# Patient Record
Sex: MALE | Race: WHITE | Employment: UNEMPLOYED | URBAN - METROPOLITAN AREA
[De-identification: names, ages, dates, MRNs, and addresses within clinical notes are randomized per-mention and may not be internally consistent; named-entity substitution may affect disease eponyms.]

---

## 2019-03-22 ENCOUNTER — APPOINTMENT (OUTPATIENT)
Dept: CT IMAGING | Age: 39
DRG: 244 | End: 2019-03-22
Attending: INTERNAL MEDICINE
Payer: MEDICAID

## 2019-03-22 ENCOUNTER — HOSPITAL ENCOUNTER (INPATIENT)
Age: 39
LOS: 7 days | Discharge: HOME | DRG: 244 | End: 2019-03-29
Attending: INTERNAL MEDICINE | Admitting: INTERNAL MEDICINE
Payer: MEDICAID

## 2019-03-22 DIAGNOSIS — K57.40 DIVERTICULITIS OF BOTH LARGE AND SMALL INTESTINE WITH ABSCESS WITHOUT BLEEDING: ICD-10-CM

## 2019-03-22 DIAGNOSIS — K65.1 INTRA-ABDOMINAL ABSCESS (HCC): Primary | ICD-10-CM

## 2019-03-22 LAB
ANION GAP SERPL CALCULATED.3IONS-SCNC: 14 MMOL/L (ref 3–16)
BUN BLDV-MCNC: 10 MG/DL (ref 7–20)
CALCIUM SERPL-MCNC: 8.8 MG/DL (ref 8.3–10.6)
CHLORIDE BLD-SCNC: 91 MMOL/L (ref 99–110)
CO2: 30 MMOL/L (ref 21–32)
CREAT SERPL-MCNC: 0.7 MG/DL (ref 0.9–1.3)
GFR AFRICAN AMERICAN: >60
GFR NON-AFRICAN AMERICAN: >60
GLUCOSE BLD-MCNC: 103 MG/DL (ref 70–99)
INR BLD: 1.65 (ref 0.86–1.14)
POTASSIUM SERPL-SCNC: 2.7 MMOL/L (ref 3.5–5.1)
PROTHROMBIN TIME: 18.8 SEC (ref 9.8–13)
SODIUM BLD-SCNC: 135 MMOL/L (ref 136–145)

## 2019-03-22 PROCEDURE — 6370000000 HC RX 637 (ALT 250 FOR IP): Performed by: INTERNAL MEDICINE

## 2019-03-22 PROCEDURE — 6360000002 HC RX W HCPCS: Performed by: INTERNAL MEDICINE

## 2019-03-22 PROCEDURE — 2580000003 HC RX 258: Performed by: INTERNAL MEDICINE

## 2019-03-22 PROCEDURE — 85610 PROTHROMBIN TIME: CPT

## 2019-03-22 PROCEDURE — 2500000003 HC RX 250 WO HCPCS: Performed by: INTERNAL MEDICINE

## 2019-03-22 PROCEDURE — 87076 CULTURE ANAEROBE IDENT EACH: CPT

## 2019-03-22 PROCEDURE — 87070 CULTURE OTHR SPECIMN AEROBIC: CPT

## 2019-03-22 PROCEDURE — 36415 COLL VENOUS BLD VENIPUNCTURE: CPT

## 2019-03-22 PROCEDURE — 1200000000 HC SEMI PRIVATE

## 2019-03-22 PROCEDURE — 6360000002 HC RX W HCPCS: Performed by: RADIOLOGY

## 2019-03-22 PROCEDURE — 99254 IP/OBS CNSLTJ NEW/EST MOD 60: CPT | Performed by: SURGERY

## 2019-03-22 PROCEDURE — 87205 SMEAR GRAM STAIN: CPT

## 2019-03-22 PROCEDURE — 2709999900 CT PERITONEAL/RETROPERITONEAL PERC DRAIN

## 2019-03-22 PROCEDURE — 87185 SC STD ENZYME DETCJ PER NZM: CPT

## 2019-03-22 PROCEDURE — 0W9G30Z DRAINAGE OF PERITONEAL CAVITY WITH DRAINAGE DEVICE, PERCUTANEOUS APPROACH: ICD-10-PCS | Performed by: RADIOLOGY

## 2019-03-22 PROCEDURE — 80048 BASIC METABOLIC PNL TOTAL CA: CPT

## 2019-03-22 PROCEDURE — 87186 SC STD MICRODIL/AGAR DIL: CPT

## 2019-03-22 PROCEDURE — 87075 CULTR BACTERIA EXCEPT BLOOD: CPT

## 2019-03-22 RX ORDER — SODIUM CHLORIDE 9 MG/ML
INJECTION, SOLUTION INTRAVENOUS CONTINUOUS
Status: DISCONTINUED | OUTPATIENT
Start: 2019-03-22 | End: 2019-03-25

## 2019-03-22 RX ORDER — POTASSIUM CHLORIDE 20 MEQ/1
40 TABLET, EXTENDED RELEASE ORAL PRN
Status: DISCONTINUED | OUTPATIENT
Start: 2019-03-22 | End: 2019-03-29 | Stop reason: HOSPADM

## 2019-03-22 RX ORDER — LORAZEPAM 0.5 MG/1
0.5 TABLET ORAL EVERY 6 HOURS PRN
Status: DISCONTINUED | OUTPATIENT
Start: 2019-03-22 | End: 2019-03-29 | Stop reason: HOSPADM

## 2019-03-22 RX ORDER — SODIUM CHLORIDE 0.9 % (FLUSH) 0.9 %
10 SYRINGE (ML) INJECTION PRN
Status: DISCONTINUED | OUTPATIENT
Start: 2019-03-22 | End: 2019-03-29 | Stop reason: HOSPADM

## 2019-03-22 RX ORDER — POTASSIUM CHLORIDE 7.45 MG/ML
10 INJECTION INTRAVENOUS PRN
Status: DISCONTINUED | OUTPATIENT
Start: 2019-03-22 | End: 2019-03-29 | Stop reason: HOSPADM

## 2019-03-22 RX ORDER — ACETAMINOPHEN 325 MG/1
650 TABLET ORAL EVERY 4 HOURS PRN
Status: DISCONTINUED | OUTPATIENT
Start: 2019-03-22 | End: 2019-03-29 | Stop reason: HOSPADM

## 2019-03-22 RX ORDER — MAGNESIUM SULFATE 1 G/100ML
1 INJECTION INTRAVENOUS PRN
Status: DISCONTINUED | OUTPATIENT
Start: 2019-03-22 | End: 2019-03-29 | Stop reason: HOSPADM

## 2019-03-22 RX ORDER — MIDAZOLAM HYDROCHLORIDE 5 MG/ML
INJECTION INTRAMUSCULAR; INTRAVENOUS
Status: COMPLETED | OUTPATIENT
Start: 2019-03-22 | End: 2019-03-22

## 2019-03-22 RX ORDER — HYDROMORPHONE HCL 110MG/55ML
0.5 PATIENT CONTROLLED ANALGESIA SYRINGE INTRAVENOUS
Status: DISCONTINUED | OUTPATIENT
Start: 2019-03-22 | End: 2019-03-23

## 2019-03-22 RX ORDER — SODIUM CHLORIDE 0.9 % (FLUSH) 0.9 %
10 SYRINGE (ML) INJECTION EVERY 12 HOURS SCHEDULED
Status: DISCONTINUED | OUTPATIENT
Start: 2019-03-22 | End: 2019-03-29 | Stop reason: HOSPADM

## 2019-03-22 RX ORDER — FENTANYL CITRATE 50 UG/ML
INJECTION, SOLUTION INTRAMUSCULAR; INTRAVENOUS
Status: COMPLETED | OUTPATIENT
Start: 2019-03-22 | End: 2019-03-22

## 2019-03-22 RX ORDER — ONDANSETRON 2 MG/ML
4 INJECTION INTRAMUSCULAR; INTRAVENOUS EVERY 6 HOURS PRN
Status: DISCONTINUED | OUTPATIENT
Start: 2019-03-22 | End: 2019-03-29 | Stop reason: HOSPADM

## 2019-03-22 RX ADMIN — SODIUM CHLORIDE: 900 INJECTION, SOLUTION INTRAVENOUS at 21:56

## 2019-03-22 RX ADMIN — HYDROMORPHONE HYDROCHLORIDE 0.5 MG: 2 INJECTION, SOLUTION INTRAMUSCULAR; INTRAVENOUS; SUBCUTANEOUS at 16:52

## 2019-03-22 RX ADMIN — ONDANSETRON 4 MG: 2 INJECTION INTRAMUSCULAR; INTRAVENOUS at 13:59

## 2019-03-22 RX ADMIN — METRONIDAZOLE 500 MG: 500 INJECTION, SOLUTION INTRAVENOUS at 18:27

## 2019-03-22 RX ADMIN — LORAZEPAM 0.5 MG: 0.5 TABLET ORAL at 21:44

## 2019-03-22 RX ADMIN — FENTANYL CITRATE 50 MCG: 50 INJECTION INTRAMUSCULAR; INTRAVENOUS at 15:59

## 2019-03-22 RX ADMIN — METRONIDAZOLE 500 MG: 500 INJECTION, SOLUTION INTRAVENOUS at 21:37

## 2019-03-22 RX ADMIN — Medication 10 ML: at 21:37

## 2019-03-22 RX ADMIN — HYDROMORPHONE HYDROCHLORIDE 0.5 MG: 2 INJECTION, SOLUTION INTRAMUSCULAR; INTRAVENOUS; SUBCUTANEOUS at 21:44

## 2019-03-22 RX ADMIN — ONDANSETRON 4 MG: 2 INJECTION INTRAMUSCULAR; INTRAVENOUS at 21:44

## 2019-03-22 RX ADMIN — SODIUM CHLORIDE: 900 INJECTION, SOLUTION INTRAVENOUS at 13:50

## 2019-03-22 RX ADMIN — MIDAZOLAM HYDROCHLORIDE 1 MG: 5 INJECTION INTRAMUSCULAR; INTRAVENOUS at 15:59

## 2019-03-22 RX ADMIN — HYDROMORPHONE HYDROCHLORIDE 0.5 MG: 2 INJECTION, SOLUTION INTRAMUSCULAR; INTRAVENOUS; SUBCUTANEOUS at 13:47

## 2019-03-22 RX ADMIN — CEFEPIME HYDROCHLORIDE 1 G: 1 INJECTION, POWDER, FOR SOLUTION INTRAMUSCULAR; INTRAVENOUS at 21:37

## 2019-03-22 ASSESSMENT — PAIN DESCRIPTION - LOCATION: LOCATION: ABDOMEN

## 2019-03-22 ASSESSMENT — PAIN SCALES - GENERAL
PAINLEVEL_OUTOF10: 10
PAINLEVEL_OUTOF10: 10
PAINLEVEL_OUTOF10: 8
PAINLEVEL_OUTOF10: 9
PAINLEVEL_OUTOF10: 9
PAINLEVEL_OUTOF10: 7
PAINLEVEL_OUTOF10: 8

## 2019-03-22 ASSESSMENT — PAIN DESCRIPTION - PAIN TYPE: TYPE: ACUTE PAIN

## 2019-03-22 ASSESSMENT — PAIN DESCRIPTION - FREQUENCY: FREQUENCY: CONTINUOUS

## 2019-03-22 ASSESSMENT — PAIN DESCRIPTION - ONSET: ONSET: GRADUAL

## 2019-03-22 ASSESSMENT — PAIN DESCRIPTION - DESCRIPTORS: DESCRIPTORS: ACHING;CONSTANT

## 2019-03-22 NOTE — PROGRESS NOTES
Pt arrived for image guided abscess drain placement. Procedure explained including the risk and benefits of the procedure. All questions answered and pt states no more questions. Labs, allergies, medications, and code status reviewed. No contraindications noted. Pre Farooq score 10. Time out taken.

## 2019-03-22 NOTE — PRE SEDATION
Sedation Pre-Procedure Note    Patient Name: Maria Luisa Weiss   YOB: 1980  Room/Bed: 0212/0212-01  Medical Record Number: [id number]  Date: 3/22/2019   Time: 3:45 PM       Indication:  abd abcess drain    Consent: I have discussed with the patient and/or the patient representative the indication, alternatives, and the possible risks and/or complications of the planned procedure and the anesthesia methods. The patient and/or patient representative appear to understand and agree to proceed. Vital Signs:   Vitals:    03/22/19 1324   BP: 116/70   Pulse: 81   Resp: 16   Temp: 98.4 °F (36.9 °C)   SpO2: 97%       Past Medical History:   has no past medical history on file. Past Surgical History:   has no past surgical history on file. Medications:   Scheduled Meds:    sodium chloride flush  10 mL Intravenous 2 times per day    enoxaparin  40 mg Subcutaneous Daily    cefepime  1 g Intravenous Q8H    metroNIDAZOLE  500 mg Intravenous Q8H     Continuous Infusions:    sodium chloride 125 mL/hr at 03/22/19 1350     PRN Meds: sodium chloride flush, potassium chloride **OR** potassium alternative oral replacement **OR** potassium chloride, magnesium sulfate, magnesium hydroxide, ondansetron, acetaminophen, HYDROmorphone, LORazepam  Home Meds:   Prior to Admission medications    Medication Sig Start Date End Date Taking? Authorizing Provider   naproxen (NAPROSYN) 500 MG tablet Take 1 tablet by mouth 2 times daily 8/15/17   BENI Daly CNP     Coumadin Use Last 7 Days:  no  Antiplatelet drug therapy use last 7 days: no  Other anticoagulant use last 7 days: no  Additional Medication Information:        Pre-Sedation Documentation and Exam:   I have reviewed the patient's history and review of systems.     Mallampati Airway Assessment:  Mallampati Class II - (soft palate, fauces & uvula are visible)    Prior History of Anesthesia Complications:   none    ASA Classification:  Class 2 - A normal healthy patient with mild systemic disease    Sedation/ Anesthesia Plan:   intravenous sedation    Medications Planned:   midazolam (Versed) intravenously and fentanyl intravenously    Patient is an appropriate candidate for plan of sedation: yes    Electronically signed by Rambo Peters MD on 3/22/2019 at 3:45 PM

## 2019-03-22 NOTE — PROGRESS NOTES
Consult has been called to Dr. Julio Montoya on 3/22/2019 . Spoke with Apolinar Greene in office.  2:17 PM    Baldev Medina  3/22/2019

## 2019-03-22 NOTE — PROGRESS NOTES
Admission assessment completed:    Alert and Oriented x4. Vitals are WNL. Respiratory status is regular, easy, unlabored, and SpO2 is 95% on RA. Pain is 9/10, located in abdomen, Pain medication given. Pt denies any other complications at this time. SR up 2/4. Bed in lowest position. Call light within reach. . Will monitor.

## 2019-03-22 NOTE — PROGRESS NOTES
4 Eyes Skin Assessment     The patient is being assess for   Admission    I agree that 2 RN's have performed a thorough Head to Toe Skin Assessment on the patient. ALL assessment sites listed below have been assessed. Areas assessed by both nurses:   [x]   Head, Face, and Ears   [x]   Shoulders, Back, and Chest, Abdomen  [x]   Arms, Elbows, and Hands   [x]   Coccyx, Sacrum, and Ischium  [x]   Legs, Feet, and Heels        Patient has no skin issues    **SHARE this note so that the co-signing nurse is able to place an eSignature**    Co-signer eSignature: Electronically signed by Radha Gregg RN on 3/22/19 at 6:50 PM    Does the Patient have Skin Breakdown?   No          Sebastian Prevention initiated:  No   Wound Care Orders initiated:  No      WOC nurse consulted for Pressure Injury (Stage 3,4, Unstageable, DTI, NWPT, Complex wounds)and New or Established Ostomies:  NA      Primary Nurse eSignature: Electronically signed by Nitin Ag RN on 3/22/19 at 3:59 PM

## 2019-03-22 NOTE — PROGRESS NOTES
Patient tolerated procedure well, specimens sent, drain to gravity bag,with green purulent drainage. Report called to Krishan Mcadams RN. Transport notified to take patient to floor in stable condition.   Ruchi DIAZ RN

## 2019-03-22 NOTE — PLAN OF CARE
49-year-old male coming from [facility]  Failed outpatient antibiotic treatment for radiculitis completed course of Cipro and Flagyl  Back with abdominal pain, CT shows 2 abscesses  Transferred to [facility], IV antibiotics and surgery consult

## 2019-03-22 NOTE — CONSULTS
mg, Intravenous, Q3H PRN  LORazepam (ATIVAN) tablet 0.5 mg, 0.5 mg, Oral, Q6H PRN  Prior to Admission medications    Medication Sig Start Date End Date Taking? Authorizing Provider   naproxen (NAPROSYN) 500 MG tablet Take 1 tablet by mouth 2 times daily 8/15/17   BENI Ocasio - CNP        Allergies:  Tramadol    Social History:   TOBACCO:   reports that he has been smoking. He has a 20.00 pack-year smoking history. He does not have any smokeless tobacco history on file. ETOH:   reports that he drinks alcohol. DRUGS:   reports that he does not use drugs. Family History:    No family history on file. REVIEW OF SYSTEMS:  He reports no complaints related to the eyes, ears , nose throat or mouth. He denies weight loss. No chest pain. No SOB. No urinary complaints. No musculoskeletal complaints. No skin rashes. No neurologic deficits. No bleeding tendencies. GI complaints include lower abdominal pain. PHYSICAL EXAM:  VITALS:  /70   Pulse 81   Temp 98.4 °F (36.9 °C) (Oral)   Resp 16   Ht 6' 1\" (1.854 m)   Wt 227 lb (103 kg)   SpO2 97%   BMI 29.95 kg/m²     CONSTITUTIONAL:  alert, no apparent distress and normal weight  EYES:  sclera clear  ENT:  normocepalic, without obvious abnormality  NECK:  supple, symmetrical, trachea midline  LUNGS:  clear to auscultation  CARDIOVASCULAR:  regular rate and rhythm   ABDOMEN:   non-distended, tenderness noted in the left lower quadrant,  and soft  MUSCULOSKELETAL:  No pitting edema lower extremities  NEUROLOGIC:  Mental Status Exam:  Level of Alertness:   awake  Orientation:   person, place, time  SKIN:  no rashes    DATA:    CBC: WBC 20 at [facility]  BMP:    Recent Labs     03/22/19  1414   *   K 2.7*   CL 91*   CO2 30   BUN 10   CREATININE 0.7*   GLUCOSE 103*         Radiology Review:  CT reviewed and shows pericolonic abscess    ASSESSMENT:  Sigmoid diverticulitis with abdominopelvic abscess    PLAN:  Discussed with IR.   Plan for percutaneous drainage. Antibiotics.         [address] [facility]

## 2019-03-23 LAB
ANION GAP SERPL CALCULATED.3IONS-SCNC: 16 MMOL/L (ref 3–16)
ANISOCYTOSIS: ABNORMAL
ATYPICAL LYMPHOCYTE RELATIVE PERCENT: 2 % (ref 0–6)
BANDED NEUTROPHILS RELATIVE PERCENT: 1 % (ref 0–7)
BASOPHILS ABSOLUTE: 0 K/UL (ref 0–0.2)
BASOPHILS RELATIVE PERCENT: 0 %
BUN BLDV-MCNC: 9 MG/DL (ref 7–20)
CALCIUM SERPL-MCNC: 8.1 MG/DL (ref 8.3–10.6)
CHLORIDE BLD-SCNC: 92 MMOL/L (ref 99–110)
CO2: 25 MMOL/L (ref 21–32)
CREAT SERPL-MCNC: 0.6 MG/DL (ref 0.9–1.3)
EOSINOPHILS ABSOLUTE: 0 K/UL (ref 0–0.6)
EOSINOPHILS RELATIVE PERCENT: 0 %
GFR AFRICAN AMERICAN: >60
GFR NON-AFRICAN AMERICAN: >60
GLUCOSE BLD-MCNC: 111 MG/DL (ref 70–99)
HCT VFR BLD CALC: 34.7 % (ref 40.5–52.5)
HEMOGLOBIN: 11.6 G/DL (ref 13.5–17.5)
LYMPHOCYTES ABSOLUTE: 1.4 K/UL (ref 1–5.1)
LYMPHOCYTES RELATIVE PERCENT: 6 %
MAGNESIUM: 1.9 MG/DL (ref 1.8–2.4)
MCH RBC QN AUTO: 29.4 PG (ref 26–34)
MCHC RBC AUTO-ENTMCNC: 33.3 G/DL (ref 31–36)
MCV RBC AUTO: 88.3 FL (ref 80–100)
MONOCYTES ABSOLUTE: 1.2 K/UL (ref 0–1.3)
MONOCYTES RELATIVE PERCENT: 7 %
NEUTROPHILS ABSOLUTE: 15 K/UL (ref 1.7–7.7)
NEUTROPHILS RELATIVE PERCENT: 84 %
PDW BLD-RTO: 15.2 % (ref 12.4–15.4)
PHOSPHORUS: 3.1 MG/DL (ref 2.5–4.9)
PLATELET # BLD: 242 K/UL (ref 135–450)
PLATELET SLIDE REVIEW: ADEQUATE
PMV BLD AUTO: 8.5 FL (ref 5–10.5)
POTASSIUM REFLEX MAGNESIUM: 2.8 MMOL/L (ref 3.5–5.1)
RBC # BLD: 3.94 M/UL (ref 4.2–5.9)
SLIDE REVIEW: ABNORMAL
SODIUM BLD-SCNC: 133 MMOL/L (ref 136–145)
WBC # BLD: 17.7 K/UL (ref 4–11)

## 2019-03-23 PROCEDURE — 85025 COMPLETE CBC W/AUTO DIFF WBC: CPT

## 2019-03-23 PROCEDURE — 6370000000 HC RX 637 (ALT 250 FOR IP): Performed by: INTERNAL MEDICINE

## 2019-03-23 PROCEDURE — 80048 BASIC METABOLIC PNL TOTAL CA: CPT

## 2019-03-23 PROCEDURE — 2580000003 HC RX 258: Performed by: SURGERY

## 2019-03-23 PROCEDURE — 84100 ASSAY OF PHOSPHORUS: CPT

## 2019-03-23 PROCEDURE — 1200000000 HC SEMI PRIVATE

## 2019-03-23 PROCEDURE — 6370000000 HC RX 637 (ALT 250 FOR IP): Performed by: SURGERY

## 2019-03-23 PROCEDURE — 83735 ASSAY OF MAGNESIUM: CPT

## 2019-03-23 PROCEDURE — 6360000002 HC RX W HCPCS: Performed by: INTERNAL MEDICINE

## 2019-03-23 PROCEDURE — 6360000002 HC RX W HCPCS: Performed by: SURGERY

## 2019-03-23 PROCEDURE — 2580000003 HC RX 258: Performed by: INTERNAL MEDICINE

## 2019-03-23 PROCEDURE — 36415 COLL VENOUS BLD VENIPUNCTURE: CPT

## 2019-03-23 PROCEDURE — 99233 SBSQ HOSP IP/OBS HIGH 50: CPT | Performed by: SURGERY

## 2019-03-23 PROCEDURE — 2500000003 HC RX 250 WO HCPCS: Performed by: INTERNAL MEDICINE

## 2019-03-23 RX ORDER — OXYCODONE HYDROCHLORIDE 5 MG/1
5 TABLET ORAL EVERY 4 HOURS PRN
Status: DISCONTINUED | OUTPATIENT
Start: 2019-03-23 | End: 2019-03-29 | Stop reason: HOSPADM

## 2019-03-23 RX ORDER — NICOTINE 21 MG/24HR
1 PATCH, TRANSDERMAL 24 HOURS TRANSDERMAL DAILY
Status: DISCONTINUED | OUTPATIENT
Start: 2019-03-23 | End: 2019-03-29 | Stop reason: HOSPADM

## 2019-03-23 RX ORDER — OXYCODONE HYDROCHLORIDE 5 MG/1
10 TABLET ORAL EVERY 4 HOURS PRN
Status: DISCONTINUED | OUTPATIENT
Start: 2019-03-23 | End: 2019-03-29 | Stop reason: HOSPADM

## 2019-03-23 RX ORDER — HYDROMORPHONE HCL 110MG/55ML
1 PATIENT CONTROLLED ANALGESIA SYRINGE INTRAVENOUS
Status: DISCONTINUED | OUTPATIENT
Start: 2019-03-23 | End: 2019-03-28

## 2019-03-23 RX ORDER — MAGNESIUM SULFATE IN WATER 40 MG/ML
2 INJECTION, SOLUTION INTRAVENOUS ONCE
Status: COMPLETED | OUTPATIENT
Start: 2019-03-23 | End: 2019-03-23

## 2019-03-23 RX ORDER — KETOROLAC TROMETHAMINE 30 MG/ML
30 INJECTION, SOLUTION INTRAMUSCULAR; INTRAVENOUS EVERY 8 HOURS PRN
Status: DISPENSED | OUTPATIENT
Start: 2019-03-23 | End: 2019-03-28

## 2019-03-23 RX ADMIN — HYDROMORPHONE HYDROCHLORIDE 1 MG: 2 INJECTION, SOLUTION INTRAMUSCULAR; INTRAVENOUS; SUBCUTANEOUS at 19:04

## 2019-03-23 RX ADMIN — POTASSIUM CHLORIDE 10 MEQ: 7.46 INJECTION, SOLUTION INTRAVENOUS at 18:05

## 2019-03-23 RX ADMIN — LORAZEPAM 0.5 MG: 0.5 TABLET ORAL at 17:23

## 2019-03-23 RX ADMIN — MEROPENEM 1 G: 1 INJECTION, POWDER, FOR SOLUTION INTRAVENOUS at 09:44

## 2019-03-23 RX ADMIN — OXYCODONE HYDROCHLORIDE 10 MG: 5 TABLET ORAL at 09:43

## 2019-03-23 RX ADMIN — HYDROMORPHONE HYDROCHLORIDE 1 MG: 2 INJECTION, SOLUTION INTRAMUSCULAR; INTRAVENOUS; SUBCUTANEOUS at 14:53

## 2019-03-23 RX ADMIN — ONDANSETRON 4 MG: 2 INJECTION INTRAMUSCULAR; INTRAVENOUS at 08:02

## 2019-03-23 RX ADMIN — HYDROMORPHONE HYDROCHLORIDE 0.5 MG: 2 INJECTION, SOLUTION INTRAMUSCULAR; INTRAVENOUS; SUBCUTANEOUS at 08:02

## 2019-03-23 RX ADMIN — OXYCODONE HYDROCHLORIDE 10 MG: 5 TABLET ORAL at 18:00

## 2019-03-23 RX ADMIN — ONDANSETRON 4 MG: 2 INJECTION INTRAMUSCULAR; INTRAVENOUS at 13:54

## 2019-03-23 RX ADMIN — POTASSIUM CHLORIDE 10 MEQ: 7.46 INJECTION, SOLUTION INTRAVENOUS at 13:00

## 2019-03-23 RX ADMIN — OXYCODONE HYDROCHLORIDE 10 MG: 5 TABLET ORAL at 22:09

## 2019-03-23 RX ADMIN — KETOROLAC TROMETHAMINE 30 MG: 30 INJECTION, SOLUTION INTRAMUSCULAR; INTRAVENOUS at 18:00

## 2019-03-23 RX ADMIN — POTASSIUM CHLORIDE 10 MEQ: 7.46 INJECTION, SOLUTION INTRAVENOUS at 19:05

## 2019-03-23 RX ADMIN — MEROPENEM 1 G: 1 INJECTION, POWDER, FOR SOLUTION INTRAVENOUS at 17:19

## 2019-03-23 RX ADMIN — LORAZEPAM 0.5 MG: 0.5 TABLET ORAL at 23:31

## 2019-03-23 RX ADMIN — KETOROLAC TROMETHAMINE 30 MG: 30 INJECTION, SOLUTION INTRAMUSCULAR; INTRAVENOUS at 10:05

## 2019-03-23 RX ADMIN — METRONIDAZOLE 500 MG: 500 INJECTION, SOLUTION INTRAVENOUS at 05:31

## 2019-03-23 RX ADMIN — HYDROMORPHONE HYDROCHLORIDE 1 MG: 2 INJECTION, SOLUTION INTRAMUSCULAR; INTRAVENOUS; SUBCUTANEOUS at 11:13

## 2019-03-23 RX ADMIN — POTASSIUM CHLORIDE 10 MEQ: 7.46 INJECTION, SOLUTION INTRAVENOUS at 16:14

## 2019-03-23 RX ADMIN — SODIUM CHLORIDE: 900 INJECTION, SOLUTION INTRAVENOUS at 20:02

## 2019-03-23 RX ADMIN — HYDROMORPHONE HYDROCHLORIDE 0.5 MG: 2 INJECTION, SOLUTION INTRAMUSCULAR; INTRAVENOUS; SUBCUTANEOUS at 02:52

## 2019-03-23 RX ADMIN — POTASSIUM CHLORIDE 10 MEQ: 7.46 INJECTION, SOLUTION INTRAVENOUS at 12:00

## 2019-03-23 RX ADMIN — CEFEPIME HYDROCHLORIDE 1 G: 1 INJECTION, POWDER, FOR SOLUTION INTRAMUSCULAR; INTRAVENOUS at 05:31

## 2019-03-23 RX ADMIN — POTASSIUM CHLORIDE 10 MEQ: 7.46 INJECTION, SOLUTION INTRAVENOUS at 14:48

## 2019-03-23 RX ADMIN — SODIUM CHLORIDE: 900 INJECTION, SOLUTION INTRAVENOUS at 18:04

## 2019-03-23 RX ADMIN — ONDANSETRON 4 MG: 2 INJECTION INTRAMUSCULAR; INTRAVENOUS at 22:11

## 2019-03-23 RX ADMIN — Medication 10 ML: at 20:02

## 2019-03-23 RX ADMIN — SODIUM CHLORIDE: 900 INJECTION, SOLUTION INTRAVENOUS at 05:36

## 2019-03-23 RX ADMIN — MAGNESIUM SULFATE HEPTAHYDRATE 2 G: 40 INJECTION, SOLUTION INTRAVENOUS at 09:50

## 2019-03-23 RX ADMIN — HYDROMORPHONE HYDROCHLORIDE 1 MG: 2 INJECTION, SOLUTION INTRAMUSCULAR; INTRAVENOUS; SUBCUTANEOUS at 23:31

## 2019-03-23 RX ADMIN — OXYCODONE HYDROCHLORIDE 10 MG: 5 TABLET ORAL at 13:51

## 2019-03-23 RX ADMIN — ACETAMINOPHEN 650 MG: 325 TABLET ORAL at 04:13

## 2019-03-23 ASSESSMENT — PAIN SCALES - GENERAL
PAINLEVEL_OUTOF10: 8
PAINLEVEL_OUTOF10: 9
PAINLEVEL_OUTOF10: 8
PAINLEVEL_OUTOF10: 8
PAINLEVEL_OUTOF10: 0
PAINLEVEL_OUTOF10: 8
PAINLEVEL_OUTOF10: 8
PAINLEVEL_OUTOF10: 6
PAINLEVEL_OUTOF10: 8
PAINLEVEL_OUTOF10: 10
PAINLEVEL_OUTOF10: 0
PAINLEVEL_OUTOF10: 8
PAINLEVEL_OUTOF10: 8

## 2019-03-23 ASSESSMENT — PAIN DESCRIPTION - FREQUENCY
FREQUENCY: CONTINUOUS

## 2019-03-23 ASSESSMENT — PAIN DESCRIPTION - ORIENTATION
ORIENTATION: MID;LOWER

## 2019-03-23 ASSESSMENT — PAIN - FUNCTIONAL ASSESSMENT
PAIN_FUNCTIONAL_ASSESSMENT: ACTIVITIES ARE NOT PREVENTED

## 2019-03-23 ASSESSMENT — PAIN DESCRIPTION - DESCRIPTORS
DESCRIPTORS: ACHING;CONSTANT

## 2019-03-23 ASSESSMENT — PAIN DESCRIPTION - PAIN TYPE
TYPE: ACUTE PAIN

## 2019-03-23 ASSESSMENT — PAIN DESCRIPTION - LOCATION
LOCATION: ABDOMEN

## 2019-03-23 ASSESSMENT — PAIN DESCRIPTION - PROGRESSION
CLINICAL_PROGRESSION: NOT CHANGED

## 2019-03-23 ASSESSMENT — PAIN DESCRIPTION - ONSET
ONSET: ON-GOING

## 2019-03-23 NOTE — PLAN OF CARE
Problem: Infection:  Goal: Will remain free from infection  Description  Will remain free from infection  Outcome: Ongoing     Problem: Safety:  Goal: Free from accidental physical injury  Description  Free from accidental physical injury  Outcome: Ongoing     Problem: Pain:  Goal: Patient's pain/discomfort is manageable  Description  Patient's pain/discomfort is manageable  Outcome: Ongoing

## 2019-03-23 NOTE — FLOWSHEET NOTE
03/23/19 0930   Vital Signs   Temp 97.8 °F (36.6 °C)   Temp Source Oral   Pulse 88   Heart Rate Source Monitor   Resp 16   /68   BP Location Right upper arm   BP Upper/Lower Upper   Patient Position Supine   Level of Consciousness 0   MEWS Score 1   Oxygen Therapy   SpO2 96 %   O2 Device None (Room air)   AM assessment completed, see flow sheet. Pt is alert and oriented. Vital signs are WNL. Respirations are even & easy. Pt with c/o pain to abdomen, PRN pain meds given per order as well as nausea med, will monitor for effect. Dr. Yogi Dan made aware of K 2.8, K+ protocol replacement orders in place, and will adminster KCl per orders. Pt denies needs at this time. SR up x 2, and bed in low position. Call light is within reach.

## 2019-03-23 NOTE — CARE COORDINATION
Case Management Assessment  Initial Evaluation    Date/Time of Evaluation: 3/23/2019 10:57 AM  Assessment Completed by: Brenton Figueroa    Patient Name: Gerry Grijalva  YOB: 1980  Diagnosis: Diverticulitis of both small and large intestine with perforation and abscess without bleeding [K57.40]  Diverticulitis of both large and small intestine with abscess, unspecified bleeding status [K57.40]  Date / Time: 3/22/2019 12:50 PM  Admission status/Date:inpatient 03/22/19  Chart Reviewed: Yes      Patient Interviewed: Yes   Family Interviewed:  No      Hospitalization in the last 30 days:  No    Contacts  :     Relationship to Patient:   Phone Number:    Alternate Contact:     Relationship to Patient:     Phone Number:    Met with:    Current PCP  No primary care provider on file. Financial  self pay  Precert required for SNF : Y, N        3 night stay required - Y, N    ADLS  Support Systems: Parent  Transportation: self    Meal Preparation: self    Housing  Home Environment: home with his Mom  Steps: one  Plans to Return to Present Housing: Yes  Other Identified Issues: no    Home Care Information  Currently active with [facility] [address] : No  Type of Home Care Services: None  Passport/Waiver : No  :                      Phone Number:    Passport/Waiver Services: no          Durable Medical Equipment   DME Provider: n/a  Equipment: Walker__Cane__RTS__ BSC__Shower Chair__  02__ HHN__ CPAP__  BiPap__  Hospital Bed__ W/C___ Other__________      Has Home O2 in place on admit:  No  Informed of need to bring portable home O2 tank on day of discharge for nursing to connect prior to leaving:   Not Indicated  Verbalized agreement/Understanding:   Not Indicated    Community Service Affiliation  Dialysis:  No    · Name:  · Location  · Dialysis Schedule:  · Phone:   · Fax:     Outpatient PT/OT: No    Cancer Center: No     CHF Clinic: No     Pulmonary Rehab: No  Pain Clinic:

## 2019-03-23 NOTE — PLAN OF CARE
Problem: Infection:  Goal: Will remain free from infection  Description  Will remain free from infection  Outcome: Ongoing     Problem: Daily Care:  Goal: Daily care needs are met  Description  Daily care needs are met  Outcome: Ongoing     Problem: Pain:  Description  Pain management should include both nonpharmacologic and pharmacologic interventions.   Goal: Patient's pain/discomfort is manageable  Description  Patient's pain/discomfort is manageable  Outcome: Ongoing  Goal: Pain level will decrease  Description  Pain level will decrease  Outcome: Ongoing     Problem: Tobacco Use:  Goal: Inpatient tobacco use cessation counseling participation  Description  Inpatient tobacco use cessation counseling participation  Outcome: Ongoing

## 2019-03-23 NOTE — H&P
cooperative. HEENT Normal cephalic, atraumatic without obvious deformity. Pupils equal, round, and reactive to light. Extra ocular muscles intact. Conjunctivae/corneas clear. Neck: Supple, No jugular venous distention/bruits. Trachea midline without thyromegaly or adenopathy with full range of motion. Lungs: Clear to auscultation, bilaterally without Rales/Wheezes/Rhonchi with good respiratory effort. Heart: Regular rate and rhythm with Normal S1/S2 without murmurs, rubs or gallops, point of maximum impulse non-displaced  Abdomen: diffuse tenderness in the lower abdomen, RUQ and LUQ no pain. + BS. Suprapubic region midline abd drain in place. Draining feculent material.   Extremities: No clubbing, cyanosis, or edema bilaterally. Full range of motion without deformity and normal gait intact. Skin: Skin color, texture, turgor normal.  No rashes or lesions. Neurologic: Alert and oriented X 3, neurovascularly intact with sensory/motor intact upper extremities/lower extremities, bilaterally. Cranial nerves: II-XII intact, grossly non-focal.  Mental status: Alert, oriented, thought content appropriate. Capillary Refill: Acceptable  < 3 seconds  Peripheral Pulses: +3 Easily felt, not easily obliterated with pressure    CBC   Recent Labs     03/23/19  0600   WBC 17.7*   HGB 11.6*   HCT 34.7*         RENAL  Recent Labs     03/22/19  1414 03/23/19  0559   * 133*   K 2.7* 2.8*   CL 91* 92*   CO2 30 25   PHOS  --  3.1   BUN 10 9   CREATININE 0.7* 0.6*     LFT'S  No results for input(s): AST, ALT, ALB, BILIDIR, BILITOT, ALKPHOS in the last 72 hours. COAG  Recent Labs     03/22/19  1414   INR 1.65*     CARDIAC ENZYMES  No results for input(s): CKTOTAL, CKMB, CKMBINDEX, TROPONINI in the last 72 hours.     U/A:    Lab Results   Component Value Date    COLORU Yellow 08/15/2017    WBCUA None seen 08/15/2017    RBCUA 0-2 08/15/2017    CLARITYU Clear 08/15/2017    SPECGRAV 1.010 08/15/2017    LEUKOCYTESUR

## 2019-03-24 PROBLEM — E44.0 MODERATE PROTEIN-CALORIE MALNUTRITION (HCC): Status: ACTIVE | Noted: 2019-03-24

## 2019-03-24 LAB
ANION GAP SERPL CALCULATED.3IONS-SCNC: 12 MMOL/L (ref 3–16)
BASOPHILS ABSOLUTE: 0 K/UL (ref 0–0.2)
BASOPHILS RELATIVE PERCENT: 0.2 %
BUN BLDV-MCNC: 11 MG/DL (ref 7–20)
CALCIUM SERPL-MCNC: 8.5 MG/DL (ref 8.3–10.6)
CHLORIDE BLD-SCNC: 97 MMOL/L (ref 99–110)
CO2: 25 MMOL/L (ref 21–32)
CREAT SERPL-MCNC: 0.6 MG/DL (ref 0.9–1.3)
EOSINOPHILS ABSOLUTE: 0.1 K/UL (ref 0–0.6)
EOSINOPHILS RELATIVE PERCENT: 0.9 %
GFR AFRICAN AMERICAN: >60
GFR NON-AFRICAN AMERICAN: >60
GLUCOSE BLD-MCNC: 89 MG/DL (ref 70–99)
HCT VFR BLD CALC: 32.2 % (ref 40.5–52.5)
HEMOGLOBIN: 10.6 G/DL (ref 13.5–17.5)
LYMPHOCYTES ABSOLUTE: 1.5 K/UL (ref 1–5.1)
LYMPHOCYTES RELATIVE PERCENT: 12.1 %
MAGNESIUM: 2.2 MG/DL (ref 1.8–2.4)
MCH RBC QN AUTO: 29.7 PG (ref 26–34)
MCHC RBC AUTO-ENTMCNC: 33.1 G/DL (ref 31–36)
MCV RBC AUTO: 89.9 FL (ref 80–100)
MONOCYTES ABSOLUTE: 1.2 K/UL (ref 0–1.3)
MONOCYTES RELATIVE PERCENT: 9.7 %
NEUTROPHILS ABSOLUTE: 9.6 K/UL (ref 1.7–7.7)
NEUTROPHILS RELATIVE PERCENT: 77.1 %
PDW BLD-RTO: 15.1 % (ref 12.4–15.4)
PHOSPHORUS: 2.1 MG/DL (ref 2.5–4.9)
PLATELET # BLD: 234 K/UL (ref 135–450)
PMV BLD AUTO: 8.5 FL (ref 5–10.5)
POTASSIUM REFLEX MAGNESIUM: 3.5 MMOL/L (ref 3.5–5.1)
RBC # BLD: 3.58 M/UL (ref 4.2–5.9)
SODIUM BLD-SCNC: 134 MMOL/L (ref 136–145)
WBC # BLD: 12.4 K/UL (ref 4–11)

## 2019-03-24 PROCEDURE — 2580000003 HC RX 258: Performed by: SURGERY

## 2019-03-24 PROCEDURE — 6360000002 HC RX W HCPCS: Performed by: SURGERY

## 2019-03-24 PROCEDURE — 6370000000 HC RX 637 (ALT 250 FOR IP): Performed by: INTERNAL MEDICINE

## 2019-03-24 PROCEDURE — 83735 ASSAY OF MAGNESIUM: CPT

## 2019-03-24 PROCEDURE — 2500000003 HC RX 250 WO HCPCS: Performed by: SURGERY

## 2019-03-24 PROCEDURE — 2580000003 HC RX 258: Performed by: INTERNAL MEDICINE

## 2019-03-24 PROCEDURE — 80048 BASIC METABOLIC PNL TOTAL CA: CPT

## 2019-03-24 PROCEDURE — 84100 ASSAY OF PHOSPHORUS: CPT

## 2019-03-24 PROCEDURE — 85025 COMPLETE CBC W/AUTO DIFF WBC: CPT

## 2019-03-24 PROCEDURE — 99232 SBSQ HOSP IP/OBS MODERATE 35: CPT | Performed by: SURGERY

## 2019-03-24 PROCEDURE — 6360000002 HC RX W HCPCS: Performed by: INTERNAL MEDICINE

## 2019-03-24 PROCEDURE — 36415 COLL VENOUS BLD VENIPUNCTURE: CPT

## 2019-03-24 PROCEDURE — 6370000000 HC RX 637 (ALT 250 FOR IP): Performed by: SURGERY

## 2019-03-24 PROCEDURE — 1200000000 HC SEMI PRIVATE

## 2019-03-24 RX ORDER — POTASSIUM CHLORIDE 7.45 MG/ML
40 INJECTION INTRAVENOUS ONCE
Status: DISCONTINUED | OUTPATIENT
Start: 2019-03-24 | End: 2019-03-24 | Stop reason: SDUPTHER

## 2019-03-24 RX ORDER — POTASSIUM CHLORIDE 7.45 MG/ML
10 INJECTION INTRAVENOUS
Status: COMPLETED | OUTPATIENT
Start: 2019-03-24 | End: 2019-03-24

## 2019-03-24 RX ADMIN — HYDROMORPHONE HYDROCHLORIDE 1 MG: 2 INJECTION, SOLUTION INTRAMUSCULAR; INTRAVENOUS; SUBCUTANEOUS at 15:06

## 2019-03-24 RX ADMIN — MEROPENEM 1 G: 1 INJECTION, POWDER, FOR SOLUTION INTRAVENOUS at 00:32

## 2019-03-24 RX ADMIN — ONDANSETRON 4 MG: 2 INJECTION INTRAMUSCULAR; INTRAVENOUS at 21:08

## 2019-03-24 RX ADMIN — POTASSIUM CHLORIDE 10 MEQ: 7.46 INJECTION, SOLUTION INTRAVENOUS at 09:55

## 2019-03-24 RX ADMIN — OXYCODONE HYDROCHLORIDE 10 MG: 5 TABLET ORAL at 16:52

## 2019-03-24 RX ADMIN — POTASSIUM PHOSPHATE, MONOBASIC AND POTASSIUM PHOSPHATE, DIBASIC 20 MMOL: 224; 236 INJECTION, SOLUTION INTRAVENOUS at 17:20

## 2019-03-24 RX ADMIN — HYDROMORPHONE HYDROCHLORIDE 1 MG: 2 INJECTION, SOLUTION INTRAMUSCULAR; INTRAVENOUS; SUBCUTANEOUS at 03:45

## 2019-03-24 RX ADMIN — KETOROLAC TROMETHAMINE 30 MG: 30 INJECTION, SOLUTION INTRAMUSCULAR; INTRAVENOUS at 10:01

## 2019-03-24 RX ADMIN — LORAZEPAM 0.5 MG: 0.5 TABLET ORAL at 23:22

## 2019-03-24 RX ADMIN — ONDANSETRON 4 MG: 2 INJECTION INTRAMUSCULAR; INTRAVENOUS at 06:05

## 2019-03-24 RX ADMIN — HYDROMORPHONE HYDROCHLORIDE 1 MG: 2 INJECTION, SOLUTION INTRAMUSCULAR; INTRAVENOUS; SUBCUTANEOUS at 18:06

## 2019-03-24 RX ADMIN — OXYCODONE HYDROCHLORIDE 10 MG: 5 TABLET ORAL at 06:05

## 2019-03-24 RX ADMIN — OXYCODONE HYDROCHLORIDE 10 MG: 5 TABLET ORAL at 02:03

## 2019-03-24 RX ADMIN — SODIUM CHLORIDE: 900 INJECTION, SOLUTION INTRAVENOUS at 12:18

## 2019-03-24 RX ADMIN — MEROPENEM 1 G: 1 INJECTION, POWDER, FOR SOLUTION INTRAVENOUS at 08:07

## 2019-03-24 RX ADMIN — POTASSIUM CHLORIDE 10 MEQ: 7.46 INJECTION, SOLUTION INTRAVENOUS at 11:00

## 2019-03-24 RX ADMIN — OXYCODONE HYDROCHLORIDE 10 MG: 5 TABLET ORAL at 21:08

## 2019-03-24 RX ADMIN — HYDROMORPHONE HYDROCHLORIDE 1 MG: 2 INJECTION, SOLUTION INTRAMUSCULAR; INTRAVENOUS; SUBCUTANEOUS at 11:00

## 2019-03-24 RX ADMIN — HYDROMORPHONE HYDROCHLORIDE 1 MG: 2 INJECTION, SOLUTION INTRAMUSCULAR; INTRAVENOUS; SUBCUTANEOUS at 07:11

## 2019-03-24 RX ADMIN — ONDANSETRON 4 MG: 2 INJECTION INTRAMUSCULAR; INTRAVENOUS at 15:06

## 2019-03-24 RX ADMIN — SODIUM CHLORIDE: 900 INJECTION, SOLUTION INTRAVENOUS at 21:08

## 2019-03-24 RX ADMIN — POTASSIUM CHLORIDE 10 MEQ: 7.46 INJECTION, SOLUTION INTRAVENOUS at 09:00

## 2019-03-24 RX ADMIN — HYDROMORPHONE HYDROCHLORIDE 1 MG: 2 INJECTION, SOLUTION INTRAMUSCULAR; INTRAVENOUS; SUBCUTANEOUS at 22:11

## 2019-03-24 RX ADMIN — POTASSIUM CHLORIDE 10 MEQ: 7.46 INJECTION, SOLUTION INTRAVENOUS at 12:00

## 2019-03-24 RX ADMIN — MEROPENEM 1 G: 1 INJECTION, POWDER, FOR SOLUTION INTRAVENOUS at 16:52

## 2019-03-24 RX ADMIN — OXYCODONE HYDROCHLORIDE 10 MG: 5 TABLET ORAL at 10:01

## 2019-03-24 RX ADMIN — KETOROLAC TROMETHAMINE 30 MG: 30 INJECTION, SOLUTION INTRAMUSCULAR; INTRAVENOUS at 18:04

## 2019-03-24 ASSESSMENT — PAIN - FUNCTIONAL ASSESSMENT
PAIN_FUNCTIONAL_ASSESSMENT: ACTIVITIES ARE NOT PREVENTED

## 2019-03-24 ASSESSMENT — PAIN DESCRIPTION - FREQUENCY
FREQUENCY: CONTINUOUS

## 2019-03-24 ASSESSMENT — PAIN DESCRIPTION - PAIN TYPE
TYPE: ACUTE PAIN

## 2019-03-24 ASSESSMENT — PAIN DESCRIPTION - PROGRESSION
CLINICAL_PROGRESSION: GRADUALLY IMPROVING
CLINICAL_PROGRESSION: GRADUALLY IMPROVING
CLINICAL_PROGRESSION: NOT CHANGED

## 2019-03-24 ASSESSMENT — PAIN DESCRIPTION - DESCRIPTORS
DESCRIPTORS: ACHING;DISCOMFORT
DESCRIPTORS: ACHING;DISCOMFORT;SORE
DESCRIPTORS: SORE

## 2019-03-24 ASSESSMENT — PAIN SCALES - GENERAL
PAINLEVEL_OUTOF10: 8
PAINLEVEL_OUTOF10: 0
PAINLEVEL_OUTOF10: 8
PAINLEVEL_OUTOF10: 0
PAINLEVEL_OUTOF10: 8
PAINLEVEL_OUTOF10: 6
PAINLEVEL_OUTOF10: 8
PAINLEVEL_OUTOF10: 6
PAINLEVEL_OUTOF10: 0
PAINLEVEL_OUTOF10: 8

## 2019-03-24 ASSESSMENT — PAIN DESCRIPTION - ORIENTATION
ORIENTATION: LOWER;MID
ORIENTATION: LOWER;MID
ORIENTATION: MID;LOWER
ORIENTATION: LOWER;MID
ORIENTATION: MID;LOWER
ORIENTATION: LOWER;MID
ORIENTATION: MID;LOWER
ORIENTATION: LOWER;MID
ORIENTATION: LOWER;MID

## 2019-03-24 ASSESSMENT — PAIN DESCRIPTION - LOCATION
LOCATION: ABDOMEN

## 2019-03-24 ASSESSMENT — PAIN DESCRIPTION - ONSET
ONSET: ON-GOING

## 2019-03-24 NOTE — FLOWSHEET NOTE
03/24/19 0800   Vital Signs   Temp 98.8 °F (37.1 °C)   Temp Source Axillary   Pulse 83   Heart Rate Source Monitor   Resp 16   /68   BP Location Left upper arm   BP Upper/Lower Upper   Patient Position Supine   Level of Consciousness 0   MEWS Score 1   Pain Assessment   Pain Assessment 0-10   Pain Level 8   Pain Type Acute pain   Pain Location Abdomen   Pain Orientation Mid;Lower   Pain Descriptors Sore   Pain Frequency Continuous   Pain Onset On-going   Clinical Progression Gradually improving   Functional Pain Assessment Activities are not prevented   Non-Pharmaceutical Pain Intervention(s) Repositioned; Rest   AM assessment completed, see flow sheet. Pt is alert and oriented. Vital signs are WNL. Respirations are even & easy. C/o pain to abdomen but was recently given PRN pain meds. Pt c/o some pain to l foot, sustained from a fall that happened 6 months ago. Foot slightly swollen but no redness noted. Will make MD aware. Pt denies needs at this time. SR up x 2, and bed in low position. Call light is within reach.

## 2019-03-24 NOTE — PROGRESS NOTES
Pt with c/o pain and some nausea a this time. PRN dilaudid and PRN zofran given. Will monitor for effect.

## 2019-03-24 NOTE — PROGRESS NOTES
Pt started on full liquid diet. Will monitor tolerance. Per Dr. Yogi Dan, ok for pt to shower with drain in place.

## 2019-03-24 NOTE — PROGRESS NOTES
Nutrition Assessment    Type and Reason for Visit: Initial, Positive Nutrition Screen(MST 1)    Nutrition Recommendations:   1. Ensure High Protein added to meals     Nutrition Assessment: pt adnitted with Mod PCM noted by sig wt loss, muscle . loss and fat loss r/t inadequate PO intake over 2 month period d/t GI pain ; remains nutritionally compromised d/t Diverticilitis and abcess with Full liquid diet; will add ensure High protien to meals     Malnutrition Assessment:  · Malnutrition Status: Meets the criteria for moderate malnutrition  · Context: Acute illness or injury  · Findings of the 6 clinical characteristics of malnutrition (Minimum of 2 out of 6 clinical characteristics is required to make the diagnosis of moderate or severe Protein Calorie Malnutrition based on AND/ASPEN Guidelines):  1. Energy Intake-Less than or equal to 75% of estimated energy requirement, Greater than or equal to 1 month    2. Weight Loss-10% loss or greater, in 3 months  3. Fat Loss-Moderate subcutaneous fat loss, Triceps, Orbital  4. Muscle Loss-Moderate muscle mass loss, Clavicles (pectoralis and deltoids), Temples (temporalis muscle), Scapula (trapezius)  5. Fluid Accumulation- , Extremities  6.  Strength-Not measured    Nutrition Risk Level: Moderate    Nutrient Needs:  · Estimated Daily Total Kcal: 2079-6074  · Estimated Daily Protein (g): 108-123  · Estimated Daily Total Fluid (ml/day): 2482-2872    Nutrition Diagnosis:   · Problem:  Moderate malnutrition  · Etiology: related to Alteration in GI function, Insufficient energy/nutrient consumption     Signs and symptoms:  as evidenced by Diet history of poor intake, Weight loss greater than or equal to 10% in 6 months, Moderate loss of subcutaneous fat, Moderate muscle loss, GI abnormality    Objective Information:  · Nutrition-Focused Physical Findings: pt is ill appering sitting up in beds  eyes are dull with dark cirlcle; he c/eleonora wekaness   · Wound Type:    · Current Nutrition Therapies:  · Oral Diet Orders: Full Liquid   · Oral Diet intake: Unable to assess  · Oral Nutrition Supplement (ONS) Orders: None  · ONS intake: Unable to assess  · Anthropometric Measures:  · Ht: 6' 1\" (185.4 cm)   · Current Body Wt: 227 lb (103 kg)  · Admission Body Wt: 227 lb (103 kg)  · Usual Body Wt: 270 lb (122.5 kg)  · % Weight Change:  ,  43 # loss x 2 months  · Ideal Body Wt: 184 lb (83.5 kg), % Ideal Body (123)  · BMI Classification: BMI 30.0 - 34.9 Obese Class I    Nutrition Interventions:   Continue current diet, Start ONS  Continued Inpatient Monitoring, Coordination of Community Care, Coordination of Care    Nutrition Evaluation:   · Evaluation: Goals set   · Goals: pt will consume > 50% of meals and supplements and his body weight will be > 220#      · Monitoring: Meal Intake, Supplement Intake, TF Tolerance, Monitor Bowel Function, Weight      Electronically signed by Raymond Carter RD, LD on 3/24/19 at 1:34 PM    Contact Number: [phone]

## 2019-03-24 NOTE — PLAN OF CARE
Problem: Infection:  Goal: Will remain free from infection  Description  Will remain free from infection  Outcome: Ongoing     Problem: Daily Care:  Goal: Daily care needs are met  Description  Daily care needs are met  Outcome: Ongoing     Problem: Pain:  Description  Pain management should include both nonpharmacologic and pharmacologic interventions.   Goal: Patient's pain/discomfort is manageable  Description  Patient's pain/discomfort is manageable  Outcome: Ongoing     Problem: Nutrition  Goal: Optimal nutrition therapy  3/24/2019 1549 by Clarita Burch RN  Outcome: Ongoing  3/24/2019 1335 by Manjeet Lewis RD, LD  Outcome: Ongoing

## 2019-03-24 NOTE — PROGRESS NOTES
[facility] SURGERY    PATIENT NAME: Iona Bah     TODAY'S DATE: 3/24/2019    CHIEF COMPLAINT: Lower abdominal pain. INTERVAL HISTORY/HPI:    Pt feels about the same. No fever yesterday. Hungry. No BM. REVIEW OF SYSTEMS:  Pertinent positives and negatives as per interval history section    OBJECTIVE:  VITALS:  /68   Pulse 83   Temp 98.8 °F (37.1 °C) (Axillary)   Resp 16   Ht 6' 1\" (1.854 m)   Wt 227 lb (103 kg)   SpO2 97%   BMI 29.95 kg/m²     INTAKE/OUTPUT:    I/O last 3 completed shifts: In: 6250 [P.O.:120; I.V.:3500]  Out: 225 [Urine:200; Drains:25]  No intake/output data recorded. CONSTITUTIONAL:  awake and alert  LUNGS:  Respirations easy and unlabored, clear to auscultation  CARD:  regular rate and rhythm  ABDOMEN:   soft, non-distended, tenderness noted lower abdomen     Data:  CBC:   Recent Labs     03/23/19  0600 03/24/19  0615   WBC 17.7* 12.4*   HGB 11.6* 10.6*   HCT 34.7* 32.2*    234     BMP:    Recent Labs     03/22/19  1414 03/23/19  0559 03/24/19  0615   * 133* 134*   K 2.7* 2.8* 3.5   CL 91* 92* 97*   CO2 30 25 25   BUN 10 9 11   CREATININE 0.7* 0.6* 0.6*   GLUCOSE 103* 111* 89     Hepatic: No results for input(s): AST, ALT, ALB, BILITOT, ALKPHOS in the last 72 hours. Mag:      Recent Labs     03/23/19  0559 03/24/19  0615   MG 1.90 2.20      Phos:     Recent Labs     03/23/19  0559 03/24/19  0615   PHOS 3.1 2.1*      INR:   Recent Labs     03/22/19  1414   INR 1.65*     ASSESSMENT:  Sigmoid diverticulitis with abdominopelvic abscess s/p percutaneous drainage     PLAN:  Antibiotics  Drain in place  Ambulate  Full liquids  Supplement electrolytes.            Electronically signed by [facility], [address]

## 2019-03-24 NOTE — PROGRESS NOTES
Patient resting in bed watching t.v at this time. IV patent, running fluids per order. Patient states his pain is controled at this time. No questions or concerns asked at this time. Call light in reach.

## 2019-03-25 LAB
ANION GAP SERPL CALCULATED.3IONS-SCNC: 11 MMOL/L (ref 3–16)
BASOPHILS ABSOLUTE: 0 K/UL (ref 0–0.2)
BASOPHILS RELATIVE PERCENT: 0.3 %
BUN BLDV-MCNC: 8 MG/DL (ref 7–20)
CALCIUM SERPL-MCNC: 7.9 MG/DL (ref 8.3–10.6)
CHLORIDE BLD-SCNC: 99 MMOL/L (ref 99–110)
CO2: 23 MMOL/L (ref 21–32)
CREAT SERPL-MCNC: <0.5 MG/DL (ref 0.9–1.3)
EOSINOPHILS ABSOLUTE: 0.1 K/UL (ref 0–0.6)
EOSINOPHILS RELATIVE PERCENT: 1.6 %
GFR AFRICAN AMERICAN: >60
GFR NON-AFRICAN AMERICAN: >60
GLUCOSE BLD-MCNC: 99 MG/DL (ref 70–99)
HCT VFR BLD CALC: 30.3 % (ref 40.5–52.5)
HEMOGLOBIN: 10 G/DL (ref 13.5–17.5)
LYMPHOCYTES ABSOLUTE: 1.2 K/UL (ref 1–5.1)
LYMPHOCYTES RELATIVE PERCENT: 14 %
MAGNESIUM: 1.7 MG/DL (ref 1.8–2.4)
MCH RBC QN AUTO: 29.3 PG (ref 26–34)
MCHC RBC AUTO-ENTMCNC: 33.1 G/DL (ref 31–36)
MCV RBC AUTO: 88.3 FL (ref 80–100)
MONOCYTES ABSOLUTE: 0.9 K/UL (ref 0–1.3)
MONOCYTES RELATIVE PERCENT: 10.5 %
NEUTROPHILS ABSOLUTE: 6.1 K/UL (ref 1.7–7.7)
NEUTROPHILS RELATIVE PERCENT: 73.6 %
PDW BLD-RTO: 15.2 % (ref 12.4–15.4)
PHOSPHORUS: 2.4 MG/DL (ref 2.5–4.9)
PLATELET # BLD: 208 K/UL (ref 135–450)
PMV BLD AUTO: 8.7 FL (ref 5–10.5)
POTASSIUM REFLEX MAGNESIUM: 3.3 MMOL/L (ref 3.5–5.1)
RBC # BLD: 3.43 M/UL (ref 4.2–5.9)
SODIUM BLD-SCNC: 133 MMOL/L (ref 136–145)
WBC # BLD: 8.3 K/UL (ref 4–11)

## 2019-03-25 PROCEDURE — 36415 COLL VENOUS BLD VENIPUNCTURE: CPT

## 2019-03-25 PROCEDURE — 83735 ASSAY OF MAGNESIUM: CPT

## 2019-03-25 PROCEDURE — 2580000003 HC RX 258: Performed by: SURGERY

## 2019-03-25 PROCEDURE — 99232 SBSQ HOSP IP/OBS MODERATE 35: CPT | Performed by: SURGERY

## 2019-03-25 PROCEDURE — 2500000003 HC RX 250 WO HCPCS: Performed by: SURGERY

## 2019-03-25 PROCEDURE — 6360000002 HC RX W HCPCS: Performed by: SURGERY

## 2019-03-25 PROCEDURE — 6370000000 HC RX 637 (ALT 250 FOR IP): Performed by: INTERNAL MEDICINE

## 2019-03-25 PROCEDURE — 80048 BASIC METABOLIC PNL TOTAL CA: CPT

## 2019-03-25 PROCEDURE — 84100 ASSAY OF PHOSPHORUS: CPT

## 2019-03-25 PROCEDURE — 6360000002 HC RX W HCPCS: Performed by: INTERNAL MEDICINE

## 2019-03-25 PROCEDURE — 99232 SBSQ HOSP IP/OBS MODERATE 35: CPT | Performed by: INTERNAL MEDICINE

## 2019-03-25 PROCEDURE — 6370000000 HC RX 637 (ALT 250 FOR IP): Performed by: SURGERY

## 2019-03-25 PROCEDURE — 1200000000 HC SEMI PRIVATE

## 2019-03-25 PROCEDURE — 2580000003 HC RX 258: Performed by: INTERNAL MEDICINE

## 2019-03-25 PROCEDURE — 85025 COMPLETE CBC W/AUTO DIFF WBC: CPT

## 2019-03-25 RX ADMIN — Medication 10 ML: at 20:16

## 2019-03-25 RX ADMIN — OXYCODONE HYDROCHLORIDE 10 MG: 5 TABLET ORAL at 23:45

## 2019-03-25 RX ADMIN — HYDROMORPHONE HYDROCHLORIDE 1 MG: 2 INJECTION, SOLUTION INTRAMUSCULAR; INTRAVENOUS; SUBCUTANEOUS at 20:52

## 2019-03-25 RX ADMIN — OXYCODONE HYDROCHLORIDE 10 MG: 5 TABLET ORAL at 01:12

## 2019-03-25 RX ADMIN — OXYCODONE HYDROCHLORIDE 10 MG: 5 TABLET ORAL at 19:37

## 2019-03-25 RX ADMIN — KETOROLAC TROMETHAMINE 30 MG: 30 INJECTION, SOLUTION INTRAMUSCULAR; INTRAVENOUS at 02:34

## 2019-03-25 RX ADMIN — HYDROMORPHONE HYDROCHLORIDE 1 MG: 2 INJECTION, SOLUTION INTRAMUSCULAR; INTRAVENOUS; SUBCUTANEOUS at 06:30

## 2019-03-25 RX ADMIN — POTASSIUM PHOSPHATE, MONOBASIC AND POTASSIUM PHOSPHATE, DIBASIC 20 MMOL: 224; 236 INJECTION, SOLUTION INTRAVENOUS at 14:43

## 2019-03-25 RX ADMIN — ACETAMINOPHEN 650 MG: 325 TABLET ORAL at 20:13

## 2019-03-25 RX ADMIN — KETOROLAC TROMETHAMINE 30 MG: 30 INJECTION, SOLUTION INTRAMUSCULAR; INTRAVENOUS at 22:02

## 2019-03-25 RX ADMIN — LORAZEPAM 0.5 MG: 0.5 TABLET ORAL at 22:01

## 2019-03-25 RX ADMIN — MEROPENEM 1 G: 1 INJECTION, POWDER, FOR SOLUTION INTRAVENOUS at 09:20

## 2019-03-25 RX ADMIN — MEROPENEM 1 G: 1 INJECTION, POWDER, FOR SOLUTION INTRAVENOUS at 01:13

## 2019-03-25 RX ADMIN — ONDANSETRON 4 MG: 2 INJECTION INTRAMUSCULAR; INTRAVENOUS at 05:08

## 2019-03-25 RX ADMIN — HYDROMORPHONE HYDROCHLORIDE 1 MG: 2 INJECTION, SOLUTION INTRAMUSCULAR; INTRAVENOUS; SUBCUTANEOUS at 15:50

## 2019-03-25 RX ADMIN — ONDANSETRON 4 MG: 2 INJECTION INTRAMUSCULAR; INTRAVENOUS at 22:02

## 2019-03-25 RX ADMIN — MEROPENEM 1 G: 1 INJECTION, POWDER, FOR SOLUTION INTRAVENOUS at 17:28

## 2019-03-25 RX ADMIN — OXYCODONE HYDROCHLORIDE 10 MG: 5 TABLET ORAL at 05:08

## 2019-03-25 RX ADMIN — POTASSIUM CHLORIDE 40 MEQ: 20 TABLET, EXTENDED RELEASE ORAL at 09:19

## 2019-03-25 RX ADMIN — HYDROMORPHONE HYDROCHLORIDE 1 MG: 2 INJECTION, SOLUTION INTRAMUSCULAR; INTRAVENOUS; SUBCUTANEOUS at 02:34

## 2019-03-25 RX ADMIN — SODIUM CHLORIDE: 900 INJECTION, SOLUTION INTRAVENOUS at 05:07

## 2019-03-25 RX ADMIN — Medication 10 ML: at 09:20

## 2019-03-25 RX ADMIN — OXYCODONE HYDROCHLORIDE 10 MG: 5 TABLET ORAL at 14:43

## 2019-03-25 RX ADMIN — OXYCODONE HYDROCHLORIDE 10 MG: 5 TABLET ORAL at 09:19

## 2019-03-25 RX ADMIN — HYDROMORPHONE HYDROCHLORIDE 1 MG: 2 INJECTION, SOLUTION INTRAMUSCULAR; INTRAVENOUS; SUBCUTANEOUS at 10:48

## 2019-03-25 RX ADMIN — KETOROLAC TROMETHAMINE 30 MG: 30 INJECTION, SOLUTION INTRAMUSCULAR; INTRAVENOUS at 10:48

## 2019-03-25 RX ADMIN — ONDANSETRON 4 MG: 2 INJECTION INTRAMUSCULAR; INTRAVENOUS at 15:50

## 2019-03-25 ASSESSMENT — PAIN SCALES - GENERAL
PAINLEVEL_OUTOF10: 9
PAINLEVEL_OUTOF10: 8
PAINLEVEL_OUTOF10: 7
PAINLEVEL_OUTOF10: 9
PAINLEVEL_OUTOF10: 7
PAINLEVEL_OUTOF10: 8
PAINLEVEL_OUTOF10: 0
PAINLEVEL_OUTOF10: 9
PAINLEVEL_OUTOF10: 7
PAINLEVEL_OUTOF10: 8
PAINLEVEL_OUTOF10: 8
PAINLEVEL_OUTOF10: 9
PAINLEVEL_OUTOF10: 8
PAINLEVEL_OUTOF10: 9
PAINLEVEL_OUTOF10: 9
PAINLEVEL_OUTOF10: 0

## 2019-03-25 ASSESSMENT — PAIN DESCRIPTION - DESCRIPTORS
DESCRIPTORS: ACHING;DISCOMFORT
DESCRIPTORS: CONSTANT;THROBBING
DESCRIPTORS: CONSTANT
DESCRIPTORS: ACHING;DISCOMFORT
DESCRIPTORS: ACHING;DISCOMFORT

## 2019-03-25 ASSESSMENT — PAIN DESCRIPTION - ONSET
ONSET: ON-GOING

## 2019-03-25 ASSESSMENT — PAIN DESCRIPTION - PAIN TYPE
TYPE: ACUTE PAIN

## 2019-03-25 ASSESSMENT — PAIN DESCRIPTION - FREQUENCY
FREQUENCY: CONTINUOUS

## 2019-03-25 ASSESSMENT — PAIN - FUNCTIONAL ASSESSMENT: PAIN_FUNCTIONAL_ASSESSMENT: ACTIVITIES ARE NOT PREVENTED

## 2019-03-25 ASSESSMENT — PAIN DESCRIPTION - ORIENTATION
ORIENTATION: LOWER;MID

## 2019-03-25 ASSESSMENT — PAIN DESCRIPTION - LOCATION
LOCATION: ABDOMEN

## 2019-03-25 ASSESSMENT — PAIN DESCRIPTION - PROGRESSION: CLINICAL_PROGRESSION: GRADUALLY IMPROVING

## 2019-03-25 NOTE — PROGRESS NOTES
Hospitalist Progress Note      PCP: No primary care provider on file. Date of Admission: 3/22/2019    Chief Complaint: Abd pain. Subjective: still with abd pain, worse when moving or coughing. SHANNON drain with feculent output. Fevers resolving  Continues to have pain      Medications:  Reviewed    Infusion Medications    sodium chloride 125 mL/hr at 03/25/19 0507     Scheduled Medications    meropenem  1 g Intravenous Q8H    nicotine  1 patch Transdermal Daily    sodium chloride flush  10 mL Intravenous 2 times per day    enoxaparin  40 mg Subcutaneous Daily     PRN Meds: oxyCODONE **OR** oxyCODONE, HYDROmorphone, ketorolac, sodium chloride flush, potassium chloride **OR** potassium alternative oral replacement **OR** potassium chloride, magnesium sulfate, magnesium hydroxide, ondansetron, acetaminophen, LORazepam      Intake/Output Summary (Last 24 hours) at 3/25/2019 0758  Last data filed at 3/25/2019 0520  Gross per 24 hour   Intake 3747 ml   Output 390 ml   Net 3357 ml       Physical Exam Performed:    /78   Pulse 80   Temp 97.6 °F (36.4 °C) (Oral)   Resp 16   Ht 6' 1\" (1.854 m)   Wt 227 lb (103 kg)   SpO2 96%   BMI 29.95 kg/m²     General appearance: No apparent distress appears stated age and cooperative. HEENT Normal cephalic, atraumatic without obvious deformity. Pupils equal, round, and reactive to light. Extra ocular muscles intact. Conjunctivae/corneas clear. Neck: Supple, No jugular venous distention/bruits. Trachea midline without thyromegaly or adenopathy with full range of motion. Lungs: Clear to auscultation, bilaterally without Rales/Wheezes/Rhonchi with good respiratory effort. Heart: Regular rate and rhythm with Normal S1/S2 without murmurs, rubs or gallops, point of maximum impulse non-displaced  Abdomen: diffuse tenderness in the lower abdomen, RUQ and LUQ no pain. + BS. Suprapubic region midline abd drain in place.  Draining feculent material. Juan Cross MD

## 2019-03-25 NOTE — PROGRESS NOTES
Bedside report given to [facility], RN, pt stable at this time. Call light within reach.

## 2019-03-25 NOTE — PROGRESS NOTES
PRN Toradal & dilaudid given per request, Pt rated lower abdominal pain 9/10. SEE MAR . Will cont to monitor.

## 2019-03-25 NOTE — PROGRESS NOTES
[facility] SURGERY    PATIENT NAME: Iona Bah     TODAY'S DATE: 3/25/2019    CHIEF COMPLAINT: Lower abdominal pain    INTERVAL HISTORY/HPI:    Pt feels overall better but not much. Denies fever. REVIEW OF SYSTEMS:  Pertinent positives and negatives as per interval history section    OBJECTIVE:  VITALS:  /76   Pulse 79   Temp 97.9 °F (36.6 °C) (Oral)   Resp 16   Ht 6' 1\" (1.854 m)   Wt 227 lb (103 kg)   SpO2 96%   BMI 29.95 kg/m²     INTAKE/OUTPUT:    I/O last 3 completed shifts: In: 0998 [P.O.:320; I.V.:3427]  Out: 400 [Urine:350; Drains:50]  No intake/output data recorded. CONSTITUTIONAL:  awake and alert  LUNGS:  Respirations easy and unlabored, clear to auscultation  CARD:  regular rate and rhythm  ABDOMEN:   soft, non-distended, tenderness noted in the left lower quadrant     Data:  CBC:   Recent Labs     03/23/19  0600 03/24/19  0615 03/25/19  0516   WBC 17.7* 12.4* 8.3   HGB 11.6* 10.6* 10.0*   HCT 34.7* 32.2* 30.3*    234 208     BMP:    Recent Labs     03/23/19  0559 03/24/19  0615 03/25/19  0516   * 134* 133*   K 2.8* 3.5 3.3*   CL 92* 97* 99   CO2 25 25 23   BUN 9 11 8   CREATININE 0.6* 0.6* <0.5*   GLUCOSE 111* 89 99     Hepatic: No results for input(s): AST, ALT, ALB, BILITOT, ALKPHOS in the last 72 hours. Mag:      Recent Labs     03/23/19  0559 03/24/19  0615 03/25/19  0516   MG 1.90 2.20 1.70*      Phos:     Recent Labs     03/23/19  0559 03/24/19  0615 03/25/19  0516   PHOS 3.1 2.1* 2.4*        ASSESSMENT:  Sigmoid diverticulitis with abdominopelvic abscess s/p percutaneous drainage     PLAN:  Antibiotics  Drain in place  Ambulate  Full liquids  Supplement electrolytes. Repeat CT in AM to re-evaluate drain and abscess.           Electronically signed by [employer], [address] [place] [address]

## 2019-03-25 NOTE — PROGRESS NOTES
Shift assessment completed, see flowsheets. K+ replaced per PRN orders. AM meds given per orders. Patient c/o pain 9/10, prn medications provided, see MAR. Patient denies further needs at this time. Patient currently awake in bed, call light and personal belongings within reach.

## 2019-03-25 NOTE — PROGRESS NOTES
Pt resting in bed w eyes closed, no signs of distress noted . Call light within reach. Will cont to monitor.

## 2019-03-26 ENCOUNTER — APPOINTMENT (OUTPATIENT)
Dept: CT IMAGING | Age: 39
DRG: 244 | End: 2019-03-26
Attending: INTERNAL MEDICINE
Payer: MEDICAID

## 2019-03-26 LAB
ANAEROBIC CULTURE: ABNORMAL
ANION GAP SERPL CALCULATED.3IONS-SCNC: 9 MMOL/L (ref 3–16)
BASOPHILS ABSOLUTE: 0.1 K/UL (ref 0–0.2)
BASOPHILS RELATIVE PERCENT: 0.8 %
BUN BLDV-MCNC: 6 MG/DL (ref 7–20)
CALCIUM SERPL-MCNC: 8.2 MG/DL (ref 8.3–10.6)
CHLORIDE BLD-SCNC: 100 MMOL/L (ref 99–110)
CO2: 25 MMOL/L (ref 21–32)
CREAT SERPL-MCNC: <0.5 MG/DL (ref 0.9–1.3)
EOSINOPHILS ABSOLUTE: 0.1 K/UL (ref 0–0.6)
EOSINOPHILS RELATIVE PERCENT: 1.9 %
GFR AFRICAN AMERICAN: >60
GFR NON-AFRICAN AMERICAN: >60
GLUCOSE BLD-MCNC: 101 MG/DL (ref 70–99)
GRAM STAIN RESULT: ABNORMAL
HCT VFR BLD CALC: 31 % (ref 40.5–52.5)
HEMOGLOBIN: 10.4 G/DL (ref 13.5–17.5)
LYMPHOCYTES ABSOLUTE: 1.3 K/UL (ref 1–5.1)
LYMPHOCYTES RELATIVE PERCENT: 18 %
MAGNESIUM: 1.6 MG/DL (ref 1.8–2.4)
MCH RBC QN AUTO: 30 PG (ref 26–34)
MCHC RBC AUTO-ENTMCNC: 33.6 G/DL (ref 31–36)
MCV RBC AUTO: 89.4 FL (ref 80–100)
MONOCYTES ABSOLUTE: 1 K/UL (ref 0–1.3)
MONOCYTES RELATIVE PERCENT: 13.3 %
NEUTROPHILS ABSOLUTE: 4.7 K/UL (ref 1.7–7.7)
NEUTROPHILS RELATIVE PERCENT: 66 %
ORGANISM: ABNORMAL
PDW BLD-RTO: 15.1 % (ref 12.4–15.4)
PHOSPHORUS: 2.9 MG/DL (ref 2.5–4.9)
PLATELET # BLD: 200 K/UL (ref 135–450)
PMV BLD AUTO: 8.5 FL (ref 5–10.5)
POTASSIUM REFLEX MAGNESIUM: 3.7 MMOL/L (ref 3.5–5.1)
RBC # BLD: 3.46 M/UL (ref 4.2–5.9)
SODIUM BLD-SCNC: 134 MMOL/L (ref 136–145)
WBC # BLD: 7.2 K/UL (ref 4–11)
WOUND/ABSCESS: ABNORMAL
WOUND/ABSCESS: ABNORMAL

## 2019-03-26 PROCEDURE — 84100 ASSAY OF PHOSPHORUS: CPT

## 2019-03-26 PROCEDURE — 85025 COMPLETE CBC W/AUTO DIFF WBC: CPT

## 2019-03-26 PROCEDURE — 99232 SBSQ HOSP IP/OBS MODERATE 35: CPT | Performed by: INTERNAL MEDICINE

## 2019-03-26 PROCEDURE — 6360000002 HC RX W HCPCS: Performed by: SURGERY

## 2019-03-26 PROCEDURE — 80048 BASIC METABOLIC PNL TOTAL CA: CPT

## 2019-03-26 PROCEDURE — 6360000002 HC RX W HCPCS: Performed by: INTERNAL MEDICINE

## 2019-03-26 PROCEDURE — 1200000000 HC SEMI PRIVATE

## 2019-03-26 PROCEDURE — 99233 SBSQ HOSP IP/OBS HIGH 50: CPT | Performed by: SURGERY

## 2019-03-26 PROCEDURE — 6370000000 HC RX 637 (ALT 250 FOR IP): Performed by: SURGERY

## 2019-03-26 PROCEDURE — 6360000004 HC RX CONTRAST MEDICATION: Performed by: SURGERY

## 2019-03-26 PROCEDURE — 6370000000 HC RX 637 (ALT 250 FOR IP): Performed by: INTERNAL MEDICINE

## 2019-03-26 PROCEDURE — 2580000003 HC RX 258: Performed by: INTERNAL MEDICINE

## 2019-03-26 PROCEDURE — 36415 COLL VENOUS BLD VENIPUNCTURE: CPT

## 2019-03-26 PROCEDURE — 2580000003 HC RX 258: Performed by: SURGERY

## 2019-03-26 PROCEDURE — 74177 CT ABD & PELVIS W/CONTRAST: CPT

## 2019-03-26 PROCEDURE — 83735 ASSAY OF MAGNESIUM: CPT

## 2019-03-26 RX ADMIN — KETOROLAC TROMETHAMINE 30 MG: 30 INJECTION, SOLUTION INTRAMUSCULAR; INTRAVENOUS at 12:05

## 2019-03-26 RX ADMIN — HYDROMORPHONE HYDROCHLORIDE 1 MG: 2 INJECTION, SOLUTION INTRAMUSCULAR; INTRAVENOUS; SUBCUTANEOUS at 12:05

## 2019-03-26 RX ADMIN — MAGNESIUM SULFATE HEPTAHYDRATE 1 G: 1 INJECTION, SOLUTION INTRAVENOUS at 14:20

## 2019-03-26 RX ADMIN — HYDROMORPHONE HYDROCHLORIDE 1 MG: 2 INJECTION, SOLUTION INTRAMUSCULAR; INTRAVENOUS; SUBCUTANEOUS at 08:36

## 2019-03-26 RX ADMIN — MAGNESIUM SULFATE HEPTAHYDRATE 1 G: 1 INJECTION, SOLUTION INTRAVENOUS at 11:36

## 2019-03-26 RX ADMIN — OXYCODONE HYDROCHLORIDE 10 MG: 5 TABLET ORAL at 05:42

## 2019-03-26 RX ADMIN — MEROPENEM 1 G: 1 INJECTION, POWDER, FOR SOLUTION INTRAVENOUS at 17:02

## 2019-03-26 RX ADMIN — OXYCODONE HYDROCHLORIDE 10 MG: 5 TABLET ORAL at 15:09

## 2019-03-26 RX ADMIN — ONDANSETRON 4 MG: 2 INJECTION INTRAMUSCULAR; INTRAVENOUS at 15:09

## 2019-03-26 RX ADMIN — OXYCODONE HYDROCHLORIDE 10 MG: 5 TABLET ORAL at 19:13

## 2019-03-26 RX ADMIN — OXYCODONE HYDROCHLORIDE 10 MG: 5 TABLET ORAL at 10:56

## 2019-03-26 RX ADMIN — HYDROMORPHONE HYDROCHLORIDE 1 MG: 2 INJECTION, SOLUTION INTRAMUSCULAR; INTRAVENOUS; SUBCUTANEOUS at 21:02

## 2019-03-26 RX ADMIN — HYDROMORPHONE HYDROCHLORIDE 1 MG: 2 INJECTION, SOLUTION INTRAMUSCULAR; INTRAVENOUS; SUBCUTANEOUS at 01:29

## 2019-03-26 RX ADMIN — ONDANSETRON 4 MG: 2 INJECTION INTRAMUSCULAR; INTRAVENOUS at 21:02

## 2019-03-26 RX ADMIN — MEROPENEM 1 G: 1 INJECTION, POWDER, FOR SOLUTION INTRAVENOUS at 08:36

## 2019-03-26 RX ADMIN — HYDROMORPHONE HYDROCHLORIDE 1 MG: 2 INJECTION, SOLUTION INTRAMUSCULAR; INTRAVENOUS; SUBCUTANEOUS at 17:02

## 2019-03-26 RX ADMIN — ONDANSETRON 4 MG: 2 INJECTION INTRAMUSCULAR; INTRAVENOUS at 05:42

## 2019-03-26 RX ADMIN — Medication 10 ML: at 21:02

## 2019-03-26 RX ADMIN — IOPAMIDOL 75 ML: 755 INJECTION, SOLUTION INTRAVENOUS at 10:11

## 2019-03-26 RX ADMIN — MEROPENEM 1 G: 1 INJECTION, POWDER, FOR SOLUTION INTRAVENOUS at 01:29

## 2019-03-26 RX ADMIN — OXYCODONE HYDROCHLORIDE 10 MG: 5 TABLET ORAL at 23:44

## 2019-03-26 ASSESSMENT — PAIN DESCRIPTION - PAIN TYPE
TYPE: ACUTE PAIN

## 2019-03-26 ASSESSMENT — PAIN DESCRIPTION - FREQUENCY: FREQUENCY: CONTINUOUS

## 2019-03-26 ASSESSMENT — PAIN DESCRIPTION - ORIENTATION
ORIENTATION: LOWER;MID

## 2019-03-26 ASSESSMENT — PAIN SCALES - GENERAL
PAINLEVEL_OUTOF10: 9
PAINLEVEL_OUTOF10: 9
PAINLEVEL_OUTOF10: 0
PAINLEVEL_OUTOF10: 9
PAINLEVEL_OUTOF10: 9
PAINLEVEL_OUTOF10: 8
PAINLEVEL_OUTOF10: 10
PAINLEVEL_OUTOF10: 9
PAINLEVEL_OUTOF10: 7
PAINLEVEL_OUTOF10: 0
PAINLEVEL_OUTOF10: 0
PAINLEVEL_OUTOF10: 8
PAINLEVEL_OUTOF10: 9
PAINLEVEL_OUTOF10: 9

## 2019-03-26 ASSESSMENT — PAIN DESCRIPTION - LOCATION
LOCATION: ABDOMEN

## 2019-03-26 ASSESSMENT — PAIN DESCRIPTION - DESCRIPTORS
DESCRIPTORS: CONSTANT
DESCRIPTORS: CONSTANT

## 2019-03-26 ASSESSMENT — PAIN DESCRIPTION - ONSET: ONSET: ON-GOING

## 2019-03-26 NOTE — PROGRESS NOTES
PRN Zofran and pain meds given per request. Pt rated lower abd. Pain 9/10. SEE MAR. Will cont to monitor.

## 2019-03-26 NOTE — PROGRESS NOTES
Bedside report given to [facility], RN, pt stable at this time. Call light within reach.

## 2019-03-26 NOTE — PROGRESS NOTES
PM assessment completed. See flow sheet. Pt A&O X 4. Temp 101.4 tylenol given. SEE MAR. Resp E&E with no distress noted. Pt.denies any further needs at this time. Call light within reach. Will continue to monitor.

## 2019-03-26 NOTE — PROGRESS NOTES
Bedside report given to [facility] with no signs of distress noted. Pt denies needs.

## 2019-03-26 NOTE — PROGRESS NOTES
Hospitalist Progress Note      PCP: No primary care provider on file. Date of Admission: 3/22/2019    Chief Complaint: Abd pain. Subjective: still with abd pain, worse when moving or coughing. SHANNON drain with feculent output. Continues to have pain and fevers       Medications:  Reviewed    Infusion Medications     Scheduled Medications    meropenem  1 g Intravenous Q8H    nicotine  1 patch Transdermal Daily    sodium chloride flush  10 mL Intravenous 2 times per day    enoxaparin  40 mg Subcutaneous Daily     PRN Meds: oxyCODONE **OR** oxyCODONE, HYDROmorphone, ketorolac, sodium chloride flush, potassium chloride **OR** potassium alternative oral replacement **OR** potassium chloride, magnesium sulfate, magnesium hydroxide, ondansetron, acetaminophen, LORazepam      Intake/Output Summary (Last 24 hours) at 3/26/2019 0811  Last data filed at 3/25/2019 2219  Gross per 24 hour   Intake --   Output 35 ml   Net -35 ml       Physical Exam Performed:    /74   Pulse 63   Temp 97.9 °F (36.6 °C) (Oral)   Resp 16   Ht 6' 1\" (1.854 m)   Wt 244 lb (110.7 kg)   SpO2 96%   BMI 32.19 kg/m²     General appearance: No apparent distress appears stated age and cooperative. HEENT Normal cephalic, atraumatic without obvious deformity. Pupils equal, round, and reactive to light. Extra ocular muscles intact. Conjunctivae/corneas clear. Neck: Supple, No jugular venous distention/bruits. Trachea midline without thyromegaly or adenopathy with full range of motion. Lungs: Clear to auscultation, bilaterally without Rales/Wheezes/Rhonchi with good respiratory effort. Heart: Regular rate and rhythm with Normal S1/S2 without murmurs, rubs or gallops, point of maximum impulse non-displaced  Abdomen: diffuse tenderness in the lower abdomen, RUQ and LUQ no pain. + BS. Suprapubic region midline abd drain in place. Draining feculent material.   Extremities: No clubbing, cyanosis, or edema bilaterally. Full range of motion without deformity and normal gait intact. Skin: Skin color, texture, turgor normal.  No rashes or lesions. Neurologic: Alert and oriented X 3, neurovascularly intact with sensory/motor intact upper extremities/lower extremities, bilaterally. Cranial nerves: II-XII intact, grossly non-focal.  Mental status: Alert, oriented, thought content appropriate. Labs:   Recent Labs     03/24/19  0615 03/25/19  0516 03/26/19  0404   WBC 12.4* 8.3 7.2   HGB 10.6* 10.0* 10.4*   HCT 32.2* 30.3* 31.0*    208 200     Recent Labs     03/24/19  0615 03/25/19  0516 03/26/19  0404   * 133* 134*   K 3.5 3.3* 3.7   CL 97* 99 100   CO2 25 23 25   BUN 11 8 6*   CREATININE 0.6* <0.5* <0.5*   CALCIUM 8.5 7.9* 8.2*   PHOS 2.1* 2.4* 2.9     No results for input(s): AST, ALT, BILIDIR, BILITOT, ALKPHOS in the last 72 hours. No results for input(s): INR in the last 72 hours. No results for input(s): Stephanie Jubilee in the last 72 hours. Urinalysis:      Lab Results   Component Value Date    NITRU Negative 08/15/2017    WBCUA None seen 08/15/2017    RBCUA 0-2 08/15/2017    BLOODU TRACE-LYSED 08/15/2017    SPECGRAV 1.010 08/15/2017    GLUCOSEU Negative 08/15/2017       Radiology:  CT PERITONEAL/RETROPERITONEAL [facility] DRAIN   Final Result   Successful CT guided placement of anterior pelvic abscess drainage catheter. CT ABDOMEN PELVIS W IV CONTRAST Additional Contrast? Oral    (Results Pending)           Assessment/Plan:    Active Hospital Problems    Diagnosis Date Noted    Intra-abdominal abscess ([address] [K65.1]     Moderate protein-calorie malnutrition ([address] [E44.0] 03/24/2019    Diverticulitis of both large and small intestine with abscess [K57.40] 47/41/4634     Complicated Diverticulitis with Abscess  IVF. Surgery eval.   S/p CT guided drain 3/22  On IV Merrem  Tolerating full liquid diet. D/c iv fluids   Repeat CT today as he continues to have abd pain and fevers     Hypokalemia. Replace K  Replace Mg        DVT Prophylaxis: lovenox  Diet: full liquid   Code Status: Full Code     Dispo - inpatient.             Brenda Lanier MD

## 2019-03-26 NOTE — PROGRESS NOTES
[facility]    PATIENT NAME: Thuan Dominguez     TODAY'S DATE: 3/26/2019    CHIEF COMPLAINT: Abdominal pain    INTERVAL HISTORY/HPI:    Pt feels about the same. Having fevers again. He had N/V as well. REVIEW OF SYSTEMS:  Pertinent positives and negatives as per interval history section    OBJECTIVE:  VITALS:  /80   Pulse 62   Temp 97.3 °F (36.3 °C) (Oral)   Resp 15   Ht 6' 1\" (1.854 m)   Wt 244 lb (110.7 kg)   SpO2 96%   BMI 32.19 kg/m²     INTAKE/OUTPUT:    I/O last 3 completed shifts:  In: -   Out: 40 [Drains:40]  No intake/output data recorded. CONSTITUTIONAL:  awake and alert  LUNGS:  Respirations easy and unlabored, clear to auscultation  CARD:  regular rate and rhythm  ABDOMEN:   soft, non-distended, tenderness noted in the left lower quadrant, drain with purulent output    Data:  CBC:   Recent Labs     03/24/19  0615 03/25/19  0516 03/26/19  0404   WBC 12.4* 8.3 7.2   HGB 10.6* 10.0* 10.4*   HCT 32.2* 30.3* 31.0*    208 200     BMP:    Recent Labs     03/24/19  0615 03/25/19  0516 03/26/19  0404   * 133* 134*   K 3.5 3.3* 3.7   CL 97* 99 100   CO2 25 23 25   BUN 11 8 6*   CREATININE 0.6* <0.5* <0.5*   GLUCOSE 89 99 101*       Mag:      Recent Labs     03/24/19  0615 03/25/19  0516 03/26/19  0404   MG 2.20 1.70* 1.60*      Phos:     Recent Labs     03/24/19  0615 03/25/19  0516 03/26/19  0404   PHOS 2.1* 2.4* 2.9        Radiology Review:  *Imaging personally reviewed by me. CT shows decreased size of abscess cavity. ASSESSMENT:  Sigmoid diverticulitis with abdominopelvic abscess s/p percutaneous drainage  Failure of medical and interventional management at present     PLAN:  Antibiotics  Drain in place  Ambulate  NPO after midnight. May require surgery this admission as he is not clinically improving with persistent N/V, fever and pain despite drainage and antibiotics.     Re-evaluate in AM for possible surgery.            Electronically signed by [facility] Marylin Edmonds, [address] [place] [address]

## 2019-03-26 NOTE — PROGRESS NOTES
Shift assessment completed, see flowsheets. AM meds given per orders. Patient c/o pain 10/10, prn medications given, see MAR. Oral contrast started at this time. Patient denies further needs at this time. Patient currently awake in bed, call light and personal belongings within reach.

## 2019-03-27 ENCOUNTER — APPOINTMENT (OUTPATIENT)
Dept: CT IMAGING | Age: 39
DRG: 244 | End: 2019-03-27
Attending: INTERNAL MEDICINE
Payer: MEDICAID

## 2019-03-27 LAB
ANION GAP SERPL CALCULATED.3IONS-SCNC: 9 MMOL/L (ref 3–16)
BASOPHILS ABSOLUTE: 0 K/UL (ref 0–0.2)
BASOPHILS RELATIVE PERCENT: 0.4 %
BUN BLDV-MCNC: 3 MG/DL (ref 7–20)
CALCIUM SERPL-MCNC: 8.6 MG/DL (ref 8.3–10.6)
CHLORIDE BLD-SCNC: 100 MMOL/L (ref 99–110)
CO2: 26 MMOL/L (ref 21–32)
CREAT SERPL-MCNC: <0.5 MG/DL (ref 0.9–1.3)
EOSINOPHILS ABSOLUTE: 0.2 K/UL (ref 0–0.6)
EOSINOPHILS RELATIVE PERCENT: 3 %
GFR AFRICAN AMERICAN: >60
GFR NON-AFRICAN AMERICAN: >60
GLUCOSE BLD-MCNC: 104 MG/DL (ref 70–99)
HCT VFR BLD CALC: 34 % (ref 40.5–52.5)
HEMOGLOBIN: 11.2 G/DL (ref 13.5–17.5)
LYMPHOCYTES ABSOLUTE: 1.1 K/UL (ref 1–5.1)
LYMPHOCYTES RELATIVE PERCENT: 19.7 %
MAGNESIUM: 1.9 MG/DL (ref 1.8–2.4)
MCH RBC QN AUTO: 29.4 PG (ref 26–34)
MCHC RBC AUTO-ENTMCNC: 33 G/DL (ref 31–36)
MCV RBC AUTO: 88.9 FL (ref 80–100)
MONOCYTES ABSOLUTE: 0.8 K/UL (ref 0–1.3)
MONOCYTES RELATIVE PERCENT: 14.7 %
NEUTROPHILS ABSOLUTE: 3.5 K/UL (ref 1.7–7.7)
NEUTROPHILS RELATIVE PERCENT: 62.2 %
PDW BLD-RTO: 15.2 % (ref 12.4–15.4)
PHOSPHORUS: 2.9 MG/DL (ref 2.5–4.9)
PLATELET # BLD: 267 K/UL (ref 135–450)
PMV BLD AUTO: 8.4 FL (ref 5–10.5)
POTASSIUM SERPL-SCNC: 4 MMOL/L (ref 3.5–5.1)
RBC # BLD: 3.82 M/UL (ref 4.2–5.9)
SODIUM BLD-SCNC: 135 MMOL/L (ref 136–145)
WBC # BLD: 5.6 K/UL (ref 4–11)

## 2019-03-27 PROCEDURE — 99233 SBSQ HOSP IP/OBS HIGH 50: CPT | Performed by: SURGERY

## 2019-03-27 PROCEDURE — 6360000002 HC RX W HCPCS: Performed by: SURGERY

## 2019-03-27 PROCEDURE — 83735 ASSAY OF MAGNESIUM: CPT

## 2019-03-27 PROCEDURE — 85025 COMPLETE CBC W/AUTO DIFF WBC: CPT

## 2019-03-27 PROCEDURE — 6370000000 HC RX 637 (ALT 250 FOR IP): Performed by: SURGERY

## 2019-03-27 PROCEDURE — 76380 CAT SCAN FOLLOW-UP STUDY: CPT

## 2019-03-27 PROCEDURE — 80048 BASIC METABOLIC PNL TOTAL CA: CPT

## 2019-03-27 PROCEDURE — 2580000003 HC RX 258: Performed by: INTERNAL MEDICINE

## 2019-03-27 PROCEDURE — 99232 SBSQ HOSP IP/OBS MODERATE 35: CPT | Performed by: INTERNAL MEDICINE

## 2019-03-27 PROCEDURE — 6370000000 HC RX 637 (ALT 250 FOR IP): Performed by: INTERNAL MEDICINE

## 2019-03-27 PROCEDURE — 6360000002 HC RX W HCPCS: Performed by: INTERNAL MEDICINE

## 2019-03-27 PROCEDURE — 1200000000 HC SEMI PRIVATE

## 2019-03-27 PROCEDURE — 84100 ASSAY OF PHOSPHORUS: CPT

## 2019-03-27 PROCEDURE — 2580000003 HC RX 258: Performed by: SURGERY

## 2019-03-27 PROCEDURE — 36415 COLL VENOUS BLD VENIPUNCTURE: CPT

## 2019-03-27 RX ORDER — SODIUM CHLORIDE 9 MG/ML
INJECTION, SOLUTION INTRAVENOUS
Status: COMPLETED
Start: 2019-03-27 | End: 2019-03-28

## 2019-03-27 RX ADMIN — MEROPENEM 1 G: 1 INJECTION, POWDER, FOR SOLUTION INTRAVENOUS at 08:41

## 2019-03-27 RX ADMIN — OXYCODONE HYDROCHLORIDE 10 MG: 5 TABLET ORAL at 23:09

## 2019-03-27 RX ADMIN — HYDROMORPHONE HYDROCHLORIDE 1 MG: 2 INJECTION, SOLUTION INTRAMUSCULAR; INTRAVENOUS; SUBCUTANEOUS at 15:55

## 2019-03-27 RX ADMIN — Medication 1.5 G: at 08:41

## 2019-03-27 RX ADMIN — KETOROLAC TROMETHAMINE 30 MG: 30 INJECTION, SOLUTION INTRAMUSCULAR; INTRAVENOUS at 20:53

## 2019-03-27 RX ADMIN — Medication 1.5 G: at 20:42

## 2019-03-27 RX ADMIN — ONDANSETRON 4 MG: 2 INJECTION INTRAMUSCULAR; INTRAVENOUS at 05:36

## 2019-03-27 RX ADMIN — Medication 10 ML: at 20:40

## 2019-03-27 RX ADMIN — OXYCODONE HYDROCHLORIDE 10 MG: 5 TABLET ORAL at 19:15

## 2019-03-27 RX ADMIN — HYDROMORPHONE HYDROCHLORIDE 1 MG: 2 INJECTION, SOLUTION INTRAMUSCULAR; INTRAVENOUS; SUBCUTANEOUS at 07:12

## 2019-03-27 RX ADMIN — HYDROMORPHONE HYDROCHLORIDE 1 MG: 2 INJECTION, SOLUTION INTRAMUSCULAR; INTRAVENOUS; SUBCUTANEOUS at 10:22

## 2019-03-27 RX ADMIN — MEROPENEM 1 G: 1 INJECTION, POWDER, FOR SOLUTION INTRAVENOUS at 17:48

## 2019-03-27 RX ADMIN — HYDROMORPHONE HYDROCHLORIDE 1 MG: 2 INJECTION, SOLUTION INTRAMUSCULAR; INTRAVENOUS; SUBCUTANEOUS at 00:47

## 2019-03-27 RX ADMIN — ONDANSETRON 4 MG: 2 INJECTION INTRAMUSCULAR; INTRAVENOUS at 15:56

## 2019-03-27 RX ADMIN — OXYCODONE HYDROCHLORIDE 10 MG: 5 TABLET ORAL at 05:36

## 2019-03-27 RX ADMIN — Medication 10 ML: at 08:41

## 2019-03-27 RX ADMIN — OXYCODONE HYDROCHLORIDE 10 MG: 5 TABLET ORAL at 14:02

## 2019-03-27 RX ADMIN — HYDROMORPHONE HYDROCHLORIDE 1 MG: 2 INJECTION, SOLUTION INTRAMUSCULAR; INTRAVENOUS; SUBCUTANEOUS at 20:52

## 2019-03-27 RX ADMIN — MEROPENEM 1 G: 1 INJECTION, POWDER, FOR SOLUTION INTRAVENOUS at 00:47

## 2019-03-27 RX ADMIN — LORAZEPAM 0.5 MG: 0.5 TABLET ORAL at 11:45

## 2019-03-27 ASSESSMENT — PAIN SCALES - GENERAL
PAINLEVEL_OUTOF10: 8
PAINLEVEL_OUTOF10: 9
PAINLEVEL_OUTOF10: 8
PAINLEVEL_OUTOF10: 9
PAINLEVEL_OUTOF10: 7
PAINLEVEL_OUTOF10: 9
PAINLEVEL_OUTOF10: 0
PAINLEVEL_OUTOF10: 8
PAINLEVEL_OUTOF10: 8
PAINLEVEL_OUTOF10: 7

## 2019-03-27 ASSESSMENT — PAIN DESCRIPTION - DESCRIPTORS: DESCRIPTORS: CONSTANT

## 2019-03-27 ASSESSMENT — PAIN DESCRIPTION - LOCATION: LOCATION: ABDOMEN

## 2019-03-27 ASSESSMENT — PAIN DESCRIPTION - FREQUENCY: FREQUENCY: CONTINUOUS

## 2019-03-27 ASSESSMENT — PAIN DESCRIPTION - PAIN TYPE: TYPE: ACUTE PAIN

## 2019-03-27 ASSESSMENT — PAIN DESCRIPTION - ORIENTATION: ORIENTATION: LOWER;MID

## 2019-03-27 ASSESSMENT — PAIN DESCRIPTION - ONSET: ONSET: ON-GOING

## 2019-03-27 NOTE — PROGRESS NOTES
Pt arrived for image guided drain placement/aspirtion. Procedure explained including the risk and benefits of the procedure. All questions answered. Pt states no more questions and verbalizes understanding. Consent verified. Labs, allergies, medications, and code status reviewed. No contraindications. Pre Farooq score 10. Time out taken.

## 2019-03-27 NOTE — PROGRESS NOTES
Image guided aspiration aborted. Drain removed by Dr. Dalia Mathias. Pt tolerated procedure without any signs or symptoms of distress. Vital signs stable see flow sheets. Post Farooq score 10. Report called to Jalen Sheppard on 2w. Pt transported to 2w in stable condition by transport. Care of pt transferred.

## 2019-03-27 NOTE — PROGRESS NOTES
Patient shift assessment completed, see flow sheet. All medications given per MD order, see MAR. Drain to abdomen in place and draining properly. Patient in bed, call light is within reach, bed in lowest, locked position. Patient denies further needs at this time.

## 2019-03-27 NOTE — PRE SEDATION
- A normal healthy patient with mild systemic disease    Sedation/ Anesthesia Plan:   intravenous sedation    Medications Planned:   midazolam (Versed) intravenously and fentanyl intravenously    Patient is an appropriate candidate for plan of sedation: yes    Electronically signed by Bg Dominguez MD on 3/27/2019 at 1:10 PM

## 2019-03-27 NOTE — PROGRESS NOTES
Bedside report given to Jassi Lopes,  no signs of distress noted. PRN pain 8/10  meds per request. Pt rated lower abdominal pain  Pt denies any further needs.

## 2019-03-27 NOTE — PROGRESS NOTES
Hospitalist Progress Note      PCP: No primary care provider on file. Date of Admission: 3/22/2019    Chief Complaint: Abd pain. Subjective: still with abd pain, worse when moving or coughing. SHANNON drain with feculent output. Continues to have pain and fevers       Medications:  Reviewed    Infusion Medications     Scheduled Medications    vancomycin  1,500 mg Intravenous Q12H    meropenem  1 g Intravenous Q8H    nicotine  1 patch Transdermal Daily    sodium chloride flush  10 mL Intravenous 2 times per day    enoxaparin  40 mg Subcutaneous Daily     PRN Meds: oxyCODONE **OR** oxyCODONE, HYDROmorphone, ketorolac, sodium chloride flush, potassium chloride **OR** potassium alternative oral replacement **OR** potassium chloride, magnesium sulfate, magnesium hydroxide, ondansetron, acetaminophen, LORazepam      Intake/Output Summary (Last 24 hours) at 3/27/2019 0805  Last data filed at 3/27/2019 0249  Gross per 24 hour   Intake 560 ml   Output 20 ml   Net 540 ml       Physical Exam Performed:    /74   Pulse 58   Temp 97.2 °F (36.2 °C) (Oral)   Resp 18   Ht 6' 1\" (1.854 m)   Wt 242 lb 4 oz (109.9 kg)   SpO2 97%   BMI 31.96 kg/m²     General appearance: No apparent distress appears stated age and cooperative. HEENT Normal cephalic, atraumatic without obvious deformity. Pupils equal, round, and reactive to light. Extra ocular muscles intact. Conjunctivae/corneas clear. Neck: Supple, No jugular venous distention/bruits. Trachea midline without thyromegaly or adenopathy with full range of motion. Lungs: Clear to auscultation, bilaterally without Rales/Wheezes/Rhonchi with good respiratory effort. Heart: Regular rate and rhythm with Normal S1/S2 without murmurs, rubs or gallops, point of maximum impulse non-displaced  Abdomen: diffuse tenderness in the lower abdomen, RUQ and LUQ no pain. + BS. Suprapubic region midline abd drain in place.    Extremities: No clubbing, cyanosis, or edema bilaterally. Full range of motion without deformity and normal gait intact. Skin: Skin color, texture, turgor normal.  No rashes or lesions. Neurologic: Alert and oriented X 3, neurovascularly intact with sensory/motor intact upper extremities/lower extremities, bilaterally. Cranial nerves: II-XII intact, grossly non-focal.  Mental status: Alert, oriented, thought content appropriate. Labs:   Recent Labs     03/25/19  0516 03/26/19  0404 03/27/19  0519   WBC 8.3 7.2 5.6   HGB 10.0* 10.4* 11.2*   HCT 30.3* 31.0* 34.0*    200 267     Recent Labs     03/25/19  0516 03/26/19  0404 03/27/19  0519   * 134* 135*   K 3.3* 3.7 4.0   CL 99 100 100   CO2 23 25 26   BUN 8 6* 3*   CREATININE <0.5* <0.5* <0.5*   CALCIUM 7.9* 8.2* 8.6   PHOS 2.4* 2.9 2.9     No results for input(s): AST, ALT, BILIDIR, BILITOT, ALKPHOS in the last 72 hours. No results for input(s): INR in the last 72 hours. No results for input(s): Eward Pong in the last 72 hours. Urinalysis:      Lab Results   Component Value Date    NITRU Negative 08/15/2017    WBCUA None seen 08/15/2017    RBCUA 0-2 08/15/2017    BLOODU TRACE-LYSED 08/15/2017    SPECGRAV 1.010 08/15/2017    GLUCOSEU Negative 08/15/2017       Radiology:  CT ABDOMEN PELVIS W IV CONTRAST Additional Contrast? Oral   Final Result   Persistent anterior pelvic abscess, presumably secondary to perforated   diverticulitis as there is wall thickening of the proximal sigmoid colon,   with surrounding inflammatory change. .  The tip of the drainage catheter is   anterior to the remaining pocket of fluid. Tiny nonobstructing right renal calculus         CT PERITONEAL/RETROPERITONEAL [facility] DRAIN   Final Result   Successful CT guided placement of anterior pelvic abscess drainage catheter.          CT DRAINAGE VISCERAL PERCUTANEOUS    (Results Pending)     Persistent anterior pelvic abscess, presumably secondary to perforated  diverticulitis as there is wall thickening of the proximal sigmoid colon,  with surrounding inflammatory change. .  The tip of the drainage catheter is  anterior to the remaining pocket of fluid. Tiny nonobstructing right renal calculus      Assessment/Plan:    Active Hospital Problems    Diagnosis Date Noted    Intra-ab[address] [K65.1]     Moderate protein-calorie malnutrition ([facility] [E44.0] 03/24/2019    Diverticulitis of both large and small intestine with abscess [K57.40] 07/59/5013     Complicated Diverticulitis with Abscess  IVF. Surgery eval.   S/p CT guided drain 3/22  On IV Merrem  Tolerating full liquid diet. D/c iv fluids   Repeat CT- persistent fluid collection. removal of existing drain and needle aspiration. IV vanc added as he seems to have developed cellulitis in the flank     Hypokalemia.    Replace K  Replace Mg        DVT Prophylaxis: lovenox  Diet: full liquid   Code Status: Full Code     Dispo - inpatient.             Bull Mack MD

## 2019-03-27 NOTE — PROGRESS NOTES
Pharmacy note:  Vancomycin Day #  1  Patient is a 35 yo male with diverticulitis and an abdominal abscess. He is being treated with Merrem. Pharmacy was consulted to dose vancomycin to cover gram positive organisms including MRSA. WBC           BUN/SCr     Ht. Wt.   5.6               3/<0.5         6'1\"           109.9 kg    Based on patient's age, weight, and renal function will start Vancomycin 1500 mg IVPB q12h and check trough at steady state. Will adjust as necessary. Briseida Villalba, Pharm. D. 3/27/2019 7:46 AM

## 2019-03-27 NOTE — PROGRESS NOTES
PM assessment completed. See flow sheet. Pt A&O X 4. Resp E&E with no distress noted. Zofran and Pain meds given per request. Pt rated lower abdominal pain 9/10. SEE MAR. Pt.denies any further needs at this time. Call light within reach. Will continue to monitor.

## 2019-03-28 LAB
ANION GAP SERPL CALCULATED.3IONS-SCNC: 12 MMOL/L (ref 3–16)
BUN BLDV-MCNC: 3 MG/DL (ref 7–20)
CALCIUM SERPL-MCNC: 8.2 MG/DL (ref 8.3–10.6)
CHLORIDE BLD-SCNC: 101 MMOL/L (ref 99–110)
CO2: 24 MMOL/L (ref 21–32)
CREAT SERPL-MCNC: <0.5 MG/DL (ref 0.9–1.3)
GFR AFRICAN AMERICAN: >60
GFR NON-AFRICAN AMERICAN: >60
GLUCOSE BLD-MCNC: 104 MG/DL (ref 70–99)
POTASSIUM SERPL-SCNC: 4.1 MMOL/L (ref 3.5–5.1)
SODIUM BLD-SCNC: 137 MMOL/L (ref 136–145)
VANCOMYCIN TROUGH: 13.2 UG/ML (ref 10–20)

## 2019-03-28 PROCEDURE — 6370000000 HC RX 637 (ALT 250 FOR IP): Performed by: INTERNAL MEDICINE

## 2019-03-28 PROCEDURE — 36415 COLL VENOUS BLD VENIPUNCTURE: CPT

## 2019-03-28 PROCEDURE — 99232 SBSQ HOSP IP/OBS MODERATE 35: CPT | Performed by: SURGERY

## 2019-03-28 PROCEDURE — 6360000002 HC RX W HCPCS: Performed by: SURGERY

## 2019-03-28 PROCEDURE — 2580000003 HC RX 258

## 2019-03-28 PROCEDURE — 80048 BASIC METABOLIC PNL TOTAL CA: CPT

## 2019-03-28 PROCEDURE — 2580000003 HC RX 258: Performed by: SURGERY

## 2019-03-28 PROCEDURE — 6370000000 HC RX 637 (ALT 250 FOR IP): Performed by: SURGERY

## 2019-03-28 PROCEDURE — 2580000003 HC RX 258: Performed by: INTERNAL MEDICINE

## 2019-03-28 PROCEDURE — 6360000002 HC RX W HCPCS: Performed by: INTERNAL MEDICINE

## 2019-03-28 PROCEDURE — 99232 SBSQ HOSP IP/OBS MODERATE 35: CPT | Performed by: INTERNAL MEDICINE

## 2019-03-28 PROCEDURE — 80202 ASSAY OF VANCOMYCIN: CPT

## 2019-03-28 PROCEDURE — 1200000000 HC SEMI PRIVATE

## 2019-03-28 RX ADMIN — Medication 1.5 G: at 20:56

## 2019-03-28 RX ADMIN — OXYCODONE HYDROCHLORIDE 10 MG: 5 TABLET ORAL at 07:02

## 2019-03-28 RX ADMIN — MEROPENEM 1 G: 1 INJECTION, POWDER, FOR SOLUTION INTRAVENOUS at 08:50

## 2019-03-28 RX ADMIN — HYDROMORPHONE HYDROCHLORIDE 1 MG: 2 INJECTION, SOLUTION INTRAMUSCULAR; INTRAVENOUS; SUBCUTANEOUS at 00:14

## 2019-03-28 RX ADMIN — HYDROMORPHONE HYDROCHLORIDE 1 MG: 2 INJECTION, SOLUTION INTRAMUSCULAR; INTRAVENOUS; SUBCUTANEOUS at 08:50

## 2019-03-28 RX ADMIN — LORAZEPAM 0.5 MG: 0.5 TABLET ORAL at 01:20

## 2019-03-28 RX ADMIN — OXYCODONE HYDROCHLORIDE 10 MG: 5 TABLET ORAL at 20:57

## 2019-03-28 RX ADMIN — OXYCODONE HYDROCHLORIDE 10 MG: 5 TABLET ORAL at 12:02

## 2019-03-28 RX ADMIN — OXYCODONE HYDROCHLORIDE 10 MG: 5 TABLET ORAL at 03:13

## 2019-03-28 RX ADMIN — Medication 10 ML: at 08:50

## 2019-03-28 RX ADMIN — MEROPENEM 1 G: 1 INJECTION, POWDER, FOR SOLUTION INTRAVENOUS at 16:41

## 2019-03-28 RX ADMIN — ONDANSETRON 4 MG: 2 INJECTION INTRAMUSCULAR; INTRAVENOUS at 23:11

## 2019-03-28 RX ADMIN — LORAZEPAM 0.5 MG: 0.5 TABLET ORAL at 19:24

## 2019-03-28 RX ADMIN — Medication 10 ML: at 20:57

## 2019-03-28 RX ADMIN — HYDROMORPHONE HYDROCHLORIDE 1 MG: 2 INJECTION, SOLUTION INTRAMUSCULAR; INTRAVENOUS; SUBCUTANEOUS at 04:27

## 2019-03-28 RX ADMIN — ONDANSETRON 4 MG: 2 INJECTION INTRAMUSCULAR; INTRAVENOUS at 09:54

## 2019-03-28 RX ADMIN — OXYCODONE HYDROCHLORIDE 10 MG: 5 TABLET ORAL at 16:42

## 2019-03-28 RX ADMIN — ONDANSETRON 4 MG: 2 INJECTION INTRAMUSCULAR; INTRAVENOUS at 16:47

## 2019-03-28 RX ADMIN — SODIUM CHLORIDE 250 ML: 9 INJECTION, SOLUTION INTRAVENOUS at 00:17

## 2019-03-28 RX ADMIN — Medication 1.5 G: at 09:53

## 2019-03-28 RX ADMIN — MEROPENEM 1 G: 1 INJECTION, POWDER, FOR SOLUTION INTRAVENOUS at 00:54

## 2019-03-28 ASSESSMENT — PAIN DESCRIPTION - PROGRESSION
CLINICAL_PROGRESSION: NOT CHANGED
CLINICAL_PROGRESSION: NOT CHANGED
CLINICAL_PROGRESSION: GRADUALLY IMPROVING
CLINICAL_PROGRESSION: NOT CHANGED
CLINICAL_PROGRESSION: NOT CHANGED

## 2019-03-28 ASSESSMENT — PAIN DESCRIPTION - DESCRIPTORS: DESCRIPTORS: CONSTANT

## 2019-03-28 ASSESSMENT — PAIN SCALES - GENERAL
PAINLEVEL_OUTOF10: 8
PAINLEVEL_OUTOF10: 8
PAINLEVEL_OUTOF10: 9
PAINLEVEL_OUTOF10: 9
PAINLEVEL_OUTOF10: 8
PAINLEVEL_OUTOF10: 9
PAINLEVEL_OUTOF10: 10
PAINLEVEL_OUTOF10: 7
PAINLEVEL_OUTOF10: 8
PAINLEVEL_OUTOF10: 10

## 2019-03-28 ASSESSMENT — PAIN DESCRIPTION - LOCATION
LOCATION: ABDOMEN

## 2019-03-28 ASSESSMENT — PAIN DESCRIPTION - FREQUENCY: FREQUENCY: CONTINUOUS

## 2019-03-28 ASSESSMENT — PAIN DESCRIPTION - ONSET: ONSET: ON-GOING

## 2019-03-28 ASSESSMENT — PAIN DESCRIPTION - PAIN TYPE
TYPE: ACUTE PAIN

## 2019-03-28 NOTE — PLAN OF CARE
Problem: Infection:  Goal: Will remain free from infection  Description  Will remain free from infection  Outcome: Ongoing     Problem: Safety:  Goal: Free from accidental physical injury  Description  Free from accidental physical injury  Outcome: Ongoing  Goal: Free from intentional harm  Description  Free from intentional harm  Outcome: Ongoing     Problem: Daily Care:  Goal: Daily care needs are met  Description  Daily care needs are met  Outcome: Ongoing     Problem: Pain:  Description  Pain management should include both nonpharmacologic and pharmacologic interventions.   Goal: Patient's pain/discomfort is manageable  Description  Patient's pain/discomfort is manageable  Outcome: Ongoing  Goal: Pain level will decrease  Description  Pain level will decrease  Outcome: Ongoing  Goal: Control of acute pain  Description  Control of acute pain  Outcome: Ongoing  Goal: Control of chronic pain  Description  Control of chronic pain  Outcome: Ongoing     Problem: Tobacco Use:  Goal: Inpatient tobacco use cessation counseling participation  Description  Inpatient tobacco use cessation counseling participation  Outcome: Ongoing     Problem: Falls - Risk of:  Goal: Will remain free from falls  Description  Will remain free from falls  Outcome: Ongoing  Goal: Absence of physical injury  Description  Absence of physical injury  Outcome: Ongoing     Problem: Nutrition  Goal: Optimal nutrition therapy  Outcome: Ongoing

## 2019-03-28 NOTE — PROGRESS NOTES
Vancomycin Day: 2    Patient's labs, cultures, vitals, and vancomycin regimen reviewed. No changes today.   Trough due on 3/28 at 2030  Apolinar ESTRADA 3/28/965428:21 PM  .

## 2019-03-28 NOTE — PROGRESS NOTES
Hospitalist Progress Note      PCP: No primary care provider on file. Date of Admission: 3/22/2019    Chief Complaint: Abd pain. Subjective: still with abd pain, worse when moving or coughing. SHANNON drain with feculent output. Continues to have pain and fevers       Medications:  Reviewed    Infusion Medications     Scheduled Medications    vancomycin  1,500 mg Intravenous Q12H    meropenem  1 g Intravenous Q8H    nicotine  1 patch Transdermal Daily    sodium chloride flush  10 mL Intravenous 2 times per day    enoxaparin  40 mg Subcutaneous Daily     PRN Meds: oxyCODONE **OR** oxyCODONE, HYDROmorphone, sodium chloride flush, potassium chloride **OR** potassium alternative oral replacement **OR** potassium chloride, magnesium sulfate, magnesium hydroxide, ondansetron, acetaminophen, LORazepam      Intake/Output Summary (Last 24 hours) at 3/28/2019 1147  Last data filed at 3/27/2019 1801  Gross per 24 hour   Intake 960 ml   Output --   Net 960 ml       Physical Exam Performed:    /72   Pulse 69   Temp 97.1 °F (36.2 °C) (Oral)   Resp 16   Ht 6' 1\" (1.854 m)   Wt 237 lb (107.5 kg)   SpO2 98%   BMI 31.27 kg/m²     General appearance: No apparent distress appears stated age and cooperative. HEENT Normal cephalic, atraumatic without obvious deformity. Pupils equal, round, and reactive to light. Extra ocular muscles intact. Conjunctivae/corneas clear. Neck: Supple, No jugular venous distention/bruits. Trachea midline without thyromegaly or adenopathy with full range of motion. Lungs: Clear to auscultation, bilaterally without Rales/Wheezes/Rhonchi with good respiratory effort. Heart: Regular rate and rhythm with Normal S1/S2 without murmurs, rubs or gallops, point of maximum impulse non-displaced  Abdomen: mild tenderness lower quadrants  Extremities: No clubbing, cyanosis, or edema bilaterally. Full range of motion without deformity and normal gait intact.   Skin: Skin color, texture, turgor normal.  No rashes or lesions. Neurologic: Alert and oriented X 3, neurovascularly intact with sensory/motor intact upper extremities/lower extremities, bilaterally. Cranial nerves: II-XII intact, grossly non-focal.  Mental status: Alert, oriented, thought content appropriate. Labs:   Recent Labs     03/26/19  0404 03/27/19  0519   WBC 7.2 5.6   HGB 10.4* 11.2*   HCT 31.0* 34.0*    267     Recent Labs     03/26/19  0404 03/27/19  0519 03/28/19  0510   * 135* 137   K 3.7 4.0 4.1    100 101   CO2 25 26 24   BUN 6* 3* 3*   CREATININE <0.5* <0.5* <0.5*   CALCIUM 8.2* 8.6 8.2*   PHOS 2.9 2.9  --      No results for input(s): AST, ALT, BILIDIR, BILITOT, ALKPHOS in the last 72 hours. No results for input(s): INR in the last 72 hours. No results for input(s): Chey Dark in the last 72 hours. Urinalysis:      Lab Results   Component Value Date    NITRU Negative 08/15/2017    WBCUA None seen 08/15/2017    RBCUA 0-2 08/15/2017    BLOODU TRACE-LYSED 08/15/2017    SPECGRAV 1.010 08/15/2017    GLUCOSEU Negative 08/15/2017       Radiology:  CT LIMITED FOLLOW UP   Final Result   Removal of the recently placed abscess drainage catheter. Improved and nearly completely resolved small pelvic fluid collection. Aspiration was not performed. CT ABDOMEN PELVIS W IV CONTRAST Additional Contrast? Oral   Final Result   Persistent anterior pelvic abscess, presumably secondary to perforated   diverticulitis as there is wall thickening of the proximal sigmoid colon,   with surrounding inflammatory change. .  The tip of the drainage catheter is   anterior to the remaining pocket of fluid. Tiny nonobstructing right renal calculus         CT PERITONEAL/RETROPERITONEAL [facility] DRAIN   Final Result   Successful CT guided placement of anterior pelvic abscess drainage catheter.            Persistent anterior pelvic abscess, presumably secondary to perforated  diverticulitis as there is wall thickening of the proximal sigmoid colon,  with surrounding inflammatory change. .  The tip of the drainage catheter is  anterior to the remaining pocket of fluid. Tiny nonobstructing right renal calculus      3/27 CT   Removal of the recently placed abscess drainage catheter. Improved and nearly completely resolved small pelvic fluid collection. Aspiration was not performed. Assessment/Plan:    Active Hospital Problems    Diagnosis Date Noted    Intra-abdominal abscess ([facility] [address] [K65.1]     Moderate protein-calorie malnutrition ([facility] [address] [facility] [E44.0] 03/24/2019    Diverticulitis of both large and small intestine with abscess [K57.40] 87/52/1649     Complicated Diverticulitis with Abscess  IVF. Surgery eval.   S/p CT guided drain 3/22  On IV Merrem  Tolerating  General diet   D/c iv fluids   Repeat CT- persistent fluid collection. removal of existing drain and needle aspiration. IV vanc added as he seems to have developed cellulitis in the flank  Too small an amount of fluid for aspiration     Hypokalemia.    Replace K  Replace Mg        DVT Prophylaxis: lovenox  Diet: full liquid   Code Status: Full Code     Dispo - inpatient.       d/c Romero Rodríguez MD

## 2019-03-28 NOTE — PROGRESS NOTES
[facility]    PATIENT NAME: Santiago Katz     TODAY'S DATE: 3/28/2019    CHIEF COMPLAINT: Lower abdominal pain    INTERVAL HISTORY/HPI:    Pt feels slightly better. REVIEW OF SYSTEMS:  Pertinent positives and negatives as per interval history section    OBJECTIVE:  VITALS:  /69   Pulse 55   Temp 97 °F (36.1 °C) (Oral)   Resp 16   Ht 6' 1\" (1.854 m)   Wt 237 lb (107.5 kg)   SpO2 94%   BMI 31.27 kg/m²     INTAKE/OUTPUT:    I/O last 3 completed shifts: In: 960 [P.O.:960]  Out: -   No intake/output data recorded. CONSTITUTIONAL:  awake and alert  LUNGS:  Respirations easy and unlabored, clear to auscultation  CARD:  regular rate and rhythm  ABDOMEN:   soft, non-distended, tenderness noted in the left lower quadrant, flank erythema improved    Data:  CBC:   Recent Labs     03/26/19 0404 03/27/19  0519   WBC 7.2 5.6   HGB 10.4* 11.2*   HCT 31.0* 34.0*    267     BMP:    Recent Labs     03/26/19 0404 03/27/19  0519 03/28/19  0510   * 135* 137   K 3.7 4.0 4.1    100 101   CO2 25 26 24   BUN 6* 3* 3*   CREATININE <0.5* <0.5* <0.5*   GLUCOSE 101* 104* 104*     Hepatic: No results for input(s): AST, ALT, ALB, BILITOT, ALKPHOS in the last 72 hours. Mag:      Recent Labs     03/26/19 0404 03/27/19  0519   MG 1.60* 1.90      Phos:     Recent Labs     03/26/19 0404 03/27/19 0519   PHOS 2.9 2.9          Radiology Review:  *Imaging personally reviewed by me. CT with resolution of fluid collection in abdomen. ASSESSMENT:  Sigmoid diverticulitis with abdominopelvic abscess s/p percutaneous drainage  Bilateral flank cellulitis - improved     PLAN:  Antibiotics.   Continue Vancomycin  Drain removed   Ambulate  Soft diet           Electronically signed by Keya Sharif, [address]

## 2019-03-28 NOTE — PROGRESS NOTES
Pt sleeping upon entering room. Pt did wake up and asked for pain medication. Pt states pain 8/10. Prn oxycodone given. Pt then went back to sleep. Will monitor.

## 2019-03-28 NOTE — PROGRESS NOTES
Shift assessment completed. See flow sheet. Respiration easy, even and non labored. Vital signs WNL. Pt alert and oriented x 4. Pt rates pain 9/10, pt states pain is the same as it has been since he has been here. Pt has hypoactive bowel sounds in all four quadrants. Pt has loose stool. Side rails up x 2. IVF infusing without complications. Pt denies any needs at this time. Call light within reach. Will continue to monitor.

## 2019-03-28 NOTE — PROGRESS NOTES
Shift assessment completed, see flowsheets. AM meds given per orders. Patient c/o pain 8/10, prn medications provided, see MAR. Patient denies further needs at this time. Patient currently awake, call light and personal belongings within reach.

## 2019-03-29 VITALS
BODY MASS INDEX: 31.14 KG/M2 | RESPIRATION RATE: 18 BRPM | WEIGHT: 235 LBS | SYSTOLIC BLOOD PRESSURE: 110 MMHG | DIASTOLIC BLOOD PRESSURE: 68 MMHG | OXYGEN SATURATION: 95 % | HEART RATE: 99 BPM | HEIGHT: 73 IN | TEMPERATURE: 97.5 F

## 2019-03-29 PROCEDURE — 6360000002 HC RX W HCPCS: Performed by: SURGERY

## 2019-03-29 PROCEDURE — 6370000000 HC RX 637 (ALT 250 FOR IP): Performed by: INTERNAL MEDICINE

## 2019-03-29 PROCEDURE — 99232 SBSQ HOSP IP/OBS MODERATE 35: CPT | Performed by: SURGERY

## 2019-03-29 PROCEDURE — 6360000002 HC RX W HCPCS: Performed by: INTERNAL MEDICINE

## 2019-03-29 PROCEDURE — 2580000003 HC RX 258: Performed by: INTERNAL MEDICINE

## 2019-03-29 PROCEDURE — 99238 HOSP IP/OBS DSCHRG MGMT 30/<: CPT | Performed by: INTERNAL MEDICINE

## 2019-03-29 PROCEDURE — 6370000000 HC RX 637 (ALT 250 FOR IP): Performed by: SURGERY

## 2019-03-29 PROCEDURE — 2580000003 HC RX 258: Performed by: SURGERY

## 2019-03-29 RX ORDER — CLINDAMYCIN HYDROCHLORIDE 300 MG/1
300 CAPSULE ORAL 3 TIMES DAILY
Qty: 30 CAPSULE | Refills: 0 | Status: SHIPPED | OUTPATIENT
Start: 2019-03-29 | End: 2019-04-08

## 2019-03-29 RX ORDER — OXYCODONE HYDROCHLORIDE 5 MG/1
5 TABLET ORAL EVERY 6 HOURS PRN
Qty: 28 TABLET | Refills: 0 | Status: SHIPPED | OUTPATIENT
Start: 2019-03-29 | End: 2019-04-05

## 2019-03-29 RX ORDER — AMOXICILLIN AND CLAVULANATE POTASSIUM 875; 125 MG/1; MG/1
1 TABLET, FILM COATED ORAL 2 TIMES DAILY
Qty: 20 TABLET | Refills: 0 | Status: SHIPPED | OUTPATIENT
Start: 2019-03-29 | End: 2019-04-08

## 2019-03-29 RX ADMIN — Medication 10 ML: at 09:07

## 2019-03-29 RX ADMIN — ONDANSETRON 4 MG: 2 INJECTION INTRAMUSCULAR; INTRAVENOUS at 05:50

## 2019-03-29 RX ADMIN — OXYCODONE HYDROCHLORIDE 10 MG: 5 TABLET ORAL at 01:17

## 2019-03-29 RX ADMIN — OXYCODONE HYDROCHLORIDE 10 MG: 5 TABLET ORAL at 05:50

## 2019-03-29 RX ADMIN — OXYCODONE HYDROCHLORIDE 10 MG: 5 TABLET ORAL at 13:49

## 2019-03-29 RX ADMIN — MEROPENEM 1 G: 1 INJECTION, POWDER, FOR SOLUTION INTRAVENOUS at 09:06

## 2019-03-29 RX ADMIN — Medication 1.5 G: at 09:06

## 2019-03-29 RX ADMIN — OXYCODONE HYDROCHLORIDE 10 MG: 5 TABLET ORAL at 09:49

## 2019-03-29 RX ADMIN — MEROPENEM 1 G: 1 INJECTION, POWDER, FOR SOLUTION INTRAVENOUS at 01:17

## 2019-03-29 ASSESSMENT — PAIN DESCRIPTION - ORIENTATION
ORIENTATION: MID
ORIENTATION: MID;LOWER
ORIENTATION: MID;LOWER

## 2019-03-29 ASSESSMENT — PAIN DESCRIPTION - LOCATION
LOCATION: ABDOMEN

## 2019-03-29 ASSESSMENT — PAIN DESCRIPTION - FREQUENCY
FREQUENCY: CONTINUOUS

## 2019-03-29 ASSESSMENT — PAIN DESCRIPTION - DESCRIPTORS
DESCRIPTORS: CONSTANT;DISCOMFORT
DESCRIPTORS: CONSTANT;ACHING;DISCOMFORT
DESCRIPTORS: ACHING;CONSTANT;DISCOMFORT

## 2019-03-29 ASSESSMENT — PAIN DESCRIPTION - PAIN TYPE
TYPE: ACUTE PAIN
TYPE: SURGICAL PAIN
TYPE: SURGICAL PAIN

## 2019-03-29 ASSESSMENT — PAIN DESCRIPTION - PROGRESSION
CLINICAL_PROGRESSION: GRADUALLY IMPROVING
CLINICAL_PROGRESSION: GRADUALLY WORSENING

## 2019-03-29 ASSESSMENT — PAIN SCALES - GENERAL
PAINLEVEL_OUTOF10: 8
PAINLEVEL_OUTOF10: 9
PAINLEVEL_OUTOF10: 10
PAINLEVEL_OUTOF10: 8
PAINLEVEL_OUTOF10: 9
PAINLEVEL_OUTOF10: 9

## 2019-03-29 ASSESSMENT — PAIN DESCRIPTION - ONSET
ONSET: ON-GOING

## 2019-03-29 NOTE — PROGRESS NOTES
Vancomycin Day: 3    Patient's labs, cultures, vitals, and vancomycin regimen reviewed. No changes today.   Renetta Lynch D 7/39/288056:04 AM  .

## 2019-03-29 NOTE — PLAN OF CARE
Problem: Safety:  Goal: Free from accidental physical injury  Description  Free from accidental physical injury  Outcome: Met This Shift  Goal: Free from intentional harm  Description  Free from intentional harm  Outcome: Met This Shift     Problem: Daily Care:  Goal: Daily care needs are met  Description  Daily care needs are met  Outcome: Met This Shift     Problem: Falls - Risk of:  Goal: Will remain free from falls  Description  Will remain free from falls  Outcome: Met This Shift  Goal: Absence of physical injury  Description  Absence of physical injury  Outcome: Met This Shift     Problem: Nutrition  Goal: Optimal nutrition therapy  Outcome: Met This Shift     Problem: Infection:  Goal: Will remain free from infection  Description  Will remain free from infection  Outcome: Completed

## 2019-03-29 NOTE — PROGRESS NOTES
Awake in bed. No needs voiced. Pt recently medicated with both pain & nausea meds. Pt rested well this shift & when woke up for VS c/o abd pain 9/10 & nausea. No needs voiced. Call light in reach.  Avani Young

## 2019-03-29 NOTE — PLAN OF CARE
Nutrition Problem:  Moderate malnutrition  Intervention: Food and/or Nutrient Delivery: Continue current diet, Start ONS  Nutritional Goals: pt will consume > 50% of meals and supplements and his body weight will be > 220#

## 2019-03-29 NOTE — PROGRESS NOTES
Pt a/o x4. Pt medicated for pain 9/10 in mid/lower abdomen. Medicated with PRN oxycodone 10 mg PO given. See MAR and pain flow sheet. Call light within reach. Will monitor. 03/29/19 8552   Pain Assessment   Pain Assessment 0-10   Pain Level 9   Pain Type Surgical pain   Pain Location Abdomen   Pain Orientation Mid;Lower   Pain Descriptors Constant; Discomfort   Pain Frequency Continuous   Pain Onset On-going   Clinical Progression Gradually worsening

## 2019-03-29 NOTE — PROGRESS NOTES
Awake in bed. PM assessment complete. Vanco trough 13.2, vanco given. C/o abd pain 10/10, medicated with oxycodone per request. States 'my pain has been a 10 all day bc they got rid of my dilaudid\". Denies nausea. No other needs voiced. New armband applied. Call light in reach. Will cont to monitor.  Kala Barnes

## 2019-03-29 NOTE — PROGRESS NOTES
Pt a/o x4. Pt medicated for pain 8/10 in lower abdomen. Medicated with PRN oxycodone 10 mg PO. See MAR and pain flow sheet. Pt able to ambulate off unit with mother for discharge. All belongings with patient. 03/29/19 1349   Pain Assessment   Pain Assessment 0-10   Pain Level 8   Pain Type Surgical pain   Pain Location Abdomen   Pain Orientation Mid;Lower   Pain Descriptors Aching;Constant; Discomfort   Pain Frequency Continuous   Pain Onset On-going   Clinical Progression Gradually worsening

## 2019-03-29 NOTE — PROGRESS NOTES
Addendum:  Vancomycin trough = 13.2. Continue Vancomycin 1500 mg IVPB q12h and monitor. Syed Aragon. D. 3/28/2019 8:32 PM

## 2019-03-29 NOTE — PROGRESS NOTES
[facility] GENERAL SURGERY DAILY PROGRESS NOTE    SUBJECTIVE: Awake, alert    OBJECTIVE: CURRENT VITALS:  /75   Pulse 64   Temp 97 °F (36.1 °C) (Oral)   Resp 18   Ht 6' 1\" (1.854 m)   Wt 235 lb (106.6 kg)   SpO2 95%   BMI 31.00 kg/m²          ABD: Soft. Flank without erythema. Tender LLQ but improved. LABS:    CBC:   Recent Labs     03/27/19 0519   WBC 5.6   RBC 3.82*   HGB 11.2*   HCT 34.0*   MCV 88.9   RDW 15.2        BMP:   Recent Labs     03/27/19  0519 03/28/19  0510   * 137   K 4.0 4.1    101   CO2 26 24   PHOS 2.9  --    BUN 3* 3*   CREATININE <0.5* <0.5*     Recent Labs     03/27/19  0519   MG 1.90       XRAYS: CT with resolution of fluid collection        ASSESSMENT:  Sigmoid diverticulitis with abdominopelvic abscess s/p percutaneous drainage  Bilateral flank cellulitis - resolved     PLAN:  Drain removed   Ambulate  Soft diet  OK for discharge with Po antibiotics. Discussed with Dr. Prachi Grant.             [address] [facility]

## 2019-03-29 NOTE — CARE COORDINATION
DISCHARGE ORDER  Date/Time 3/29/2019 10:34 AM  Completed by: Maria R Mcnair, Case Management    Patient Name: Jonas Best    : 1980  Admitting Diagnosis: Diverticulitis of both small and large intestine with perforation and abscess without bleeding [K57.40]  Diverticulitis of both large and small intestine with abscess, unspecified bleeding status [K57.40]  Admit Date/Time: 3/22/2019 12:50 PM    Noted discharge order. Confirmed discharge plan with patient / family (pt): Yes   Discharge Plan: Chart reviewed and role of dcp explained. Pt is returning home and declines HHC at this time. No dc needs voiced or identified. Discharge timeout done with Bhumi Nichole RN. All discharge needs and concerns addressed.

## 2019-03-29 NOTE — PROGRESS NOTES
Prescriptions and discharge instructions given. Verbal and written education provided on: new medications (clindamycin / Augmentin / oxycodone), follow-up appointments (Dr. Jose Roberto Katz on 4/4 pt to call), s/s to report to physician, diet, and activity. Written & verbal education provided on Diverticulitis and new meds. Pt verbalized understanding denies any questions/ needs at this time. PIV removed from Right wrist, no complications, catheter intact, pressure held for 2 mins, and dressing applied that is CDI. Immunizations are n/a, refused flu vaccine. Pt is stable for discharge at this time. All belongings are with patient at discharge.      Awaiting for pts ride around [id number]

## 2019-08-23 ENCOUNTER — HOSPITAL ENCOUNTER (INPATIENT)
Age: 39
LOS: 7 days | Discharge: HOME | DRG: 244 | End: 2019-08-30
Attending: SURGERY | Admitting: SURGERY
Payer: MEDICAID

## 2019-08-23 PROBLEM — K57.92 DIVERTICULITIS: Status: ACTIVE | Noted: 2019-08-23

## 2019-08-23 PROCEDURE — 1200000000 HC SEMI PRIVATE

## 2019-08-23 RX ORDER — SODIUM CHLORIDE AND POTASSIUM CHLORIDE .9; .15 G/100ML; G/100ML
SOLUTION INTRAVENOUS CONTINUOUS
Status: DISCONTINUED | OUTPATIENT
Start: 2019-08-23 | End: 2019-08-30 | Stop reason: HOSPADM

## 2019-08-23 RX ORDER — DIPHENHYDRAMINE HYDROCHLORIDE 50 MG/ML
25 INJECTION INTRAMUSCULAR; INTRAVENOUS EVERY 6 HOURS PRN
Status: DISCONTINUED | OUTPATIENT
Start: 2019-08-23 | End: 2019-08-30 | Stop reason: HOSPADM

## 2019-08-23 RX ORDER — ONDANSETRON 2 MG/ML
4 INJECTION INTRAMUSCULAR; INTRAVENOUS EVERY 4 HOURS PRN
Status: DISCONTINUED | OUTPATIENT
Start: 2019-08-23 | End: 2019-08-30 | Stop reason: HOSPADM

## 2019-08-23 RX ORDER — ACETAMINOPHEN 325 MG/1
650 TABLET ORAL EVERY 4 HOURS PRN
Status: DISCONTINUED | OUTPATIENT
Start: 2019-08-23 | End: 2019-08-30 | Stop reason: HOSPADM

## 2019-08-24 LAB
ANION GAP SERPL CALCULATED.3IONS-SCNC: 12 MMOL/L (ref 3–16)
BASOPHILS ABSOLUTE: 0 K/UL (ref 0–0.2)
BASOPHILS RELATIVE PERCENT: 0.4 %
BUN BLDV-MCNC: 9 MG/DL (ref 7–20)
CALCIUM SERPL-MCNC: 8.7 MG/DL (ref 8.3–10.6)
CHLORIDE BLD-SCNC: 101 MMOL/L (ref 99–110)
CO2: 26 MMOL/L (ref 21–32)
CREAT SERPL-MCNC: <0.5 MG/DL (ref 0.9–1.3)
EOSINOPHILS ABSOLUTE: 0.1 K/UL (ref 0–0.6)
EOSINOPHILS RELATIVE PERCENT: 0.8 %
GFR AFRICAN AMERICAN: >60
GFR NON-AFRICAN AMERICAN: >60
GLUCOSE BLD-MCNC: 105 MG/DL (ref 70–99)
HCT VFR BLD CALC: 31.7 % (ref 40.5–52.5)
HEMOGLOBIN: 10.9 G/DL (ref 13.5–17.5)
LYMPHOCYTES ABSOLUTE: 1.1 K/UL (ref 1–5.1)
LYMPHOCYTES RELATIVE PERCENT: 12.1 %
MCH RBC QN AUTO: 31.5 PG (ref 26–34)
MCHC RBC AUTO-ENTMCNC: 34.5 G/DL (ref 31–36)
MCV RBC AUTO: 91.5 FL (ref 80–100)
MONOCYTES ABSOLUTE: 1.1 K/UL (ref 0–1.3)
MONOCYTES RELATIVE PERCENT: 11.8 %
NEUTROPHILS ABSOLUTE: 6.9 K/UL (ref 1.7–7.7)
NEUTROPHILS RELATIVE PERCENT: 74.9 %
PDW BLD-RTO: 15.1 % (ref 12.4–15.4)
PLATELET # BLD: 210 K/UL (ref 135–450)
PMV BLD AUTO: 6.8 FL (ref 5–10.5)
POTASSIUM SERPL-SCNC: 3.1 MMOL/L (ref 3.5–5.1)
RBC # BLD: 3.46 M/UL (ref 4.2–5.9)
SODIUM BLD-SCNC: 139 MMOL/L (ref 136–145)
WBC # BLD: 9.3 K/UL (ref 4–11)

## 2019-08-24 PROCEDURE — 6360000002 HC RX W HCPCS: Performed by: SURGERY

## 2019-08-24 PROCEDURE — 99223 1ST HOSP IP/OBS HIGH 75: CPT | Performed by: SURGERY

## 2019-08-24 PROCEDURE — 85025 COMPLETE CBC W/AUTO DIFF WBC: CPT

## 2019-08-24 PROCEDURE — 1200000000 HC SEMI PRIVATE

## 2019-08-24 PROCEDURE — 80048 BASIC METABOLIC PNL TOTAL CA: CPT

## 2019-08-24 PROCEDURE — 6370000000 HC RX 637 (ALT 250 FOR IP): Performed by: SURGERY

## 2019-08-24 PROCEDURE — 36415 COLL VENOUS BLD VENIPUNCTURE: CPT

## 2019-08-24 PROCEDURE — 2580000003 HC RX 258: Performed by: SURGERY

## 2019-08-24 RX ORDER — OXYCODONE HYDROCHLORIDE 5 MG/1
5 TABLET ORAL EVERY 4 HOURS PRN
Status: DISCONTINUED | OUTPATIENT
Start: 2019-08-24 | End: 2019-08-30 | Stop reason: HOSPADM

## 2019-08-24 RX ORDER — OXYCODONE HYDROCHLORIDE 5 MG/1
10 TABLET ORAL EVERY 4 HOURS PRN
Status: DISCONTINUED | OUTPATIENT
Start: 2019-08-24 | End: 2019-08-30 | Stop reason: HOSPADM

## 2019-08-24 RX ORDER — KETOROLAC TROMETHAMINE 30 MG/ML
30 INJECTION, SOLUTION INTRAMUSCULAR; INTRAVENOUS EVERY 8 HOURS PRN
Status: DISPENSED | OUTPATIENT
Start: 2019-08-24 | End: 2019-08-29

## 2019-08-24 RX ORDER — POTASSIUM CHLORIDE 750 MG/1
10 TABLET, EXTENDED RELEASE ORAL 3 TIMES DAILY
Status: DISCONTINUED | OUTPATIENT
Start: 2019-08-24 | End: 2019-08-26

## 2019-08-24 RX ADMIN — KETOROLAC TROMETHAMINE 30 MG: 30 INJECTION, SOLUTION INTRAMUSCULAR; INTRAVENOUS at 16:26

## 2019-08-24 RX ADMIN — PIPERACILLIN SODIUM AND TAZOBACTAM SODIUM 3.38 G: 3; .375 INJECTION, POWDER, LYOPHILIZED, FOR SOLUTION INTRAVENOUS at 00:10

## 2019-08-24 RX ADMIN — HYDROMORPHONE HYDROCHLORIDE 1 MG: 1 INJECTION, SOLUTION INTRAMUSCULAR; INTRAVENOUS; SUBCUTANEOUS at 08:44

## 2019-08-24 RX ADMIN — PIPERACILLIN SODIUM AND TAZOBACTAM SODIUM 3.38 G: 3; .375 INJECTION, POWDER, LYOPHILIZED, FOR SOLUTION INTRAVENOUS at 14:35

## 2019-08-24 RX ADMIN — POTASSIUM CHLORIDE AND SODIUM CHLORIDE: 900; 150 INJECTION, SOLUTION INTRAVENOUS at 08:44

## 2019-08-24 RX ADMIN — ENOXAPARIN SODIUM 40 MG: 40 INJECTION SUBCUTANEOUS at 08:44

## 2019-08-24 RX ADMIN — HYDROMORPHONE HYDROCHLORIDE 1 MG: 1 INJECTION, SOLUTION INTRAMUSCULAR; INTRAVENOUS; SUBCUTANEOUS at 22:11

## 2019-08-24 RX ADMIN — POTASSIUM CHLORIDE 10 MEQ: 10 TABLET, EXTENDED RELEASE ORAL at 14:35

## 2019-08-24 RX ADMIN — POTASSIUM CHLORIDE AND SODIUM CHLORIDE: 900; 150 INJECTION, SOLUTION INTRAVENOUS at 00:14

## 2019-08-24 RX ADMIN — POTASSIUM CHLORIDE AND SODIUM CHLORIDE: 900; 150 INJECTION, SOLUTION INTRAVENOUS at 23:25

## 2019-08-24 RX ADMIN — OXYCODONE HYDROCHLORIDE 10 MG: 5 TABLET ORAL at 20:04

## 2019-08-24 RX ADMIN — POTASSIUM CHLORIDE 10 MEQ: 10 TABLET, EXTENDED RELEASE ORAL at 20:04

## 2019-08-24 RX ADMIN — OXYCODONE HYDROCHLORIDE 10 MG: 5 TABLET ORAL at 14:37

## 2019-08-24 RX ADMIN — ONDANSETRON 4 MG: 2 INJECTION INTRAMUSCULAR; INTRAVENOUS at 00:14

## 2019-08-24 RX ADMIN — HYDROMORPHONE HYDROCHLORIDE 1 MG: 1 INJECTION, SOLUTION INTRAMUSCULAR; INTRAVENOUS; SUBCUTANEOUS at 04:38

## 2019-08-24 RX ADMIN — HYDROMORPHONE HYDROCHLORIDE 1 MG: 1 INJECTION, SOLUTION INTRAMUSCULAR; INTRAVENOUS; SUBCUTANEOUS at 00:14

## 2019-08-24 RX ADMIN — PIPERACILLIN SODIUM AND TAZOBACTAM SODIUM 3.38 G: 3; .375 INJECTION, POWDER, LYOPHILIZED, FOR SOLUTION INTRAVENOUS at 07:20

## 2019-08-24 RX ADMIN — PIPERACILLIN SODIUM AND TAZOBACTAM SODIUM 3.38 G: 3; .375 INJECTION, POWDER, LYOPHILIZED, FOR SOLUTION INTRAVENOUS at 23:25

## 2019-08-24 ASSESSMENT — PAIN SCALES - GENERAL
PAINLEVEL_OUTOF10: 9
PAINLEVEL_OUTOF10: 8
PAINLEVEL_OUTOF10: 8
PAINLEVEL_OUTOF10: 7
PAINLEVEL_OUTOF10: 10

## 2019-08-24 NOTE — FLOWSHEET NOTE
08/24/19 0830   Vital Signs   Temp 98.3 °F (36.8 °C)   Temp Source Oral   Pulse 72   Resp 18   BP (!) 131/92   BP Location Left upper arm   Patient Position Semi fowlers   Level of Consciousness 0   MEWS Score 1   Oxygen Therapy   SpO2 97 %   O2 Device None (Room air)   AM assessment and meds complete. PRN dilaudid given. Pt denies further needs. Call light in reach. Will monitor.

## 2019-08-24 NOTE — PROGRESS NOTES
Admitted to [place] [id number] at this time. Alert and pleasant; states that he is in pain at this time.

## 2019-08-24 NOTE — H&P
smoking history. He does not have any smokeless tobacco history on file. ETOH:   reports that he drinks about 8.0 standard drinks of alcohol per week. DRUGS:   reports that he does not use drugs. Family History:    History reviewed. No pertinent family history. REVIEW OF SYSTEMS:  He reports no complaints related to the eyes, ears , nose throat or mouth. He denies weight loss. No chest pain. No SOB. No urinary complaints. No musculoskeletal complaints. No skin rashes. No neurologic deficits. No bleeding tendencies. GI complaints include lower abdominal pain. PHYSICAL EXAM:  VITALS:  BP (!) 131/92   Pulse 72   Temp 98.3 °F (36.8 °C) (Oral)   Resp 18   Ht 6' (1.829 m)   Wt 272 lb 6.4 oz (123.6 kg)   SpO2 97%   BMI 36.94 kg/m²     CONSTITUTIONAL:  alert, no apparent distress and moderately obese  EYES:  sclera clear  ENT:  normocepalic, without obvious abnormality  NECK:  supple, symmetrical, trachea midline   LUNGS:  clear to auscultation  CARDIOVASCULAR:  regular rate and rhythm   ABDOMEN:     non-distended, tenderness noted in the left lower quadrant,and soft  MUSCULOSKELETAL:  No pitting edema lower extremities  NEUROLOGIC:  Mental Status Exam:  Level of Alertness:   awake  Orientation:   person, place, time  SKIN:  no rashes    DATA:    CBC:   Recent Labs     08/24/19  0531   WBC 9.3   HGB 10.9*   HCT 31.7*        BMP:    Recent Labs     08/24/19  0531      K 3.1*      CO2 26   BUN 9   CREATININE <0.5*   GLUCOSE 105*         Radiology Review:  CT reviewed from [facility]. Significant inflammation around sigmoid. Possible small pericolonic fluid collection. ASSESSMENT:  Recurrent, complicated sigmoid diverticulitis    PLAN:  IVF  Antibiotics  Full liquids  Try to cool off acute process. Will need elective resection.         [address] [facility]

## 2019-08-25 LAB
ANION GAP SERPL CALCULATED.3IONS-SCNC: 9 MMOL/L (ref 3–16)
BASOPHILS ABSOLUTE: 0 K/UL (ref 0–0.2)
BASOPHILS RELATIVE PERCENT: 0.4 %
BUN BLDV-MCNC: 9 MG/DL (ref 7–20)
CALCIUM SERPL-MCNC: 8.6 MG/DL (ref 8.3–10.6)
CHLORIDE BLD-SCNC: 103 MMOL/L (ref 99–110)
CO2: 25 MMOL/L (ref 21–32)
CREAT SERPL-MCNC: 0.6 MG/DL (ref 0.9–1.3)
EOSINOPHILS ABSOLUTE: 0.1 K/UL (ref 0–0.6)
EOSINOPHILS RELATIVE PERCENT: 1 %
GFR AFRICAN AMERICAN: >60
GFR NON-AFRICAN AMERICAN: >60
GLUCOSE BLD-MCNC: 108 MG/DL (ref 70–99)
HCT VFR BLD CALC: 29.4 % (ref 40.5–52.5)
HEMOGLOBIN: 10.1 G/DL (ref 13.5–17.5)
LYMPHOCYTES ABSOLUTE: 1.3 K/UL (ref 1–5.1)
LYMPHOCYTES RELATIVE PERCENT: 15.2 %
MAGNESIUM: 1.7 MG/DL (ref 1.8–2.4)
MCH RBC QN AUTO: 31.5 PG (ref 26–34)
MCHC RBC AUTO-ENTMCNC: 34.3 G/DL (ref 31–36)
MCV RBC AUTO: 91.9 FL (ref 80–100)
MONOCYTES ABSOLUTE: 1 K/UL (ref 0–1.3)
MONOCYTES RELATIVE PERCENT: 12.5 %
NEUTROPHILS ABSOLUTE: 5.9 K/UL (ref 1.7–7.7)
NEUTROPHILS RELATIVE PERCENT: 70.9 %
PDW BLD-RTO: 14.9 % (ref 12.4–15.4)
PHOSPHORUS: 2.1 MG/DL (ref 2.5–4.9)
PLATELET # BLD: 194 K/UL (ref 135–450)
PMV BLD AUTO: 7 FL (ref 5–10.5)
POTASSIUM SERPL-SCNC: 3.7 MMOL/L (ref 3.5–5.1)
RBC # BLD: 3.2 M/UL (ref 4.2–5.9)
SODIUM BLD-SCNC: 137 MMOL/L (ref 136–145)
WBC # BLD: 8.3 K/UL (ref 4–11)

## 2019-08-25 PROCEDURE — 2500000003 HC RX 250 WO HCPCS: Performed by: SURGERY

## 2019-08-25 PROCEDURE — 1200000000 HC SEMI PRIVATE

## 2019-08-25 PROCEDURE — 6370000000 HC RX 637 (ALT 250 FOR IP): Performed by: SURGERY

## 2019-08-25 PROCEDURE — 6360000002 HC RX W HCPCS: Performed by: SURGERY

## 2019-08-25 PROCEDURE — 36415 COLL VENOUS BLD VENIPUNCTURE: CPT

## 2019-08-25 PROCEDURE — 85025 COMPLETE CBC W/AUTO DIFF WBC: CPT

## 2019-08-25 PROCEDURE — 2580000003 HC RX 258: Performed by: SURGERY

## 2019-08-25 PROCEDURE — 80048 BASIC METABOLIC PNL TOTAL CA: CPT

## 2019-08-25 PROCEDURE — 83735 ASSAY OF MAGNESIUM: CPT

## 2019-08-25 PROCEDURE — 84100 ASSAY OF PHOSPHORUS: CPT

## 2019-08-25 PROCEDURE — 99232 SBSQ HOSP IP/OBS MODERATE 35: CPT | Performed by: SURGERY

## 2019-08-25 RX ORDER — MAGNESIUM SULFATE IN WATER 40 MG/ML
2 INJECTION, SOLUTION INTRAVENOUS ONCE
Status: COMPLETED | OUTPATIENT
Start: 2019-08-25 | End: 2019-08-25

## 2019-08-25 RX ADMIN — KETOROLAC TROMETHAMINE 30 MG: 30 INJECTION, SOLUTION INTRAMUSCULAR; INTRAVENOUS at 11:37

## 2019-08-25 RX ADMIN — HYDROMORPHONE HYDROCHLORIDE 1 MG: 1 INJECTION, SOLUTION INTRAMUSCULAR; INTRAVENOUS; SUBCUTANEOUS at 13:56

## 2019-08-25 RX ADMIN — OXYCODONE HYDROCHLORIDE 10 MG: 5 TABLET ORAL at 10:08

## 2019-08-25 RX ADMIN — PIPERACILLIN SODIUM AND TAZOBACTAM SODIUM 3.38 G: 3; .375 INJECTION, POWDER, LYOPHILIZED, FOR SOLUTION INTRAVENOUS at 07:02

## 2019-08-25 RX ADMIN — OXYCODONE HYDROCHLORIDE 10 MG: 5 TABLET ORAL at 17:59

## 2019-08-25 RX ADMIN — HYDROMORPHONE HYDROCHLORIDE 1 MG: 1 INJECTION, SOLUTION INTRAMUSCULAR; INTRAVENOUS; SUBCUTANEOUS at 21:37

## 2019-08-25 RX ADMIN — OXYCODONE HYDROCHLORIDE 10 MG: 5 TABLET ORAL at 03:21

## 2019-08-25 RX ADMIN — MAGNESIUM SULFATE HEPTAHYDRATE 2 G: 40 INJECTION, SOLUTION INTRAVENOUS at 10:10

## 2019-08-25 RX ADMIN — HYDROMORPHONE HYDROCHLORIDE 1 MG: 1 INJECTION, SOLUTION INTRAMUSCULAR; INTRAVENOUS; SUBCUTANEOUS at 07:02

## 2019-08-25 RX ADMIN — PIPERACILLIN SODIUM AND TAZOBACTAM SODIUM 3.38 G: 3; .375 INJECTION, POWDER, LYOPHILIZED, FOR SOLUTION INTRAVENOUS at 23:50

## 2019-08-25 RX ADMIN — POTASSIUM CHLORIDE AND SODIUM CHLORIDE: 900; 150 INJECTION, SOLUTION INTRAVENOUS at 07:09

## 2019-08-25 RX ADMIN — POTASSIUM CHLORIDE AND SODIUM CHLORIDE: 900; 150 INJECTION, SOLUTION INTRAVENOUS at 20:25

## 2019-08-25 RX ADMIN — ENOXAPARIN SODIUM 40 MG: 40 INJECTION SUBCUTANEOUS at 10:07

## 2019-08-25 RX ADMIN — HYDROMORPHONE HYDROCHLORIDE 1 MG: 1 INJECTION, SOLUTION INTRAMUSCULAR; INTRAVENOUS; SUBCUTANEOUS at 19:18

## 2019-08-25 RX ADMIN — POTASSIUM CHLORIDE 10 MEQ: 10 TABLET, EXTENDED RELEASE ORAL at 20:54

## 2019-08-25 RX ADMIN — POTASSIUM CHLORIDE 10 MEQ: 10 TABLET, EXTENDED RELEASE ORAL at 13:56

## 2019-08-25 RX ADMIN — ACETAMINOPHEN 650 MG: 325 TABLET ORAL at 20:27

## 2019-08-25 RX ADMIN — PIPERACILLIN SODIUM AND TAZOBACTAM SODIUM 3.38 G: 3; .375 INJECTION, POWDER, LYOPHILIZED, FOR SOLUTION INTRAVENOUS at 16:45

## 2019-08-25 RX ADMIN — POTASSIUM CHLORIDE 10 MEQ: 10 TABLET, EXTENDED RELEASE ORAL at 10:07

## 2019-08-25 RX ADMIN — POTASSIUM PHOSPHATE, MONOBASIC AND POTASSIUM PHOSPHATE, DIBASIC 20 MMOL: 224; 236 INJECTION, SOLUTION, CONCENTRATE INTRAVENOUS at 11:37

## 2019-08-25 RX ADMIN — KETOROLAC TROMETHAMINE 30 MG: 30 INJECTION, SOLUTION INTRAMUSCULAR; INTRAVENOUS at 22:55

## 2019-08-25 ASSESSMENT — PAIN SCALES - GENERAL
PAINLEVEL_OUTOF10: 8
PAINLEVEL_OUTOF10: 8
PAINLEVEL_OUTOF10: 7
PAINLEVEL_OUTOF10: 8
PAINLEVEL_OUTOF10: 0
PAINLEVEL_OUTOF10: 10
PAINLEVEL_OUTOF10: 7
PAINLEVEL_OUTOF10: 8

## 2019-08-25 NOTE — PLAN OF CARE
Problem: Pain:  Goal: Pain level will decrease  Description  Pain level will decrease  Outcome: Ongoing  Goal: Control of acute pain  Description  Control of acute pain  Outcome: Ongoing     Problem: Falls - Risk of:  Goal: Will remain free from falls  Description  Will remain free from falls  Outcome: Ongoing  Goal: Absence of physical injury  Description  Absence of physical injury  Outcome: Ongoing     Problem: Nutrition  Goal: Optimal nutrition therapy  8/25/2019 1621 by Talha Briones RN  Outcome: Ongoing  8/25/2019 1316 by Sabine Briseno RD, LD  Outcome: Ongoing  Goal: Understanding of nutritional guidelines  8/25/2019 1621 by Talha Briones RN  Outcome: Ongoing  8/25/2019 1316 by Sabine Briseno RD, LD  Outcome: Ongoing

## 2019-08-25 NOTE — PROGRESS NOTES
energy/nutrient consumption     Signs and symptoms:  as evidenced by Patient report of, Diet history of poor intake, Lab values, GI abnormality, Nausea    Objective Information:  · Nutrition-Focused Physical Findings: patient was laying in bed watching TV this afternoon; he is A & O x 4; he appeared annoyed with this RD's presence during initial visit; + own teeth - poor dentition; he reported that his throat feels \"tight\" and food is sometimes hard to go down but he has never choked before; skin is unremarkable; he c/o abdominal pain and was guarded when this RD gently felt his abdomen; he reported that his abdomen feels bloated + distended all of the time; patient reported that his last BM was \"probably 2 weeks ago\" (chart has 8/17/19); patient had consumed the drinks on his tray for lunch but not the meal itself  · Wound Type: None  · Current Nutrition Therapies:  · Oral Diet Orders: Full Liquid   · Oral Diet intake: 51-75%  · Oral Nutrition Supplement (ONS) Orders: None  · ONS intake: (none ordered)  · Anthropometric Measures:  · Ht: 6' (182.9 cm)   · Current Body Wt: 272 lb 6 oz (123.5 kg)  · Admission Body Wt: 272 lb 6 oz (123.5 kg)  · Usual Body Wt: (245#, per patient; patient weighed 235# in March 2019 but has gained weight)  · Weight Change:  + 37# weight gain since March 2019  · Ideal Body Wt: 178 lb (80.7 kg), % Ideal Body 153%  · BMI Classification: BMI 35.0 - 39.9 Obese Class II    Nutrition Interventions:   Continue current diet  Continued Inpatient Monitoring, Coordination of Care, Coordination of Community Care    Nutrition Evaluation:   · Evaluation: Goals set   · Goals: patient will consume 50% or greater of meals on full liquid diet order x 3 meals per day + he will tolerate diet advancement to solid food, as appropriate; weight will remain stable during remainder of admission    · Monitoring: Meal Intake, Nutrition Progression, Diet Tolerance, Weight, Pertinent Labs, Nausea or Vomiting, Constipation, Monitor Bowel Function      Electronically signed by Sabine Briseno RD, LD on 8/25/19 at 1:17 PM    Contact Number: [phone]

## 2019-08-25 NOTE — PLAN OF CARE
Nutrition Problem: Altered GI function  Intervention: Food and/or Nutrient Delivery: Continue current diet  Nutritional Goals: patient will consume 50% or greater of meals on full liquid diet order x 3 meals per day + he will tolerate diet advancement to solid food, as appropriate; weight will remain stable during remainder of admission

## 2019-08-26 ENCOUNTER — APPOINTMENT (OUTPATIENT)
Dept: CT IMAGING | Age: 39
DRG: 244 | End: 2019-08-26
Attending: SURGERY
Payer: MEDICAID

## 2019-08-26 LAB
ANION GAP SERPL CALCULATED.3IONS-SCNC: 10 MMOL/L (ref 3–16)
BASOPHILS ABSOLUTE: 0 K/UL (ref 0–0.2)
BASOPHILS RELATIVE PERCENT: 0.3 %
BUN BLDV-MCNC: 4 MG/DL (ref 7–20)
CALCIUM SERPL-MCNC: 8.5 MG/DL (ref 8.3–10.6)
CHLORIDE BLD-SCNC: 101 MMOL/L (ref 99–110)
CO2: 24 MMOL/L (ref 21–32)
CREAT SERPL-MCNC: <0.5 MG/DL (ref 0.9–1.3)
EOSINOPHILS ABSOLUTE: 0.1 K/UL (ref 0–0.6)
EOSINOPHILS RELATIVE PERCENT: 0.8 %
GFR AFRICAN AMERICAN: >60
GFR NON-AFRICAN AMERICAN: >60
GLUCOSE BLD-MCNC: 115 MG/DL (ref 70–99)
HCT VFR BLD CALC: 31.1 % (ref 40.5–52.5)
HEMOGLOBIN: 10.6 G/DL (ref 13.5–17.5)
LYMPHOCYTES ABSOLUTE: 0.9 K/UL (ref 1–5.1)
LYMPHOCYTES RELATIVE PERCENT: 10.2 %
MAGNESIUM: 2 MG/DL (ref 1.8–2.4)
MCH RBC QN AUTO: 31.4 PG (ref 26–34)
MCHC RBC AUTO-ENTMCNC: 33.9 G/DL (ref 31–36)
MCV RBC AUTO: 92.5 FL (ref 80–100)
MONOCYTES ABSOLUTE: 1.2 K/UL (ref 0–1.3)
MONOCYTES RELATIVE PERCENT: 13.5 %
NEUTROPHILS ABSOLUTE: 6.6 K/UL (ref 1.7–7.7)
NEUTROPHILS RELATIVE PERCENT: 75.2 %
PDW BLD-RTO: 14.8 % (ref 12.4–15.4)
PHOSPHORUS: 2.6 MG/DL (ref 2.5–4.9)
PLATELET # BLD: 208 K/UL (ref 135–450)
PMV BLD AUTO: 7.4 FL (ref 5–10.5)
POTASSIUM SERPL-SCNC: 3.7 MMOL/L (ref 3.5–5.1)
RBC # BLD: 3.37 M/UL (ref 4.2–5.9)
SODIUM BLD-SCNC: 135 MMOL/L (ref 136–145)
WBC # BLD: 8.8 K/UL (ref 4–11)

## 2019-08-26 PROCEDURE — 83735 ASSAY OF MAGNESIUM: CPT

## 2019-08-26 PROCEDURE — 6360000002 HC RX W HCPCS: Performed by: NURSE PRACTITIONER

## 2019-08-26 PROCEDURE — 84100 ASSAY OF PHOSPHORUS: CPT

## 2019-08-26 PROCEDURE — 74177 CT ABD & PELVIS W/CONTRAST: CPT

## 2019-08-26 PROCEDURE — 1200000000 HC SEMI PRIVATE

## 2019-08-26 PROCEDURE — 6360000002 HC RX W HCPCS: Performed by: SURGERY

## 2019-08-26 PROCEDURE — 80048 BASIC METABOLIC PNL TOTAL CA: CPT

## 2019-08-26 PROCEDURE — 6370000000 HC RX 637 (ALT 250 FOR IP): Performed by: SURGERY

## 2019-08-26 PROCEDURE — 2580000003 HC RX 258: Performed by: SURGERY

## 2019-08-26 PROCEDURE — 2500000003 HC RX 250 WO HCPCS: Performed by: NURSE PRACTITIONER

## 2019-08-26 PROCEDURE — 6360000004 HC RX CONTRAST MEDICATION: Performed by: NURSE PRACTITIONER

## 2019-08-26 PROCEDURE — 36415 COLL VENOUS BLD VENIPUNCTURE: CPT

## 2019-08-26 PROCEDURE — 99233 SBSQ HOSP IP/OBS HIGH 50: CPT | Performed by: SURGERY

## 2019-08-26 PROCEDURE — 85025 COMPLETE CBC W/AUTO DIFF WBC: CPT

## 2019-08-26 RX ADMIN — ONDANSETRON 4 MG: 2 INJECTION INTRAMUSCULAR; INTRAVENOUS at 20:48

## 2019-08-26 RX ADMIN — HYDROMORPHONE HYDROCHLORIDE 1 MG: 1 INJECTION, SOLUTION INTRAMUSCULAR; INTRAVENOUS; SUBCUTANEOUS at 17:06

## 2019-08-26 RX ADMIN — OXYCODONE HYDROCHLORIDE 10 MG: 5 TABLET ORAL at 03:50

## 2019-08-26 RX ADMIN — HYDROMORPHONE HYDROCHLORIDE 1 MG: 1 INJECTION, SOLUTION INTRAMUSCULAR; INTRAVENOUS; SUBCUTANEOUS at 23:36

## 2019-08-26 RX ADMIN — METRONIDAZOLE 500 MG: 500 INJECTION, SOLUTION INTRAVENOUS at 12:54

## 2019-08-26 RX ADMIN — HYDROMORPHONE HYDROCHLORIDE 1 MG: 1 INJECTION, SOLUTION INTRAMUSCULAR; INTRAVENOUS; SUBCUTANEOUS at 11:14

## 2019-08-26 RX ADMIN — IOPAMIDOL 75 ML: 755 INJECTION, SOLUTION INTRAVENOUS at 10:46

## 2019-08-26 RX ADMIN — POTASSIUM CHLORIDE AND SODIUM CHLORIDE: 900; 150 INJECTION, SOLUTION INTRAVENOUS at 20:49

## 2019-08-26 RX ADMIN — HYDROMORPHONE HYDROCHLORIDE 1 MG: 1 INJECTION, SOLUTION INTRAMUSCULAR; INTRAVENOUS; SUBCUTANEOUS at 09:15

## 2019-08-26 RX ADMIN — HYDROMORPHONE HYDROCHLORIDE 1 MG: 1 INJECTION, SOLUTION INTRAMUSCULAR; INTRAVENOUS; SUBCUTANEOUS at 14:04

## 2019-08-26 RX ADMIN — KETOROLAC TROMETHAMINE 30 MG: 30 INJECTION, SOLUTION INTRAMUSCULAR; INTRAVENOUS at 12:54

## 2019-08-26 RX ADMIN — METRONIDAZOLE 500 MG: 500 INJECTION, SOLUTION INTRAVENOUS at 20:48

## 2019-08-26 RX ADMIN — HYDROMORPHONE HYDROCHLORIDE 1 MG: 1 INJECTION, SOLUTION INTRAMUSCULAR; INTRAVENOUS; SUBCUTANEOUS at 07:00

## 2019-08-26 RX ADMIN — POTASSIUM CHLORIDE 10 MEQ: 10 TABLET, EXTENDED RELEASE ORAL at 08:36

## 2019-08-26 RX ADMIN — PIPERACILLIN SODIUM AND TAZOBACTAM SODIUM 3.38 G: 3; .375 INJECTION, POWDER, LYOPHILIZED, FOR SOLUTION INTRAVENOUS at 15:03

## 2019-08-26 RX ADMIN — KETOROLAC TROMETHAMINE 30 MG: 30 INJECTION, SOLUTION INTRAMUSCULAR; INTRAVENOUS at 21:45

## 2019-08-26 RX ADMIN — ENOXAPARIN SODIUM 40 MG: 40 INJECTION SUBCUTANEOUS at 08:36

## 2019-08-26 RX ADMIN — ONDANSETRON 4 MG: 2 INJECTION INTRAMUSCULAR; INTRAVENOUS at 07:00

## 2019-08-26 RX ADMIN — PIPERACILLIN SODIUM AND TAZOBACTAM SODIUM 3.38 G: 3; .375 INJECTION, POWDER, LYOPHILIZED, FOR SOLUTION INTRAVENOUS at 06:44

## 2019-08-26 RX ADMIN — HYDROMORPHONE HYDROCHLORIDE 1 MG: 1 INJECTION, SOLUTION INTRAMUSCULAR; INTRAVENOUS; SUBCUTANEOUS at 20:48

## 2019-08-26 RX ADMIN — ACETAMINOPHEN 650 MG: 325 TABLET ORAL at 21:45

## 2019-08-26 ASSESSMENT — PAIN DESCRIPTION - PROGRESSION
CLINICAL_PROGRESSION: NOT CHANGED

## 2019-08-26 ASSESSMENT — PAIN DESCRIPTION - ORIENTATION
ORIENTATION: MID

## 2019-08-26 ASSESSMENT — PAIN SCALES - GENERAL
PAINLEVEL_OUTOF10: 6
PAINLEVEL_OUTOF10: 8
PAINLEVEL_OUTOF10: 7
PAINLEVEL_OUTOF10: 7
PAINLEVEL_OUTOF10: 9
PAINLEVEL_OUTOF10: 7
PAINLEVEL_OUTOF10: 8
PAINLEVEL_OUTOF10: 8
PAINLEVEL_OUTOF10: 7
PAINLEVEL_OUTOF10: 9
PAINLEVEL_OUTOF10: 5
PAINLEVEL_OUTOF10: 7
PAINLEVEL_OUTOF10: 8

## 2019-08-26 ASSESSMENT — PAIN DESCRIPTION - DESCRIPTORS
DESCRIPTORS: ACHING;CRAMPING

## 2019-08-26 ASSESSMENT — PAIN DESCRIPTION - ONSET
ONSET: ON-GOING

## 2019-08-26 ASSESSMENT — PAIN DESCRIPTION - PAIN TYPE
TYPE: ACUTE PAIN

## 2019-08-26 ASSESSMENT — PAIN DESCRIPTION - LOCATION
LOCATION: ABDOMEN

## 2019-08-26 ASSESSMENT — PAIN DESCRIPTION - FREQUENCY
FREQUENCY: CONTINUOUS

## 2019-08-26 NOTE — PROGRESS NOTES
Shift report given to Juventino Nicholson RN. States that nausea has subsided but that pain is still present but has also improved. Discussed that he call out with need/concerns; voiced understanding.

## 2019-08-26 NOTE — PROGRESS NOTES
Patient states pain is unrelieved by medications. PRN toradol given at this time for pain 8/10, see MAR. Patient currently awake in bed, call light and personal belongings within reach, no needs voiced at this time.

## 2019-08-26 NOTE — PROGRESS NOTES
Toradol given at this time for abd pain. Stated that the previous pain medication given has not helped much. Will continue to monitor.

## 2019-08-26 NOTE — PROGRESS NOTES
General Surgery - [facility], [address] [facility]  Daily Progress Note    Pt Name: Hernando Veloz Record Number: [id number]  Date of Birth 1980   Today's Date: 8/26/2019    ASSESSMENT  1. Repeat CT 8/26: long segment recurrent diverticulitis with 21mm ? Intramural abscess  2. ABD: obese, soft, LLQ tenderness present, no peritonitis, +N, +V, + flatus, + BM yesterday  3. VS: intermittent fevers as high as 102.7 now 100.1  4. Leuks normal  5. UO OK    PLAN  1. Repeat CT scan reviewed by Dr. Henson Overall: no change in plan of care. Continue IV antibiotics, add Flagyl  2. Ambulate halls  3. IVF  4. NPO  5. Hopefully acute diverticulitis will resolved with antibiotic therapy with plans for elective resection once acute inflammation resolved. SUBJECTIVE  Bharat Fears has improved in some ways and worsened in others from yesterday. Pain is not well controlled. He has nausea and vomiting. He has passed flatus and has had a bowel movement. He is not tolerating full liquids. Current activity is up with assistance    OBJECTIVE  VITALS:  height is 6' (1.829 m) and weight is 272 lb 6.4 oz (123.6 kg). His oral temperature is 98.5 °F (36.9 °C). His blood pressure is 139/94 (abnormal) and his pulse is 87. His respiration is 18 and oxygen saturation is 97%. VITALS:  BP (!) 139/94   Pulse 87   Temp 98.5 °F (36.9 °C) (Oral)   Resp 18   Ht 6' (1.829 m)   Wt 272 lb 6.4 oz (123.6 kg)   SpO2 97%   BMI 36.94 kg/m²   INTAKE/OUTPUT:    Intake/Output Summary (Last 24 hours) at 8/26/2019 1254  Last data filed at 8/26/2019 0947  Gross per 24 hour   Intake 720 ml   Output --   Net 720 ml     GENERAL: alert, cooperative, no distress    I/O last 3 completed shifts: In: 4266 [P.O.:960; I.V.:3250]  Out: -   No intake/output data recorded.     LABS  Recent Labs     08/26/19  0539   WBC 8.8   HGB 10.6*   HCT 31.1*      *   K 3.7      CO2 24   BUN 4*   CREATININE <0.5*   MG 2.00   PHOS 2.6   CALCIUM 8.5     CBC with

## 2019-08-26 NOTE — PROGRESS NOTES
Zofran IVP and Dilaudid 1 mg IVP given at this time for c/o pain and nausea. Will follow up appropriately.

## 2019-08-27 LAB
ANION GAP SERPL CALCULATED.3IONS-SCNC: 11 MMOL/L (ref 3–16)
BASOPHILS ABSOLUTE: 0.1 K/UL (ref 0–0.2)
BASOPHILS RELATIVE PERCENT: 0.7 %
BUN BLDV-MCNC: 6 MG/DL (ref 7–20)
CALCIUM SERPL-MCNC: 8.8 MG/DL (ref 8.3–10.6)
CHLORIDE BLD-SCNC: 98 MMOL/L (ref 99–110)
CO2: 25 MMOL/L (ref 21–32)
CREAT SERPL-MCNC: 0.6 MG/DL (ref 0.9–1.3)
EOSINOPHILS ABSOLUTE: 0.1 K/UL (ref 0–0.6)
EOSINOPHILS RELATIVE PERCENT: 0.9 %
GFR AFRICAN AMERICAN: >60
GFR NON-AFRICAN AMERICAN: >60
GLUCOSE BLD-MCNC: 87 MG/DL (ref 70–99)
HCT VFR BLD CALC: 31.3 % (ref 40.5–52.5)
HEMOGLOBIN: 10.5 G/DL (ref 13.5–17.5)
LYMPHOCYTES ABSOLUTE: 0.8 K/UL (ref 1–5.1)
LYMPHOCYTES RELATIVE PERCENT: 8.9 %
MCH RBC QN AUTO: 31.5 PG (ref 26–34)
MCHC RBC AUTO-ENTMCNC: 33.7 G/DL (ref 31–36)
MCV RBC AUTO: 93.6 FL (ref 80–100)
MONOCYTES ABSOLUTE: 1.2 K/UL (ref 0–1.3)
MONOCYTES RELATIVE PERCENT: 13.9 %
NEUTROPHILS ABSOLUTE: 6.6 K/UL (ref 1.7–7.7)
NEUTROPHILS RELATIVE PERCENT: 75.6 %
PDW BLD-RTO: 14.7 % (ref 12.4–15.4)
PLATELET # BLD: 207 K/UL (ref 135–450)
PMV BLD AUTO: 7 FL (ref 5–10.5)
POTASSIUM SERPL-SCNC: 4.4 MMOL/L (ref 3.5–5.1)
RBC # BLD: 3.34 M/UL (ref 4.2–5.9)
SODIUM BLD-SCNC: 134 MMOL/L (ref 136–145)
WBC # BLD: 8.7 K/UL (ref 4–11)

## 2019-08-27 PROCEDURE — 2580000003 HC RX 258: Performed by: SURGERY

## 2019-08-27 PROCEDURE — 6360000002 HC RX W HCPCS: Performed by: SURGERY

## 2019-08-27 PROCEDURE — 85025 COMPLETE CBC W/AUTO DIFF WBC: CPT

## 2019-08-27 PROCEDURE — 36415 COLL VENOUS BLD VENIPUNCTURE: CPT

## 2019-08-27 PROCEDURE — 2500000003 HC RX 250 WO HCPCS: Performed by: NURSE PRACTITIONER

## 2019-08-27 PROCEDURE — 80048 BASIC METABOLIC PNL TOTAL CA: CPT

## 2019-08-27 PROCEDURE — 99232 SBSQ HOSP IP/OBS MODERATE 35: CPT | Performed by: SURGERY

## 2019-08-27 PROCEDURE — 6360000002 HC RX W HCPCS: Performed by: NURSE PRACTITIONER

## 2019-08-27 PROCEDURE — 1200000000 HC SEMI PRIVATE

## 2019-08-27 RX ADMIN — ENOXAPARIN SODIUM 40 MG: 40 INJECTION SUBCUTANEOUS at 08:26

## 2019-08-27 RX ADMIN — PIPERACILLIN SODIUM AND TAZOBACTAM SODIUM 3.38 G: 3; .375 INJECTION, POWDER, LYOPHILIZED, FOR SOLUTION INTRAVENOUS at 23:18

## 2019-08-27 RX ADMIN — KETOROLAC TROMETHAMINE 30 MG: 30 INJECTION, SOLUTION INTRAMUSCULAR; INTRAVENOUS at 15:01

## 2019-08-27 RX ADMIN — HYDROMORPHONE HYDROCHLORIDE 1 MG: 1 INJECTION, SOLUTION INTRAMUSCULAR; INTRAVENOUS; SUBCUTANEOUS at 10:45

## 2019-08-27 RX ADMIN — HYDROMORPHONE HYDROCHLORIDE 1 MG: 1 INJECTION, SOLUTION INTRAMUSCULAR; INTRAVENOUS; SUBCUTANEOUS at 05:02

## 2019-08-27 RX ADMIN — PIPERACILLIN SODIUM AND TAZOBACTAM SODIUM 3.38 G: 3; .375 INJECTION, POWDER, LYOPHILIZED, FOR SOLUTION INTRAVENOUS at 00:34

## 2019-08-27 RX ADMIN — HYDROMORPHONE HYDROCHLORIDE 1 MG: 1 INJECTION, SOLUTION INTRAMUSCULAR; INTRAVENOUS; SUBCUTANEOUS at 23:26

## 2019-08-27 RX ADMIN — ONDANSETRON 4 MG: 2 INJECTION INTRAMUSCULAR; INTRAVENOUS at 20:40

## 2019-08-27 RX ADMIN — PIPERACILLIN SODIUM AND TAZOBACTAM SODIUM 3.38 G: 3; .375 INJECTION, POWDER, LYOPHILIZED, FOR SOLUTION INTRAVENOUS at 06:30

## 2019-08-27 RX ADMIN — HYDROMORPHONE HYDROCHLORIDE 1 MG: 1 INJECTION, SOLUTION INTRAMUSCULAR; INTRAVENOUS; SUBCUTANEOUS at 18:52

## 2019-08-27 RX ADMIN — KETOROLAC TROMETHAMINE 30 MG: 30 INJECTION, SOLUTION INTRAMUSCULAR; INTRAVENOUS at 23:18

## 2019-08-27 RX ADMIN — HYDROMORPHONE HYDROCHLORIDE 1 MG: 1 INJECTION, SOLUTION INTRAMUSCULAR; INTRAVENOUS; SUBCUTANEOUS at 16:40

## 2019-08-27 RX ADMIN — METRONIDAZOLE 500 MG: 500 INJECTION, SOLUTION INTRAVENOUS at 12:51

## 2019-08-27 RX ADMIN — METRONIDAZOLE 500 MG: 500 INJECTION, SOLUTION INTRAVENOUS at 05:01

## 2019-08-27 RX ADMIN — POTASSIUM CHLORIDE AND SODIUM CHLORIDE: 900; 150 INJECTION, SOLUTION INTRAVENOUS at 13:03

## 2019-08-27 RX ADMIN — HYDROMORPHONE HYDROCHLORIDE 1 MG: 1 INJECTION, SOLUTION INTRAMUSCULAR; INTRAVENOUS; SUBCUTANEOUS at 08:25

## 2019-08-27 RX ADMIN — METRONIDAZOLE 500 MG: 500 INJECTION, SOLUTION INTRAVENOUS at 20:40

## 2019-08-27 RX ADMIN — ONDANSETRON 4 MG: 2 INJECTION INTRAMUSCULAR; INTRAVENOUS at 06:11

## 2019-08-27 RX ADMIN — HYDROMORPHONE HYDROCHLORIDE 1 MG: 1 INJECTION, SOLUTION INTRAMUSCULAR; INTRAVENOUS; SUBCUTANEOUS at 20:52

## 2019-08-27 RX ADMIN — HYDROMORPHONE HYDROCHLORIDE 1 MG: 1 INJECTION, SOLUTION INTRAMUSCULAR; INTRAVENOUS; SUBCUTANEOUS at 13:04

## 2019-08-27 RX ADMIN — KETOROLAC TROMETHAMINE 30 MG: 30 INJECTION, SOLUTION INTRAMUSCULAR; INTRAVENOUS at 06:35

## 2019-08-27 RX ADMIN — POTASSIUM CHLORIDE AND SODIUM CHLORIDE: 900; 150 INJECTION, SOLUTION INTRAVENOUS at 05:06

## 2019-08-27 RX ADMIN — PIPERACILLIN SODIUM AND TAZOBACTAM SODIUM 3.38 G: 3; .375 INJECTION, POWDER, LYOPHILIZED, FOR SOLUTION INTRAVENOUS at 16:46

## 2019-08-27 ASSESSMENT — PAIN DESCRIPTION - FREQUENCY: FREQUENCY: CONTINUOUS

## 2019-08-27 ASSESSMENT — PAIN SCALES - GENERAL
PAINLEVEL_OUTOF10: 6
PAINLEVEL_OUTOF10: 7
PAINLEVEL_OUTOF10: 6
PAINLEVEL_OUTOF10: 7
PAINLEVEL_OUTOF10: 7
PAINLEVEL_OUTOF10: 5
PAINLEVEL_OUTOF10: 5
PAINLEVEL_OUTOF10: 8
PAINLEVEL_OUTOF10: 6
PAINLEVEL_OUTOF10: 8
PAINLEVEL_OUTOF10: 7
PAINLEVEL_OUTOF10: 6
PAINLEVEL_OUTOF10: 8
PAINLEVEL_OUTOF10: 6
PAINLEVEL_OUTOF10: 6
PAINLEVEL_OUTOF10: 7

## 2019-08-27 ASSESSMENT — PAIN DESCRIPTION - DESCRIPTORS
DESCRIPTORS: THROBBING

## 2019-08-27 ASSESSMENT — PAIN DESCRIPTION - LOCATION
LOCATION: ABDOMEN

## 2019-08-27 ASSESSMENT — PAIN DESCRIPTION - PAIN TYPE
TYPE: ACUTE PAIN

## 2019-08-27 ASSESSMENT — PAIN DESCRIPTION - ORIENTATION
ORIENTATION: MID

## 2019-08-27 ASSESSMENT — PAIN DESCRIPTION - PROGRESSION: CLINICAL_PROGRESSION: GRADUALLY IMPROVING

## 2019-08-27 NOTE — PROGRESS NOTES
Handoff report and transfer of care given at bedside to [address] [place] [address]. Patient in stable condition, denies needs/concerns at this time. Call light within reach.

## 2019-08-28 LAB
BILIRUBIN URINE: NEGATIVE
BLOOD, URINE: ABNORMAL
CLARITY: CLEAR
COLOR: YELLOW
GLUCOSE URINE: NEGATIVE MG/DL
KETONES, URINE: 15 MG/DL
LEUKOCYTE ESTERASE, URINE: NEGATIVE
MICROSCOPIC EXAMINATION: YES
NITRITE, URINE: NEGATIVE
PH UA: 6.5 (ref 5–8)
PROTEIN UA: NEGATIVE MG/DL
RBC UA: ABNORMAL /HPF (ref 0–2)
SPECIFIC GRAVITY UA: <=1.005 (ref 1–1.03)
URINE TYPE: ABNORMAL
UROBILINOGEN, URINE: 2 E.U./DL
WBC UA: ABNORMAL /HPF (ref 0–5)

## 2019-08-28 PROCEDURE — 1200000000 HC SEMI PRIVATE

## 2019-08-28 PROCEDURE — 6360000002 HC RX W HCPCS: Performed by: SURGERY

## 2019-08-28 PROCEDURE — 87086 URINE CULTURE/COLONY COUNT: CPT

## 2019-08-28 PROCEDURE — 6370000000 HC RX 637 (ALT 250 FOR IP): Performed by: SURGERY

## 2019-08-28 PROCEDURE — 81001 URINALYSIS AUTO W/SCOPE: CPT

## 2019-08-28 PROCEDURE — 6360000002 HC RX W HCPCS: Performed by: NURSE PRACTITIONER

## 2019-08-28 PROCEDURE — 2500000003 HC RX 250 WO HCPCS: Performed by: NURSE PRACTITIONER

## 2019-08-28 PROCEDURE — 2580000003 HC RX 258: Performed by: SURGERY

## 2019-08-28 PROCEDURE — 99232 SBSQ HOSP IP/OBS MODERATE 35: CPT | Performed by: SURGERY

## 2019-08-28 RX ADMIN — HYDROMORPHONE HYDROCHLORIDE 1 MG: 1 INJECTION, SOLUTION INTRAMUSCULAR; INTRAVENOUS; SUBCUTANEOUS at 17:59

## 2019-08-28 RX ADMIN — HYDROMORPHONE HYDROCHLORIDE 1 MG: 1 INJECTION, SOLUTION INTRAMUSCULAR; INTRAVENOUS; SUBCUTANEOUS at 05:37

## 2019-08-28 RX ADMIN — METRONIDAZOLE 500 MG: 500 INJECTION, SOLUTION INTRAVENOUS at 13:43

## 2019-08-28 RX ADMIN — KETOROLAC TROMETHAMINE 30 MG: 30 INJECTION, SOLUTION INTRAMUSCULAR; INTRAVENOUS at 07:33

## 2019-08-28 RX ADMIN — HYDROMORPHONE HYDROCHLORIDE 1 MG: 1 INJECTION, SOLUTION INTRAMUSCULAR; INTRAVENOUS; SUBCUTANEOUS at 08:48

## 2019-08-28 RX ADMIN — ONDANSETRON 4 MG: 2 INJECTION INTRAMUSCULAR; INTRAVENOUS at 17:55

## 2019-08-28 RX ADMIN — POTASSIUM CHLORIDE AND SODIUM CHLORIDE: 900; 150 INJECTION, SOLUTION INTRAVENOUS at 17:55

## 2019-08-28 RX ADMIN — KETOROLAC TROMETHAMINE 30 MG: 30 INJECTION, SOLUTION INTRAMUSCULAR; INTRAVENOUS at 20:26

## 2019-08-28 RX ADMIN — ENOXAPARIN SODIUM 40 MG: 40 INJECTION SUBCUTANEOUS at 08:10

## 2019-08-28 RX ADMIN — PIPERACILLIN SODIUM AND TAZOBACTAM SODIUM 3.38 G: 3; .375 INJECTION, POWDER, LYOPHILIZED, FOR SOLUTION INTRAVENOUS at 23:19

## 2019-08-28 RX ADMIN — HYDROMORPHONE HYDROCHLORIDE 1 MG: 1 INJECTION, SOLUTION INTRAMUSCULAR; INTRAVENOUS; SUBCUTANEOUS at 03:30

## 2019-08-28 RX ADMIN — OXYCODONE HYDROCHLORIDE 10 MG: 5 TABLET ORAL at 13:52

## 2019-08-28 RX ADMIN — PIPERACILLIN SODIUM AND TAZOBACTAM SODIUM 3.38 G: 3; .375 INJECTION, POWDER, LYOPHILIZED, FOR SOLUTION INTRAVENOUS at 05:34

## 2019-08-28 RX ADMIN — OXYCODONE HYDROCHLORIDE 10 MG: 5 TABLET ORAL at 20:25

## 2019-08-28 RX ADMIN — PIPERACILLIN SODIUM AND TAZOBACTAM SODIUM 3.38 G: 3; .375 INJECTION, POWDER, LYOPHILIZED, FOR SOLUTION INTRAVENOUS at 16:01

## 2019-08-28 RX ADMIN — METRONIDAZOLE 500 MG: 500 INJECTION, SOLUTION INTRAVENOUS at 20:26

## 2019-08-28 RX ADMIN — METRONIDAZOLE 500 MG: 500 INJECTION, SOLUTION INTRAVENOUS at 05:34

## 2019-08-28 ASSESSMENT — PAIN SCALES - GENERAL
PAINLEVEL_OUTOF10: 7
PAINLEVEL_OUTOF10: 0
PAINLEVEL_OUTOF10: 7
PAINLEVEL_OUTOF10: 5
PAINLEVEL_OUTOF10: 5
PAINLEVEL_OUTOF10: 7
PAINLEVEL_OUTOF10: 6
PAINLEVEL_OUTOF10: 7

## 2019-08-28 NOTE — PROGRESS NOTES
Pt called out for nausea and pain in abd 7/10, prn meds given, see MAR. Will reassess and continue to monitor.

## 2019-08-28 NOTE — PLAN OF CARE
Problem: Pain:  Description  Pain management should include both nonpharmacologic and pharmacologic interventions.   Goal: Pain level will decrease  Description  Pain level will decrease  Outcome: Ongoing  Goal: Control of acute pain  Description  Control of acute pain  Outcome: Ongoing  Goal: Control of chronic pain  Description  Control of chronic pain  Outcome: Ongoing  Goal: Patient's pain/discomfort is manageable  Description  Patient's pain/discomfort is manageable  Outcome: Ongoing     Problem: Falls - Risk of:  Goal: Will remain free from falls  Description  Will remain free from falls  Outcome: Ongoing  Goal: Absence of physical injury  Description  Absence of physical injury  Outcome: Ongoing     Problem: Nutrition  Goal: Optimal nutrition therapy  Outcome: Ongoing  Goal: Understanding of nutritional guidelines  Outcome: Ongoing     Problem: Infection:  Goal: Will remain free from infection  Description  Will remain free from infection  Outcome: Ongoing     Problem: Safety:  Goal: Free from accidental physical injury  Description  Free from accidental physical injury  Outcome: Ongoing  Goal: Free from intentional harm  Description  Free from intentional harm  Outcome: Ongoing     Problem: Daily Care:  Goal: Daily care needs are met  Description  Daily care needs are met  Outcome: Ongoing     Problem: Skin Integrity:  Goal: Skin integrity will stabilize  Description  Skin integrity will stabilize  Outcome: Ongoing     Problem: Discharge Planning:  Goal: Patients continuum of care needs are met  Description  Patients continuum of care needs are met  Outcome: Ongoing

## 2019-08-28 NOTE — PROGRESS NOTES
General Surgery - [facility] [place], [place]  Daily Progress Note    Pt Name: Ashley Huff Record Number: [id number]  Date of Birth 1980   Today's Date: 8/28/2019    ASSESSMENT  1. Repeat CT 8/26: long segment recurrent diverticulitis with 21mm ? Intramural abscess  2. ABD: obese, soft, very mild LLQ tenderness present, no peritonitis, + mild nausea, no V, + flatus, + BMs  3. VS: fever curve improving 100.9   4. Leuks normal  5. UO OK: pt complaining of some old blood in urine, pain with urination, increased start stop    PLAN  1. Continue IV antibiotics  2. Ambulate halls  3. IVF: decrease  4. Start clears  5. Hopefully acute diverticulitis will resolved with antibiotic therapy with plans for elective resection once acute inflammation resolved. If continues to improve hopefully discharge pt home in 1-2 days    SUBJECTIVE  Rico Kub has improved from yesterday. Pain is well controlled. He has mild intermittent nausea and vomiting. He has passed flatus and has had a bowel movement. He is tolerating ice chips. Current activity is up with assistance    OBJECTIVE  VITALS:  height is 6' (1.829 m) and weight is 272 lb 6.4 oz (123.6 kg). His oral temperature is 100.1 °F (37.8 °C). His blood pressure is 141/85 (abnormal) and his pulse is 84. His respiration is 18 and oxygen saturation is 96%. VITALS:  BP (!) 141/85   Pulse 84   Temp 100.1 °F (37.8 °C) (Oral)   Resp 18   Ht 6' (1.829 m)   Wt 272 lb 6.4 oz (123.6 kg)   SpO2 96%   BMI 36.94 kg/m²   INTAKE/OUTPUT:      Intake/Output Summary (Last 24 hours) at 8/28/2019 1145  Last data filed at 8/28/2019 0846  Gross per 24 hour   Intake 4720 ml   Output --   Net 4720 ml     GENERAL: alert, cooperative, no distress    I/O last 3 completed shifts:   In: 4360 [P.O.:360; I.V.:4000]  Out: -   I/O this shift:  In: 360 [P.O.:360]  Out: -     LABS  Recent Labs     08/26/19  0539 08/27/19  0920   WBC 8.8 8.7   HGB 10.6* 10.5*   HCT 31.1* 31.3*    207   NA

## 2019-08-28 NOTE — FLOWSHEET NOTE
08/28/19 0800   Vital Signs   Temp 100.4 °F (38 °C)   Temp Source Oral   Temp recheck above, no meds given at this time. Pt states he was bundled under blankets when the temp of 100.9 was taken, will reassess and continue to monitor.

## 2019-08-29 LAB — URINE CULTURE, ROUTINE: NORMAL

## 2019-08-29 PROCEDURE — 2580000003 HC RX 258: Performed by: SURGERY

## 2019-08-29 PROCEDURE — 1200000000 HC SEMI PRIVATE

## 2019-08-29 PROCEDURE — 6360000002 HC RX W HCPCS: Performed by: SURGERY

## 2019-08-29 PROCEDURE — 2500000003 HC RX 250 WO HCPCS: Performed by: NURSE PRACTITIONER

## 2019-08-29 PROCEDURE — 6370000000 HC RX 637 (ALT 250 FOR IP): Performed by: SURGERY

## 2019-08-29 PROCEDURE — 99232 SBSQ HOSP IP/OBS MODERATE 35: CPT | Performed by: SURGERY

## 2019-08-29 RX ADMIN — PIPERACILLIN SODIUM AND TAZOBACTAM SODIUM 3.38 G: 3; .375 INJECTION, POWDER, LYOPHILIZED, FOR SOLUTION INTRAVENOUS at 21:00

## 2019-08-29 RX ADMIN — METRONIDAZOLE 500 MG: 500 INJECTION, SOLUTION INTRAVENOUS at 04:58

## 2019-08-29 RX ADMIN — ONDANSETRON 4 MG: 2 INJECTION INTRAMUSCULAR; INTRAVENOUS at 22:11

## 2019-08-29 RX ADMIN — METRONIDAZOLE 500 MG: 500 INJECTION, SOLUTION INTRAVENOUS at 12:21

## 2019-08-29 RX ADMIN — ONDANSETRON 4 MG: 2 INJECTION INTRAMUSCULAR; INTRAVENOUS at 08:25

## 2019-08-29 RX ADMIN — OXYCODONE HYDROCHLORIDE 10 MG: 5 TABLET ORAL at 13:09

## 2019-08-29 RX ADMIN — OXYCODONE HYDROCHLORIDE 10 MG: 5 TABLET ORAL at 17:45

## 2019-08-29 RX ADMIN — OXYCODONE HYDROCHLORIDE 10 MG: 5 TABLET ORAL at 08:50

## 2019-08-29 RX ADMIN — PIPERACILLIN SODIUM AND TAZOBACTAM SODIUM 3.38 G: 3; .375 INJECTION, POWDER, LYOPHILIZED, FOR SOLUTION INTRAVENOUS at 14:30

## 2019-08-29 RX ADMIN — OXYCODONE HYDROCHLORIDE 10 MG: 5 TABLET ORAL at 03:31

## 2019-08-29 RX ADMIN — PIPERACILLIN SODIUM AND TAZOBACTAM SODIUM 3.38 G: 3; .375 INJECTION, POWDER, LYOPHILIZED, FOR SOLUTION INTRAVENOUS at 06:23

## 2019-08-29 RX ADMIN — ENOXAPARIN SODIUM 40 MG: 40 INJECTION SUBCUTANEOUS at 08:26

## 2019-08-29 RX ADMIN — KETOROLAC TROMETHAMINE 30 MG: 30 INJECTION, SOLUTION INTRAMUSCULAR; INTRAVENOUS at 03:30

## 2019-08-29 RX ADMIN — METRONIDAZOLE 500 MG: 500 INJECTION, SOLUTION INTRAVENOUS at 20:58

## 2019-08-29 RX ADMIN — DIPHENHYDRAMINE HYDROCHLORIDE 25 MG: 50 INJECTION, SOLUTION INTRAMUSCULAR; INTRAVENOUS at 23:09

## 2019-08-29 RX ADMIN — OXYCODONE HYDROCHLORIDE 10 MG: 5 TABLET ORAL at 22:11

## 2019-08-29 ASSESSMENT — PAIN DESCRIPTION - DESCRIPTORS
DESCRIPTORS: CRAMPING;DISCOMFORT
DESCRIPTORS: ACHING
DESCRIPTORS: ACHING;CRAMPING

## 2019-08-29 ASSESSMENT — PAIN SCALES - GENERAL
PAINLEVEL_OUTOF10: 1
PAINLEVEL_OUTOF10: 3
PAINLEVEL_OUTOF10: 6
PAINLEVEL_OUTOF10: 4
PAINLEVEL_OUTOF10: 4
PAINLEVEL_OUTOF10: 7
PAINLEVEL_OUTOF10: 3
PAINLEVEL_OUTOF10: 6
PAINLEVEL_OUTOF10: 7
PAINLEVEL_OUTOF10: 4

## 2019-08-29 ASSESSMENT — PAIN DESCRIPTION - FREQUENCY
FREQUENCY: CONTINUOUS

## 2019-08-29 ASSESSMENT — PAIN - FUNCTIONAL ASSESSMENT
PAIN_FUNCTIONAL_ASSESSMENT: ACTIVITIES ARE NOT PREVENTED
PAIN_FUNCTIONAL_ASSESSMENT: ACTIVITIES ARE NOT PREVENTED
PAIN_FUNCTIONAL_ASSESSMENT: PREVENTS OR INTERFERES SOME ACTIVE ACTIVITIES AND ADLS
PAIN_FUNCTIONAL_ASSESSMENT: ACTIVITIES ARE NOT PREVENTED

## 2019-08-29 ASSESSMENT — PAIN DESCRIPTION - ORIENTATION
ORIENTATION: MID

## 2019-08-29 ASSESSMENT — PAIN DESCRIPTION - PROGRESSION
CLINICAL_PROGRESSION: NOT CHANGED

## 2019-08-29 ASSESSMENT — PAIN DESCRIPTION - PAIN TYPE
TYPE: ACUTE PAIN

## 2019-08-29 ASSESSMENT — PAIN DESCRIPTION - ONSET
ONSET: ON-GOING

## 2019-08-29 ASSESSMENT — PAIN DESCRIPTION - LOCATION
LOCATION: ABDOMEN

## 2019-08-29 NOTE — PROGRESS NOTES
Assessment complete. Chaya is alert and oriented. Vital signs are per flowsheet. Respirations are easy and even at rest.  Chaya complains of pain in abdomen at 6/10. Non-medication interventions were refused. Chaya is up in bed watching television, 2/4 side rails up, bed in lowest position, call light in easy reach, bed alarm off, and he denies any further needs. No other complications are noted, will continue to monitor throughout shift.

## 2019-08-29 NOTE — PROGRESS NOTES
AM assessment complete. Gave am meds due see mar. PRN nausea med given. A/O x 4. Call light within reach.

## 2019-08-30 VITALS
TEMPERATURE: 98.4 F | SYSTOLIC BLOOD PRESSURE: 114 MMHG | BODY MASS INDEX: 36.9 KG/M2 | RESPIRATION RATE: 20 BRPM | HEART RATE: 73 BPM | DIASTOLIC BLOOD PRESSURE: 78 MMHG | OXYGEN SATURATION: 95 % | HEIGHT: 72 IN | WEIGHT: 272.4 LBS

## 2019-08-30 PROCEDURE — 2580000003 HC RX 258: Performed by: SURGERY

## 2019-08-30 PROCEDURE — 6360000002 HC RX W HCPCS: Performed by: SURGERY

## 2019-08-30 PROCEDURE — 6370000000 HC RX 637 (ALT 250 FOR IP): Performed by: SURGERY

## 2019-08-30 PROCEDURE — 99238 HOSP IP/OBS DSCHRG MGMT 30/<: CPT | Performed by: SURGERY

## 2019-08-30 PROCEDURE — 2500000003 HC RX 250 WO HCPCS: Performed by: NURSE PRACTITIONER

## 2019-08-30 RX ORDER — AMOXICILLIN AND CLAVULANATE POTASSIUM 500; 125 MG/1; MG/1
1 TABLET, FILM COATED ORAL 3 TIMES DAILY
Qty: 30 TABLET | Refills: 0 | Status: ON HOLD | OUTPATIENT
Start: 2019-08-30 | End: 2019-09-11 | Stop reason: HOSPADM

## 2019-08-30 RX ORDER — METRONIDAZOLE 500 MG/1
500 TABLET ORAL 3 TIMES DAILY
Qty: 30 TABLET | Refills: 0 | Status: ON HOLD | OUTPATIENT
Start: 2019-08-30 | End: 2019-09-11 | Stop reason: HOSPADM

## 2019-08-30 RX ADMIN — OXYCODONE HYDROCHLORIDE 10 MG: 5 TABLET ORAL at 03:14

## 2019-08-30 RX ADMIN — OXYCODONE HYDROCHLORIDE 10 MG: 5 TABLET ORAL at 08:09

## 2019-08-30 RX ADMIN — METRONIDAZOLE 500 MG: 500 INJECTION, SOLUTION INTRAVENOUS at 05:02

## 2019-08-30 RX ADMIN — PIPERACILLIN SODIUM AND TAZOBACTAM SODIUM 3.38 G: 3; .375 INJECTION, POWDER, LYOPHILIZED, FOR SOLUTION INTRAVENOUS at 05:02

## 2019-08-30 RX ADMIN — ENOXAPARIN SODIUM 40 MG: 40 INJECTION SUBCUTANEOUS at 07:39

## 2019-08-30 RX ADMIN — ONDANSETRON 4 MG: 2 INJECTION INTRAMUSCULAR; INTRAVENOUS at 07:38

## 2019-08-30 ASSESSMENT — PAIN DESCRIPTION - ONSET
ONSET: ON-GOING
ONSET: ON-GOING

## 2019-08-30 ASSESSMENT — PAIN DESCRIPTION - FREQUENCY
FREQUENCY: CONTINUOUS
FREQUENCY: CONTINUOUS

## 2019-08-30 ASSESSMENT — PAIN DESCRIPTION - ORIENTATION: ORIENTATION: MID

## 2019-08-30 ASSESSMENT — PAIN DESCRIPTION - PROGRESSION
CLINICAL_PROGRESSION: NOT CHANGED
CLINICAL_PROGRESSION: NOT CHANGED

## 2019-08-30 ASSESSMENT — PAIN - FUNCTIONAL ASSESSMENT
PAIN_FUNCTIONAL_ASSESSMENT: ACTIVITIES ARE NOT PREVENTED
PAIN_FUNCTIONAL_ASSESSMENT: ACTIVITIES ARE NOT PREVENTED

## 2019-08-30 ASSESSMENT — PAIN DESCRIPTION - LOCATION
LOCATION: ABDOMEN
LOCATION: ABDOMEN

## 2019-08-30 ASSESSMENT — PAIN SCALES - GENERAL
PAINLEVEL_OUTOF10: 7
PAINLEVEL_OUTOF10: 3
PAINLEVEL_OUTOF10: 7

## 2019-08-30 ASSESSMENT — PAIN DESCRIPTION - DESCRIPTORS
DESCRIPTORS: CRAMPING;DISCOMFORT
DESCRIPTORS: DISCOMFORT

## 2019-08-30 ASSESSMENT — PAIN DESCRIPTION - PAIN TYPE
TYPE: ACUTE PAIN
TYPE: ACUTE PAIN

## 2019-08-30 NOTE — PROGRESS NOTES
Nausea    Objective Information:  · Nutrition-Focused Physical Findings: tolerating full liquids  · Wound Type: None  · Current Nutrition Therapies:  · Oral Diet Orders: Full Liquid   · Oral Diet intake: 51-75%  · Oral Nutrition Supplement (ONS) Orders: None  · ONS intake: (none ordered)  · Anthropometric Measures:  · Ht: 6' (182.9 cm)   · Current Body Wt: 272 lb 6 oz (123.5 kg)  · Admission Body Wt: 272 lb 6 oz (123.5 kg)  · Usual Body Wt: (245#, per patient; patient weighed 235# in March 2019 but has gained weight)  · % Weight Change:  ,  + 37# weight gain since March 2019  · Ideal Body Wt: 178 lb (80.7 kg), % Ideal Body 153%  · BMI Classification: BMI 35.0 - 39.9 Obese Class II    Nutrition Interventions:   Continue current diet  Education declined, Continued Inpatient Monitoring    Nutrition Evaluation:   · Evaluation: Goals set   · Goals: patient will consume 50% or greater of meals on full liquid diet order x 3 meals per day + he will tolerate diet advancement to solid food, as appropriate; weight will remain stable during remainder of admission    · Monitoring: Meal Intake, Nutrition Progression, Diet Tolerance, Weight, Pertinent Labs, Nausea or Vomiting, Constipation, Monitor Bowel Function      Electronically signed by Sanaz Arreola RD, LD on 8/30/19 at 1:55 PM    Contact Number: [phone]

## 2019-08-30 NOTE — DISCHARGE SUMMARY
EXAMINATION   Discharge Vitals:  height is 6' (1.829 m) and weight is 272 lb 6.4 oz (123.6 kg). His oral temperature is 98.4 °F (36.9 °C). His blood pressure is 114/78 and his pulse is 73. His respiration is 20 and oxygen saturation is 95%. General appearance - alert, well appearing, and in no distress  Abdomen - soft, mild LLQ tenderness, nondistended, no masses or organomegaly  Neurological - motor and sensory grossly normal bilaterally  Musculoskeletal - full range of motion without pain  Incision: N/A    LABS   No results for input(s): WBC, HGB, HCT, PLT, NA, K, CL, CO2, BUN, CREATININE in the last 72 hours.     DISCHARGE INSTRUCTIONS   Discharge Medications:      Medication List      START taking these medications    amoxicillin-clavulanate 500-125 MG per tablet  Commonly known as:  AUGMENTIN  Take 1 tablet by mouth 3 times daily for 10 days     metroNIDAZOLE 500 MG tablet  Commonly known as:  FLAGYL  Take 1 tablet by mouth 3 times daily for 10 days        STOP taking these medications    naproxen 500 MG tablet  Commonly known as:  NAPROSYN           Where to Get Your Medications      You can get these medications from any pharmacy    Bring a paper prescription for each of these medications  · amoxicillin-clavulanate 500-125 MG per tablet  · metroNIDAZOLE 500 MG tablet       Diet: Home on full liquids advance to low fiber diet over the next 2-3 days as tolerated   Activity: activity as tolerated  Wound Care: N/A  Follow-up:  Next Thursday at [facility] with Dr. Ileana Cronin in office   Time Spent for discharge: 20 minutes      Electronically signed by BENI Ariza CNP on 8/30/2019 at 12:51 PM

## 2019-09-01 ENCOUNTER — HOSPITAL ENCOUNTER (INPATIENT)
Age: 39
LOS: 10 days | Discharge: HOME | DRG: 720 | End: 2019-09-11
Attending: EMERGENCY MEDICINE | Admitting: SURGERY
Payer: MEDICAID

## 2019-09-01 ENCOUNTER — APPOINTMENT (OUTPATIENT)
Dept: GENERAL RADIOLOGY | Age: 39
DRG: 720 | End: 2019-09-01
Payer: MEDICAID

## 2019-09-01 DIAGNOSIS — K57.32 DIVERTICULITIS OF COLON: ICD-10-CM

## 2019-09-01 DIAGNOSIS — K57.20 DIVERTICULITIS OF LARGE INTESTINE WITH PERFORATION AND ABSCESS WITHOUT BLEEDING: ICD-10-CM

## 2019-09-01 DIAGNOSIS — R10.31 RIGHT LOWER QUADRANT ABDOMINAL PAIN: ICD-10-CM

## 2019-09-01 DIAGNOSIS — R10.32 LEFT LOWER QUADRANT PAIN: Primary | ICD-10-CM

## 2019-09-01 DIAGNOSIS — R11.2 NAUSEA AND VOMITING, INTRACTABILITY OF VOMITING NOT SPECIFIED, UNSPECIFIED VOMITING TYPE: ICD-10-CM

## 2019-09-01 LAB
A/G RATIO: 0.8 (ref 1.1–2.2)
ALBUMIN SERPL-MCNC: 4.1 G/DL (ref 3.4–5)
ALP BLD-CCNC: 82 U/L (ref 40–129)
ALT SERPL-CCNC: <5 U/L (ref 10–40)
ANION GAP SERPL CALCULATED.3IONS-SCNC: 15 MMOL/L (ref 3–16)
AST SERPL-CCNC: 8 U/L (ref 15–37)
BASOPHILS ABSOLUTE: 0.1 K/UL (ref 0–0.2)
BASOPHILS RELATIVE PERCENT: 0.4 %
BILIRUB SERPL-MCNC: 0.7 MG/DL (ref 0–1)
BUN BLDV-MCNC: 6 MG/DL (ref 7–20)
CALCIUM SERPL-MCNC: 9.9 MG/DL (ref 8.3–10.6)
CHLORIDE BLD-SCNC: 97 MMOL/L (ref 99–110)
CO2: 23 MMOL/L (ref 21–32)
CREAT SERPL-MCNC: 0.7 MG/DL (ref 0.9–1.3)
EOSINOPHILS ABSOLUTE: 0.1 K/UL (ref 0–0.6)
EOSINOPHILS RELATIVE PERCENT: 0.7 %
GFR AFRICAN AMERICAN: >60
GFR NON-AFRICAN AMERICAN: >60
GLOBULIN: 5.3 G/DL
GLUCOSE BLD-MCNC: 113 MG/DL (ref 70–99)
GLUCOSE BLD-MCNC: 115 MG/DL (ref 70–99)
HCT VFR BLD CALC: 40.1 % (ref 40.5–52.5)
HEMOGLOBIN: 13.4 G/DL (ref 13.5–17.5)
LACTIC ACID, SEPSIS: 1.3 MMOL/L (ref 0.4–1.9)
LYMPHOCYTES ABSOLUTE: 0.9 K/UL (ref 1–5.1)
LYMPHOCYTES RELATIVE PERCENT: 6.3 %
MCH RBC QN AUTO: 31 PG (ref 26–34)
MCHC RBC AUTO-ENTMCNC: 33.4 G/DL (ref 31–36)
MCV RBC AUTO: 92.9 FL (ref 80–100)
MONOCYTES ABSOLUTE: 1.1 K/UL (ref 0–1.3)
MONOCYTES RELATIVE PERCENT: 7.4 %
NEUTROPHILS ABSOLUTE: 12.8 K/UL (ref 1.7–7.7)
NEUTROPHILS RELATIVE PERCENT: 85.2 %
PDW BLD-RTO: 15.3 % (ref 12.4–15.4)
PERFORMED ON: ABNORMAL
PLATELET # BLD: 491 K/UL (ref 135–450)
PMV BLD AUTO: 7.3 FL (ref 5–10.5)
POTASSIUM REFLEX MAGNESIUM: 3.9 MMOL/L (ref 3.5–5.1)
RBC # BLD: 4.32 M/UL (ref 4.2–5.9)
SODIUM BLD-SCNC: 135 MMOL/L (ref 136–145)
TOTAL PROTEIN: 9.4 G/DL (ref 6.4–8.2)
WBC # BLD: 15 K/UL (ref 4–11)

## 2019-09-01 PROCEDURE — 2580000003 HC RX 258: Performed by: SURGERY

## 2019-09-01 PROCEDURE — 6370000000 HC RX 637 (ALT 250 FOR IP): Performed by: SURGERY

## 2019-09-01 PROCEDURE — 83605 ASSAY OF LACTIC ACID: CPT

## 2019-09-01 PROCEDURE — 2500000003 HC RX 250 WO HCPCS: Performed by: EMERGENCY MEDICINE

## 2019-09-01 PROCEDURE — 1200000000 HC SEMI PRIVATE

## 2019-09-01 PROCEDURE — 85025 COMPLETE CBC W/AUTO DIFF WBC: CPT

## 2019-09-01 PROCEDURE — 96376 TX/PRO/DX INJ SAME DRUG ADON: CPT

## 2019-09-01 PROCEDURE — 2500000003 HC RX 250 WO HCPCS: Performed by: SURGERY

## 2019-09-01 PROCEDURE — 6360000002 HC RX W HCPCS: Performed by: EMERGENCY MEDICINE

## 2019-09-01 PROCEDURE — 2580000003 HC RX 258: Performed by: EMERGENCY MEDICINE

## 2019-09-01 PROCEDURE — 80053 COMPREHEN METABOLIC PANEL: CPT

## 2019-09-01 PROCEDURE — 96375 TX/PRO/DX INJ NEW DRUG ADDON: CPT

## 2019-09-01 PROCEDURE — 74022 RADEX COMPL AQT ABD SERIES: CPT

## 2019-09-01 PROCEDURE — 99285 EMERGENCY DEPT VISIT HI MDM: CPT

## 2019-09-01 PROCEDURE — 99222 1ST HOSP IP/OBS MODERATE 55: CPT | Performed by: SURGERY

## 2019-09-01 PROCEDURE — 96365 THER/PROPH/DIAG IV INF INIT: CPT

## 2019-09-01 PROCEDURE — 6360000002 HC RX W HCPCS: Performed by: SURGERY

## 2019-09-01 RX ORDER — FENTANYL CITRATE 50 UG/ML
50 INJECTION, SOLUTION INTRAMUSCULAR; INTRAVENOUS
Status: COMPLETED | OUTPATIENT
Start: 2019-09-01 | End: 2019-09-01

## 2019-09-01 RX ORDER — HEPARIN SODIUM 5000 [USP'U]/ML
5000 INJECTION, SOLUTION INTRAVENOUS; SUBCUTANEOUS EVERY 8 HOURS SCHEDULED
Status: DISCONTINUED | OUTPATIENT
Start: 2019-09-01 | End: 2019-09-11 | Stop reason: HOSPADM

## 2019-09-01 RX ORDER — DIPHENHYDRAMINE HYDROCHLORIDE 50 MG/ML
25 INJECTION INTRAMUSCULAR; INTRAVENOUS EVERY 6 HOURS PRN
Status: DISCONTINUED | OUTPATIENT
Start: 2019-09-01 | End: 2019-09-11 | Stop reason: HOSPADM

## 2019-09-01 RX ORDER — ONDANSETRON 2 MG/ML
4 INJECTION INTRAMUSCULAR; INTRAVENOUS EVERY 4 HOURS PRN
Status: DISCONTINUED | OUTPATIENT
Start: 2019-09-01 | End: 2019-09-11 | Stop reason: HOSPADM

## 2019-09-01 RX ORDER — ONDANSETRON 2 MG/ML
4 INJECTION INTRAMUSCULAR; INTRAVENOUS EVERY 30 MIN PRN
Status: DISCONTINUED | OUTPATIENT
Start: 2019-09-01 | End: 2019-09-01

## 2019-09-01 RX ORDER — 0.9 % SODIUM CHLORIDE 0.9 %
1000 INTRAVENOUS SOLUTION INTRAVENOUS ONCE
Status: COMPLETED | OUTPATIENT
Start: 2019-09-01 | End: 2019-09-01

## 2019-09-01 RX ORDER — ACETAMINOPHEN 325 MG/1
650 TABLET ORAL EVERY 4 HOURS PRN
Status: DISCONTINUED | OUTPATIENT
Start: 2019-09-01 | End: 2019-09-11 | Stop reason: HOSPADM

## 2019-09-01 RX ORDER — SODIUM CHLORIDE 9 MG/ML
INJECTION, SOLUTION INTRAVENOUS CONTINUOUS
Status: DISCONTINUED | OUTPATIENT
Start: 2019-09-01 | End: 2019-09-06

## 2019-09-01 RX ORDER — IBUPROFEN 400 MG/1
400 TABLET ORAL EVERY 4 HOURS PRN
Status: DISCONTINUED | OUTPATIENT
Start: 2019-09-01 | End: 2019-09-04

## 2019-09-01 RX ORDER — LORAZEPAM 2 MG/ML
1 INJECTION INTRAMUSCULAR EVERY 6 HOURS PRN
Status: DISCONTINUED | OUTPATIENT
Start: 2019-09-01 | End: 2019-09-11 | Stop reason: HOSPADM

## 2019-09-01 RX ADMIN — PIPERACILLIN SODIUM,TAZOBACTAM SODIUM 3.38 G: 3; .375 INJECTION, POWDER, FOR SOLUTION INTRAVENOUS at 05:18

## 2019-09-01 RX ADMIN — MEROPENEM 1 G: 1 INJECTION, POWDER, FOR SOLUTION INTRAVENOUS at 08:06

## 2019-09-01 RX ADMIN — HYDROMORPHONE HYDROCHLORIDE 1 MG: 1 INJECTION, SOLUTION INTRAMUSCULAR; INTRAVENOUS; SUBCUTANEOUS at 14:02

## 2019-09-01 RX ADMIN — HYDROMORPHONE HYDROCHLORIDE 1 MG: 1 INJECTION, SOLUTION INTRAMUSCULAR; INTRAVENOUS; SUBCUTANEOUS at 08:55

## 2019-09-01 RX ADMIN — METRONIDAZOLE 500 MG: 500 INJECTION, SOLUTION INTRAVENOUS at 23:08

## 2019-09-01 RX ADMIN — MEROPENEM 1 G: 1 INJECTION, POWDER, FOR SOLUTION INTRAVENOUS at 23:03

## 2019-09-01 RX ADMIN — SODIUM CHLORIDE: 9 INJECTION, SOLUTION INTRAVENOUS at 08:06

## 2019-09-01 RX ADMIN — SODIUM CHLORIDE: 9 INJECTION, SOLUTION INTRAVENOUS at 21:15

## 2019-09-01 RX ADMIN — FENTANYL CITRATE 50 MCG: 50 INJECTION INTRAMUSCULAR; INTRAVENOUS at 04:56

## 2019-09-01 RX ADMIN — HEPARIN SODIUM 5000 UNITS: 5000 INJECTION INTRAVENOUS; SUBCUTANEOUS at 08:07

## 2019-09-01 RX ADMIN — HYDROMORPHONE HYDROCHLORIDE 1 MG: 1 INJECTION, SOLUTION INTRAMUSCULAR; INTRAVENOUS; SUBCUTANEOUS at 22:51

## 2019-09-01 RX ADMIN — HYDROMORPHONE HYDROCHLORIDE 1 MG: 1 INJECTION, SOLUTION INTRAMUSCULAR; INTRAVENOUS; SUBCUTANEOUS at 19:32

## 2019-09-01 RX ADMIN — HEPARIN SODIUM 5000 UNITS: 5000 INJECTION INTRAVENOUS; SUBCUTANEOUS at 13:55

## 2019-09-01 RX ADMIN — HYDROMORPHONE HYDROCHLORIDE 1 MG: 1 INJECTION, SOLUTION INTRAMUSCULAR; INTRAVENOUS; SUBCUTANEOUS at 17:02

## 2019-09-01 RX ADMIN — SODIUM CHLORIDE 1000 ML: 9 INJECTION, SOLUTION INTRAVENOUS at 10:20

## 2019-09-01 RX ADMIN — HYDROMORPHONE HYDROCHLORIDE 1 MG: 1 INJECTION, SOLUTION INTRAMUSCULAR; INTRAVENOUS; SUBCUTANEOUS at 06:26

## 2019-09-01 RX ADMIN — METRONIDAZOLE 500 MG: 500 INJECTION, SOLUTION INTRAVENOUS at 15:28

## 2019-09-01 RX ADMIN — HEPARIN SODIUM 5000 UNITS: 5000 INJECTION INTRAVENOUS; SUBCUTANEOUS at 22:56

## 2019-09-01 RX ADMIN — LORAZEPAM 1 MG: 2 INJECTION INTRAMUSCULAR; INTRAVENOUS at 21:11

## 2019-09-01 RX ADMIN — LORAZEPAM 1 MG: 2 INJECTION INTRAMUSCULAR; INTRAVENOUS at 10:18

## 2019-09-01 RX ADMIN — SODIUM CHLORIDE: 9 INJECTION, SOLUTION INTRAVENOUS at 23:09

## 2019-09-01 RX ADMIN — IBUPROFEN 400 MG: 400 TABLET, FILM COATED ORAL at 22:51

## 2019-09-01 RX ADMIN — MEROPENEM 1 G: 1 INJECTION, POWDER, FOR SOLUTION INTRAVENOUS at 15:28

## 2019-09-01 RX ADMIN — ACETAMINOPHEN 650 MG: 325 TABLET ORAL at 20:26

## 2019-09-01 RX ADMIN — SODIUM CHLORIDE 1000 ML: 9 INJECTION, SOLUTION INTRAVENOUS at 05:17

## 2019-09-01 RX ADMIN — ONDANSETRON 4 MG: 2 INJECTION INTRAMUSCULAR; INTRAVENOUS at 15:38

## 2019-09-01 RX ADMIN — METRONIDAZOLE 500 MG: 500 INJECTION, SOLUTION INTRAVENOUS at 08:15

## 2019-09-01 RX ADMIN — FENTANYL CITRATE 50 MCG: 50 INJECTION INTRAMUSCULAR; INTRAVENOUS at 05:40

## 2019-09-01 RX ADMIN — ONDANSETRON 4 MG: 2 INJECTION INTRAMUSCULAR; INTRAVENOUS at 04:56

## 2019-09-01 ASSESSMENT — PAIN SCALES - GENERAL
PAINLEVEL_OUTOF10: 8
PAINLEVEL_OUTOF10: 10
PAINLEVEL_OUTOF10: 9
PAINLEVEL_OUTOF10: 8
PAINLEVEL_OUTOF10: 9
PAINLEVEL_OUTOF10: 8
PAINLEVEL_OUTOF10: 10
PAINLEVEL_OUTOF10: 10

## 2019-09-01 ASSESSMENT — PAIN - FUNCTIONAL ASSESSMENT
PAIN_FUNCTIONAL_ASSESSMENT: PREVENTS OR INTERFERES SOME ACTIVE ACTIVITIES AND ADLS
PAIN_FUNCTIONAL_ASSESSMENT: PREVENTS OR INTERFERES SOME ACTIVE ACTIVITIES AND ADLS
PAIN_FUNCTIONAL_ASSESSMENT: ACTIVITIES ARE NOT PREVENTED
PAIN_FUNCTIONAL_ASSESSMENT: PREVENTS OR INTERFERES SOME ACTIVE ACTIVITIES AND ADLS

## 2019-09-01 ASSESSMENT — PAIN DESCRIPTION - LOCATION
LOCATION: ABDOMEN

## 2019-09-01 ASSESSMENT — PAIN DESCRIPTION - DESCRIPTORS
DESCRIPTORS: JABBING;SHARP

## 2019-09-01 ASSESSMENT — PAIN DESCRIPTION - ORIENTATION
ORIENTATION: LEFT

## 2019-09-01 ASSESSMENT — PAIN DESCRIPTION - PAIN TYPE
TYPE: ACUTE PAIN

## 2019-09-01 ASSESSMENT — PAIN DESCRIPTION - PROGRESSION: CLINICAL_PROGRESSION: NOT CHANGED

## 2019-09-01 NOTE — H&P
negative  HEMATOLOGIC/LYMPHATIC:  negative  ENDOCRINE:  Negative  NEUROLOGICAL:  Negative  * All other ROS reviewed and negative. PHYSICAL EXAM:    VITALS:  BP (!) 150/89   Pulse 140   Temp 99.8 °F (37.7 °C) (Oral)   Resp 18   Ht 6' (1.829 m)   Wt 264 lb 9.6 oz (120 kg)   SpO2 94%   BMI 35.89 kg/m²   INTAKE/OUTPUT:   No intake/output data recorded. No intake/output data recorded. CONSTITUTIONAL:  awake, alert, no apparent distress and moderately obese  ENT:  normocepalic, without obvious abnormality  NECK:  supple, symmetrical, trachea midline   LUNGS:  clear to auscultation  CARDIOVASCULAR:  tachycardic with regular rhythm and no murmur noted  ABDOMEN:  normal bowel sounds, soft, obese, tenderness noted in the left lower quadrant, voluntary guarding present, no masses palpated and hernia absent  MUSCULOSKELETAL:  0+ pitting edema lower extremities  NEUROLOGIC:  Mental Status Exam:  Level of Alertness:   awake  Orientation:   person, place, time  SKIN: Warm and dry      DATA:  CBC:   Recent Labs     09/01/19 0443   WBC 15.0*   HGB 13.4*   HCT 40.1*   *     BMP:    Recent Labs     09/01/19 0443   *   K 3.9   CL 97*   CO2 23   BUN 6*   CREATININE 0.7*   GLUCOSE 115*     Hepatic:   Recent Labs     09/01/19 0443   AST 8*   ALT <5*   BILITOT 0.7   ALKPHOS 82     Mag:    No results for input(s): MG in the last 72 hours. Phos:   No results for input(s): PHOS in the last 72 hours. INR: No results for input(s): INR in the last 72 hours. Radiology Review: Images personally reviewed by me. Abdominal x-ray shows no free air, no bowel dilation, and no airspace disease      ASSESSMENT AND PLAN:  Diverticulitis with abscess, failed outpatient management. Given the lack of free air on plain film imaging, it was decided not to repeat a CT scan at this time. Admit for IV fluid hydration, intravenous antibiotics, and parenteral narcotics.   He will likely need surgery this

## 2019-09-01 NOTE — ED PROVIDER NOTES
which required my urgent intervention. This time does not include separately billable procedures. The note was completed using Dragon voice recognition transcription. Every effort was made to ensure accuracy; however, inadvertent transcription errors may be present despite my best efforts to edit errors.     Rose Marie Lynch MD  [address] [place] [address]        Rose Marie Lynch MD  09/01/19 6371

## 2019-09-02 ENCOUNTER — APPOINTMENT (OUTPATIENT)
Dept: CT IMAGING | Age: 39
DRG: 720 | End: 2019-09-02
Payer: MEDICAID

## 2019-09-02 LAB
ANION GAP SERPL CALCULATED.3IONS-SCNC: 11 MMOL/L (ref 3–16)
BASOPHILS ABSOLUTE: 0 K/UL (ref 0–0.2)
BASOPHILS RELATIVE PERCENT: 0.1 %
BUN BLDV-MCNC: 8 MG/DL (ref 7–20)
CALCIUM SERPL-MCNC: 8.4 MG/DL (ref 8.3–10.6)
CHLORIDE BLD-SCNC: 103 MMOL/L (ref 99–110)
CO2: 22 MMOL/L (ref 21–32)
CREAT SERPL-MCNC: 0.7 MG/DL (ref 0.9–1.3)
EOSINOPHILS ABSOLUTE: 0.1 K/UL (ref 0–0.6)
EOSINOPHILS RELATIVE PERCENT: 0.5 %
GFR AFRICAN AMERICAN: >60
GFR NON-AFRICAN AMERICAN: >60
GLUCOSE BLD-MCNC: 105 MG/DL (ref 70–99)
HCT VFR BLD CALC: 33 % (ref 40.5–52.5)
HEMOGLOBIN: 10.7 G/DL (ref 13.5–17.5)
LYMPHOCYTES ABSOLUTE: 1.1 K/UL (ref 1–5.1)
LYMPHOCYTES RELATIVE PERCENT: 7.1 %
MCH RBC QN AUTO: 30.4 PG (ref 26–34)
MCHC RBC AUTO-ENTMCNC: 32.5 G/DL (ref 31–36)
MCV RBC AUTO: 93.8 FL (ref 80–100)
MONOCYTES ABSOLUTE: 1.5 K/UL (ref 0–1.3)
MONOCYTES RELATIVE PERCENT: 9.5 %
NEUTROPHILS ABSOLUTE: 12.9 K/UL (ref 1.7–7.7)
NEUTROPHILS RELATIVE PERCENT: 82.8 %
PDW BLD-RTO: 15.2 % (ref 12.4–15.4)
PLATELET # BLD: 426 K/UL (ref 135–450)
PMV BLD AUTO: 7.5 FL (ref 5–10.5)
POTASSIUM SERPL-SCNC: 3.6 MMOL/L (ref 3.5–5.1)
RBC # BLD: 3.52 M/UL (ref 4.2–5.9)
SODIUM BLD-SCNC: 136 MMOL/L (ref 136–145)
WBC # BLD: 15.6 K/UL (ref 4–11)

## 2019-09-02 PROCEDURE — 80048 BASIC METABOLIC PNL TOTAL CA: CPT

## 2019-09-02 PROCEDURE — 36415 COLL VENOUS BLD VENIPUNCTURE: CPT

## 2019-09-02 PROCEDURE — 99232 SBSQ HOSP IP/OBS MODERATE 35: CPT | Performed by: SURGERY

## 2019-09-02 PROCEDURE — 6360000004 HC RX CONTRAST MEDICATION: Performed by: SURGERY

## 2019-09-02 PROCEDURE — 2500000003 HC RX 250 WO HCPCS: Performed by: SURGERY

## 2019-09-02 PROCEDURE — 6360000002 HC RX W HCPCS: Performed by: SURGERY

## 2019-09-02 PROCEDURE — 1200000000 HC SEMI PRIVATE

## 2019-09-02 PROCEDURE — 85025 COMPLETE CBC W/AUTO DIFF WBC: CPT

## 2019-09-02 PROCEDURE — 6370000000 HC RX 637 (ALT 250 FOR IP): Performed by: SURGERY

## 2019-09-02 PROCEDURE — 2580000003 HC RX 258: Performed by: SURGERY

## 2019-09-02 RX ADMIN — HYDROMORPHONE HYDROCHLORIDE 1 MG: 1 INJECTION, SOLUTION INTRAMUSCULAR; INTRAVENOUS; SUBCUTANEOUS at 10:16

## 2019-09-02 RX ADMIN — HEPARIN SODIUM 5000 UNITS: 5000 INJECTION INTRAVENOUS; SUBCUTANEOUS at 12:49

## 2019-09-02 RX ADMIN — HYDROMORPHONE HYDROCHLORIDE 1 MG: 1 INJECTION, SOLUTION INTRAMUSCULAR; INTRAVENOUS; SUBCUTANEOUS at 04:16

## 2019-09-02 RX ADMIN — MEROPENEM 1 G: 1 INJECTION, POWDER, FOR SOLUTION INTRAVENOUS at 08:11

## 2019-09-02 RX ADMIN — MEROPENEM 1 G: 1 INJECTION, POWDER, FOR SOLUTION INTRAVENOUS at 00:20

## 2019-09-02 RX ADMIN — MEROPENEM 1 G: 1 INJECTION, POWDER, FOR SOLUTION INTRAVENOUS at 23:19

## 2019-09-02 RX ADMIN — ACETAMINOPHEN 650 MG: 325 TABLET ORAL at 19:41

## 2019-09-02 RX ADMIN — METRONIDAZOLE 500 MG: 500 INJECTION, SOLUTION INTRAVENOUS at 06:21

## 2019-09-02 RX ADMIN — HYDROMORPHONE HYDROCHLORIDE 1 MG: 1 INJECTION, SOLUTION INTRAMUSCULAR; INTRAVENOUS; SUBCUTANEOUS at 20:54

## 2019-09-02 RX ADMIN — SODIUM CHLORIDE: 9 INJECTION, SOLUTION INTRAVENOUS at 08:11

## 2019-09-02 RX ADMIN — HYDROMORPHONE HYDROCHLORIDE 1 MG: 1 INJECTION, SOLUTION INTRAMUSCULAR; INTRAVENOUS; SUBCUTANEOUS at 18:53

## 2019-09-02 RX ADMIN — METRONIDAZOLE 500 MG: 500 INJECTION, SOLUTION INTRAVENOUS at 14:19

## 2019-09-02 RX ADMIN — IBUPROFEN 400 MG: 400 TABLET, FILM COATED ORAL at 20:35

## 2019-09-02 RX ADMIN — SODIUM CHLORIDE: 9 INJECTION, SOLUTION INTRAVENOUS at 16:59

## 2019-09-02 RX ADMIN — ONDANSETRON 4 MG: 2 INJECTION INTRAMUSCULAR; INTRAVENOUS at 07:50

## 2019-09-02 RX ADMIN — LORAZEPAM 1 MG: 2 INJECTION INTRAMUSCULAR; INTRAVENOUS at 22:00

## 2019-09-02 RX ADMIN — HYDROMORPHONE HYDROCHLORIDE 1 MG: 1 INJECTION, SOLUTION INTRAMUSCULAR; INTRAVENOUS; SUBCUTANEOUS at 16:58

## 2019-09-02 RX ADMIN — ONDANSETRON 4 MG: 2 INJECTION INTRAMUSCULAR; INTRAVENOUS at 19:44

## 2019-09-02 RX ADMIN — SODIUM CHLORIDE: 9 INJECTION, SOLUTION INTRAVENOUS at 19:42

## 2019-09-02 RX ADMIN — HEPARIN SODIUM 5000 UNITS: 5000 INJECTION INTRAVENOUS; SUBCUTANEOUS at 06:23

## 2019-09-02 RX ADMIN — HYDROMORPHONE HYDROCHLORIDE 1 MG: 1 INJECTION, SOLUTION INTRAMUSCULAR; INTRAVENOUS; SUBCUTANEOUS at 12:47

## 2019-09-02 RX ADMIN — HYDROMORPHONE HYDROCHLORIDE 1 MG: 1 INJECTION, SOLUTION INTRAMUSCULAR; INTRAVENOUS; SUBCUTANEOUS at 07:46

## 2019-09-02 RX ADMIN — HYDROMORPHONE HYDROCHLORIDE 1 MG: 1 INJECTION, SOLUTION INTRAMUSCULAR; INTRAVENOUS; SUBCUTANEOUS at 23:22

## 2019-09-02 RX ADMIN — HYDROMORPHONE HYDROCHLORIDE 1 MG: 1 INJECTION, SOLUTION INTRAMUSCULAR; INTRAVENOUS; SUBCUTANEOUS at 14:53

## 2019-09-02 RX ADMIN — ACETAMINOPHEN 650 MG: 325 TABLET ORAL at 14:53

## 2019-09-02 RX ADMIN — IOPAMIDOL 75 ML: 755 INJECTION, SOLUTION INTRAVENOUS at 23:49

## 2019-09-02 RX ADMIN — METRONIDAZOLE 500 MG: 500 INJECTION, SOLUTION INTRAVENOUS at 22:07

## 2019-09-02 RX ADMIN — HEPARIN SODIUM 5000 UNITS: 5000 INJECTION INTRAVENOUS; SUBCUTANEOUS at 20:54

## 2019-09-02 RX ADMIN — MEROPENEM 1 G: 1 INJECTION, POWDER, FOR SOLUTION INTRAVENOUS at 15:48

## 2019-09-02 ASSESSMENT — PAIN SCALES - GENERAL
PAINLEVEL_OUTOF10: 8
PAINLEVEL_OUTOF10: 9
PAINLEVEL_OUTOF10: 4
PAINLEVEL_OUTOF10: 4
PAINLEVEL_OUTOF10: 8
PAINLEVEL_OUTOF10: 4
PAINLEVEL_OUTOF10: 8
PAINLEVEL_OUTOF10: 8
PAINLEVEL_OUTOF10: 9
PAINLEVEL_OUTOF10: 8
PAINLEVEL_OUTOF10: 3
PAINLEVEL_OUTOF10: 8

## 2019-09-02 ASSESSMENT — PAIN - FUNCTIONAL ASSESSMENT
PAIN_FUNCTIONAL_ASSESSMENT: ACTIVITIES ARE NOT PREVENTED

## 2019-09-02 ASSESSMENT — PAIN DESCRIPTION - DESCRIPTORS
DESCRIPTORS: CRAMPING;DISCOMFORT
DESCRIPTORS: CRAMPING;DISCOMFORT
DESCRIPTORS: DISCOMFORT;CRAMPING
DESCRIPTORS: CRAMPING;DISCOMFORT;SHARP
DESCRIPTORS: CRAMPING;DISCOMFORT

## 2019-09-02 ASSESSMENT — PAIN DESCRIPTION - LOCATION
LOCATION: ABDOMEN

## 2019-09-02 ASSESSMENT — PAIN DESCRIPTION - ORIENTATION
ORIENTATION: LEFT

## 2019-09-02 ASSESSMENT — PAIN DESCRIPTION - PAIN TYPE
TYPE: ACUTE PAIN

## 2019-09-02 ASSESSMENT — PAIN DESCRIPTION - FREQUENCY
FREQUENCY: CONTINUOUS

## 2019-09-02 ASSESSMENT — PAIN DESCRIPTION - PROGRESSION
CLINICAL_PROGRESSION: NOT CHANGED

## 2019-09-02 ASSESSMENT — PAIN DESCRIPTION - ONSET
ONSET: ON-GOING

## 2019-09-02 NOTE — PROGRESS NOTES
AM assessment complete. Gave am meds due see mar. A/O x 4. Pain level 8/10 iv dilaudid given at 0746. Notified Dr Daryle Coins. Call light within reach.

## 2019-09-02 NOTE — PROGRESS NOTES
PRN pain med given for pain level 9/10 located L Abd. See mar/pain flowsheet. Call light within reach.

## 2019-09-02 NOTE — PROGRESS NOTES
PRN pain med given for pain level 9/10 located in L abd. See mar/pain flowsheet. Call light within reach.

## 2019-09-02 NOTE — PROGRESS NOTES
[facility] SURGERY    PATIENT NAME: Sean Mohan     TODAY'S DATE: 9/2/2019    CHIEF COMPLAINT: Abdominal pain    INTERVAL HISTORY/HPI:    Pt states pain is just slightly better but still significant. Flatus this AM, minimal appetite. REVIEW OF SYSTEMS:  Pertinent positives and negatives as per interval history section    OBJECTIVE:  VITALS:  BP (!) 141/93   Pulse 105   Temp 100 °F (37.8 °C) (Oral)   Resp 16   Ht 6' (1.829 m)   Wt 264 lb 9.6 oz (120 kg)   SpO2 93%   BMI 35.89 kg/m²     INTAKE/OUTPUT:    I/O last 3 completed shifts: In: 6047 [I.V.:1504]  Out: -   I/O this shift:  In: 2015 [I.V.:2015]  Out: -     CONSTITUTIONAL:  fatigued and alert  LUNGS:  Respirations easy and unlabored, clear to auscultation  CARD:     ABDOMEN:  hypoactive bowel sounds, soft, non-distended, tenderness noted diffusely and worse in LLQ, no rebound     Data:  CBC:   Recent Labs     09/01/19 0443 09/02/19 0519   WBC 15.0* 15.6*   HGB 13.4* 10.7*   HCT 40.1* 33.0*   * 426     BMP:    Recent Labs     09/01/19 0443 09/02/19 0519   * 136   K 3.9 3.6   CL 97* 103   CO2 23 22   BUN 6* 8   CREATININE 0.7* 0.7*   GLUCOSE 115* 105*     Hepatic:   Recent Labs     09/01/19 0443   AST 8*   ALT <5*   BILITOT 0.7   ALKPHOS 82     Mag:    No results for input(s): MG in the last 72 hours. Phos:   No results for input(s): PHOS in the last 72 hours. INR: No results for input(s): INR in the last 72 hours. Radiology Review:  *Imaging personally reviewed by me.           ASSESSMENT AND PLAN:  Diverticulitis w/ abscess   - cont IV abx   - clear liquids ok today   - recheck labs, exam in AM - possible repeat CT for poss perc abscess drainage, vs proceeding to surgical intervention    [address] [facility]

## 2019-09-02 NOTE — PROGRESS NOTES
Shift assessment completed. See flow sheet. Respiration easy, even and non labored. Vital signs mewsing 5, charge nurse notified. Clinical called and updated. Pt alert and oriented. Side rails up x 2. IVF infusing without complications. Pt states he is in pain, pt states pain medication not helping as much as it had been. Pt denies any needs at this time. Call light within reach. Will continue to monitor.

## 2019-09-03 ENCOUNTER — APPOINTMENT (OUTPATIENT)
Dept: CT IMAGING | Age: 39
DRG: 720 | End: 2019-09-03
Payer: MEDICAID

## 2019-09-03 LAB
ANION GAP SERPL CALCULATED.3IONS-SCNC: 10 MMOL/L (ref 3–16)
ANISOCYTOSIS: ABNORMAL
ATYPICAL LYMPHOCYTE RELATIVE PERCENT: 1 % (ref 0–6)
BASOPHILS ABSOLUTE: 0 K/UL (ref 0–0.2)
BASOPHILS RELATIVE PERCENT: 0 %
BUN BLDV-MCNC: 7 MG/DL (ref 7–20)
CALCIUM SERPL-MCNC: 8.6 MG/DL (ref 8.3–10.6)
CHLORIDE BLD-SCNC: 101 MMOL/L (ref 99–110)
CO2: 24 MMOL/L (ref 21–32)
CREAT SERPL-MCNC: 0.6 MG/DL (ref 0.9–1.3)
EOSINOPHILS ABSOLUTE: 0.3 K/UL (ref 0–0.6)
EOSINOPHILS RELATIVE PERCENT: 2 %
GFR AFRICAN AMERICAN: >60
GFR NON-AFRICAN AMERICAN: >60
GLUCOSE BLD-MCNC: 113 MG/DL (ref 70–99)
HCT VFR BLD CALC: 33.7 % (ref 40.5–52.5)
HEMOGLOBIN: 11 G/DL (ref 13.5–17.5)
HYPOCHROMIA: ABNORMAL
LYMPHOCYTES ABSOLUTE: 1.2 K/UL (ref 1–5.1)
LYMPHOCYTES RELATIVE PERCENT: 7 %
MAGNESIUM: 2 MG/DL (ref 1.8–2.4)
MCH RBC QN AUTO: 30.1 PG (ref 26–34)
MCHC RBC AUTO-ENTMCNC: 32.5 G/DL (ref 31–36)
MCV RBC AUTO: 92.5 FL (ref 80–100)
METAMYELOCYTES RELATIVE PERCENT: 1 %
MONOCYTES ABSOLUTE: 1.4 K/UL (ref 0–1.3)
MONOCYTES RELATIVE PERCENT: 9 %
NEUTROPHILS ABSOLUTE: 12.6 K/UL (ref 1.7–7.7)
NEUTROPHILS RELATIVE PERCENT: 80 %
PDW BLD-RTO: 15.6 % (ref 12.4–15.4)
PHOSPHORUS: 2.4 MG/DL (ref 2.5–4.9)
PLATELET # BLD: 460 K/UL (ref 135–450)
PLATELET SLIDE REVIEW: ABNORMAL
PMV BLD AUTO: 7.5 FL (ref 5–10.5)
POLYCHROMASIA: ABNORMAL
POTASSIUM SERPL-SCNC: 3.6 MMOL/L (ref 3.5–5.1)
RBC # BLD: 3.64 M/UL (ref 4.2–5.9)
SLIDE REVIEW: ABNORMAL
SODIUM BLD-SCNC: 135 MMOL/L (ref 136–145)
WBC # BLD: 15.6 K/UL (ref 4–11)

## 2019-09-03 PROCEDURE — 74177 CT ABD & PELVIS W/CONTRAST: CPT

## 2019-09-03 PROCEDURE — C1769 GUIDE WIRE: HCPCS

## 2019-09-03 PROCEDURE — 6360000002 HC RX W HCPCS: Performed by: RADIOLOGY

## 2019-09-03 PROCEDURE — 87205 SMEAR GRAM STAIN: CPT

## 2019-09-03 PROCEDURE — 83735 ASSAY OF MAGNESIUM: CPT

## 2019-09-03 PROCEDURE — 6360000002 HC RX W HCPCS: Performed by: SURGERY

## 2019-09-03 PROCEDURE — 99232 SBSQ HOSP IP/OBS MODERATE 35: CPT | Performed by: SURGERY

## 2019-09-03 PROCEDURE — 87077 CULTURE AEROBIC IDENTIFY: CPT

## 2019-09-03 PROCEDURE — 87186 SC STD MICRODIL/AGAR DIL: CPT

## 2019-09-03 PROCEDURE — 87075 CULTR BACTERIA EXCEPT BLOOD: CPT

## 2019-09-03 PROCEDURE — 87070 CULTURE OTHR SPECIMN AEROBIC: CPT

## 2019-09-03 PROCEDURE — 80048 BASIC METABOLIC PNL TOTAL CA: CPT

## 2019-09-03 PROCEDURE — 0W9H30Z DRAINAGE OF RETROPERITONEUM WITH DRAINAGE DEVICE, PERCUTANEOUS APPROACH: ICD-10-PCS | Performed by: RADIOLOGY

## 2019-09-03 PROCEDURE — 2580000003 HC RX 258: Performed by: SURGERY

## 2019-09-03 PROCEDURE — 2500000003 HC RX 250 WO HCPCS: Performed by: SURGERY

## 2019-09-03 PROCEDURE — 85025 COMPLETE CBC W/AUTO DIFF WBC: CPT

## 2019-09-03 PROCEDURE — 1200000000 HC SEMI PRIVATE

## 2019-09-03 PROCEDURE — 6370000000 HC RX 637 (ALT 250 FOR IP): Performed by: SURGERY

## 2019-09-03 PROCEDURE — 84100 ASSAY OF PHOSPHORUS: CPT

## 2019-09-03 PROCEDURE — 99153 MOD SED SAME PHYS/QHP EA: CPT

## 2019-09-03 PROCEDURE — 99152 MOD SED SAME PHYS/QHP 5/>YRS: CPT

## 2019-09-03 PROCEDURE — 36415 COLL VENOUS BLD VENIPUNCTURE: CPT

## 2019-09-03 PROCEDURE — 6360000004 HC RX CONTRAST MEDICATION: Performed by: SURGERY

## 2019-09-03 RX ORDER — FENTANYL CITRATE 50 UG/ML
INJECTION, SOLUTION INTRAMUSCULAR; INTRAVENOUS
Status: COMPLETED | OUTPATIENT
Start: 2019-09-03 | End: 2019-09-03

## 2019-09-03 RX ORDER — MIDAZOLAM HYDROCHLORIDE 5 MG/ML
INJECTION INTRAMUSCULAR; INTRAVENOUS
Status: COMPLETED | OUTPATIENT
Start: 2019-09-03 | End: 2019-09-03

## 2019-09-03 RX ADMIN — ACETAMINOPHEN 650 MG: 325 TABLET ORAL at 06:32

## 2019-09-03 RX ADMIN — HYDROMORPHONE HYDROCHLORIDE 1 MG: 1 INJECTION, SOLUTION INTRAMUSCULAR; INTRAVENOUS; SUBCUTANEOUS at 08:33

## 2019-09-03 RX ADMIN — HEPARIN SODIUM 5000 UNITS: 5000 INJECTION INTRAVENOUS; SUBCUTANEOUS at 22:08

## 2019-09-03 RX ADMIN — HYDROMORPHONE HYDROCHLORIDE 1 MG: 1 INJECTION, SOLUTION INTRAMUSCULAR; INTRAVENOUS; SUBCUTANEOUS at 16:41

## 2019-09-03 RX ADMIN — IOPAMIDOL 75 ML: 755 INJECTION, SOLUTION INTRAVENOUS at 10:05

## 2019-09-03 RX ADMIN — MEROPENEM 1 G: 1 INJECTION, POWDER, FOR SOLUTION INTRAVENOUS at 16:07

## 2019-09-03 RX ADMIN — HYDROMORPHONE HYDROCHLORIDE 1 MG: 1 INJECTION, SOLUTION INTRAMUSCULAR; INTRAVENOUS; SUBCUTANEOUS at 06:32

## 2019-09-03 RX ADMIN — ACETAMINOPHEN 650 MG: 325 TABLET ORAL at 14:43

## 2019-09-03 RX ADMIN — HYDROMORPHONE HYDROCHLORIDE 1 MG: 1 INJECTION, SOLUTION INTRAMUSCULAR; INTRAVENOUS; SUBCUTANEOUS at 18:54

## 2019-09-03 RX ADMIN — HYDROMORPHONE HYDROCHLORIDE 1 MG: 1 INJECTION, SOLUTION INTRAMUSCULAR; INTRAVENOUS; SUBCUTANEOUS at 03:28

## 2019-09-03 RX ADMIN — MIDAZOLAM HYDROCHLORIDE 2 MG: 5 INJECTION INTRAMUSCULAR; INTRAVENOUS at 13:22

## 2019-09-03 RX ADMIN — MEROPENEM 1 G: 1 INJECTION, POWDER, FOR SOLUTION INTRAVENOUS at 08:16

## 2019-09-03 RX ADMIN — METRONIDAZOLE 500 MG: 500 INJECTION, SOLUTION INTRAVENOUS at 08:16

## 2019-09-03 RX ADMIN — HYDROMORPHONE HYDROCHLORIDE 1 MG: 1 INJECTION, SOLUTION INTRAMUSCULAR; INTRAVENOUS; SUBCUTANEOUS at 10:41

## 2019-09-03 RX ADMIN — ONDANSETRON 4 MG: 2 INJECTION INTRAMUSCULAR; INTRAVENOUS at 22:08

## 2019-09-03 RX ADMIN — ONDANSETRON 4 MG: 2 INJECTION INTRAMUSCULAR; INTRAVENOUS at 14:21

## 2019-09-03 RX ADMIN — HYDROMORPHONE HYDROCHLORIDE 1 MG: 1 INJECTION, SOLUTION INTRAMUSCULAR; INTRAVENOUS; SUBCUTANEOUS at 14:21

## 2019-09-03 RX ADMIN — IBUPROFEN 400 MG: 400 TABLET, FILM COATED ORAL at 16:06

## 2019-09-03 RX ADMIN — HEPARIN SODIUM 5000 UNITS: 5000 INJECTION INTRAVENOUS; SUBCUTANEOUS at 15:28

## 2019-09-03 RX ADMIN — HEPARIN SODIUM 5000 UNITS: 5000 INJECTION INTRAVENOUS; SUBCUTANEOUS at 06:32

## 2019-09-03 RX ADMIN — FENTANYL CITRATE 100 MCG: 50 INJECTION INTRAMUSCULAR; INTRAVENOUS at 13:22

## 2019-09-03 RX ADMIN — HYDROMORPHONE HYDROCHLORIDE 1 MG: 1 INJECTION, SOLUTION INTRAMUSCULAR; INTRAVENOUS; SUBCUTANEOUS at 22:08

## 2019-09-03 RX ADMIN — METRONIDAZOLE 500 MG: 500 INJECTION, SOLUTION INTRAVENOUS at 23:10

## 2019-09-03 RX ADMIN — SODIUM CHLORIDE: 9 INJECTION, SOLUTION INTRAVENOUS at 17:17

## 2019-09-03 RX ADMIN — ONDANSETRON 4 MG: 2 INJECTION INTRAMUSCULAR; INTRAVENOUS at 06:33

## 2019-09-03 RX ADMIN — METRONIDAZOLE 500 MG: 500 INJECTION, SOLUTION INTRAVENOUS at 16:07

## 2019-09-03 ASSESSMENT — PAIN SCALES - GENERAL
PAINLEVEL_OUTOF10: 8
PAINLEVEL_OUTOF10: 7
PAINLEVEL_OUTOF10: 8
PAINLEVEL_OUTOF10: 9
PAINLEVEL_OUTOF10: 5
PAINLEVEL_OUTOF10: 8
PAINLEVEL_OUTOF10: 7
PAINLEVEL_OUTOF10: 9
PAINLEVEL_OUTOF10: 8
PAINLEVEL_OUTOF10: 8

## 2019-09-03 ASSESSMENT — PAIN DESCRIPTION - DESCRIPTORS: DESCRIPTORS: ACHING

## 2019-09-03 NOTE — PROGRESS NOTES
Pt taken down to CT for scan. Per x ray techs, pt attempted to get onto CT bed and slid down bed and was an assisted fall to pt knees on ground. Pt did not hit his head per x ray techs and patient, this writer was not in room. Pt stated \"Im fine, no I'm not hurt\". Pt knees palpated and pt denies any new pain. Pt states \"My knees give out all the time, I'm fine\". Clinical called immediately and instructed to call nocturnist due to pt already being in radiology incase another xray needed. Dr. Liliana Gardner updated and given pt diagnosis and situation, MD notified pt denies injury. No additional xray needed per MD.     Gerhardt Kohl to do CT due to not drinking oral contrast, pt needing both IV and oral. Pt educated once back into room on 2w, if he had any pain staff needed to get an x ray of his knees, pt stated \"No I don't have any pain then\". Pt encouraged to let this writer know if he had pain and pt insisted he did not have any new pain. Pt laughed and stated \"yeah the dilaudid was helping my belly then trying to get up flared it up again\". Pt denies any new or different pain. Pt states \"are you going to treat me like a fall patient now\" pt educated and when going to turn bed alarm on pt refused. Charge nurse notified and insisted bed alarm be on, back to explain to pt. Pt still refusing bed alarm and encouraged to call out.

## 2019-09-03 NOTE — PROGRESS NOTES
Patient returned to room from CT. Patient c/o pain 9/10, prn medications provided, see MAR. Patient vomiting, prn nausea medications given. Patient with temp 102.1, tylenol given. Drains emptied at this time, see flowsheets. Patient currently awake in bed, call light and personal belongings within reach, see MAR.

## 2019-09-03 NOTE — PLAN OF CARE
Problem: Falls - Risk of:  Goal: Will remain free from falls  Description  Will remain free from falls  9/2/2019 2001 by Misael Arenas RN  Outcome: Ongoing  9/2/2019 0939 by Cady Menon RN  Outcome: Ongoing  Goal: Absence of physical injury  Description  Absence of physical injury  9/2/2019 2001 by Misael Arenas RN  Outcome: Ongoing  9/2/2019 0939 by Cady Menon RN  Outcome: Ongoing     Problem: Risk for Impaired Skin Integrity  Goal: Tissue integrity - skin and mucous membranes  Description  Structural intactness and normal physiological function of skin and  mucous membranes. 9/2/2019 2001 by Misael Arenas RN  Outcome: Ongoing  9/2/2019 0939 by Cady Menon RN  Outcome: Ongoing     Problem: Activity:  Goal: Risk for activity intolerance will decrease  Description  Risk for activity intolerance will decrease  9/2/2019 2001 by Misael Arenas RN  Outcome: Ongoing  9/2/2019 0939 by Cady Menon RN  Outcome: Ongoing     Problem:  Bowel/Gastric:  Goal: Bowel function will improve  Description  Bowel function will improve  9/2/2019 2001 by Misael Arenas RN  Outcome: Ongoing  9/2/2019 0939 by Cady Menon RN  Outcome: Ongoing  Goal: Diagnostic test results will improve  Description  Diagnostic test results will improve  9/2/2019 2001 by Misael Arenas RN  Outcome: Ongoing  9/2/2019 0939 by Cady Menon RN  Outcome: Ongoing  Goal: Occurrences of nausea will decrease  Description  Occurrences of nausea will decrease  9/2/2019 2001 by Misael Arenas RN  Outcome: Ongoing  9/2/2019 0939 by Cady Menon RN  Outcome: Ongoing  Goal: Occurrences of vomiting will decrease  Description  Occurrences of vomiting will decrease  9/2/2019 2001 by Misael Arenas RN  Outcome: Ongoing  9/2/2019 0939 by Cady Menon RN  Outcome: Ongoing     Problem: Fluid Volume:  Goal: Maintenance of adequate hydration will improve  Description  Maintenance of adequate hydration will improve  9/2/2019 2001 by Misael Arenas RN  Outcome: Ongoing  9/2/2019 0939 by Aury Ochoa RN  Outcome: Ongoing     Problem: Health Behavior:  Goal: Ability to state signs and symptoms to report to health care provider will improve  Description  Ability to state signs and symptoms to report to health care provider will improve  9/2/2019 2001 by Latesha Bella RN  Outcome: Ongoing  9/2/2019 0939 by Aury Ochoa RN  Outcome: Ongoing     Problem: Physical Regulation:  Goal: Complications related to the disease process, condition or treatment will be avoided or minimized  Description  Complications related to the disease process, condition or treatment will be avoided or minimized  9/2/2019 2001 by Latesha Bella RN  Outcome: Ongoing  9/2/2019 0939 by Aury Ochoa RN  Outcome: Ongoing  Goal: Ability to maintain clinical measurements within normal limits will improve  Description  Ability to maintain clinical measurements within normal limits will improve  9/2/2019 2001 by Latesha Bella RN  Outcome: Ongoing  9/2/2019 0939 by Aury Ochoa RN  Outcome: Ongoing     Problem: Sensory:  Goal: Ability to identify factors that increase the pain will improve  Description  Ability to identify factors that increase the pain will improve  9/2/2019 2001 by Latesha Bella RN  Outcome: Ongoing  9/2/2019 0939 by Aury Ochoa RN  Outcome: Ongoing  Goal: Ability to notify healthcare provider of pain before it becomes unmanageable or unbearable will improve  Description  Ability to notify healthcare provider of pain before it becomes unmanageable or unbearable will improve  9/2/2019 2001 by Latesha Bella RN  Outcome: Ongoing  9/2/2019 0939 by Aury Ochoa RN  Outcome: Ongoing  Goal: Pain level will decrease  Description  Pain level will decrease  9/2/2019 2001 by Latesha Bella RN  Outcome: Ongoing  9/2/2019 0939 by Aury Ochoa RN  Outcome: Ongoing     Problem: Tobacco Use:  Goal: Inpatient tobacco use cessation counseling participation  Description  Inpatient tobacco use

## 2019-09-04 LAB
ANION GAP SERPL CALCULATED.3IONS-SCNC: 13 MMOL/L (ref 3–16)
BASOPHILS ABSOLUTE: 0 K/UL (ref 0–0.2)
BASOPHILS RELATIVE PERCENT: 0.3 %
BUN BLDV-MCNC: 4 MG/DL (ref 7–20)
CALCIUM SERPL-MCNC: 8.1 MG/DL (ref 8.3–10.6)
CHLORIDE BLD-SCNC: 101 MMOL/L (ref 99–110)
CO2: 21 MMOL/L (ref 21–32)
CREAT SERPL-MCNC: <0.5 MG/DL (ref 0.9–1.3)
EOSINOPHILS ABSOLUTE: 0.1 K/UL (ref 0–0.6)
EOSINOPHILS RELATIVE PERCENT: 0.7 %
GFR AFRICAN AMERICAN: >60
GFR NON-AFRICAN AMERICAN: >60
GLUCOSE BLD-MCNC: 107 MG/DL (ref 70–99)
HCT VFR BLD CALC: 30.1 % (ref 40.5–52.5)
HEMOGLOBIN: 9.8 G/DL (ref 13.5–17.5)
LYMPHOCYTES ABSOLUTE: 1.1 K/UL (ref 1–5.1)
LYMPHOCYTES RELATIVE PERCENT: 8.7 %
MAGNESIUM: 2 MG/DL (ref 1.8–2.4)
MCH RBC QN AUTO: 30.1 PG (ref 26–34)
MCHC RBC AUTO-ENTMCNC: 32.7 G/DL (ref 31–36)
MCV RBC AUTO: 92 FL (ref 80–100)
MONOCYTES ABSOLUTE: 1.4 K/UL (ref 0–1.3)
MONOCYTES RELATIVE PERCENT: 11.1 %
NEUTROPHILS ABSOLUTE: 9.7 K/UL (ref 1.7–7.7)
NEUTROPHILS RELATIVE PERCENT: 79.2 %
PDW BLD-RTO: 15.2 % (ref 12.4–15.4)
PHOSPHORUS: 2.9 MG/DL (ref 2.5–4.9)
PLATELET # BLD: 426 K/UL (ref 135–450)
PMV BLD AUTO: 7.1 FL (ref 5–10.5)
POTASSIUM SERPL-SCNC: 3.4 MMOL/L (ref 3.5–5.1)
RBC # BLD: 3.27 M/UL (ref 4.2–5.9)
SODIUM BLD-SCNC: 135 MMOL/L (ref 136–145)
WBC # BLD: 12.3 K/UL (ref 4–11)

## 2019-09-04 PROCEDURE — 85025 COMPLETE CBC W/AUTO DIFF WBC: CPT

## 2019-09-04 PROCEDURE — 36415 COLL VENOUS BLD VENIPUNCTURE: CPT

## 2019-09-04 PROCEDURE — 6360000002 HC RX W HCPCS: Performed by: SURGERY

## 2019-09-04 PROCEDURE — 6370000000 HC RX 637 (ALT 250 FOR IP): Performed by: SURGERY

## 2019-09-04 PROCEDURE — 84100 ASSAY OF PHOSPHORUS: CPT

## 2019-09-04 PROCEDURE — 80048 BASIC METABOLIC PNL TOTAL CA: CPT

## 2019-09-04 PROCEDURE — 83735 ASSAY OF MAGNESIUM: CPT

## 2019-09-04 PROCEDURE — 6360000002 HC RX W HCPCS: Performed by: NURSE PRACTITIONER

## 2019-09-04 PROCEDURE — 2500000003 HC RX 250 WO HCPCS: Performed by: SURGERY

## 2019-09-04 PROCEDURE — 1200000000 HC SEMI PRIVATE

## 2019-09-04 PROCEDURE — 2580000003 HC RX 258: Performed by: SURGERY

## 2019-09-04 PROCEDURE — 99232 SBSQ HOSP IP/OBS MODERATE 35: CPT | Performed by: SURGERY

## 2019-09-04 RX ORDER — KETOROLAC TROMETHAMINE 30 MG/ML
15 INJECTION, SOLUTION INTRAMUSCULAR; INTRAVENOUS EVERY 6 HOURS PRN
Status: DISPENSED | OUTPATIENT
Start: 2019-09-04 | End: 2019-09-09

## 2019-09-04 RX ADMIN — KETOROLAC TROMETHAMINE 15 MG: 30 INJECTION, SOLUTION INTRAMUSCULAR; INTRAVENOUS at 22:55

## 2019-09-04 RX ADMIN — METRONIDAZOLE 500 MG: 500 INJECTION, SOLUTION INTRAVENOUS at 06:50

## 2019-09-04 RX ADMIN — KETOROLAC TROMETHAMINE 15 MG: 30 INJECTION, SOLUTION INTRAMUSCULAR; INTRAVENOUS at 16:10

## 2019-09-04 RX ADMIN — HYDROMORPHONE HYDROCHLORIDE 1 MG: 1 INJECTION, SOLUTION INTRAMUSCULAR; INTRAVENOUS; SUBCUTANEOUS at 19:39

## 2019-09-04 RX ADMIN — HYDROMORPHONE HYDROCHLORIDE 1 MG: 1 INJECTION, SOLUTION INTRAMUSCULAR; INTRAVENOUS; SUBCUTANEOUS at 10:20

## 2019-09-04 RX ADMIN — IBUPROFEN 400 MG: 400 TABLET, FILM COATED ORAL at 02:42

## 2019-09-04 RX ADMIN — MEROPENEM 1 G: 1 INJECTION, POWDER, FOR SOLUTION INTRAVENOUS at 00:25

## 2019-09-04 RX ADMIN — HYDROMORPHONE HYDROCHLORIDE 1 MG: 1 INJECTION, SOLUTION INTRAMUSCULAR; INTRAVENOUS; SUBCUTANEOUS at 00:28

## 2019-09-04 RX ADMIN — HEPARIN SODIUM 5000 UNITS: 5000 INJECTION INTRAVENOUS; SUBCUTANEOUS at 14:59

## 2019-09-04 RX ADMIN — HYDROMORPHONE HYDROCHLORIDE 1 MG: 1 INJECTION, SOLUTION INTRAMUSCULAR; INTRAVENOUS; SUBCUTANEOUS at 14:59

## 2019-09-04 RX ADMIN — HEPARIN SODIUM 5000 UNITS: 5000 INJECTION INTRAVENOUS; SUBCUTANEOUS at 06:51

## 2019-09-04 RX ADMIN — METRONIDAZOLE 500 MG: 500 INJECTION, SOLUTION INTRAVENOUS at 14:59

## 2019-09-04 RX ADMIN — DIPHENHYDRAMINE HYDROCHLORIDE 25 MG: 50 INJECTION, SOLUTION INTRAMUSCULAR; INTRAVENOUS at 00:33

## 2019-09-04 RX ADMIN — METRONIDAZOLE 500 MG: 500 INJECTION, SOLUTION INTRAVENOUS at 22:48

## 2019-09-04 RX ADMIN — MEROPENEM 1 G: 1 INJECTION, POWDER, FOR SOLUTION INTRAVENOUS at 07:25

## 2019-09-04 RX ADMIN — HYDROMORPHONE HYDROCHLORIDE 1 MG: 1 INJECTION, SOLUTION INTRAMUSCULAR; INTRAVENOUS; SUBCUTANEOUS at 17:30

## 2019-09-04 RX ADMIN — LORAZEPAM 1 MG: 2 INJECTION INTRAMUSCULAR; INTRAVENOUS at 22:48

## 2019-09-04 RX ADMIN — HYDROMORPHONE HYDROCHLORIDE 1 MG: 1 INJECTION, SOLUTION INTRAMUSCULAR; INTRAVENOUS; SUBCUTANEOUS at 04:58

## 2019-09-04 RX ADMIN — HEPARIN SODIUM 5000 UNITS: 5000 INJECTION INTRAVENOUS; SUBCUTANEOUS at 21:28

## 2019-09-04 RX ADMIN — ONDANSETRON 4 MG: 2 INJECTION INTRAMUSCULAR; INTRAVENOUS at 02:45

## 2019-09-04 RX ADMIN — HYDROMORPHONE HYDROCHLORIDE 1 MG: 1 INJECTION, SOLUTION INTRAMUSCULAR; INTRAVENOUS; SUBCUTANEOUS at 12:40

## 2019-09-04 RX ADMIN — HYDROMORPHONE HYDROCHLORIDE 1 MG: 1 INJECTION, SOLUTION INTRAMUSCULAR; INTRAVENOUS; SUBCUTANEOUS at 02:46

## 2019-09-04 RX ADMIN — ONDANSETRON 4 MG: 2 INJECTION INTRAMUSCULAR; INTRAVENOUS at 21:49

## 2019-09-04 RX ADMIN — SODIUM CHLORIDE: 9 INJECTION, SOLUTION INTRAVENOUS at 10:20

## 2019-09-04 RX ADMIN — HYDROMORPHONE HYDROCHLORIDE 1 MG: 1 INJECTION, SOLUTION INTRAMUSCULAR; INTRAVENOUS; SUBCUTANEOUS at 21:49

## 2019-09-04 RX ADMIN — HYDROMORPHONE HYDROCHLORIDE 1 MG: 1 INJECTION, SOLUTION INTRAMUSCULAR; INTRAVENOUS; SUBCUTANEOUS at 06:57

## 2019-09-04 RX ADMIN — SODIUM CHLORIDE: 9 INJECTION, SOLUTION INTRAVENOUS at 21:28

## 2019-09-04 RX ADMIN — MEROPENEM 1 G: 1 INJECTION, POWDER, FOR SOLUTION INTRAVENOUS at 14:59

## 2019-09-04 ASSESSMENT — PAIN SCALES - GENERAL
PAINLEVEL_OUTOF10: 8
PAINLEVEL_OUTOF10: 7
PAINLEVEL_OUTOF10: 8
PAINLEVEL_OUTOF10: 9
PAINLEVEL_OUTOF10: 9
PAINLEVEL_OUTOF10: 8

## 2019-09-04 ASSESSMENT — PAIN DESCRIPTION - ONSET: ONSET: ON-GOING

## 2019-09-04 ASSESSMENT — PAIN DESCRIPTION - PROGRESSION: CLINICAL_PROGRESSION: GRADUALLY WORSENING

## 2019-09-04 ASSESSMENT — PAIN DESCRIPTION - LOCATION
LOCATION: ABDOMEN

## 2019-09-04 ASSESSMENT — PAIN DESCRIPTION - ORIENTATION
ORIENTATION: LOWER;MID
ORIENTATION: MID

## 2019-09-04 ASSESSMENT — PAIN DESCRIPTION - PAIN TYPE
TYPE: ACUTE PAIN
TYPE: ACUTE PAIN
TYPE: ACUTE PAIN;SURGICAL PAIN

## 2019-09-04 ASSESSMENT — PAIN DESCRIPTION - FREQUENCY: FREQUENCY: CONTINUOUS

## 2019-09-04 ASSESSMENT — PAIN DESCRIPTION - DESCRIPTORS
DESCRIPTORS: ACHING;DISCOMFORT
DESCRIPTORS: ACHING;SHARP
DESCRIPTORS: ACHING;DISCOMFORT

## 2019-09-04 NOTE — PROGRESS NOTES
Pt with fever nausea and pain. Prn meds given cold applied to forehead. Pt frustrated because he does not feel well at all and has been in pain all night. Will monitor. Denies other needs. Call light within reach.

## 2019-09-04 NOTE — PROGRESS NOTES
Shift assessment completed. Pt is alert and oriented. Vital signs are WNL. Respirations are even & easy. Pt just back to bed after having a episode of diarrhea and experiencing some nausea. Prn meds given. Full bed and gown change. Abdominal drains are c,d and I. abd sounds are present but hypoactive and distant. Some bruising noted around sites. serosanguinous drainage noted. Pt denies needs at this time. SR up x 2 and bed in low position. Call light is within reach. Will monitor.

## 2019-09-04 NOTE — PROGRESS NOTES
4*   CREATININE <0.5*   MG 2.00   PHOS 2.9   CALCIUM 8.1*     CBC with Differential:    Lab Results   Component Value Date    WBC 12.3 09/04/2019    RBC 3.27 09/04/2019    HGB 9.8 09/04/2019    HCT 30.1 09/04/2019     09/04/2019    MCV 92.0 09/04/2019    MCH 30.1 09/04/2019    MCHC 32.7 09/04/2019    RDW 15.2 09/04/2019    BANDSPCT 1 03/23/2019    METASPCT 1 09/03/2019    LYMPHOPCT 8.7 09/04/2019    MONOPCT 11.1 09/04/2019    BASOPCT 0.3 09/04/2019    MONOSABS 1.4 09/04/2019    LYMPHSABS 1.1 09/04/2019    EOSABS 0.1 09/04/2019    BASOSABS 0.0 09/04/2019     CMP:    Lab Results   Component Value Date     09/04/2019    K 3.4 09/04/2019    K 3.9 09/01/2019     09/04/2019    CO2 21 09/04/2019    BUN 4 09/04/2019    CREATININE <0.5 09/04/2019    GFRAA >60 09/04/2019    AGRATIO 0.8 09/01/2019    LABGLOM >60 09/04/2019    GLUCOSE 107 09/04/2019    PROT 9.4 09/01/2019    LABALBU 4.1 09/01/2019    CALCIUM 8.1 09/04/2019    BILITOT 0.7 09/01/2019    ALKPHOS 82 09/01/2019    AST 8 09/01/2019    ALT <5 09/01/2019         Ledy Bella Valadez  Electronically signed 9/4/2019 at 11:13 AM     Patient seen and examined. I agree with the assessment and plan from MITESH Roldan. Patient feels about the same. Had N/V yesterday but not today. IR placed 2 drains. Labs reviewed. Continue antibiotics, drains. Encourage ambulation. Possible trial of liquids tomorrow.     [address] [facility]

## 2019-09-05 LAB
ANION GAP SERPL CALCULATED.3IONS-SCNC: 14 MMOL/L (ref 3–16)
ANISOCYTOSIS: ABNORMAL
BASOPHILS ABSOLUTE: 0 K/UL (ref 0–0.2)
BASOPHILS RELATIVE PERCENT: 0 %
BUN BLDV-MCNC: 4 MG/DL (ref 7–20)
BURR CELLS: ABNORMAL
CALCIUM SERPL-MCNC: 8.4 MG/DL (ref 8.3–10.6)
CHLORIDE BLD-SCNC: 99 MMOL/L (ref 99–110)
CO2: 20 MMOL/L (ref 21–32)
CREAT SERPL-MCNC: 0.6 MG/DL (ref 0.9–1.3)
EOSINOPHILS ABSOLUTE: 0.1 K/UL (ref 0–0.6)
EOSINOPHILS RELATIVE PERCENT: 1 %
GFR AFRICAN AMERICAN: >60
GFR NON-AFRICAN AMERICAN: >60
GLUCOSE BLD-MCNC: 96 MG/DL (ref 70–99)
HCT VFR BLD CALC: 31.4 % (ref 40.5–52.5)
HEMATOLOGY PATH CONSULT: NO
HEMOGLOBIN: 10.4 G/DL (ref 13.5–17.5)
LYMPHOCYTES ABSOLUTE: 0.7 K/UL (ref 1–5.1)
LYMPHOCYTES RELATIVE PERCENT: 6 %
MAGNESIUM: 2 MG/DL (ref 1.8–2.4)
MCH RBC QN AUTO: 30.1 PG (ref 26–34)
MCHC RBC AUTO-ENTMCNC: 33.1 G/DL (ref 31–36)
MCV RBC AUTO: 91 FL (ref 80–100)
MONOCYTES ABSOLUTE: 0.9 K/UL (ref 0–1.3)
MONOCYTES RELATIVE PERCENT: 8 %
MYELOCYTE PERCENT: 1 %
NEUTROPHILS ABSOLUTE: 9.7 K/UL (ref 1.7–7.7)
NEUTROPHILS RELATIVE PERCENT: 84 %
PDW BLD-RTO: 15.9 % (ref 12.4–15.4)
PHOSPHORUS: 2.7 MG/DL (ref 2.5–4.9)
PLATELET # BLD: 437 K/UL (ref 135–450)
PLATELET SLIDE REVIEW: ABNORMAL
PMV BLD AUTO: 7 FL (ref 5–10.5)
POIKILOCYTES: ABNORMAL
POTASSIUM SERPL-SCNC: 3.3 MMOL/L (ref 3.5–5.1)
RBC # BLD: 3.45 M/UL (ref 4.2–5.9)
SLIDE REVIEW: ABNORMAL
SODIUM BLD-SCNC: 133 MMOL/L (ref 136–145)
WBC # BLD: 11.4 K/UL (ref 4–11)

## 2019-09-05 PROCEDURE — 99232 SBSQ HOSP IP/OBS MODERATE 35: CPT | Performed by: SURGERY

## 2019-09-05 PROCEDURE — 36415 COLL VENOUS BLD VENIPUNCTURE: CPT

## 2019-09-05 PROCEDURE — 6360000002 HC RX W HCPCS: Performed by: SURGERY

## 2019-09-05 PROCEDURE — 83735 ASSAY OF MAGNESIUM: CPT

## 2019-09-05 PROCEDURE — 6360000002 HC RX W HCPCS: Performed by: NURSE PRACTITIONER

## 2019-09-05 PROCEDURE — 85025 COMPLETE CBC W/AUTO DIFF WBC: CPT

## 2019-09-05 PROCEDURE — 1200000000 HC SEMI PRIVATE

## 2019-09-05 PROCEDURE — 2500000003 HC RX 250 WO HCPCS: Performed by: SURGERY

## 2019-09-05 PROCEDURE — 6370000000 HC RX 637 (ALT 250 FOR IP): Performed by: SURGERY

## 2019-09-05 PROCEDURE — 80048 BASIC METABOLIC PNL TOTAL CA: CPT

## 2019-09-05 PROCEDURE — 84100 ASSAY OF PHOSPHORUS: CPT

## 2019-09-05 PROCEDURE — 2580000003 HC RX 258: Performed by: SURGERY

## 2019-09-05 RX ORDER — POTASSIUM CHLORIDE 7.45 MG/ML
10 INJECTION INTRAVENOUS
Status: COMPLETED | OUTPATIENT
Start: 2019-09-05 | End: 2019-09-05

## 2019-09-05 RX ADMIN — SODIUM CHLORIDE: 9 INJECTION, SOLUTION INTRAVENOUS at 18:46

## 2019-09-05 RX ADMIN — ONDANSETRON 4 MG: 2 INJECTION INTRAMUSCULAR; INTRAVENOUS at 21:07

## 2019-09-05 RX ADMIN — HYDROMORPHONE HYDROCHLORIDE 1 MG: 1 INJECTION, SOLUTION INTRAMUSCULAR; INTRAVENOUS; SUBCUTANEOUS at 03:17

## 2019-09-05 RX ADMIN — HEPARIN SODIUM 5000 UNITS: 5000 INJECTION INTRAVENOUS; SUBCUTANEOUS at 14:12

## 2019-09-05 RX ADMIN — SODIUM CHLORIDE: 9 INJECTION, SOLUTION INTRAVENOUS at 21:07

## 2019-09-05 RX ADMIN — HYDROMORPHONE HYDROCHLORIDE 1 MG: 1 INJECTION, SOLUTION INTRAMUSCULAR; INTRAVENOUS; SUBCUTANEOUS at 14:12

## 2019-09-05 RX ADMIN — Medication 10 MEQ: at 13:13

## 2019-09-05 RX ADMIN — HYDROMORPHONE HYDROCHLORIDE 1 MG: 1 INJECTION, SOLUTION INTRAMUSCULAR; INTRAVENOUS; SUBCUTANEOUS at 21:07

## 2019-09-05 RX ADMIN — LORAZEPAM 1 MG: 2 INJECTION INTRAMUSCULAR; INTRAVENOUS at 04:51

## 2019-09-05 RX ADMIN — HYDROMORPHONE HYDROCHLORIDE 1 MG: 1 INJECTION, SOLUTION INTRAMUSCULAR; INTRAVENOUS; SUBCUTANEOUS at 11:55

## 2019-09-05 RX ADMIN — HYDROMORPHONE HYDROCHLORIDE 1 MG: 1 INJECTION, SOLUTION INTRAMUSCULAR; INTRAVENOUS; SUBCUTANEOUS at 07:15

## 2019-09-05 RX ADMIN — SODIUM CHLORIDE: 9 INJECTION, SOLUTION INTRAVENOUS at 07:20

## 2019-09-05 RX ADMIN — ONDANSETRON 4 MG: 2 INJECTION INTRAMUSCULAR; INTRAVENOUS at 13:14

## 2019-09-05 RX ADMIN — HEPARIN SODIUM 5000 UNITS: 5000 INJECTION INTRAVENOUS; SUBCUTANEOUS at 05:59

## 2019-09-05 RX ADMIN — METRONIDAZOLE 500 MG: 500 INJECTION, SOLUTION INTRAVENOUS at 15:16

## 2019-09-05 RX ADMIN — HYDROMORPHONE HYDROCHLORIDE 1 MG: 1 INJECTION, SOLUTION INTRAMUSCULAR; INTRAVENOUS; SUBCUTANEOUS at 22:59

## 2019-09-05 RX ADMIN — HYDROMORPHONE HYDROCHLORIDE 1 MG: 1 INJECTION, SOLUTION INTRAMUSCULAR; INTRAVENOUS; SUBCUTANEOUS at 09:15

## 2019-09-05 RX ADMIN — HYDROMORPHONE HYDROCHLORIDE 1 MG: 1 INJECTION, SOLUTION INTRAMUSCULAR; INTRAVENOUS; SUBCUTANEOUS at 16:49

## 2019-09-05 RX ADMIN — KETOROLAC TROMETHAMINE 15 MG: 30 INJECTION, SOLUTION INTRAMUSCULAR; INTRAVENOUS at 22:19

## 2019-09-05 RX ADMIN — HYDROMORPHONE HYDROCHLORIDE 1 MG: 1 INJECTION, SOLUTION INTRAMUSCULAR; INTRAVENOUS; SUBCUTANEOUS at 00:15

## 2019-09-05 RX ADMIN — Medication 10 MEQ: at 16:48

## 2019-09-05 RX ADMIN — MEROPENEM 1 G: 1 INJECTION, POWDER, FOR SOLUTION INTRAVENOUS at 23:30

## 2019-09-05 RX ADMIN — MEROPENEM 1 G: 1 INJECTION, POWDER, FOR SOLUTION INTRAVENOUS at 15:16

## 2019-09-05 RX ADMIN — KETOROLAC TROMETHAMINE 15 MG: 30 INJECTION, SOLUTION INTRAMUSCULAR; INTRAVENOUS at 13:08

## 2019-09-05 RX ADMIN — MEROPENEM 1 G: 1 INJECTION, POWDER, FOR SOLUTION INTRAVENOUS at 00:09

## 2019-09-05 RX ADMIN — ACETAMINOPHEN 650 MG: 325 TABLET ORAL at 21:12

## 2019-09-05 RX ADMIN — METRONIDAZOLE 500 MG: 500 INJECTION, SOLUTION INTRAVENOUS at 07:20

## 2019-09-05 RX ADMIN — MEROPENEM 1 G: 1 INJECTION, POWDER, FOR SOLUTION INTRAVENOUS at 08:43

## 2019-09-05 RX ADMIN — ACETAMINOPHEN 650 MG: 325 TABLET ORAL at 00:20

## 2019-09-05 RX ADMIN — Medication 10 MEQ: at 14:13

## 2019-09-05 RX ADMIN — HEPARIN SODIUM 5000 UNITS: 5000 INJECTION INTRAVENOUS; SUBCUTANEOUS at 21:14

## 2019-09-05 RX ADMIN — HYDROMORPHONE HYDROCHLORIDE 1 MG: 1 INJECTION, SOLUTION INTRAMUSCULAR; INTRAVENOUS; SUBCUTANEOUS at 18:47

## 2019-09-05 RX ADMIN — METRONIDAZOLE 500 MG: 500 INJECTION, SOLUTION INTRAVENOUS at 22:19

## 2019-09-05 ASSESSMENT — PAIN DESCRIPTION - PROGRESSION: CLINICAL_PROGRESSION: GRADUALLY IMPROVING

## 2019-09-05 ASSESSMENT — PAIN DESCRIPTION - LOCATION
LOCATION: ABDOMEN

## 2019-09-05 ASSESSMENT — PAIN SCALES - GENERAL
PAINLEVEL_OUTOF10: 8
PAINLEVEL_OUTOF10: 6
PAINLEVEL_OUTOF10: 8

## 2019-09-05 ASSESSMENT — PAIN DESCRIPTION - ORIENTATION: ORIENTATION: LOWER

## 2019-09-05 ASSESSMENT — PAIN DESCRIPTION - PAIN TYPE
TYPE: ACUTE PAIN

## 2019-09-05 ASSESSMENT — PAIN DESCRIPTION - FREQUENCY: FREQUENCY: INTERMITTENT

## 2019-09-05 ASSESSMENT — PAIN DESCRIPTION - DESCRIPTORS: DESCRIPTORS: ACHING;SHARP

## 2019-09-05 NOTE — PROGRESS NOTES
Pt called out for pain 8/10 in abd and nausea, prn meds given, see MAR. [address] mist also given to pt. Will reassess and continue to monitor.

## 2019-09-06 LAB
ANION GAP SERPL CALCULATED.3IONS-SCNC: 10 MMOL/L (ref 3–16)
BASOPHILS ABSOLUTE: 0 K/UL (ref 0–0.2)
BASOPHILS RELATIVE PERCENT: 0.4 %
BUN BLDV-MCNC: 3 MG/DL (ref 7–20)
CALCIUM SERPL-MCNC: 8.3 MG/DL (ref 8.3–10.6)
CHLORIDE BLD-SCNC: 103 MMOL/L (ref 99–110)
CO2: 24 MMOL/L (ref 21–32)
CREAT SERPL-MCNC: <0.5 MG/DL (ref 0.9–1.3)
EOSINOPHILS ABSOLUTE: 0.2 K/UL (ref 0–0.6)
EOSINOPHILS RELATIVE PERCENT: 1.6 %
GFR AFRICAN AMERICAN: >60
GFR NON-AFRICAN AMERICAN: >60
GLUCOSE BLD-MCNC: 112 MG/DL (ref 70–99)
HCT VFR BLD CALC: 30.1 % (ref 40.5–52.5)
HEMOGLOBIN: 9.9 G/DL (ref 13.5–17.5)
LYMPHOCYTES ABSOLUTE: 1.3 K/UL (ref 1–5.1)
LYMPHOCYTES RELATIVE PERCENT: 12.6 %
MAGNESIUM: 1.9 MG/DL (ref 1.8–2.4)
MCH RBC QN AUTO: 30.2 PG (ref 26–34)
MCHC RBC AUTO-ENTMCNC: 32.9 G/DL (ref 31–36)
MCV RBC AUTO: 91.8 FL (ref 80–100)
MONOCYTES ABSOLUTE: 1.2 K/UL (ref 0–1.3)
MONOCYTES RELATIVE PERCENT: 11.7 %
NEUTROPHILS ABSOLUTE: 7.6 K/UL (ref 1.7–7.7)
NEUTROPHILS RELATIVE PERCENT: 73.7 %
PDW BLD-RTO: 15.6 % (ref 12.4–15.4)
PHOSPHORUS: 2.5 MG/DL (ref 2.5–4.9)
PLATELET # BLD: 442 K/UL (ref 135–450)
PMV BLD AUTO: 7.5 FL (ref 5–10.5)
POTASSIUM SERPL-SCNC: 3.2 MMOL/L (ref 3.5–5.1)
RBC # BLD: 3.28 M/UL (ref 4.2–5.9)
SODIUM BLD-SCNC: 137 MMOL/L (ref 136–145)
WBC # BLD: 10.3 K/UL (ref 4–11)

## 2019-09-06 PROCEDURE — 99232 SBSQ HOSP IP/OBS MODERATE 35: CPT | Performed by: SURGERY

## 2019-09-06 PROCEDURE — 85025 COMPLETE CBC W/AUTO DIFF WBC: CPT

## 2019-09-06 PROCEDURE — 6360000002 HC RX W HCPCS: Performed by: SURGERY

## 2019-09-06 PROCEDURE — 2500000003 HC RX 250 WO HCPCS: Performed by: SURGERY

## 2019-09-06 PROCEDURE — 83735 ASSAY OF MAGNESIUM: CPT

## 2019-09-06 PROCEDURE — 80048 BASIC METABOLIC PNL TOTAL CA: CPT

## 2019-09-06 PROCEDURE — 36415 COLL VENOUS BLD VENIPUNCTURE: CPT

## 2019-09-06 PROCEDURE — 1200000000 HC SEMI PRIVATE

## 2019-09-06 PROCEDURE — 6370000000 HC RX 637 (ALT 250 FOR IP): Performed by: NURSE PRACTITIONER

## 2019-09-06 PROCEDURE — 2580000003 HC RX 258: Performed by: SURGERY

## 2019-09-06 PROCEDURE — 84100 ASSAY OF PHOSPHORUS: CPT

## 2019-09-06 PROCEDURE — 6360000002 HC RX W HCPCS: Performed by: NURSE PRACTITIONER

## 2019-09-06 RX ORDER — SODIUM CHLORIDE AND POTASSIUM CHLORIDE .9; .15 G/100ML; G/100ML
SOLUTION INTRAVENOUS CONTINUOUS
Status: DISCONTINUED | OUTPATIENT
Start: 2019-09-06 | End: 2019-09-11 | Stop reason: HOSPADM

## 2019-09-06 RX ORDER — POTASSIUM CHLORIDE 750 MG/1
40 TABLET, EXTENDED RELEASE ORAL
Status: COMPLETED | OUTPATIENT
Start: 2019-09-06 | End: 2019-09-06

## 2019-09-06 RX ADMIN — HYDROMORPHONE HYDROCHLORIDE 1 MG: 1 INJECTION, SOLUTION INTRAMUSCULAR; INTRAVENOUS; SUBCUTANEOUS at 02:07

## 2019-09-06 RX ADMIN — LORAZEPAM 1 MG: 2 INJECTION INTRAMUSCULAR; INTRAVENOUS at 03:02

## 2019-09-06 RX ADMIN — ONDANSETRON 4 MG: 2 INJECTION INTRAMUSCULAR; INTRAVENOUS at 07:56

## 2019-09-06 RX ADMIN — MEROPENEM 1 G: 1 INJECTION, POWDER, FOR SOLUTION INTRAVENOUS at 08:01

## 2019-09-06 RX ADMIN — KETOROLAC TROMETHAMINE 15 MG: 30 INJECTION, SOLUTION INTRAMUSCULAR; INTRAVENOUS at 16:27

## 2019-09-06 RX ADMIN — METRONIDAZOLE 500 MG: 500 INJECTION, SOLUTION INTRAVENOUS at 06:55

## 2019-09-06 RX ADMIN — HYDROMORPHONE HYDROCHLORIDE 1 MG: 1 INJECTION, SOLUTION INTRAMUSCULAR; INTRAVENOUS; SUBCUTANEOUS at 10:51

## 2019-09-06 RX ADMIN — HYDROMORPHONE HYDROCHLORIDE 1 MG: 1 INJECTION, SOLUTION INTRAMUSCULAR; INTRAVENOUS; SUBCUTANEOUS at 17:59

## 2019-09-06 RX ADMIN — HEPARIN SODIUM 5000 UNITS: 5000 INJECTION INTRAVENOUS; SUBCUTANEOUS at 20:22

## 2019-09-06 RX ADMIN — POTASSIUM CHLORIDE AND SODIUM CHLORIDE: 900; 150 INJECTION, SOLUTION INTRAVENOUS at 09:15

## 2019-09-06 RX ADMIN — METRONIDAZOLE 500 MG: 500 INJECTION, SOLUTION INTRAVENOUS at 23:21

## 2019-09-06 RX ADMIN — METRONIDAZOLE 500 MG: 500 INJECTION, SOLUTION INTRAVENOUS at 17:17

## 2019-09-06 RX ADMIN — KETOROLAC TROMETHAMINE 15 MG: 30 INJECTION, SOLUTION INTRAMUSCULAR; INTRAVENOUS at 23:16

## 2019-09-06 RX ADMIN — HEPARIN SODIUM 5000 UNITS: 5000 INJECTION INTRAVENOUS; SUBCUTANEOUS at 05:24

## 2019-09-06 RX ADMIN — LORAZEPAM 1 MG: 2 INJECTION INTRAMUSCULAR; INTRAVENOUS at 16:26

## 2019-09-06 RX ADMIN — MEROPENEM 1 G: 1 INJECTION, POWDER, FOR SOLUTION INTRAVENOUS at 23:21

## 2019-09-06 RX ADMIN — MEROPENEM 1 G: 1 INJECTION, POWDER, FOR SOLUTION INTRAVENOUS at 16:34

## 2019-09-06 RX ADMIN — POTASSIUM CHLORIDE AND SODIUM CHLORIDE: 900; 150 INJECTION, SOLUTION INTRAVENOUS at 23:21

## 2019-09-06 RX ADMIN — HYDROMORPHONE HYDROCHLORIDE 1 MG: 1 INJECTION, SOLUTION INTRAMUSCULAR; INTRAVENOUS; SUBCUTANEOUS at 07:56

## 2019-09-06 RX ADMIN — HEPARIN SODIUM 5000 UNITS: 5000 INJECTION INTRAVENOUS; SUBCUTANEOUS at 13:34

## 2019-09-06 RX ADMIN — POTASSIUM CHLORIDE 40 MEQ: 10 TABLET, EXTENDED RELEASE ORAL at 09:13

## 2019-09-06 RX ADMIN — HYDROMORPHONE HYDROCHLORIDE 1 MG: 1 INJECTION, SOLUTION INTRAMUSCULAR; INTRAVENOUS; SUBCUTANEOUS at 05:24

## 2019-09-06 RX ADMIN — HYDROMORPHONE HYDROCHLORIDE 1 MG: 1 INJECTION, SOLUTION INTRAMUSCULAR; INTRAVENOUS; SUBCUTANEOUS at 15:16

## 2019-09-06 RX ADMIN — HYDROMORPHONE HYDROCHLORIDE 1 MG: 1 INJECTION, SOLUTION INTRAMUSCULAR; INTRAVENOUS; SUBCUTANEOUS at 23:54

## 2019-09-06 RX ADMIN — HYDROMORPHONE HYDROCHLORIDE 1 MG: 1 INJECTION, SOLUTION INTRAMUSCULAR; INTRAVENOUS; SUBCUTANEOUS at 20:22

## 2019-09-06 RX ADMIN — LORAZEPAM 1 MG: 2 INJECTION INTRAMUSCULAR; INTRAVENOUS at 23:17

## 2019-09-06 RX ADMIN — HYDROMORPHONE HYDROCHLORIDE 1 MG: 1 INJECTION, SOLUTION INTRAMUSCULAR; INTRAVENOUS; SUBCUTANEOUS at 12:55

## 2019-09-06 RX ADMIN — LORAZEPAM 1 MG: 2 INJECTION INTRAMUSCULAR; INTRAVENOUS at 09:06

## 2019-09-06 RX ADMIN — KETOROLAC TROMETHAMINE 15 MG: 30 INJECTION, SOLUTION INTRAMUSCULAR; INTRAVENOUS at 09:05

## 2019-09-06 ASSESSMENT — PAIN SCALES - GENERAL
PAINLEVEL_OUTOF10: 0
PAINLEVEL_OUTOF10: 8
PAINLEVEL_OUTOF10: 8
PAINLEVEL_OUTOF10: 9
PAINLEVEL_OUTOF10: 9
PAINLEVEL_OUTOF10: 8
PAINLEVEL_OUTOF10: 9
PAINLEVEL_OUTOF10: 8
PAINLEVEL_OUTOF10: 6
PAINLEVEL_OUTOF10: 8
PAINLEVEL_OUTOF10: 9

## 2019-09-06 ASSESSMENT — PAIN DESCRIPTION - LOCATION
LOCATION: ABDOMEN

## 2019-09-06 ASSESSMENT — PAIN DESCRIPTION - PAIN TYPE
TYPE: ACUTE PAIN

## 2019-09-06 ASSESSMENT — PAIN DESCRIPTION - DESCRIPTORS
DESCRIPTORS: CONSTANT

## 2019-09-06 ASSESSMENT — PAIN DESCRIPTION - ORIENTATION
ORIENTATION: LOWER

## 2019-09-06 ASSESSMENT — PAIN DESCRIPTION - PROGRESSION: CLINICAL_PROGRESSION: GRADUALLY IMPROVING

## 2019-09-06 NOTE — FLOWSHEET NOTE
09/05/19 2106   Vital Signs   Temp 101 °F (38.3 °C)   Temp Source Oral   Pulse 105   Heart Rate Source Monitor   Resp 16   /84   BP Location Right upper arm   BP Upper/Lower Upper   Patient Position Semi fowlers   Level of Consciousness 0   MEWS Score 2   Oxygen Therapy   SpO2 97 %   O2 Device None (Room air)   Pt A/O x 4 assessment completed. 2 SHANNON drains noted with brown output. PM meds given. PRN Tylenol given for temp. PRN Zofran given. Pt provided with fresh ice chips and ginger ale. Pt denies any other needs at this time.

## 2019-09-06 NOTE — FLOWSHEET NOTE
09/06/19 1830   Vital Signs   Temp 97.6 °F (36.4 °C)   Temp Source Axillary   Pulse 87   Resp 15   /79   BP Location Right upper arm   BP Upper/Lower Upper   Patient Position High fowlers   Oxygen Therapy   SpO2 96 %   O2 Device None (Room air)

## 2019-09-07 LAB
ANION GAP SERPL CALCULATED.3IONS-SCNC: 9 MMOL/L (ref 3–16)
BASOPHILS ABSOLUTE: 0.1 K/UL (ref 0–0.2)
BASOPHILS RELATIVE PERCENT: 0.7 %
BUN BLDV-MCNC: 4 MG/DL (ref 7–20)
CALCIUM SERPL-MCNC: 8.4 MG/DL (ref 8.3–10.6)
CHLORIDE BLD-SCNC: 104 MMOL/L (ref 99–110)
CO2: 23 MMOL/L (ref 21–32)
CREAT SERPL-MCNC: <0.5 MG/DL (ref 0.9–1.3)
EOSINOPHILS ABSOLUTE: 0.2 K/UL (ref 0–0.6)
EOSINOPHILS RELATIVE PERCENT: 2.1 %
GFR AFRICAN AMERICAN: >60
GFR NON-AFRICAN AMERICAN: >60
GLUCOSE BLD-MCNC: 125 MG/DL (ref 70–99)
HCT VFR BLD CALC: 29.9 % (ref 40.5–52.5)
HEMOGLOBIN: 9.8 G/DL (ref 13.5–17.5)
LYMPHOCYTES ABSOLUTE: 1.3 K/UL (ref 1–5.1)
LYMPHOCYTES RELATIVE PERCENT: 14.1 %
MAGNESIUM: 2.1 MG/DL (ref 1.8–2.4)
MCH RBC QN AUTO: 30.3 PG (ref 26–34)
MCHC RBC AUTO-ENTMCNC: 32.9 G/DL (ref 31–36)
MCV RBC AUTO: 92 FL (ref 80–100)
MONOCYTES ABSOLUTE: 1 K/UL (ref 0–1.3)
MONOCYTES RELATIVE PERCENT: 11.2 %
NEUTROPHILS ABSOLUTE: 6.7 K/UL (ref 1.7–7.7)
NEUTROPHILS RELATIVE PERCENT: 71.9 %
PDW BLD-RTO: 16.5 % (ref 12.4–15.4)
PHOSPHORUS: 3.2 MG/DL (ref 2.5–4.9)
PLATELET # BLD: 441 K/UL (ref 135–450)
PMV BLD AUTO: 7.1 FL (ref 5–10.5)
POTASSIUM SERPL-SCNC: 3.8 MMOL/L (ref 3.5–5.1)
RBC # BLD: 3.25 M/UL (ref 4.2–5.9)
SODIUM BLD-SCNC: 136 MMOL/L (ref 136–145)
WBC # BLD: 9.3 K/UL (ref 4–11)

## 2019-09-07 PROCEDURE — 80048 BASIC METABOLIC PNL TOTAL CA: CPT

## 2019-09-07 PROCEDURE — 99232 SBSQ HOSP IP/OBS MODERATE 35: CPT | Performed by: SURGERY

## 2019-09-07 PROCEDURE — 83735 ASSAY OF MAGNESIUM: CPT

## 2019-09-07 PROCEDURE — 2580000003 HC RX 258: Performed by: SURGERY

## 2019-09-07 PROCEDURE — 84100 ASSAY OF PHOSPHORUS: CPT

## 2019-09-07 PROCEDURE — 85025 COMPLETE CBC W/AUTO DIFF WBC: CPT

## 2019-09-07 PROCEDURE — 2500000003 HC RX 250 WO HCPCS: Performed by: SURGERY

## 2019-09-07 PROCEDURE — 36415 COLL VENOUS BLD VENIPUNCTURE: CPT

## 2019-09-07 PROCEDURE — 1200000000 HC SEMI PRIVATE

## 2019-09-07 PROCEDURE — 6360000002 HC RX W HCPCS: Performed by: SURGERY

## 2019-09-07 PROCEDURE — 6360000002 HC RX W HCPCS: Performed by: NURSE PRACTITIONER

## 2019-09-07 RX ADMIN — HYDROMORPHONE HYDROCHLORIDE 1 MG: 1 INJECTION, SOLUTION INTRAMUSCULAR; INTRAVENOUS; SUBCUTANEOUS at 17:25

## 2019-09-07 RX ADMIN — HYDROMORPHONE HYDROCHLORIDE 1 MG: 1 INJECTION, SOLUTION INTRAMUSCULAR; INTRAVENOUS; SUBCUTANEOUS at 09:10

## 2019-09-07 RX ADMIN — METRONIDAZOLE 500 MG: 500 INJECTION, SOLUTION INTRAVENOUS at 06:16

## 2019-09-07 RX ADMIN — LORAZEPAM 1 MG: 2 INJECTION INTRAMUSCULAR; INTRAVENOUS at 15:04

## 2019-09-07 RX ADMIN — KETOROLAC TROMETHAMINE 15 MG: 30 INJECTION, SOLUTION INTRAMUSCULAR; INTRAVENOUS at 22:32

## 2019-09-07 RX ADMIN — HEPARIN SODIUM 5000 UNITS: 5000 INJECTION INTRAVENOUS; SUBCUTANEOUS at 21:58

## 2019-09-07 RX ADMIN — MEROPENEM 1 G: 1 INJECTION, POWDER, FOR SOLUTION INTRAVENOUS at 06:16

## 2019-09-07 RX ADMIN — LORAZEPAM 1 MG: 2 INJECTION INTRAMUSCULAR; INTRAVENOUS at 21:41

## 2019-09-07 RX ADMIN — HYDROMORPHONE HYDROCHLORIDE 1 MG: 1 INJECTION, SOLUTION INTRAMUSCULAR; INTRAVENOUS; SUBCUTANEOUS at 23:39

## 2019-09-07 RX ADMIN — POTASSIUM CHLORIDE AND SODIUM CHLORIDE: 900; 150 INJECTION, SOLUTION INTRAVENOUS at 16:31

## 2019-09-07 RX ADMIN — HEPARIN SODIUM 5000 UNITS: 5000 INJECTION INTRAVENOUS; SUBCUTANEOUS at 06:19

## 2019-09-07 RX ADMIN — HYDROMORPHONE HYDROCHLORIDE 1 MG: 1 INJECTION, SOLUTION INTRAMUSCULAR; INTRAVENOUS; SUBCUTANEOUS at 06:14

## 2019-09-07 RX ADMIN — HYDROMORPHONE HYDROCHLORIDE 1 MG: 1 INJECTION, SOLUTION INTRAMUSCULAR; INTRAVENOUS; SUBCUTANEOUS at 21:41

## 2019-09-07 RX ADMIN — METRONIDAZOLE 500 MG: 500 INJECTION, SOLUTION INTRAVENOUS at 21:42

## 2019-09-07 RX ADMIN — HYDROMORPHONE HYDROCHLORIDE 1 MG: 1 INJECTION, SOLUTION INTRAMUSCULAR; INTRAVENOUS; SUBCUTANEOUS at 12:13

## 2019-09-07 RX ADMIN — HEPARIN SODIUM 5000 UNITS: 5000 INJECTION INTRAVENOUS; SUBCUTANEOUS at 13:18

## 2019-09-07 RX ADMIN — HYDROMORPHONE HYDROCHLORIDE 1 MG: 1 INJECTION, SOLUTION INTRAMUSCULAR; INTRAVENOUS; SUBCUTANEOUS at 14:39

## 2019-09-07 RX ADMIN — MEROPENEM 1 G: 1 INJECTION, POWDER, FOR SOLUTION INTRAVENOUS at 21:42

## 2019-09-07 RX ADMIN — MEROPENEM 1 G: 1 INJECTION, POWDER, FOR SOLUTION INTRAVENOUS at 15:45

## 2019-09-07 RX ADMIN — LORAZEPAM 1 MG: 2 INJECTION INTRAMUSCULAR; INTRAVENOUS at 06:14

## 2019-09-07 RX ADMIN — HYDROMORPHONE HYDROCHLORIDE 1 MG: 1 INJECTION, SOLUTION INTRAMUSCULAR; INTRAVENOUS; SUBCUTANEOUS at 19:28

## 2019-09-07 RX ADMIN — METRONIDAZOLE 500 MG: 500 INJECTION, SOLUTION INTRAVENOUS at 14:42

## 2019-09-07 RX ADMIN — HYDROMORPHONE HYDROCHLORIDE 1 MG: 1 INJECTION, SOLUTION INTRAMUSCULAR; INTRAVENOUS; SUBCUTANEOUS at 02:02

## 2019-09-07 ASSESSMENT — PAIN SCALES - GENERAL
PAINLEVEL_OUTOF10: 8
PAINLEVEL_OUTOF10: 6
PAINLEVEL_OUTOF10: 8
PAINLEVEL_OUTOF10: 7
PAINLEVEL_OUTOF10: 9
PAINLEVEL_OUTOF10: 8
PAINLEVEL_OUTOF10: 7
PAINLEVEL_OUTOF10: 8
PAINLEVEL_OUTOF10: 7
PAINLEVEL_OUTOF10: 8

## 2019-09-07 ASSESSMENT — PAIN DESCRIPTION - FREQUENCY
FREQUENCY: CONTINUOUS

## 2019-09-07 ASSESSMENT — PAIN DESCRIPTION - ONSET
ONSET: ON-GOING

## 2019-09-07 ASSESSMENT — PAIN DESCRIPTION - PAIN TYPE
TYPE: ACUTE PAIN
TYPE: CHRONIC PAIN

## 2019-09-07 ASSESSMENT — PAIN - FUNCTIONAL ASSESSMENT
PAIN_FUNCTIONAL_ASSESSMENT: ACTIVITIES ARE NOT PREVENTED

## 2019-09-07 ASSESSMENT — PAIN DESCRIPTION - DESCRIPTORS
DESCRIPTORS: ACHING;DISCOMFORT;CRAMPING
DESCRIPTORS: ACHING;CONSTANT;CRAMPING;DISCOMFORT
DESCRIPTORS: ACHING;DISCOMFORT;CONSTANT;CRAMPING

## 2019-09-07 ASSESSMENT — PAIN DESCRIPTION - LOCATION
LOCATION: ABDOMEN

## 2019-09-07 NOTE — PLAN OF CARE
Problem: Falls - Risk of:  Goal: Will remain free from falls  Description  Will remain free from falls  9/7/2019 1234 by Rubina Montgomery RN  Outcome: Ongoing     Problem: Risk for Impaired Skin Integrity  Goal: Tissue integrity - skin and mucous membranes  Description  Structural intactness and normal physiological function of skin and  mucous membranes. 9/7/2019 1234 by Rubina Montgomery RN  Outcome: Ongoing     Problem: Activity:  Goal: Risk for activity intolerance will decrease  Description  Risk for activity intolerance will decrease  9/7/2019 1234 by Rubina Montgomery RN  Outcome: Ongoing     Problem:  Bowel/Gastric:  Goal: Bowel function will improve  Description  Bowel function will improve  9/7/2019 1234 by Rubina Montgomery RN  Outcome: Ongoing     Problem: Fluid Volume:  Goal: Maintenance of adequate hydration will improve  Description  Maintenance of adequate hydration will improve  9/7/2019 1234 by Rubina Montgomery RN  Outcome: Ongoing

## 2019-09-08 LAB
ANAEROBIC CULTURE: ABNORMAL
ANAEROBIC CULTURE: ABNORMAL
GRAM STAIN RESULT: ABNORMAL
GRAM STAIN RESULT: ABNORMAL
ORGANISM: ABNORMAL
ORGANISM: ABNORMAL
WOUND/ABSCESS: ABNORMAL

## 2019-09-08 PROCEDURE — 1200000000 HC SEMI PRIVATE

## 2019-09-08 PROCEDURE — 6360000002 HC RX W HCPCS: Performed by: NURSE PRACTITIONER

## 2019-09-08 PROCEDURE — 99231 SBSQ HOSP IP/OBS SF/LOW 25: CPT | Performed by: SURGERY

## 2019-09-08 PROCEDURE — 6360000002 HC RX W HCPCS: Performed by: SURGERY

## 2019-09-08 PROCEDURE — 2500000003 HC RX 250 WO HCPCS: Performed by: SURGERY

## 2019-09-08 PROCEDURE — 2580000003 HC RX 258: Performed by: SURGERY

## 2019-09-08 RX ADMIN — HYDROMORPHONE HYDROCHLORIDE 1 MG: 1 INJECTION, SOLUTION INTRAMUSCULAR; INTRAVENOUS; SUBCUTANEOUS at 06:10

## 2019-09-08 RX ADMIN — METRONIDAZOLE 500 MG: 500 INJECTION, SOLUTION INTRAVENOUS at 14:37

## 2019-09-08 RX ADMIN — LORAZEPAM 1 MG: 2 INJECTION INTRAMUSCULAR; INTRAVENOUS at 03:47

## 2019-09-08 RX ADMIN — HEPARIN SODIUM 5000 UNITS: 5000 INJECTION INTRAVENOUS; SUBCUTANEOUS at 06:20

## 2019-09-08 RX ADMIN — HYDROMORPHONE HYDROCHLORIDE 1 MG: 1 INJECTION, SOLUTION INTRAMUSCULAR; INTRAVENOUS; SUBCUTANEOUS at 01:49

## 2019-09-08 RX ADMIN — KETOROLAC TROMETHAMINE 15 MG: 30 INJECTION, SOLUTION INTRAMUSCULAR; INTRAVENOUS at 19:58

## 2019-09-08 RX ADMIN — MEROPENEM 1 G: 1 INJECTION, POWDER, FOR SOLUTION INTRAVENOUS at 22:32

## 2019-09-08 RX ADMIN — HYDROMORPHONE HYDROCHLORIDE 1 MG: 1 INJECTION, SOLUTION INTRAMUSCULAR; INTRAVENOUS; SUBCUTANEOUS at 16:18

## 2019-09-08 RX ADMIN — HYDROMORPHONE HYDROCHLORIDE 1 MG: 1 INJECTION, SOLUTION INTRAMUSCULAR; INTRAVENOUS; SUBCUTANEOUS at 08:55

## 2019-09-08 RX ADMIN — MEROPENEM 1 G: 1 INJECTION, POWDER, FOR SOLUTION INTRAVENOUS at 06:13

## 2019-09-08 RX ADMIN — LORAZEPAM 1 MG: 2 INJECTION INTRAMUSCULAR; INTRAVENOUS at 19:58

## 2019-09-08 RX ADMIN — HYDROMORPHONE HYDROCHLORIDE 1 MG: 1 INJECTION, SOLUTION INTRAMUSCULAR; INTRAVENOUS; SUBCUTANEOUS at 20:47

## 2019-09-08 RX ADMIN — MEROPENEM 1 G: 1 INJECTION, POWDER, FOR SOLUTION INTRAVENOUS at 16:23

## 2019-09-08 RX ADMIN — HEPARIN SODIUM 5000 UNITS: 5000 INJECTION INTRAVENOUS; SUBCUTANEOUS at 22:31

## 2019-09-08 RX ADMIN — HYDROMORPHONE HYDROCHLORIDE 1 MG: 1 INJECTION, SOLUTION INTRAMUSCULAR; INTRAVENOUS; SUBCUTANEOUS at 13:26

## 2019-09-08 RX ADMIN — HEPARIN SODIUM 5000 UNITS: 5000 INJECTION INTRAVENOUS; SUBCUTANEOUS at 14:34

## 2019-09-08 RX ADMIN — HYDROMORPHONE HYDROCHLORIDE 1 MG: 1 INJECTION, SOLUTION INTRAMUSCULAR; INTRAVENOUS; SUBCUTANEOUS at 22:39

## 2019-09-08 RX ADMIN — METRONIDAZOLE 500 MG: 500 INJECTION, SOLUTION INTRAVENOUS at 06:10

## 2019-09-08 RX ADMIN — HYDROMORPHONE HYDROCHLORIDE 1 MG: 1 INJECTION, SOLUTION INTRAMUSCULAR; INTRAVENOUS; SUBCUTANEOUS at 03:47

## 2019-09-08 RX ADMIN — POTASSIUM CHLORIDE AND SODIUM CHLORIDE: 900; 150 INJECTION, SOLUTION INTRAVENOUS at 13:28

## 2019-09-08 RX ADMIN — HYDROMORPHONE HYDROCHLORIDE 1 MG: 1 INJECTION, SOLUTION INTRAMUSCULAR; INTRAVENOUS; SUBCUTANEOUS at 11:05

## 2019-09-08 RX ADMIN — METRONIDAZOLE 500 MG: 500 INJECTION, SOLUTION INTRAVENOUS at 22:32

## 2019-09-08 RX ADMIN — HYDROMORPHONE HYDROCHLORIDE 1 MG: 1 INJECTION, SOLUTION INTRAMUSCULAR; INTRAVENOUS; SUBCUTANEOUS at 18:30

## 2019-09-08 ASSESSMENT — PAIN SCALES - GENERAL
PAINLEVEL_OUTOF10: 8
PAINLEVEL_OUTOF10: 8
PAINLEVEL_OUTOF10: 6
PAINLEVEL_OUTOF10: 7
PAINLEVEL_OUTOF10: 8
PAINLEVEL_OUTOF10: 7
PAINLEVEL_OUTOF10: 7
PAINLEVEL_OUTOF10: 8
PAINLEVEL_OUTOF10: 8
PAINLEVEL_OUTOF10: 9
PAINLEVEL_OUTOF10: 8
PAINLEVEL_OUTOF10: 8
PAINLEVEL_OUTOF10: 6
PAINLEVEL_OUTOF10: 8
PAINLEVEL_OUTOF10: 7

## 2019-09-08 ASSESSMENT — PAIN DESCRIPTION - DESCRIPTORS
DESCRIPTORS: ACHING;CRAMPING;DISCOMFORT
DESCRIPTORS: DISCOMFORT;CRAMPING;ACHING

## 2019-09-08 ASSESSMENT — PAIN DESCRIPTION - PROGRESSION: CLINICAL_PROGRESSION: GRADUALLY IMPROVING

## 2019-09-08 ASSESSMENT — PAIN DESCRIPTION - LOCATION
LOCATION: ABDOMEN
LOCATION: ABDOMEN

## 2019-09-08 ASSESSMENT — PAIN DESCRIPTION - PAIN TYPE
TYPE: ACUTE PAIN
TYPE: ACUTE PAIN

## 2019-09-08 ASSESSMENT — PAIN DESCRIPTION - ORIENTATION
ORIENTATION: MID
ORIENTATION: MID

## 2019-09-08 ASSESSMENT — PAIN DESCRIPTION - ONSET
ONSET: ON-GOING
ONSET: ON-GOING

## 2019-09-08 ASSESSMENT — PAIN DESCRIPTION - FREQUENCY
FREQUENCY: CONTINUOUS
FREQUENCY: CONTINUOUS

## 2019-09-08 NOTE — PROGRESS NOTES
Handoff report and transfer of care given at bedside to [facility]. Patient in stable condition, denies needs/concerns at this time. Call light within reach.

## 2019-09-08 NOTE — PROGRESS NOTES
Shift assessment complete; see flow sheet. Scheduled medications administered; See MAR. IV infusing without difficulty. Pt c/o abdominal pain 8/10 PRN Dilaudid given at this time. Pt denies any needs at this time.  Call light within reach, bed in low locked position, bed alarm refused

## 2019-09-08 NOTE — PROGRESS NOTES
Pt requesting pain medication  7/10. PRN  pain medication given, see MAR. SR up x2, Call light and bedside table in easy reach. Denies any other needs at this time.

## 2019-09-08 NOTE — PLAN OF CARE
Problem: Falls - Risk of:  Goal: Will remain free from falls  Description  Will remain free from falls  Outcome: Ongoing  Goal: Absence of physical injury  Description  Absence of physical injury  Outcome: Ongoing     Problem: Risk for Impaired Skin Integrity  Goal: Tissue integrity - skin and mucous membranes  Description  Structural intactness and normal physiological function of skin and  mucous membranes. Outcome: Ongoing     Problem: Activity:  Goal: Risk for activity intolerance will decrease  Description  Risk for activity intolerance will decrease  Outcome: Ongoing     Problem:  Bowel/Gastric:  Goal: Bowel function will improve  Description  Bowel function will improve  Outcome: Ongoing  Goal: Diagnostic test results will improve  Description  Diagnostic test results will improve  Outcome: Ongoing  Goal: Occurrences of nausea will decrease  Description  Occurrences of nausea will decrease  Outcome: Ongoing  Goal: Occurrences of vomiting will decrease  Description  Occurrences of vomiting will decrease  Outcome: Ongoing     Problem: Fluid Volume:  Goal: Maintenance of adequate hydration will improve  Description  Maintenance of adequate hydration will improve  Outcome: Ongoing     Problem: Health Behavior:  Goal: Ability to state signs and symptoms to report to health care provider will improve  Description  Ability to state signs and symptoms to report to health care provider will improve  Outcome: Ongoing     Problem: Physical Regulation:  Goal: Complications related to the disease process, condition or treatment will be avoided or minimized  Description  Complications related to the disease process, condition or treatment will be avoided or minimized  Outcome: Ongoing  Goal: Ability to maintain clinical measurements within normal limits will improve  Description  Ability to maintain clinical measurements within normal limits will improve  Outcome: Ongoing     Problem: Sensory:  Goal: Ability to identify

## 2019-09-09 ENCOUNTER — APPOINTMENT (OUTPATIENT)
Dept: CT IMAGING | Age: 39
DRG: 720 | End: 2019-09-09
Payer: MEDICAID

## 2019-09-09 LAB
ANION GAP SERPL CALCULATED.3IONS-SCNC: 10 MMOL/L (ref 3–16)
BASOPHILS ABSOLUTE: 0.1 K/UL (ref 0–0.2)
BASOPHILS RELATIVE PERCENT: 0.6 %
BUN BLDV-MCNC: 6 MG/DL (ref 7–20)
CALCIUM SERPL-MCNC: 9.1 MG/DL (ref 8.3–10.6)
CHLORIDE BLD-SCNC: 98 MMOL/L (ref 99–110)
CO2: 28 MMOL/L (ref 21–32)
CREAT SERPL-MCNC: <0.5 MG/DL (ref 0.9–1.3)
EOSINOPHILS ABSOLUTE: 0.2 K/UL (ref 0–0.6)
EOSINOPHILS RELATIVE PERCENT: 2.4 %
GFR AFRICAN AMERICAN: >60
GFR NON-AFRICAN AMERICAN: >60
GLUCOSE BLD-MCNC: 114 MG/DL (ref 70–99)
HCT VFR BLD CALC: 31.9 % (ref 40.5–52.5)
HEMOGLOBIN: 10.4 G/DL (ref 13.5–17.5)
LYMPHOCYTES ABSOLUTE: 1.5 K/UL (ref 1–5.1)
LYMPHOCYTES RELATIVE PERCENT: 15.7 %
MCH RBC QN AUTO: 30.5 PG (ref 26–34)
MCHC RBC AUTO-ENTMCNC: 32.5 G/DL (ref 31–36)
MCV RBC AUTO: 93.6 FL (ref 80–100)
MONOCYTES ABSOLUTE: 0.9 K/UL (ref 0–1.3)
MONOCYTES RELATIVE PERCENT: 9.7 %
NEUTROPHILS ABSOLUTE: 6.8 K/UL (ref 1.7–7.7)
NEUTROPHILS RELATIVE PERCENT: 71.6 %
PDW BLD-RTO: 16.4 % (ref 12.4–15.4)
PLATELET # BLD: 460 K/UL (ref 135–450)
PMV BLD AUTO: 7.3 FL (ref 5–10.5)
POTASSIUM SERPL-SCNC: 4.3 MMOL/L (ref 3.5–5.1)
RBC # BLD: 3.41 M/UL (ref 4.2–5.9)
SODIUM BLD-SCNC: 136 MMOL/L (ref 136–145)
WBC # BLD: 9.5 K/UL (ref 4–11)

## 2019-09-09 PROCEDURE — 6360000004 HC RX CONTRAST MEDICATION: Performed by: NURSE PRACTITIONER

## 2019-09-09 PROCEDURE — 85025 COMPLETE CBC W/AUTO DIFF WBC: CPT

## 2019-09-09 PROCEDURE — 2580000003 HC RX 258: Performed by: SURGERY

## 2019-09-09 PROCEDURE — 74177 CT ABD & PELVIS W/CONTRAST: CPT

## 2019-09-09 PROCEDURE — 1200000000 HC SEMI PRIVATE

## 2019-09-09 PROCEDURE — 36415 COLL VENOUS BLD VENIPUNCTURE: CPT

## 2019-09-09 PROCEDURE — 2500000003 HC RX 250 WO HCPCS: Performed by: SURGERY

## 2019-09-09 PROCEDURE — 80048 BASIC METABOLIC PNL TOTAL CA: CPT

## 2019-09-09 PROCEDURE — 99232 SBSQ HOSP IP/OBS MODERATE 35: CPT | Performed by: SURGERY

## 2019-09-09 PROCEDURE — 6360000002 HC RX W HCPCS: Performed by: SURGERY

## 2019-09-09 PROCEDURE — 6360000002 HC RX W HCPCS: Performed by: NURSE PRACTITIONER

## 2019-09-09 RX ADMIN — HYDROMORPHONE HYDROCHLORIDE 1 MG: 1 INJECTION, SOLUTION INTRAMUSCULAR; INTRAVENOUS; SUBCUTANEOUS at 23:53

## 2019-09-09 RX ADMIN — HEPARIN SODIUM 5000 UNITS: 5000 INJECTION INTRAVENOUS; SUBCUTANEOUS at 14:11

## 2019-09-09 RX ADMIN — METRONIDAZOLE 500 MG: 500 INJECTION, SOLUTION INTRAVENOUS at 22:31

## 2019-09-09 RX ADMIN — MEROPENEM 1 G: 1 INJECTION, POWDER, FOR SOLUTION INTRAVENOUS at 07:41

## 2019-09-09 RX ADMIN — METRONIDAZOLE 500 MG: 500 INJECTION, SOLUTION INTRAVENOUS at 15:09

## 2019-09-09 RX ADMIN — LORAZEPAM 1 MG: 2 INJECTION INTRAMUSCULAR; INTRAVENOUS at 02:10

## 2019-09-09 RX ADMIN — HYDROMORPHONE HYDROCHLORIDE 1 MG: 1 INJECTION, SOLUTION INTRAMUSCULAR; INTRAVENOUS; SUBCUTANEOUS at 06:15

## 2019-09-09 RX ADMIN — HEPARIN SODIUM 5000 UNITS: 5000 INJECTION INTRAVENOUS; SUBCUTANEOUS at 21:21

## 2019-09-09 RX ADMIN — MEROPENEM 1 G: 1 INJECTION, POWDER, FOR SOLUTION INTRAVENOUS at 15:08

## 2019-09-09 RX ADMIN — METRONIDAZOLE 500 MG: 500 INJECTION, SOLUTION INTRAVENOUS at 07:41

## 2019-09-09 RX ADMIN — HYDROMORPHONE HYDROCHLORIDE 1 MG: 1 INJECTION, SOLUTION INTRAMUSCULAR; INTRAVENOUS; SUBCUTANEOUS at 09:03

## 2019-09-09 RX ADMIN — HYDROMORPHONE HYDROCHLORIDE 1 MG: 1 INJECTION, SOLUTION INTRAMUSCULAR; INTRAVENOUS; SUBCUTANEOUS at 01:29

## 2019-09-09 RX ADMIN — IOPAMIDOL 75 ML: 755 INJECTION, SOLUTION INTRAVENOUS at 11:04

## 2019-09-09 RX ADMIN — HEPARIN SODIUM 5000 UNITS: 5000 INJECTION INTRAVENOUS; SUBCUTANEOUS at 06:15

## 2019-09-09 RX ADMIN — LORAZEPAM 1 MG: 2 INJECTION INTRAMUSCULAR; INTRAVENOUS at 22:27

## 2019-09-09 RX ADMIN — KETOROLAC TROMETHAMINE 15 MG: 30 INJECTION, SOLUTION INTRAMUSCULAR; INTRAVENOUS at 02:11

## 2019-09-09 RX ADMIN — HYDROMORPHONE HYDROCHLORIDE 1 MG: 1 INJECTION, SOLUTION INTRAMUSCULAR; INTRAVENOUS; SUBCUTANEOUS at 21:19

## 2019-09-09 RX ADMIN — HYDROMORPHONE HYDROCHLORIDE 1 MG: 1 INJECTION, SOLUTION INTRAMUSCULAR; INTRAVENOUS; SUBCUTANEOUS at 14:11

## 2019-09-09 RX ADMIN — LORAZEPAM 1 MG: 2 INJECTION INTRAMUSCULAR; INTRAVENOUS at 15:09

## 2019-09-09 RX ADMIN — HYDROMORPHONE HYDROCHLORIDE 1 MG: 1 INJECTION, SOLUTION INTRAMUSCULAR; INTRAVENOUS; SUBCUTANEOUS at 16:38

## 2019-09-09 RX ADMIN — MEROPENEM 1 G: 1 INJECTION, POWDER, FOR SOLUTION INTRAVENOUS at 23:53

## 2019-09-09 RX ADMIN — HYDROMORPHONE HYDROCHLORIDE 1 MG: 1 INJECTION, SOLUTION INTRAMUSCULAR; INTRAVENOUS; SUBCUTANEOUS at 11:45

## 2019-09-09 RX ADMIN — HYDROMORPHONE HYDROCHLORIDE 1 MG: 1 INJECTION, SOLUTION INTRAMUSCULAR; INTRAVENOUS; SUBCUTANEOUS at 19:09

## 2019-09-09 RX ADMIN — HYDROMORPHONE HYDROCHLORIDE 1 MG: 1 INJECTION, SOLUTION INTRAMUSCULAR; INTRAVENOUS; SUBCUTANEOUS at 03:40

## 2019-09-09 ASSESSMENT — PAIN SCALES - GENERAL
PAINLEVEL_OUTOF10: 8
PAINLEVEL_OUTOF10: 6
PAINLEVEL_OUTOF10: 8

## 2019-09-09 ASSESSMENT — PAIN DESCRIPTION - PROGRESSION
CLINICAL_PROGRESSION: NOT CHANGED

## 2019-09-09 ASSESSMENT — PAIN DESCRIPTION - ONSET
ONSET: ON-GOING

## 2019-09-09 ASSESSMENT — PAIN DESCRIPTION - ORIENTATION
ORIENTATION: MID

## 2019-09-09 ASSESSMENT — PAIN - FUNCTIONAL ASSESSMENT
PAIN_FUNCTIONAL_ASSESSMENT: ACTIVITIES ARE NOT PREVENTED

## 2019-09-09 ASSESSMENT — PAIN DESCRIPTION - DESCRIPTORS
DESCRIPTORS: ACHING;CRAMPING;DISCOMFORT
DESCRIPTORS: DISCOMFORT;ACHING;CRAMPING
DESCRIPTORS: DISCOMFORT;CRAMPING;ACHING

## 2019-09-09 ASSESSMENT — PAIN DESCRIPTION - FREQUENCY
FREQUENCY: CONTINUOUS

## 2019-09-09 ASSESSMENT — PAIN DESCRIPTION - PAIN TYPE
TYPE: ACUTE PAIN

## 2019-09-09 ASSESSMENT — PAIN DESCRIPTION - LOCATION
LOCATION: ABDOMEN

## 2019-09-09 NOTE — PROGRESS NOTES
General Surgery - L[facility] Gustafson, [facility]  Daily Progress Note    Pt Name: Domonique Castro Record Number: [id number]  Date of Birth 1980   Today's Date: 9/9/2019    ASSESSMENT  1. S/P perc drain placement x 2 9/3  2. ABD: obese, soft, + nausea x1, no V, + flatus, + Diarrhea, perc drain x 2 in place, + lower abdominal tenderness appears unchanged, mild distention  3. VSS  4. Sepsis present on admission improving: Leuks 15.6->12.3->11.4->10.3->9.3->9.5  5. Hypokalemia resolved  6. Perc drains x 2: no output recorded: Rigjht purulent green/ left brown/pink purulent drainage  7. Wound culture: E coli/ mixed skin monalisa sensitivity for E coli: pansensitive    PLAN  1. Merrem/Flagyl  2. IVF to 50  3. Ambulate halls  4. Fulls: pt npo until after CT  5. Pain control  6. Plan is to cool off pt acute process with perc drainage and IV antibiotics and plan for elective colectomy at some point. PT is aware if he fails to improve or worsens then emergency surgery may be warranted  7. Await CT results     SUBJECTIVE  Estelle Hampton is unchanged yesterday. Pain is better controlled. He has + mild intermittent nausea and no vomiting. He has passed flatus and has had a bowel movement. He is tolerating some liquids. Current activity is up with assistance    OBJECTIVE  VITALS:  height is 6' (1.829 m) and weight is 264 lb 9.6 oz (120 kg). His oral temperature is 97.6 °F (36.4 °C). His blood pressure is 121/85 and his pulse is 81. His respiration is 16 and oxygen saturation is 96%. VITALS:  /85   Pulse 81   Temp 97.6 °F (36.4 °C) (Oral)   Resp 16   Ht 6' (1.829 m)   Wt 264 lb 9.6 oz (120 kg)   SpO2 96%   BMI 35.89 kg/m²   INTAKE/OUTPUT:      Intake/Output Summary (Last 24 hours) at 9/9/2019 1025  Last data filed at 9/9/2019 0849  Gross per 24 hour   Intake 1060 ml   Output 500 ml   Net 560 ml     URINARY CATHETER OUTPUT (Valenzuela):     GENERAL: alert, cooperative, no distress    I/O last 3 completed shifts:   In: 960 [P.O.:260; I.V.:700]  Out: 800 [Urine:800]  I/O this shift:  In: 360 [P.O.:360]  Out: -     LABS  Recent Labs     09/07/19  0734 09/09/19  0508   WBC 9.3 9.5   HGB 9.8* 10.4*   HCT 29.9* 31.9*    460*    136   K 3.8 4.3    98*   CO2 23 28   BUN 4* 6*   CREATININE <0.5* <0.5*   MG 2.10  --    PHOS 3.2  --    CALCIUM 8.4 9.1     CBC with Differential:    Lab Results   Component Value Date    WBC 9.5 09/09/2019    RBC 3.41 09/09/2019    HGB 10.4 09/09/2019    HCT 31.9 09/09/2019     09/09/2019    MCV 93.6 09/09/2019    MCH 30.5 09/09/2019    MCHC 32.5 09/09/2019    RDW 16.4 09/09/2019    BANDSPCT 1 03/23/2019    METASPCT 1 09/03/2019    LYMPHOPCT 15.7 09/09/2019    MONOPCT 9.7 09/09/2019    MYELOPCT 1 09/05/2019    BASOPCT 0.6 09/09/2019    MONOSABS 0.9 09/09/2019    LYMPHSABS 1.5 09/09/2019    EOSABS 0.2 09/09/2019    BASOSABS 0.1 09/09/2019     CMP:    Lab Results   Component Value Date     09/09/2019    K 4.3 09/09/2019    K 3.9 09/01/2019    CL 98 09/09/2019    CO2 28 09/09/2019    BUN 6 09/09/2019    CREATININE <0.5 09/09/2019    GFRAA >60 09/09/2019    AGRATIO 0.8 09/01/2019    LABGLOM >60 09/09/2019    GLUCOSE 114 09/09/2019    PROT 9.4 09/01/2019    LABALBU 4.1 09/01/2019    CALCIUM 9.1 09/09/2019    BILITOT 0.7 09/01/2019    ALKPHOS 82 09/01/2019    AST 8 09/01/2019    ALT <5 09/01/2019         Ledy Bella Frame  Electronically signed 9/9/2019 at 10:25 AM       Patient seen and examined. I agree with the assessment and plan from MITESH Roldan. Patient feels some better. Abdomen soft. Mild tenderness. CT reviewed. Large collection drained and controlled. L side collection smaller. Drains still feculent. Continue antibiotics. Advance diet. Ambulate. Home soon with drains.       [address] [facility]

## 2019-09-10 PROCEDURE — 2500000003 HC RX 250 WO HCPCS: Performed by: SURGERY

## 2019-09-10 PROCEDURE — 1200000000 HC SEMI PRIVATE

## 2019-09-10 PROCEDURE — 2580000003 HC RX 258: Performed by: SURGERY

## 2019-09-10 PROCEDURE — 99232 SBSQ HOSP IP/OBS MODERATE 35: CPT | Performed by: SURGERY

## 2019-09-10 PROCEDURE — 6360000002 HC RX W HCPCS: Performed by: SURGERY

## 2019-09-10 RX ADMIN — HYDROMORPHONE HYDROCHLORIDE 1 MG: 1 INJECTION, SOLUTION INTRAMUSCULAR; INTRAVENOUS; SUBCUTANEOUS at 20:13

## 2019-09-10 RX ADMIN — MEROPENEM 1 G: 1 INJECTION, POWDER, FOR SOLUTION INTRAVENOUS at 15:50

## 2019-09-10 RX ADMIN — METRONIDAZOLE 500 MG: 500 INJECTION, SOLUTION INTRAVENOUS at 06:05

## 2019-09-10 RX ADMIN — HYDROMORPHONE HYDROCHLORIDE 1 MG: 1 INJECTION, SOLUTION INTRAMUSCULAR; INTRAVENOUS; SUBCUTANEOUS at 17:51

## 2019-09-10 RX ADMIN — HYDROMORPHONE HYDROCHLORIDE 1 MG: 1 INJECTION, SOLUTION INTRAMUSCULAR; INTRAVENOUS; SUBCUTANEOUS at 06:05

## 2019-09-10 RX ADMIN — HYDROMORPHONE HYDROCHLORIDE 1 MG: 1 INJECTION, SOLUTION INTRAMUSCULAR; INTRAVENOUS; SUBCUTANEOUS at 11:11

## 2019-09-10 RX ADMIN — HEPARIN SODIUM 5000 UNITS: 5000 INJECTION INTRAVENOUS; SUBCUTANEOUS at 14:57

## 2019-09-10 RX ADMIN — HYDROMORPHONE HYDROCHLORIDE 1 MG: 1 INJECTION, SOLUTION INTRAMUSCULAR; INTRAVENOUS; SUBCUTANEOUS at 13:35

## 2019-09-10 RX ADMIN — HEPARIN SODIUM 5000 UNITS: 5000 INJECTION INTRAVENOUS; SUBCUTANEOUS at 22:40

## 2019-09-10 RX ADMIN — METRONIDAZOLE 500 MG: 500 INJECTION, SOLUTION INTRAVENOUS at 14:56

## 2019-09-10 RX ADMIN — HYDROMORPHONE HYDROCHLORIDE 1 MG: 1 INJECTION, SOLUTION INTRAMUSCULAR; INTRAVENOUS; SUBCUTANEOUS at 22:34

## 2019-09-10 RX ADMIN — HYDROMORPHONE HYDROCHLORIDE 1 MG: 1 INJECTION, SOLUTION INTRAMUSCULAR; INTRAVENOUS; SUBCUTANEOUS at 08:54

## 2019-09-10 RX ADMIN — MEROPENEM 1 G: 1 INJECTION, POWDER, FOR SOLUTION INTRAVENOUS at 08:54

## 2019-09-10 RX ADMIN — METRONIDAZOLE 500 MG: 500 INJECTION, SOLUTION INTRAVENOUS at 22:34

## 2019-09-10 RX ADMIN — HEPARIN SODIUM 5000 UNITS: 5000 INJECTION INTRAVENOUS; SUBCUTANEOUS at 06:08

## 2019-09-10 RX ADMIN — HYDROMORPHONE HYDROCHLORIDE 1 MG: 1 INJECTION, SOLUTION INTRAMUSCULAR; INTRAVENOUS; SUBCUTANEOUS at 03:07

## 2019-09-10 RX ADMIN — MEROPENEM 1 G: 1 INJECTION, POWDER, FOR SOLUTION INTRAVENOUS at 23:46

## 2019-09-10 RX ADMIN — ONDANSETRON 4 MG: 2 INJECTION INTRAMUSCULAR; INTRAVENOUS at 08:54

## 2019-09-10 RX ADMIN — HYDROMORPHONE HYDROCHLORIDE 1 MG: 1 INJECTION, SOLUTION INTRAMUSCULAR; INTRAVENOUS; SUBCUTANEOUS at 15:49

## 2019-09-10 RX ADMIN — LORAZEPAM 1 MG: 2 INJECTION INTRAMUSCULAR; INTRAVENOUS at 23:46

## 2019-09-10 ASSESSMENT — PAIN SCALES - GENERAL
PAINLEVEL_OUTOF10: 8
PAINLEVEL_OUTOF10: 7
PAINLEVEL_OUTOF10: 8
PAINLEVEL_OUTOF10: 8

## 2019-09-10 NOTE — PROGRESS NOTES
LOV 11/8/18  NOV- No upcoming apppointments  Refilled 12/4/18 Shift handoff given to [facility], RN. Care transferred.

## 2019-09-11 VITALS
HEART RATE: 84 BPM | HEIGHT: 72 IN | RESPIRATION RATE: 16 BRPM | SYSTOLIC BLOOD PRESSURE: 126 MMHG | DIASTOLIC BLOOD PRESSURE: 91 MMHG | BODY MASS INDEX: 35.84 KG/M2 | TEMPERATURE: 97.1 F | OXYGEN SATURATION: 93 % | WEIGHT: 264.6 LBS

## 2019-09-11 PROCEDURE — 2500000003 HC RX 250 WO HCPCS: Performed by: SURGERY

## 2019-09-11 PROCEDURE — 2580000003 HC RX 258: Performed by: SURGERY

## 2019-09-11 PROCEDURE — 6360000002 HC RX W HCPCS: Performed by: NURSE PRACTITIONER

## 2019-09-11 PROCEDURE — 6360000002 HC RX W HCPCS: Performed by: SURGERY

## 2019-09-11 PROCEDURE — 6370000000 HC RX 637 (ALT 250 FOR IP): Performed by: NURSE PRACTITIONER

## 2019-09-11 RX ORDER — AMOXICILLIN AND CLAVULANATE POTASSIUM 500; 125 MG/1; MG/1
1 TABLET, FILM COATED ORAL 3 TIMES DAILY
Qty: 30 TABLET | Refills: 0 | Status: SHIPPED | OUTPATIENT
Start: 2019-09-11 | End: 2019-09-21

## 2019-09-11 RX ORDER — OXYCODONE HYDROCHLORIDE 5 MG/1
5 TABLET ORAL EVERY 6 HOURS PRN
Qty: 10 TABLET | Refills: 0 | Status: SHIPPED | OUTPATIENT
Start: 2019-09-11 | End: 2019-09-14

## 2019-09-11 RX ORDER — OXYCODONE HYDROCHLORIDE 5 MG/1
5 TABLET ORAL EVERY 6 HOURS PRN
Status: DISCONTINUED | OUTPATIENT
Start: 2019-09-11 | End: 2019-09-11 | Stop reason: HOSPADM

## 2019-09-11 RX ADMIN — MEROPENEM 1 G: 1 INJECTION, POWDER, FOR SOLUTION INTRAVENOUS at 10:02

## 2019-09-11 RX ADMIN — HEPARIN SODIUM 5000 UNITS: 5000 INJECTION INTRAVENOUS; SUBCUTANEOUS at 06:20

## 2019-09-11 RX ADMIN — HYDROMORPHONE HYDROCHLORIDE 1 MG: 1 INJECTION, SOLUTION INTRAMUSCULAR; INTRAVENOUS; SUBCUTANEOUS at 06:19

## 2019-09-11 RX ADMIN — POTASSIUM CHLORIDE AND SODIUM CHLORIDE: 900; 150 INJECTION, SOLUTION INTRAVENOUS at 11:37

## 2019-09-11 RX ADMIN — OXYCODONE HYDROCHLORIDE 5 MG: 5 TABLET ORAL at 11:37

## 2019-09-11 RX ADMIN — METRONIDAZOLE 500 MG: 500 INJECTION, SOLUTION INTRAVENOUS at 06:19

## 2019-09-11 RX ADMIN — HYDROMORPHONE HYDROCHLORIDE 1 MG: 1 INJECTION, SOLUTION INTRAMUSCULAR; INTRAVENOUS; SUBCUTANEOUS at 04:11

## 2019-09-11 RX ADMIN — HYDROMORPHONE HYDROCHLORIDE 1 MG: 1 INJECTION, SOLUTION INTRAMUSCULAR; INTRAVENOUS; SUBCUTANEOUS at 01:10

## 2019-09-11 ASSESSMENT — PAIN SCALES - GENERAL
PAINLEVEL_OUTOF10: 8
PAINLEVEL_OUTOF10: 9
PAINLEVEL_OUTOF10: 8
PAINLEVEL_OUTOF10: 7
PAINLEVEL_OUTOF10: 8

## 2019-09-11 NOTE — CARE COORDINATION
Spoke with Digna Royal in intake at [facility]. Pt is covered at 100% for home IV abx. Per [facility]. will be providing SN services if needed. Informed Digna Royal pt may go home on PO abx. Telephone call to [facility]. Spoke with Ozzie in intake. Aware pt may go home today. [facility] is able to service pt for SN if needed. Liaison to follow up with .    3:15 Liaison aware pt declining need for [facility], [facility]..      Electronically signed by Rach Stone RN on 9/11/2019 at 1:34 PM

## 2019-09-11 NOTE — ADT AUTH CERT
Utilization Reviews         Diverticulitis, Acute - Care Day 8 (9/8/2019) by Rajendra Ziegler RN         Review Status Review Entered   Completed 9/9/2019 09:56       Criteria Review      Care Day: 8 Care Date: 9/8/2019 Level of Care:    Guideline Day 3    Clinical Status    ( ) * Hemodynamic stability    ( ) * Pain absent or managed    ( ) * Hematocrit stable    ( ) * No evidence of peritonitis or abscess    ( ) * Surgery not indicated    ( ) * Diet tolerated    ( ) * Fever absent or reduced    ( ) * Discharge plans and education understood    Activity    (X) * Ambulatory    Routes    (X) * Oral hydration, medications, [E] and diet    9/9/2019 9:42 AM EDT by Poppy torres fl diet    9/9/2019 9:23 AM EDT by Dallas bergman diet    Interventions    ( ) CBC    9/9/2019 9:51 AM EDT by Dallas Brar      wbc 9.3, h/h 9.8/29.9    9/9/2019 9:23 AM EDT by Dermateusz Brar      wbc 11.4, h/h 10.4/31.4    Medications    (X) Possible parenteral or oral antibiotics    9/9/2019 9:56 AM EDT by Deri Maykel      iv merrem and iv flagyl    9/9/2019 9:51 AM EDT by Deri Buzzard      iv merrem q 8 hrs, iv flagyl q 8 hrs    9/9/2019 9:42 AM EDT by Deri Budanielaard      iv mrrem q 8 hrs, iv flagyl q 8 hrs    9/9/2019 9:23 AM EDT by Deri Buzzard      iv merre q 8 hrs, iv flagyl q 8 hrs    * Milestone   Additional Notes      VS    Radiology testing/Labs pertinent    Anaerobic and Aerobic Culture [[id number]] (Abnormal) Collected: 09/03/19 1333      Specimen: Abdomen from Abscess Updated: 09/08/19 1047      Gram Stain Result 3+ Gram positive cocci    3+ Gram variable rods    2+ Yeast    4+ WBC's (Polymorphonuclear)      WOUND/ABSCESS --      Heavy growth Mixed skin monalisa,    No further workup      Anaerobic Culture No anaerobes isolated      Organism Escherichia coli      WOUND/ABSCESS --      Rare growth    No further workup      Narrative:        ORDER#: [id number]                          ORDERED BY: DELVIN Webb    SOURCE: Abscess Abdomen Narrative:        Performed at:    [address]   Phone [phone]      BASIC METABOLIC PANEL [[id number]] (Abnormal) Collected: 09/07/19 0734      Specimen: Blood Updated: 09/07/19 0813      Sodium 136 mmol/L      Potassium 3.8 mmol/L      Chloride 104 mmol/L      CO2 23 mmol/L      Anion Gap 9      Glucose 125 mg/dL      BUN 4 mg/dL      CREATININE <0.5 mg/dL   GFR Non-African American >60      GFR African American >60      Calcium 8.4 mg/dL      Narrative:        Performed at:    [address]   Phone [phone]      Magnesium [[id number]] Collected: 09/07/19 0734      Specimen: Blood Updated: 09/07/19 0813      Magnesium 2.10 mg/dL      Narrative:        Performed at:    [address]   Phone [phone]      CBC Auto Differential [[id number]] (Abnormal) Collected: 09/07/19 0734      Specimen: Blood Updated: 09/07/19 0759      WBC 9.3 K/uL      RBC 3.25 M/uL      Hemoglobin 9.8 g/dL      Hematocrit 29.9 %      MCV 92.0 fL      MCH 30.3 pg      MCHC 32.9 g/dL      RDW 16.5 %      Platelets 891 K/uL      MPV 7.1 fL      Neutrophils % 71.9 %      Lymphocytes % 14.1 %      Monocytes % 11.2 %      Eosinophils % 2.1 %      Basophils % 0.7 %      Neutrophils Absolute 6.7 K/uL      Lymphocytes Absolute 1.3 K/uL      Monocytes Absolute 1.0 K/uL      Eosinophils Absolute 0.2 K/uL      Basophils Absolute 0.1 K/uL    Meds-name, dose, route,rate    Current Facility-Administered Medications: 0.9% NaCl with KCl 20 mEq infusion, , Intravenous, Continuous    ketorolac (TORADOL) injection 15 mg, 15 mg, Intravenous, Q6H PRN    ondansetron (ZOFRAN) injection 4 mg, 4 mg, Intravenous, Q4H PRN    metronidazole (FLAGYL) 500 mg in NaCl 100 mL IVPB premix, 500 mg, Intravenous, Q8H    heparin (porcine) injection 5,000 Units, 5,000 Units, Subcutaneous, 3 times per day    diphenhydrAMINE (BENADRYL) injection 25 mg, 25 mg, Intravenous, Q6H PRN    acetaminophen (TYLENOL) tablet 650 mg, 650 mg, Oral, Q4H PRN    HYDROmorphone (DILAUDID) injection 1 mg, 1 mg, Intravenous, Q2H PRN    LORazepam (ATIVAN) injection 1 mg, 1 mg, Intravenous, Q6H PRN    meropenem (MERREM) 1 g in sodium chloride 0.9 % 100 mL IVPB (mini-bag), 1 g, Intravenous, Q8H    Treatment plan-attending, consults     gen sur. S/P perc drain placement x 2 9/3    2. ABD: obese, soft, no nausea, no V, + flatus, + Diarrhea, perc drain x 2 in place, + lower abdominal tenderness appears unchanged, mild distention    3. VS: afebrile last night    4. Sepsis present on admission improving: Leuks 15.6->12.3->11.4->10.3->9.3    5. Hypokalemia resolved    6. Perc drains x 2: 40/30: left feculent drainage, right serosanguinous/purulent drainage    7. Wound culture: E coli/ mixed skin monalisa sensitivity for E coli: pansensitive         PLAN    1. Merrem/Flagyl    2. IVF    3. Ambulate halls    4. Fulls: go slow    5. Pain control    6. Plan is to cool off pt acute process with perc drainage and IV antibiotics and plan for elective colectomy at some point. PT is aware if he fails to improve or worsens then emergency surgery may be warranted    7.  Plan for follow-up CT on Monday    Orders     ivf at 75/hr, iv merrem q 8 hrs, iv flagyl q 8 hrs, dilaudid iv x 9, toradol iv x 1, ativan iv x 3, cont above meds       Diverticulitis, Acute - Care Day 6 (2019) by Miladis Araiza RN         Review Status Review Entered   Completed 2019 09:42       Criteria Review      Care Day: 6 Care Date: 2019 Level of Care:    Guideline Day 3    Clinical Status    ( ) * Hemodynamic stability    ( ) * Pain absent or managed    ( ) * Hematocrit stable    ( ) * No evidence of peritonitis or abscess    ( ) * Surgery not indicated    ( ) * Diet tolerated    ( ) * Fever absent or reduced

## 2019-09-11 NOTE — DISCHARGE INSTR - COC
Continuity of Care Form    Patient Name: Richard Fox   :  1980  MRN:  [id number]    Admit date:  2019  Discharge date:  ***    Code Status Order: Full Code   Advance Directives:   Advance Care Flowsheet Documentation     Date/Time Healthcare Directive Type of Healthcare Directive Copy in [address] Agent's Name Healthcare Agent's Phone Number    19 1035  No, patient does not have an advance directive for healthcare treatment -- -- -- -- --          Admitting Physician:  Jovita Melgar MD  PCP: Bard Dimas DO    Discharging Nurse: [facility] Unit/Room#: 0203/0203-01  Discharging Unit Phone Number: ***    Emergency Contact:   Extended Emergency Contact Information  Primary Emergency Contact: Celeste Lopez  Address: [address] [place] [address] Phone: [phone]  Mobile Phone: [phone]  Relation: Parent    Past Surgical History:  Past Surgical History:   Procedure Laterality Date    ABDOMEN SURGERY         Immunization History: There is no immunization history on file for this patient.     Active Problems:  Patient Active Problem List   Diagnosis Code    Diverticulitis of both large and small intestine with abscess K57.40    Moderate protein-calorie malnutrition (HCC) E44.0    Intra-abdominal abscess ([address] K65.1    Diverticulitis K57.92    Diverticulitis of large intestine with perforation and abscess without bleeding K57.20       Isolation/Infection:   Isolation          No Isolation            Nurse Assessment:  Last Vital Signs: /85   Pulse 73   Temp 97.8 °F (36.6 °C) (Oral)   Resp 14   Ht 6' (1.829 m)   Wt 264 lb 9.6 oz (120 kg)   SpO2 94%   BMI 35.89 kg/m²     Last documented pain score (0-10 scale): Pain Level: 9  Last Weight:   Wt Readings from Last 1 Encounters:   19 264 lb 9.6 oz (120 kg)     Mental Status:  {IP PT MENTAL STATUS:}    IV Access:  [facility] IV

## 2019-09-11 NOTE — DISCHARGE SUMMARY
GENERAL SURGERY  Discharge Summary      Pt Name:Miguel Astorga  MRN: [id number]  YOB: 1980  Primary Care Physician: Elliot Hood DO  Admit date:  9/1/2019  4:04 AM  Discharge date:  No discharge date for patient encounter. Disposition: home  Admitting Diagnosis:   1. Left lower quadrant pain    2. Right lower quadrant abdominal pain    3. Nausea and vomiting, intractability of vomiting not specified, unspecified vomiting type    4. Diverticulitis of colon    5. Diverticulitis of large intestine with perforation and abscess without bleeding      Discharge Diagnosis:   Patient Active Problem List   Diagnosis Code    Diverticulitis of both large and small intestine with abscess K57.40    Moderate protein-calorie malnutrition (HCC) E44.0    Intra-abdominal abscess ([address] K65.1    Diverticulitis K57.92    Diverticulitis of large intestine with perforation and abscess without bleeding K57.20     Consultants:  none  Procedures/Diagnostic Test:  Percutaneous drainage of intraabdominal abscess performed by interventional radiology on 9/3/19  Hospital Course: Juan Richmond originally presented to the hospital on 9/1/2019  4:04 AM with complaints of worsening abdominal pain. He was discharged a day before this admission after suffering from acute diverticulitis with a small abscess. After discharge he did take his antibiotics. His abdominal pain worsened and he developed fevers so, he came to the ER. In the ER he was noted to have sepsis present on admission with a leukocytosis and fevers. A CT was performed on 9/3 which demonstrated worsening changes associated with sigmoid diverticulitis and associated abscesses. Interventional radiology was consulted at this time and two percutaneous drains were placed within the abscesses. The patient appears to improve. On 9/9 a f/u CT was performed and demonstrated interval resolution of suprapubic abscess and smaller anterior left upper pelvis abscess.  The

## 2019-09-11 NOTE — PROGRESS NOTES
Discharge instructions reviewed and copy given to patient. Meds to beds provided prescriptions. SHANNON drain care instructions provided to patient. Patient denies further needs/questions. Mom here to take patient home. Patient A/Ox4. Ambulatory at d/c. VSS.

## 2019-09-26 ENCOUNTER — TELEPHONE (OUTPATIENT)
Dept: SURGERY | Age: 39
End: 2019-09-26

## 2019-09-26 NOTE — TELEPHONE ENCOUNTER
----- Message from Wyatt Luna sent at 9/26/2019 12:16 PM EDT -----  Billing has requested Dr. Ria Eugene add a CPT code to this encounter.

## 2019-10-03 ENCOUNTER — HOSPITAL ENCOUNTER (INPATIENT)
Age: 39
LOS: 11 days | Discharge: HOME HEALTH | DRG: 230 | End: 2019-10-14
Attending: EMERGENCY MEDICINE | Admitting: SURGERY
Payer: MEDICAID

## 2019-10-03 ENCOUNTER — APPOINTMENT (OUTPATIENT)
Dept: CT IMAGING | Age: 39
DRG: 230 | End: 2019-10-03
Payer: MEDICAID

## 2019-10-03 DIAGNOSIS — R91.1 PULMONARY NODULE: ICD-10-CM

## 2019-10-03 DIAGNOSIS — K57.20 DIVERTICULITIS OF LARGE INTESTINE WITH PERFORATION AND ABSCESS WITHOUT BLEEDING: ICD-10-CM

## 2019-10-03 DIAGNOSIS — R10.9 ABDOMINAL PAIN, UNSPECIFIED ABDOMINAL LOCATION: Primary | ICD-10-CM

## 2019-10-03 DIAGNOSIS — R11.2 NAUSEA AND VOMITING, INTRACTABILITY OF VOMITING NOT SPECIFIED, UNSPECIFIED VOMITING TYPE: ICD-10-CM

## 2019-10-03 LAB
A/G RATIO: 1 (ref 1.1–2.2)
ALBUMIN SERPL-MCNC: 4.1 G/DL (ref 3.4–5)
ALP BLD-CCNC: 83 U/L (ref 40–129)
ALT SERPL-CCNC: 38 U/L (ref 10–40)
ANION GAP SERPL CALCULATED.3IONS-SCNC: 17 MMOL/L (ref 3–16)
AST SERPL-CCNC: 31 U/L (ref 15–37)
BACTERIA: ABNORMAL /HPF
BILIRUB SERPL-MCNC: 1.5 MG/DL (ref 0–1)
BILIRUBIN URINE: ABNORMAL
BLOOD, URINE: ABNORMAL
BUN BLDV-MCNC: 11 MG/DL (ref 7–20)
CALCIUM SERPL-MCNC: 9.7 MG/DL (ref 8.3–10.6)
CHLORIDE BLD-SCNC: 98 MMOL/L (ref 99–110)
CLARITY: CLEAR
CO2: 21 MMOL/L (ref 21–32)
COLOR: ABNORMAL
CREAT SERPL-MCNC: <0.5 MG/DL (ref 0.9–1.3)
EPITHELIAL CELLS, UA: ABNORMAL /HPF
GFR AFRICAN AMERICAN: >60
GFR NON-AFRICAN AMERICAN: >60
GLOBULIN: 4.2 G/DL
GLUCOSE BLD-MCNC: 110 MG/DL (ref 70–99)
GLUCOSE BLD-MCNC: 96 MG/DL (ref 70–99)
GLUCOSE URINE: 100 MG/DL
HCT VFR BLD CALC: 35.3 % (ref 40.5–52.5)
HEMOGLOBIN: 11.8 G/DL (ref 13.5–17.5)
KETONES, URINE: 15 MG/DL
LEUKOCYTE ESTERASE, URINE: NEGATIVE
LIPASE: <3 U/L (ref 13–60)
MCH RBC QN AUTO: 30.3 PG (ref 26–34)
MCHC RBC AUTO-ENTMCNC: 33.5 G/DL (ref 31–36)
MCV RBC AUTO: 90.4 FL (ref 80–100)
MICROSCOPIC EXAMINATION: YES
MUCUS: ABNORMAL /LPF
NITRITE, URINE: POSITIVE
PDW BLD-RTO: 16.4 % (ref 12.4–15.4)
PERFORMED ON: ABNORMAL
PH UA: 8.5 (ref 5–8)
PLATELET # BLD: 166 K/UL (ref 135–450)
PMV BLD AUTO: 7.9 FL (ref 5–10.5)
POTASSIUM REFLEX MAGNESIUM: 4 MMOL/L (ref 3.5–5.1)
PROTEIN UA: 30 MG/DL
RBC # BLD: 3.91 M/UL (ref 4.2–5.9)
RBC UA: ABNORMAL /HPF (ref 0–2)
SODIUM BLD-SCNC: 136 MMOL/L (ref 136–145)
SPECIFIC GRAVITY UA: 1.01 (ref 1–1.03)
TOTAL PROTEIN: 8.3 G/DL (ref 6.4–8.2)
URINE REFLEX TO CULTURE: YES
URINE TYPE: ABNORMAL
UROBILINOGEN, URINE: >=8 E.U./DL
WBC # BLD: 15.2 K/UL (ref 4–11)
WBC UA: ABNORMAL /HPF (ref 0–5)

## 2019-10-03 PROCEDURE — 85027 COMPLETE CBC AUTOMATED: CPT

## 2019-10-03 PROCEDURE — 81001 URINALYSIS AUTO W/SCOPE: CPT

## 2019-10-03 PROCEDURE — 96375 TX/PRO/DX INJ NEW DRUG ADDON: CPT

## 2019-10-03 PROCEDURE — 6360000004 HC RX CONTRAST MEDICATION: Performed by: EMERGENCY MEDICINE

## 2019-10-03 PROCEDURE — 1200000000 HC SEMI PRIVATE

## 2019-10-03 PROCEDURE — 80053 COMPREHEN METABOLIC PANEL: CPT

## 2019-10-03 PROCEDURE — 6360000004 HC RX CONTRAST MEDICATION: Performed by: PHYSICIAN ASSISTANT

## 2019-10-03 PROCEDURE — 74177 CT ABD & PELVIS W/CONTRAST: CPT

## 2019-10-03 PROCEDURE — 2580000003 HC RX 258: Performed by: SURGERY

## 2019-10-03 PROCEDURE — 6360000002 HC RX W HCPCS: Performed by: PHYSICIAN ASSISTANT

## 2019-10-03 PROCEDURE — 87040 BLOOD CULTURE FOR BACTERIA: CPT

## 2019-10-03 PROCEDURE — 36415 COLL VENOUS BLD VENIPUNCTURE: CPT

## 2019-10-03 PROCEDURE — 96361 HYDRATE IV INFUSION ADD-ON: CPT

## 2019-10-03 PROCEDURE — 6360000002 HC RX W HCPCS: Performed by: EMERGENCY MEDICINE

## 2019-10-03 PROCEDURE — 6360000002 HC RX W HCPCS: Performed by: SURGERY

## 2019-10-03 PROCEDURE — 87086 URINE CULTURE/COLONY COUNT: CPT

## 2019-10-03 PROCEDURE — 96374 THER/PROPH/DIAG INJ IV PUSH: CPT

## 2019-10-03 PROCEDURE — 99285 EMERGENCY DEPT VISIT HI MDM: CPT

## 2019-10-03 PROCEDURE — 2580000003 HC RX 258: Performed by: PHYSICIAN ASSISTANT

## 2019-10-03 PROCEDURE — 83690 ASSAY OF LIPASE: CPT

## 2019-10-03 RX ORDER — ACETAMINOPHEN 325 MG/1
650 TABLET ORAL EVERY 4 HOURS PRN
Status: DISCONTINUED | OUTPATIENT
Start: 2019-10-03 | End: 2019-10-14 | Stop reason: HOSPADM

## 2019-10-03 RX ORDER — ONDANSETRON 2 MG/ML
4 INJECTION INTRAMUSCULAR; INTRAVENOUS ONCE
Status: COMPLETED | OUTPATIENT
Start: 2019-10-03 | End: 2019-10-03

## 2019-10-03 RX ORDER — SODIUM CHLORIDE 9 MG/ML
INJECTION, SOLUTION INTRAVENOUS CONTINUOUS
Status: DISCONTINUED | OUTPATIENT
Start: 2019-10-03 | End: 2019-10-11

## 2019-10-03 RX ORDER — SODIUM CHLORIDE 9 MG/ML
INJECTION, SOLUTION INTRAVENOUS
Status: DISPENSED
Start: 2019-10-03 | End: 2019-10-04

## 2019-10-03 RX ORDER — DIPHENHYDRAMINE HYDROCHLORIDE 50 MG/ML
25 INJECTION INTRAMUSCULAR; INTRAVENOUS EVERY 6 HOURS PRN
Status: DISCONTINUED | OUTPATIENT
Start: 2019-10-03 | End: 2019-10-13

## 2019-10-03 RX ORDER — ONDANSETRON 2 MG/ML
4 INJECTION INTRAMUSCULAR; INTRAVENOUS EVERY 4 HOURS PRN
Status: DISCONTINUED | OUTPATIENT
Start: 2019-10-03 | End: 2019-10-14 | Stop reason: HOSPADM

## 2019-10-03 RX ORDER — MORPHINE SULFATE 4 MG/ML
4 INJECTION, SOLUTION INTRAMUSCULAR; INTRAVENOUS ONCE
Status: COMPLETED | OUTPATIENT
Start: 2019-10-03 | End: 2019-10-03

## 2019-10-03 RX ORDER — 0.9 % SODIUM CHLORIDE 0.9 %
1000 INTRAVENOUS SOLUTION INTRAVENOUS ONCE
Status: COMPLETED | OUTPATIENT
Start: 2019-10-03 | End: 2019-10-03

## 2019-10-03 RX ADMIN — IOHEXOL 50 ML: 240 INJECTION, SOLUTION INTRATHECAL; INTRAVASCULAR; INTRAVENOUS; ORAL at 18:32

## 2019-10-03 RX ADMIN — SODIUM CHLORIDE 1000 ML: 9 INJECTION, SOLUTION INTRAVENOUS at 18:23

## 2019-10-03 RX ADMIN — ONDANSETRON 4 MG: 2 INJECTION INTRAMUSCULAR; INTRAVENOUS at 18:23

## 2019-10-03 RX ADMIN — HYDROMORPHONE HYDROCHLORIDE 0.5 MG: 1 INJECTION, SOLUTION INTRAMUSCULAR; INTRAVENOUS; SUBCUTANEOUS at 20:51

## 2019-10-03 RX ADMIN — HYDROMORPHONE HYDROCHLORIDE 1 MG: 1 INJECTION, SOLUTION INTRAMUSCULAR; INTRAVENOUS; SUBCUTANEOUS at 23:46

## 2019-10-03 RX ADMIN — IOPAMIDOL 75 ML: 755 INJECTION, SOLUTION INTRAVENOUS at 19:32

## 2019-10-03 RX ADMIN — ONDANSETRON 4 MG: 2 INJECTION INTRAMUSCULAR; INTRAVENOUS at 23:48

## 2019-10-03 RX ADMIN — MORPHINE SULFATE 4 MG: 4 INJECTION INTRAVENOUS at 19:57

## 2019-10-03 RX ADMIN — SODIUM CHLORIDE: 900 INJECTION, SOLUTION INTRAVENOUS at 23:46

## 2019-10-03 RX ADMIN — PIPERACILLIN AND TAZOBACTAM 3.38 G: 3; .375 INJECTION, POWDER, FOR SOLUTION INTRAVENOUS at 23:53

## 2019-10-03 ASSESSMENT — ENCOUNTER SYMPTOMS
DIARRHEA: 0
RECTAL PAIN: 0
COUGH: 0
ABDOMINAL PAIN: 1
CONSTIPATION: 1
ABDOMINAL DISTENTION: 1
NAUSEA: 1
VOMITING: 1
SHORTNESS OF BREATH: 0

## 2019-10-03 ASSESSMENT — PAIN DESCRIPTION - DESCRIPTORS: DESCRIPTORS: ACHING;DISCOMFORT

## 2019-10-03 ASSESSMENT — PAIN DESCRIPTION - ORIENTATION: ORIENTATION: MID;LOWER

## 2019-10-03 ASSESSMENT — PAIN SCALES - GENERAL
PAINLEVEL_OUTOF10: 7

## 2019-10-03 ASSESSMENT — PAIN DESCRIPTION - PAIN TYPE
TYPE: ACUTE PAIN
TYPE: ACUTE PAIN

## 2019-10-03 ASSESSMENT — PAIN DESCRIPTION - LOCATION
LOCATION: ABDOMEN
LOCATION: ABDOMEN

## 2019-10-03 ASSESSMENT — PAIN DESCRIPTION - FREQUENCY: FREQUENCY: CONTINUOUS

## 2019-10-04 ENCOUNTER — APPOINTMENT (OUTPATIENT)
Dept: CT IMAGING | Age: 39
DRG: 230 | End: 2019-10-04
Payer: MEDICAID

## 2019-10-04 PROBLEM — E43 SEVERE PROTEIN-CALORIE MALNUTRITION (HCC): Status: ACTIVE | Noted: 2019-03-24

## 2019-10-04 LAB
ANION GAP SERPL CALCULATED.3IONS-SCNC: 11 MMOL/L (ref 3–16)
BASOPHILS ABSOLUTE: 0 K/UL (ref 0–0.2)
BASOPHILS RELATIVE PERCENT: 0.1 %
BUN BLDV-MCNC: 9 MG/DL (ref 7–20)
CALCIUM SERPL-MCNC: 8.8 MG/DL (ref 8.3–10.6)
CHLORIDE BLD-SCNC: 102 MMOL/L (ref 99–110)
CO2: 23 MMOL/L (ref 21–32)
CREAT SERPL-MCNC: 0.6 MG/DL (ref 0.9–1.3)
EOSINOPHILS ABSOLUTE: 0.2 K/UL (ref 0–0.6)
EOSINOPHILS RELATIVE PERCENT: 2 %
GFR AFRICAN AMERICAN: >60
GFR NON-AFRICAN AMERICAN: >60
GLUCOSE BLD-MCNC: 100 MG/DL (ref 70–99)
GLUCOSE BLD-MCNC: 102 MG/DL (ref 70–99)
GLUCOSE BLD-MCNC: 104 MG/DL (ref 70–99)
GLUCOSE BLD-MCNC: 122 MG/DL (ref 70–99)
GLUCOSE BLD-MCNC: 96 MG/DL (ref 70–99)
GLUCOSE BLD-MCNC: 98 MG/DL (ref 70–99)
HCT VFR BLD CALC: 31.9 % (ref 40.5–52.5)
HEMOGLOBIN: 10.6 G/DL (ref 13.5–17.5)
INR BLD: 1.28 (ref 0.86–1.14)
LACTIC ACID, SEPSIS: 0.5 MMOL/L (ref 0.4–1.9)
LYMPHOCYTES ABSOLUTE: 1.1 K/UL (ref 1–5.1)
LYMPHOCYTES RELATIVE PERCENT: 10.6 %
MCH RBC QN AUTO: 30.9 PG (ref 26–34)
MCHC RBC AUTO-ENTMCNC: 33.1 G/DL (ref 31–36)
MCV RBC AUTO: 93.4 FL (ref 80–100)
MONOCYTES ABSOLUTE: 0.8 K/UL (ref 0–1.3)
MONOCYTES RELATIVE PERCENT: 7.9 %
NEUTROPHILS ABSOLUTE: 8.5 K/UL (ref 1.7–7.7)
NEUTROPHILS RELATIVE PERCENT: 79.4 %
PDW BLD-RTO: 16.4 % (ref 12.4–15.4)
PERFORMED ON: ABNORMAL
PERFORMED ON: NORMAL
PERFORMED ON: NORMAL
PLATELET # BLD: 148 K/UL (ref 135–450)
PMV BLD AUTO: 7.6 FL (ref 5–10.5)
POTASSIUM SERPL-SCNC: 3.5 MMOL/L (ref 3.5–5.1)
PROTHROMBIN TIME: 14.6 SEC (ref 9.8–13)
RBC # BLD: 3.42 M/UL (ref 4.2–5.9)
SODIUM BLD-SCNC: 136 MMOL/L (ref 136–145)
WBC # BLD: 10.7 K/UL (ref 4–11)

## 2019-10-04 PROCEDURE — 99222 1ST HOSP IP/OBS MODERATE 55: CPT | Performed by: SURGERY

## 2019-10-04 PROCEDURE — 83605 ASSAY OF LACTIC ACID: CPT

## 2019-10-04 PROCEDURE — 2580000003 HC RX 258: Performed by: SURGERY

## 2019-10-04 PROCEDURE — 2709999900 CT PERITONEAL/RETROPERITONEAL PERC DRAIN

## 2019-10-04 PROCEDURE — 85025 COMPLETE CBC W/AUTO DIFF WBC: CPT

## 2019-10-04 PROCEDURE — 6360000002 HC RX W HCPCS: Performed by: SURGERY

## 2019-10-04 PROCEDURE — 85610 PROTHROMBIN TIME: CPT

## 2019-10-04 PROCEDURE — 6360000002 HC RX W HCPCS: Performed by: RADIOLOGY

## 2019-10-04 PROCEDURE — 1200000000 HC SEMI PRIVATE

## 2019-10-04 PROCEDURE — 80048 BASIC METABOLIC PNL TOTAL CA: CPT

## 2019-10-04 PROCEDURE — 36415 COLL VENOUS BLD VENIPUNCTURE: CPT

## 2019-10-04 RX ORDER — FENTANYL CITRATE 50 UG/ML
INJECTION, SOLUTION INTRAMUSCULAR; INTRAVENOUS
Status: COMPLETED | OUTPATIENT
Start: 2019-10-04 | End: 2019-10-04

## 2019-10-04 RX ORDER — MIDAZOLAM HYDROCHLORIDE 5 MG/ML
INJECTION INTRAMUSCULAR; INTRAVENOUS
Status: COMPLETED | OUTPATIENT
Start: 2019-10-04 | End: 2019-10-04

## 2019-10-04 RX ADMIN — HYDROMORPHONE HYDROCHLORIDE 1 MG: 1 INJECTION, SOLUTION INTRAMUSCULAR; INTRAVENOUS; SUBCUTANEOUS at 05:53

## 2019-10-04 RX ADMIN — HYDROMORPHONE HYDROCHLORIDE 1 MG: 1 INJECTION, SOLUTION INTRAMUSCULAR; INTRAVENOUS; SUBCUTANEOUS at 22:52

## 2019-10-04 RX ADMIN — HYDROMORPHONE HYDROCHLORIDE 1 MG: 1 INJECTION, SOLUTION INTRAMUSCULAR; INTRAVENOUS; SUBCUTANEOUS at 02:18

## 2019-10-04 RX ADMIN — SODIUM CHLORIDE: 900 INJECTION, SOLUTION INTRAVENOUS at 02:14

## 2019-10-04 RX ADMIN — PIPERACILLIN AND TAZOBACTAM 3.38 G: 3; .375 INJECTION, POWDER, FOR SOLUTION INTRAVENOUS at 14:18

## 2019-10-04 RX ADMIN — ONDANSETRON 4 MG: 2 INJECTION INTRAMUSCULAR; INTRAVENOUS at 08:31

## 2019-10-04 RX ADMIN — HYDROMORPHONE HYDROCHLORIDE 1 MG: 1 INJECTION, SOLUTION INTRAMUSCULAR; INTRAVENOUS; SUBCUTANEOUS at 15:07

## 2019-10-04 RX ADMIN — ENOXAPARIN SODIUM 40 MG: 40 INJECTION SUBCUTANEOUS at 14:18

## 2019-10-04 RX ADMIN — FENTANYL CITRATE 50 MCG: 50 INJECTION INTRAMUSCULAR; INTRAVENOUS at 11:58

## 2019-10-04 RX ADMIN — HYDROMORPHONE HYDROCHLORIDE 1 MG: 1 INJECTION, SOLUTION INTRAMUSCULAR; INTRAVENOUS; SUBCUTANEOUS at 12:46

## 2019-10-04 RX ADMIN — ONDANSETRON 4 MG: 2 INJECTION INTRAMUSCULAR; INTRAVENOUS at 18:27

## 2019-10-04 RX ADMIN — HYDROMORPHONE HYDROCHLORIDE 1 MG: 1 INJECTION, SOLUTION INTRAMUSCULAR; INTRAVENOUS; SUBCUTANEOUS at 18:27

## 2019-10-04 RX ADMIN — HYDROMORPHONE HYDROCHLORIDE 1 MG: 1 INJECTION, SOLUTION INTRAMUSCULAR; INTRAVENOUS; SUBCUTANEOUS at 08:31

## 2019-10-04 RX ADMIN — HYDROMORPHONE HYDROCHLORIDE 1 MG: 1 INJECTION, SOLUTION INTRAMUSCULAR; INTRAVENOUS; SUBCUTANEOUS at 20:54

## 2019-10-04 RX ADMIN — PIPERACILLIN AND TAZOBACTAM 3.38 G: 3; .375 INJECTION, POWDER, FOR SOLUTION INTRAVENOUS at 22:45

## 2019-10-04 RX ADMIN — PIPERACILLIN AND TAZOBACTAM 3.38 G: 3; .375 INJECTION, POWDER, FOR SOLUTION INTRAVENOUS at 07:33

## 2019-10-04 RX ADMIN — MIDAZOLAM HYDROCHLORIDE 1 MG: 5 INJECTION INTRAMUSCULAR; INTRAVENOUS at 11:58

## 2019-10-04 RX ADMIN — FENTANYL CITRATE 25 MCG: 50 INJECTION INTRAMUSCULAR; INTRAVENOUS at 12:10

## 2019-10-04 RX ADMIN — SODIUM CHLORIDE: 900 INJECTION, SOLUTION INTRAVENOUS at 18:29

## 2019-10-04 ASSESSMENT — PAIN DESCRIPTION - ONSET
ONSET: ON-GOING

## 2019-10-04 ASSESSMENT — PAIN SCALES - GENERAL
PAINLEVEL_OUTOF10: 6
PAINLEVEL_OUTOF10: 9
PAINLEVEL_OUTOF10: 6
PAINLEVEL_OUTOF10: 8
PAINLEVEL_OUTOF10: 6
PAINLEVEL_OUTOF10: 4
PAINLEVEL_OUTOF10: 7
PAINLEVEL_OUTOF10: 8
PAINLEVEL_OUTOF10: 6
PAINLEVEL_OUTOF10: 8
PAINLEVEL_OUTOF10: 4

## 2019-10-04 ASSESSMENT — PAIN DESCRIPTION - FREQUENCY
FREQUENCY: CONTINUOUS

## 2019-10-04 ASSESSMENT — PAIN - FUNCTIONAL ASSESSMENT
PAIN_FUNCTIONAL_ASSESSMENT: PREVENTS OR INTERFERES WITH MANY ACTIVE NOT PASSIVE ACTIVITIES
PAIN_FUNCTIONAL_ASSESSMENT: PREVENTS OR INTERFERES SOME ACTIVE ACTIVITIES AND ADLS
PAIN_FUNCTIONAL_ASSESSMENT: PREVENTS OR INTERFERES WITH MANY ACTIVE NOT PASSIVE ACTIVITIES

## 2019-10-04 ASSESSMENT — PAIN DESCRIPTION - ORIENTATION
ORIENTATION: LOWER

## 2019-10-04 ASSESSMENT — PAIN DESCRIPTION - PAIN TYPE
TYPE: ACUTE PAIN

## 2019-10-04 ASSESSMENT — PAIN DESCRIPTION - PROGRESSION
CLINICAL_PROGRESSION: NOT CHANGED
CLINICAL_PROGRESSION: GRADUALLY IMPROVING
CLINICAL_PROGRESSION: GRADUALLY WORSENING

## 2019-10-04 ASSESSMENT — PAIN DESCRIPTION - LOCATION
LOCATION: ABDOMEN

## 2019-10-04 ASSESSMENT — PAIN DESCRIPTION - DESCRIPTORS
DESCRIPTORS: ACHING;DISCOMFORT
DESCRIPTORS: ACHING
DESCRIPTORS: ACHING;DISCOMFORT

## 2019-10-05 LAB — URINE CULTURE, ROUTINE: NORMAL

## 2019-10-05 PROCEDURE — 99232 SBSQ HOSP IP/OBS MODERATE 35: CPT | Performed by: SURGERY

## 2019-10-05 PROCEDURE — 6360000002 HC RX W HCPCS: Performed by: SURGERY

## 2019-10-05 PROCEDURE — 2580000003 HC RX 258: Performed by: SURGERY

## 2019-10-05 PROCEDURE — 1200000000 HC SEMI PRIVATE

## 2019-10-05 RX ADMIN — HYDROMORPHONE HYDROCHLORIDE 1 MG: 1 INJECTION, SOLUTION INTRAMUSCULAR; INTRAVENOUS; SUBCUTANEOUS at 22:00

## 2019-10-05 RX ADMIN — HYDROMORPHONE HYDROCHLORIDE 1 MG: 1 INJECTION, SOLUTION INTRAMUSCULAR; INTRAVENOUS; SUBCUTANEOUS at 08:59

## 2019-10-05 RX ADMIN — PIPERACILLIN AND TAZOBACTAM 3.38 G: 3; .375 INJECTION, POWDER, FOR SOLUTION INTRAVENOUS at 23:14

## 2019-10-05 RX ADMIN — PIPERACILLIN AND TAZOBACTAM 3.38 G: 3; .375 INJECTION, POWDER, FOR SOLUTION INTRAVENOUS at 13:28

## 2019-10-05 RX ADMIN — HYDROMORPHONE HYDROCHLORIDE 1 MG: 1 INJECTION, SOLUTION INTRAMUSCULAR; INTRAVENOUS; SUBCUTANEOUS at 02:01

## 2019-10-05 RX ADMIN — SODIUM CHLORIDE: 900 INJECTION, SOLUTION INTRAVENOUS at 18:50

## 2019-10-05 RX ADMIN — HYDROMORPHONE HYDROCHLORIDE 1 MG: 1 INJECTION, SOLUTION INTRAMUSCULAR; INTRAVENOUS; SUBCUTANEOUS at 15:28

## 2019-10-05 RX ADMIN — ONDANSETRON 4 MG: 2 INJECTION INTRAMUSCULAR; INTRAVENOUS at 22:00

## 2019-10-05 RX ADMIN — ONDANSETRON 4 MG: 2 INJECTION INTRAMUSCULAR; INTRAVENOUS at 09:00

## 2019-10-05 RX ADMIN — PIPERACILLIN AND TAZOBACTAM 3.38 G: 3; .375 INJECTION, POWDER, FOR SOLUTION INTRAVENOUS at 06:44

## 2019-10-05 RX ADMIN — HYDROMORPHONE HYDROCHLORIDE 1 MG: 1 INJECTION, SOLUTION INTRAMUSCULAR; INTRAVENOUS; SUBCUTANEOUS at 11:05

## 2019-10-05 RX ADMIN — ENOXAPARIN SODIUM 40 MG: 40 INJECTION SUBCUTANEOUS at 09:00

## 2019-10-05 RX ADMIN — SODIUM CHLORIDE: 900 INJECTION, SOLUTION INTRAVENOUS at 03:08

## 2019-10-05 RX ADMIN — HYDROMORPHONE HYDROCHLORIDE 1 MG: 1 INJECTION, SOLUTION INTRAMUSCULAR; INTRAVENOUS; SUBCUTANEOUS at 06:44

## 2019-10-05 RX ADMIN — HYDROMORPHONE HYDROCHLORIDE 1 MG: 1 INJECTION, SOLUTION INTRAMUSCULAR; INTRAVENOUS; SUBCUTANEOUS at 13:28

## 2019-10-05 RX ADMIN — HYDROMORPHONE HYDROCHLORIDE 1 MG: 1 INJECTION, SOLUTION INTRAMUSCULAR; INTRAVENOUS; SUBCUTANEOUS at 04:06

## 2019-10-05 RX ADMIN — HYDROMORPHONE HYDROCHLORIDE 1 MG: 1 INJECTION, SOLUTION INTRAMUSCULAR; INTRAVENOUS; SUBCUTANEOUS at 19:23

## 2019-10-05 RX ADMIN — HYDROMORPHONE HYDROCHLORIDE 1 MG: 1 INJECTION, SOLUTION INTRAMUSCULAR; INTRAVENOUS; SUBCUTANEOUS at 17:30

## 2019-10-05 ASSESSMENT — PAIN SCALES - GENERAL
PAINLEVEL_OUTOF10: 6
PAINLEVEL_OUTOF10: 3
PAINLEVEL_OUTOF10: 7
PAINLEVEL_OUTOF10: 6
PAINLEVEL_OUTOF10: 7
PAINLEVEL_OUTOF10: 6
PAINLEVEL_OUTOF10: 7
PAINLEVEL_OUTOF10: 3
PAINLEVEL_OUTOF10: 6
PAINLEVEL_OUTOF10: 3
PAINLEVEL_OUTOF10: 6
PAINLEVEL_OUTOF10: 7
PAINLEVEL_OUTOF10: 7

## 2019-10-05 ASSESSMENT — PAIN DESCRIPTION - DESCRIPTORS
DESCRIPTORS: THROBBING

## 2019-10-05 ASSESSMENT — PAIN DESCRIPTION - PROGRESSION
CLINICAL_PROGRESSION: NOT CHANGED

## 2019-10-05 ASSESSMENT — PAIN DESCRIPTION - PAIN TYPE
TYPE: ACUTE PAIN

## 2019-10-05 ASSESSMENT — PAIN DESCRIPTION - LOCATION
LOCATION: ABDOMEN

## 2019-10-05 ASSESSMENT — PAIN DESCRIPTION - ORIENTATION
ORIENTATION: LOWER

## 2019-10-05 ASSESSMENT — PAIN DESCRIPTION - ONSET
ONSET: ON-GOING

## 2019-10-05 ASSESSMENT — PAIN DESCRIPTION - FREQUENCY
FREQUENCY: CONTINUOUS

## 2019-10-06 PROCEDURE — 2580000003 HC RX 258: Performed by: SURGERY

## 2019-10-06 PROCEDURE — 1200000000 HC SEMI PRIVATE

## 2019-10-06 PROCEDURE — 6360000002 HC RX W HCPCS: Performed by: SURGERY

## 2019-10-06 PROCEDURE — 99232 SBSQ HOSP IP/OBS MODERATE 35: CPT | Performed by: SURGERY

## 2019-10-06 RX ADMIN — PIPERACILLIN AND TAZOBACTAM 3.38 G: 3; .375 INJECTION, POWDER, FOR SOLUTION INTRAVENOUS at 23:31

## 2019-10-06 RX ADMIN — HYDROMORPHONE HYDROCHLORIDE 1 MG: 1 INJECTION, SOLUTION INTRAMUSCULAR; INTRAVENOUS; SUBCUTANEOUS at 12:12

## 2019-10-06 RX ADMIN — HYDROMORPHONE HYDROCHLORIDE 1 MG: 1 INJECTION, SOLUTION INTRAMUSCULAR; INTRAVENOUS; SUBCUTANEOUS at 09:18

## 2019-10-06 RX ADMIN — ENOXAPARIN SODIUM 40 MG: 40 INJECTION SUBCUTANEOUS at 09:18

## 2019-10-06 RX ADMIN — DIPHENHYDRAMINE HYDROCHLORIDE 25 MG: 50 INJECTION, SOLUTION INTRAMUSCULAR; INTRAVENOUS at 23:26

## 2019-10-06 RX ADMIN — PIPERACILLIN AND TAZOBACTAM 3.38 G: 3; .375 INJECTION, POWDER, FOR SOLUTION INTRAVENOUS at 14:21

## 2019-10-06 RX ADMIN — HYDROMORPHONE HYDROCHLORIDE 1 MG: 1 INJECTION, SOLUTION INTRAMUSCULAR; INTRAVENOUS; SUBCUTANEOUS at 16:34

## 2019-10-06 RX ADMIN — HYDROMORPHONE HYDROCHLORIDE 1 MG: 1 INJECTION, SOLUTION INTRAMUSCULAR; INTRAVENOUS; SUBCUTANEOUS at 14:16

## 2019-10-06 RX ADMIN — SODIUM CHLORIDE: 900 INJECTION, SOLUTION INTRAVENOUS at 06:18

## 2019-10-06 RX ADMIN — HYDROMORPHONE HYDROCHLORIDE 1 MG: 1 INJECTION, SOLUTION INTRAMUSCULAR; INTRAVENOUS; SUBCUTANEOUS at 18:55

## 2019-10-06 RX ADMIN — DIPHENHYDRAMINE HYDROCHLORIDE 25 MG: 50 INJECTION, SOLUTION INTRAMUSCULAR; INTRAVENOUS at 00:12

## 2019-10-06 RX ADMIN — HYDROMORPHONE HYDROCHLORIDE 1 MG: 1 INJECTION, SOLUTION INTRAMUSCULAR; INTRAVENOUS; SUBCUTANEOUS at 06:20

## 2019-10-06 RX ADMIN — PIPERACILLIN AND TAZOBACTAM 3.38 G: 3; .375 INJECTION, POWDER, FOR SOLUTION INTRAVENOUS at 06:18

## 2019-10-06 RX ADMIN — HYDROMORPHONE HYDROCHLORIDE 1 MG: 1 INJECTION, SOLUTION INTRAMUSCULAR; INTRAVENOUS; SUBCUTANEOUS at 00:12

## 2019-10-06 RX ADMIN — SODIUM CHLORIDE: 900 INJECTION, SOLUTION INTRAVENOUS at 10:32

## 2019-10-06 RX ADMIN — HYDROMORPHONE HYDROCHLORIDE 1 MG: 1 INJECTION, SOLUTION INTRAMUSCULAR; INTRAVENOUS; SUBCUTANEOUS at 21:16

## 2019-10-06 RX ADMIN — HYDROMORPHONE HYDROCHLORIDE 1 MG: 1 INJECTION, SOLUTION INTRAMUSCULAR; INTRAVENOUS; SUBCUTANEOUS at 04:04

## 2019-10-06 RX ADMIN — ONDANSETRON 4 MG: 2 INJECTION INTRAMUSCULAR; INTRAVENOUS at 09:18

## 2019-10-06 RX ADMIN — HYDROMORPHONE HYDROCHLORIDE 1 MG: 1 INJECTION, SOLUTION INTRAMUSCULAR; INTRAVENOUS; SUBCUTANEOUS at 23:25

## 2019-10-06 ASSESSMENT — PAIN DESCRIPTION - LOCATION
LOCATION: ABDOMEN

## 2019-10-06 ASSESSMENT — PAIN DESCRIPTION - DESCRIPTORS
DESCRIPTORS: ACHING;CRAMPING;DISCOMFORT
DESCRIPTORS: ACHING;DISCOMFORT;CRAMPING
DESCRIPTORS: ACHING;CRAMPING;DISCOMFORT

## 2019-10-06 ASSESSMENT — PAIN SCALES - GENERAL
PAINLEVEL_OUTOF10: 7
PAINLEVEL_OUTOF10: 7
PAINLEVEL_OUTOF10: 8
PAINLEVEL_OUTOF10: 7

## 2019-10-06 ASSESSMENT — PAIN DESCRIPTION - PAIN TYPE
TYPE: ACUTE PAIN;CHRONIC PAIN

## 2019-10-06 ASSESSMENT — PAIN DESCRIPTION - FREQUENCY
FREQUENCY: CONTINUOUS

## 2019-10-06 ASSESSMENT — PAIN DESCRIPTION - ONSET
ONSET: ON-GOING

## 2019-10-06 ASSESSMENT — PAIN DESCRIPTION - ORIENTATION
ORIENTATION: LOWER
ORIENTATION: MID;LOWER
ORIENTATION: MID;LOWER
ORIENTATION: LOWER
ORIENTATION: LOWER

## 2019-10-06 ASSESSMENT — PAIN DESCRIPTION - PROGRESSION
CLINICAL_PROGRESSION: NOT CHANGED

## 2019-10-07 ENCOUNTER — ANESTHESIA EVENT (OUTPATIENT)
Dept: OPERATING ROOM | Age: 39
DRG: 230 | End: 2019-10-07
Payer: MEDICAID

## 2019-10-07 LAB
ANION GAP SERPL CALCULATED.3IONS-SCNC: 12 MMOL/L (ref 3–16)
BASOPHILS ABSOLUTE: 0 K/UL (ref 0–0.2)
BASOPHILS RELATIVE PERCENT: 0.5 %
BUN BLDV-MCNC: 4 MG/DL (ref 7–20)
CALCIUM SERPL-MCNC: 8.9 MG/DL (ref 8.3–10.6)
CHLORIDE BLD-SCNC: 102 MMOL/L (ref 99–110)
CO2: 24 MMOL/L (ref 21–32)
CREAT SERPL-MCNC: <0.5 MG/DL (ref 0.9–1.3)
EOSINOPHILS ABSOLUTE: 0.4 K/UL (ref 0–0.6)
EOSINOPHILS RELATIVE PERCENT: 7.4 %
GFR AFRICAN AMERICAN: >60
GFR NON-AFRICAN AMERICAN: >60
GLUCOSE BLD-MCNC: 137 MG/DL (ref 70–99)
HCT VFR BLD CALC: 28.6 % (ref 40.5–52.5)
HEMOGLOBIN: 9.7 G/DL (ref 13.5–17.5)
LYMPHOCYTES ABSOLUTE: 1.1 K/UL (ref 1–5.1)
LYMPHOCYTES RELATIVE PERCENT: 20.5 %
MAGNESIUM: 2.1 MG/DL (ref 1.8–2.4)
MCH RBC QN AUTO: 30.7 PG (ref 26–34)
MCHC RBC AUTO-ENTMCNC: 34 G/DL (ref 31–36)
MCV RBC AUTO: 90.1 FL (ref 80–100)
MONOCYTES ABSOLUTE: 0.8 K/UL (ref 0–1.3)
MONOCYTES RELATIVE PERCENT: 15.2 %
NEUTROPHILS ABSOLUTE: 3 K/UL (ref 1.7–7.7)
NEUTROPHILS RELATIVE PERCENT: 56.4 %
PDW BLD-RTO: 16.2 % (ref 12.4–15.4)
PHOSPHORUS: 3.2 MG/DL (ref 2.5–4.9)
PLATELET # BLD: 183 K/UL (ref 135–450)
PMV BLD AUTO: 7.1 FL (ref 5–10.5)
POTASSIUM SERPL-SCNC: 3.5 MMOL/L (ref 3.5–5.1)
RBC # BLD: 3.18 M/UL (ref 4.2–5.9)
SODIUM BLD-SCNC: 138 MMOL/L (ref 136–145)
WBC # BLD: 5.3 K/UL (ref 4–11)

## 2019-10-07 PROCEDURE — 2580000003 HC RX 258: Performed by: SURGERY

## 2019-10-07 PROCEDURE — 6360000002 HC RX W HCPCS: Performed by: SURGERY

## 2019-10-07 PROCEDURE — 85025 COMPLETE CBC W/AUTO DIFF WBC: CPT

## 2019-10-07 PROCEDURE — 6370000000 HC RX 637 (ALT 250 FOR IP): Performed by: NURSE PRACTITIONER

## 2019-10-07 PROCEDURE — 80048 BASIC METABOLIC PNL TOTAL CA: CPT

## 2019-10-07 PROCEDURE — 36415 COLL VENOUS BLD VENIPUNCTURE: CPT

## 2019-10-07 PROCEDURE — 84100 ASSAY OF PHOSPHORUS: CPT

## 2019-10-07 PROCEDURE — 1200000000 HC SEMI PRIVATE

## 2019-10-07 PROCEDURE — 99232 SBSQ HOSP IP/OBS MODERATE 35: CPT | Performed by: SURGERY

## 2019-10-07 PROCEDURE — 83735 ASSAY OF MAGNESIUM: CPT

## 2019-10-07 RX ORDER — POLYETHYLENE GLYCOL 3350 17 G/17G
17 POWDER, FOR SOLUTION ORAL ONCE
Status: COMPLETED | OUTPATIENT
Start: 2019-10-07 | End: 2019-10-07

## 2019-10-07 RX ADMIN — POLYETHYLENE GLYCOL 3350 17 G: 17 POWDER, FOR SOLUTION ORAL at 13:47

## 2019-10-07 RX ADMIN — SODIUM CHLORIDE: 900 INJECTION, SOLUTION INTRAVENOUS at 23:43

## 2019-10-07 RX ADMIN — ENOXAPARIN SODIUM 40 MG: 40 INJECTION SUBCUTANEOUS at 09:22

## 2019-10-07 RX ADMIN — HYDROMORPHONE HYDROCHLORIDE 1 MG: 1 INJECTION, SOLUTION INTRAMUSCULAR; INTRAVENOUS; SUBCUTANEOUS at 12:45

## 2019-10-07 RX ADMIN — PIPERACILLIN AND TAZOBACTAM 3.38 G: 3; .375 INJECTION, POWDER, FOR SOLUTION INTRAVENOUS at 15:07

## 2019-10-07 RX ADMIN — HYDROMORPHONE HYDROCHLORIDE 1 MG: 1 INJECTION, SOLUTION INTRAMUSCULAR; INTRAVENOUS; SUBCUTANEOUS at 19:23

## 2019-10-07 RX ADMIN — HYDROMORPHONE HYDROCHLORIDE 1 MG: 1 INJECTION, SOLUTION INTRAMUSCULAR; INTRAVENOUS; SUBCUTANEOUS at 06:07

## 2019-10-07 RX ADMIN — SODIUM CHLORIDE: 900 INJECTION, SOLUTION INTRAVENOUS at 10:45

## 2019-10-07 RX ADMIN — SODIUM CHLORIDE: 900 INJECTION, SOLUTION INTRAVENOUS at 04:07

## 2019-10-07 RX ADMIN — HYDROMORPHONE HYDROCHLORIDE 1 MG: 1 INJECTION, SOLUTION INTRAMUSCULAR; INTRAVENOUS; SUBCUTANEOUS at 17:13

## 2019-10-07 RX ADMIN — PIPERACILLIN AND TAZOBACTAM 3.38 G: 3; .375 INJECTION, POWDER, FOR SOLUTION INTRAVENOUS at 06:07

## 2019-10-07 RX ADMIN — SODIUM CHLORIDE: 900 INJECTION, SOLUTION INTRAVENOUS at 21:39

## 2019-10-07 RX ADMIN — HYDROMORPHONE HYDROCHLORIDE 1 MG: 1 INJECTION, SOLUTION INTRAMUSCULAR; INTRAVENOUS; SUBCUTANEOUS at 01:57

## 2019-10-07 RX ADMIN — PIPERACILLIN AND TAZOBACTAM 3.38 G: 3; .375 INJECTION, POWDER, FOR SOLUTION INTRAVENOUS at 22:47

## 2019-10-07 RX ADMIN — HYDROMORPHONE HYDROCHLORIDE 1 MG: 1 INJECTION, SOLUTION INTRAMUSCULAR; INTRAVENOUS; SUBCUTANEOUS at 21:34

## 2019-10-07 RX ADMIN — HYDROMORPHONE HYDROCHLORIDE 1 MG: 1 INJECTION, SOLUTION INTRAMUSCULAR; INTRAVENOUS; SUBCUTANEOUS at 23:45

## 2019-10-07 RX ADMIN — HYDROMORPHONE HYDROCHLORIDE 1 MG: 1 INJECTION, SOLUTION INTRAMUSCULAR; INTRAVENOUS; SUBCUTANEOUS at 08:37

## 2019-10-07 RX ADMIN — HYDROMORPHONE HYDROCHLORIDE 1 MG: 1 INJECTION, SOLUTION INTRAMUSCULAR; INTRAVENOUS; SUBCUTANEOUS at 04:07

## 2019-10-07 RX ADMIN — HYDROMORPHONE HYDROCHLORIDE 1 MG: 1 INJECTION, SOLUTION INTRAMUSCULAR; INTRAVENOUS; SUBCUTANEOUS at 15:07

## 2019-10-07 RX ADMIN — HYDROMORPHONE HYDROCHLORIDE 1 MG: 1 INJECTION, SOLUTION INTRAMUSCULAR; INTRAVENOUS; SUBCUTANEOUS at 10:44

## 2019-10-07 RX ADMIN — ONDANSETRON 4 MG: 2 INJECTION INTRAMUSCULAR; INTRAVENOUS at 08:37

## 2019-10-07 ASSESSMENT — PAIN DESCRIPTION - PAIN TYPE
TYPE: ACUTE PAIN

## 2019-10-07 ASSESSMENT — PAIN - FUNCTIONAL ASSESSMENT: PAIN_FUNCTIONAL_ASSESSMENT: ACTIVITIES ARE NOT PREVENTED

## 2019-10-07 ASSESSMENT — PAIN DESCRIPTION - DESCRIPTORS
DESCRIPTORS: ACHING;CONSTANT
DESCRIPTORS: ACHING
DESCRIPTORS: ACHING;CONSTANT
DESCRIPTORS: ACHING
DESCRIPTORS: ACHING

## 2019-10-07 ASSESSMENT — PAIN SCALES - GENERAL
PAINLEVEL_OUTOF10: 7
PAINLEVEL_OUTOF10: 6
PAINLEVEL_OUTOF10: 8
PAINLEVEL_OUTOF10: 7
PAINLEVEL_OUTOF10: 6
PAINLEVEL_OUTOF10: 6
PAINLEVEL_OUTOF10: 7
PAINLEVEL_OUTOF10: 6
PAINLEVEL_OUTOF10: 6
PAINLEVEL_OUTOF10: 7
PAINLEVEL_OUTOF10: 7

## 2019-10-07 ASSESSMENT — PAIN DESCRIPTION - ORIENTATION
ORIENTATION: MID
ORIENTATION: MID

## 2019-10-07 ASSESSMENT — PAIN DESCRIPTION - LOCATION
LOCATION: ABDOMEN

## 2019-10-07 ASSESSMENT — PAIN DESCRIPTION - ONSET: ONSET: ON-GOING

## 2019-10-07 ASSESSMENT — PAIN DESCRIPTION - FREQUENCY: FREQUENCY: CONTINUOUS

## 2019-10-07 ASSESSMENT — PAIN DESCRIPTION - PROGRESSION: CLINICAL_PROGRESSION: NOT CHANGED

## 2019-10-08 ENCOUNTER — ANESTHESIA (OUTPATIENT)
Dept: OPERATING ROOM | Age: 39
DRG: 230 | End: 2019-10-08
Payer: MEDICAID

## 2019-10-08 VITALS
DIASTOLIC BLOOD PRESSURE: 92 MMHG | SYSTOLIC BLOOD PRESSURE: 115 MMHG | TEMPERATURE: 97.2 F | OXYGEN SATURATION: 98 % | RESPIRATION RATE: 4 BRPM

## 2019-10-08 LAB
ABO/RH: NORMAL
ANTIBODY SCREEN: NORMAL
BLOOD CULTURE, ROUTINE: NORMAL
CULTURE, BLOOD 2: NORMAL

## 2019-10-08 PROCEDURE — C1765 ADHESION BARRIER: HCPCS | Performed by: SURGERY

## 2019-10-08 PROCEDURE — 0D1B0Z4 BYPASS ILEUM TO CUTANEOUS, OPEN APPROACH: ICD-10-PCS | Performed by: SURGERY

## 2019-10-08 PROCEDURE — 36415 COLL VENOUS BLD VENIPUNCTURE: CPT

## 2019-10-08 PROCEDURE — 2709999900 HC NON-CHARGEABLE SUPPLY: Performed by: SURGERY

## 2019-10-08 PROCEDURE — 44139 MOBILIZATION OF COLON: CPT | Performed by: SURGERY

## 2019-10-08 PROCEDURE — 6370000000 HC RX 637 (ALT 250 FOR IP): Performed by: NURSE ANESTHETIST, CERTIFIED REGISTERED

## 2019-10-08 PROCEDURE — 2580000003 HC RX 258: Performed by: SURGERY

## 2019-10-08 PROCEDURE — 6360000002 HC RX W HCPCS: Performed by: ANESTHESIOLOGY

## 2019-10-08 PROCEDURE — 3600000004 HC SURGERY LEVEL 4 BASE: Performed by: SURGERY

## 2019-10-08 PROCEDURE — 0DTN0ZZ RESECTION OF SIGMOID COLON, OPEN APPROACH: ICD-10-PCS | Performed by: SURGERY

## 2019-10-08 PROCEDURE — 6360000002 HC RX W HCPCS: Performed by: SURGERY

## 2019-10-08 PROCEDURE — 86901 BLOOD TYPING SEROLOGIC RH(D): CPT

## 2019-10-08 PROCEDURE — 3700000001 HC ADD 15 MINUTES (ANESTHESIA): Performed by: SURGERY

## 2019-10-08 PROCEDURE — 86900 BLOOD TYPING SEROLOGIC ABO: CPT

## 2019-10-08 PROCEDURE — 2500000003 HC RX 250 WO HCPCS: Performed by: SURGERY

## 2019-10-08 PROCEDURE — 3700000000 HC ANESTHESIA ATTENDED CARE: Performed by: SURGERY

## 2019-10-08 PROCEDURE — C1892 INTRO/SHEATH,FIXED,PEEL-AWAY: HCPCS | Performed by: SURGERY

## 2019-10-08 PROCEDURE — 6360000002 HC RX W HCPCS: Performed by: NURSE ANESTHETIST, CERTIFIED REGISTERED

## 2019-10-08 PROCEDURE — 86850 RBC ANTIBODY SCREEN: CPT

## 2019-10-08 PROCEDURE — 3600000014 HC SURGERY LEVEL 4 ADDTL 15MIN: Performed by: SURGERY

## 2019-10-08 PROCEDURE — 2500000003 HC RX 250 WO HCPCS: Performed by: NURSE ANESTHETIST, CERTIFIED REGISTERED

## 2019-10-08 PROCEDURE — C2626 INFUSION PUMP, NON-PROG,TEMP: HCPCS | Performed by: SURGERY

## 2019-10-08 PROCEDURE — 44146 PARTIAL REMOVAL OF COLON: CPT | Performed by: SURGERY

## 2019-10-08 PROCEDURE — 2720000010 HC SURG SUPPLY STERILE: Performed by: SURGERY

## 2019-10-08 PROCEDURE — 2580000003 HC RX 258: Performed by: NURSE ANESTHETIST, CERTIFIED REGISTERED

## 2019-10-08 PROCEDURE — 88307 TISSUE EXAM BY PATHOLOGIST: CPT

## 2019-10-08 PROCEDURE — 7100000001 HC PACU RECOVERY - ADDTL 15 MIN: Performed by: SURGERY

## 2019-10-08 PROCEDURE — 1200000000 HC SEMI PRIVATE

## 2019-10-08 PROCEDURE — 94150 VITAL CAPACITY TEST: CPT

## 2019-10-08 PROCEDURE — 7100000000 HC PACU RECOVERY - FIRST 15 MIN: Performed by: SURGERY

## 2019-10-08 RX ORDER — MIDAZOLAM HYDROCHLORIDE 1 MG/ML
INJECTION INTRAMUSCULAR; INTRAVENOUS PRN
Status: DISCONTINUED | OUTPATIENT
Start: 2019-10-08 | End: 2019-10-08 | Stop reason: SDUPTHER

## 2019-10-08 RX ORDER — FENTANYL CITRATE 50 UG/ML
25 INJECTION, SOLUTION INTRAMUSCULAR; INTRAVENOUS EVERY 5 MIN PRN
Status: DISCONTINUED | OUTPATIENT
Start: 2019-10-08 | End: 2019-10-08 | Stop reason: HOSPADM

## 2019-10-08 RX ORDER — PROPOFOL 10 MG/ML
INJECTION, EMULSION INTRAVENOUS PRN
Status: DISCONTINUED | OUTPATIENT
Start: 2019-10-08 | End: 2019-10-08 | Stop reason: SDUPTHER

## 2019-10-08 RX ORDER — SODIUM CHLORIDE, SODIUM LACTATE, POTASSIUM CHLORIDE, CALCIUM CHLORIDE 600; 310; 30; 20 MG/100ML; MG/100ML; MG/100ML; MG/100ML
INJECTION, SOLUTION INTRAVENOUS CONTINUOUS PRN
Status: DISCONTINUED | OUTPATIENT
Start: 2019-10-08 | End: 2019-10-08 | Stop reason: SDUPTHER

## 2019-10-08 RX ORDER — HYDROMORPHONE HCL 110MG/55ML
PATIENT CONTROLLED ANALGESIA SYRINGE INTRAVENOUS PRN
Status: DISCONTINUED | OUTPATIENT
Start: 2019-10-08 | End: 2019-10-08 | Stop reason: SDUPTHER

## 2019-10-08 RX ORDER — KETOROLAC TROMETHAMINE 30 MG/ML
30 INJECTION, SOLUTION INTRAMUSCULAR; INTRAVENOUS EVERY 8 HOURS PRN
Status: DISPENSED | OUTPATIENT
Start: 2019-10-08 | End: 2019-10-13

## 2019-10-08 RX ORDER — LIDOCAINE HYDROCHLORIDE 20 MG/ML
INJECTION, SOLUTION INFILTRATION; PERINEURAL PRN
Status: DISCONTINUED | OUTPATIENT
Start: 2019-10-08 | End: 2019-10-08 | Stop reason: SDUPTHER

## 2019-10-08 RX ORDER — OXYCODONE HYDROCHLORIDE AND ACETAMINOPHEN 5; 325 MG/1; MG/1
1 TABLET ORAL PRN
Status: DISCONTINUED | OUTPATIENT
Start: 2019-10-08 | End: 2019-10-08 | Stop reason: HOSPADM

## 2019-10-08 RX ORDER — FENTANYL CITRATE 50 UG/ML
INJECTION, SOLUTION INTRAMUSCULAR; INTRAVENOUS PRN
Status: DISCONTINUED | OUTPATIENT
Start: 2019-10-08 | End: 2019-10-08 | Stop reason: SDUPTHER

## 2019-10-08 RX ORDER — BUPIVACAINE HYDROCHLORIDE 5 MG/ML
INJECTION, SOLUTION EPIDURAL; INTRACAUDAL PRN
Status: DISCONTINUED | OUTPATIENT
Start: 2019-10-08 | End: 2019-10-08 | Stop reason: HOSPADM

## 2019-10-08 RX ORDER — ROCURONIUM BROMIDE 10 MG/ML
INJECTION, SOLUTION INTRAVENOUS PRN
Status: DISCONTINUED | OUTPATIENT
Start: 2019-10-08 | End: 2019-10-08 | Stop reason: SDUPTHER

## 2019-10-08 RX ORDER — LIDOCAINE HYDROCHLORIDE 40 MG/ML
SOLUTION TOPICAL PRN
Status: DISCONTINUED | OUTPATIENT
Start: 2019-10-08 | End: 2019-10-08 | Stop reason: SDUPTHER

## 2019-10-08 RX ORDER — HYDRALAZINE HYDROCHLORIDE 20 MG/ML
5 INJECTION INTRAMUSCULAR; INTRAVENOUS EVERY 10 MIN PRN
Status: DISCONTINUED | OUTPATIENT
Start: 2019-10-08 | End: 2019-10-08 | Stop reason: HOSPADM

## 2019-10-08 RX ORDER — DEXAMETHASONE SODIUM PHOSPHATE 4 MG/ML
INJECTION, SOLUTION INTRA-ARTICULAR; INTRALESIONAL; INTRAMUSCULAR; INTRAVENOUS; SOFT TISSUE PRN
Status: DISCONTINUED | OUTPATIENT
Start: 2019-10-08 | End: 2019-10-08 | Stop reason: SDUPTHER

## 2019-10-08 RX ORDER — PROMETHAZINE HYDROCHLORIDE 25 MG/ML
6.25 INJECTION, SOLUTION INTRAMUSCULAR; INTRAVENOUS
Status: DISCONTINUED | OUTPATIENT
Start: 2019-10-08 | End: 2019-10-08 | Stop reason: HOSPADM

## 2019-10-08 RX ORDER — MAGNESIUM HYDROXIDE 1200 MG/15ML
LIQUID ORAL CONTINUOUS PRN
Status: COMPLETED | OUTPATIENT
Start: 2019-10-08 | End: 2019-10-08

## 2019-10-08 RX ORDER — ONDANSETRON 2 MG/ML
INJECTION INTRAMUSCULAR; INTRAVENOUS PRN
Status: DISCONTINUED | OUTPATIENT
Start: 2019-10-08 | End: 2019-10-08 | Stop reason: SDUPTHER

## 2019-10-08 RX ORDER — ONDANSETRON 2 MG/ML
4 INJECTION INTRAMUSCULAR; INTRAVENOUS
Status: DISCONTINUED | OUTPATIENT
Start: 2019-10-08 | End: 2019-10-08 | Stop reason: HOSPADM

## 2019-10-08 RX ORDER — OXYCODONE HYDROCHLORIDE AND ACETAMINOPHEN 5; 325 MG/1; MG/1
2 TABLET ORAL PRN
Status: DISCONTINUED | OUTPATIENT
Start: 2019-10-08 | End: 2019-10-08 | Stop reason: HOSPADM

## 2019-10-08 RX ADMIN — HYDROMORPHONE HYDROCHLORIDE 1 MG: 1 INJECTION, SOLUTION INTRAMUSCULAR; INTRAVENOUS; SUBCUTANEOUS at 18:28

## 2019-10-08 RX ADMIN — ONDANSETRON 4 MG: 2 INJECTION INTRAMUSCULAR; INTRAVENOUS at 23:17

## 2019-10-08 RX ADMIN — SODIUM CHLORIDE, POTASSIUM CHLORIDE, SODIUM LACTATE AND CALCIUM CHLORIDE: 600; 310; 30; 20 INJECTION, SOLUTION INTRAVENOUS at 12:26

## 2019-10-08 RX ADMIN — ROCURONIUM BROMIDE 20 MG: 10 INJECTION, SOLUTION INTRAVENOUS at 11:52

## 2019-10-08 RX ADMIN — SUGAMMADEX 200 MG: 100 INJECTION, SOLUTION INTRAVENOUS at 13:44

## 2019-10-08 RX ADMIN — FENTANYL CITRATE 50 MCG: 50 INJECTION, SOLUTION INTRAMUSCULAR; INTRAVENOUS at 11:16

## 2019-10-08 RX ADMIN — LIDOCAINE HYDROCHLORIDE 60 MG: 20 INJECTION, SOLUTION INFILTRATION; PERINEURAL at 10:49

## 2019-10-08 RX ADMIN — ROCURONIUM BROMIDE 20 MG: 10 INJECTION, SOLUTION INTRAVENOUS at 11:16

## 2019-10-08 RX ADMIN — ROCURONIUM BROMIDE 10 MG: 10 INJECTION, SOLUTION INTRAVENOUS at 11:19

## 2019-10-08 RX ADMIN — BUPIVACAINE HYDROCHLORIDE 270 ML: 5 INJECTION, SOLUTION EPIDURAL; INTRACAUDAL at 13:34

## 2019-10-08 RX ADMIN — HYDROMORPHONE HYDROCHLORIDE 1 MG: 1 INJECTION, SOLUTION INTRAMUSCULAR; INTRAVENOUS; SUBCUTANEOUS at 09:17

## 2019-10-08 RX ADMIN — MIDAZOLAM 2 MG: 1 INJECTION INTRAMUSCULAR; INTRAVENOUS at 10:44

## 2019-10-08 RX ADMIN — HYDROMORPHONE HYDROCHLORIDE 1 MG: 1 INJECTION, SOLUTION INTRAMUSCULAR; INTRAVENOUS; SUBCUTANEOUS at 02:03

## 2019-10-08 RX ADMIN — ROCURONIUM BROMIDE 50 MG: 10 INJECTION, SOLUTION INTRAVENOUS at 10:49

## 2019-10-08 RX ADMIN — HYDROMORPHONE HYDROCHLORIDE 1 MG: 1 INJECTION, SOLUTION INTRAMUSCULAR; INTRAVENOUS; SUBCUTANEOUS at 21:03

## 2019-10-08 RX ADMIN — HYDROMORPHONE HYDROCHLORIDE 0.5 MG: 2 INJECTION, SOLUTION INTRAMUSCULAR; INTRAVENOUS; SUBCUTANEOUS at 12:09

## 2019-10-08 RX ADMIN — PIPERACILLIN AND TAZOBACTAM 3.38 G: 3; .375 INJECTION, POWDER, FOR SOLUTION INTRAVENOUS at 10:44

## 2019-10-08 RX ADMIN — FAMOTIDINE 20 MG: 10 INJECTION, SOLUTION INTRAVENOUS at 21:03

## 2019-10-08 RX ADMIN — PIPERACILLIN AND TAZOBACTAM 3.38 G: 3; .375 INJECTION, POWDER, FOR SOLUTION INTRAVENOUS at 07:08

## 2019-10-08 RX ADMIN — FENTANYL CITRATE 100 MCG: 50 INJECTION, SOLUTION INTRAMUSCULAR; INTRAVENOUS at 11:52

## 2019-10-08 RX ADMIN — HYDROMORPHONE HYDROCHLORIDE 1 MG: 1 INJECTION, SOLUTION INTRAMUSCULAR; INTRAVENOUS; SUBCUTANEOUS at 16:23

## 2019-10-08 RX ADMIN — HYDROMORPHONE HYDROCHLORIDE 1 MG: 1 INJECTION, SOLUTION INTRAMUSCULAR; INTRAVENOUS; SUBCUTANEOUS at 23:18

## 2019-10-08 RX ADMIN — FENTANYL CITRATE 100 MCG: 50 INJECTION, SOLUTION INTRAMUSCULAR; INTRAVENOUS at 10:49

## 2019-10-08 RX ADMIN — PROPOFOL 300 MG: 10 INJECTION, EMULSION INTRAVENOUS at 10:49

## 2019-10-08 RX ADMIN — LIDOCAINE HYDROCHLORIDE 4 ML: 40 SPRAY LARYNGEAL; TRANSTRACHEAL at 10:50

## 2019-10-08 RX ADMIN — PHENYLEPHRINE HYDROCHLORIDE 100 MCG: 10 INJECTION INTRAVENOUS at 12:14

## 2019-10-08 RX ADMIN — SODIUM CHLORIDE: 900 INJECTION, SOLUTION INTRAVENOUS at 23:20

## 2019-10-08 RX ADMIN — SODIUM CHLORIDE, POTASSIUM CHLORIDE, SODIUM LACTATE AND CALCIUM CHLORIDE: 600; 310; 30; 20 INJECTION, SOLUTION INTRAVENOUS at 13:36

## 2019-10-08 RX ADMIN — ENOXAPARIN SODIUM 40 MG: 40 INJECTION SUBCUTANEOUS at 09:00

## 2019-10-08 RX ADMIN — DIPHENHYDRAMINE HYDROCHLORIDE 25 MG: 50 INJECTION, SOLUTION INTRAMUSCULAR; INTRAVENOUS at 23:17

## 2019-10-08 RX ADMIN — ROCURONIUM BROMIDE 20 MG: 10 INJECTION, SOLUTION INTRAVENOUS at 12:09

## 2019-10-08 RX ADMIN — HYDROMORPHONE HYDROCHLORIDE 0.5 MG: 2 INJECTION, SOLUTION INTRAMUSCULAR; INTRAVENOUS; SUBCUTANEOUS at 13:38

## 2019-10-08 RX ADMIN — PHENYLEPHRINE HYDROCHLORIDE 100 MCG: 10 INJECTION INTRAVENOUS at 12:16

## 2019-10-08 RX ADMIN — HYDROMORPHONE HYDROCHLORIDE 0.5 MG: 2 INJECTION, SOLUTION INTRAMUSCULAR; INTRAVENOUS; SUBCUTANEOUS at 11:42

## 2019-10-08 RX ADMIN — PHENYLEPHRINE HYDROCHLORIDE 100 MCG: 10 INJECTION INTRAVENOUS at 12:26

## 2019-10-08 RX ADMIN — FENTANYL CITRATE 50 MCG: 50 INJECTION, SOLUTION INTRAMUSCULAR; INTRAVENOUS at 10:44

## 2019-10-08 RX ADMIN — DEXAMETHASONE SODIUM PHOSPHATE 8 MG: 4 INJECTION, SOLUTION INTRAMUSCULAR; INTRAVENOUS at 11:03

## 2019-10-08 RX ADMIN — PIPERACILLIN AND TAZOBACTAM 3.38 G: 3; .375 INJECTION, POWDER, FOR SOLUTION INTRAVENOUS at 23:18

## 2019-10-08 RX ADMIN — PIPERACILLIN AND TAZOBACTAM 3.38 G: 3; .375 INJECTION, POWDER, FOR SOLUTION INTRAVENOUS at 15:11

## 2019-10-08 RX ADMIN — HYDROMORPHONE HYDROCHLORIDE 0.5 MG: 2 INJECTION, SOLUTION INTRAMUSCULAR; INTRAVENOUS; SUBCUTANEOUS at 13:43

## 2019-10-08 RX ADMIN — KETOROLAC TROMETHAMINE 30 MG: 30 INJECTION, SOLUTION INTRAMUSCULAR; INTRAVENOUS at 15:11

## 2019-10-08 RX ADMIN — SODIUM CHLORIDE, POTASSIUM CHLORIDE, SODIUM LACTATE AND CALCIUM CHLORIDE: 600; 310; 30; 20 INJECTION, SOLUTION INTRAVENOUS at 11:20

## 2019-10-08 RX ADMIN — HYDROMORPHONE HYDROCHLORIDE 0.5 MG: 1 INJECTION, SOLUTION INTRAMUSCULAR; INTRAVENOUS; SUBCUTANEOUS at 14:05

## 2019-10-08 RX ADMIN — ONDANSETRON 4 MG: 2 INJECTION, SOLUTION INTRAMUSCULAR; INTRAVENOUS at 11:03

## 2019-10-08 RX ADMIN — ROCURONIUM BROMIDE 10 MG: 10 INJECTION, SOLUTION INTRAVENOUS at 11:28

## 2019-10-08 RX ADMIN — DIPHENHYDRAMINE HYDROCHLORIDE 25 MG: 50 INJECTION, SOLUTION INTRAMUSCULAR; INTRAVENOUS at 01:18

## 2019-10-08 RX ADMIN — HYDROMORPHONE HYDROCHLORIDE 1 MG: 1 INJECTION, SOLUTION INTRAMUSCULAR; INTRAVENOUS; SUBCUTANEOUS at 05:19

## 2019-10-08 RX ADMIN — HYDROMORPHONE HYDROCHLORIDE 1 MG: 1 INJECTION, SOLUTION INTRAMUSCULAR; INTRAVENOUS; SUBCUTANEOUS at 07:10

## 2019-10-08 RX ADMIN — KETOROLAC TROMETHAMINE 30 MG: 30 INJECTION, SOLUTION INTRAMUSCULAR; INTRAVENOUS at 23:32

## 2019-10-08 RX ADMIN — HYDROMORPHONE HYDROCHLORIDE 0.5 MG: 1 INJECTION, SOLUTION INTRAMUSCULAR; INTRAVENOUS; SUBCUTANEOUS at 14:20

## 2019-10-08 RX ADMIN — FENTANYL CITRATE 50 MCG: 50 INJECTION, SOLUTION INTRAMUSCULAR; INTRAVENOUS at 11:39

## 2019-10-08 RX ADMIN — SODIUM CHLORIDE: 900 INJECTION, SOLUTION INTRAVENOUS at 10:44

## 2019-10-08 RX ADMIN — FENTANYL CITRATE 100 MCG: 50 INJECTION, SOLUTION INTRAMUSCULAR; INTRAVENOUS at 11:28

## 2019-10-08 RX ADMIN — FENTANYL CITRATE 50 MCG: 50 INJECTION, SOLUTION INTRAMUSCULAR; INTRAVENOUS at 11:19

## 2019-10-08 ASSESSMENT — PULMONARY FUNCTION TESTS
PIF_VALUE: 19
PIF_VALUE: 19
PIF_VALUE: 15
PIF_VALUE: 19
PIF_VALUE: 20
PIF_VALUE: 19
PIF_VALUE: 19
PIF_VALUE: 17
PIF_VALUE: 19
PIF_VALUE: 17
PIF_VALUE: 22
PIF_VALUE: 19
PIF_VALUE: 0
PIF_VALUE: 3
PIF_VALUE: 19
PIF_VALUE: 21
PIF_VALUE: 19
PIF_VALUE: 17
PIF_VALUE: 17
PIF_VALUE: 15
PIF_VALUE: 19
PIF_VALUE: 20
PIF_VALUE: 21
PIF_VALUE: 19
PIF_VALUE: 19
PIF_VALUE: 4
PIF_VALUE: 21
PIF_VALUE: 19
PIF_VALUE: 7
PIF_VALUE: 15
PIF_VALUE: 19
PIF_VALUE: 3
PIF_VALUE: 15
PIF_VALUE: 21
PIF_VALUE: 21
PIF_VALUE: 22
PIF_VALUE: 19
PIF_VALUE: 22
PIF_VALUE: 19
PIF_VALUE: 21
PIF_VALUE: 19
PIF_VALUE: 21
PIF_VALUE: 16
PIF_VALUE: 21
PIF_VALUE: 19
PIF_VALUE: 22
PIF_VALUE: 18
PIF_VALUE: 19
PIF_VALUE: 21
PIF_VALUE: 19
PIF_VALUE: 21
PIF_VALUE: 19
PIF_VALUE: 19
PIF_VALUE: 1
PIF_VALUE: 19
PIF_VALUE: 21
PIF_VALUE: 21
PIF_VALUE: 19
PIF_VALUE: 21
PIF_VALUE: 15
PIF_VALUE: 21
PIF_VALUE: 19
PIF_VALUE: 17
PIF_VALUE: 19
PIF_VALUE: 22
PIF_VALUE: 21
PIF_VALUE: 16
PIF_VALUE: 19
PIF_VALUE: 15
PIF_VALUE: 21
PIF_VALUE: 17
PIF_VALUE: 21
PIF_VALUE: 19
PIF_VALUE: 17
PIF_VALUE: 19
PIF_VALUE: 19
PIF_VALUE: 17
PIF_VALUE: 21
PIF_VALUE: 19
PIF_VALUE: 22
PIF_VALUE: 21
PIF_VALUE: 19
PIF_VALUE: 3
PIF_VALUE: 19
PIF_VALUE: 21
PIF_VALUE: 19
PIF_VALUE: 19
PIF_VALUE: 18
PIF_VALUE: 25
PIF_VALUE: 21
PIF_VALUE: 22
PIF_VALUE: 1
PIF_VALUE: 19
PIF_VALUE: 22
PIF_VALUE: 22
PIF_VALUE: 19
PIF_VALUE: 14
PIF_VALUE: 22
PIF_VALUE: 21
PIF_VALUE: 22
PIF_VALUE: 21
PIF_VALUE: 19
PIF_VALUE: 17
PIF_VALUE: 21
PIF_VALUE: 19
PIF_VALUE: 19
PIF_VALUE: 17
PIF_VALUE: 21
PIF_VALUE: 21
PIF_VALUE: 19
PIF_VALUE: 21
PIF_VALUE: 19
PIF_VALUE: 22
PIF_VALUE: 19
PIF_VALUE: 19
PIF_VALUE: 21
PIF_VALUE: 15
PIF_VALUE: 19
PIF_VALUE: 22
PIF_VALUE: 4
PIF_VALUE: 6
PIF_VALUE: 20
PIF_VALUE: 19
PIF_VALUE: 19
PIF_VALUE: 1
PIF_VALUE: 1
PIF_VALUE: 21
PIF_VALUE: 21
PIF_VALUE: 19
PIF_VALUE: 22
PIF_VALUE: 21
PIF_VALUE: 3
PIF_VALUE: 21
PIF_VALUE: 19
PIF_VALUE: 22
PIF_VALUE: 22
PIF_VALUE: 16
PIF_VALUE: 19
PIF_VALUE: 21
PIF_VALUE: 19
PIF_VALUE: 21
PIF_VALUE: 15
PIF_VALUE: 21
PIF_VALUE: 22
PIF_VALUE: 20
PIF_VALUE: 21
PIF_VALUE: 5
PIF_VALUE: 0
PIF_VALUE: 19
PIF_VALUE: 21
PIF_VALUE: 19
PIF_VALUE: 19
PIF_VALUE: 21
PIF_VALUE: 15
PIF_VALUE: 3
PIF_VALUE: 15
PIF_VALUE: 19
PIF_VALUE: 17
PIF_VALUE: 19
PIF_VALUE: 21
PIF_VALUE: 21
PIF_VALUE: 15
PIF_VALUE: 19
PIF_VALUE: 21

## 2019-10-08 ASSESSMENT — PAIN DESCRIPTION - PAIN TYPE
TYPE: SURGICAL PAIN
TYPE: ACUTE PAIN
TYPE: SURGICAL PAIN

## 2019-10-08 ASSESSMENT — PAIN SCALES - GENERAL
PAINLEVEL_OUTOF10: 10
PAINLEVEL_OUTOF10: 0
PAINLEVEL_OUTOF10: 9
PAINLEVEL_OUTOF10: 10
PAINLEVEL_OUTOF10: 7
PAINLEVEL_OUTOF10: 6
PAINLEVEL_OUTOF10: 9
PAINLEVEL_OUTOF10: 8
PAINLEVEL_OUTOF10: 9
PAINLEVEL_OUTOF10: 7
PAINLEVEL_OUTOF10: 9
PAINLEVEL_OUTOF10: 10
PAINLEVEL_OUTOF10: 7
PAINLEVEL_OUTOF10: 10

## 2019-10-08 ASSESSMENT — PAIN DESCRIPTION - ORIENTATION
ORIENTATION: MID

## 2019-10-08 ASSESSMENT — PAIN DESCRIPTION - LOCATION
LOCATION: ABDOMEN

## 2019-10-08 ASSESSMENT — PAIN DESCRIPTION - DESCRIPTORS
DESCRIPTORS: CONSTANT
DESCRIPTORS: CONSTANT
DESCRIPTORS: BURNING
DESCRIPTORS: BURNING
DESCRIPTORS: CONSTANT
DESCRIPTORS: CONSTANT

## 2019-10-08 ASSESSMENT — LIFESTYLE VARIABLES: SMOKING_STATUS: 1

## 2019-10-08 ASSESSMENT — PAIN DESCRIPTION - PROGRESSION
CLINICAL_PROGRESSION: NOT CHANGED
CLINICAL_PROGRESSION: NOT CHANGED

## 2019-10-09 LAB
ANION GAP SERPL CALCULATED.3IONS-SCNC: 10 MMOL/L (ref 3–16)
ANISOCYTOSIS: ABNORMAL
BANDED NEUTROPHILS RELATIVE PERCENT: 3 % (ref 0–7)
BASOPHILS ABSOLUTE: 0 K/UL (ref 0–0.2)
BASOPHILS RELATIVE PERCENT: 0 %
BUN BLDV-MCNC: 7 MG/DL (ref 7–20)
CALCIUM SERPL-MCNC: 8.7 MG/DL (ref 8.3–10.6)
CHLORIDE BLD-SCNC: 103 MMOL/L (ref 99–110)
CO2: 24 MMOL/L (ref 21–32)
CREAT SERPL-MCNC: 0.6 MG/DL (ref 0.9–1.3)
EOSINOPHILS ABSOLUTE: 0 K/UL (ref 0–0.6)
EOSINOPHILS RELATIVE PERCENT: 0 %
GFR AFRICAN AMERICAN: >60
GFR NON-AFRICAN AMERICAN: >60
GLUCOSE BLD-MCNC: 115 MG/DL (ref 70–99)
HCT VFR BLD CALC: 29.9 % (ref 40.5–52.5)
HEMOGLOBIN: 10.1 G/DL (ref 13.5–17.5)
HYPOCHROMIA: ABNORMAL
LYMPHOCYTES ABSOLUTE: 1.3 K/UL (ref 1–5.1)
LYMPHOCYTES RELATIVE PERCENT: 14 %
MAGNESIUM: 2 MG/DL (ref 1.8–2.4)
MCH RBC QN AUTO: 31 PG (ref 26–34)
MCHC RBC AUTO-ENTMCNC: 33.7 G/DL (ref 31–36)
MCV RBC AUTO: 91.8 FL (ref 80–100)
MONOCYTES ABSOLUTE: 0.8 K/UL (ref 0–1.3)
MONOCYTES RELATIVE PERCENT: 9 %
NEUTROPHILS ABSOLUTE: 7.2 K/UL (ref 1.7–7.7)
NEUTROPHILS RELATIVE PERCENT: 74 %
PDW BLD-RTO: 16.3 % (ref 12.4–15.4)
PHOSPHORUS: 4 MG/DL (ref 2.5–4.9)
PLATELET # BLD: 301 K/UL (ref 135–450)
PLATELET SLIDE REVIEW: ADEQUATE
PMV BLD AUTO: 7.2 FL (ref 5–10.5)
POTASSIUM SERPL-SCNC: 4.1 MMOL/L (ref 3.5–5.1)
RBC # BLD: 3.25 M/UL (ref 4.2–5.9)
SLIDE REVIEW: ABNORMAL
SODIUM BLD-SCNC: 137 MMOL/L (ref 136–145)
WBC # BLD: 9.3 K/UL (ref 4–11)

## 2019-10-09 PROCEDURE — 84100 ASSAY OF PHOSPHORUS: CPT

## 2019-10-09 PROCEDURE — 2500000003 HC RX 250 WO HCPCS: Performed by: SURGERY

## 2019-10-09 PROCEDURE — 2580000003 HC RX 258: Performed by: SURGERY

## 2019-10-09 PROCEDURE — 6360000002 HC RX W HCPCS: Performed by: SURGERY

## 2019-10-09 PROCEDURE — 36415 COLL VENOUS BLD VENIPUNCTURE: CPT

## 2019-10-09 PROCEDURE — 99024 POSTOP FOLLOW-UP VISIT: CPT | Performed by: NURSE PRACTITIONER

## 2019-10-09 PROCEDURE — 80048 BASIC METABOLIC PNL TOTAL CA: CPT

## 2019-10-09 PROCEDURE — 85025 COMPLETE CBC W/AUTO DIFF WBC: CPT

## 2019-10-09 PROCEDURE — 83735 ASSAY OF MAGNESIUM: CPT

## 2019-10-09 PROCEDURE — 1200000000 HC SEMI PRIVATE

## 2019-10-09 RX ADMIN — DIPHENHYDRAMINE HYDROCHLORIDE 25 MG: 50 INJECTION, SOLUTION INTRAMUSCULAR; INTRAVENOUS at 20:12

## 2019-10-09 RX ADMIN — FAMOTIDINE 20 MG: 10 INJECTION, SOLUTION INTRAVENOUS at 08:38

## 2019-10-09 RX ADMIN — KETOROLAC TROMETHAMINE 30 MG: 30 INJECTION, SOLUTION INTRAMUSCULAR; INTRAVENOUS at 17:10

## 2019-10-09 RX ADMIN — HYDROMORPHONE HYDROCHLORIDE 1 MG: 1 INJECTION, SOLUTION INTRAMUSCULAR; INTRAVENOUS; SUBCUTANEOUS at 11:00

## 2019-10-09 RX ADMIN — SODIUM CHLORIDE: 900 INJECTION, SOLUTION INTRAVENOUS at 07:42

## 2019-10-09 RX ADMIN — PIPERACILLIN AND TAZOBACTAM 3.38 G: 3; .375 INJECTION, POWDER, FOR SOLUTION INTRAVENOUS at 22:19

## 2019-10-09 RX ADMIN — HYDROMORPHONE HYDROCHLORIDE 1 MG: 1 INJECTION, SOLUTION INTRAMUSCULAR; INTRAVENOUS; SUBCUTANEOUS at 22:19

## 2019-10-09 RX ADMIN — HYDROMORPHONE HYDROCHLORIDE 1 MG: 1 INJECTION, SOLUTION INTRAMUSCULAR; INTRAVENOUS; SUBCUTANEOUS at 03:37

## 2019-10-09 RX ADMIN — HYDROMORPHONE HYDROCHLORIDE 1 MG: 1 INJECTION, SOLUTION INTRAMUSCULAR; INTRAVENOUS; SUBCUTANEOUS at 08:37

## 2019-10-09 RX ADMIN — HYDROMORPHONE HYDROCHLORIDE 1 MG: 1 INJECTION, SOLUTION INTRAMUSCULAR; INTRAVENOUS; SUBCUTANEOUS at 15:42

## 2019-10-09 RX ADMIN — ENOXAPARIN SODIUM 40 MG: 40 INJECTION SUBCUTANEOUS at 08:38

## 2019-10-09 RX ADMIN — PIPERACILLIN AND TAZOBACTAM 3.38 G: 3; .375 INJECTION, POWDER, FOR SOLUTION INTRAVENOUS at 06:01

## 2019-10-09 RX ADMIN — DIPHENHYDRAMINE HYDROCHLORIDE 25 MG: 50 INJECTION, SOLUTION INTRAMUSCULAR; INTRAVENOUS at 08:38

## 2019-10-09 RX ADMIN — PIPERACILLIN AND TAZOBACTAM 3.38 G: 3; .375 INJECTION, POWDER, FOR SOLUTION INTRAVENOUS at 14:49

## 2019-10-09 RX ADMIN — HYDROMORPHONE HYDROCHLORIDE 1 MG: 1 INJECTION, SOLUTION INTRAMUSCULAR; INTRAVENOUS; SUBCUTANEOUS at 17:44

## 2019-10-09 RX ADMIN — HYDROMORPHONE HYDROCHLORIDE 1 MG: 1 INJECTION, SOLUTION INTRAMUSCULAR; INTRAVENOUS; SUBCUTANEOUS at 05:59

## 2019-10-09 RX ADMIN — KETOROLAC TROMETHAMINE 30 MG: 30 INJECTION, SOLUTION INTRAMUSCULAR; INTRAVENOUS at 07:41

## 2019-10-09 RX ADMIN — SODIUM CHLORIDE: 900 INJECTION, SOLUTION INTRAVENOUS at 14:49

## 2019-10-09 RX ADMIN — HYDROMORPHONE HYDROCHLORIDE 1 MG: 1 INJECTION, SOLUTION INTRAMUSCULAR; INTRAVENOUS; SUBCUTANEOUS at 13:30

## 2019-10-09 RX ADMIN — SODIUM CHLORIDE: 900 INJECTION, SOLUTION INTRAVENOUS at 20:12

## 2019-10-09 RX ADMIN — FAMOTIDINE 20 MG: 10 INJECTION, SOLUTION INTRAVENOUS at 20:12

## 2019-10-09 RX ADMIN — HYDROMORPHONE HYDROCHLORIDE 1 MG: 1 INJECTION, SOLUTION INTRAMUSCULAR; INTRAVENOUS; SUBCUTANEOUS at 20:12

## 2019-10-09 ASSESSMENT — PAIN DESCRIPTION - PAIN TYPE
TYPE: SURGICAL PAIN
TYPE: SURGICAL PAIN;ACUTE PAIN
TYPE: SURGICAL PAIN

## 2019-10-09 ASSESSMENT — PAIN DESCRIPTION - DESCRIPTORS
DESCRIPTORS: ACHING;CRAMPING;CONSTANT;SHARP
DESCRIPTORS: CONSTANT;ACHING;SHARP
DESCRIPTORS: CONSTANT

## 2019-10-09 ASSESSMENT — PAIN DESCRIPTION - ORIENTATION
ORIENTATION: MID

## 2019-10-09 ASSESSMENT — PAIN SCALES - GENERAL
PAINLEVEL_OUTOF10: 8
PAINLEVEL_OUTOF10: 9
PAINLEVEL_OUTOF10: 8
PAINLEVEL_OUTOF10: 9
PAINLEVEL_OUTOF10: 8
PAINLEVEL_OUTOF10: 9
PAINLEVEL_OUTOF10: 8
PAINLEVEL_OUTOF10: 9

## 2019-10-09 ASSESSMENT — PAIN DESCRIPTION - ONSET: ONSET: ON-GOING

## 2019-10-09 ASSESSMENT — PAIN DESCRIPTION - LOCATION
LOCATION: ABDOMEN

## 2019-10-09 ASSESSMENT — PAIN DESCRIPTION - PROGRESSION: CLINICAL_PROGRESSION: NOT CHANGED

## 2019-10-09 ASSESSMENT — PAIN - FUNCTIONAL ASSESSMENT: PAIN_FUNCTIONAL_ASSESSMENT: PREVENTS OR INTERFERES SOME ACTIVE ACTIVITIES AND ADLS

## 2019-10-09 ASSESSMENT — PAIN DESCRIPTION - FREQUENCY
FREQUENCY: CONTINUOUS
FREQUENCY: CONTINUOUS

## 2019-10-10 LAB
ANION GAP SERPL CALCULATED.3IONS-SCNC: 14 MMOL/L (ref 3–16)
ANISOCYTOSIS: ABNORMAL
BASOPHILS ABSOLUTE: 0 K/UL (ref 0–0.2)
BASOPHILS RELATIVE PERCENT: 0 %
BUN BLDV-MCNC: 6 MG/DL (ref 7–20)
CALCIUM SERPL-MCNC: 8.4 MG/DL (ref 8.3–10.6)
CHLORIDE BLD-SCNC: 101 MMOL/L (ref 99–110)
CO2: 18 MMOL/L (ref 21–32)
CREAT SERPL-MCNC: 0.6 MG/DL (ref 0.9–1.3)
EOSINOPHILS ABSOLUTE: 0 K/UL (ref 0–0.6)
EOSINOPHILS RELATIVE PERCENT: 0 %
GFR AFRICAN AMERICAN: >60
GFR NON-AFRICAN AMERICAN: >60
GLUCOSE BLD-MCNC: 102 MG/DL (ref 70–99)
HCT VFR BLD CALC: 30 % (ref 40.5–52.5)
HEMOGLOBIN: 9.6 G/DL (ref 13.5–17.5)
LYMPHOCYTES ABSOLUTE: 1.6 K/UL (ref 1–5.1)
LYMPHOCYTES RELATIVE PERCENT: 17 %
MAGNESIUM: 2 MG/DL (ref 1.8–2.4)
MCH RBC QN AUTO: 31.1 PG (ref 26–34)
MCHC RBC AUTO-ENTMCNC: 32 G/DL (ref 31–36)
MCV RBC AUTO: 97.4 FL (ref 80–100)
MONOCYTES ABSOLUTE: 0.3 K/UL (ref 0–1.3)
MONOCYTES RELATIVE PERCENT: 3 %
NEUTROPHILS ABSOLUTE: 7.7 K/UL (ref 1.7–7.7)
NEUTROPHILS RELATIVE PERCENT: 80 %
PDW BLD-RTO: 16.3 % (ref 12.4–15.4)
PHOSPHORUS: 2.2 MG/DL (ref 2.5–4.9)
PLATELET # BLD: 208 K/UL (ref 135–450)
PLATELET SLIDE REVIEW: ADEQUATE
PMV BLD AUTO: 7 FL (ref 5–10.5)
POTASSIUM SERPL-SCNC: 3.5 MMOL/L (ref 3.5–5.1)
RBC # BLD: 3.08 M/UL (ref 4.2–5.9)
SLIDE REVIEW: ABNORMAL
SODIUM BLD-SCNC: 133 MMOL/L (ref 136–145)
WBC # BLD: 9.6 K/UL (ref 4–11)

## 2019-10-10 PROCEDURE — 99024 POSTOP FOLLOW-UP VISIT: CPT | Performed by: SURGERY

## 2019-10-10 PROCEDURE — 36415 COLL VENOUS BLD VENIPUNCTURE: CPT

## 2019-10-10 PROCEDURE — 80048 BASIC METABOLIC PNL TOTAL CA: CPT

## 2019-10-10 PROCEDURE — 84100 ASSAY OF PHOSPHORUS: CPT

## 2019-10-10 PROCEDURE — 2500000003 HC RX 250 WO HCPCS: Performed by: SURGERY

## 2019-10-10 PROCEDURE — 6360000002 HC RX W HCPCS: Performed by: SURGERY

## 2019-10-10 PROCEDURE — 85025 COMPLETE CBC W/AUTO DIFF WBC: CPT

## 2019-10-10 PROCEDURE — 1200000000 HC SEMI PRIVATE

## 2019-10-10 PROCEDURE — 2500000003 HC RX 250 WO HCPCS: Performed by: NURSE PRACTITIONER

## 2019-10-10 PROCEDURE — 2580000003 HC RX 258: Performed by: NURSE PRACTITIONER

## 2019-10-10 PROCEDURE — 2580000003 HC RX 258: Performed by: SURGERY

## 2019-10-10 PROCEDURE — 99024 POSTOP FOLLOW-UP VISIT: CPT | Performed by: NURSE PRACTITIONER

## 2019-10-10 PROCEDURE — 83735 ASSAY OF MAGNESIUM: CPT

## 2019-10-10 PROCEDURE — 6370000000 HC RX 637 (ALT 250 FOR IP): Performed by: SURGERY

## 2019-10-10 RX ADMIN — HYDROMORPHONE HYDROCHLORIDE 1 MG: 1 INJECTION, SOLUTION INTRAMUSCULAR; INTRAVENOUS; SUBCUTANEOUS at 15:50

## 2019-10-10 RX ADMIN — KETOROLAC TROMETHAMINE 30 MG: 30 INJECTION, SOLUTION INTRAMUSCULAR; INTRAVENOUS at 18:16

## 2019-10-10 RX ADMIN — HYDROMORPHONE HYDROCHLORIDE 1 MG: 1 INJECTION, SOLUTION INTRAMUSCULAR; INTRAVENOUS; SUBCUTANEOUS at 00:20

## 2019-10-10 RX ADMIN — DIPHENHYDRAMINE HYDROCHLORIDE 25 MG: 50 INJECTION, SOLUTION INTRAMUSCULAR; INTRAVENOUS at 20:54

## 2019-10-10 RX ADMIN — PIPERACILLIN AND TAZOBACTAM 3.38 G: 3; .375 INJECTION, POWDER, FOR SOLUTION INTRAVENOUS at 06:42

## 2019-10-10 RX ADMIN — KETOROLAC TROMETHAMINE 30 MG: 30 INJECTION, SOLUTION INTRAMUSCULAR; INTRAVENOUS at 01:18

## 2019-10-10 RX ADMIN — KETOROLAC TROMETHAMINE 30 MG: 30 INJECTION, SOLUTION INTRAMUSCULAR; INTRAVENOUS at 09:28

## 2019-10-10 RX ADMIN — DIPHENHYDRAMINE HYDROCHLORIDE 25 MG: 50 INJECTION, SOLUTION INTRAMUSCULAR; INTRAVENOUS at 14:01

## 2019-10-10 RX ADMIN — FAMOTIDINE 20 MG: 10 INJECTION, SOLUTION INTRAVENOUS at 20:11

## 2019-10-10 RX ADMIN — HYDROMORPHONE HYDROCHLORIDE 1 MG: 1 INJECTION, SOLUTION INTRAMUSCULAR; INTRAVENOUS; SUBCUTANEOUS at 22:25

## 2019-10-10 RX ADMIN — SODIUM PHOSPHATE, MONOBASIC, MONOHYDRATE 10 MMOL: 276; 142 INJECTION, SOLUTION INTRAVENOUS at 12:54

## 2019-10-10 RX ADMIN — HYDROMORPHONE HYDROCHLORIDE 1 MG: 1 INJECTION, SOLUTION INTRAMUSCULAR; INTRAVENOUS; SUBCUTANEOUS at 11:17

## 2019-10-10 RX ADMIN — HYDROMORPHONE HYDROCHLORIDE 1 MG: 1 INJECTION, SOLUTION INTRAMUSCULAR; INTRAVENOUS; SUBCUTANEOUS at 18:16

## 2019-10-10 RX ADMIN — HYDROMORPHONE HYDROCHLORIDE 1 MG: 1 INJECTION, SOLUTION INTRAMUSCULAR; INTRAVENOUS; SUBCUTANEOUS at 02:20

## 2019-10-10 RX ADMIN — HYDROMORPHONE HYDROCHLORIDE 1 MG: 1 INJECTION, SOLUTION INTRAMUSCULAR; INTRAVENOUS; SUBCUTANEOUS at 05:02

## 2019-10-10 RX ADMIN — ACETAMINOPHEN 650 MG: 325 TABLET ORAL at 20:50

## 2019-10-10 RX ADMIN — FAMOTIDINE 20 MG: 10 INJECTION, SOLUTION INTRAVENOUS at 08:58

## 2019-10-10 RX ADMIN — PIPERACILLIN AND TAZOBACTAM 3.38 G: 3; .375 INJECTION, POWDER, FOR SOLUTION INTRAVENOUS at 13:57

## 2019-10-10 RX ADMIN — DIPHENHYDRAMINE HYDROCHLORIDE 25 MG: 50 INJECTION, SOLUTION INTRAMUSCULAR; INTRAVENOUS at 02:23

## 2019-10-10 RX ADMIN — ONDANSETRON 4 MG: 2 INJECTION INTRAMUSCULAR; INTRAVENOUS at 20:55

## 2019-10-10 RX ADMIN — HYDROMORPHONE HYDROCHLORIDE 1 MG: 1 INJECTION, SOLUTION INTRAMUSCULAR; INTRAVENOUS; SUBCUTANEOUS at 06:59

## 2019-10-10 RX ADMIN — SODIUM CHLORIDE: 900 INJECTION, SOLUTION INTRAVENOUS at 18:16

## 2019-10-10 RX ADMIN — HYDROMORPHONE HYDROCHLORIDE 1 MG: 1 INJECTION, SOLUTION INTRAMUSCULAR; INTRAVENOUS; SUBCUTANEOUS at 08:59

## 2019-10-10 RX ADMIN — PIPERACILLIN AND TAZOBACTAM 3.38 G: 3; .375 INJECTION, POWDER, FOR SOLUTION INTRAVENOUS at 22:24

## 2019-10-10 RX ADMIN — HYDROMORPHONE HYDROCHLORIDE 1 MG: 1 INJECTION, SOLUTION INTRAMUSCULAR; INTRAVENOUS; SUBCUTANEOUS at 13:19

## 2019-10-10 RX ADMIN — HYDROMORPHONE HYDROCHLORIDE 1 MG: 1 INJECTION, SOLUTION INTRAMUSCULAR; INTRAVENOUS; SUBCUTANEOUS at 20:25

## 2019-10-10 RX ADMIN — ENOXAPARIN SODIUM 40 MG: 40 INJECTION SUBCUTANEOUS at 08:58

## 2019-10-10 ASSESSMENT — PAIN - FUNCTIONAL ASSESSMENT
PAIN_FUNCTIONAL_ASSESSMENT: PREVENTS OR INTERFERES SOME ACTIVE ACTIVITIES AND ADLS
PAIN_FUNCTIONAL_ASSESSMENT: ACTIVITIES ARE NOT PREVENTED
PAIN_FUNCTIONAL_ASSESSMENT: PREVENTS OR INTERFERES SOME ACTIVE ACTIVITIES AND ADLS

## 2019-10-10 ASSESSMENT — PAIN DESCRIPTION - ORIENTATION
ORIENTATION: MID
ORIENTATION: RIGHT
ORIENTATION: MID
ORIENTATION: RIGHT
ORIENTATION: RIGHT
ORIENTATION: MID
ORIENTATION: RIGHT
ORIENTATION: MID
ORIENTATION: RIGHT

## 2019-10-10 ASSESSMENT — PAIN DESCRIPTION - LOCATION
LOCATION: ABDOMEN

## 2019-10-10 ASSESSMENT — PAIN DESCRIPTION - FREQUENCY
FREQUENCY: CONTINUOUS

## 2019-10-10 ASSESSMENT — PAIN DESCRIPTION - PAIN TYPE
TYPE: SURGICAL PAIN
TYPE: ACUTE PAIN
TYPE: SURGICAL PAIN
TYPE: ACUTE PAIN
TYPE: ACUTE PAIN
TYPE: SURGICAL PAIN
TYPE: SURGICAL PAIN
TYPE: ACUTE PAIN
TYPE: SURGICAL PAIN
TYPE: SURGICAL PAIN

## 2019-10-10 ASSESSMENT — PAIN SCALES - GENERAL
PAINLEVEL_OUTOF10: 9
PAINLEVEL_OUTOF10: 8
PAINLEVEL_OUTOF10: 8
PAINLEVEL_OUTOF10: 7
PAINLEVEL_OUTOF10: 4
PAINLEVEL_OUTOF10: 10
PAINLEVEL_OUTOF10: 6
PAINLEVEL_OUTOF10: 8
PAINLEVEL_OUTOF10: 8
PAINLEVEL_OUTOF10: 7
PAINLEVEL_OUTOF10: 8
PAINLEVEL_OUTOF10: 3
PAINLEVEL_OUTOF10: 8
PAINLEVEL_OUTOF10: 9
PAINLEVEL_OUTOF10: 8

## 2019-10-10 ASSESSMENT — PAIN DESCRIPTION - DESCRIPTORS
DESCRIPTORS: DISCOMFORT
DESCRIPTORS: ACHING;DISCOMFORT;BURNING
DESCRIPTORS: DISCOMFORT
DESCRIPTORS: ACHING;BURNING;SHOOTING;SHARP
DESCRIPTORS: DISCOMFORT
DESCRIPTORS: ACHING;DISCOMFORT

## 2019-10-10 ASSESSMENT — PAIN DESCRIPTION - PROGRESSION
CLINICAL_PROGRESSION: NOT CHANGED

## 2019-10-10 ASSESSMENT — PAIN DESCRIPTION - ONSET
ONSET: ON-GOING

## 2019-10-11 LAB
ANION GAP SERPL CALCULATED.3IONS-SCNC: 12 MMOL/L (ref 3–16)
BASOPHILS ABSOLUTE: 0 K/UL (ref 0–0.2)
BASOPHILS RELATIVE PERCENT: 0.2 %
BUN BLDV-MCNC: 4 MG/DL (ref 7–20)
CALCIUM SERPL-MCNC: 8.4 MG/DL (ref 8.3–10.6)
CHLORIDE BLD-SCNC: 100 MMOL/L (ref 99–110)
CO2: 24 MMOL/L (ref 21–32)
CREAT SERPL-MCNC: <0.5 MG/DL (ref 0.9–1.3)
EOSINOPHILS ABSOLUTE: 0.2 K/UL (ref 0–0.6)
EOSINOPHILS RELATIVE PERCENT: 2.2 %
GFR AFRICAN AMERICAN: >60
GFR NON-AFRICAN AMERICAN: >60
GLUCOSE BLD-MCNC: 135 MG/DL (ref 70–99)
HCT VFR BLD CALC: 24.1 % (ref 40.5–52.5)
HEMOGLOBIN: 8.2 G/DL (ref 13.5–17.5)
LYMPHOCYTES ABSOLUTE: 1.2 K/UL (ref 1–5.1)
LYMPHOCYTES RELATIVE PERCENT: 15.7 %
MAGNESIUM: 1.8 MG/DL (ref 1.8–2.4)
MCH RBC QN AUTO: 30.5 PG (ref 26–34)
MCHC RBC AUTO-ENTMCNC: 34.1 G/DL (ref 31–36)
MCV RBC AUTO: 89.5 FL (ref 80–100)
MONOCYTES ABSOLUTE: 1.1 K/UL (ref 0–1.3)
MONOCYTES RELATIVE PERCENT: 14.3 %
NEUTROPHILS ABSOLUTE: 5.2 K/UL (ref 1.7–7.7)
NEUTROPHILS RELATIVE PERCENT: 67.6 %
PDW BLD-RTO: 16.2 % (ref 12.4–15.4)
PHOSPHORUS: 2.6 MG/DL (ref 2.5–4.9)
PLATELET # BLD: 298 K/UL (ref 135–450)
PMV BLD AUTO: 6.8 FL (ref 5–10.5)
POTASSIUM SERPL-SCNC: 3.2 MMOL/L (ref 3.5–5.1)
RBC # BLD: 2.69 M/UL (ref 4.2–5.9)
SODIUM BLD-SCNC: 136 MMOL/L (ref 136–145)
WBC # BLD: 7.6 K/UL (ref 4–11)

## 2019-10-11 PROCEDURE — 85025 COMPLETE CBC W/AUTO DIFF WBC: CPT

## 2019-10-11 PROCEDURE — 2500000003 HC RX 250 WO HCPCS: Performed by: SURGERY

## 2019-10-11 PROCEDURE — 80048 BASIC METABOLIC PNL TOTAL CA: CPT

## 2019-10-11 PROCEDURE — 36415 COLL VENOUS BLD VENIPUNCTURE: CPT

## 2019-10-11 PROCEDURE — 1200000000 HC SEMI PRIVATE

## 2019-10-11 PROCEDURE — 6360000002 HC RX W HCPCS: Performed by: SURGERY

## 2019-10-11 PROCEDURE — 84100 ASSAY OF PHOSPHORUS: CPT

## 2019-10-11 PROCEDURE — 2580000003 HC RX 258: Performed by: SURGERY

## 2019-10-11 PROCEDURE — 83735 ASSAY OF MAGNESIUM: CPT

## 2019-10-11 PROCEDURE — 99024 POSTOP FOLLOW-UP VISIT: CPT | Performed by: NURSE PRACTITIONER

## 2019-10-11 PROCEDURE — 2580000003 HC RX 258: Performed by: NURSE PRACTITIONER

## 2019-10-11 PROCEDURE — 6370000000 HC RX 637 (ALT 250 FOR IP): Performed by: NURSE PRACTITIONER

## 2019-10-11 PROCEDURE — 6360000002 HC RX W HCPCS: Performed by: NURSE PRACTITIONER

## 2019-10-11 RX ORDER — SODIUM CHLORIDE 0.9 % (FLUSH) 0.9 %
SYRINGE (ML) INJECTION
Status: DISPENSED
Start: 2019-10-11 | End: 2019-10-11

## 2019-10-11 RX ORDER — POTASSIUM CHLORIDE 7.45 MG/ML
10 INJECTION INTRAVENOUS
Status: COMPLETED | OUTPATIENT
Start: 2019-10-11 | End: 2019-10-11

## 2019-10-11 RX ORDER — SODIUM CHLORIDE AND POTASSIUM CHLORIDE .9; .15 G/100ML; G/100ML
SOLUTION INTRAVENOUS CONTINUOUS
Status: DISCONTINUED | OUTPATIENT
Start: 2019-10-11 | End: 2019-10-14 | Stop reason: HOSPADM

## 2019-10-11 RX ORDER — OXYCODONE HYDROCHLORIDE 5 MG/1
5 TABLET ORAL EVERY 4 HOURS PRN
Status: DISCONTINUED | OUTPATIENT
Start: 2019-10-11 | End: 2019-10-14 | Stop reason: HOSPADM

## 2019-10-11 RX ORDER — OXYCODONE HYDROCHLORIDE 5 MG/1
10 TABLET ORAL EVERY 4 HOURS PRN
Status: DISCONTINUED | OUTPATIENT
Start: 2019-10-11 | End: 2019-10-14 | Stop reason: HOSPADM

## 2019-10-11 RX ORDER — POTASSIUM CHLORIDE 7.45 MG/ML
10 INJECTION INTRAVENOUS
Status: DISPENSED | OUTPATIENT
Start: 2019-10-11 | End: 2019-10-11

## 2019-10-11 RX ADMIN — HYDROMORPHONE HYDROCHLORIDE 1 MG: 1 INJECTION, SOLUTION INTRAMUSCULAR; INTRAVENOUS; SUBCUTANEOUS at 08:34

## 2019-10-11 RX ADMIN — POTASSIUM CHLORIDE AND SODIUM CHLORIDE: 900; 150 INJECTION, SOLUTION INTRAVENOUS at 18:45

## 2019-10-11 RX ADMIN — HYDROMORPHONE HYDROCHLORIDE 1 MG: 1 INJECTION, SOLUTION INTRAMUSCULAR; INTRAVENOUS; SUBCUTANEOUS at 06:26

## 2019-10-11 RX ADMIN — PIPERACILLIN AND TAZOBACTAM 3.38 G: 3; .375 INJECTION, POWDER, FOR SOLUTION INTRAVENOUS at 06:32

## 2019-10-11 RX ADMIN — FAMOTIDINE 20 MG: 10 INJECTION, SOLUTION INTRAVENOUS at 08:34

## 2019-10-11 RX ADMIN — POTASSIUM CHLORIDE 10 MEQ: 7.46 INJECTION, SOLUTION INTRAVENOUS at 16:36

## 2019-10-11 RX ADMIN — DIPHENHYDRAMINE HYDROCHLORIDE 25 MG: 50 INJECTION, SOLUTION INTRAMUSCULAR; INTRAVENOUS at 22:42

## 2019-10-11 RX ADMIN — HYDROMORPHONE HYDROCHLORIDE 1 MG: 1 INJECTION, SOLUTION INTRAMUSCULAR; INTRAVENOUS; SUBCUTANEOUS at 13:37

## 2019-10-11 RX ADMIN — POTASSIUM CHLORIDE 10 MEQ: 7.46 INJECTION, SOLUTION INTRAVENOUS at 10:46

## 2019-10-11 RX ADMIN — OXYCODONE HYDROCHLORIDE 10 MG: 5 TABLET ORAL at 22:42

## 2019-10-11 RX ADMIN — HYDROMORPHONE HYDROCHLORIDE 1 MG: 1 INJECTION, SOLUTION INTRAMUSCULAR; INTRAVENOUS; SUBCUTANEOUS at 19:56

## 2019-10-11 RX ADMIN — HYDROMORPHONE HYDROCHLORIDE 1 MG: 1 INJECTION, SOLUTION INTRAMUSCULAR; INTRAVENOUS; SUBCUTANEOUS at 03:18

## 2019-10-11 RX ADMIN — OXYCODONE HYDROCHLORIDE 10 MG: 5 TABLET ORAL at 18:42

## 2019-10-11 RX ADMIN — ENOXAPARIN SODIUM 40 MG: 40 INJECTION SUBCUTANEOUS at 08:34

## 2019-10-11 RX ADMIN — KETOROLAC TROMETHAMINE 30 MG: 30 INJECTION, SOLUTION INTRAMUSCULAR; INTRAVENOUS at 22:45

## 2019-10-11 RX ADMIN — DIPHENHYDRAMINE HYDROCHLORIDE 25 MG: 50 INJECTION, SOLUTION INTRAMUSCULAR; INTRAVENOUS at 13:59

## 2019-10-11 RX ADMIN — POTASSIUM CHLORIDE 10 MEQ: 7.46 INJECTION, SOLUTION INTRAVENOUS at 14:32

## 2019-10-11 RX ADMIN — HYDROMORPHONE HYDROCHLORIDE 1 MG: 1 INJECTION, SOLUTION INTRAMUSCULAR; INTRAVENOUS; SUBCUTANEOUS at 00:25

## 2019-10-11 RX ADMIN — KETOROLAC TROMETHAMINE 30 MG: 30 INJECTION, SOLUTION INTRAMUSCULAR; INTRAVENOUS at 14:37

## 2019-10-11 RX ADMIN — OXYCODONE HYDROCHLORIDE 10 MG: 5 TABLET ORAL at 10:14

## 2019-10-11 RX ADMIN — OXYCODONE HYDROCHLORIDE 10 MG: 5 TABLET ORAL at 14:36

## 2019-10-11 RX ADMIN — SODIUM CHLORIDE: 900 INJECTION, SOLUTION INTRAVENOUS at 08:40

## 2019-10-11 RX ADMIN — PIPERACILLIN AND TAZOBACTAM 3.38 G: 3; .375 INJECTION, POWDER, FOR SOLUTION INTRAVENOUS at 17:38

## 2019-10-11 RX ADMIN — HYDROMORPHONE HYDROCHLORIDE 1 MG: 1 INJECTION, SOLUTION INTRAMUSCULAR; INTRAVENOUS; SUBCUTANEOUS at 15:46

## 2019-10-11 RX ADMIN — KETOROLAC TROMETHAMINE 30 MG: 30 INJECTION, SOLUTION INTRAMUSCULAR; INTRAVENOUS at 06:30

## 2019-10-11 RX ADMIN — FAMOTIDINE 20 MG: 10 INJECTION, SOLUTION INTRAVENOUS at 19:55

## 2019-10-11 RX ADMIN — DIPHENHYDRAMINE HYDROCHLORIDE 25 MG: 50 INJECTION, SOLUTION INTRAMUSCULAR; INTRAVENOUS at 03:21

## 2019-10-11 RX ADMIN — HYDROMORPHONE HYDROCHLORIDE 1 MG: 1 INJECTION, SOLUTION INTRAMUSCULAR; INTRAVENOUS; SUBCUTANEOUS at 17:46

## 2019-10-11 RX ADMIN — POTASSIUM CHLORIDE 10 MEQ: 7.46 INJECTION, SOLUTION INTRAVENOUS at 15:33

## 2019-10-11 ASSESSMENT — PAIN SCALES - GENERAL
PAINLEVEL_OUTOF10: 7
PAINLEVEL_OUTOF10: 8
PAINLEVEL_OUTOF10: 0
PAINLEVEL_OUTOF10: 8
PAINLEVEL_OUTOF10: 7
PAINLEVEL_OUTOF10: 8
PAINLEVEL_OUTOF10: 8
PAINLEVEL_OUTOF10: 0
PAINLEVEL_OUTOF10: 7
PAINLEVEL_OUTOF10: 9

## 2019-10-11 ASSESSMENT — PAIN DESCRIPTION - PAIN TYPE
TYPE: SURGICAL PAIN

## 2019-10-11 ASSESSMENT — PAIN DESCRIPTION - DESCRIPTORS
DESCRIPTORS: BURNING
DESCRIPTORS: DISCOMFORT;SORE
DESCRIPTORS: DISCOMFORT
DESCRIPTORS: DISCOMFORT;SORE
DESCRIPTORS: DISCOMFORT
DESCRIPTORS: DISCOMFORT

## 2019-10-11 ASSESSMENT — PAIN DESCRIPTION - ORIENTATION
ORIENTATION: RIGHT

## 2019-10-11 ASSESSMENT — PAIN DESCRIPTION - LOCATION
LOCATION: ABDOMEN
LOCATION: ABDOMEN
LOCATION: ABDOMEN;HIP
LOCATION: ABDOMEN
LOCATION: ABDOMEN;HIP

## 2019-10-11 ASSESSMENT — PAIN DESCRIPTION - FREQUENCY
FREQUENCY: CONTINUOUS

## 2019-10-11 ASSESSMENT — PAIN DESCRIPTION - ONSET
ONSET: PROGRESSIVE
ONSET: ON-GOING
ONSET: ON-GOING

## 2019-10-11 ASSESSMENT — PAIN DESCRIPTION - PROGRESSION
CLINICAL_PROGRESSION: NOT CHANGED

## 2019-10-11 ASSESSMENT — PAIN - FUNCTIONAL ASSESSMENT
PAIN_FUNCTIONAL_ASSESSMENT: ACTIVITIES ARE NOT PREVENTED

## 2019-10-12 LAB
ANION GAP SERPL CALCULATED.3IONS-SCNC: 8 MMOL/L (ref 3–16)
BASOPHILS ABSOLUTE: 0 K/UL (ref 0–0.2)
BASOPHILS RELATIVE PERCENT: 0.9 %
BUN BLDV-MCNC: 4 MG/DL (ref 7–20)
CALCIUM SERPL-MCNC: 8.5 MG/DL (ref 8.3–10.6)
CHLORIDE BLD-SCNC: 102 MMOL/L (ref 99–110)
CO2: 25 MMOL/L (ref 21–32)
CREAT SERPL-MCNC: <0.5 MG/DL (ref 0.9–1.3)
EOSINOPHILS ABSOLUTE: 0.3 K/UL (ref 0–0.6)
EOSINOPHILS RELATIVE PERCENT: 4.8 %
GFR AFRICAN AMERICAN: >60
GFR NON-AFRICAN AMERICAN: >60
GLUCOSE BLD-MCNC: 96 MG/DL (ref 70–99)
HCT VFR BLD CALC: 22.7 % (ref 40.5–52.5)
HEMOGLOBIN: 7.5 G/DL (ref 13.5–17.5)
LYMPHOCYTES ABSOLUTE: 1.2 K/UL (ref 1–5.1)
LYMPHOCYTES RELATIVE PERCENT: 22.4 %
MAGNESIUM: 1.9 MG/DL (ref 1.8–2.4)
MCH RBC QN AUTO: 30 PG (ref 26–34)
MCHC RBC AUTO-ENTMCNC: 33.1 G/DL (ref 31–36)
MCV RBC AUTO: 90.6 FL (ref 80–100)
MONOCYTES ABSOLUTE: 0.6 K/UL (ref 0–1.3)
MONOCYTES RELATIVE PERCENT: 11.8 %
NEUTROPHILS ABSOLUTE: 3.3 K/UL (ref 1.7–7.7)
NEUTROPHILS RELATIVE PERCENT: 60.1 %
PDW BLD-RTO: 16.2 % (ref 12.4–15.4)
PHOSPHORUS: 3.8 MG/DL (ref 2.5–4.9)
PLATELET # BLD: 285 K/UL (ref 135–450)
PMV BLD AUTO: 7.1 FL (ref 5–10.5)
POTASSIUM SERPL-SCNC: 3.7 MMOL/L (ref 3.5–5.1)
RBC # BLD: 2.5 M/UL (ref 4.2–5.9)
SODIUM BLD-SCNC: 135 MMOL/L (ref 136–145)
WBC # BLD: 5.4 K/UL (ref 4–11)

## 2019-10-12 PROCEDURE — 83735 ASSAY OF MAGNESIUM: CPT

## 2019-10-12 PROCEDURE — 6360000002 HC RX W HCPCS: Performed by: SURGERY

## 2019-10-12 PROCEDURE — 84100 ASSAY OF PHOSPHORUS: CPT

## 2019-10-12 PROCEDURE — 2580000003 HC RX 258: Performed by: SURGERY

## 2019-10-12 PROCEDURE — 1200000000 HC SEMI PRIVATE

## 2019-10-12 PROCEDURE — 2500000003 HC RX 250 WO HCPCS: Performed by: SURGERY

## 2019-10-12 PROCEDURE — 80048 BASIC METABOLIC PNL TOTAL CA: CPT

## 2019-10-12 PROCEDURE — 99024 POSTOP FOLLOW-UP VISIT: CPT | Performed by: SURGERY

## 2019-10-12 PROCEDURE — 36415 COLL VENOUS BLD VENIPUNCTURE: CPT

## 2019-10-12 PROCEDURE — 6360000002 HC RX W HCPCS: Performed by: NURSE PRACTITIONER

## 2019-10-12 PROCEDURE — 6370000000 HC RX 637 (ALT 250 FOR IP): Performed by: NURSE PRACTITIONER

## 2019-10-12 PROCEDURE — 85025 COMPLETE CBC W/AUTO DIFF WBC: CPT

## 2019-10-12 RX ADMIN — FAMOTIDINE 20 MG: 10 INJECTION, SOLUTION INTRAVENOUS at 21:29

## 2019-10-12 RX ADMIN — HYDROMORPHONE HYDROCHLORIDE 1 MG: 1 INJECTION, SOLUTION INTRAMUSCULAR; INTRAVENOUS; SUBCUTANEOUS at 03:08

## 2019-10-12 RX ADMIN — HYDROMORPHONE HYDROCHLORIDE 1 MG: 1 INJECTION, SOLUTION INTRAMUSCULAR; INTRAVENOUS; SUBCUTANEOUS at 13:35

## 2019-10-12 RX ADMIN — HYDROMORPHONE HYDROCHLORIDE 1 MG: 1 INJECTION, SOLUTION INTRAMUSCULAR; INTRAVENOUS; SUBCUTANEOUS at 15:30

## 2019-10-12 RX ADMIN — HYDROMORPHONE HYDROCHLORIDE 1 MG: 1 INJECTION, SOLUTION INTRAMUSCULAR; INTRAVENOUS; SUBCUTANEOUS at 19:23

## 2019-10-12 RX ADMIN — KETOROLAC TROMETHAMINE 30 MG: 30 INJECTION, SOLUTION INTRAMUSCULAR; INTRAVENOUS at 07:34

## 2019-10-12 RX ADMIN — PIPERACILLIN AND TAZOBACTAM 3.38 G: 3; .375 INJECTION, POWDER, FOR SOLUTION INTRAVENOUS at 23:04

## 2019-10-12 RX ADMIN — OXYCODONE HYDROCHLORIDE 10 MG: 5 TABLET ORAL at 06:02

## 2019-10-12 RX ADMIN — OXYCODONE HYDROCHLORIDE 10 MG: 5 TABLET ORAL at 23:05

## 2019-10-12 RX ADMIN — PIPERACILLIN AND TAZOBACTAM 3.38 G: 3; .375 INJECTION, POWDER, FOR SOLUTION INTRAVENOUS at 17:32

## 2019-10-12 RX ADMIN — FAMOTIDINE 20 MG: 10 INJECTION, SOLUTION INTRAVENOUS at 09:33

## 2019-10-12 RX ADMIN — OXYCODONE HYDROCHLORIDE 10 MG: 5 TABLET ORAL at 14:29

## 2019-10-12 RX ADMIN — HYDROMORPHONE HYDROCHLORIDE 1 MG: 1 INJECTION, SOLUTION INTRAMUSCULAR; INTRAVENOUS; SUBCUTANEOUS at 09:31

## 2019-10-12 RX ADMIN — PIPERACILLIN AND TAZOBACTAM 3.38 G: 3; .375 INJECTION, POWDER, FOR SOLUTION INTRAVENOUS at 11:34

## 2019-10-12 RX ADMIN — HYDROMORPHONE HYDROCHLORIDE 1 MG: 1 INJECTION, SOLUTION INTRAMUSCULAR; INTRAVENOUS; SUBCUTANEOUS at 00:12

## 2019-10-12 RX ADMIN — KETOROLAC TROMETHAMINE 30 MG: 30 INJECTION, SOLUTION INTRAMUSCULAR; INTRAVENOUS at 23:05

## 2019-10-12 RX ADMIN — HYDROMORPHONE HYDROCHLORIDE 1 MG: 1 INJECTION, SOLUTION INTRAMUSCULAR; INTRAVENOUS; SUBCUTANEOUS at 17:30

## 2019-10-12 RX ADMIN — HYDROMORPHONE HYDROCHLORIDE 1 MG: 1 INJECTION, SOLUTION INTRAMUSCULAR; INTRAVENOUS; SUBCUTANEOUS at 07:33

## 2019-10-12 RX ADMIN — PIPERACILLIN AND TAZOBACTAM 3.38 G: 3; .375 INJECTION, POWDER, FOR SOLUTION INTRAVENOUS at 00:03

## 2019-10-12 RX ADMIN — PIPERACILLIN AND TAZOBACTAM 3.38 G: 3; .375 INJECTION, POWDER, FOR SOLUTION INTRAVENOUS at 06:02

## 2019-10-12 RX ADMIN — HYDROMORPHONE HYDROCHLORIDE 1 MG: 1 INJECTION, SOLUTION INTRAMUSCULAR; INTRAVENOUS; SUBCUTANEOUS at 21:32

## 2019-10-12 RX ADMIN — KETOROLAC TROMETHAMINE 30 MG: 30 INJECTION, SOLUTION INTRAMUSCULAR; INTRAVENOUS at 15:30

## 2019-10-12 RX ADMIN — HYDROMORPHONE HYDROCHLORIDE 1 MG: 1 INJECTION, SOLUTION INTRAMUSCULAR; INTRAVENOUS; SUBCUTANEOUS at 11:32

## 2019-10-12 RX ADMIN — POTASSIUM CHLORIDE AND SODIUM CHLORIDE: 900; 150 INJECTION, SOLUTION INTRAVENOUS at 03:11

## 2019-10-12 RX ADMIN — ENOXAPARIN SODIUM 40 MG: 40 INJECTION SUBCUTANEOUS at 09:35

## 2019-10-12 RX ADMIN — OXYCODONE HYDROCHLORIDE 10 MG: 5 TABLET ORAL at 18:35

## 2019-10-12 RX ADMIN — DIPHENHYDRAMINE HYDROCHLORIDE 25 MG: 50 INJECTION, SOLUTION INTRAMUSCULAR; INTRAVENOUS at 21:29

## 2019-10-12 RX ADMIN — OXYCODONE HYDROCHLORIDE 10 MG: 5 TABLET ORAL at 10:30

## 2019-10-12 ASSESSMENT — PAIN DESCRIPTION - LOCATION
LOCATION: ABDOMEN

## 2019-10-12 ASSESSMENT — PAIN SCALES - GENERAL
PAINLEVEL_OUTOF10: 7
PAINLEVEL_OUTOF10: 8
PAINLEVEL_OUTOF10: 8
PAINLEVEL_OUTOF10: 7
PAINLEVEL_OUTOF10: 6
PAINLEVEL_OUTOF10: 8
PAINLEVEL_OUTOF10: 0
PAINLEVEL_OUTOF10: 0
PAINLEVEL_OUTOF10: 8
PAINLEVEL_OUTOF10: 8
PAINLEVEL_OUTOF10: 7
PAINLEVEL_OUTOF10: 7
PAINLEVEL_OUTOF10: 6
PAINLEVEL_OUTOF10: 7
PAINLEVEL_OUTOF10: 7
PAINLEVEL_OUTOF10: 8
PAINLEVEL_OUTOF10: 8
PAINLEVEL_OUTOF10: 6
PAINLEVEL_OUTOF10: 7
PAINLEVEL_OUTOF10: 8

## 2019-10-12 ASSESSMENT — PAIN DESCRIPTION - FREQUENCY
FREQUENCY: CONTINUOUS

## 2019-10-12 ASSESSMENT — PAIN DESCRIPTION - ORIENTATION
ORIENTATION: RIGHT

## 2019-10-12 ASSESSMENT — PAIN DESCRIPTION - DESCRIPTORS
DESCRIPTORS: BURNING

## 2019-10-12 ASSESSMENT — PAIN DESCRIPTION - PAIN TYPE
TYPE: SURGICAL PAIN

## 2019-10-13 PROCEDURE — 2580000003 HC RX 258: Performed by: SURGERY

## 2019-10-13 PROCEDURE — 6360000002 HC RX W HCPCS: Performed by: SURGERY

## 2019-10-13 PROCEDURE — 99024 POSTOP FOLLOW-UP VISIT: CPT | Performed by: SURGERY

## 2019-10-13 PROCEDURE — 1200000000 HC SEMI PRIVATE

## 2019-10-13 PROCEDURE — 2500000003 HC RX 250 WO HCPCS: Performed by: SURGERY

## 2019-10-13 PROCEDURE — 6370000000 HC RX 637 (ALT 250 FOR IP): Performed by: NURSE PRACTITIONER

## 2019-10-13 RX ORDER — DIPHENHYDRAMINE HYDROCHLORIDE 50 MG/ML
12.5 INJECTION INTRAMUSCULAR; INTRAVENOUS NIGHTLY PRN
Status: DISCONTINUED | OUTPATIENT
Start: 2019-10-13 | End: 2019-10-14 | Stop reason: HOSPADM

## 2019-10-13 RX ADMIN — HYDROMORPHONE HYDROCHLORIDE 1 MG: 1 INJECTION, SOLUTION INTRAMUSCULAR; INTRAVENOUS; SUBCUTANEOUS at 03:39

## 2019-10-13 RX ADMIN — OXYCODONE HYDROCHLORIDE 10 MG: 5 TABLET ORAL at 18:32

## 2019-10-13 RX ADMIN — PIPERACILLIN AND TAZOBACTAM 3.38 G: 3; .375 INJECTION, POWDER, FOR SOLUTION INTRAVENOUS at 04:52

## 2019-10-13 RX ADMIN — OXYCODONE HYDROCHLORIDE 10 MG: 5 TABLET ORAL at 04:52

## 2019-10-13 RX ADMIN — FAMOTIDINE 20 MG: 10 INJECTION, SOLUTION INTRAVENOUS at 08:04

## 2019-10-13 RX ADMIN — POTASSIUM CHLORIDE AND SODIUM CHLORIDE: 900; 150 INJECTION, SOLUTION INTRAVENOUS at 06:05

## 2019-10-13 RX ADMIN — FAMOTIDINE 20 MG: 10 INJECTION, SOLUTION INTRAVENOUS at 20:41

## 2019-10-13 RX ADMIN — PIPERACILLIN AND TAZOBACTAM 3.38 G: 3; .375 INJECTION, POWDER, FOR SOLUTION INTRAVENOUS at 17:19

## 2019-10-13 RX ADMIN — DIPHENHYDRAMINE HYDROCHLORIDE 25 MG: 50 INJECTION, SOLUTION INTRAMUSCULAR; INTRAVENOUS at 06:06

## 2019-10-13 RX ADMIN — ENOXAPARIN SODIUM 40 MG: 40 INJECTION SUBCUTANEOUS at 08:04

## 2019-10-13 RX ADMIN — DIPHENHYDRAMINE HYDROCHLORIDE 12.5 MG: 50 INJECTION, SOLUTION INTRAMUSCULAR; INTRAVENOUS at 20:42

## 2019-10-13 RX ADMIN — PIPERACILLIN AND TAZOBACTAM 3.38 G: 3; .375 INJECTION, POWDER, FOR SOLUTION INTRAVENOUS at 11:01

## 2019-10-13 RX ADMIN — HYDROMORPHONE HYDROCHLORIDE 1 MG: 1 INJECTION, SOLUTION INTRAMUSCULAR; INTRAVENOUS; SUBCUTANEOUS at 10:59

## 2019-10-13 RX ADMIN — HYDROMORPHONE HYDROCHLORIDE 1 MG: 1 INJECTION, SOLUTION INTRAMUSCULAR; INTRAVENOUS; SUBCUTANEOUS at 17:17

## 2019-10-13 RX ADMIN — KETOROLAC TROMETHAMINE 30 MG: 30 INJECTION, SOLUTION INTRAMUSCULAR; INTRAVENOUS at 08:02

## 2019-10-13 RX ADMIN — HYDROMORPHONE HYDROCHLORIDE 1 MG: 1 INJECTION, SOLUTION INTRAMUSCULAR; INTRAVENOUS; SUBCUTANEOUS at 08:01

## 2019-10-13 RX ADMIN — HYDROMORPHONE HYDROCHLORIDE 1 MG: 1 INJECTION, SOLUTION INTRAMUSCULAR; INTRAVENOUS; SUBCUTANEOUS at 06:06

## 2019-10-13 RX ADMIN — OXYCODONE HYDROCHLORIDE 10 MG: 5 TABLET ORAL at 09:05

## 2019-10-13 RX ADMIN — HYDROMORPHONE HYDROCHLORIDE 1 MG: 1 INJECTION, SOLUTION INTRAMUSCULAR; INTRAVENOUS; SUBCUTANEOUS at 20:42

## 2019-10-13 RX ADMIN — OXYCODONE HYDROCHLORIDE 10 MG: 5 TABLET ORAL at 12:58

## 2019-10-13 RX ADMIN — HYDROMORPHONE HYDROCHLORIDE 1 MG: 1 INJECTION, SOLUTION INTRAMUSCULAR; INTRAVENOUS; SUBCUTANEOUS at 14:22

## 2019-10-13 ASSESSMENT — PAIN DESCRIPTION - DESCRIPTORS: DESCRIPTORS: BURNING;DISCOMFORT

## 2019-10-13 ASSESSMENT — PAIN SCALES - GENERAL
PAINLEVEL_OUTOF10: 8
PAINLEVEL_OUTOF10: 8
PAINLEVEL_OUTOF10: 7
PAINLEVEL_OUTOF10: 8
PAINLEVEL_OUTOF10: 8
PAINLEVEL_OUTOF10: 7
PAINLEVEL_OUTOF10: 8

## 2019-10-13 ASSESSMENT — PAIN DESCRIPTION - FREQUENCY: FREQUENCY: CONTINUOUS

## 2019-10-13 ASSESSMENT — PAIN DESCRIPTION - ONSET: ONSET: ON-GOING

## 2019-10-13 ASSESSMENT — PAIN DESCRIPTION - LOCATION: LOCATION: ABDOMEN

## 2019-10-13 ASSESSMENT — PAIN - FUNCTIONAL ASSESSMENT: PAIN_FUNCTIONAL_ASSESSMENT: ACTIVITIES ARE NOT PREVENTED

## 2019-10-13 ASSESSMENT — PAIN DESCRIPTION - PAIN TYPE: TYPE: SURGICAL PAIN

## 2019-10-13 ASSESSMENT — PAIN DESCRIPTION - PROGRESSION: CLINICAL_PROGRESSION: NOT CHANGED

## 2019-10-13 ASSESSMENT — PAIN DESCRIPTION - ORIENTATION: ORIENTATION: RIGHT

## 2019-10-14 VITALS
TEMPERATURE: 97.2 F | HEIGHT: 73 IN | SYSTOLIC BLOOD PRESSURE: 135 MMHG | OXYGEN SATURATION: 98 % | BODY MASS INDEX: 33.09 KG/M2 | RESPIRATION RATE: 16 BRPM | HEART RATE: 66 BPM | DIASTOLIC BLOOD PRESSURE: 96 MMHG | WEIGHT: 249.7 LBS

## 2019-10-14 PROBLEM — E43 SEVERE PROTEIN-CALORIE MALNUTRITION (HCC): Status: RESOLVED | Noted: 2019-03-24 | Resolved: 2019-10-14

## 2019-10-14 PROBLEM — K57.20 DIVERTICULITIS OF LARGE INTESTINE WITH PERFORATION AND ABSCESS WITHOUT BLEEDING: Status: RESOLVED | Noted: 2019-09-01 | Resolved: 2019-10-14

## 2019-10-14 LAB
ANION GAP SERPL CALCULATED.3IONS-SCNC: 12 MMOL/L (ref 3–16)
BASOPHILS ABSOLUTE: 0.1 K/UL (ref 0–0.2)
BASOPHILS RELATIVE PERCENT: 0.9 %
BUN BLDV-MCNC: 4 MG/DL (ref 7–20)
CALCIUM SERPL-MCNC: 9.7 MG/DL (ref 8.3–10.6)
CHLORIDE BLD-SCNC: 100 MMOL/L (ref 99–110)
CO2: 25 MMOL/L (ref 21–32)
CREAT SERPL-MCNC: 0.7 MG/DL (ref 0.9–1.3)
EOSINOPHILS ABSOLUTE: 0.4 K/UL (ref 0–0.6)
EOSINOPHILS RELATIVE PERCENT: 4.4 %
GFR AFRICAN AMERICAN: >60
GFR NON-AFRICAN AMERICAN: >60
GLUCOSE BLD-MCNC: 119 MG/DL (ref 70–99)
HCT VFR BLD CALC: 28 % (ref 40.5–52.5)
HEMOGLOBIN: 9.3 G/DL (ref 13.5–17.5)
LYMPHOCYTES ABSOLUTE: 1.4 K/UL (ref 1–5.1)
LYMPHOCYTES RELATIVE PERCENT: 17 %
MCH RBC QN AUTO: 29.8 PG (ref 26–34)
MCHC RBC AUTO-ENTMCNC: 33.3 G/DL (ref 31–36)
MCV RBC AUTO: 89.4 FL (ref 80–100)
MONOCYTES ABSOLUTE: 0.7 K/UL (ref 0–1.3)
MONOCYTES RELATIVE PERCENT: 7.9 %
NEUTROPHILS ABSOLUTE: 6 K/UL (ref 1.7–7.7)
NEUTROPHILS RELATIVE PERCENT: 69.8 %
PDW BLD-RTO: 16.3 % (ref 12.4–15.4)
PHOSPHORUS: 4 MG/DL (ref 2.5–4.9)
PLATELET # BLD: 471 K/UL (ref 135–450)
PMV BLD AUTO: 6.5 FL (ref 5–10.5)
POTASSIUM SERPL-SCNC: 3.9 MMOL/L (ref 3.5–5.1)
RBC # BLD: 3.13 M/UL (ref 4.2–5.9)
SODIUM BLD-SCNC: 137 MMOL/L (ref 136–145)
WBC # BLD: 8.5 K/UL (ref 4–11)

## 2019-10-14 PROCEDURE — 6360000002 HC RX W HCPCS: Performed by: SURGERY

## 2019-10-14 PROCEDURE — 80048 BASIC METABOLIC PNL TOTAL CA: CPT

## 2019-10-14 PROCEDURE — 2500000003 HC RX 250 WO HCPCS: Performed by: SURGERY

## 2019-10-14 PROCEDURE — 84100 ASSAY OF PHOSPHORUS: CPT

## 2019-10-14 PROCEDURE — 36415 COLL VENOUS BLD VENIPUNCTURE: CPT

## 2019-10-14 PROCEDURE — 85025 COMPLETE CBC W/AUTO DIFF WBC: CPT

## 2019-10-14 PROCEDURE — 99024 POSTOP FOLLOW-UP VISIT: CPT | Performed by: NURSE PRACTITIONER

## 2019-10-14 PROCEDURE — 6370000000 HC RX 637 (ALT 250 FOR IP): Performed by: NURSE PRACTITIONER

## 2019-10-14 PROCEDURE — 2580000003 HC RX 258: Performed by: SURGERY

## 2019-10-14 RX ORDER — AMOXICILLIN AND CLAVULANATE POTASSIUM 500; 125 MG/1; MG/1
1 TABLET, FILM COATED ORAL 3 TIMES DAILY
Qty: 21 TABLET | Refills: 0 | Status: SHIPPED | OUTPATIENT
Start: 2019-10-14 | End: 2019-10-21

## 2019-10-14 RX ORDER — LACTOSE-REDUCED FOOD
1 LIQUID (ML) ORAL 3 TIMES DAILY
Qty: 45 CAN | Refills: 5 | Status: SHIPPED | OUTPATIENT
Start: 2019-10-14 | End: 2019-11-25 | Stop reason: ALTCHOICE

## 2019-10-14 RX ORDER — OXYCODONE HYDROCHLORIDE 5 MG/1
5 TABLET ORAL EVERY 4 HOURS PRN
Qty: 40 TABLET | Refills: 0 | Status: SHIPPED | OUTPATIENT
Start: 2019-10-14 | End: 2019-10-21

## 2019-10-14 RX ADMIN — HYDROMORPHONE HYDROCHLORIDE 1 MG: 1 INJECTION, SOLUTION INTRAMUSCULAR; INTRAVENOUS; SUBCUTANEOUS at 09:36

## 2019-10-14 RX ADMIN — HYDROMORPHONE HYDROCHLORIDE 1 MG: 1 INJECTION, SOLUTION INTRAMUSCULAR; INTRAVENOUS; SUBCUTANEOUS at 06:22

## 2019-10-14 RX ADMIN — PIPERACILLIN AND TAZOBACTAM 3.38 G: 3; .375 INJECTION, POWDER, FOR SOLUTION INTRAVENOUS at 11:07

## 2019-10-14 RX ADMIN — FAMOTIDINE 20 MG: 10 INJECTION, SOLUTION INTRAVENOUS at 08:02

## 2019-10-14 RX ADMIN — OXYCODONE HYDROCHLORIDE 10 MG: 5 TABLET ORAL at 12:12

## 2019-10-14 RX ADMIN — PIPERACILLIN AND TAZOBACTAM 3.38 G: 3; .375 INJECTION, POWDER, FOR SOLUTION INTRAVENOUS at 00:31

## 2019-10-14 RX ADMIN — ENOXAPARIN SODIUM 40 MG: 40 INJECTION SUBCUTANEOUS at 08:02

## 2019-10-14 RX ADMIN — OXYCODONE HYDROCHLORIDE 10 MG: 5 TABLET ORAL at 08:00

## 2019-10-14 RX ADMIN — PIPERACILLIN AND TAZOBACTAM 3.38 G: 3; .375 INJECTION, POWDER, FOR SOLUTION INTRAVENOUS at 05:30

## 2019-10-14 RX ADMIN — OXYCODONE HYDROCHLORIDE 10 MG: 5 TABLET ORAL at 03:14

## 2019-10-14 RX ADMIN — POTASSIUM CHLORIDE AND SODIUM CHLORIDE: 900; 150 INJECTION, SOLUTION INTRAVENOUS at 06:21

## 2019-10-14 ASSESSMENT — PAIN SCALES - GENERAL
PAINLEVEL_OUTOF10: 7
PAINLEVEL_OUTOF10: 6
PAINLEVEL_OUTOF10: 8
PAINLEVEL_OUTOF10: 8
PAINLEVEL_OUTOF10: 7
PAINLEVEL_OUTOF10: 8

## 2019-10-14 ASSESSMENT — PAIN DESCRIPTION - DESCRIPTORS
DESCRIPTORS: BURNING;DISCOMFORT
DESCRIPTORS: SHARP;THROBBING

## 2019-10-14 ASSESSMENT — PAIN DESCRIPTION - PROGRESSION: CLINICAL_PROGRESSION: NOT CHANGED

## 2019-10-14 ASSESSMENT — PAIN DESCRIPTION - ORIENTATION: ORIENTATION: RIGHT;LEFT;MID

## 2019-10-14 ASSESSMENT — PAIN DESCRIPTION - FREQUENCY: FREQUENCY: CONTINUOUS

## 2019-10-14 ASSESSMENT — PAIN - FUNCTIONAL ASSESSMENT: PAIN_FUNCTIONAL_ASSESSMENT: PREVENTS OR INTERFERES WITH MANY ACTIVE NOT PASSIVE ACTIVITIES

## 2019-10-14 ASSESSMENT — PAIN DESCRIPTION - PAIN TYPE
TYPE: SURGICAL PAIN
TYPE: SURGICAL PAIN

## 2019-10-14 ASSESSMENT — PAIN DESCRIPTION - LOCATION
LOCATION: ABDOMEN
LOCATION: ABDOMEN

## 2019-10-14 ASSESSMENT — PAIN DESCRIPTION - ONSET: ONSET: ON-GOING

## 2019-11-21 ENCOUNTER — PREP FOR PROCEDURE (OUTPATIENT)
Dept: SURGERY | Age: 39
End: 2019-11-21

## 2019-11-21 ENCOUNTER — TELEPHONE (OUTPATIENT)
Dept: SURGERY | Age: 39
End: 2019-11-21

## 2019-11-21 RX ORDER — SODIUM CHLORIDE 0.9 % (FLUSH) 0.9 %
10 SYRINGE (ML) INJECTION PRN
Status: CANCELLED | OUTPATIENT
Start: 2019-11-21

## 2019-11-21 RX ORDER — SODIUM CHLORIDE 0.9 % (FLUSH) 0.9 %
10 SYRINGE (ML) INJECTION EVERY 12 HOURS SCHEDULED
Status: CANCELLED | OUTPATIENT
Start: 2019-11-21

## 2019-11-26 ENCOUNTER — ANESTHESIA EVENT (OUTPATIENT)
Dept: OPERATING ROOM | Age: 39
DRG: 230 | End: 2019-11-26
Payer: MEDICAID

## 2019-11-26 ASSESSMENT — LIFESTYLE VARIABLES: SMOKING_STATUS: 1

## 2019-11-27 ENCOUNTER — ANESTHESIA (OUTPATIENT)
Dept: OPERATING ROOM | Age: 39
DRG: 230 | End: 2019-11-27
Payer: MEDICAID

## 2019-11-27 ENCOUNTER — HOSPITAL ENCOUNTER (INPATIENT)
Age: 39
LOS: 4 days | Discharge: HOME | DRG: 230 | End: 2019-12-01
Attending: SURGERY | Admitting: SURGERY
Payer: MEDICAID

## 2019-11-27 VITALS — SYSTOLIC BLOOD PRESSURE: 121 MMHG | TEMPERATURE: 97.2 F | DIASTOLIC BLOOD PRESSURE: 101 MMHG | OXYGEN SATURATION: 100 %

## 2019-11-27 DIAGNOSIS — Z93.2 ILEOSTOMY IN PLACE (HCC): Primary | ICD-10-CM

## 2019-11-27 LAB
ABO/RH: NORMAL
ANION GAP SERPL CALCULATED.3IONS-SCNC: 12 MMOL/L (ref 3–16)
ANTIBODY SCREEN: NORMAL
BUN BLDV-MCNC: 16 MG/DL (ref 7–20)
CALCIUM SERPL-MCNC: 9.9 MG/DL (ref 8.3–10.6)
CHLORIDE BLD-SCNC: 103 MMOL/L (ref 99–110)
CO2: 20 MMOL/L (ref 21–32)
CREAT SERPL-MCNC: 0.7 MG/DL (ref 0.9–1.3)
GFR AFRICAN AMERICAN: >60
GFR NON-AFRICAN AMERICAN: >60
GLUCOSE BLD-MCNC: 106 MG/DL (ref 70–99)
HCT VFR BLD CALC: 37 % (ref 40.5–52.5)
HEMOGLOBIN: 12.3 G/DL (ref 13.5–17.5)
MCH RBC QN AUTO: 30.3 PG (ref 26–34)
MCHC RBC AUTO-ENTMCNC: 33.2 G/DL (ref 31–36)
MCV RBC AUTO: 91.3 FL (ref 80–100)
PDW BLD-RTO: 16.8 % (ref 12.4–15.4)
PLATELET # BLD: 272 K/UL (ref 135–450)
PMV BLD AUTO: 7.3 FL (ref 5–10.5)
POTASSIUM SERPL-SCNC: 3.8 MMOL/L (ref 3.5–5.1)
RBC # BLD: 4.06 M/UL (ref 4.2–5.9)
SODIUM BLD-SCNC: 135 MMOL/L (ref 136–145)
WBC # BLD: 8 K/UL (ref 4–11)

## 2019-11-27 PROCEDURE — 94150 VITAL CAPACITY TEST: CPT

## 2019-11-27 PROCEDURE — C1892 INTRO/SHEATH,FIXED,PEEL-AWAY: HCPCS | Performed by: SURGERY

## 2019-11-27 PROCEDURE — 86901 BLOOD TYPING SEROLOGIC RH(D): CPT

## 2019-11-27 PROCEDURE — 6360000002 HC RX W HCPCS: Performed by: NURSE ANESTHETIST, CERTIFIED REGISTERED

## 2019-11-27 PROCEDURE — 7100000001 HC PACU RECOVERY - ADDTL 15 MIN: Performed by: SURGERY

## 2019-11-27 PROCEDURE — 2500000003 HC RX 250 WO HCPCS: Performed by: SURGERY

## 2019-11-27 PROCEDURE — 44625 REPAIR BOWEL OPENING: CPT | Performed by: SURGERY

## 2019-11-27 PROCEDURE — 2580000003 HC RX 258: Performed by: SURGERY

## 2019-11-27 PROCEDURE — 0DBB0ZZ EXCISION OF ILEUM, OPEN APPROACH: ICD-10-PCS | Performed by: SURGERY

## 2019-11-27 PROCEDURE — C2626 INFUSION PUMP, NON-PROG,TEMP: HCPCS | Performed by: SURGERY

## 2019-11-27 PROCEDURE — 3700000001 HC ADD 15 MINUTES (ANESTHESIA): Performed by: SURGERY

## 2019-11-27 PROCEDURE — 6360000002 HC RX W HCPCS: Performed by: ANESTHESIOLOGY

## 2019-11-27 PROCEDURE — 6360000002 HC RX W HCPCS: Performed by: SURGERY

## 2019-11-27 PROCEDURE — 80048 BASIC METABOLIC PNL TOTAL CA: CPT

## 2019-11-27 PROCEDURE — 2709999900 HC NON-CHARGEABLE SUPPLY: Performed by: SURGERY

## 2019-11-27 PROCEDURE — 3600000014 HC SURGERY LEVEL 4 ADDTL 15MIN: Performed by: SURGERY

## 2019-11-27 PROCEDURE — 36415 COLL VENOUS BLD VENIPUNCTURE: CPT

## 2019-11-27 PROCEDURE — 2720000010 HC SURG SUPPLY STERILE: Performed by: SURGERY

## 2019-11-27 PROCEDURE — 85027 COMPLETE CBC AUTOMATED: CPT

## 2019-11-27 PROCEDURE — 1200000000 HC SEMI PRIVATE

## 2019-11-27 PROCEDURE — 86900 BLOOD TYPING SEROLOGIC ABO: CPT

## 2019-11-27 PROCEDURE — 86850 RBC ANTIBODY SCREEN: CPT

## 2019-11-27 PROCEDURE — 6360000002 HC RX W HCPCS

## 2019-11-27 PROCEDURE — 3600000004 HC SURGERY LEVEL 4 BASE: Performed by: SURGERY

## 2019-11-27 PROCEDURE — 2500000003 HC RX 250 WO HCPCS: Performed by: NURSE ANESTHETIST, CERTIFIED REGISTERED

## 2019-11-27 PROCEDURE — 3700000000 HC ANESTHESIA ATTENDED CARE: Performed by: SURGERY

## 2019-11-27 PROCEDURE — 2580000003 HC RX 258

## 2019-11-27 PROCEDURE — 6370000000 HC RX 637 (ALT 250 FOR IP): Performed by: NURSE ANESTHETIST, CERTIFIED REGISTERED

## 2019-11-27 PROCEDURE — 7100000000 HC PACU RECOVERY - FIRST 15 MIN: Performed by: SURGERY

## 2019-11-27 PROCEDURE — 2580000003 HC RX 258: Performed by: ANESTHESIOLOGY

## 2019-11-27 PROCEDURE — 2500000003 HC RX 250 WO HCPCS: Performed by: ANESTHESIOLOGY

## 2019-11-27 RX ORDER — ONDANSETRON 2 MG/ML
4 INJECTION INTRAMUSCULAR; INTRAVENOUS EVERY 6 HOURS PRN
Status: DISCONTINUED | OUTPATIENT
Start: 2019-11-27 | End: 2019-12-01 | Stop reason: HOSPADM

## 2019-11-27 RX ORDER — BUPIVACAINE HYDROCHLORIDE 5 MG/ML
INJECTION, SOLUTION EPIDURAL; INTRACAUDAL PRN
Status: DISCONTINUED | OUTPATIENT
Start: 2019-11-27 | End: 2019-11-27 | Stop reason: ALTCHOICE

## 2019-11-27 RX ORDER — LIDOCAINE HYDROCHLORIDE 40 MG/ML
SOLUTION TOPICAL PRN
Status: DISCONTINUED | OUTPATIENT
Start: 2019-11-27 | End: 2019-11-27 | Stop reason: SDUPTHER

## 2019-11-27 RX ORDER — SODIUM CHLORIDE, SODIUM LACTATE, POTASSIUM CHLORIDE, CALCIUM CHLORIDE 600; 310; 30; 20 MG/100ML; MG/100ML; MG/100ML; MG/100ML
INJECTION, SOLUTION INTRAVENOUS
Status: COMPLETED
Start: 2019-11-27 | End: 2019-11-27

## 2019-11-27 RX ORDER — DIPHENHYDRAMINE HYDROCHLORIDE 50 MG/ML
INJECTION INTRAMUSCULAR; INTRAVENOUS PRN
Status: DISCONTINUED | OUTPATIENT
Start: 2019-11-27 | End: 2019-11-27 | Stop reason: SDUPTHER

## 2019-11-27 RX ORDER — MIDAZOLAM HYDROCHLORIDE 1 MG/ML
INJECTION INTRAMUSCULAR; INTRAVENOUS PRN
Status: DISCONTINUED | OUTPATIENT
Start: 2019-11-27 | End: 2019-11-27 | Stop reason: SDUPTHER

## 2019-11-27 RX ORDER — KETOROLAC TROMETHAMINE 30 MG/ML
30 INJECTION, SOLUTION INTRAMUSCULAR; INTRAVENOUS EVERY 8 HOURS PRN
Status: DISCONTINUED | OUTPATIENT
Start: 2019-11-27 | End: 2019-12-01 | Stop reason: HOSPADM

## 2019-11-27 RX ORDER — OXYCODONE HYDROCHLORIDE AND ACETAMINOPHEN 5; 325 MG/1; MG/1
1 TABLET ORAL PRN
Status: DISCONTINUED | OUTPATIENT
Start: 2019-11-27 | End: 2019-11-27 | Stop reason: HOSPADM

## 2019-11-27 RX ORDER — HYDRALAZINE HYDROCHLORIDE 20 MG/ML
5 INJECTION INTRAMUSCULAR; INTRAVENOUS EVERY 10 MIN PRN
Status: DISCONTINUED | OUTPATIENT
Start: 2019-11-27 | End: 2019-11-27 | Stop reason: HOSPADM

## 2019-11-27 RX ORDER — LIDOCAINE HYDROCHLORIDE 20 MG/ML
INJECTION, SOLUTION INFILTRATION; PERINEURAL PRN
Status: DISCONTINUED | OUTPATIENT
Start: 2019-11-27 | End: 2019-11-27 | Stop reason: SDUPTHER

## 2019-11-27 RX ORDER — OXYCODONE HYDROCHLORIDE AND ACETAMINOPHEN 5; 325 MG/1; MG/1
2 TABLET ORAL PRN
Status: DISCONTINUED | OUTPATIENT
Start: 2019-11-27 | End: 2019-11-27 | Stop reason: HOSPADM

## 2019-11-27 RX ORDER — LIDOCAINE HYDROCHLORIDE 10 MG/ML
0.3 INJECTION, SOLUTION EPIDURAL; INFILTRATION; INTRACAUDAL; PERINEURAL
Status: COMPLETED | OUTPATIENT
Start: 2019-11-27 | End: 2019-11-27

## 2019-11-27 RX ORDER — SODIUM CHLORIDE 9 MG/ML
INJECTION, SOLUTION INTRAVENOUS CONTINUOUS
Status: DISCONTINUED | OUTPATIENT
Start: 2019-11-27 | End: 2019-12-01 | Stop reason: HOSPADM

## 2019-11-27 RX ORDER — PROPOFOL 10 MG/ML
INJECTION, EMULSION INTRAVENOUS PRN
Status: DISCONTINUED | OUTPATIENT
Start: 2019-11-27 | End: 2019-11-27 | Stop reason: SDUPTHER

## 2019-11-27 RX ORDER — ONDANSETRON 2 MG/ML
4 INJECTION INTRAMUSCULAR; INTRAVENOUS EVERY 10 MIN PRN
Status: DISCONTINUED | OUTPATIENT
Start: 2019-11-27 | End: 2019-11-27 | Stop reason: HOSPADM

## 2019-11-27 RX ORDER — LIDOCAINE HYDROCHLORIDE 10 MG/ML
INJECTION, SOLUTION EPIDURAL; INFILTRATION; INTRACAUDAL; PERINEURAL
Status: DISCONTINUED
Start: 2019-11-27 | End: 2019-11-27

## 2019-11-27 RX ORDER — SODIUM CHLORIDE 0.9 % (FLUSH) 0.9 %
10 SYRINGE (ML) INJECTION PRN
Status: DISCONTINUED | OUTPATIENT
Start: 2019-11-27 | End: 2019-11-27 | Stop reason: HOSPADM

## 2019-11-27 RX ORDER — ROCURONIUM BROMIDE 10 MG/ML
INJECTION, SOLUTION INTRAVENOUS PRN
Status: DISCONTINUED | OUTPATIENT
Start: 2019-11-27 | End: 2019-11-27 | Stop reason: SDUPTHER

## 2019-11-27 RX ORDER — FENTANYL CITRATE 50 UG/ML
INJECTION, SOLUTION INTRAMUSCULAR; INTRAVENOUS PRN
Status: DISCONTINUED | OUTPATIENT
Start: 2019-11-27 | End: 2019-11-27 | Stop reason: SDUPTHER

## 2019-11-27 RX ORDER — MAGNESIUM HYDROXIDE 1200 MG/15ML
LIQUID ORAL CONTINUOUS PRN
Status: COMPLETED | OUTPATIENT
Start: 2019-11-27 | End: 2019-11-27

## 2019-11-27 RX ORDER — SODIUM CHLORIDE 0.9 % (FLUSH) 0.9 %
10 SYRINGE (ML) INJECTION EVERY 12 HOURS SCHEDULED
Status: DISCONTINUED | OUTPATIENT
Start: 2019-11-27 | End: 2019-11-27 | Stop reason: HOSPADM

## 2019-11-27 RX ORDER — ONDANSETRON 2 MG/ML
INJECTION INTRAMUSCULAR; INTRAVENOUS PRN
Status: DISCONTINUED | OUTPATIENT
Start: 2019-11-27 | End: 2019-11-27 | Stop reason: SDUPTHER

## 2019-11-27 RX ORDER — HYDROMORPHONE HCL 110MG/55ML
PATIENT CONTROLLED ANALGESIA SYRINGE INTRAVENOUS PRN
Status: DISCONTINUED | OUTPATIENT
Start: 2019-11-27 | End: 2019-11-27 | Stop reason: SDUPTHER

## 2019-11-27 RX ORDER — ACETAMINOPHEN 325 MG/1
650 TABLET ORAL EVERY 6 HOURS PRN
Status: DISCONTINUED | OUTPATIENT
Start: 2019-11-27 | End: 2019-12-01 | Stop reason: HOSPADM

## 2019-11-27 RX ORDER — SODIUM CHLORIDE, SODIUM LACTATE, POTASSIUM CHLORIDE, CALCIUM CHLORIDE 600; 310; 30; 20 MG/100ML; MG/100ML; MG/100ML; MG/100ML
INJECTION, SOLUTION INTRAVENOUS CONTINUOUS
Status: DISCONTINUED | OUTPATIENT
Start: 2019-11-27 | End: 2019-11-27

## 2019-11-27 RX ORDER — PROMETHAZINE HYDROCHLORIDE 25 MG/ML
6.25 INJECTION, SOLUTION INTRAMUSCULAR; INTRAVENOUS EVERY 6 HOURS PRN
Status: DISCONTINUED | OUTPATIENT
Start: 2019-11-27 | End: 2019-12-01 | Stop reason: HOSPADM

## 2019-11-27 RX ORDER — DEXAMETHASONE SODIUM PHOSPHATE 4 MG/ML
INJECTION, SOLUTION INTRA-ARTICULAR; INTRALESIONAL; INTRAMUSCULAR; INTRAVENOUS; SOFT TISSUE PRN
Status: DISCONTINUED | OUTPATIENT
Start: 2019-11-27 | End: 2019-11-27 | Stop reason: SDUPTHER

## 2019-11-27 RX ORDER — LABETALOL HYDROCHLORIDE 5 MG/ML
5 INJECTION, SOLUTION INTRAVENOUS EVERY 10 MIN PRN
Status: DISCONTINUED | OUTPATIENT
Start: 2019-11-27 | End: 2019-11-27 | Stop reason: HOSPADM

## 2019-11-27 RX ADMIN — FAMOTIDINE 20 MG: 10 INJECTION, SOLUTION INTRAVENOUS at 21:46

## 2019-11-27 RX ADMIN — HYDROMORPHONE HYDROCHLORIDE 0.5 MG: 1 INJECTION, SOLUTION INTRAMUSCULAR; INTRAVENOUS; SUBCUTANEOUS at 09:22

## 2019-11-27 RX ADMIN — ONDANSETRON 4 MG: 2 INJECTION, SOLUTION INTRAMUSCULAR; INTRAVENOUS at 07:55

## 2019-11-27 RX ADMIN — ENOXAPARIN SODIUM 40 MG: 40 INJECTION SUBCUTANEOUS at 06:40

## 2019-11-27 RX ADMIN — METRONIDAZOLE 500 MG: 500 INJECTION, SOLUTION INTRAVENOUS at 06:42

## 2019-11-27 RX ADMIN — HYDROMORPHONE HYDROCHLORIDE 0.5 MG: 1 INJECTION, SOLUTION INTRAMUSCULAR; INTRAVENOUS; SUBCUTANEOUS at 10:53

## 2019-11-27 RX ADMIN — DEXAMETHASONE SODIUM PHOSPHATE 8 MG: 4 INJECTION, SOLUTION INTRAMUSCULAR; INTRAVENOUS at 07:55

## 2019-11-27 RX ADMIN — FENTANYL CITRATE 100 MCG: 50 INJECTION, SOLUTION INTRAMUSCULAR; INTRAVENOUS at 07:50

## 2019-11-27 RX ADMIN — KETOROLAC TROMETHAMINE 30 MG: 30 INJECTION, SOLUTION INTRAMUSCULAR at 10:52

## 2019-11-27 RX ADMIN — SODIUM CHLORIDE, POTASSIUM CHLORIDE, SODIUM LACTATE AND CALCIUM CHLORIDE: 600; 310; 30; 20 INJECTION, SOLUTION INTRAVENOUS at 07:32

## 2019-11-27 RX ADMIN — FAMOTIDINE 20 MG: 10 INJECTION, SOLUTION INTRAVENOUS at 10:52

## 2019-11-27 RX ADMIN — FENTANYL CITRATE 50 MCG: 50 INJECTION, SOLUTION INTRAMUSCULAR; INTRAVENOUS at 08:01

## 2019-11-27 RX ADMIN — HYDROMORPHONE HYDROCHLORIDE 0.5 MG: 2 INJECTION, SOLUTION INTRAMUSCULAR; INTRAVENOUS; SUBCUTANEOUS at 08:31

## 2019-11-27 RX ADMIN — LIDOCAINE HYDROCHLORIDE 4 ML: 40 SPRAY LARYNGEAL; TRANSTRACHEAL at 07:39

## 2019-11-27 RX ADMIN — LIDOCAINE HYDROCHLORIDE 60 MG: 20 INJECTION, SOLUTION INFILTRATION; PERINEURAL at 07:38

## 2019-11-27 RX ADMIN — KETOROLAC TROMETHAMINE 30 MG: 30 INJECTION, SOLUTION INTRAMUSCULAR at 18:14

## 2019-11-27 RX ADMIN — METRONIDAZOLE 500 MG: 500 INJECTION, SOLUTION INTRAVENOUS at 14:13

## 2019-11-27 RX ADMIN — HYDROMORPHONE HYDROCHLORIDE 0.5 MG: 2 INJECTION, SOLUTION INTRAMUSCULAR; INTRAVENOUS; SUBCUTANEOUS at 08:07

## 2019-11-27 RX ADMIN — HYDROMORPHONE HYDROCHLORIDE 0.5 MG: 1 INJECTION, SOLUTION INTRAMUSCULAR; INTRAVENOUS; SUBCUTANEOUS at 16:53

## 2019-11-27 RX ADMIN — FENTANYL CITRATE 100 MCG: 50 INJECTION, SOLUTION INTRAMUSCULAR; INTRAVENOUS at 07:32

## 2019-11-27 RX ADMIN — CEFTRIAXONE SODIUM 1 G: 1 INJECTION, POWDER, FOR SOLUTION INTRAMUSCULAR; INTRAVENOUS at 07:40

## 2019-11-27 RX ADMIN — HYDROMORPHONE HYDROCHLORIDE 0.5 MG: 1 INJECTION, SOLUTION INTRAMUSCULAR; INTRAVENOUS; SUBCUTANEOUS at 21:47

## 2019-11-27 RX ADMIN — HYDROMORPHONE HYDROCHLORIDE 0.5 MG: 1 INJECTION, SOLUTION INTRAMUSCULAR; INTRAVENOUS; SUBCUTANEOUS at 18:58

## 2019-11-27 RX ADMIN — FENTANYL CITRATE 50 MCG: 50 INJECTION, SOLUTION INTRAMUSCULAR; INTRAVENOUS at 07:38

## 2019-11-27 RX ADMIN — DIPHENHYDRAMINE HYDROCHLORIDE 6.25 MG: 50 INJECTION, SOLUTION INTRAMUSCULAR; INTRAVENOUS at 07:50

## 2019-11-27 RX ADMIN — SODIUM CHLORIDE: 9 INJECTION, SOLUTION INTRAVENOUS at 10:53

## 2019-11-27 RX ADMIN — HYDROMORPHONE HYDROCHLORIDE 0.5 MG: 1 INJECTION, SOLUTION INTRAMUSCULAR; INTRAVENOUS; SUBCUTANEOUS at 08:57

## 2019-11-27 RX ADMIN — METRONIDAZOLE 500 MG: 500 INJECTION, SOLUTION INTRAVENOUS at 21:46

## 2019-11-27 RX ADMIN — PROPOFOL 300 MG: 10 INJECTION, EMULSION INTRAVENOUS at 07:38

## 2019-11-27 RX ADMIN — BUPIVACAINE HYDROCHLORIDE 270 ML: 5 INJECTION, SOLUTION EPIDURAL; INTRACAUDAL at 08:36

## 2019-11-27 RX ADMIN — HYDROMORPHONE HYDROCHLORIDE 0.5 MG: 1 INJECTION, SOLUTION INTRAMUSCULAR; INTRAVENOUS; SUBCUTANEOUS at 14:14

## 2019-11-27 RX ADMIN — SODIUM CHLORIDE, POTASSIUM CHLORIDE, SODIUM LACTATE AND CALCIUM CHLORIDE 1000 ML: 600; 310; 30; 20 INJECTION, SOLUTION INTRAVENOUS at 06:17

## 2019-11-27 RX ADMIN — SUGAMMADEX 200 MG: 100 INJECTION, SOLUTION INTRAVENOUS at 08:29

## 2019-11-27 RX ADMIN — ROCURONIUM BROMIDE 60 MG: 10 INJECTION, SOLUTION INTRAVENOUS at 07:38

## 2019-11-27 RX ADMIN — MIDAZOLAM 2 MG: 1 INJECTION INTRAMUSCULAR; INTRAVENOUS at 07:32

## 2019-11-27 RX ADMIN — LIDOCAINE HYDROCHLORIDE 0.3 ML: 10 INJECTION, SOLUTION EPIDURAL; INFILTRATION; INTRACAUDAL; PERINEURAL at 06:18

## 2019-11-27 ASSESSMENT — PULMONARY FUNCTION TESTS
PIF_VALUE: 15
PIF_VALUE: 15
PIF_VALUE: 16
PIF_VALUE: 15
PIF_VALUE: 28
PIF_VALUE: 15
PIF_VALUE: 4
PIF_VALUE: 4
PIF_VALUE: 1
PIF_VALUE: 15
PIF_VALUE: 16
PIF_VALUE: 14
PIF_VALUE: 3
PIF_VALUE: 3
PIF_VALUE: 16
PIF_VALUE: 16
PIF_VALUE: 15
PIF_VALUE: 16
PIF_VALUE: 16
PIF_VALUE: 15
PIF_VALUE: 13
PIF_VALUE: 13
PIF_VALUE: 1
PIF_VALUE: 16
PIF_VALUE: 1
PIF_VALUE: 14
PIF_VALUE: 1
PIF_VALUE: 11
PIF_VALUE: 16
PIF_VALUE: 13
PIF_VALUE: 13
PIF_VALUE: 1
PIF_VALUE: 15
PIF_VALUE: 5
PIF_VALUE: 16
PIF_VALUE: 11
PIF_VALUE: 15
PIF_VALUE: 15
PIF_VALUE: 13
PIF_VALUE: 15
PIF_VALUE: 16
PIF_VALUE: 16
PIF_VALUE: 1
PIF_VALUE: 5
PIF_VALUE: 15
PIF_VALUE: 18
PIF_VALUE: 12
PIF_VALUE: 15
PIF_VALUE: 3
PIF_VALUE: 16
PIF_VALUE: 15
PIF_VALUE: 10
PIF_VALUE: 13
PIF_VALUE: 1
PIF_VALUE: 16
PIF_VALUE: 16
PIF_VALUE: 13

## 2019-11-27 ASSESSMENT — PAIN SCALES - GENERAL
PAINLEVEL_OUTOF10: 4
PAINLEVEL_OUTOF10: 10
PAINLEVEL_OUTOF10: 7
PAINLEVEL_OUTOF10: 8
PAINLEVEL_OUTOF10: 7
PAINLEVEL_OUTOF10: 7
PAINLEVEL_OUTOF10: 6
PAINLEVEL_OUTOF10: 8
PAINLEVEL_OUTOF10: 7
PAINLEVEL_OUTOF10: 4

## 2019-11-27 ASSESSMENT — PAIN - FUNCTIONAL ASSESSMENT
PAIN_FUNCTIONAL_ASSESSMENT: 0-10

## 2019-11-27 ASSESSMENT — PAIN DESCRIPTION - LOCATION
LOCATION: ABDOMEN

## 2019-11-27 ASSESSMENT — PAIN DESCRIPTION - PAIN TYPE
TYPE: SURGICAL PAIN
TYPE: SURGICAL PAIN

## 2019-11-28 LAB
ANION GAP SERPL CALCULATED.3IONS-SCNC: 10 MMOL/L (ref 3–16)
BASOPHILS ABSOLUTE: 0 K/UL (ref 0–0.2)
BASOPHILS RELATIVE PERCENT: 0.4 %
BUN BLDV-MCNC: 22 MG/DL (ref 7–20)
CALCIUM SERPL-MCNC: 9.1 MG/DL (ref 8.3–10.6)
CHLORIDE BLD-SCNC: 106 MMOL/L (ref 99–110)
CO2: 21 MMOL/L (ref 21–32)
CREAT SERPL-MCNC: 0.7 MG/DL (ref 0.9–1.3)
EOSINOPHILS ABSOLUTE: 0 K/UL (ref 0–0.6)
EOSINOPHILS RELATIVE PERCENT: 0.4 %
GFR AFRICAN AMERICAN: >60
GFR NON-AFRICAN AMERICAN: >60
GLUCOSE BLD-MCNC: 105 MG/DL (ref 70–99)
HCT VFR BLD CALC: 33.1 % (ref 40.5–52.5)
HEMOGLOBIN: 11 G/DL (ref 13.5–17.5)
LYMPHOCYTES ABSOLUTE: 1.7 K/UL (ref 1–5.1)
LYMPHOCYTES RELATIVE PERCENT: 18.3 %
MAGNESIUM: 1.9 MG/DL (ref 1.8–2.4)
MCH RBC QN AUTO: 30.5 PG (ref 26–34)
MCHC RBC AUTO-ENTMCNC: 33.2 G/DL (ref 31–36)
MCV RBC AUTO: 91.8 FL (ref 80–100)
MONOCYTES ABSOLUTE: 1 K/UL (ref 0–1.3)
MONOCYTES RELATIVE PERCENT: 11.3 %
NEUTROPHILS ABSOLUTE: 6.4 K/UL (ref 1.7–7.7)
NEUTROPHILS RELATIVE PERCENT: 69.6 %
PDW BLD-RTO: 16.4 % (ref 12.4–15.4)
PHOSPHORUS: 3.3 MG/DL (ref 2.5–4.9)
PLATELET # BLD: 227 K/UL (ref 135–450)
PMV BLD AUTO: 7.1 FL (ref 5–10.5)
POTASSIUM SERPL-SCNC: 3.7 MMOL/L (ref 3.5–5.1)
RBC # BLD: 3.6 M/UL (ref 4.2–5.9)
SODIUM BLD-SCNC: 137 MMOL/L (ref 136–145)
WBC # BLD: 9.1 K/UL (ref 4–11)

## 2019-11-28 PROCEDURE — 99024 POSTOP FOLLOW-UP VISIT: CPT | Performed by: SURGERY

## 2019-11-28 PROCEDURE — 83735 ASSAY OF MAGNESIUM: CPT

## 2019-11-28 PROCEDURE — 6370000000 HC RX 637 (ALT 250 FOR IP): Performed by: SURGERY

## 2019-11-28 PROCEDURE — 6360000002 HC RX W HCPCS: Performed by: SURGERY

## 2019-11-28 PROCEDURE — 2580000003 HC RX 258: Performed by: SURGERY

## 2019-11-28 PROCEDURE — 36415 COLL VENOUS BLD VENIPUNCTURE: CPT

## 2019-11-28 PROCEDURE — 85025 COMPLETE CBC W/AUTO DIFF WBC: CPT

## 2019-11-28 PROCEDURE — 84100 ASSAY OF PHOSPHORUS: CPT

## 2019-11-28 PROCEDURE — 1200000000 HC SEMI PRIVATE

## 2019-11-28 PROCEDURE — 2500000003 HC RX 250 WO HCPCS: Performed by: SURGERY

## 2019-11-28 PROCEDURE — 80048 BASIC METABOLIC PNL TOTAL CA: CPT

## 2019-11-28 RX ORDER — OXYCODONE HYDROCHLORIDE 5 MG/1
10 TABLET ORAL EVERY 4 HOURS PRN
Status: DISCONTINUED | OUTPATIENT
Start: 2019-11-28 | End: 2019-12-01 | Stop reason: HOSPADM

## 2019-11-28 RX ORDER — OXYCODONE HYDROCHLORIDE 5 MG/1
5 TABLET ORAL EVERY 4 HOURS PRN
Status: DISCONTINUED | OUTPATIENT
Start: 2019-11-28 | End: 2019-12-01 | Stop reason: HOSPADM

## 2019-11-28 RX ADMIN — KETOROLAC TROMETHAMINE 30 MG: 30 INJECTION, SOLUTION INTRAMUSCULAR at 23:13

## 2019-11-28 RX ADMIN — OXYCODONE HYDROCHLORIDE 5 MG: 5 TABLET ORAL at 23:13

## 2019-11-28 RX ADMIN — HYDROMORPHONE HYDROCHLORIDE 0.5 MG: 1 INJECTION, SOLUTION INTRAMUSCULAR; INTRAVENOUS; SUBCUTANEOUS at 09:02

## 2019-11-28 RX ADMIN — OXYCODONE HYDROCHLORIDE 10 MG: 5 TABLET ORAL at 17:04

## 2019-11-28 RX ADMIN — FAMOTIDINE 20 MG: 10 INJECTION, SOLUTION INTRAVENOUS at 09:02

## 2019-11-28 RX ADMIN — SODIUM CHLORIDE: 9 INJECTION, SOLUTION INTRAVENOUS at 09:07

## 2019-11-28 RX ADMIN — KETOROLAC TROMETHAMINE 30 MG: 30 INJECTION, SOLUTION INTRAMUSCULAR at 02:49

## 2019-11-28 RX ADMIN — OXYCODONE HYDROCHLORIDE 10 MG: 5 TABLET ORAL at 13:09

## 2019-11-28 RX ADMIN — HYDROMORPHONE HYDROCHLORIDE 0.5 MG: 1 INJECTION, SOLUTION INTRAMUSCULAR; INTRAVENOUS; SUBCUTANEOUS at 06:16

## 2019-11-28 RX ADMIN — HYDROMORPHONE HYDROCHLORIDE 0.5 MG: 1 INJECTION, SOLUTION INTRAMUSCULAR; INTRAVENOUS; SUBCUTANEOUS at 02:41

## 2019-11-28 RX ADMIN — ENOXAPARIN SODIUM 40 MG: 40 INJECTION SUBCUTANEOUS at 09:01

## 2019-11-28 RX ADMIN — HYDROMORPHONE HYDROCHLORIDE 0.5 MG: 1 INJECTION, SOLUTION INTRAMUSCULAR; INTRAVENOUS; SUBCUTANEOUS at 18:22

## 2019-11-28 RX ADMIN — HYDROMORPHONE HYDROCHLORIDE 0.5 MG: 1 INJECTION, SOLUTION INTRAMUSCULAR; INTRAVENOUS; SUBCUTANEOUS at 14:36

## 2019-11-28 RX ADMIN — SODIUM CHLORIDE: 9 INJECTION, SOLUTION INTRAVENOUS at 19:22

## 2019-11-28 RX ADMIN — FAMOTIDINE 20 MG: 10 INJECTION, SOLUTION INTRAVENOUS at 20:58

## 2019-11-28 RX ADMIN — HYDROMORPHONE HYDROCHLORIDE 0.5 MG: 1 INJECTION, SOLUTION INTRAMUSCULAR; INTRAVENOUS; SUBCUTANEOUS at 22:07

## 2019-11-28 RX ADMIN — HYDROMORPHONE HYDROCHLORIDE 0.5 MG: 1 INJECTION, SOLUTION INTRAMUSCULAR; INTRAVENOUS; SUBCUTANEOUS at 11:30

## 2019-11-28 RX ADMIN — KETOROLAC TROMETHAMINE 30 MG: 30 INJECTION, SOLUTION INTRAMUSCULAR at 11:30

## 2019-11-28 ASSESSMENT — PAIN SCALES - GENERAL
PAINLEVEL_OUTOF10: 7
PAINLEVEL_OUTOF10: 6
PAINLEVEL_OUTOF10: 9
PAINLEVEL_OUTOF10: 7
PAINLEVEL_OUTOF10: 6
PAINLEVEL_OUTOF10: 7
PAINLEVEL_OUTOF10: 6
PAINLEVEL_OUTOF10: 7

## 2019-11-29 PROCEDURE — 1200000000 HC SEMI PRIVATE

## 2019-11-29 PROCEDURE — 6370000000 HC RX 637 (ALT 250 FOR IP): Performed by: SURGERY

## 2019-11-29 PROCEDURE — 2500000003 HC RX 250 WO HCPCS: Performed by: SURGERY

## 2019-11-29 PROCEDURE — 99024 POSTOP FOLLOW-UP VISIT: CPT | Performed by: SURGERY

## 2019-11-29 PROCEDURE — 2580000003 HC RX 258: Performed by: SURGERY

## 2019-11-29 PROCEDURE — 6360000002 HC RX W HCPCS: Performed by: SURGERY

## 2019-11-29 RX ADMIN — OXYCODONE HYDROCHLORIDE 10 MG: 5 TABLET ORAL at 13:22

## 2019-11-29 RX ADMIN — ENOXAPARIN SODIUM 40 MG: 40 INJECTION SUBCUTANEOUS at 09:36

## 2019-11-29 RX ADMIN — OXYCODONE HYDROCHLORIDE 10 MG: 5 TABLET ORAL at 23:17

## 2019-11-29 RX ADMIN — KETOROLAC TROMETHAMINE 30 MG: 30 INJECTION, SOLUTION INTRAMUSCULAR at 18:09

## 2019-11-29 RX ADMIN — KETOROLAC TROMETHAMINE 30 MG: 30 INJECTION, SOLUTION INTRAMUSCULAR at 08:05

## 2019-11-29 RX ADMIN — OXYCODONE HYDROCHLORIDE 10 MG: 5 TABLET ORAL at 08:05

## 2019-11-29 RX ADMIN — SODIUM CHLORIDE: 9 INJECTION, SOLUTION INTRAVENOUS at 08:04

## 2019-11-29 RX ADMIN — OXYCODONE HYDROCHLORIDE 10 MG: 5 TABLET ORAL at 18:06

## 2019-11-29 RX ADMIN — HYDROMORPHONE HYDROCHLORIDE 0.5 MG: 1 INJECTION, SOLUTION INTRAMUSCULAR; INTRAVENOUS; SUBCUTANEOUS at 20:19

## 2019-11-29 RX ADMIN — HYDROMORPHONE HYDROCHLORIDE 0.5 MG: 1 INJECTION, SOLUTION INTRAMUSCULAR; INTRAVENOUS; SUBCUTANEOUS at 06:46

## 2019-11-29 RX ADMIN — FAMOTIDINE 20 MG: 10 INJECTION, SOLUTION INTRAVENOUS at 09:36

## 2019-11-29 RX ADMIN — SODIUM CHLORIDE: 9 INJECTION, SOLUTION INTRAVENOUS at 20:13

## 2019-11-29 RX ADMIN — FAMOTIDINE 20 MG: 10 INJECTION, SOLUTION INTRAVENOUS at 20:13

## 2019-11-29 RX ADMIN — HYDROMORPHONE HYDROCHLORIDE 0.5 MG: 1 INJECTION, SOLUTION INTRAMUSCULAR; INTRAVENOUS; SUBCUTANEOUS at 15:50

## 2019-11-29 RX ADMIN — HYDROMORPHONE HYDROCHLORIDE 0.5 MG: 1 INJECTION, SOLUTION INTRAMUSCULAR; INTRAVENOUS; SUBCUTANEOUS at 09:57

## 2019-11-29 ASSESSMENT — PAIN DESCRIPTION - PAIN TYPE
TYPE: SURGICAL PAIN

## 2019-11-29 ASSESSMENT — PAIN SCALES - GENERAL
PAINLEVEL_OUTOF10: 8
PAINLEVEL_OUTOF10: 8
PAINLEVEL_OUTOF10: 7
PAINLEVEL_OUTOF10: 8
PAINLEVEL_OUTOF10: 5
PAINLEVEL_OUTOF10: 5
PAINLEVEL_OUTOF10: 8
PAINLEVEL_OUTOF10: 8
PAINLEVEL_OUTOF10: 7
PAINLEVEL_OUTOF10: 5
PAINLEVEL_OUTOF10: 6
PAINLEVEL_OUTOF10: 7
PAINLEVEL_OUTOF10: 6
PAINLEVEL_OUTOF10: 8
PAINLEVEL_OUTOF10: 7

## 2019-11-29 ASSESSMENT — PAIN DESCRIPTION - LOCATION
LOCATION: ABDOMEN

## 2019-11-29 ASSESSMENT — PAIN DESCRIPTION - PROGRESSION
CLINICAL_PROGRESSION: GRADUALLY IMPROVING

## 2019-11-30 PROCEDURE — 2500000003 HC RX 250 WO HCPCS: Performed by: SURGERY

## 2019-11-30 PROCEDURE — 2580000003 HC RX 258: Performed by: SURGERY

## 2019-11-30 PROCEDURE — 99024 POSTOP FOLLOW-UP VISIT: CPT | Performed by: SURGERY

## 2019-11-30 PROCEDURE — 6360000002 HC RX W HCPCS: Performed by: SURGERY

## 2019-11-30 PROCEDURE — 6370000000 HC RX 637 (ALT 250 FOR IP): Performed by: SURGERY

## 2019-11-30 PROCEDURE — 1200000000 HC SEMI PRIVATE

## 2019-11-30 RX ADMIN — KETOROLAC TROMETHAMINE 30 MG: 30 INJECTION, SOLUTION INTRAMUSCULAR at 23:08

## 2019-11-30 RX ADMIN — OXYCODONE HYDROCHLORIDE 10 MG: 5 TABLET ORAL at 19:11

## 2019-11-30 RX ADMIN — HYDROMORPHONE HYDROCHLORIDE 0.5 MG: 1 INJECTION, SOLUTION INTRAMUSCULAR; INTRAVENOUS; SUBCUTANEOUS at 12:38

## 2019-11-30 RX ADMIN — KETOROLAC TROMETHAMINE 30 MG: 30 INJECTION, SOLUTION INTRAMUSCULAR at 15:06

## 2019-11-30 RX ADMIN — ENOXAPARIN SODIUM 40 MG: 40 INJECTION SUBCUTANEOUS at 08:26

## 2019-11-30 RX ADMIN — OXYCODONE HYDROCHLORIDE 10 MG: 5 TABLET ORAL at 08:26

## 2019-11-30 RX ADMIN — HYDROMORPHONE HYDROCHLORIDE 0.5 MG: 1 INJECTION, SOLUTION INTRAMUSCULAR; INTRAVENOUS; SUBCUTANEOUS at 05:41

## 2019-11-30 RX ADMIN — SODIUM CHLORIDE: 9 INJECTION, SOLUTION INTRAVENOUS at 19:11

## 2019-11-30 RX ADMIN — OXYCODONE HYDROCHLORIDE 10 MG: 5 TABLET ORAL at 23:08

## 2019-11-30 RX ADMIN — FAMOTIDINE 20 MG: 10 INJECTION, SOLUTION INTRAVENOUS at 20:32

## 2019-11-30 RX ADMIN — FAMOTIDINE 20 MG: 10 INJECTION, SOLUTION INTRAVENOUS at 08:26

## 2019-11-30 RX ADMIN — KETOROLAC TROMETHAMINE 30 MG: 30 INJECTION, SOLUTION INTRAMUSCULAR at 05:41

## 2019-11-30 RX ADMIN — OXYCODONE HYDROCHLORIDE 10 MG: 5 TABLET ORAL at 15:05

## 2019-11-30 ASSESSMENT — PAIN DESCRIPTION - PAIN TYPE
TYPE: SURGICAL PAIN

## 2019-11-30 ASSESSMENT — PAIN DESCRIPTION - LOCATION
LOCATION: ABDOMEN

## 2019-11-30 ASSESSMENT — PAIN SCALES - GENERAL
PAINLEVEL_OUTOF10: 9
PAINLEVEL_OUTOF10: 6
PAINLEVEL_OUTOF10: 7
PAINLEVEL_OUTOF10: 7
PAINLEVEL_OUTOF10: 6
PAINLEVEL_OUTOF10: 5
PAINLEVEL_OUTOF10: 5
PAINLEVEL_OUTOF10: 7
PAINLEVEL_OUTOF10: 7
PAINLEVEL_OUTOF10: 5

## 2019-11-30 ASSESSMENT — PAIN DESCRIPTION - PROGRESSION
CLINICAL_PROGRESSION: GRADUALLY IMPROVING
CLINICAL_PROGRESSION: GRADUALLY IMPROVING

## 2019-12-01 VITALS
DIASTOLIC BLOOD PRESSURE: 85 MMHG | SYSTOLIC BLOOD PRESSURE: 122 MMHG | HEIGHT: 73 IN | HEART RATE: 71 BPM | BODY MASS INDEX: 34.46 KG/M2 | TEMPERATURE: 97.8 F | RESPIRATION RATE: 16 BRPM | OXYGEN SATURATION: 100 % | WEIGHT: 260 LBS

## 2019-12-01 PROCEDURE — 6370000000 HC RX 637 (ALT 250 FOR IP): Performed by: SURGERY

## 2019-12-01 PROCEDURE — 6360000002 HC RX W HCPCS: Performed by: SURGERY

## 2019-12-01 PROCEDURE — 99024 POSTOP FOLLOW-UP VISIT: CPT | Performed by: SURGERY

## 2019-12-01 PROCEDURE — 2500000003 HC RX 250 WO HCPCS: Performed by: SURGERY

## 2019-12-01 RX ORDER — OXYCODONE HYDROCHLORIDE AND ACETAMINOPHEN 5; 325 MG/1; MG/1
1-2 TABLET ORAL EVERY 4 HOURS PRN
Qty: 40 TABLET | Refills: 0 | Status: SHIPPED | OUTPATIENT
Start: 2019-12-01 | End: 2019-12-08

## 2019-12-01 RX ADMIN — FAMOTIDINE 20 MG: 10 INJECTION, SOLUTION INTRAVENOUS at 10:09

## 2019-12-01 RX ADMIN — OXYCODONE HYDROCHLORIDE 10 MG: 5 TABLET ORAL at 10:09

## 2019-12-01 RX ADMIN — OXYCODONE HYDROCHLORIDE 10 MG: 5 TABLET ORAL at 05:32

## 2019-12-01 RX ADMIN — KETOROLAC TROMETHAMINE 30 MG: 30 INJECTION, SOLUTION INTRAMUSCULAR at 10:09

## 2019-12-01 RX ADMIN — ENOXAPARIN SODIUM 40 MG: 40 INJECTION SUBCUTANEOUS at 10:09

## 2019-12-01 ASSESSMENT — PAIN DESCRIPTION - LOCATION
LOCATION: ABDOMEN
LOCATION: ABDOMEN

## 2019-12-01 ASSESSMENT — PAIN SCALES - GENERAL
PAINLEVEL_OUTOF10: 0
PAINLEVEL_OUTOF10: 6
PAINLEVEL_OUTOF10: 8
PAINLEVEL_OUTOF10: 7

## 2019-12-01 ASSESSMENT — PAIN DESCRIPTION - PAIN TYPE
TYPE: SURGICAL PAIN
TYPE: SURGICAL PAIN

## 2019-12-01 ASSESSMENT — PAIN DESCRIPTION - PROGRESSION: CLINICAL_PROGRESSION: RESOLVED

## 2019-12-04 ENCOUNTER — TELEPHONE (OUTPATIENT)
Dept: SURGERY | Age: 39
End: 2019-12-04

## 2019-12-04 DIAGNOSIS — K57.40 DIVERTICULITIS OF BOTH LARGE AND SMALL INTESTINE WITH ABSCESS WITHOUT BLEEDING: Primary | ICD-10-CM

## 2019-12-04 RX ORDER — OXYCODONE HYDROCHLORIDE 5 MG/1
5 TABLET ORAL EVERY 6 HOURS PRN
Qty: 28 TABLET | Refills: 0 | Status: SHIPPED | OUTPATIENT
Start: 2019-12-04 | End: 2019-12-11

## 2019-12-05 ENCOUNTER — TELEPHONE (OUTPATIENT)
Dept: SURGERY | Age: 39
End: 2019-12-05

## 2020-01-03 ENCOUNTER — OFFICE VISIT (OUTPATIENT)
Dept: ORTHOPEDIC SURGERY | Age: 40
End: 2020-01-03
Payer: MEDICAID

## 2020-01-03 VITALS
SYSTOLIC BLOOD PRESSURE: 136 MMHG | HEART RATE: 123 BPM | DIASTOLIC BLOOD PRESSURE: 93 MMHG | BODY MASS INDEX: 34.3 KG/M2 | HEIGHT: 73 IN

## 2020-01-03 PROBLEM — M17.0 PRIMARY OSTEOARTHRITIS OF BOTH KNEES: Status: ACTIVE | Noted: 2020-01-03

## 2020-01-03 PROBLEM — M22.00 RECURRENT DISLOCATION OF PATELLA: Status: ACTIVE | Noted: 2020-01-03

## 2020-01-03 PROCEDURE — G8484 FLU IMMUNIZE NO ADMIN: HCPCS | Performed by: ORTHOPAEDIC SURGERY

## 2020-01-03 PROCEDURE — 99214 OFFICE O/P EST MOD 30 MIN: CPT | Performed by: ORTHOPAEDIC SURGERY

## 2020-01-03 PROCEDURE — G8427 DOCREV CUR MEDS BY ELIG CLIN: HCPCS | Performed by: ORTHOPAEDIC SURGERY

## 2020-01-03 PROCEDURE — G8419 CALC BMI OUT NRM PARAM NOF/U: HCPCS | Performed by: ORTHOPAEDIC SURGERY

## 2020-01-03 NOTE — PROGRESS NOTES
KNEE VISIT      HISTORY OF PRESENT ILLNESS    Robina Colon is a 37 y.o. male who presents for consultation at request of Dr. Mary Ann Jordan, for bilateral knee pain and disability. At this time he says he has had a longstanding history of knee pain arthritis was told he needs knee replacements. He has had physical therapy, bracing, and cortisone injections in the past none of which have given him much relief. ROS    Well-documented patient history form dated 1/3/2020  All other ROS negative except for above. Past Surgical history    Past Surgical History:   Procedure Laterality Date    ABDOMEN SURGERY  10/08/2019    sigmoid colectomy with ostomy    OTHER SURGICAL HISTORY  11/27/2019    ileostomy reversal, on Q pain buster insertion    SMALL INTESTINE SURGERY N/A 10/8/2019    SIGMOID COLECTOMY, OSTOMY performed by Papo Latham MD at [facility] N/A 11/27/2019    ILEOSTOMY REVERSAL, ON-Q PAIN BUSTER INSERTION performed by Papo Latham MD at [facility]    Past Medical History:   Diagnosis Date    Diverticulitis     Heroin abuse ([facility] [address] 2013    Pt has been clean for OVER 8 hears last used 2013    Knee instability     per pt, his knees pop out at times, bilateral        Allergies    Allergies   Allergen Reactions    Tramadol Other (See Comments)     Hot and sweaty       Meds    No current outpatient medications on file. No current facility-administered medications for this visit.         Social    Social History     Socioeconomic History    Marital status:      Spouse name: Not on file    Number of children: Not on file    Years of education: HS education    Highest education level: Not on file   Occupational History    Occupation: unemployed   Social Needs    Financial resource strain: Not on file    Food insecurity:     Worry: Not on file     Inability: Not on file    Transportation needs:     Medical: Not on file line tenderness [x] [] [x] []     Additional Exam Comments: His neurocirculatory lymphatic exam otherwise is normal symmetric but he does have when he sits with his knee flexed lateral dislocated patellae, bilateral.  They can be manually reduced with the knee in full extension which does not appear to be overly painful to him but there is a fair amount of crepitus. He has palpable spurs. IMAGING STUDIES    X-rays 3 views of both knees demonstrate lateral patellar subluxation with moderately severe osteoarthritis of both knees most in the medial compartment. There appears to be some destructive lesions to both patellae consistent with his recurrent dislocation. IMPRESSION    Bilateral knee pain secondary to osteoarthritis, and recurrent bilateral patellar dislocations    PLAN      1. Conservative care options including physical therapy, NSAIDs, bracing, and activity modification were discussed. 2.  The indications for therapeutic injections were discussed. 3.  The indications for additional imaging studies were discussed. 4.  After considering the various options discussed, the patient elected to pursue a course that includes referring to Dr. Tho Nelson for consideration of surgical intervention for both issues.

## 2020-01-03 NOTE — LETTER
 Highest education level: Not on file   Occupational History    Occupation: unemployed   Social Needs    Financial resource strain: Not on file    Food insecurity:     Worry: Not on file     Inability: Not on file   [employer] needs:     Medical: Not on file     Non-medical: Not on file   Tobacco Use    Smoking status: Current Every Day Smoker     Packs/day: 0.25     Years: 20.00     Pack years: 5.00     Types: Cigarettes    Smokeless tobacco: Never Used   Substance and Sexual Activity    Alcohol use: Not Currently     Alcohol/week: 8.0 standard drinks     Types: 8 Shots of liquor per week     Comment: once a month. ..    Drug use: No    Sexual activity: Yes     Partners: Female   Lifestyle    Physical activity:     Days per week: Not on file     Minutes per session: Not on file    Stress: Not on file   Relationships    Social connections:     Talks on phone: Not on file     Gets together: Not on file     Attends Sabianism service: Not on file     Active member of club or organization: Not on file     Attends meetings of clubs or organizations: Not on file     Relationship status: Not on file    Intimate partner violence:     Fear of current or ex partner: Not on file     Emotionally abused: Not on file     Physically abused: Not on file     Forced sexual activity: Not on file   Other Topics Concern    Not on file   Social History Narrative    Not on file       Family HISTORY    No family history on file. PHYSICAL EXAM    Vital Signs:  BP (!) 136/93   Pulse 123   Ht 6' 1\" (1.854 m)   BMI 34.30 kg/m²    General Appearance:  Normal body habitus. Alert and oriented to person,  place, and time. He does have a strong odor of alcohol while sitting in the room talking to him. Affect:  Normal.   Gait: Antalgic. Good balance and coordination. Skin:  Intact. Sensation:  Intact. Strength:  Intact. Reflexes:  Intact. Pulses:  Intact.    Knee Exam:    Effusion: Negative

## 2020-01-28 ENCOUNTER — OFFICE VISIT (OUTPATIENT)
Dept: ORTHOPEDIC SURGERY | Age: 40
End: 2020-01-28
Payer: MEDICAID

## 2020-01-28 VITALS — BODY MASS INDEX: 34.45 KG/M2 | HEIGHT: 73 IN | WEIGHT: 259.92 LBS

## 2020-01-28 PROBLEM — M17.0 PRIMARY LOCALIZED OSTEOARTHRITIS OF KNEES, BILATERAL: Status: ACTIVE | Noted: 2020-01-28

## 2020-01-28 PROCEDURE — G8417 CALC BMI ABV UP PARAM F/U: HCPCS | Performed by: ORTHOPAEDIC SURGERY

## 2020-01-28 PROCEDURE — G8427 DOCREV CUR MEDS BY ELIG CLIN: HCPCS | Performed by: ORTHOPAEDIC SURGERY

## 2020-01-28 PROCEDURE — G8484 FLU IMMUNIZE NO ADMIN: HCPCS | Performed by: ORTHOPAEDIC SURGERY

## 2020-01-28 PROCEDURE — 4004F PT TOBACCO SCREEN RCVD TLK: CPT | Performed by: ORTHOPAEDIC SURGERY

## 2020-01-28 PROCEDURE — 99213 OFFICE O/P EST LOW 20 MIN: CPT | Performed by: ORTHOPAEDIC SURGERY

## 2020-01-28 PROCEDURE — L1810 KO ELASTIC WITH JOINTS: HCPCS | Performed by: ORTHOPAEDIC SURGERY

## 2020-01-28 NOTE — PROGRESS NOTES
10/08/2019    sigmoid colectomy with ostomy    OTHER SURGICAL HISTORY  11/27/2019    ileostomy reversal, on Q pain buster insertion    SMALL INTESTINE SURGERY N/A 10/8/2019    SIGMOID COLECTOMY, OSTOMY performed by Ganga Haley MD at [address] [place] [address] N/A 11/27/2019    ILEOSTOMY REVERSAL, ON-Q PAIN BUSTER INSERTION performed by Ganga Haley MD at [address] [place] [address] History     Socioeconomic History    Marital status:      Spouse name: None    Number of children: None    Years of education: HS education    Highest education level: None   Occupational History    Occupation: unemployed   Social Needs    Financial resource strain: None    Food insecurity:     Worry: None     Inability: None    Transportation needs:     Medical: None     Non-medical: None   Tobacco Use    Smoking status: Current Every Day Smoker     Packs/day: 0.25     Years: 20.00     Pack years: 5.00     Types: Cigarettes    Smokeless tobacco: Never Used   Substance and Sexual Activity    Alcohol use: Not Currently     Alcohol/week: 8.0 standard drinks     Types: 8 Shots of liquor per week     Comment: once a month. ..    Drug use: No    Sexual activity: Yes     Partners: Female   Lifestyle    Physical activity:     Days per week: None     Minutes per session: None    Stress: None   Relationships    Social connections:     Talks on phone: None     Gets together: None     Attends Adventism service: None     Active member of club or organization: None     Attends meetings of clubs or organizations: None     Relationship status: None    Intimate partner violence:     Fear of current or ex partner: None     Emotionally abused: None     Physically abused: None     Forced sexual activity: None   Other Topics Concern    None   Social History Narrative    None     Allergies   Allergen Reactions    Tramadol Other (See Comments)     Hot and sweaty       Review of Systems:  Constitutional: negative  Respiratory: negative  Cardiovascular: negative  Musculoskeletal:negative except for New Patient (david knee - ref by Aisha Christopher )    Relevant review of systems reviewed and available in the patient's chart in media tab    Vital Signs:  Vitals:    01/28/20 0948   Weight: 259 lb 14.8 oz (117.9 kg)   Height: 6' 0.99\" (1.854 m)         General Exam:   Constitutional: Patient is adequately groomed with no evidence of malnutrition  Mental Status: The patient is oriented to time, place and person. The patient's mood and affect are appropriate. Vascular: Examination reveals no swelling or calf tenderness. Peripheral pulses are palpable and 2+.    bilateral Knee Examination  Inspection:   Patellas are laterally subluxed back progressed. mild swelling noted. No erythema or ecchymosis. Skin is intact. Palpation: positive Tenderness to palpation along the medial joint line, positive Tenderness to palpation along lateral joint line, positive Tenderness to palpation along medial and lateral facets of undersurface of the patella    Range of Motion:  0-110, he has a positive J sign, patella is able to be reduced passively but subluxes lateral with extension. Strength:  Able to do SLR    Special Tests: There positive crepitus noted with range of motion under the patella. A positive grind       Skin: There are no rashes, ulcerations or lesions. Gait: Ambulates with slightly antalgic gait, no assistive devices    Reflex: not tested    Additional Examinations:    LUMBAR SPINE: The skin is warm and dry. There is no swelling, warmth, or erythema. Range of motion is within normal limits. There is no paraspinal or spinous process tenderness. Ipsilateral and contralateral straight leg raising tests are negative. The distal neurovascular exam is grossly intact and symmetric.     X-RAYS: 4 views weightbearing AP, lateral. PA and sunrise of the bilateral knee were obtained and reviewed, they show no periosteal reaction, medullary lesions, or osteopenia. Medial lateral joint spaces are fairly well-maintained however he does have squaring and small marginal osteophytes on the medial lateral femoral condyles. No significant findings in the patellofemoral compartment with severe osteoarthritic changes and bilateral patella dislocation. Right slightly worse than left. No evidence of fracture or dislocation. Assessment : Bilateral knee osteoarthritis with chronic patella dislocation    Impression:  Encounter Diagnoses   Name Primary?  Right knee pain, unspecified chronicity     Left knee pain, unspecified chronicity     Primary osteoarthritis of both knees     Recurrent dislocation of patella, unspecified laterality     Primary localized osteoarthritis of knees, bilateral Yes       Office Procedures:  Orders Placed This Encounter   Procedures    XR KNEE RIGHT (MIN 4 VIEWS)    XR KNEE LEFT (MIN 4 VIEWS)    External Referral To Orthopedic Surgery     Referral Priority:   Routine     Referral Type:   Eval and Treat     Referral Reason:   Specialty Services Required     Requested Specialty:   Orthopedic Surgery     Number of Visits Requested:   1    DJO Hinged Lateral J Knee Stabilizer     Patient was prescribed a DJO Hinged Lateral J Knee Stabilizer brace. The bilateral knee will require stabilization / immobilization from this semi-rigid / rigid orthosis to improve their function. The orthosis will assist in protecting the affected area, provide functional support and facilitate healing. The patient was educated and fit by a healthcare professional with expert knowledge and specialization in brace application while under the direct supervision of the treating physician. Verbal and written instructions for the use of and application of this item were provided.    They were instructed to contact the office immediately should the brace result in increased pain, decreased sensation, increased swelling or worsening of the condition. Procedures    DJO Hinged Lateral J Knee Stabilizer     Patient was prescribed a DJO Hinged Lateral J Knee Stabilizer brace. The bilateral knee will require stabilization / immobilization from this semi-rigid / rigid orthosis to improve their function. The orthosis will assist in protecting the affected area, provide functional support and facilitate healing. The patient was educated and fit by a healthcare professional with expert knowledge and specialization in brace application while under the direct supervision of the treating physician. Verbal and written instructions for the use of and application of this item were provided. They were instructed to contact the office immediately should the brace result in increased pain, decreased sensation, increased swelling or worsening of the condition. Treatment Plan: I reviewed patient's x-ray and physical exam findings, he does have very complex situation with chronically dislocated patellas. This will require total knee arthroplasty with at least extensive lateral release, may require more extensive surgery depending on patella tracking intraoperatively. This was discussed with the patient. Today I recommend follow-up with 1 of our more experienced joint surgeons including Dr. Elicia Randolph or Dr. Olive Fry is a deal with these complex cases on a more regular basis. Patient agreed and will schedule follow-up. I also counseled patient about tobacco since cessation. Prior to proceeding with a total knee arthroplasty I encouraged him to stop smoking as this will help decrease her risk of complication. I did provide lateral patella stabilizing braces today to improve stability and function prior to surgery.       Shelton Forte

## 2020-04-20 ENCOUNTER — HOSPITAL ENCOUNTER (INPATIENT)
Age: 40
LOS: 3 days | Discharge: HOME | DRG: 247 | End: 2020-04-23
Attending: SURGERY | Admitting: SURGERY
Payer: MEDICAID

## 2020-04-20 PROBLEM — K56.609 SMALL BOWEL OBSTRUCTION (HCC): Status: ACTIVE | Noted: 2020-04-20

## 2020-04-20 LAB
ANION GAP SERPL CALCULATED.3IONS-SCNC: 11 MMOL/L (ref 3–16)
BUN BLDV-MCNC: 10 MG/DL (ref 7–20)
CALCIUM SERPL-MCNC: 8.4 MG/DL (ref 8.3–10.6)
CHLORIDE BLD-SCNC: 104 MMOL/L (ref 99–110)
CO2: 22 MMOL/L (ref 21–32)
CREAT SERPL-MCNC: 0.6 MG/DL (ref 0.9–1.3)
GFR AFRICAN AMERICAN: >60
GFR NON-AFRICAN AMERICAN: >60
GLUCOSE BLD-MCNC: 118 MG/DL (ref 70–99)
HCT VFR BLD CALC: 38.2 % (ref 40.5–52.5)
HEMOGLOBIN: 13 G/DL (ref 13.5–17.5)
MCH RBC QN AUTO: 31.9 PG (ref 26–34)
MCHC RBC AUTO-ENTMCNC: 34 G/DL (ref 31–36)
MCV RBC AUTO: 93.9 FL (ref 80–100)
PDW BLD-RTO: 14.8 % (ref 12.4–15.4)
PLATELET # BLD: 241 K/UL (ref 135–450)
PMV BLD AUTO: 7 FL (ref 5–10.5)
POTASSIUM SERPL-SCNC: 3.6 MMOL/L (ref 3.5–5.1)
RBC # BLD: 4.07 M/UL (ref 4.2–5.9)
SODIUM BLD-SCNC: 137 MMOL/L (ref 136–145)
WBC # BLD: 10.5 K/UL (ref 4–11)

## 2020-04-20 PROCEDURE — 2580000003 HC RX 258: Performed by: SURGERY

## 2020-04-20 PROCEDURE — 0D9670Z DRAINAGE OF STOMACH WITH DRAINAGE DEVICE, VIA NATURAL OR ARTIFICIAL OPENING: ICD-10-PCS | Performed by: SURGERY

## 2020-04-20 PROCEDURE — 1200000000 HC SEMI PRIVATE

## 2020-04-20 PROCEDURE — 80048 BASIC METABOLIC PNL TOTAL CA: CPT

## 2020-04-20 PROCEDURE — 6360000002 HC RX W HCPCS: Performed by: SURGERY

## 2020-04-20 PROCEDURE — 6370000000 HC RX 637 (ALT 250 FOR IP): Performed by: INTERNAL MEDICINE

## 2020-04-20 PROCEDURE — 36415 COLL VENOUS BLD VENIPUNCTURE: CPT

## 2020-04-20 PROCEDURE — 2500000003 HC RX 250 WO HCPCS: Performed by: SURGERY

## 2020-04-20 PROCEDURE — 85027 COMPLETE CBC AUTOMATED: CPT

## 2020-04-20 RX ORDER — PROMETHAZINE HYDROCHLORIDE 25 MG/ML
6.25 INJECTION, SOLUTION INTRAMUSCULAR; INTRAVENOUS EVERY 6 HOURS PRN
Status: DISCONTINUED | OUTPATIENT
Start: 2020-04-20 | End: 2020-04-23 | Stop reason: HOSPADM

## 2020-04-20 RX ORDER — ONDANSETRON 2 MG/ML
4 INJECTION INTRAMUSCULAR; INTRAVENOUS EVERY 6 HOURS PRN
Status: DISCONTINUED | OUTPATIENT
Start: 2020-04-20 | End: 2020-04-23 | Stop reason: HOSPADM

## 2020-04-20 RX ORDER — ESCITALOPRAM OXALATE 20 MG/1
20 TABLET ORAL DAILY
Status: ON HOLD | COMMUNITY
End: 2021-03-14 | Stop reason: ALTCHOICE

## 2020-04-20 RX ORDER — SODIUM CHLORIDE 9 MG/ML
INJECTION, SOLUTION INTRAVENOUS CONTINUOUS
Status: DISCONTINUED | OUTPATIENT
Start: 2020-04-20 | End: 2020-04-21

## 2020-04-20 RX ORDER — DICLOFENAC SODIUM 75 MG/1
75 TABLET, DELAYED RELEASE ORAL 2 TIMES DAILY
Status: ON HOLD | COMMUNITY
End: 2020-05-26 | Stop reason: HOSPADM

## 2020-04-20 RX ORDER — LISINOPRIL 10 MG/1
10 TABLET ORAL 2 TIMES DAILY
Status: ON HOLD | COMMUNITY
End: 2020-05-22 | Stop reason: ALTCHOICE

## 2020-04-20 RX ORDER — ACETAMINOPHEN 650 MG/1
650 SUPPOSITORY RECTAL EVERY 6 HOURS PRN
Status: DISCONTINUED | OUTPATIENT
Start: 2020-04-20 | End: 2020-04-23 | Stop reason: HOSPADM

## 2020-04-20 RX ORDER — HYDROCODONE BITARTRATE AND ACETAMINOPHEN 7.5; 325 MG/1; MG/1
7.5-325 TABLET ORAL EVERY 6 HOURS
Status: ON HOLD | COMMUNITY
End: 2020-04-23 | Stop reason: HOSPADM

## 2020-04-20 RX ADMIN — SODIUM CHLORIDE: 9 INJECTION, SOLUTION INTRAVENOUS at 19:39

## 2020-04-20 RX ADMIN — HYDROMORPHONE HYDROCHLORIDE 1 MG: 1 INJECTION, SOLUTION INTRAMUSCULAR; INTRAVENOUS; SUBCUTANEOUS at 23:16

## 2020-04-20 RX ADMIN — ENOXAPARIN SODIUM 40 MG: 40 INJECTION SUBCUTANEOUS at 22:10

## 2020-04-20 RX ADMIN — HYDROMORPHONE HYDROCHLORIDE 1 MG: 1 INJECTION, SOLUTION INTRAMUSCULAR; INTRAVENOUS; SUBCUTANEOUS at 19:39

## 2020-04-20 RX ADMIN — PROMETHAZINE HYDROCHLORIDE 6.25 MG: 25 INJECTION INTRAMUSCULAR; INTRAVENOUS at 19:39

## 2020-04-20 RX ADMIN — FAMOTIDINE 20 MG: 10 INJECTION, SOLUTION INTRAVENOUS at 22:10

## 2020-04-20 RX ADMIN — Medication 1 SPRAY: at 23:38

## 2020-04-20 ASSESSMENT — PAIN - FUNCTIONAL ASSESSMENT: PAIN_FUNCTIONAL_ASSESSMENT: ACTIVITIES ARE NOT PREVENTED

## 2020-04-20 ASSESSMENT — PAIN SCALES - GENERAL
PAINLEVEL_OUTOF10: 8
PAINLEVEL_OUTOF10: 8
PAINLEVEL_OUTOF10: 0

## 2020-04-20 ASSESSMENT — PAIN DESCRIPTION - FREQUENCY: FREQUENCY: CONTINUOUS

## 2020-04-20 ASSESSMENT — PAIN DESCRIPTION - PROGRESSION: CLINICAL_PROGRESSION: GRADUALLY WORSENING

## 2020-04-20 ASSESSMENT — PAIN DESCRIPTION - ONSET: ONSET: ON-GOING

## 2020-04-20 ASSESSMENT — PAIN DESCRIPTION - PAIN TYPE: TYPE: ACUTE PAIN

## 2020-04-20 ASSESSMENT — PAIN DESCRIPTION - LOCATION: LOCATION: ABDOMEN

## 2020-04-21 ENCOUNTER — APPOINTMENT (OUTPATIENT)
Dept: GENERAL RADIOLOGY | Age: 40
DRG: 247 | End: 2020-04-21
Attending: SURGERY
Payer: MEDICAID

## 2020-04-21 LAB
ANION GAP SERPL CALCULATED.3IONS-SCNC: 10 MMOL/L (ref 3–16)
BASOPHILS ABSOLUTE: 0 K/UL (ref 0–0.2)
BASOPHILS RELATIVE PERCENT: 0.7 %
BUN BLDV-MCNC: 11 MG/DL (ref 7–20)
CALCIUM SERPL-MCNC: 8.5 MG/DL (ref 8.3–10.6)
CHLORIDE BLD-SCNC: 105 MMOL/L (ref 99–110)
CO2: 22 MMOL/L (ref 21–32)
CREAT SERPL-MCNC: 0.6 MG/DL (ref 0.9–1.3)
EOSINOPHILS ABSOLUTE: 0.2 K/UL (ref 0–0.6)
EOSINOPHILS RELATIVE PERCENT: 4.2 %
GFR AFRICAN AMERICAN: >60
GFR NON-AFRICAN AMERICAN: >60
GLUCOSE BLD-MCNC: 108 MG/DL (ref 70–99)
HCT VFR BLD CALC: 36 % (ref 40.5–52.5)
HEMOGLOBIN: 11.9 G/DL (ref 13.5–17.5)
LYMPHOCYTES ABSOLUTE: 1.1 K/UL (ref 1–5.1)
LYMPHOCYTES RELATIVE PERCENT: 19.4 %
MAGNESIUM: 2.2 MG/DL (ref 1.8–2.4)
MCH RBC QN AUTO: 31.6 PG (ref 26–34)
MCHC RBC AUTO-ENTMCNC: 33.1 G/DL (ref 31–36)
MCV RBC AUTO: 95.4 FL (ref 80–100)
MONOCYTES ABSOLUTE: 0.7 K/UL (ref 0–1.3)
MONOCYTES RELATIVE PERCENT: 12.5 %
NEUTROPHILS ABSOLUTE: 3.7 K/UL (ref 1.7–7.7)
NEUTROPHILS RELATIVE PERCENT: 63.2 %
PDW BLD-RTO: 14.7 % (ref 12.4–15.4)
PHOSPHORUS: 2.6 MG/DL (ref 2.5–4.9)
PLATELET # BLD: 214 K/UL (ref 135–450)
PMV BLD AUTO: 7.5 FL (ref 5–10.5)
POTASSIUM SERPL-SCNC: 3.4 MMOL/L (ref 3.5–5.1)
RBC # BLD: 3.78 M/UL (ref 4.2–5.9)
SODIUM BLD-SCNC: 137 MMOL/L (ref 136–145)
WBC # BLD: 5.8 K/UL (ref 4–11)

## 2020-04-21 PROCEDURE — 1200000000 HC SEMI PRIVATE

## 2020-04-21 PROCEDURE — 2580000003 HC RX 258

## 2020-04-21 PROCEDURE — 2500000003 HC RX 250 WO HCPCS: Performed by: SURGERY

## 2020-04-21 PROCEDURE — 80048 BASIC METABOLIC PNL TOTAL CA: CPT

## 2020-04-21 PROCEDURE — 2580000003 HC RX 258: Performed by: SURGERY

## 2020-04-21 PROCEDURE — 99222 1ST HOSP IP/OBS MODERATE 55: CPT | Performed by: SURGERY

## 2020-04-21 PROCEDURE — 74250 X-RAY XM SM INT 1CNTRST STD: CPT

## 2020-04-21 PROCEDURE — 6360000002 HC RX W HCPCS: Performed by: NURSE PRACTITIONER

## 2020-04-21 PROCEDURE — 36415 COLL VENOUS BLD VENIPUNCTURE: CPT

## 2020-04-21 PROCEDURE — 84100 ASSAY OF PHOSPHORUS: CPT

## 2020-04-21 PROCEDURE — 85025 COMPLETE CBC W/AUTO DIFF WBC: CPT

## 2020-04-21 PROCEDURE — 74019 RADEX ABDOMEN 2 VIEWS: CPT

## 2020-04-21 PROCEDURE — 6360000002 HC RX W HCPCS: Performed by: SURGERY

## 2020-04-21 PROCEDURE — 83735 ASSAY OF MAGNESIUM: CPT

## 2020-04-21 RX ORDER — SODIUM CHLORIDE AND POTASSIUM CHLORIDE .9; .15 G/100ML; G/100ML
SOLUTION INTRAVENOUS CONTINUOUS
Status: DISCONTINUED | OUTPATIENT
Start: 2020-04-21 | End: 2020-04-23 | Stop reason: HOSPADM

## 2020-04-21 RX ORDER — SODIUM CHLORIDE 0.9 % (FLUSH) 0.9 %
SYRINGE (ML) INJECTION
Status: COMPLETED
Start: 2020-04-21 | End: 2020-04-21

## 2020-04-21 RX ADMIN — HYDROMORPHONE HYDROCHLORIDE 1 MG: 1 INJECTION, SOLUTION INTRAMUSCULAR; INTRAVENOUS; SUBCUTANEOUS at 18:19

## 2020-04-21 RX ADMIN — HYDROMORPHONE HYDROCHLORIDE 1 MG: 1 INJECTION, SOLUTION INTRAMUSCULAR; INTRAVENOUS; SUBCUTANEOUS at 11:34

## 2020-04-21 RX ADMIN — HYDROMORPHONE HYDROCHLORIDE 1 MG: 1 INJECTION, SOLUTION INTRAMUSCULAR; INTRAVENOUS; SUBCUTANEOUS at 21:33

## 2020-04-21 RX ADMIN — ONDANSETRON HYDROCHLORIDE 4 MG: 2 INJECTION, SOLUTION INTRAMUSCULAR; INTRAVENOUS at 15:09

## 2020-04-21 RX ADMIN — POTASSIUM CHLORIDE AND SODIUM CHLORIDE: 900; 150 INJECTION, SOLUTION INTRAVENOUS at 18:19

## 2020-04-21 RX ADMIN — Medication 10 ML: at 08:13

## 2020-04-21 RX ADMIN — ENOXAPARIN SODIUM 40 MG: 40 INJECTION SUBCUTANEOUS at 20:34

## 2020-04-21 RX ADMIN — HYDROMORPHONE HYDROCHLORIDE 1 MG: 1 INJECTION, SOLUTION INTRAMUSCULAR; INTRAVENOUS; SUBCUTANEOUS at 15:09

## 2020-04-21 RX ADMIN — SODIUM CHLORIDE: 9 INJECTION, SOLUTION INTRAVENOUS at 03:19

## 2020-04-21 RX ADMIN — POTASSIUM CHLORIDE AND SODIUM CHLORIDE: 900; 150 INJECTION, SOLUTION INTRAVENOUS at 12:34

## 2020-04-21 RX ADMIN — FAMOTIDINE 20 MG: 10 INJECTION, SOLUTION INTRAVENOUS at 20:35

## 2020-04-21 RX ADMIN — FAMOTIDINE 20 MG: 10 INJECTION, SOLUTION INTRAVENOUS at 08:04

## 2020-04-21 RX ADMIN — HYDROMORPHONE HYDROCHLORIDE 1 MG: 1 INJECTION, SOLUTION INTRAMUSCULAR; INTRAVENOUS; SUBCUTANEOUS at 08:12

## 2020-04-21 RX ADMIN — HYDROMORPHONE HYDROCHLORIDE 1 MG: 1 INJECTION, SOLUTION INTRAMUSCULAR; INTRAVENOUS; SUBCUTANEOUS at 03:19

## 2020-04-21 ASSESSMENT — PAIN SCALES - GENERAL
PAINLEVEL_OUTOF10: 6
PAINLEVEL_OUTOF10: 3
PAINLEVEL_OUTOF10: 7
PAINLEVEL_OUTOF10: 8
PAINLEVEL_OUTOF10: 2
PAINLEVEL_OUTOF10: 3
PAINLEVEL_OUTOF10: 7
PAINLEVEL_OUTOF10: 2
PAINLEVEL_OUTOF10: 3
PAINLEVEL_OUTOF10: 6
PAINLEVEL_OUTOF10: 7

## 2020-04-21 ASSESSMENT — PAIN DESCRIPTION - ONSET
ONSET: ON-GOING

## 2020-04-21 ASSESSMENT — PAIN DESCRIPTION - ORIENTATION
ORIENTATION: MID;LOWER

## 2020-04-21 ASSESSMENT — PAIN DESCRIPTION - PAIN TYPE
TYPE: ACUTE PAIN

## 2020-04-21 ASSESSMENT — PAIN DESCRIPTION - DESCRIPTORS
DESCRIPTORS: ACHING;DISCOMFORT
DESCRIPTORS: TENDER;SHARP;SHOOTING
DESCRIPTORS: ACHING;DISCOMFORT
DESCRIPTORS: ACHING;DISCOMFORT

## 2020-04-21 ASSESSMENT — PAIN DESCRIPTION - LOCATION
LOCATION: ABDOMEN

## 2020-04-21 ASSESSMENT — PAIN DESCRIPTION - PROGRESSION
CLINICAL_PROGRESSION: GRADUALLY IMPROVING
CLINICAL_PROGRESSION: NOT CHANGED

## 2020-04-21 ASSESSMENT — PAIN - FUNCTIONAL ASSESSMENT
PAIN_FUNCTIONAL_ASSESSMENT: PREVENTS OR INTERFERES SOME ACTIVE ACTIVITIES AND ADLS
PAIN_FUNCTIONAL_ASSESSMENT: ACTIVITIES ARE NOT PREVENTED

## 2020-04-21 ASSESSMENT — PAIN DESCRIPTION - FREQUENCY
FREQUENCY: INTERMITTENT
FREQUENCY: CONTINUOUS

## 2020-04-21 NOTE — H&P
Surgery History and Physical  Ledy Elder Hence, CNP  Pt Name: Gabe Woodall  MRN: [id number]  YOB: 1980  Date of evaluation: 4/21/2020  Primary Care Physician: Andi Jimenez DO  Patient evaluated at the request of [facility]  Reason for evaluation: SBO  IMPRESSIONS:   1. SBFT unremarkable with normal transit to the TI, no SBO  2. ABD xray this am: nonobstructive bowel gas pattern  3. ABD: obese, midline scar with small scabs noted, mild distention, soft, no flatus, no BM  4. K+ 3.4  5. Otherwise labs unremarkable  6. VSS   7. NGT with 500 in canister  8. Active Problems:  9. Small bowel obstruction ([address]  10. Resolved Problems:  11.   * No resolved hospital problems. *  12. PLANS:   1. Admit type: Inpatient  2. It is expected this patient's LOS will be: Greater than 2 midnights  3. Anticipated Disposition Upon Discharge: Home  4. Analgesics and antiemetics on a prn basis  5. IV hydration: NS add 20 K+  6. DVT prophylaxis with Lovenox  7. Await SBFT results: Xray in the am     SUBJECTIVE:     History of Chief Complaint:    Gabe Woodall is a 37 y.o. male who presented to [facility] with worsening, severe lower abdominal pain. The patient states the abdominal pain has been intermittent x 2 months. He states over the last few days, the pain became sharp and constant. He state syesterday he vomitted 12 times at home. He states he ahd chills and fevers. He is not sure how high his fevers were. The pain was so severe and he was concerned so, he came to the ER for evaluation. A CT of the abd and pelvis was performed and demonstrated the above findings. He was transferred to [facility] for serial abdominal examinations, intravenous fluids, pain control and close follow up should surgical intervention be warranted.  The patient is known to our group and underwent an ileotomy reversal with Dr. Andrés Mcintosh on 11/27/19 and a sigmoid colectomy with diverting ileostomy for refractory complicated diverticulitis on 10/8/19. The patient reports he was feeling great after his surgeries until the intermittent pain started 2 months ago. He denies any recent drug abuse. Past Medical History   has a past medical history of Arthritis, Diverticulitis, Heroin abuse ([address] Hypertension, and Knee instability. Past Surgical History   has a past surgical history that includes Abdomen surgery (10/08/2019); Small intestine surgery (N/A, 10/8/2019); other surgical history (11/27/2019); and Small intestine surgery (N/A, 11/27/2019). Medications  Prior to Admission medications    Medication Sig Start Date End Date Taking? Authorizing Provider   diclofenac (VOLTAREN) 75 MG EC tablet Take 75 mg by mouth 2 times daily   Yes Historical Provider, MD   escitalopram (LEXAPRO) 20 MG tablet Take 20 mg by mouth daily   Yes Historical Provider, MD   HYDROcodone-acetaminophen (NORCO) 7.5-325 MG per tablet Take 7.5-325 mg by mouth every 6 hours. Yes Historical Provider, MD   lisinopril (PRINIVIL;ZESTRIL) 10 MG tablet Take 10 mg by mouth 2 times daily   Yes Historical Provider, MD    Scheduled Meds:   enoxaparin  40 mg Subcutaneous Q24H    famotidine (PEPCID) injection  20 mg Intravenous BID     Continuous Infusions:   0.9% NaCl with KCl 20 mEq       PRN Meds:.promethazine, HYDROmorphone, acetaminophen, ondansetron, phenol    Allergies  is allergic to tramadol. Family History  family history is not on file. Social History   reports that he has been smoking cigarettes. He has a 5.00 pack-year smoking history. He has never used smokeless tobacco. He reports previous alcohol use of about 8.0 standard drinks of alcohol per week. He reports that he does not use drugs.     Review of Systems:  General positive for  fevers and chills  HEENT Denies any diplopia, tinnitus or vertigo  Resp Denies any shortness of breath, cough or wheezing  Cardiac Denies any chest pain, palpitations, claudication or edema  GI Positive for nausea, vomiting,

## 2020-04-21 NOTE — PROGRESS NOTES
Pt is resting in bed with eyes closed. No s/s of distress at this time. Call light and bedside table within reach. Will continue to monitor.

## 2020-04-21 NOTE — PROGRESS NOTES
Bedside report given and pt care transferred to [facility]. Pt denies needs at this time. Call light within reach.

## 2020-04-21 NOTE — PROGRESS NOTES
4 Eyes Skin Assessment     The patient is being assess for   Admission    I agree that 2 RN's have performed a thorough Head to Toe Skin Assessment on the patient. ALL assessment sites listed below have been assessed. Areas assessed by both nurses:   [x]   Head, Face, and Ears   [x]   Shoulders, Back, and Chest, Abdomen  [x]   Arms, Elbows, and Hands   [x]   Coccyx, Sacrum, and Ischium  [x]   Legs, Feet, and Heels        Scabbing to left elbow, callous to right ball of foot, scattered abrasions to abdomen from ostomy reversal surgery, scattered abrasions on BUE.    **SHARE this note so that the co-signing nurse is able to place an eSignature**    Co-signer eSignature: Temo Licona RN     Does the Patient have Skin Breakdown?   No          Sebastian Prevention initiated:  NA   Wound Care Orders initiated:  NA      WOC nurse consulted for Pressure Injury (Stage 3,4, Unstageable, DTI, NWPT, Complex wounds)and New or Established Ostomies:  NA      Primary Nurse eSignature: Electronically signed by Jairo Au RN on 4/20/20 at 10:14 PM EDT

## 2020-04-22 LAB
ANION GAP SERPL CALCULATED.3IONS-SCNC: 13 MMOL/L (ref 3–16)
BILIRUBIN URINE: ABNORMAL
BLOOD, URINE: ABNORMAL
BUN BLDV-MCNC: 11 MG/DL (ref 7–20)
CALCIUM SERPL-MCNC: 8.8 MG/DL (ref 8.3–10.6)
CHLORIDE BLD-SCNC: 103 MMOL/L (ref 99–110)
CLARITY: CLEAR
CO2: 22 MMOL/L (ref 21–32)
COLOR: YELLOW
CREAT SERPL-MCNC: <0.5 MG/DL (ref 0.9–1.3)
GFR AFRICAN AMERICAN: >60
GFR NON-AFRICAN AMERICAN: >60
GLUCOSE BLD-MCNC: 84 MG/DL (ref 70–99)
GLUCOSE URINE: NEGATIVE MG/DL
KETONES, URINE: >=80 MG/DL
LEUKOCYTE ESTERASE, URINE: NEGATIVE
MICROSCOPIC EXAMINATION: YES
NITRITE, URINE: NEGATIVE
PH UA: 6.5 (ref 5–8)
PHOSPHORUS: 2.9 MG/DL (ref 2.5–4.9)
POTASSIUM REFLEX MAGNESIUM: 3.9 MMOL/L (ref 3.5–5.1)
PROTEIN UA: NEGATIVE MG/DL
RBC UA: ABNORMAL /HPF (ref 0–4)
SODIUM BLD-SCNC: 138 MMOL/L (ref 136–145)
SPECIFIC GRAVITY UA: 1.02 (ref 1–1.03)
URINE TYPE: ABNORMAL
UROBILINOGEN, URINE: 0.2 E.U./DL
WBC UA: ABNORMAL /HPF (ref 0–5)

## 2020-04-22 PROCEDURE — 6360000002 HC RX W HCPCS: Performed by: SURGERY

## 2020-04-22 PROCEDURE — 99232 SBSQ HOSP IP/OBS MODERATE 35: CPT | Performed by: SURGERY

## 2020-04-22 PROCEDURE — 81001 URINALYSIS AUTO W/SCOPE: CPT

## 2020-04-22 PROCEDURE — 2500000003 HC RX 250 WO HCPCS: Performed by: SURGERY

## 2020-04-22 PROCEDURE — 36415 COLL VENOUS BLD VENIPUNCTURE: CPT

## 2020-04-22 PROCEDURE — 6360000002 HC RX W HCPCS: Performed by: NURSE PRACTITIONER

## 2020-04-22 PROCEDURE — 80048 BASIC METABOLIC PNL TOTAL CA: CPT

## 2020-04-22 PROCEDURE — 84100 ASSAY OF PHOSPHORUS: CPT

## 2020-04-22 PROCEDURE — 1200000000 HC SEMI PRIVATE

## 2020-04-22 PROCEDURE — 87086 URINE CULTURE/COLONY COUNT: CPT

## 2020-04-22 RX ADMIN — HYDROMORPHONE HYDROCHLORIDE 1 MG: 1 INJECTION, SOLUTION INTRAMUSCULAR; INTRAVENOUS; SUBCUTANEOUS at 18:49

## 2020-04-22 RX ADMIN — POTASSIUM CHLORIDE AND SODIUM CHLORIDE: 900; 150 INJECTION, SOLUTION INTRAVENOUS at 16:29

## 2020-04-22 RX ADMIN — FAMOTIDINE 20 MG: 10 INJECTION, SOLUTION INTRAVENOUS at 20:52

## 2020-04-22 RX ADMIN — ENOXAPARIN SODIUM 40 MG: 40 INJECTION SUBCUTANEOUS at 20:52

## 2020-04-22 RX ADMIN — HYDROMORPHONE HYDROCHLORIDE 1 MG: 1 INJECTION, SOLUTION INTRAMUSCULAR; INTRAVENOUS; SUBCUTANEOUS at 01:05

## 2020-04-22 RX ADMIN — FAMOTIDINE 20 MG: 10 INJECTION, SOLUTION INTRAVENOUS at 08:36

## 2020-04-22 RX ADMIN — HYDROMORPHONE HYDROCHLORIDE 1 MG: 1 INJECTION, SOLUTION INTRAMUSCULAR; INTRAVENOUS; SUBCUTANEOUS at 08:38

## 2020-04-22 RX ADMIN — HYDROMORPHONE HYDROCHLORIDE 1 MG: 1 INJECTION, SOLUTION INTRAMUSCULAR; INTRAVENOUS; SUBCUTANEOUS at 12:08

## 2020-04-22 RX ADMIN — HYDROMORPHONE HYDROCHLORIDE 1 MG: 1 INJECTION, SOLUTION INTRAMUSCULAR; INTRAVENOUS; SUBCUTANEOUS at 21:53

## 2020-04-22 RX ADMIN — HYDROMORPHONE HYDROCHLORIDE 1 MG: 1 INJECTION, SOLUTION INTRAMUSCULAR; INTRAVENOUS; SUBCUTANEOUS at 05:05

## 2020-04-22 RX ADMIN — POTASSIUM CHLORIDE AND SODIUM CHLORIDE: 900; 150 INJECTION, SOLUTION INTRAVENOUS at 07:31

## 2020-04-22 RX ADMIN — ONDANSETRON HYDROCHLORIDE 4 MG: 2 INJECTION, SOLUTION INTRAMUSCULAR; INTRAVENOUS at 12:07

## 2020-04-22 RX ADMIN — HYDROMORPHONE HYDROCHLORIDE 1 MG: 1 INJECTION, SOLUTION INTRAMUSCULAR; INTRAVENOUS; SUBCUTANEOUS at 15:38

## 2020-04-22 ASSESSMENT — PAIN DESCRIPTION - LOCATION
LOCATION: ABDOMEN

## 2020-04-22 ASSESSMENT — PAIN SCALES - GENERAL
PAINLEVEL_OUTOF10: 0
PAINLEVEL_OUTOF10: 6
PAINLEVEL_OUTOF10: 5
PAINLEVEL_OUTOF10: 7
PAINLEVEL_OUTOF10: 5
PAINLEVEL_OUTOF10: 2
PAINLEVEL_OUTOF10: 5
PAINLEVEL_OUTOF10: 7
PAINLEVEL_OUTOF10: 7
PAINLEVEL_OUTOF10: 2
PAINLEVEL_OUTOF10: 3
PAINLEVEL_OUTOF10: 2
PAINLEVEL_OUTOF10: 6
PAINLEVEL_OUTOF10: 7
PAINLEVEL_OUTOF10: 6

## 2020-04-22 ASSESSMENT — PAIN DESCRIPTION - PAIN TYPE
TYPE: ACUTE PAIN

## 2020-04-22 ASSESSMENT — PAIN DESCRIPTION - PROGRESSION
CLINICAL_PROGRESSION: NOT CHANGED
CLINICAL_PROGRESSION: GRADUALLY IMPROVING
CLINICAL_PROGRESSION: NOT CHANGED

## 2020-04-22 ASSESSMENT — PAIN DESCRIPTION - DESCRIPTORS
DESCRIPTORS: SHARP
DESCRIPTORS: ACHING;DISCOMFORT
DESCRIPTORS: ACHING;DISCOMFORT
DESCRIPTORS: SHARP

## 2020-04-22 ASSESSMENT — PAIN DESCRIPTION - ONSET
ONSET: ON-GOING
ONSET: ON-GOING

## 2020-04-22 ASSESSMENT — PAIN - FUNCTIONAL ASSESSMENT
PAIN_FUNCTIONAL_ASSESSMENT: PREVENTS OR INTERFERES SOME ACTIVE ACTIVITIES AND ADLS
PAIN_FUNCTIONAL_ASSESSMENT: PREVENTS OR INTERFERES SOME ACTIVE ACTIVITIES AND ADLS

## 2020-04-22 ASSESSMENT — PAIN DESCRIPTION - ORIENTATION
ORIENTATION: MID;LOWER
ORIENTATION: LOWER;MID
ORIENTATION: LOWER;MID
ORIENTATION: LOWER
ORIENTATION: LOWER;MID
ORIENTATION: LOWER;MID

## 2020-04-22 ASSESSMENT — PAIN DESCRIPTION - FREQUENCY
FREQUENCY: CONTINUOUS

## 2020-04-22 NOTE — PROGRESS NOTES
General Surgery - [facility], [address] [facility]  Daily Progress Note    Pt Name: Jesus Maradiaga Record Number: [id number]  Date of Birth 1980   Today's Date: 4/22/2020    ASSESSMENT  1. SBFT 4/21: no obstruction, normal transit to the TI  2. ABD: soft, very mild tenderness with one episode of sharp shooting tenderness mid lower abdomen last night, + Bms, NGT in place, no N/V  3. Labs unremarkable  4. VSS  5. NGT 1.1L pt taking ice  6. UO OK: pt reports difficulty urinating       PLAN  1. NGT clamping with clear liquid trials  2. Ambulate halls  3. IVF  4. Will send Urine. 5. Pt appears to be improved overall. His SBO is resolved. SUBJECTIVE  Miguel has slightly improved from yesterday. Pain is well controlled. He has no nausea and no vomiting. He has passed flatus and has had a bowel movement. He is not tolerating ice chips with his NGT. Current activity is up with assistance    OBJECTIVE  VITALS:  height is 6' 1\" (1.854 m). His oral temperature is 98.5 °F (36.9 °C). His blood pressure is 134/86 and his pulse is 66. His respiration is 16 and oxygen saturation is 97%. VITALS:  /86   Pulse 66   Temp 98.5 °F (36.9 °C) (Oral)   Resp 16   Ht 6' 1\" (1.854 m)   SpO2 97%   BMI 34.29 kg/m²   INTAKE/OUTPUT:    Intake/Output Summary (Last 24 hours) at 4/22/2020 1315  Last data filed at 4/22/2020 1248  Gross per 24 hour   Intake 2363 ml   Output 2950 ml   Net -587 ml     GENERAL: alert, cooperative, no distress  I/O last 3 completed shifts:   In: 434 [I.V.:434]  Out: 1800 [Urine:700; Emesis/NG output:1100]  I/O this shift:  In: 2363 [I.V.:2363]  Out: 1150 [Urine:450; Emesis/NG output:700]    LABS  Recent Labs     04/21/20  0457 04/22/20  0923 04/22/20  1015   WBC 5.8  --   --    HGB 11.9*  --   --    HCT 36.0*  --   --      --   --     138  --    K 3.4* 3.9  --     103  --    CO2 22 22  --    BUN 11 11  --    CREATININE 0.6* <0.5*  --    MG 2.20  --   --    PHOS 2.6 2.9  --

## 2020-04-23 VITALS
SYSTOLIC BLOOD PRESSURE: 140 MMHG | BODY MASS INDEX: 34.29 KG/M2 | DIASTOLIC BLOOD PRESSURE: 93 MMHG | RESPIRATION RATE: 16 BRPM | HEIGHT: 73 IN | HEART RATE: 76 BPM | TEMPERATURE: 97.7 F | OXYGEN SATURATION: 97 %

## 2020-04-23 PROBLEM — K56.609 SMALL BOWEL OBSTRUCTION (HCC): Status: RESOLVED | Noted: 2020-04-20 | Resolved: 2020-04-23

## 2020-04-23 LAB — URINE CULTURE, ROUTINE: NORMAL

## 2020-04-23 PROCEDURE — 99238 HOSP IP/OBS DSCHRG MGMT 30/<: CPT | Performed by: SURGERY

## 2020-04-23 PROCEDURE — 6370000000 HC RX 637 (ALT 250 FOR IP): Performed by: NURSE PRACTITIONER

## 2020-04-23 PROCEDURE — 6360000002 HC RX W HCPCS: Performed by: SURGERY

## 2020-04-23 PROCEDURE — 6360000002 HC RX W HCPCS: Performed by: NURSE PRACTITIONER

## 2020-04-23 PROCEDURE — 2500000003 HC RX 250 WO HCPCS: Performed by: SURGERY

## 2020-04-23 RX ORDER — HYDROCODONE BITARTRATE AND ACETAMINOPHEN 7.5; 325 MG/1; MG/1
1 TABLET ORAL EVERY 6 HOURS PRN
Qty: 6 TABLET | Refills: 0 | Status: SHIPPED | OUTPATIENT
Start: 2020-04-23 | End: 2020-04-30

## 2020-04-23 RX ORDER — HYDROCODONE BITARTRATE AND ACETAMINOPHEN 7.5; 325 MG/1; MG/1
1 TABLET ORAL EVERY 6 HOURS PRN
Status: DISCONTINUED | OUTPATIENT
Start: 2020-04-23 | End: 2020-04-23 | Stop reason: HOSPADM

## 2020-04-23 RX ADMIN — PROMETHAZINE HYDROCHLORIDE 6.25 MG: 25 INJECTION INTRAMUSCULAR; INTRAVENOUS at 01:16

## 2020-04-23 RX ADMIN — HYDROCODONE BITARTRATE AND ACETAMINOPHEN 1 TABLET: 7.5; 325 TABLET ORAL at 14:28

## 2020-04-23 RX ADMIN — HYDROMORPHONE HYDROCHLORIDE 1 MG: 1 INJECTION, SOLUTION INTRAMUSCULAR; INTRAVENOUS; SUBCUTANEOUS at 01:15

## 2020-04-23 RX ADMIN — PROMETHAZINE HYDROCHLORIDE 6.25 MG: 25 INJECTION INTRAMUSCULAR; INTRAVENOUS at 08:16

## 2020-04-23 RX ADMIN — POTASSIUM CHLORIDE AND SODIUM CHLORIDE: 900; 150 INJECTION, SOLUTION INTRAVENOUS at 05:09

## 2020-04-23 RX ADMIN — HYDROMORPHONE HYDROCHLORIDE 1 MG: 1 INJECTION, SOLUTION INTRAMUSCULAR; INTRAVENOUS; SUBCUTANEOUS at 05:12

## 2020-04-23 RX ADMIN — FAMOTIDINE 20 MG: 10 INJECTION, SOLUTION INTRAVENOUS at 08:16

## 2020-04-23 RX ADMIN — HYDROMORPHONE HYDROCHLORIDE 0.5 MG: 1 INJECTION, SOLUTION INTRAMUSCULAR; INTRAVENOUS; SUBCUTANEOUS at 11:18

## 2020-04-23 RX ADMIN — HYDROMORPHONE HYDROCHLORIDE 1 MG: 1 INJECTION, SOLUTION INTRAMUSCULAR; INTRAVENOUS; SUBCUTANEOUS at 08:15

## 2020-04-23 ASSESSMENT — PAIN DESCRIPTION - FREQUENCY: FREQUENCY: INTERMITTENT

## 2020-04-23 ASSESSMENT — PAIN DESCRIPTION - ORIENTATION
ORIENTATION: LOWER
ORIENTATION: RIGHT;LEFT
ORIENTATION: LOWER;MID
ORIENTATION: RIGHT;LEFT

## 2020-04-23 ASSESSMENT — PAIN DESCRIPTION - LOCATION
LOCATION: ABDOMEN
LOCATION: KNEE
LOCATION: KNEE
LOCATION: ABDOMEN

## 2020-04-23 ASSESSMENT — PAIN DESCRIPTION - DESCRIPTORS
DESCRIPTORS: THROBBING
DESCRIPTORS: ACHING
DESCRIPTORS: THROBBING
DESCRIPTORS: CONSTANT

## 2020-04-23 ASSESSMENT — PAIN SCALES - GENERAL
PAINLEVEL_OUTOF10: 7
PAINLEVEL_OUTOF10: 6
PAINLEVEL_OUTOF10: 6
PAINLEVEL_OUTOF10: 4
PAINLEVEL_OUTOF10: 6
PAINLEVEL_OUTOF10: 6
PAINLEVEL_OUTOF10: 4
PAINLEVEL_OUTOF10: 4

## 2020-04-23 ASSESSMENT — PAIN DESCRIPTION - PAIN TYPE
TYPE: ACUTE PAIN

## 2020-04-23 ASSESSMENT — PAIN - FUNCTIONAL ASSESSMENT: PAIN_FUNCTIONAL_ASSESSMENT: ACTIVITIES ARE NOT PREVENTED

## 2020-04-23 ASSESSMENT — PAIN DESCRIPTION - PROGRESSION: CLINICAL_PROGRESSION: GRADUALLY IMPROVING

## 2020-04-23 ASSESSMENT — PAIN DESCRIPTION - ONSET: ONSET: AWAKENED FROM SLEEP

## 2020-04-23 NOTE — PLAN OF CARE
Problem: Activity:  Goal: Risk for activity intolerance will decrease  Description: Risk for activity intolerance will decrease  4/21/2020 1423 by Estefania Clemons RN  Outcome: Ongoing  4/21/2020 0359 by Ashli Musa RN  Outcome: Ongoing     Problem: Bowel/Gastric:  Goal: Bowel function will improve  Description: Bowel function will improve  4/21/2020 1423 by Estefania Clemons RN  Outcome: Ongoing  4/21/2020 0359 by Ashli Musa RN  Outcome: Ongoing     Problem: Bowel/Gastric:  Goal: Occurrences of nausea will decrease  Description: Occurrences of nausea will decrease  4/21/2020 0359 by Ashli Musa RN  Outcome: Ongoing     Problem:  Bowel/Gastric:  Goal: Occurrences of vomiting will decrease  Description: Occurrences of vomiting will decrease  4/21/2020 0359 by Ashli Musa RN  Outcome: Ongoing     Problem: Fluid Volume:  Goal: Maintenance of adequate hydration will improve  Description: Maintenance of adequate hydration will improve  4/21/2020 1423 by Estefania Clemons RN  Outcome: Ongoing  4/21/2020 0359 by Ashli Musa RN  Outcome: Ongoing
RN  Outcome: Ongoing  Goal: Free from intentional harm  Description: Free from intentional harm  4/23/2020 1454 by Hilda Valentino RN  Outcome: Completed  4/23/2020 1023 by Hilda Valentino RN  Outcome: Ongoing     Problem: Daily Care:  Goal: Daily care needs are met  Description: Daily care needs are met  4/23/2020 1454 by Hilda Valentino RN  Outcome: Completed  4/23/2020 1023 by Hilda Valentino RN  Outcome: Ongoing     Problem: Skin Integrity:  Goal: Skin integrity will stabilize  Description: Skin integrity will stabilize  4/23/2020 1454 by Hilda Valentino RN  Outcome: Completed  4/23/2020 1023 by Hilda Valentino RN  Outcome: Ongoing     Problem: Discharge Planning:  Goal: Patients continuum of care needs are met  Description: Patients continuum of care needs are met  4/23/2020 1454 by Hilda Valentino RN  Outcome: Completed  4/23/2020 1023 by Hilda Valentino RN  Outcome: Ongoing

## 2020-04-23 NOTE — PROGRESS NOTES
Pt tolerating full liquid diet, pt requesting for ng to be removed, writer placed call to NP, orders to remove ng and start home dose po pain medication. Norman Gonzalez RN\

## 2020-05-18 ENCOUNTER — APPOINTMENT (OUTPATIENT)
Dept: CT IMAGING | Age: 40
DRG: 282 | End: 2020-05-18
Payer: MEDICAID

## 2020-05-18 ENCOUNTER — HOSPITAL ENCOUNTER (INPATIENT)
Age: 40
LOS: 7 days | Discharge: HOME | DRG: 282 | End: 2020-05-26
Attending: STUDENT IN AN ORGANIZED HEALTH CARE EDUCATION/TRAINING PROGRAM | Admitting: INTERNAL MEDICINE
Payer: MEDICAID

## 2020-05-18 LAB
A/G RATIO: 1.3 (ref 1.1–2.2)
ALBUMIN SERPL-MCNC: 4.9 G/DL (ref 3.4–5)
ALP BLD-CCNC: 158 U/L (ref 40–129)
ALT SERPL-CCNC: 69 U/L (ref 10–40)
ANION GAP SERPL CALCULATED.3IONS-SCNC: 17 MMOL/L (ref 3–16)
AST SERPL-CCNC: 44 U/L (ref 15–37)
BASOPHILS ABSOLUTE: 0 K/UL (ref 0–0.2)
BASOPHILS RELATIVE PERCENT: 0.4 %
BILIRUB SERPL-MCNC: 0.9 MG/DL (ref 0–1)
BUN BLDV-MCNC: 10 MG/DL (ref 7–20)
CALCIUM SERPL-MCNC: 10 MG/DL (ref 8.3–10.6)
CHLORIDE BLD-SCNC: 96 MMOL/L (ref 99–110)
CO2: 21 MMOL/L (ref 21–32)
CREAT SERPL-MCNC: <0.5 MG/DL (ref 0.9–1.3)
EOSINOPHILS ABSOLUTE: 0 K/UL (ref 0–0.6)
EOSINOPHILS RELATIVE PERCENT: 0.3 %
GFR AFRICAN AMERICAN: >60
GFR NON-AFRICAN AMERICAN: >60
GLOBULIN: 3.7 G/DL
GLUCOSE BLD-MCNC: 112 MG/DL (ref 70–99)
HCT VFR BLD CALC: 42 % (ref 40.5–52.5)
HEMOGLOBIN: 14.1 G/DL (ref 13.5–17.5)
LIPASE: 631 U/L (ref 13–60)
LYMPHOCYTES ABSOLUTE: 0.6 K/UL (ref 1–5.1)
LYMPHOCYTES RELATIVE PERCENT: 4.9 %
MCH RBC QN AUTO: 32 PG (ref 26–34)
MCHC RBC AUTO-ENTMCNC: 33.5 G/DL (ref 31–36)
MCV RBC AUTO: 95.3 FL (ref 80–100)
MONOCYTES ABSOLUTE: 0.8 K/UL (ref 0–1.3)
MONOCYTES RELATIVE PERCENT: 6.5 %
NEUTROPHILS ABSOLUTE: 10.6 K/UL (ref 1.7–7.7)
NEUTROPHILS RELATIVE PERCENT: 87.9 %
PDW BLD-RTO: 14.2 % (ref 12.4–15.4)
PLATELET # BLD: 193 K/UL (ref 135–450)
PMV BLD AUTO: 7.6 FL (ref 5–10.5)
POTASSIUM SERPL-SCNC: 3.8 MMOL/L (ref 3.5–5.1)
RBC # BLD: 4.4 M/UL (ref 4.2–5.9)
SODIUM BLD-SCNC: 134 MMOL/L (ref 136–145)
TOTAL PROTEIN: 8.6 G/DL (ref 6.4–8.2)
WBC # BLD: 12.1 K/UL (ref 4–11)

## 2020-05-18 PROCEDURE — 74177 CT ABD & PELVIS W/CONTRAST: CPT

## 2020-05-18 PROCEDURE — 85025 COMPLETE CBC W/AUTO DIFF WBC: CPT

## 2020-05-18 PROCEDURE — 96375 TX/PRO/DX INJ NEW DRUG ADDON: CPT

## 2020-05-18 PROCEDURE — 6360000002 HC RX W HCPCS: Performed by: PHYSICIAN ASSISTANT

## 2020-05-18 PROCEDURE — 83690 ASSAY OF LIPASE: CPT

## 2020-05-18 PROCEDURE — 80053 COMPREHEN METABOLIC PANEL: CPT

## 2020-05-18 PROCEDURE — 6360000004 HC RX CONTRAST MEDICATION: Performed by: PHYSICIAN ASSISTANT

## 2020-05-18 PROCEDURE — 96374 THER/PROPH/DIAG INJ IV PUSH: CPT

## 2020-05-18 PROCEDURE — 2580000003 HC RX 258: Performed by: PHYSICIAN ASSISTANT

## 2020-05-18 PROCEDURE — 99285 EMERGENCY DEPT VISIT HI MDM: CPT

## 2020-05-18 RX ORDER — MORPHINE SULFATE 4 MG/ML
4 INJECTION, SOLUTION INTRAMUSCULAR; INTRAVENOUS ONCE
Status: COMPLETED | OUTPATIENT
Start: 2020-05-18 | End: 2020-05-18

## 2020-05-18 RX ORDER — HYDROCODONE BITARTRATE AND ACETAMINOPHEN 7.5; 325 MG/1; MG/1
1 TABLET ORAL EVERY 6 HOURS PRN
Status: ON HOLD | COMMUNITY
End: 2020-05-26 | Stop reason: HOSPADM

## 2020-05-18 RX ORDER — 0.9 % SODIUM CHLORIDE 0.9 %
1000 INTRAVENOUS SOLUTION INTRAVENOUS ONCE
Status: COMPLETED | OUTPATIENT
Start: 2020-05-18 | End: 2020-05-19

## 2020-05-18 RX ORDER — ONDANSETRON 2 MG/ML
4 INJECTION INTRAMUSCULAR; INTRAVENOUS EVERY 6 HOURS PRN
Status: DISCONTINUED | OUTPATIENT
Start: 2020-05-18 | End: 2020-05-19 | Stop reason: SDUPTHER

## 2020-05-18 RX ADMIN — MORPHINE SULFATE 4 MG: 4 INJECTION, SOLUTION INTRAMUSCULAR; INTRAVENOUS at 23:02

## 2020-05-18 RX ADMIN — ONDANSETRON HYDROCHLORIDE 4 MG: 2 INJECTION, SOLUTION INTRAMUSCULAR; INTRAVENOUS at 23:02

## 2020-05-18 RX ADMIN — IOPAMIDOL 75 ML: 755 INJECTION, SOLUTION INTRAVENOUS at 23:38

## 2020-05-18 RX ADMIN — SODIUM CHLORIDE 1000 ML: 9 INJECTION, SOLUTION INTRAVENOUS at 23:02

## 2020-05-18 ASSESSMENT — PAIN SCALES - GENERAL
PAINLEVEL_OUTOF10: 9
PAINLEVEL_OUTOF10: 9
PAINLEVEL_OUTOF10: 8

## 2020-05-18 ASSESSMENT — PAIN DESCRIPTION - PAIN TYPE: TYPE: ACUTE PAIN;SURGICAL PAIN

## 2020-05-18 ASSESSMENT — PAIN DESCRIPTION - LOCATION: LOCATION: ABDOMEN

## 2020-05-19 ENCOUNTER — TELEPHONE (OUTPATIENT)
Dept: INTERNAL MEDICINE CLINIC | Age: 40
End: 2020-05-19

## 2020-05-19 PROBLEM — K85.90 ACUTE PANCREATITIS WITHOUT INFECTION OR NECROSIS: Status: ACTIVE | Noted: 2020-05-19

## 2020-05-19 LAB
A/G RATIO: 1.2 (ref 1.1–2.2)
ALBUMIN SERPL-MCNC: 4.2 G/DL (ref 3.4–5)
ALP BLD-CCNC: 133 U/L (ref 40–129)
ALT SERPL-CCNC: 54 U/L (ref 10–40)
AMORPHOUS: ABNORMAL /HPF
ANION GAP SERPL CALCULATED.3IONS-SCNC: 18 MMOL/L (ref 3–16)
AST SERPL-CCNC: 29 U/L (ref 15–37)
BACTERIA: ABNORMAL /HPF
BASOPHILS ABSOLUTE: 0 K/UL (ref 0–0.2)
BASOPHILS RELATIVE PERCENT: 0.1 %
BILIRUB SERPL-MCNC: 0.8 MG/DL (ref 0–1)
BILIRUBIN URINE: ABNORMAL
BLOOD, URINE: ABNORMAL
BUN BLDV-MCNC: 9 MG/DL (ref 7–20)
CALCIUM SERPL-MCNC: 9.1 MG/DL (ref 8.3–10.6)
CHLORIDE BLD-SCNC: 99 MMOL/L (ref 99–110)
CLARITY: ABNORMAL
CO2: 18 MMOL/L (ref 21–32)
COLOR: YELLOW
CREAT SERPL-MCNC: <0.5 MG/DL (ref 0.9–1.3)
EOSINOPHILS ABSOLUTE: 0 K/UL (ref 0–0.6)
EOSINOPHILS RELATIVE PERCENT: 0.2 %
GFR AFRICAN AMERICAN: >60
GFR NON-AFRICAN AMERICAN: >60
GLOBULIN: 3.4 G/DL
GLUCOSE BLD-MCNC: 108 MG/DL (ref 70–99)
GLUCOSE URINE: NEGATIVE MG/DL
HCT VFR BLD CALC: 39.5 % (ref 40.5–52.5)
HEMOGLOBIN: 13 G/DL (ref 13.5–17.5)
KETONES, URINE: >=80 MG/DL
LEUKOCYTE ESTERASE, URINE: NEGATIVE
LYMPHOCYTES ABSOLUTE: 0.7 K/UL (ref 1–5.1)
LYMPHOCYTES RELATIVE PERCENT: 5.6 %
MAGNESIUM: 2.2 MG/DL (ref 1.8–2.4)
MCH RBC QN AUTO: 31.8 PG (ref 26–34)
MCHC RBC AUTO-ENTMCNC: 32.9 G/DL (ref 31–36)
MCV RBC AUTO: 96.7 FL (ref 80–100)
MICROSCOPIC EXAMINATION: YES
MONOCYTES ABSOLUTE: 0.9 K/UL (ref 0–1.3)
MONOCYTES RELATIVE PERCENT: 6.8 %
MUCUS: ABNORMAL /LPF
NEUTROPHILS ABSOLUTE: 11.3 K/UL (ref 1.7–7.7)
NEUTROPHILS RELATIVE PERCENT: 87.3 %
NITRITE, URINE: NEGATIVE
PDW BLD-RTO: 14.3 % (ref 12.4–15.4)
PH UA: 8 (ref 5–8)
PLATELET # BLD: 167 K/UL (ref 135–450)
PMV BLD AUTO: 7.6 FL (ref 5–10.5)
POTASSIUM REFLEX MAGNESIUM: 3.5 MMOL/L (ref 3.5–5.1)
PROTEIN UA: 30 MG/DL
RBC # BLD: 4.08 M/UL (ref 4.2–5.9)
RBC UA: ABNORMAL /HPF (ref 0–4)
SODIUM BLD-SCNC: 135 MMOL/L (ref 136–145)
SPECIFIC GRAVITY UA: 1.01 (ref 1–1.03)
TOTAL PROTEIN: 7.6 G/DL (ref 6.4–8.2)
TRIGL SERPL-MCNC: 94 MG/DL (ref 0–150)
URINE REFLEX TO CULTURE: ABNORMAL
URINE TYPE: ABNORMAL
UROBILINOGEN, URINE: 0.2 E.U./DL
WBC # BLD: 13 K/UL (ref 4–11)
WBC UA: ABNORMAL /HPF (ref 0–5)

## 2020-05-19 PROCEDURE — 2500000003 HC RX 250 WO HCPCS: Performed by: STUDENT IN AN ORGANIZED HEALTH CARE EDUCATION/TRAINING PROGRAM

## 2020-05-19 PROCEDURE — 2500000003 HC RX 250 WO HCPCS: Performed by: INTERNAL MEDICINE

## 2020-05-19 PROCEDURE — 1200000000 HC SEMI PRIVATE

## 2020-05-19 PROCEDURE — 36415 COLL VENOUS BLD VENIPUNCTURE: CPT

## 2020-05-19 PROCEDURE — 6360000002 HC RX W HCPCS: Performed by: STUDENT IN AN ORGANIZED HEALTH CARE EDUCATION/TRAINING PROGRAM

## 2020-05-19 PROCEDURE — 2580000003 HC RX 258: Performed by: INTERNAL MEDICINE

## 2020-05-19 PROCEDURE — 84478 ASSAY OF TRIGLYCERIDES: CPT

## 2020-05-19 PROCEDURE — 85025 COMPLETE CBC W/AUTO DIFF WBC: CPT

## 2020-05-19 PROCEDURE — 6370000000 HC RX 637 (ALT 250 FOR IP): Performed by: INTERNAL MEDICINE

## 2020-05-19 PROCEDURE — 81001 URINALYSIS AUTO W/SCOPE: CPT

## 2020-05-19 PROCEDURE — 6360000002 HC RX W HCPCS: Performed by: INTERNAL MEDICINE

## 2020-05-19 PROCEDURE — 80053 COMPREHEN METABOLIC PANEL: CPT

## 2020-05-19 PROCEDURE — 83735 ASSAY OF MAGNESIUM: CPT

## 2020-05-19 RX ORDER — DIPHENHYDRAMINE HYDROCHLORIDE 50 MG/ML
25 INJECTION INTRAMUSCULAR; INTRAVENOUS ONCE
Status: COMPLETED | OUTPATIENT
Start: 2020-05-19 | End: 2020-05-19

## 2020-05-19 RX ORDER — SODIUM CHLORIDE 0.9 % (FLUSH) 0.9 %
10 SYRINGE (ML) INJECTION PRN
Status: DISCONTINUED | OUTPATIENT
Start: 2020-05-19 | End: 2020-05-26 | Stop reason: HOSPADM

## 2020-05-19 RX ORDER — SODIUM CHLORIDE 0.9 % (FLUSH) 0.9 %
10 SYRINGE (ML) INJECTION EVERY 12 HOURS SCHEDULED
Status: DISCONTINUED | OUTPATIENT
Start: 2020-05-19 | End: 2020-05-26 | Stop reason: HOSPADM

## 2020-05-19 RX ORDER — ESCITALOPRAM OXALATE 10 MG/1
20 TABLET ORAL DAILY
Status: DISCONTINUED | OUTPATIENT
Start: 2020-05-19 | End: 2020-05-26 | Stop reason: HOSPADM

## 2020-05-19 RX ORDER — ACETAMINOPHEN 325 MG/1
650 TABLET ORAL EVERY 4 HOURS PRN
Status: DISCONTINUED | OUTPATIENT
Start: 2020-05-19 | End: 2020-05-26 | Stop reason: HOSPADM

## 2020-05-19 RX ORDER — SODIUM CHLORIDE, SODIUM LACTATE, POTASSIUM CHLORIDE, CALCIUM CHLORIDE 600; 310; 30; 20 MG/100ML; MG/100ML; MG/100ML; MG/100ML
150 INJECTION, SOLUTION INTRAVENOUS CONTINUOUS
Status: DISCONTINUED | OUTPATIENT
Start: 2020-05-19 | End: 2020-05-25

## 2020-05-19 RX ORDER — PROMETHAZINE HYDROCHLORIDE 25 MG/1
12.5 TABLET ORAL EVERY 6 HOURS PRN
Status: DISCONTINUED | OUTPATIENT
Start: 2020-05-19 | End: 2020-05-26 | Stop reason: HOSPADM

## 2020-05-19 RX ORDER — PROCHLORPERAZINE EDISYLATE 5 MG/ML
10 INJECTION INTRAMUSCULAR; INTRAVENOUS ONCE
Status: COMPLETED | OUTPATIENT
Start: 2020-05-19 | End: 2020-05-19

## 2020-05-19 RX ORDER — ONDANSETRON 2 MG/ML
4 INJECTION INTRAMUSCULAR; INTRAVENOUS EVERY 6 HOURS PRN
Status: DISCONTINUED | OUTPATIENT
Start: 2020-05-19 | End: 2020-05-26 | Stop reason: HOSPADM

## 2020-05-19 RX ORDER — LISINOPRIL 10 MG/1
10 TABLET ORAL 2 TIMES DAILY
Status: DISCONTINUED | OUTPATIENT
Start: 2020-05-19 | End: 2020-05-23

## 2020-05-19 RX ADMIN — ONDANSETRON HYDROCHLORIDE 4 MG: 2 INJECTION, SOLUTION INTRAMUSCULAR; INTRAVENOUS at 22:18

## 2020-05-19 RX ADMIN — DIPHENHYDRAMINE HYDROCHLORIDE 25 MG: 50 INJECTION, SOLUTION INTRAMUSCULAR; INTRAVENOUS at 00:33

## 2020-05-19 RX ADMIN — ESCITALOPRAM OXALATE 20 MG: 10 TABLET ORAL at 09:25

## 2020-05-19 RX ADMIN — HYDROMORPHONE HYDROCHLORIDE 1 MG: 1 INJECTION, SOLUTION INTRAMUSCULAR; INTRAVENOUS; SUBCUTANEOUS at 00:28

## 2020-05-19 RX ADMIN — HYDROMORPHONE HYDROCHLORIDE 0.5 MG: 1 INJECTION, SOLUTION INTRAMUSCULAR; INTRAVENOUS; SUBCUTANEOUS at 09:26

## 2020-05-19 RX ADMIN — HYDROMORPHONE HYDROCHLORIDE 0.5 MG: 1 INJECTION, SOLUTION INTRAMUSCULAR; INTRAVENOUS; SUBCUTANEOUS at 15:39

## 2020-05-19 RX ADMIN — SODIUM CHLORIDE, POTASSIUM CHLORIDE, SODIUM LACTATE AND CALCIUM CHLORIDE 150 ML/HR: 600; 310; 30; 20 INJECTION, SOLUTION INTRAVENOUS at 22:23

## 2020-05-19 RX ADMIN — SODIUM CHLORIDE, POTASSIUM CHLORIDE, SODIUM LACTATE AND CALCIUM CHLORIDE 150 ML/HR: 600; 310; 30; 20 INJECTION, SOLUTION INTRAVENOUS at 01:47

## 2020-05-19 RX ADMIN — Medication 10 ML: at 20:08

## 2020-05-19 RX ADMIN — HYDROMORPHONE HYDROCHLORIDE 0.5 MG: 1 INJECTION, SOLUTION INTRAMUSCULAR; INTRAVENOUS; SUBCUTANEOUS at 22:18

## 2020-05-19 RX ADMIN — HYDROMORPHONE HYDROCHLORIDE 0.5 MG: 1 INJECTION, SOLUTION INTRAMUSCULAR; INTRAVENOUS; SUBCUTANEOUS at 17:55

## 2020-05-19 RX ADMIN — SODIUM CHLORIDE, POTASSIUM CHLORIDE, SODIUM LACTATE AND CALCIUM CHLORIDE 150 ML/HR: 600; 310; 30; 20 INJECTION, SOLUTION INTRAVENOUS at 15:43

## 2020-05-19 RX ADMIN — ONDANSETRON HYDROCHLORIDE 4 MG: 2 INJECTION, SOLUTION INTRAMUSCULAR; INTRAVENOUS at 15:17

## 2020-05-19 RX ADMIN — HYDROMORPHONE HYDROCHLORIDE 0.25 MG: 1 INJECTION, SOLUTION INTRAMUSCULAR; INTRAVENOUS; SUBCUTANEOUS at 11:17

## 2020-05-19 RX ADMIN — HYDROMORPHONE HYDROCHLORIDE 0.5 MG: 1 INJECTION, SOLUTION INTRAMUSCULAR; INTRAVENOUS; SUBCUTANEOUS at 20:07

## 2020-05-19 RX ADMIN — HYDROMORPHONE HYDROCHLORIDE 0.5 MG: 1 INJECTION, SOLUTION INTRAMUSCULAR; INTRAVENOUS; SUBCUTANEOUS at 06:35

## 2020-05-19 RX ADMIN — LISINOPRIL 10 MG: 10 TABLET ORAL at 20:10

## 2020-05-19 RX ADMIN — HYDROMORPHONE HYDROCHLORIDE 0.5 MG: 1 INJECTION, SOLUTION INTRAMUSCULAR; INTRAVENOUS; SUBCUTANEOUS at 03:34

## 2020-05-19 RX ADMIN — HYDROMORPHONE HYDROCHLORIDE 0.5 MG: 1 INJECTION, SOLUTION INTRAMUSCULAR; INTRAVENOUS; SUBCUTANEOUS at 13:47

## 2020-05-19 RX ADMIN — ENOXAPARIN SODIUM 40 MG: 40 INJECTION SUBCUTANEOUS at 09:25

## 2020-05-19 RX ADMIN — PROMETHAZINE HYDROCHLORIDE 12.5 MG: 25 TABLET ORAL at 05:37

## 2020-05-19 RX ADMIN — PROCHLORPERAZINE EDISYLATE 10 MG: 5 INJECTION INTRAMUSCULAR; INTRAVENOUS at 00:33

## 2020-05-19 RX ADMIN — PROMETHAZINE HYDROCHLORIDE 12.5 MG: 25 TABLET ORAL at 01:47

## 2020-05-19 RX ADMIN — LISINOPRIL 10 MG: 10 TABLET ORAL at 09:25

## 2020-05-19 ASSESSMENT — PAIN SCALES - GENERAL
PAINLEVEL_OUTOF10: 7
PAINLEVEL_OUTOF10: 7
PAINLEVEL_OUTOF10: 9
PAINLEVEL_OUTOF10: 8
PAINLEVEL_OUTOF10: 7
PAINLEVEL_OUTOF10: 6
PAINLEVEL_OUTOF10: 8
PAINLEVEL_OUTOF10: 8
PAINLEVEL_OUTOF10: 9
PAINLEVEL_OUTOF10: 9
PAINLEVEL_OUTOF10: 7
PAINLEVEL_OUTOF10: 8
PAINLEVEL_OUTOF10: 6
PAINLEVEL_OUTOF10: 8
PAINLEVEL_OUTOF10: 9

## 2020-05-19 ASSESSMENT — PAIN DESCRIPTION - PROGRESSION: CLINICAL_PROGRESSION: NOT CHANGED

## 2020-05-19 ASSESSMENT — PAIN DESCRIPTION - PAIN TYPE
TYPE: ACUTE PAIN

## 2020-05-19 ASSESSMENT — PAIN DESCRIPTION - ONSET
ONSET: ON-GOING
ONSET: ON-GOING

## 2020-05-19 ASSESSMENT — PAIN DESCRIPTION - DESCRIPTORS
DESCRIPTORS: ACHING
DESCRIPTORS: THROBBING

## 2020-05-19 ASSESSMENT — PAIN DESCRIPTION - FREQUENCY
FREQUENCY: CONTINUOUS
FREQUENCY: CONTINUOUS

## 2020-05-19 ASSESSMENT — PAIN DESCRIPTION - LOCATION
LOCATION: ABDOMEN
LOCATION: ABDOMEN
LOCATION: ABDOMEN;BACK

## 2020-05-19 ASSESSMENT — PAIN DESCRIPTION - ORIENTATION
ORIENTATION: UPPER
ORIENTATION: UPPER
ORIENTATION: MID;LOWER

## 2020-05-19 NOTE — TELEPHONE ENCOUNTER
LM on VM for Pt to call back and make a F.U. appt. Also tried calling Pt in his hospital room 230 Bed 1.  No Answer

## 2020-05-19 NOTE — ED TRIAGE NOTES
Patient complains of abdominal pain for a couple days with vomiting and diarrhea starting yesterday. Patient recently admitted for bowel obstruction had an ostomy reversal in November of 2019. Patient is puny looking.

## 2020-05-19 NOTE — ED PROVIDER NOTES
MidLevel Attestation   I havepersonally performed and/or participated in the history, exam and medical decision making and agree with all pertinent clinical information. I have also reviewed and agree with the past medical, family and social historyunless otherwise noted. I have personally performed a face to face diagnostic evaluation onthis patient. I have reviewed the mid-levels findings and agree. In brief, Bill Diane is a 41 y.o. male that presented to the emergency department with complaint of generalized abdominal pain. Please see NAHED is note for details. Exam he appears to be in distress and tenderness around the epigastrium but otherwise the rest of the exam unremarkable.     I have reviewed and interpreted all of the currently available lab results and diagnostics from this visit:  Results for orders placed or performed during the hospital encounter of 05/18/20   CBC auto differential   Result Value Ref Range    WBC 12.1 (H) 4.0 - 11.0 K/uL    RBC 4.40 4.20 - 5.90 M/uL    Hemoglobin 14.1 13.5 - 17.5 g/dL    Hematocrit 42.0 40.5 - 52.5 %    MCV 95.3 80.0 - 100.0 fL    MCH 32.0 26.0 - 34.0 pg    MCHC 33.5 31.0 - 36.0 g/dL    RDW 14.2 12.4 - 15.4 %    Platelets 114 725 - 678 K/uL    MPV 7.6 5.0 - 10.5 fL    Neutrophils % 87.9 %    Lymphocytes % 4.9 %    Monocytes % 6.5 %    Eosinophils % 0.3 %    Basophils % 0.4 %    Neutrophils Absolute 10.6 (H) 1.7 - 7.7 K/uL    Lymphocytes Absolute 0.6 (L) 1.0 - 5.1 K/uL    Monocytes Absolute 0.8 0.0 - 1.3 K/uL    Eosinophils Absolute 0.0 0.0 - 0.6 K/uL    Basophils Absolute 0.0 0.0 - 0.2 K/uL   Comprehensive metabolic panel   Result Value Ref Range    Sodium 134 (L) 136 - 145 mmol/L    Potassium 3.8 3.5 - 5.1 mmol/L    Chloride 96 (L) 99 - 110 mmol/L    CO2 21 21 - 32 mmol/L    Anion Gap 17 (H) 3 - 16    Glucose 112 (H) 70 - 99 mg/dL    BUN 10 7 - 20 mg/dL    CREATININE <0.5 (L) 0.9 - 1.3 mg/dL    GFR Non-African American >60 >60    GFR 
Correlate with amylase and lipase. Follow-up to   resolution recommended. Fatty liver. Linear induration of subcutaneous fat within the anterior right hemiabdomen,   new as compared to prior. Correlate with any history of surgery or contusion   at the site. Sequela of granulomatous disease. Noncalcified basilar pulmonary nodules are   also seen measuring up to approximately 6 mm which may be followed based upon   clinical risk factors. Ct Abdomen Pelvis W Iv Contrast Additional Contrast? None    Result Date: 5/19/2020  EXAMINATION: CT OF THE ABDOMEN AND PELVIS WITH CONTRAST 5/18/2020 11:39 pm TECHNIQUE: CT of the abdomen and pelvis was performed with the administration of intravenous contrast. Multiplanar reformatted images are provided for review. Dose modulation, iterative reconstruction, and/or weight based adjustment of the mA/kV was utilized to reduce the radiation dose to as low as reasonably achievable. COMPARISON: 10/03/2019 HISTORY: ORDERING SYSTEM PROVIDED HISTORY: abdominal pain TECHNOLOGIST PROVIDED HISTORY: If patient is on cardiac monitor and/or pulse ox, they may be taken off cardiac monitor and pulse ox, left on O2 if currently on. All monitors reattached when patient returns to room. Additional Contrast?->None Reason for exam:->abdominal pain Reason for Exam: mid/lower abd pain with N/V Acuity: Acute Type of Exam: Initial FINDINGS: Lower Chest: Calcified and noncalcified bibasilar pulmonary nodules are again demonstrated with noncalcified nodules measuring up to approximately 6 mm for example within the right middle lobe. Small hiatal hernia. Organs: Liver is fatty. No intrahepatic ductal dilatation. Gallbladder is grossly unremarkable. Calcified granulomas within the spleen. Stomach is incompletely distended. Moderate inflammatory stranding as well as free fluid is demonstrated along the length of the pancreas.   Fluid about the distal pancreatic body for example

## 2020-05-19 NOTE — H&P
Hospital Medicine History & Physical      PCP: Michael Aristidesmana Jimenez DO    Date of Admission: 5/18/2020    Date of Service: Pt seen/examined on 5/19/2020    Chief Complaint: Abdominal pain    History Of Present Illness:    41 y.o. male who presented to the hospital with a chief complaint of abdominal pain that started this morning. The patient describes the pain as a mid epigastric sharp pain that radiates to his back. The patient also endorses nausea but no vomiting. He did endorse drinking a couple bottles of beer this weekend but not that much. He has had pancreatitis in the past but at a time was a heavy drinker and drank hard liquor. He denies any fevers or chills, no chest pain or shortness of breath. In the emergency department a CT scan that was obtained showed pancreatitis and his lipase was elevated confirming this diagnosis. She will be admitted for further medical evaluation and treatment. Past Medical History:        Diagnosis Date    Arthritis     Diverticulitis     Heroin abuse ([address] 2013    Pt has been clean for OVER 8 hears last used 2013    Hypertension     Knee instability     per pt, his knees pop out at times, bilateral        Past Surgical History:        Procedure Laterality Date    ABDOMEN SURGERY  10/08/2019    sigmoid colectomy with ostomy    OTHER SURGICAL HISTORY  11/27/2019    ileostomy reversal, on Q pain buster insertion    SMALL INTESTINE SURGERY N/A 10/8/2019    SIGMOID COLECTOMY, OSTOMY performed by Reginald Diaz MD at [address] [place] [address] N/A 11/27/2019    ILEOSTOMY REVERSAL, ON-Q PAIN BUSTER INSERTION performed by Reginald Diaz MD at [facility]       Medications Prior to Admission:   Prior to Admission medications    Medication Sig Start Date End Date Taking? Authorizing Provider   HYDROcodone-acetaminophen (NORCO) 7.5-325 MG per tablet Take 1 tablet by mouth every 6 hours as needed for Pain.    Yes Historical Provider, MD   diclofenac (VOLTAREN) 75 MG EC tablet Take 75 mg by mouth 2 times daily    Historical Provider, MD   escitalopram (LEXAPRO) 20 MG tablet Take 20 mg by mouth daily    Historical Provider, MD   lisinopril (PRINIVIL;ZESTRIL) 10 MG tablet Take 10 mg by mouth 2 times daily    Historical Provider, MD       Allergies:  Tramadol    Social History:    TOBACCO:   reports that he has been smoking cigarettes. He has a 5.00 pack-year smoking history. He has never used smokeless tobacco.  ETOH:   reports previous alcohol use of about 8.0 standard drinks of alcohol per week. Family History:    History reviewed. No pertinent family history. REVIEW OF SYSTEMS:   Constitutional:  Negative for fever,chills or night sweats  ENT:  Negative for rhinorrhea, epistaxis, hoarseness, sore throat. Respiratory: Negative for SOB or wheezing   Cardiovascular:   Negative for  chest pain, palpitations   Gastrointestinal: Positive for abdominal pain, nausea or vomiting  Genitourinary:  Negative for polyuria, dysuria   Hematologic/Lymphatic:  Negative for  bleeding tendency, easy bruising  Musculoskeletal:  Negative for myalgias and arthralgias  Neurologic:  Negative for  confusion,dysarthria. Skin:  Negative for itching,rash  Psychiatric:  Negative for depression,anxiety, agitation. Endocrine:  Negative for polydipsia,polyuria,heat /cold intolerance. PHYSICAL EXAM:  BP (!) 153/100   Pulse 92   Temp 97.6 °F (36.4 °C) (Oral)   Resp 16   Ht 6' 1\" (1.854 m)   Wt 259 lb (117.5 kg)   SpO2 97%   BMI 34.17 kg/m²   General appearance:  No apparent distress, appears stated age and cooperative. HEENT:  Normal cephalic, atraumatic without obvious deformity. Pupils equal, round, and reactive to light. Extra ocular muscles intact. Conjunctivae/corneas clear. Neck: Supple, with full range of motion. No jugular venous distention. Trachea midline. Respiratory:  Normal respiratory effort.  Clear to auscultation, bilaterally without #1  Hypertension  Depression   Obesity  History of diverticulitis    PLAN:  N.P.O, supportive care  Aggressive IV fluids, advance diet as tolerated  Resume home antihypertensive and antidepressant therapy    DVT Prophylaxis: Lovenox  Diet: N.p.o., ice chips   Code Status: Prior    Dispo -admit to [facility]      Thank you for the opportunity to be involved in this patient's care.       (Please note that portions of this note were completed with a voice recognition program. Efforts were made to edit the dictations but occasionally words are mis-transcribed.)

## 2020-05-19 NOTE — CARE COORDINATION
/  Case Management Assessment  Initial Evaluation    Date/Time of Evaluation: 5/19/2020 10:43 AM  Assessment Completed by: Diana Sanchez    Patient Name: Gabe Woodall  YOB: 1980  Diagnosis: Acute pancreatitis without infection or necrosis [K85.90]  Date / Time: 5/18/2020  9:54 PM  Admission status/Date: /  Chart Reviewed: Yes      Patient Interviewed: Yes   Family Interviewed:  No      Hospitalization in the last 30 days:  Yes- readmit within 30 days for different condition- pancreatitis. Pt states that he has had this before. Denies alcohol consumption. States that he has quit for two and a half years. Contacts  :   Lydia Gonzalez  Relationship to Patient: Mother  Phone Number:  [phone]  Alternate Contact:     Relationship to Patient:     Phone Number:    Met with: talked with Pt via telephone    Current PCP: states he doesn't have one.  Referral made to [facility]    Financial  Commercial  recert required for SNF : Y      3 night stay required - N    ADLS  Support Systems: Spouse/Significant Other, Family Members  Transportation: family    Meal Preparation: self    Housing  Home Environment: lives with mother in house  Steps: independent on stairs  Plans to Return to Present Housing: Yes  Other Identified Issues: Mamadou Storey  Currently active with [address] : No  Type of Home Care Services: None  Passport/Waiver : No  :                      Phone Number:    Passport/Waiver Services: [address] Provider: n/a  Equipment: Walker___Cane___RTS___ BSC___Shower Chair___  02__ at ____Liter(s)---Uses________HHN___ CPAP___ BiPap___ Hospital Bed___W/C____Other________      Has Home O2 in place on admit:  No  Informed of need to bring portable home O2 tank on day of discharge for nursing to connect prior to leaving:   Not Indicated  Verbalized agreement/Understanding:   Not Indicated    Community Service Affiliation  Dialysis:  No    · Name:  · Location  · Dialysis Schedule:  · Phone:   · Fax: Outpatient PT/OT: No    Cancer Center: No     CHF Clinic: No     Pulmonary Rehab: No  Pain Clinic: No  Community Mental Health: No    Wound Clinic: No     COVID SCREENING: No       Other: none    The Plan for Transition of Care is related to the following treatment goals: to return home      DISCHARGE PLAN: denies needs. Pt answered questions, but seemed aggravated. Referral made  to [facility] for follow up as Pt does not have PCP. Explained Case Management role/services.

## 2020-05-20 PROBLEM — K57.40: Status: RESOLVED | Noted: 2019-03-22 | Resolved: 2020-05-20

## 2020-05-20 PROBLEM — E66.9 OBESITY (BMI 35.0-39.9 WITHOUT COMORBIDITY): Status: ACTIVE | Noted: 2020-05-20

## 2020-05-20 PROBLEM — R74.8 ELEVATED LIVER ENZYMES: Status: ACTIVE | Noted: 2020-05-20

## 2020-05-20 PROBLEM — K57.92 DIVERTICULITIS: Status: RESOLVED | Noted: 2019-08-23 | Resolved: 2020-05-20

## 2020-05-20 PROBLEM — Z93.2 ILEOSTOMY IN PLACE (HCC): Status: RESOLVED | Noted: 2019-11-27 | Resolved: 2020-05-20

## 2020-05-20 LAB
A/G RATIO: 0.9 (ref 1.1–2.2)
ALBUMIN SERPL-MCNC: 3.6 G/DL (ref 3.4–5)
ALP BLD-CCNC: 124 U/L (ref 40–129)
ALT SERPL-CCNC: 38 U/L (ref 10–40)
ANION GAP SERPL CALCULATED.3IONS-SCNC: 18 MMOL/L (ref 3–16)
AST SERPL-CCNC: 22 U/L (ref 15–37)
BASOPHILS ABSOLUTE: 0.1 K/UL (ref 0–0.2)
BASOPHILS RELATIVE PERCENT: 0.5 %
BILIRUB SERPL-MCNC: 0.9 MG/DL (ref 0–1)
BUN BLDV-MCNC: 8 MG/DL (ref 7–20)
CALCIUM SERPL-MCNC: 8.8 MG/DL (ref 8.3–10.6)
CHLORIDE BLD-SCNC: 99 MMOL/L (ref 99–110)
CO2: 18 MMOL/L (ref 21–32)
CREAT SERPL-MCNC: <0.5 MG/DL (ref 0.9–1.3)
EOSINOPHILS ABSOLUTE: 0.1 K/UL (ref 0–0.6)
EOSINOPHILS RELATIVE PERCENT: 0.4 %
GFR AFRICAN AMERICAN: >60
GFR NON-AFRICAN AMERICAN: >60
GLOBULIN: 3.9 G/DL
GLUCOSE BLD-MCNC: 105 MG/DL (ref 70–99)
HCT VFR BLD CALC: 38.2 % (ref 40.5–52.5)
HEMOGLOBIN: 12.7 G/DL (ref 13.5–17.5)
LYMPHOCYTES ABSOLUTE: 0.9 K/UL (ref 1–5.1)
LYMPHOCYTES RELATIVE PERCENT: 7.7 %
MCH RBC QN AUTO: 31.8 PG (ref 26–34)
MCHC RBC AUTO-ENTMCNC: 33.2 G/DL (ref 31–36)
MCV RBC AUTO: 96.1 FL (ref 80–100)
MONOCYTES ABSOLUTE: 0.9 K/UL (ref 0–1.3)
MONOCYTES RELATIVE PERCENT: 7.4 %
NEUTROPHILS ABSOLUTE: 10.3 K/UL (ref 1.7–7.7)
NEUTROPHILS RELATIVE PERCENT: 84 %
PDW BLD-RTO: 14.2 % (ref 12.4–15.4)
PLATELET # BLD: 181 K/UL (ref 135–450)
PMV BLD AUTO: 7.6 FL (ref 5–10.5)
POTASSIUM REFLEX MAGNESIUM: 3.6 MMOL/L (ref 3.5–5.1)
RBC # BLD: 3.98 M/UL (ref 4.2–5.9)
SODIUM BLD-SCNC: 135 MMOL/L (ref 136–145)
TOTAL PROTEIN: 7.5 G/DL (ref 6.4–8.2)
WBC # BLD: 12.2 K/UL (ref 4–11)

## 2020-05-20 PROCEDURE — 6360000002 HC RX W HCPCS: Performed by: INTERNAL MEDICINE

## 2020-05-20 PROCEDURE — 2500000003 HC RX 250 WO HCPCS: Performed by: PHYSICIAN ASSISTANT

## 2020-05-20 PROCEDURE — 99232 SBSQ HOSP IP/OBS MODERATE 35: CPT | Performed by: INTERNAL MEDICINE

## 2020-05-20 PROCEDURE — 6360000002 HC RX W HCPCS: Performed by: PHYSICIAN ASSISTANT

## 2020-05-20 PROCEDURE — 2580000003 HC RX 258: Performed by: INTERNAL MEDICINE

## 2020-05-20 PROCEDURE — 36415 COLL VENOUS BLD VENIPUNCTURE: CPT

## 2020-05-20 PROCEDURE — 1200000000 HC SEMI PRIVATE

## 2020-05-20 PROCEDURE — 2500000003 HC RX 250 WO HCPCS: Performed by: INTERNAL MEDICINE

## 2020-05-20 PROCEDURE — 80053 COMPREHEN METABOLIC PANEL: CPT

## 2020-05-20 PROCEDURE — C9113 INJ PANTOPRAZOLE SODIUM, VIA: HCPCS | Performed by: PHYSICIAN ASSISTANT

## 2020-05-20 PROCEDURE — 85025 COMPLETE CBC W/AUTO DIFF WBC: CPT

## 2020-05-20 PROCEDURE — 6370000000 HC RX 637 (ALT 250 FOR IP): Performed by: INTERNAL MEDICINE

## 2020-05-20 RX ORDER — PANTOPRAZOLE SODIUM 40 MG/10ML
40 INJECTION, POWDER, LYOPHILIZED, FOR SOLUTION INTRAVENOUS DAILY
Status: DISCONTINUED | OUTPATIENT
Start: 2020-05-20 | End: 2020-05-26 | Stop reason: HOSPADM

## 2020-05-20 RX ADMIN — ENOXAPARIN SODIUM 40 MG: 40 INJECTION SUBCUTANEOUS at 08:37

## 2020-05-20 RX ADMIN — LISINOPRIL 10 MG: 10 TABLET ORAL at 20:07

## 2020-05-20 RX ADMIN — SODIUM CHLORIDE, POTASSIUM CHLORIDE, SODIUM LACTATE AND CALCIUM CHLORIDE 150 ML/HR: 600; 310; 30; 20 INJECTION, SOLUTION INTRAVENOUS at 16:23

## 2020-05-20 RX ADMIN — HYDROMORPHONE HYDROCHLORIDE 0.5 MG: 1 INJECTION, SOLUTION INTRAMUSCULAR; INTRAVENOUS; SUBCUTANEOUS at 10:42

## 2020-05-20 RX ADMIN — HYDROMORPHONE HYDROCHLORIDE 0.5 MG: 1 INJECTION, SOLUTION INTRAMUSCULAR; INTRAVENOUS; SUBCUTANEOUS at 08:37

## 2020-05-20 RX ADMIN — HYDROMORPHONE HYDROCHLORIDE 1 MG: 1 INJECTION, SOLUTION INTRAMUSCULAR; INTRAVENOUS; SUBCUTANEOUS at 21:56

## 2020-05-20 RX ADMIN — ONDANSETRON HYDROCHLORIDE 4 MG: 2 INJECTION, SOLUTION INTRAMUSCULAR; INTRAVENOUS at 19:55

## 2020-05-20 RX ADMIN — HYDROMORPHONE HYDROCHLORIDE 0.5 MG: 1 INJECTION, SOLUTION INTRAMUSCULAR; INTRAVENOUS; SUBCUTANEOUS at 06:25

## 2020-05-20 RX ADMIN — ONDANSETRON HYDROCHLORIDE 4 MG: 2 INJECTION, SOLUTION INTRAMUSCULAR; INTRAVENOUS at 13:21

## 2020-05-20 RX ADMIN — Medication 10 ML: at 19:57

## 2020-05-20 RX ADMIN — HYDROMORPHONE HYDROCHLORIDE 1 MG: 1 INJECTION, SOLUTION INTRAMUSCULAR; INTRAVENOUS; SUBCUTANEOUS at 19:54

## 2020-05-20 RX ADMIN — LISINOPRIL 10 MG: 10 TABLET ORAL at 08:37

## 2020-05-20 RX ADMIN — HYDROMORPHONE HYDROCHLORIDE 0.5 MG: 1 INJECTION, SOLUTION INTRAMUSCULAR; INTRAVENOUS; SUBCUTANEOUS at 00:19

## 2020-05-20 RX ADMIN — HYDROMORPHONE HYDROCHLORIDE 0.5 MG: 1 INJECTION, SOLUTION INTRAMUSCULAR; INTRAVENOUS; SUBCUTANEOUS at 15:44

## 2020-05-20 RX ADMIN — HYDROMORPHONE HYDROCHLORIDE 1 MG: 1 INJECTION, SOLUTION INTRAMUSCULAR; INTRAVENOUS; SUBCUTANEOUS at 17:46

## 2020-05-20 RX ADMIN — SODIUM CHLORIDE, POTASSIUM CHLORIDE, SODIUM LACTATE AND CALCIUM CHLORIDE 150 ML/HR: 600; 310; 30; 20 INJECTION, SOLUTION INTRAVENOUS at 04:33

## 2020-05-20 RX ADMIN — PANTOPRAZOLE SODIUM 40 MG: 40 INJECTION, POWDER, FOR SOLUTION INTRAVENOUS at 16:23

## 2020-05-20 RX ADMIN — ESCITALOPRAM OXALATE 20 MG: 10 TABLET ORAL at 08:37

## 2020-05-20 RX ADMIN — SODIUM CHLORIDE, POTASSIUM CHLORIDE, SODIUM LACTATE AND CALCIUM CHLORIDE 150 ML/HR: 600; 310; 30; 20 INJECTION, SOLUTION INTRAVENOUS at 10:42

## 2020-05-20 RX ADMIN — HYDROMORPHONE HYDROCHLORIDE 0.5 MG: 1 INJECTION, SOLUTION INTRAMUSCULAR; INTRAVENOUS; SUBCUTANEOUS at 13:21

## 2020-05-20 RX ADMIN — ONDANSETRON HYDROCHLORIDE 4 MG: 2 INJECTION, SOLUTION INTRAMUSCULAR; INTRAVENOUS at 04:33

## 2020-05-20 RX ADMIN — HYDROMORPHONE HYDROCHLORIDE 0.5 MG: 1 INJECTION, SOLUTION INTRAMUSCULAR; INTRAVENOUS; SUBCUTANEOUS at 04:24

## 2020-05-20 RX ADMIN — HYDROMORPHONE HYDROCHLORIDE 0.5 MG: 1 INJECTION, SOLUTION INTRAMUSCULAR; INTRAVENOUS; SUBCUTANEOUS at 02:20

## 2020-05-20 ASSESSMENT — PAIN SCALES - GENERAL
PAINLEVEL_OUTOF10: 7
PAINLEVEL_OUTOF10: 8
PAINLEVEL_OUTOF10: 4
PAINLEVEL_OUTOF10: 8
PAINLEVEL_OUTOF10: 7
PAINLEVEL_OUTOF10: 6
PAINLEVEL_OUTOF10: 6
PAINLEVEL_OUTOF10: 7
PAINLEVEL_OUTOF10: 7
PAINLEVEL_OUTOF10: 8
PAINLEVEL_OUTOF10: 8
PAINLEVEL_OUTOF10: 9

## 2020-05-20 ASSESSMENT — PAIN DESCRIPTION - ONSET
ONSET: ON-GOING

## 2020-05-20 ASSESSMENT — PAIN DESCRIPTION - PAIN TYPE
TYPE: ACUTE PAIN

## 2020-05-20 ASSESSMENT — PAIN DESCRIPTION - PROGRESSION: CLINICAL_PROGRESSION: GRADUALLY WORSENING

## 2020-05-20 ASSESSMENT — PAIN DESCRIPTION - ORIENTATION
ORIENTATION: MID;UPPER
ORIENTATION: UPPER
ORIENTATION: MID;UPPER
ORIENTATION: UPPER

## 2020-05-20 ASSESSMENT — PAIN DESCRIPTION - LOCATION
LOCATION: ABDOMEN;BACK
LOCATION: ABDOMEN

## 2020-05-20 ASSESSMENT — PAIN DESCRIPTION - DESCRIPTORS
DESCRIPTORS: THROBBING;DISCOMFORT
DESCRIPTORS: THROBBING
DESCRIPTORS: DISCOMFORT;THROBBING
DESCRIPTORS: THROBBING

## 2020-05-20 ASSESSMENT — PAIN DESCRIPTION - FREQUENCY
FREQUENCY: CONTINUOUS

## 2020-05-20 ASSESSMENT — PAIN - FUNCTIONAL ASSESSMENT: PAIN_FUNCTIONAL_ASSESSMENT: ACTIVITIES ARE NOT PREVENTED

## 2020-05-20 NOTE — FLOWSHEET NOTE
05/19/20 2000   Vital Signs   Temp 98.8 °F (37.1 °C)   Temp Source Oral   Pulse 79   Heart Rate Source Monitor   Resp 16   BP (!) 161/91   Level of Consciousness 0   MEWS Score 1   Oxygen Therapy   SpO2 99 %   O2 Device None (Room air)   Pt. Resting in bed. Call light in reach. Shift assessment completed see flow sheet. Denies any needs at this time. Will continue to monitor.

## 2020-05-20 NOTE — PROGRESS NOTES
Pt BP elevated and remained elevated after morning medication however patient is having ongoing pain. Pain medication helps for short while but quickly goes away. pts has discussed increasing paiun medication with   GI and hospitalist today per pt.

## 2020-05-20 NOTE — CONSULTS
hemiabdomen,   new as compared to prior.  Correlate with any history of surgery or contusion   at the site.       Sequela of granulomatous disease.  Noncalcified basilar pulmonary nodules are   also seen measuring up to approximately 6 mm which may be followed based upon   clinical risk factors. Attending Supervising [de-identified] Attestation Statement  The patient is a 41 y.o. male. I have performed a history and physical examination of the patient. I discussed the case with my physician assistant Rico Segovia PA-C    I reviewed the patient's Past Medical History, Past Surgical History, Medications, and Allergies. Physical Exam:  Vitals:    05/19/20 2000 05/20/20 0158 05/20/20 0830 05/20/20 1130   BP: (!) 161/91 139/84 (!) 168/103 (!) 162/99   Pulse: 79 100 95    Resp: 16 16 18    Temp: 98.8 °F (37.1 °C) 98.6 °F (37 °C) 97 °F (36.1 °C)    TempSrc: Oral Oral Oral    SpO2: 99% 97% 93%    Weight:       Height:           Physical Examination: General appearance - alert, well appearing, and in no distress  Mental status - alert, oriented to person, place, and time  Eyes - pupils equal and reactive, extraocular eye movements intact  Neck - supple, no significant adenopathy  Chest - clear to auscultation, no wheezes, rales or rhonchi, symmetric air entry  Heart - normal rate, regular rhythm, normal S1, S2, no murmurs, rubs, clicks or gallops  Abdomen - tenderness noted upper abdomen  Extremities - no pedal edema noted          Assessment:   53-year-old male with a history of arthritis, diverticulitis with abscess formation s/p partial colectomy, heroin abuse, alcohol abuse, HTN, ileostomy reversal, and knee instability admitted with acute recurrent pancreatitis, likely alcohol-induced. TG WNL. Plan:     1. Continue supportive care  2. NPO, IVF, pain control  3. Monitor LFTs  4. Monitor and replenish electrolytes  5.  alcohol abstinence  6. Will follow  7.  GI clinic follow up within 4 weeks of

## 2020-05-20 NOTE — CARE COORDINATION
INTERDISCIPLINARY PLAN OF CARE CONFERENCE    Date/Time: 5/20/2020 11:27 AM  Completed by: Corby Guerra Case Management      Patient Name:  Floyd Guadalupe  YOB: 1980  Admitting Diagnosis: Acute pancreatitis without infection or necrosis [K85.90]     Admit Date/Time:  5/18/2020  9:54 PM    Chart reviewed. Interdisciplinary team met to discuss patient progress and discharge plans. Disciplines included Case Management, Nursing, and Dietitian. Current Status: IP 05/19/2020    PT/OT recommendation:    Anticipated Discharge Date: TBD  Expected D/C Disposition:  Home  Confirmed plan  Discharge Plan Comments: home      The Plan for Transition of Care is related to the following treatment goals: *home. Referral to [facility] for PCP. [facility] contact info placed in DC instructions in Epic.  NF

## 2020-05-20 NOTE — PROGRESS NOTES
Pt complaining of need for more pain medication and some heartburn. Spoke with hanh Cai about this increased patients pain medication to b.5 and 1mg dilaudid however not going to be increased to more than this due to patients hx of drug abuse and also added protonix to patient medications.

## 2020-05-20 NOTE — PROGRESS NOTES
Progress Note    Admit Date:  5/18/2020    Admitted for acute pancreatitis and abdominal pain    Subjective:  Mr. Modesto Roe is still having a lot of pain and N/V. Reports he was sober for 2 1/2 years but just started drinking again a little over 1 week ago. Objective:   Patient Vitals for the past 4 hrs:   BP Temp Temp src Pulse Resp SpO2   05/20/20 0830 (!) 168/103 97 °F (36.1 °C) Oral 95 18 93 %       Intake/Output Summary (Last 24 hours) at 5/20/2020 0930  Last data filed at 5/20/2020 0159  Gross per 24 hour   Intake 2075 ml   Output --   Net 2075 ml       Physical Exam:  Gen: Obese male, Alert. Appears uncomfortable  Eyes:  No sclera icterus. No conjunctival injection. ENT: No discharge. Pharynx clear. Dry mucous membranes  Neck:  Trachea midline. Resp: No accessory muscle use. No crackles. No wheezes. No rhonchi. CV: Regular rate. Regular rhythm. No murmur. No rub. No edema. Capillary Refill: Brisk,< 3 seconds   Peripheral Pulses: +2 palpable, equal bilaterally   GI: +  bilateral upper quadrant TTP. Non-distended. Normal bowel sounds. Skin: Warm and dry. Midline scar to abdomen with 2 camilo of scabbing without surrounding skin changes or drainage   M/S: No cyanosis. No joint deformity. No clubbing. Neuro: Awake. Grossly nonfocal    Psych: Oriented x 3. No anxiety or agitation.      Scheduled Meds:   sodium chloride flush  10 mL Intravenous 2 times per day    enoxaparin  40 mg Subcutaneous Daily    lisinopril  10 mg Oral BID    escitalopram  20 mg Oral Daily       Continuous Infusions:   lactated ringers 150 mL/hr (05/20/20 0433)       PRN Meds:  sodium chloride flush, acetaminophen, promethazine **OR** ondansetron, HYDROmorphone, HYDROmorphone      Data:  CBC:   Recent Labs     05/18/20  2140 05/19/20  0539 05/20/20  0558   WBC 12.1* 13.0* 12.2*   HGB 14.1 13.0* 12.7*   HCT 42.0 39.5* 38.2*   MCV 95.3 96.7 96.1    167 181     BMP:   Recent Labs     05/18/20  2140 05/19/20  0580 Regular rate. Regular rhythm. No murmur or rub. No edema. GI: moderate epigastric tenderness. Non-distended. No masses. No organomegaly. Normal bowel sounds. No hernia. Assessment/Plan:  Acute pancreatitis  Abdominal Pain   - likely secondary to alcohol abuse  - lipase: 631  - Hx of pancreatitis in the past as well   - CT scan shows acute pancreatitis with stranding and free fluid around the pancreas   - PRN symptom control   - GI consult   - triglycerides WNL     Elevated LFTs--resolved  - suspect 2/2 to ETOH abuse   - Trend-->Improving     Dehydration   - UA with > 80 ketones   - Suspect 2/2 to abdominal pain, N/V from acute pancreatitis   - Continue IVF and anti-emetics     HTN  - BP is elevated, likely a pain component as well   - Continue lisinopril      Depression   - continue Lexapro     History of diverticulitis  - had acute diverticulitis with perforation and abscess formation resulting in sigmoid colectomy and diverting end ileostomy in 10/24/19   - Recent reversal of ileostomy   - SBO in April, resolved without surgical intervention   - No acute issues on admission today     Fatty Liver   - recommend weight loss     Pulmonary Nodules   - 6 mm bibasilar nodules noted on CT  - can follow up OP    Obesity  - Body mass index is 37.87 kg/m². - Complicating assessment and treatment. Placing patient at risk for multiple co-morbidities as well as early death and contributing to the patient's presentation.   - Counseled on weight loss.     DVT Prophylaxis: Lovenox   Diet: Diet NPO Effective Now Exceptions are: Ice Chips  Code Status: Full Code    Marcy Aliza Cummings 9:30 AM 5/20/2020    ALEX MITCHELL 5/20/2020 10:26 AM

## 2020-05-21 LAB
A/G RATIO: 1.2 (ref 1.1–2.2)
ALBUMIN SERPL-MCNC: 3.7 G/DL (ref 3.4–5)
ALP BLD-CCNC: 114 U/L (ref 40–129)
ALT SERPL-CCNC: 33 U/L (ref 10–40)
ANION GAP SERPL CALCULATED.3IONS-SCNC: 13 MMOL/L (ref 3–16)
AST SERPL-CCNC: 25 U/L (ref 15–37)
BASOPHILS ABSOLUTE: 0.1 K/UL (ref 0–0.2)
BASOPHILS RELATIVE PERCENT: 0.8 %
BILIRUB SERPL-MCNC: 0.7 MG/DL (ref 0–1)
BUN BLDV-MCNC: 9 MG/DL (ref 7–20)
CALCIUM SERPL-MCNC: 8.9 MG/DL (ref 8.3–10.6)
CHLORIDE BLD-SCNC: 97 MMOL/L (ref 99–110)
CO2: 21 MMOL/L (ref 21–32)
CREAT SERPL-MCNC: <0.5 MG/DL (ref 0.9–1.3)
EOSINOPHILS ABSOLUTE: 0.1 K/UL (ref 0–0.6)
EOSINOPHILS RELATIVE PERCENT: 1.4 %
GFR AFRICAN AMERICAN: >60
GFR NON-AFRICAN AMERICAN: >60
GLOBULIN: 3.1 G/DL
GLUCOSE BLD-MCNC: 98 MG/DL (ref 70–99)
HCT VFR BLD CALC: 36.8 % (ref 40.5–52.5)
HEMOGLOBIN: 12.2 G/DL (ref 13.5–17.5)
LYMPHOCYTES ABSOLUTE: 1.1 K/UL (ref 1–5.1)
LYMPHOCYTES RELATIVE PERCENT: 12 %
MAGNESIUM: 1.8 MG/DL (ref 1.8–2.4)
MCH RBC QN AUTO: 31.6 PG (ref 26–34)
MCHC RBC AUTO-ENTMCNC: 33.1 G/DL (ref 31–36)
MCV RBC AUTO: 95.3 FL (ref 80–100)
MONOCYTES ABSOLUTE: 0.8 K/UL (ref 0–1.3)
MONOCYTES RELATIVE PERCENT: 9.4 %
NEUTROPHILS ABSOLUTE: 6.9 K/UL (ref 1.7–7.7)
NEUTROPHILS RELATIVE PERCENT: 76.4 %
PDW BLD-RTO: 13.5 % (ref 12.4–15.4)
PLATELET # BLD: 165 K/UL (ref 135–450)
PMV BLD AUTO: 7.5 FL (ref 5–10.5)
POTASSIUM REFLEX MAGNESIUM: 3.3 MMOL/L (ref 3.5–5.1)
RBC # BLD: 3.86 M/UL (ref 4.2–5.9)
SODIUM BLD-SCNC: 131 MMOL/L (ref 136–145)
TOTAL PROTEIN: 6.8 G/DL (ref 6.4–8.2)
WBC # BLD: 9 K/UL (ref 4–11)

## 2020-05-21 PROCEDURE — 2580000003 HC RX 258: Performed by: INTERNAL MEDICINE

## 2020-05-21 PROCEDURE — 80053 COMPREHEN METABOLIC PANEL: CPT

## 2020-05-21 PROCEDURE — 99232 SBSQ HOSP IP/OBS MODERATE 35: CPT | Performed by: INTERNAL MEDICINE

## 2020-05-21 PROCEDURE — 36415 COLL VENOUS BLD VENIPUNCTURE: CPT

## 2020-05-21 PROCEDURE — 2500000003 HC RX 250 WO HCPCS: Performed by: PHYSICIAN ASSISTANT

## 2020-05-21 PROCEDURE — 6370000000 HC RX 637 (ALT 250 FOR IP): Performed by: INTERNAL MEDICINE

## 2020-05-21 PROCEDURE — 1200000000 HC SEMI PRIVATE

## 2020-05-21 PROCEDURE — 6360000002 HC RX W HCPCS: Performed by: INTERNAL MEDICINE

## 2020-05-21 PROCEDURE — 85025 COMPLETE CBC W/AUTO DIFF WBC: CPT

## 2020-05-21 PROCEDURE — 6360000002 HC RX W HCPCS: Performed by: PHYSICIAN ASSISTANT

## 2020-05-21 PROCEDURE — C9113 INJ PANTOPRAZOLE SODIUM, VIA: HCPCS | Performed by: PHYSICIAN ASSISTANT

## 2020-05-21 PROCEDURE — 83735 ASSAY OF MAGNESIUM: CPT

## 2020-05-21 RX ORDER — POTASSIUM CHLORIDE 20 MEQ/1
40 TABLET, EXTENDED RELEASE ORAL ONCE
Status: COMPLETED | OUTPATIENT
Start: 2020-05-21 | End: 2020-05-21

## 2020-05-21 RX ORDER — POTASSIUM CHLORIDE 20 MEQ/1
40 TABLET, EXTENDED RELEASE ORAL PRN
Status: DISCONTINUED | OUTPATIENT
Start: 2020-05-21 | End: 2020-05-26 | Stop reason: HOSPADM

## 2020-05-21 RX ORDER — HYDRALAZINE HYDROCHLORIDE 20 MG/ML
10 INJECTION INTRAMUSCULAR; INTRAVENOUS EVERY 6 HOURS PRN
Status: DISCONTINUED | OUTPATIENT
Start: 2020-05-21 | End: 2020-05-26 | Stop reason: HOSPADM

## 2020-05-21 RX ORDER — POTASSIUM CHLORIDE 7.45 MG/ML
10 INJECTION INTRAVENOUS PRN
Status: DISCONTINUED | OUTPATIENT
Start: 2020-05-21 | End: 2020-05-26 | Stop reason: HOSPADM

## 2020-05-21 RX ADMIN — ENOXAPARIN SODIUM 40 MG: 40 INJECTION SUBCUTANEOUS at 08:16

## 2020-05-21 RX ADMIN — HYDROMORPHONE HYDROCHLORIDE 1 MG: 1 INJECTION, SOLUTION INTRAMUSCULAR; INTRAVENOUS; SUBCUTANEOUS at 04:18

## 2020-05-21 RX ADMIN — HYDROMORPHONE HYDROCHLORIDE 1 MG: 1 INJECTION, SOLUTION INTRAMUSCULAR; INTRAVENOUS; SUBCUTANEOUS at 18:30

## 2020-05-21 RX ADMIN — HYDROMORPHONE HYDROCHLORIDE 1 MG: 1 INJECTION, SOLUTION INTRAMUSCULAR; INTRAVENOUS; SUBCUTANEOUS at 22:13

## 2020-05-21 RX ADMIN — HYDROMORPHONE HYDROCHLORIDE 1 MG: 1 INJECTION, SOLUTION INTRAMUSCULAR; INTRAVENOUS; SUBCUTANEOUS at 06:09

## 2020-05-21 RX ADMIN — ESCITALOPRAM OXALATE 20 MG: 10 TABLET ORAL at 08:15

## 2020-05-21 RX ADMIN — ONDANSETRON HYDROCHLORIDE 4 MG: 2 INJECTION, SOLUTION INTRAMUSCULAR; INTRAVENOUS at 20:20

## 2020-05-21 RX ADMIN — LISINOPRIL 10 MG: 10 TABLET ORAL at 08:15

## 2020-05-21 RX ADMIN — SODIUM CHLORIDE, POTASSIUM CHLORIDE, SODIUM LACTATE AND CALCIUM CHLORIDE 150 ML/HR: 600; 310; 30; 20 INJECTION, SOLUTION INTRAVENOUS at 13:36

## 2020-05-21 RX ADMIN — HYDROMORPHONE HYDROCHLORIDE 1 MG: 1 INJECTION, SOLUTION INTRAMUSCULAR; INTRAVENOUS; SUBCUTANEOUS at 20:20

## 2020-05-21 RX ADMIN — HYDROMORPHONE HYDROCHLORIDE 1 MG: 1 INJECTION, SOLUTION INTRAMUSCULAR; INTRAVENOUS; SUBCUTANEOUS at 02:00

## 2020-05-21 RX ADMIN — POTASSIUM CHLORIDE 40 MEQ: 1500 TABLET, EXTENDED RELEASE ORAL at 08:24

## 2020-05-21 RX ADMIN — HYDROMORPHONE HYDROCHLORIDE 1 MG: 1 INJECTION, SOLUTION INTRAMUSCULAR; INTRAVENOUS; SUBCUTANEOUS at 08:15

## 2020-05-21 RX ADMIN — HYDROMORPHONE HYDROCHLORIDE 1 MG: 1 INJECTION, SOLUTION INTRAMUSCULAR; INTRAVENOUS; SUBCUTANEOUS at 00:17

## 2020-05-21 RX ADMIN — HYDROMORPHONE HYDROCHLORIDE 1 MG: 1 INJECTION, SOLUTION INTRAMUSCULAR; INTRAVENOUS; SUBCUTANEOUS at 10:15

## 2020-05-21 RX ADMIN — PANTOPRAZOLE SODIUM 40 MG: 40 INJECTION, POWDER, FOR SOLUTION INTRAVENOUS at 08:15

## 2020-05-21 RX ADMIN — HYDROMORPHONE HYDROCHLORIDE 1 MG: 1 INJECTION, SOLUTION INTRAMUSCULAR; INTRAVENOUS; SUBCUTANEOUS at 16:09

## 2020-05-21 RX ADMIN — ONDANSETRON HYDROCHLORIDE 4 MG: 2 INJECTION, SOLUTION INTRAMUSCULAR; INTRAVENOUS at 08:24

## 2020-05-21 RX ADMIN — Medication 10 ML: at 08:15

## 2020-05-21 RX ADMIN — LISINOPRIL 10 MG: 10 TABLET ORAL at 20:20

## 2020-05-21 RX ADMIN — HYDROMORPHONE HYDROCHLORIDE 1 MG: 1 INJECTION, SOLUTION INTRAMUSCULAR; INTRAVENOUS; SUBCUTANEOUS at 13:41

## 2020-05-21 RX ADMIN — SODIUM CHLORIDE, POTASSIUM CHLORIDE, SODIUM LACTATE AND CALCIUM CHLORIDE 150 ML/HR: 600; 310; 30; 20 INJECTION, SOLUTION INTRAVENOUS at 20:21

## 2020-05-21 RX ADMIN — Medication 10 ML: at 20:21

## 2020-05-21 RX ADMIN — SODIUM CHLORIDE, POTASSIUM CHLORIDE, SODIUM LACTATE AND CALCIUM CHLORIDE 150 ML/HR: 600; 310; 30; 20 INJECTION, SOLUTION INTRAVENOUS at 06:09

## 2020-05-21 ASSESSMENT — PAIN DESCRIPTION - FREQUENCY
FREQUENCY: CONTINUOUS
FREQUENCY: CONTINUOUS

## 2020-05-21 ASSESSMENT — PAIN DESCRIPTION - PROGRESSION
CLINICAL_PROGRESSION: NOT CHANGED
CLINICAL_PROGRESSION: NOT CHANGED

## 2020-05-21 ASSESSMENT — PAIN DESCRIPTION - DESCRIPTORS
DESCRIPTORS: THROBBING;DISCOMFORT
DESCRIPTORS: DISCOMFORT;RADIATING
DESCRIPTORS: DISCOMFORT

## 2020-05-21 ASSESSMENT — PAIN SCALES - GENERAL
PAINLEVEL_OUTOF10: 8
PAINLEVEL_OUTOF10: 7
PAINLEVEL_OUTOF10: 4
PAINLEVEL_OUTOF10: 6
PAINLEVEL_OUTOF10: 8
PAINLEVEL_OUTOF10: 6
PAINLEVEL_OUTOF10: 3
PAINLEVEL_OUTOF10: 7
PAINLEVEL_OUTOF10: 8
PAINLEVEL_OUTOF10: 8
PAINLEVEL_OUTOF10: 7

## 2020-05-21 ASSESSMENT — PAIN DESCRIPTION - LOCATION
LOCATION: ABDOMEN;BACK
LOCATION: ABDOMEN
LOCATION: ABDOMEN

## 2020-05-21 ASSESSMENT — PAIN DESCRIPTION - ONSET
ONSET: ON-GOING
ONSET: ON-GOING

## 2020-05-21 ASSESSMENT — PAIN DESCRIPTION - PAIN TYPE
TYPE: ACUTE PAIN

## 2020-05-21 ASSESSMENT — PAIN DESCRIPTION - ORIENTATION
ORIENTATION: MID;UPPER

## 2020-05-21 NOTE — PROGRESS NOTES
Primary notified writer to give prn pain med per pt request. Pain level 7/10 located in upper abd. See mar/pain flowsheet. Call light within reach.

## 2020-05-21 NOTE — PLAN OF CARE
Problem: Falls - Risk of:  Goal: Will remain free from falls  Description: Will remain free from falls  5/21/2020 0246 by Ramin Combs RN  Outcome: Ongoing  5/20/2020 1605 by Gemini Ye RN  Outcome: Met This Shift  Goal: Absence of physical injury  Description: Absence of physical injury  Outcome: Ongoing     Problem: Pain:  Description: Pain management should include both nonpharmacologic and pharmacologic interventions.   Goal: Pain level will decrease  Description: Pain level will decrease  5/21/2020 0246 by Ramin Combs RN  Outcome: Ongoing  5/20/2020 1605 by Gemini Ye RN  Outcome: Ongoing  Goal: Control of acute pain  Description: Control of acute pain  Outcome: Ongoing  Goal: Control of chronic pain  Description: Control of chronic pain  Outcome: Ongoing     Problem: Coping:  Goal: Ability to verbalize feelings will improve  Description: Ability to verbalize feelings will improve  5/21/2020 0246 by Ramin Combs RN  Outcome: Ongoing  5/20/2020 1605 by Gemini Ye RN  Outcome: Met This Shift  Goal: Level of anxiety will decrease  Description: Level of anxiety will decrease  Outcome: Ongoing     Problem: Fluid Volume:  Goal: Will maintain adequate fluid volume  Description: Will maintain adequate fluid volume  5/21/2020 0246 by Ramin Combs RN  Outcome: Ongoing  5/20/2020 1605 by Gemini Ye RN  Outcome: Met This Shift     Problem: Health Behavior:  Goal: Ability to identify changes in lifestyle to reduce recurrence of condition will improve  Description: Ability to identify changes in lifestyle to reduce recurrence of condition will improve  Outcome: Ongoing     Problem: Nutritional:  Goal: Ability to achieve adequate nutritional intake will improve  Description: Ability to achieve adequate nutritional intake will improve  5/21/2020 0246 by Ramin Combs RN  Outcome: Ongoing  5/20/2020 1605 by Gemini Ye RN  Outcome: Ongoing     Problem: Physical Regulation:  Goal: Complications related to the disease process, condition or treatment will be avoided or minimized  Description: Complications related to the disease process, condition or treatment will be avoided or minimized  Outcome: Ongoing  Goal: Hemodynamic stability will improve  Description: Hemodynamic stability will improve  Outcome: Ongoing     Problem: Respiratory:  Goal: Ability to achieve and maintain a regular respiratory rate will improve  Description: Ability to achieve and maintain a regular respiratory rate will improve  5/21/2020 0246 by Anabella Garcia RN  Outcome: Ongoing  5/20/2020 1605 by Misael Rangel RN  Outcome: Met This Shift     Problem: Sensory:  Goal: General experience of comfort will improve  Description: General experience of comfort will improve  Outcome: Ongoing     Problem: Skin Integrity:  Goal: Skin integrity will be maintained  Description: Skin integrity will be maintained  Outcome: Ongoing

## 2020-05-21 NOTE — PROGRESS NOTES
PROGRESS NOTE  S:40 yrs Patient  admitted on 5/18/2020 with Acute pancreatitis without infection or necrosis [K85.90] . Today he complains of Nausea, Vomiting, Abdominal Pain and Heartburn. Patient wishes to remain NPO today and possibly trial clear liquids tomorrow. Exam:   Vitals:    05/21/20 0739   BP: (!) 169/95   Pulse: 91   Resp: 16   Temp: 96.9 °F (36.1 °C)   SpO2: 98%      General appearance: alert, appears stated age, cooperative, mild distress and morbidly obese  HEENT: Oropharynx clear, no lesions  Neck: no adenopathy and supple, symmetrical, trachea midline  Lungs: clear to auscultation bilaterally  Heart: regular rate and rhythm, S1, S2 normal, no murmur, click, rub or gallop  Abdomen: normal findings: symmetric and abnormal findings:  hypoactive bowel sounds, obese and tenderness moderate in the epigastrium, in the RUQ, in the LUQ and in the LLQ  Extremities: extremities normal, atraumatic, no cyanosis or edema     Medications: Reviewed    Labs:  CBC:   Recent Labs     05/19/20  0539 05/20/20  0558 05/21/20  0537   WBC 13.0* 12.2* 9.0   HGB 13.0* 12.7* 12.2*   HCT 39.5* 38.2* 36.8*   MCV 96.7 96.1 95.3    181 165     BMP:   Recent Labs     05/19/20  0539 05/20/20  0558 05/21/20  0537   * 135* 131*   K 3.5 3.6 3.3*   CL 99 99 97*   CO2 18* 18* 21   BUN 9 8 9   CREATININE <0.5* <0.5* <0.5*     LIVER PROFILE:   Recent Labs     05/18/20  2140 05/19/20  0539 05/20/20  0558 05/21/20  0537   AST 44* 29 22 25   ALT 69* 54* 38 33   LIPASE 631.0*  --   --   --    PROT 8.6* 7.6 7.5 6.8   BILITOT 0.9 0.8 0.9 0.7   ALKPHOS 158* 133* 124 114     Attending Supervising [de-identified] Attestation Statement  The patient is a 41 y.o. male. I have performed a history and physical examination of the patient.  I discussed the case with my physician assistant Willis Arcos PA-C    I reviewed the patient's Past Medical History, Past Surgical History, Medications, and

## 2020-05-21 NOTE — PLAN OF CARE
Problem: Falls - Risk of:  Goal: Will remain free from falls  Description: Will remain free from falls  5/21/2020 0827 by Ciera Campos RN  Outcome: Ongoing  5/21/2020 0246 by Morris Haskins RN  Outcome: Ongoing  Goal: Absence of physical injury  Description: Absence of physical injury  5/21/2020 0827 by Ciera Campos RN  Outcome: Ongoing  5/21/2020 0246 by Morris Haskins RN  Outcome: Ongoing     Problem: Pain:  Goal: Pain level will decrease  Description: Pain level will decrease  5/21/2020 0827 by Ciera Campos RN  Outcome: Ongoing  5/21/2020 0246 by Morris Haskins RN  Outcome: Ongoing  Goal: Control of acute pain  Description: Control of acute pain  5/21/2020 0827 by Ciera Campos RN  Outcome: Ongoing  5/21/2020 0246 by Morris Haskins RN  Outcome: Ongoing  Goal: Control of chronic pain  Description: Control of chronic pain  5/21/2020 0827 by Ciera Campos RN  Outcome: Ongoing  5/21/2020 0246 by Morris Haskins RN  Outcome: Ongoing     Problem: Coping:  Goal: Ability to verbalize feelings will improve  Description: Ability to verbalize feelings will improve  5/21/2020 0827 by Ciera Campos RN  Outcome: Ongoing  5/21/2020 0246 by Morris Haskins RN  Outcome: Ongoing  Goal: Level of anxiety will decrease  Description: Level of anxiety will decrease  5/21/2020 0827 by Ciera Campos RN  Outcome: Ongoing  5/21/2020 0246 by Morris Haskins RN  Outcome: Ongoing     Problem: Fluid Volume:  Goal: Will maintain adequate fluid volume  Description: Will maintain adequate fluid volume  5/21/2020 0827 by Ciera Campos RN  Outcome: Ongoing  5/21/2020 0246 by Morris Haskins RN  Outcome: Ongoing     Problem: Health Behavior:  Goal: Ability to identify changes in lifestyle to reduce recurrence of condition will improve  Description: Ability to identify changes in lifestyle to reduce recurrence of condition will improve  5/21/2020 0827 by Ciera Campos RN  Outcome: Ongoing  5/21/2020 0246 by Morris Haskins RN  Outcome: Ongoing     Problem: Nutritional:  Goal: Ability to achieve adequate nutritional intake will improve  Description: Ability to achieve adequate nutritional intake will improve  5/21/2020 0827 by Sherry Mccormack RN  Outcome: Ongoing  5/21/2020 0246 by Orquidea Mondragon RN  Outcome: Ongoing     Problem: Physical Regulation:  Goal: Complications related to the disease process, condition or treatment will be avoided or minimized  Description: Complications related to the disease process, condition or treatment will be avoided or minimized  5/21/2020 0827 by Sherry Mccormack RN  Outcome: Ongoing  5/21/2020 0246 by Orquidea Mondragon RN  Outcome: Ongoing  Goal: Hemodynamic stability will improve  Description: Hemodynamic stability will improve  5/21/2020 0827 by Sherry Mccormack RN  Outcome: Ongoing  5/21/2020 0246 by Orquidea Mondragon RN  Outcome: Ongoing     Problem: Respiratory:  Goal: Ability to achieve and maintain a regular respiratory rate will improve  Description: Ability to achieve and maintain a regular respiratory rate will improve  5/21/2020 0827 by Sherry Mccormack RN  Outcome: Ongoing  5/21/2020 0246 by Orquidea Mondragon RN  Outcome: Ongoing     Problem: Sensory:  Goal: General experience of comfort will improve  Description: General experience of comfort will improve  5/21/2020 0827 by Sherry Mccormack RN  Outcome: Ongoing  5/21/2020 0246 by Orquidea Mondragon RN  Outcome: Ongoing     Problem: Skin Integrity:  Goal: Skin integrity will be maintained  Description: Skin integrity will be maintained  5/21/2020 0827 by Sherry Mccormack RN  Outcome: Ongoing  5/21/2020 0246 by Orquidea Mondragon RN  Outcome: Ongoing

## 2020-05-21 NOTE — PROGRESS NOTES
Handoff report and transfer of care given at bedside to [facility]. Patient in stable condition, denies needs/concerns at this time. Call light within reach.

## 2020-05-22 LAB
A/G RATIO: 1.3 (ref 1.1–2.2)
ALBUMIN SERPL-MCNC: 4 G/DL (ref 3.4–5)
ALP BLD-CCNC: 120 U/L (ref 40–129)
ALT SERPL-CCNC: 36 U/L (ref 10–40)
ANION GAP SERPL CALCULATED.3IONS-SCNC: 14 MMOL/L (ref 3–16)
AST SERPL-CCNC: 31 U/L (ref 15–37)
BASOPHILS ABSOLUTE: 0.1 K/UL (ref 0–0.2)
BASOPHILS RELATIVE PERCENT: 0.7 %
BILIRUB SERPL-MCNC: 0.8 MG/DL (ref 0–1)
BUN BLDV-MCNC: 8 MG/DL (ref 7–20)
CALCIUM SERPL-MCNC: 9.2 MG/DL (ref 8.3–10.6)
CHLORIDE BLD-SCNC: 93 MMOL/L (ref 99–110)
CO2: 22 MMOL/L (ref 21–32)
CREAT SERPL-MCNC: <0.5 MG/DL (ref 0.9–1.3)
EOSINOPHILS ABSOLUTE: 0.2 K/UL (ref 0–0.6)
EOSINOPHILS RELATIVE PERCENT: 2 %
GFR AFRICAN AMERICAN: >60
GFR NON-AFRICAN AMERICAN: >60
GLOBULIN: 3 G/DL
GLUCOSE BLD-MCNC: 89 MG/DL (ref 70–99)
HCT VFR BLD CALC: 37.1 % (ref 40.5–52.5)
HEMOGLOBIN: 12.5 G/DL (ref 13.5–17.5)
LYMPHOCYTES ABSOLUTE: 1.1 K/UL (ref 1–5.1)
LYMPHOCYTES RELATIVE PERCENT: 14.4 %
MAGNESIUM: 1.9 MG/DL (ref 1.8–2.4)
MCH RBC QN AUTO: 32.1 PG (ref 26–34)
MCHC RBC AUTO-ENTMCNC: 33.8 G/DL (ref 31–36)
MCV RBC AUTO: 94.9 FL (ref 80–100)
MONOCYTES ABSOLUTE: 1 K/UL (ref 0–1.3)
MONOCYTES RELATIVE PERCENT: 12.2 %
NEUTROPHILS ABSOLUTE: 5.6 K/UL (ref 1.7–7.7)
NEUTROPHILS RELATIVE PERCENT: 70.7 %
PDW BLD-RTO: 13.6 % (ref 12.4–15.4)
PLATELET # BLD: 195 K/UL (ref 135–450)
PMV BLD AUTO: 7.6 FL (ref 5–10.5)
POTASSIUM REFLEX MAGNESIUM: 3.4 MMOL/L (ref 3.5–5.1)
RBC # BLD: 3.91 M/UL (ref 4.2–5.9)
SODIUM BLD-SCNC: 129 MMOL/L (ref 136–145)
TOTAL PROTEIN: 7 G/DL (ref 6.4–8.2)
WBC # BLD: 7.9 K/UL (ref 4–11)

## 2020-05-22 PROCEDURE — 1200000000 HC SEMI PRIVATE

## 2020-05-22 PROCEDURE — 36415 COLL VENOUS BLD VENIPUNCTURE: CPT

## 2020-05-22 PROCEDURE — 99232 SBSQ HOSP IP/OBS MODERATE 35: CPT | Performed by: INTERNAL MEDICINE

## 2020-05-22 PROCEDURE — 2500000003 HC RX 250 WO HCPCS: Performed by: PHYSICIAN ASSISTANT

## 2020-05-22 PROCEDURE — 6370000000 HC RX 637 (ALT 250 FOR IP): Performed by: INTERNAL MEDICINE

## 2020-05-22 PROCEDURE — 80053 COMPREHEN METABOLIC PANEL: CPT

## 2020-05-22 PROCEDURE — 6360000002 HC RX W HCPCS: Performed by: INTERNAL MEDICINE

## 2020-05-22 PROCEDURE — 85025 COMPLETE CBC W/AUTO DIFF WBC: CPT

## 2020-05-22 PROCEDURE — 2580000003 HC RX 258: Performed by: INTERNAL MEDICINE

## 2020-05-22 PROCEDURE — 6360000002 HC RX W HCPCS: Performed by: PHYSICIAN ASSISTANT

## 2020-05-22 PROCEDURE — C9113 INJ PANTOPRAZOLE SODIUM, VIA: HCPCS | Performed by: PHYSICIAN ASSISTANT

## 2020-05-22 PROCEDURE — 83735 ASSAY OF MAGNESIUM: CPT

## 2020-05-22 PROCEDURE — 2500000003 HC RX 250 WO HCPCS: Performed by: INTERNAL MEDICINE

## 2020-05-22 RX ORDER — AMLODIPINE BESYLATE 5 MG/1
5 TABLET ORAL 2 TIMES DAILY
Status: ON HOLD | COMMUNITY
End: 2021-03-14 | Stop reason: ALTCHOICE

## 2020-05-22 RX ORDER — OXYCODONE HYDROCHLORIDE AND ACETAMINOPHEN 5; 325 MG/1; MG/1
1 TABLET ORAL EVERY 4 HOURS PRN
Status: DISCONTINUED | OUTPATIENT
Start: 2020-05-22 | End: 2020-05-26 | Stop reason: HOSPADM

## 2020-05-22 RX ADMIN — ESCITALOPRAM OXALATE 20 MG: 10 TABLET ORAL at 09:01

## 2020-05-22 RX ADMIN — Medication 10 ML: at 20:06

## 2020-05-22 RX ADMIN — HYDROMORPHONE HYDROCHLORIDE 0.5 MG: 1 INJECTION, SOLUTION INTRAMUSCULAR; INTRAVENOUS; SUBCUTANEOUS at 18:50

## 2020-05-22 RX ADMIN — OXYCODONE HYDROCHLORIDE AND ACETAMINOPHEN 1 TABLET: 5; 325 TABLET ORAL at 13:12

## 2020-05-22 RX ADMIN — HYDROMORPHONE HYDROCHLORIDE 1 MG: 1 INJECTION, SOLUTION INTRAMUSCULAR; INTRAVENOUS; SUBCUTANEOUS at 06:14

## 2020-05-22 RX ADMIN — OXYCODONE HYDROCHLORIDE AND ACETAMINOPHEN 1 TABLET: 5; 325 TABLET ORAL at 09:00

## 2020-05-22 RX ADMIN — Medication 10 ML: at 09:01

## 2020-05-22 RX ADMIN — HYDROMORPHONE HYDROCHLORIDE 1 MG: 1 INJECTION, SOLUTION INTRAMUSCULAR; INTRAVENOUS; SUBCUTANEOUS at 00:16

## 2020-05-22 RX ADMIN — LISINOPRIL 10 MG: 10 TABLET ORAL at 09:01

## 2020-05-22 RX ADMIN — SODIUM CHLORIDE, POTASSIUM CHLORIDE, SODIUM LACTATE AND CALCIUM CHLORIDE 150 ML/HR: 600; 310; 30; 20 INJECTION, SOLUTION INTRAVENOUS at 09:00

## 2020-05-22 RX ADMIN — HYDROMORPHONE HYDROCHLORIDE 1 MG: 1 INJECTION, SOLUTION INTRAMUSCULAR; INTRAVENOUS; SUBCUTANEOUS at 02:15

## 2020-05-22 RX ADMIN — HYDRALAZINE HYDROCHLORIDE 10 MG: 20 INJECTION INTRAMUSCULAR; INTRAVENOUS at 13:14

## 2020-05-22 RX ADMIN — LISINOPRIL 10 MG: 10 TABLET ORAL at 20:05

## 2020-05-22 RX ADMIN — HYDRALAZINE HYDROCHLORIDE 10 MG: 20 INJECTION INTRAMUSCULAR; INTRAVENOUS at 22:00

## 2020-05-22 RX ADMIN — SODIUM CHLORIDE, POTASSIUM CHLORIDE, SODIUM LACTATE AND CALCIUM CHLORIDE 150 ML/HR: 600; 310; 30; 20 INJECTION, SOLUTION INTRAVENOUS at 21:56

## 2020-05-22 RX ADMIN — HYDROMORPHONE HYDROCHLORIDE 1 MG: 1 INJECTION, SOLUTION INTRAMUSCULAR; INTRAVENOUS; SUBCUTANEOUS at 04:14

## 2020-05-22 RX ADMIN — PANTOPRAZOLE SODIUM 40 MG: 40 INJECTION, POWDER, FOR SOLUTION INTRAVENOUS at 09:01

## 2020-05-22 RX ADMIN — ENOXAPARIN SODIUM 40 MG: 40 INJECTION SUBCUTANEOUS at 09:01

## 2020-05-22 RX ADMIN — SODIUM CHLORIDE, POTASSIUM CHLORIDE, SODIUM LACTATE AND CALCIUM CHLORIDE 150 ML/HR: 600; 310; 30; 20 INJECTION, SOLUTION INTRAVENOUS at 15:49

## 2020-05-22 RX ADMIN — ONDANSETRON HYDROCHLORIDE 4 MG: 2 INJECTION, SOLUTION INTRAMUSCULAR; INTRAVENOUS at 17:21

## 2020-05-22 RX ADMIN — OXYCODONE HYDROCHLORIDE AND ACETAMINOPHEN 1 TABLET: 5; 325 TABLET ORAL at 20:05

## 2020-05-22 ASSESSMENT — PAIN SCALES - GENERAL
PAINLEVEL_OUTOF10: 6
PAINLEVEL_OUTOF10: 8
PAINLEVEL_OUTOF10: 7
PAINLEVEL_OUTOF10: 8
PAINLEVEL_OUTOF10: 6
PAINLEVEL_OUTOF10: 6
PAINLEVEL_OUTOF10: 8
PAINLEVEL_OUTOF10: 6
PAINLEVEL_OUTOF10: 7

## 2020-05-22 ASSESSMENT — PAIN DESCRIPTION - PAIN TYPE
TYPE: ACUTE PAIN

## 2020-05-22 ASSESSMENT — PAIN DESCRIPTION - FREQUENCY
FREQUENCY: CONTINUOUS

## 2020-05-22 ASSESSMENT — PAIN - FUNCTIONAL ASSESSMENT
PAIN_FUNCTIONAL_ASSESSMENT: ACTIVITIES ARE NOT PREVENTED

## 2020-05-22 ASSESSMENT — PAIN DESCRIPTION - PROGRESSION
CLINICAL_PROGRESSION: GRADUALLY WORSENING
CLINICAL_PROGRESSION: GRADUALLY IMPROVING
CLINICAL_PROGRESSION: GRADUALLY WORSENING
CLINICAL_PROGRESSION: NOT CHANGED
CLINICAL_PROGRESSION: GRADUALLY IMPROVING
CLINICAL_PROGRESSION: GRADUALLY IMPROVING

## 2020-05-22 ASSESSMENT — PAIN DESCRIPTION - DESCRIPTORS
DESCRIPTORS: DISCOMFORT;THROBBING
DESCRIPTORS: DISCOMFORT
DESCRIPTORS: ACHING
DESCRIPTORS: DISCOMFORT

## 2020-05-22 ASSESSMENT — PAIN DESCRIPTION - LOCATION
LOCATION: ABDOMEN

## 2020-05-22 ASSESSMENT — PAIN DESCRIPTION - ORIENTATION
ORIENTATION: MID
ORIENTATION: MID
ORIENTATION: UPPER
ORIENTATION: MID

## 2020-05-22 ASSESSMENT — PAIN DESCRIPTION - ONSET
ONSET: ON-GOING

## 2020-05-22 ASSESSMENT — PAIN DESCRIPTION - DIRECTION: RADIATING_TOWARDS: BACK

## 2020-05-22 NOTE — PLAN OF CARE
Problem: Falls - Risk of:  Goal: Will remain free from falls  Description: Will remain free from falls  5/22/2020 1106 by Cuba Do RN  Outcome: Ongoing  5/22/2020 0403 by Gricel Shannon RN  Outcome: Ongoing  Goal: Absence of physical injury  Description: Absence of physical injury  5/22/2020 1106 by Cuba Do RN  Outcome: Ongoing  5/22/2020 0403 by Gricel Shannon RN  Outcome: Ongoing     Problem: Pain:  Goal: Pain level will decrease  Description: Pain level will decrease  5/22/2020 1106 by Cuba Do RN  Outcome: Ongoing  5/22/2020 0403 by Gricel Shannon RN  Outcome: Ongoing  Goal: Control of acute pain  Description: Control of acute pain  5/22/2020 1106 by Cuba Do RN  Outcome: Ongoing  5/22/2020 0403 by Gricel Shannon RN  Outcome: Ongoing  Goal: Control of chronic pain  Description: Control of chronic pain  5/22/2020 1106 by Cuba Do RN  Outcome: Ongoing  5/22/2020 0403 by Gricel Shannon RN  Outcome: Ongoing     Problem: Coping:  Goal: Ability to verbalize feelings will improve  Description: Ability to verbalize feelings will improve  5/22/2020 1106 by Cuba Do RN  Outcome: Ongoing  5/22/2020 0403 by Gricel Shannon RN  Outcome: Ongoing  Goal: Level of anxiety will decrease  Description: Level of anxiety will decrease  5/22/2020 1106 by Cuba Do RN  Outcome: Ongoing  5/22/2020 0403 by Gricel Shannon RN  Outcome: Ongoing     Problem: Fluid Volume:  Goal: Will maintain adequate fluid volume  Description: Will maintain adequate fluid volume  5/22/2020 1106 by Cuba Do RN  Outcome: Ongoing  5/22/2020 0403 by Gricel Shannon RN  Outcome: Ongoing     Problem: Health Behavior:  Goal: Ability to identify changes in lifestyle to reduce recurrence of condition will improve  Description: Ability to identify changes in lifestyle to reduce recurrence of condition will improve  5/22/2020 1106 by Cuba Do RN  Outcome:

## 2020-05-22 NOTE — PROGRESS NOTES
Progress Note    Admit Date:  5/18/2020    Admitted for acute pancreatitis and abdominal pain    Subjective:  Mr. Mayi Khanna reports having some improvement in pain today. Wants to start clear liquids. No N/V overnight. BP still high today     Objective:   Vitals:    05/22/20 0400   BP: 137/85   Pulse: 82   Resp: 16   Temp: 98.5 °F (36.9 °C)   SpO2: 96%       Intake/Output Summary (Last 24 hours) at 5/22/2020 0835  Last data filed at 5/21/2020 2257  Gross per 24 hour   Intake 4340.31 ml   Output --   Net 4340.31 ml       Physical Exam:  Gen: Obese male, Alert. Appears uncomfortable  Eyes:  No sclera icterus. No conjunctival injection. ENT: No discharge. Pharynx clear. Dry mucous membranes  Neck:  Trachea midline. Resp: No accessory muscle use. No crackles. No wheezes. No rhonchi. CV: Regular rate. Regular rhythm. No murmur. No rub. No edema. Capillary Refill: Brisk,< 3 seconds   Peripheral Pulses: +2 palpable, equal bilaterally   GI: +  bilateral upper quadrant TTP. Non-distended. Normal bowel sounds. Skin: Warm and dry. Midline scar to abdomen with 2 camilo of scabbing without surrounding skin changes or drainage   M/S: No cyanosis. No joint deformity. No clubbing. Neuro: Awake. Grossly nonfocal    Psych: Oriented x 3. No anxiety or agitation.      Scheduled Meds:   pantoprazole  40 mg Intravenous Daily    sodium chloride flush  10 mL Intravenous 2 times per day    enoxaparin  40 mg Subcutaneous Daily    lisinopril  10 mg Oral BID    escitalopram  20 mg Oral Daily       Continuous Infusions:   lactated ringers 150 mL/hr (05/21/20 2021)       PRN Meds:  hydrALAZINE, potassium chloride **OR** potassium alternative oral replacement **OR** potassium chloride, HYDROmorphone, HYDROmorphone, sodium chloride flush, acetaminophen, promethazine **OR** ondansetron      Data:  CBC:   Recent Labs     05/20/20  0558 05/21/20  0537 05/22/20  0544   WBC 12.2* 9.0 7.9   HGB 12.7* 12.2* 12.5*   HCT 38.2* 36.8* 37.1*   MCV 96.1 95.3 94.9    165 195     BMP:   Recent Labs     05/20/20  0558 05/21/20  0537 05/22/20  0544   * 131* 129*   K 3.6 3.3* 3.4*   CL 99 97* 93*   CO2 18* 21 22   BUN 8 9 8   CREATININE <0.5* <0.5* <0.5*     LIVER PROFILE:   Recent Labs     05/20/20  0558 05/21/20  0537 05/22/20  0544   AST 22 25 31   ALT 38 33 36   BILITOT 0.9 0.7 0.8   ALKPHOS 124 114 120       CULTURES  None      RADIOLOGY  CT ABDOMEN PELVIS W IV CONTRAST Additional Contrast? None   Final Result   Moderate inflammatory stranding and free fluid about the pancreas, compatible   with pancreatitis. Correlate with amylase and lipase. Follow-up to   resolution recommended. Fatty liver. Linear induration of subcutaneous fat within the anterior right hemiabdomen,   new as compared to prior. Correlate with any history of surgery or contusion   at the site. Sequela of granulomatous disease. Noncalcified basilar pulmonary nodules are   also seen measuring up to approximately 6 mm which may be followed based upon   clinical risk factors. Suzanne Del Valle  have reviewed the chart on [employer] and personally interviewed and examined patient, reviewed the data (labs and imaging) and after discussion with my PA formulated the plan. Agree with note with the following edits. HPI:     Patient admitted to hospital with epigastric pain. Work-up consistent with acute alcoholic pancreatitis. He is feeling slightly improved today. 5/21-he is improving but still having some pain. Did not sleep well last night due to pain issues. No fever. No nausea or vomiting    5/22- doing some better. Will try clear liquids. I reviewed the patient's Past Medical History, Past Surgical History, Medications, and Allergies. Physical exam:    /85   Pulse 82   Temp 98.5 °F (36.9 °C) (Oral)   Resp 16   Ht 6' 1\" (1.854 m)   Wt 287 lb (130.2 kg)   SpO2 96%   BMI 37.87 kg/m²     Gen: No distress. Alert. IVF, potassium replacement, PRN pain control changes to PO. Start clear liquids.  GI following      Dori Payton 8:35 AM 5/22/2020    ALEX MITCHELL 5/22/2020 1:47 PM

## 2020-05-22 NOTE — PROGRESS NOTES
PROGRESS NOTE  S:40 yrs Patient  admitted on 5/18/2020 with Acute pancreatitis without infection or necrosis [K85.90] . Today he complains of nausea, heartburn, vomiting x 2 yesterday, and improving upper Abdominal Pain. Exam:   Vitals:    05/22/20 0845   BP: (!) 187/106   Pulse: 78   Resp: 18   Temp: 97 °F (36.1 °C)   SpO2:       General appearance: alert, appears stated age, cooperative, no distress and morbidly obese  HEENT: Oropharynx clear, no lesions  Neck: no adenopathy and supple, symmetrical, trachea midline  Lungs: clear to auscultation bilaterally  Heart: regular rate and rhythm, S1, S2 normal, no murmur, click, rub or gallop  Abdomen: normal findings: bowel sounds normal, no organomegaly and symmetric and abnormal findings:  tenderness mild in the upper abdomen  Extremities: extremities normal, atraumatic, no cyanosis or edema     Medications: Reviewed    Labs:  CBC:   Recent Labs     05/20/20 0558 05/21/20 0537 05/22/20  0544   WBC 12.2* 9.0 7.9   HGB 12.7* 12.2* 12.5*   HCT 38.2* 36.8* 37.1*   MCV 96.1 95.3 94.9    165 195     BMP:   Recent Labs     05/20/20  0558 05/21/20  0537 05/22/20  0544   * 131* 129*   K 3.6 3.3* 3.4*   CL 99 97* 93*   CO2 18* 21 22   BUN 8 9 8   CREATININE <0.5* <0.5* <0.5*     LIVER PROFILE:   Recent Labs     05/20/20  0558 05/21/20  0537 05/22/20  0544   AST 22 25 31   ALT 38 33 36   PROT 7.5 6.8 7.0   BILITOT 0.9 0.7 0.8   ALKPHOS 124 114 120     Attending Supervising [de-identified] Attestation Statement  The patient is a 41 y.o. male. I have performed a history and physical examination of the patient. I discussed the case with my physician assistant Mindy Hodges PA-C    I reviewed the patient's Past Medical History, Past Surgical History, Medications, and Allergies.      Physical Exam:  Vitals:    05/21/20 1608 05/21/20 2020 05/22/20 0400 05/22/20 0845   BP: (!) 164/93 (!) 167/94 137/85 (!) 187/106   Pulse: 88 84 82

## 2020-05-22 NOTE — PROGRESS NOTES
Pt requesting pain medication  8/10. PRN  pain medication given, see MAR. SR up x2, Call light and bedside table in easy reach. Denies any other needs at this time.

## 2020-05-22 NOTE — PROGRESS NOTES
Patient with nausea and emesis at this time. Patient unable to take PO pain medication at this time.

## 2020-05-23 ENCOUNTER — APPOINTMENT (OUTPATIENT)
Dept: GENERAL RADIOLOGY | Age: 40
DRG: 282 | End: 2020-05-23
Payer: MEDICAID

## 2020-05-23 LAB
A/G RATIO: 1.1 (ref 1.1–2.2)
ALBUMIN SERPL-MCNC: 3.9 G/DL (ref 3.4–5)
ALP BLD-CCNC: 119 U/L (ref 40–129)
ALT SERPL-CCNC: 39 U/L (ref 10–40)
ANION GAP SERPL CALCULATED.3IONS-SCNC: 16 MMOL/L (ref 3–16)
AST SERPL-CCNC: 29 U/L (ref 15–37)
BASOPHILS ABSOLUTE: 0 K/UL (ref 0–0.2)
BASOPHILS RELATIVE PERCENT: 0.5 %
BILIRUB SERPL-MCNC: 0.8 MG/DL (ref 0–1)
BUN BLDV-MCNC: 6 MG/DL (ref 7–20)
CALCIUM SERPL-MCNC: 9.5 MG/DL (ref 8.3–10.6)
CHLORIDE BLD-SCNC: 93 MMOL/L (ref 99–110)
CO2: 19 MMOL/L (ref 21–32)
CREAT SERPL-MCNC: <0.5 MG/DL (ref 0.9–1.3)
EOSINOPHILS ABSOLUTE: 0.1 K/UL (ref 0–0.6)
EOSINOPHILS RELATIVE PERCENT: 1.2 %
GFR AFRICAN AMERICAN: >60
GFR NON-AFRICAN AMERICAN: >60
GLOBULIN: 3.5 G/DL
GLUCOSE BLD-MCNC: 92 MG/DL (ref 70–99)
HCT VFR BLD CALC: 38 % (ref 40.5–52.5)
HEMOGLOBIN: 12.7 G/DL (ref 13.5–17.5)
LIPASE: 86 U/L (ref 13–60)
LYMPHOCYTES ABSOLUTE: 1 K/UL (ref 1–5.1)
LYMPHOCYTES RELATIVE PERCENT: 12.7 %
MAGNESIUM: 1.9 MG/DL (ref 1.8–2.4)
MCH RBC QN AUTO: 31.4 PG (ref 26–34)
MCHC RBC AUTO-ENTMCNC: 33.4 G/DL (ref 31–36)
MCV RBC AUTO: 94 FL (ref 80–100)
MONOCYTES ABSOLUTE: 1.2 K/UL (ref 0–1.3)
MONOCYTES RELATIVE PERCENT: 14.2 %
NEUTROPHILS ABSOLUTE: 5.8 K/UL (ref 1.7–7.7)
NEUTROPHILS RELATIVE PERCENT: 71.4 %
PDW BLD-RTO: 14.1 % (ref 12.4–15.4)
PLATELET # BLD: 241 K/UL (ref 135–450)
PMV BLD AUTO: 7.8 FL (ref 5–10.5)
POTASSIUM REFLEX MAGNESIUM: 3.1 MMOL/L (ref 3.5–5.1)
RBC # BLD: 4.04 M/UL (ref 4.2–5.9)
SODIUM BLD-SCNC: 128 MMOL/L (ref 136–145)
TOTAL PROTEIN: 7.4 G/DL (ref 6.4–8.2)
WBC # BLD: 8.1 K/UL (ref 4–11)

## 2020-05-23 PROCEDURE — 36415 COLL VENOUS BLD VENIPUNCTURE: CPT

## 2020-05-23 PROCEDURE — 83735 ASSAY OF MAGNESIUM: CPT

## 2020-05-23 PROCEDURE — C9113 INJ PANTOPRAZOLE SODIUM, VIA: HCPCS | Performed by: PHYSICIAN ASSISTANT

## 2020-05-23 PROCEDURE — 99233 SBSQ HOSP IP/OBS HIGH 50: CPT | Performed by: INTERNAL MEDICINE

## 2020-05-23 PROCEDURE — 6370000000 HC RX 637 (ALT 250 FOR IP): Performed by: INTERNAL MEDICINE

## 2020-05-23 PROCEDURE — 0DH67UZ INSERTION OF FEEDING DEVICE INTO STOMACH, VIA NATURAL OR ARTIFICIAL OPENING: ICD-10-PCS | Performed by: RADIOLOGY

## 2020-05-23 PROCEDURE — 6370000000 HC RX 637 (ALT 250 FOR IP): Performed by: NURSE PRACTITIONER

## 2020-05-23 PROCEDURE — 83690 ASSAY OF LIPASE: CPT

## 2020-05-23 PROCEDURE — 43752 NASAL/OROGASTRIC W/TUBE PLMT: CPT

## 2020-05-23 PROCEDURE — 6360000002 HC RX W HCPCS: Performed by: INTERNAL MEDICINE

## 2020-05-23 PROCEDURE — 1200000000 HC SEMI PRIVATE

## 2020-05-23 PROCEDURE — 6360000002 HC RX W HCPCS: Performed by: PHYSICIAN ASSISTANT

## 2020-05-23 PROCEDURE — 2500000003 HC RX 250 WO HCPCS: Performed by: INTERNAL MEDICINE

## 2020-05-23 PROCEDURE — 85025 COMPLETE CBC W/AUTO DIFF WBC: CPT

## 2020-05-23 PROCEDURE — 2580000003 HC RX 258: Performed by: INTERNAL MEDICINE

## 2020-05-23 PROCEDURE — 6370000000 HC RX 637 (ALT 250 FOR IP): Performed by: PHYSICIAN ASSISTANT

## 2020-05-23 PROCEDURE — 80053 COMPREHEN METABOLIC PANEL: CPT

## 2020-05-23 RX ORDER — AMLODIPINE BESYLATE 5 MG/1
5 TABLET ORAL 2 TIMES DAILY
Status: DISCONTINUED | OUTPATIENT
Start: 2020-05-23 | End: 2020-05-26 | Stop reason: HOSPADM

## 2020-05-23 RX ADMIN — OXYCODONE HYDROCHLORIDE AND ACETAMINOPHEN 1 TABLET: 5; 325 TABLET ORAL at 09:07

## 2020-05-23 RX ADMIN — SODIUM CHLORIDE, POTASSIUM CHLORIDE, SODIUM LACTATE AND CALCIUM CHLORIDE 150 ML/HR: 600; 310; 30; 20 INJECTION, SOLUTION INTRAVENOUS at 18:19

## 2020-05-23 RX ADMIN — AMLODIPINE BESYLATE 5 MG: 5 TABLET ORAL at 08:22

## 2020-05-23 RX ADMIN — AMLODIPINE BESYLATE 5 MG: 5 TABLET ORAL at 20:15

## 2020-05-23 RX ADMIN — HYDROMORPHONE HYDROCHLORIDE 0.5 MG: 1 INJECTION, SOLUTION INTRAMUSCULAR; INTRAVENOUS; SUBCUTANEOUS at 12:18

## 2020-05-23 RX ADMIN — HYDROMORPHONE HYDROCHLORIDE 0.5 MG: 1 INJECTION, SOLUTION INTRAMUSCULAR; INTRAVENOUS; SUBCUTANEOUS at 16:56

## 2020-05-23 RX ADMIN — OXYCODONE HYDROCHLORIDE AND ACETAMINOPHEN 1 TABLET: 5; 325 TABLET ORAL at 14:19

## 2020-05-23 RX ADMIN — OXYCODONE HYDROCHLORIDE AND ACETAMINOPHEN 1 TABLET: 5; 325 TABLET ORAL at 04:42

## 2020-05-23 RX ADMIN — HYDROMORPHONE HYDROCHLORIDE 0.5 MG: 1 INJECTION, SOLUTION INTRAMUSCULAR; INTRAVENOUS; SUBCUTANEOUS at 20:14

## 2020-05-23 RX ADMIN — SODIUM CHLORIDE, POTASSIUM CHLORIDE, SODIUM LACTATE AND CALCIUM CHLORIDE 150 ML/HR: 600; 310; 30; 20 INJECTION, SOLUTION INTRAVENOUS at 12:22

## 2020-05-23 RX ADMIN — ENOXAPARIN SODIUM 40 MG: 40 INJECTION SUBCUTANEOUS at 08:22

## 2020-05-23 RX ADMIN — SODIUM CHLORIDE, POTASSIUM CHLORIDE, SODIUM LACTATE AND CALCIUM CHLORIDE 150 ML/HR: 600; 310; 30; 20 INJECTION, SOLUTION INTRAVENOUS at 04:43

## 2020-05-23 RX ADMIN — POTASSIUM CHLORIDE 40 MEQ: 1500 TABLET, EXTENDED RELEASE ORAL at 08:22

## 2020-05-23 RX ADMIN — ONDANSETRON HYDROCHLORIDE 4 MG: 2 INJECTION, SOLUTION INTRAMUSCULAR; INTRAVENOUS at 03:14

## 2020-05-23 RX ADMIN — Medication 10 ML: at 08:22

## 2020-05-23 RX ADMIN — PANTOPRAZOLE SODIUM 40 MG: 40 INJECTION, POWDER, FOR SOLUTION INTRAVENOUS at 08:22

## 2020-05-23 RX ADMIN — ONDANSETRON HYDROCHLORIDE 4 MG: 2 INJECTION, SOLUTION INTRAMUSCULAR; INTRAVENOUS at 14:19

## 2020-05-23 RX ADMIN — OXYCODONE HYDROCHLORIDE AND ACETAMINOPHEN 1 TABLET: 5; 325 TABLET ORAL at 00:20

## 2020-05-23 RX ADMIN — ESCITALOPRAM OXALATE 20 MG: 10 TABLET ORAL at 08:22

## 2020-05-23 ASSESSMENT — PAIN DESCRIPTION - FREQUENCY
FREQUENCY: CONTINUOUS

## 2020-05-23 ASSESSMENT — PAIN SCALES - GENERAL
PAINLEVEL_OUTOF10: 7
PAINLEVEL_OUTOF10: 4
PAINLEVEL_OUTOF10: 6
PAINLEVEL_OUTOF10: 8
PAINLEVEL_OUTOF10: 6
PAINLEVEL_OUTOF10: 7
PAINLEVEL_OUTOF10: 7
PAINLEVEL_OUTOF10: 9
PAINLEVEL_OUTOF10: 7
PAINLEVEL_OUTOF10: 5
PAINLEVEL_OUTOF10: 7
PAINLEVEL_OUTOF10: 7
PAINLEVEL_OUTOF10: 8
PAINLEVEL_OUTOF10: 9

## 2020-05-23 ASSESSMENT — PAIN DESCRIPTION - PROGRESSION
CLINICAL_PROGRESSION: GRADUALLY WORSENING
CLINICAL_PROGRESSION: NOT CHANGED
CLINICAL_PROGRESSION: GRADUALLY WORSENING

## 2020-05-23 ASSESSMENT — PAIN DESCRIPTION - PAIN TYPE
TYPE: ACUTE PAIN

## 2020-05-23 ASSESSMENT — PAIN DESCRIPTION - LOCATION
LOCATION: ABDOMEN
LOCATION: ABDOMEN;BACK;CHEST
LOCATION: ABDOMEN
LOCATION: ABDOMEN

## 2020-05-23 ASSESSMENT — PAIN DESCRIPTION - ORIENTATION
ORIENTATION: RIGHT;LEFT;UPPER
ORIENTATION: UPPER
ORIENTATION: RIGHT;LEFT;UPPER
ORIENTATION: RIGHT;LEFT;UPPER

## 2020-05-23 ASSESSMENT — PAIN - FUNCTIONAL ASSESSMENT: PAIN_FUNCTIONAL_ASSESSMENT: ACTIVITIES ARE NOT PREVENTED

## 2020-05-23 ASSESSMENT — PAIN DESCRIPTION - DESCRIPTORS
DESCRIPTORS: ACHING
DESCRIPTORS: THROBBING
DESCRIPTORS: ACHING

## 2020-05-23 ASSESSMENT — PAIN DESCRIPTION - ONSET
ONSET: ON-GOING

## 2020-05-23 NOTE — PROGRESS NOTES
Resting in bed awake. AM assessment complete. Alert and oriented. No needs at present time. Call light in reach.

## 2020-05-23 NOTE — FLOWSHEET NOTE
05/22/20 2158 05/22/20 2205 05/22/20 2210   Vital Signs   Pulse 83 89 92   Heart Rate Source Monitor Monitor Monitor   BP (!) 172/104 (!) 175/102 (!) 175/105   BP Location Right upper arm Right upper arm Right upper arm      05/22/20 2215 05/22/20 2220   Vital Signs   Pulse 97 93   Heart Rate Source Monitor Monitor   BP (!) 177/108 (!) 160/102   BP Location Right upper arm Right upper arm   Hydralazine given per prn order for elevated BP at 2200. BP checked Q 5 minutes per protocol. Will monitor.  Evan Croft

## 2020-05-23 NOTE — PROGRESS NOTES
GI Progress Note      SUBJECTIVE:  Pt unable to maintain oral intake. Manifest nausea and emesis this am.  Abd pain has increased slightly. There are no reports of fevers. Passing flatus but has not had a BM.     OBJECTIVE      Medications    Current Facility-Administered Medications: amLODIPine (NORVASC) tablet 5 mg, 5 mg, Oral, BID  HYDROmorphone (DILAUDID) injection 0.25 mg, 0.25 mg, Intravenous, Q3H PRN **OR** HYDROmorphone (DILAUDID) injection 0.5 mg, 0.5 mg, Intravenous, Q3H PRN  oxyCODONE-acetaminophen (PERCOCET) 5-325 MG per tablet 1 tablet, 1 tablet, Oral, Q4H PRN  hydrALAZINE (APRESOLINE) injection 10 mg, 10 mg, Intravenous, Q6H PRN  potassium chloride (KLOR-CON M) extended release tablet 40 mEq, 40 mEq, Oral, PRN **OR** potassium bicarb-citric acid (EFFER-K) effervescent tablet 40 mEq, 40 mEq, Oral, PRN **OR** potassium chloride 10 mEq/100 mL IVPB (Peripheral Line), 10 mEq, Intravenous, PRN  pantoprazole (PROTONIX) injection 40 mg, 40 mg, Intravenous, Daily  sodium chloride flush 0.9 % injection 10 mL, 10 mL, Intravenous, 2 times per day  sodium chloride flush 0.9 % injection 10 mL, 10 mL, Intravenous, PRN  acetaminophen (TYLENOL) tablet 650 mg, 650 mg, Oral, Q4H PRN  promethazine (PHENERGAN) tablet 12.5 mg, 12.5 mg, Oral, Q6H PRN **OR** ondansetron (ZOFRAN) injection 4 mg, 4 mg, Intravenous, Q6H PRN  enoxaparin (LOVENOX) injection 40 mg, 40 mg, Subcutaneous, Daily  lactated ringers infusion, 150 mL/hr, Intravenous, Continuous  escitalopram (LEXAPRO) tablet 20 mg, 20 mg, Oral, Daily  Physical    VITALS:  BP (!) 177/103   Pulse 84   Temp 97.1 °F (36.2 °C) (Oral)   Resp 14   Ht 6' 1\" (1.854 m)   Wt 287 lb (130.2 kg)   SpO2 98%   BMI 37.87 kg/m²   ABD: soft, moderate epigastric tenderness, decreased BS, ND  Data    CBC with Differential:    Lab Results   Component Value Date    WBC 8.1 05/23/2020    RBC 4.04 05/23/2020    HGB 12.7 05/23/2020    HCT 38.0 05/23/2020     05/23/2020    MCV 94.0

## 2020-05-23 NOTE — PROGRESS NOTES
C/o abdominal pain rate as 8/10. Requesting pain medication. Dilaudid 0.5 mg IV given. See MAR. Call light in reach.

## 2020-05-23 NOTE — PROGRESS NOTES
K this am of 3.1. 40 MEQ of KCL given per PRN order. See MAR. Norvasc started will recheck bp later.

## 2020-05-24 LAB
A/G RATIO: 1.1 (ref 1.1–2.2)
ALBUMIN SERPL-MCNC: 3.9 G/DL (ref 3.4–5)
ALP BLD-CCNC: 115 U/L (ref 40–129)
ALT SERPL-CCNC: 41 U/L (ref 10–40)
ANION GAP SERPL CALCULATED.3IONS-SCNC: 15 MMOL/L (ref 3–16)
AST SERPL-CCNC: 32 U/L (ref 15–37)
BASOPHILS ABSOLUTE: 0 K/UL (ref 0–0.2)
BASOPHILS RELATIVE PERCENT: 0.5 %
BILIRUB SERPL-MCNC: 0.8 MG/DL (ref 0–1)
BUN BLDV-MCNC: 7 MG/DL (ref 7–20)
CALCIUM SERPL-MCNC: 9.4 MG/DL (ref 8.3–10.6)
CHLORIDE BLD-SCNC: 95 MMOL/L (ref 99–110)
CO2: 20 MMOL/L (ref 21–32)
CREAT SERPL-MCNC: <0.5 MG/DL (ref 0.9–1.3)
EOSINOPHILS ABSOLUTE: 0.2 K/UL (ref 0–0.6)
EOSINOPHILS RELATIVE PERCENT: 2.4 %
GFR AFRICAN AMERICAN: >60
GFR NON-AFRICAN AMERICAN: >60
GLOBULIN: 3.7 G/DL
GLUCOSE BLD-MCNC: 94 MG/DL (ref 70–99)
HCT VFR BLD CALC: 39.7 % (ref 40.5–52.5)
HEMOGLOBIN: 13.1 G/DL (ref 13.5–17.5)
LYMPHOCYTES ABSOLUTE: 1.2 K/UL (ref 1–5.1)
LYMPHOCYTES RELATIVE PERCENT: 16.7 %
MAGNESIUM: 1.9 MG/DL (ref 1.8–2.4)
MCH RBC QN AUTO: 31 PG (ref 26–34)
MCHC RBC AUTO-ENTMCNC: 33 G/DL (ref 31–36)
MCV RBC AUTO: 94 FL (ref 80–100)
MONOCYTES ABSOLUTE: 1.2 K/UL (ref 0–1.3)
MONOCYTES RELATIVE PERCENT: 17 %
NEUTROPHILS ABSOLUTE: 4.4 K/UL (ref 1.7–7.7)
NEUTROPHILS RELATIVE PERCENT: 63.4 %
PDW BLD-RTO: 14.2 % (ref 12.4–15.4)
PLATELET # BLD: 251 K/UL (ref 135–450)
PMV BLD AUTO: 7.5 FL (ref 5–10.5)
POTASSIUM REFLEX MAGNESIUM: 3.4 MMOL/L (ref 3.5–5.1)
RBC # BLD: 4.22 M/UL (ref 4.2–5.9)
SODIUM BLD-SCNC: 130 MMOL/L (ref 136–145)
TOTAL PROTEIN: 7.6 G/DL (ref 6.4–8.2)
WBC # BLD: 7 K/UL (ref 4–11)

## 2020-05-24 PROCEDURE — 6360000002 HC RX W HCPCS: Performed by: INTERNAL MEDICINE

## 2020-05-24 PROCEDURE — C9113 INJ PANTOPRAZOLE SODIUM, VIA: HCPCS | Performed by: PHYSICIAN ASSISTANT

## 2020-05-24 PROCEDURE — 80053 COMPREHEN METABOLIC PANEL: CPT

## 2020-05-24 PROCEDURE — 6370000000 HC RX 637 (ALT 250 FOR IP): Performed by: NURSE PRACTITIONER

## 2020-05-24 PROCEDURE — 2580000003 HC RX 258: Performed by: INTERNAL MEDICINE

## 2020-05-24 PROCEDURE — 85025 COMPLETE CBC W/AUTO DIFF WBC: CPT

## 2020-05-24 PROCEDURE — 6360000002 HC RX W HCPCS: Performed by: PHYSICIAN ASSISTANT

## 2020-05-24 PROCEDURE — 36415 COLL VENOUS BLD VENIPUNCTURE: CPT

## 2020-05-24 PROCEDURE — 83735 ASSAY OF MAGNESIUM: CPT

## 2020-05-24 PROCEDURE — 2500000003 HC RX 250 WO HCPCS: Performed by: INTERNAL MEDICINE

## 2020-05-24 PROCEDURE — 1200000000 HC SEMI PRIVATE

## 2020-05-24 PROCEDURE — 6370000000 HC RX 637 (ALT 250 FOR IP): Performed by: INTERNAL MEDICINE

## 2020-05-24 PROCEDURE — 99232 SBSQ HOSP IP/OBS MODERATE 35: CPT | Performed by: INTERNAL MEDICINE

## 2020-05-24 RX ADMIN — SODIUM CHLORIDE, POTASSIUM CHLORIDE, SODIUM LACTATE AND CALCIUM CHLORIDE 150 ML/HR: 600; 310; 30; 20 INJECTION, SOLUTION INTRAVENOUS at 00:40

## 2020-05-24 RX ADMIN — AMLODIPINE BESYLATE 5 MG: 5 TABLET ORAL at 09:53

## 2020-05-24 RX ADMIN — HYDROMORPHONE HYDROCHLORIDE 0.5 MG: 1 INJECTION, SOLUTION INTRAMUSCULAR; INTRAVENOUS; SUBCUTANEOUS at 09:59

## 2020-05-24 RX ADMIN — HYDROMORPHONE HYDROCHLORIDE 0.5 MG: 1 INJECTION, SOLUTION INTRAMUSCULAR; INTRAVENOUS; SUBCUTANEOUS at 18:54

## 2020-05-24 RX ADMIN — Medication 10 ML: at 09:53

## 2020-05-24 RX ADMIN — HYDROMORPHONE HYDROCHLORIDE 0.5 MG: 1 INJECTION, SOLUTION INTRAMUSCULAR; INTRAVENOUS; SUBCUTANEOUS at 22:12

## 2020-05-24 RX ADMIN — Medication 10 ML: at 22:12

## 2020-05-24 RX ADMIN — OXYCODONE HYDROCHLORIDE AND ACETAMINOPHEN 1 TABLET: 5; 325 TABLET ORAL at 15:47

## 2020-05-24 RX ADMIN — HYDROMORPHONE HYDROCHLORIDE 0.5 MG: 1 INJECTION, SOLUTION INTRAMUSCULAR; INTRAVENOUS; SUBCUTANEOUS at 05:52

## 2020-05-24 RX ADMIN — ENOXAPARIN SODIUM 40 MG: 40 INJECTION SUBCUTANEOUS at 09:53

## 2020-05-24 RX ADMIN — SODIUM CHLORIDE, POTASSIUM CHLORIDE, SODIUM LACTATE AND CALCIUM CHLORIDE 150 ML/HR: 600; 310; 30; 20 INJECTION, SOLUTION INTRAVENOUS at 12:14

## 2020-05-24 RX ADMIN — HYDROMORPHONE HYDROCHLORIDE 0.5 MG: 1 INJECTION, SOLUTION INTRAMUSCULAR; INTRAVENOUS; SUBCUTANEOUS at 00:36

## 2020-05-24 RX ADMIN — PANTOPRAZOLE SODIUM 40 MG: 40 INJECTION, POWDER, FOR SOLUTION INTRAVENOUS at 09:53

## 2020-05-24 RX ADMIN — ONDANSETRON HYDROCHLORIDE 4 MG: 2 INJECTION, SOLUTION INTRAMUSCULAR; INTRAVENOUS at 00:36

## 2020-05-24 RX ADMIN — SODIUM CHLORIDE, POTASSIUM CHLORIDE, SODIUM LACTATE AND CALCIUM CHLORIDE 150 ML/HR: 600; 310; 30; 20 INJECTION, SOLUTION INTRAVENOUS at 05:55

## 2020-05-24 RX ADMIN — HYDROMORPHONE HYDROCHLORIDE 0.5 MG: 1 INJECTION, SOLUTION INTRAMUSCULAR; INTRAVENOUS; SUBCUTANEOUS at 14:00

## 2020-05-24 RX ADMIN — AMLODIPINE BESYLATE 5 MG: 5 TABLET ORAL at 22:12

## 2020-05-24 RX ADMIN — ESCITALOPRAM OXALATE 20 MG: 10 TABLET ORAL at 09:53

## 2020-05-24 RX ADMIN — ONDANSETRON HYDROCHLORIDE 4 MG: 2 INJECTION, SOLUTION INTRAMUSCULAR; INTRAVENOUS at 18:54

## 2020-05-24 ASSESSMENT — PAIN DESCRIPTION - ORIENTATION
ORIENTATION: RIGHT;LEFT;UPPER
ORIENTATION: UPPER
ORIENTATION: RIGHT;LEFT;MID
ORIENTATION: UPPER

## 2020-05-24 ASSESSMENT — PAIN - FUNCTIONAL ASSESSMENT
PAIN_FUNCTIONAL_ASSESSMENT: PREVENTS OR INTERFERES SOME ACTIVE ACTIVITIES AND ADLS
PAIN_FUNCTIONAL_ASSESSMENT: ACTIVITIES ARE NOT PREVENTED
PAIN_FUNCTIONAL_ASSESSMENT: ACTIVITIES ARE NOT PREVENTED

## 2020-05-24 ASSESSMENT — PAIN DESCRIPTION - DESCRIPTORS
DESCRIPTORS: ACHING
DESCRIPTORS: ACHING;THROBBING
DESCRIPTORS: ACHING;DISCOMFORT
DESCRIPTORS: ACHING;THROBBING

## 2020-05-24 ASSESSMENT — PAIN DESCRIPTION - PROGRESSION
CLINICAL_PROGRESSION: GRADUALLY WORSENING

## 2020-05-24 ASSESSMENT — PAIN SCALES - GENERAL
PAINLEVEL_OUTOF10: 5
PAINLEVEL_OUTOF10: 7
PAINLEVEL_OUTOF10: 7
PAINLEVEL_OUTOF10: 5
PAINLEVEL_OUTOF10: 7
PAINLEVEL_OUTOF10: 3
PAINLEVEL_OUTOF10: 7
PAINLEVEL_OUTOF10: 5
PAINLEVEL_OUTOF10: 5
PAINLEVEL_OUTOF10: 7
PAINLEVEL_OUTOF10: 7

## 2020-05-24 ASSESSMENT — PAIN DESCRIPTION - LOCATION
LOCATION: ABDOMEN

## 2020-05-24 ASSESSMENT — PAIN DESCRIPTION - ONSET
ONSET: ON-GOING
ONSET: ON-GOING
ONSET: AWAKENED FROM SLEEP
ONSET: ON-GOING

## 2020-05-24 ASSESSMENT — PAIN DESCRIPTION - DIRECTION
RADIATING_TOWARDS: BACK

## 2020-05-24 ASSESSMENT — PAIN DESCRIPTION - PAIN TYPE
TYPE: ACUTE PAIN

## 2020-05-24 ASSESSMENT — PAIN DESCRIPTION - FREQUENCY
FREQUENCY: CONTINUOUS

## 2020-05-24 NOTE — PROGRESS NOTES
MCV 94.0 05/24/2020    MCH 31.0 05/24/2020    MCHC 33.0 05/24/2020    RDW 14.2 05/24/2020    BANDSPCT 3 10/09/2019    METASPCT 1 09/03/2019    LYMPHOPCT 16.7 05/24/2020    MONOPCT 17.0 05/24/2020    MYELOPCT 1 09/05/2019    BASOPCT 0.5 05/24/2020    MONOSABS 1.2 05/24/2020    LYMPHSABS 1.2 05/24/2020    EOSABS 0.2 05/24/2020    BASOSABS 0.0 05/24/2020     CMP:    Lab Results   Component Value Date     05/24/2020    K 3.4 05/24/2020    CL 95 05/24/2020    CO2 20 05/24/2020    BUN 7 05/24/2020    CREATININE <0.5 05/24/2020    GFRAA >60 05/24/2020    AGRATIO 1.1 05/24/2020    LABGLOM >60 05/24/2020    GLUCOSE 94 05/24/2020    PROT 7.6 05/24/2020    LABALBU 3.9 05/24/2020    CALCIUM 9.4 05/24/2020    BILITOT 0.8 05/24/2020    ALKPHOS 115 05/24/2020    AST 32 05/24/2020    ALT 41 05/24/2020       ASSESSMENT AND PLAN  33yo M abuser of ETOH c/b acute pancreatitis a few years ago manifest epigastric pain,nausea and emesis after a lengthy period of sobriety was disrupted with binge drinking. Moderate didier pancreatic inflammatory stranding and free fluid c/w acute pancreatitis noted. NJT was placed and feedings started this am.  Abd pain is well controlled.   - continue IS to reduce the likelihood of pneumonia  - continue daily IV PPI for stress ulcer prophylaxis  - keep adequately hydrated  - NJT feeds

## 2020-05-24 NOTE — PROGRESS NOTES
formation resulting in sigmoid colectomy and diverting end ileostomy in 10/24/19   - Recent reversal of ileostomy   - SBO in April, resolved without surgical intervention   - No acute issues on admission today     Fatty Liver   - recommend weight loss     Pulmonary Nodules   - 6 mm bibasilar nodules noted on CT  - can follow up OP    Obesity  - Body mass index is 37.87 kg/m². - Complicating assessment and treatment. Placing patient at risk for multiple co-morbidities as well as early death and contributing to the patient's presentation.   - Counseled on weight loss. DVT Prophylaxis: Lovenox   Diet: DIET TUBE FEED CONTINUOUS/CYCLIC NPO; 1.5 Calorie with Fiber (Jevity);  Nasoenteric; 20; 65; 20; Exceptions are: Ice Chips  Code Status: Full Code    Valentin Peñaloza FNP-C 11:18 AM 5/24/2020    ALEX MITCHELL 5/24/2020 11:29 AM

## 2020-05-24 NOTE — PROGRESS NOTES
C/o upper abdominal pain rate as 7/10. Requesting pain shot, dilaudid 0.5 mg IV given. See MAR. CAll light in reach.

## 2020-05-25 LAB
A/G RATIO: 1.2 (ref 1.1–2.2)
ALBUMIN SERPL-MCNC: 3.8 G/DL (ref 3.4–5)
ALP BLD-CCNC: 116 U/L (ref 40–129)
ALT SERPL-CCNC: 44 U/L (ref 10–40)
ANION GAP SERPL CALCULATED.3IONS-SCNC: 12 MMOL/L (ref 3–16)
AST SERPL-CCNC: 34 U/L (ref 15–37)
BASOPHILS ABSOLUTE: 0 K/UL (ref 0–0.2)
BASOPHILS RELATIVE PERCENT: 0.7 %
BILIRUB SERPL-MCNC: 0.6 MG/DL (ref 0–1)
BUN BLDV-MCNC: 9 MG/DL (ref 7–20)
CALCIUM SERPL-MCNC: 9.2 MG/DL (ref 8.3–10.6)
CHLORIDE BLD-SCNC: 96 MMOL/L (ref 99–110)
CO2: 23 MMOL/L (ref 21–32)
CREAT SERPL-MCNC: <0.5 MG/DL (ref 0.9–1.3)
EOSINOPHILS ABSOLUTE: 0.2 K/UL (ref 0–0.6)
EOSINOPHILS RELATIVE PERCENT: 4.1 %
GFR AFRICAN AMERICAN: >60
GFR NON-AFRICAN AMERICAN: >60
GLOBULIN: 3.1 G/DL
GLUCOSE BLD-MCNC: 150 MG/DL (ref 70–99)
HCT VFR BLD CALC: 36.6 % (ref 40.5–52.5)
HEMOGLOBIN: 12.4 G/DL (ref 13.5–17.5)
LYMPHOCYTES ABSOLUTE: 1 K/UL (ref 1–5.1)
LYMPHOCYTES RELATIVE PERCENT: 21.3 %
MAGNESIUM: 1.9 MG/DL (ref 1.8–2.4)
MCH RBC QN AUTO: 32.1 PG (ref 26–34)
MCHC RBC AUTO-ENTMCNC: 33.8 G/DL (ref 31–36)
MCV RBC AUTO: 95.1 FL (ref 80–100)
MONOCYTES ABSOLUTE: 0.9 K/UL (ref 0–1.3)
MONOCYTES RELATIVE PERCENT: 18.4 %
NEUTROPHILS ABSOLUTE: 2.7 K/UL (ref 1.7–7.7)
NEUTROPHILS RELATIVE PERCENT: 55.5 %
PDW BLD-RTO: 13.9 % (ref 12.4–15.4)
PLATELET # BLD: 231 K/UL (ref 135–450)
PMV BLD AUTO: 7.7 FL (ref 5–10.5)
POTASSIUM REFLEX MAGNESIUM: 3.3 MMOL/L (ref 3.5–5.1)
RBC # BLD: 3.85 M/UL (ref 4.2–5.9)
SODIUM BLD-SCNC: 131 MMOL/L (ref 136–145)
TOTAL PROTEIN: 6.9 G/DL (ref 6.4–8.2)
WBC # BLD: 4.9 K/UL (ref 4–11)

## 2020-05-25 PROCEDURE — 80053 COMPREHEN METABOLIC PANEL: CPT

## 2020-05-25 PROCEDURE — 6370000000 HC RX 637 (ALT 250 FOR IP): Performed by: PHYSICIAN ASSISTANT

## 2020-05-25 PROCEDURE — 6360000002 HC RX W HCPCS: Performed by: PHYSICIAN ASSISTANT

## 2020-05-25 PROCEDURE — 85025 COMPLETE CBC W/AUTO DIFF WBC: CPT

## 2020-05-25 PROCEDURE — 36415 COLL VENOUS BLD VENIPUNCTURE: CPT

## 2020-05-25 PROCEDURE — 2500000003 HC RX 250 WO HCPCS: Performed by: INTERNAL MEDICINE

## 2020-05-25 PROCEDURE — 6370000000 HC RX 637 (ALT 250 FOR IP): Performed by: INTERNAL MEDICINE

## 2020-05-25 PROCEDURE — 6370000000 HC RX 637 (ALT 250 FOR IP): Performed by: NURSE PRACTITIONER

## 2020-05-25 PROCEDURE — C9113 INJ PANTOPRAZOLE SODIUM, VIA: HCPCS | Performed by: PHYSICIAN ASSISTANT

## 2020-05-25 PROCEDURE — 99232 SBSQ HOSP IP/OBS MODERATE 35: CPT | Performed by: INTERNAL MEDICINE

## 2020-05-25 PROCEDURE — 83735 ASSAY OF MAGNESIUM: CPT

## 2020-05-25 PROCEDURE — 2580000003 HC RX 258: Performed by: INTERNAL MEDICINE

## 2020-05-25 PROCEDURE — 6360000002 HC RX W HCPCS: Performed by: INTERNAL MEDICINE

## 2020-05-25 PROCEDURE — 1200000000 HC SEMI PRIVATE

## 2020-05-25 RX ADMIN — SODIUM CHLORIDE, POTASSIUM CHLORIDE, SODIUM LACTATE AND CALCIUM CHLORIDE 150 ML/HR: 600; 310; 30; 20 INJECTION, SOLUTION INTRAVENOUS at 09:23

## 2020-05-25 RX ADMIN — ESCITALOPRAM OXALATE 20 MG: 10 TABLET ORAL at 09:24

## 2020-05-25 RX ADMIN — ONDANSETRON HYDROCHLORIDE 4 MG: 2 INJECTION, SOLUTION INTRAMUSCULAR; INTRAVENOUS at 15:40

## 2020-05-25 RX ADMIN — Medication 10 ML: at 01:46

## 2020-05-25 RX ADMIN — HYDROMORPHONE HYDROCHLORIDE 0.5 MG: 1 INJECTION, SOLUTION INTRAMUSCULAR; INTRAVENOUS; SUBCUTANEOUS at 15:40

## 2020-05-25 RX ADMIN — AMLODIPINE BESYLATE 5 MG: 5 TABLET ORAL at 09:23

## 2020-05-25 RX ADMIN — PANTOPRAZOLE SODIUM 40 MG: 40 INJECTION, POWDER, FOR SOLUTION INTRAVENOUS at 09:23

## 2020-05-25 RX ADMIN — AMLODIPINE BESYLATE 5 MG: 5 TABLET ORAL at 21:13

## 2020-05-25 RX ADMIN — OXYCODONE HYDROCHLORIDE AND ACETAMINOPHEN 1 TABLET: 5; 325 TABLET ORAL at 08:01

## 2020-05-25 RX ADMIN — ONDANSETRON HYDROCHLORIDE 4 MG: 2 INJECTION, SOLUTION INTRAMUSCULAR; INTRAVENOUS at 23:21

## 2020-05-25 RX ADMIN — OXYCODONE HYDROCHLORIDE AND ACETAMINOPHEN 1 TABLET: 5; 325 TABLET ORAL at 13:50

## 2020-05-25 RX ADMIN — POTASSIUM CHLORIDE 40 MEQ: 1500 TABLET, EXTENDED RELEASE ORAL at 09:24

## 2020-05-25 RX ADMIN — OXYCODONE HYDROCHLORIDE AND ACETAMINOPHEN 1 TABLET: 5; 325 TABLET ORAL at 01:47

## 2020-05-25 RX ADMIN — ONDANSETRON HYDROCHLORIDE 4 MG: 2 INJECTION, SOLUTION INTRAMUSCULAR; INTRAVENOUS at 01:47

## 2020-05-25 RX ADMIN — HYDROMORPHONE HYDROCHLORIDE 0.5 MG: 1 INJECTION, SOLUTION INTRAMUSCULAR; INTRAVENOUS; SUBCUTANEOUS at 09:33

## 2020-05-25 RX ADMIN — OXYCODONE HYDROCHLORIDE AND ACETAMINOPHEN 1 TABLET: 5; 325 TABLET ORAL at 23:21

## 2020-05-25 RX ADMIN — ENOXAPARIN SODIUM 40 MG: 40 INJECTION SUBCUTANEOUS at 09:24

## 2020-05-25 RX ADMIN — OXYCODONE HYDROCHLORIDE AND ACETAMINOPHEN 1 TABLET: 5; 325 TABLET ORAL at 19:01

## 2020-05-25 RX ADMIN — HYDROMORPHONE HYDROCHLORIDE 0.5 MG: 1 INJECTION, SOLUTION INTRAMUSCULAR; INTRAVENOUS; SUBCUTANEOUS at 03:06

## 2020-05-25 ASSESSMENT — PAIN SCALES - GENERAL
PAINLEVEL_OUTOF10: 5
PAINLEVEL_OUTOF10: 7
PAINLEVEL_OUTOF10: 6
PAINLEVEL_OUTOF10: 7
PAINLEVEL_OUTOF10: 5
PAINLEVEL_OUTOF10: 5
PAINLEVEL_OUTOF10: 6
PAINLEVEL_OUTOF10: 5
PAINLEVEL_OUTOF10: 6
PAINLEVEL_OUTOF10: 6
PAINLEVEL_OUTOF10: 5
PAINLEVEL_OUTOF10: 7

## 2020-05-25 NOTE — PROGRESS NOTES
Patient awake and quiet in bed, receiving TF and with IVF infusing, pt on room air. No s/s of distress noted. Patient with complaints of abdominal pain radiating to lower back a 7/10 when assessed. PRN Dilaudid given at this time for pain. Call light within reach. Patient denies additional needs at this time. Shift assessment completed. Will continue to monitor.

## 2020-05-25 NOTE — PROGRESS NOTES
Rounded on Pt. Pt c/o abd pain. Medicated with prn percocet. Increased TF to 40 ml/hr. Pt resting in bed with call light in reach.

## 2020-05-25 NOTE — PROGRESS NOTES
Report given at this time, patient stable, care transferred to [facility].

## 2020-05-25 NOTE — PROGRESS NOTES
05/25/20  0431   * 130* 131*   K 3.1* 3.4* 3.3*   CL 93* 95* 96*   CO2 19* 20* 23   BUN 6* 7 9   CREATININE <0.5* <0.5* <0.5*     LIVER PROFILE:   Recent Labs     05/23/20  0517 05/24/20  0531 05/25/20  0431   AST 29 32 34   ALT 39 41* 44*   LIPASE 86.0*  --   --    BILITOT 0.8 0.8 0.6   ALKPHOS 119 115 116       CULTURES  None      RADIOLOGY  XR Place Ng Tube By Dr Rodrigez Gist   Final Result      CT ABDOMEN PELVIS W IV CONTRAST Additional Contrast? None   Final Result   Moderate inflammatory stranding and free fluid about the pancreas, compatible   with pancreatitis. Correlate with amylase and lipase. Follow-up to   resolution recommended. Fatty liver. Linear induration of subcutaneous fat within the anterior right hemiabdomen,   new as compared to prior. Correlate with any history of surgery or contusion   at the site. Sequela of granulomatous disease. Noncalcified basilar pulmonary nodules are   also seen measuring up to approximately 6 mm which may be followed based upon   clinical risk factors. Maren Jeffers  have reviewed the chart on [employer] and personally interviewed and examined patient, reviewed the data (labs and imaging) and after discussion with my PA formulated the plan. Agree with note with the following edits. HPI:     Patient admitted to hospital with epigastric pain. Work-up consistent with acute alcoholic pancreatitis. He is feeling slightly improved today. 5/21-he is improving but still having some pain. Did not sleep well last night due to pain issues. No fever. No nausea or vomiting    5/22- doing some better. Will try clear liquids. 5/23-intolerant of her diet. Discussed with GI. Switch back to IV pain medicine.    5/24- NJ placed. Tube feeds to start today. Feeling some better. 5/25-he is tolerating tube feeds well. He is feeling better. Nausea has improved. Pain is controlled.     I reviewed the patient's Past Medical History, Past Surgical History, Medications, and Allergies. Physical exam:    BP (!) 135/97   Pulse 70   Temp 97.1 °F (36.2 °C) (Oral)   Resp 16   Ht 6' 1\" (1.854 m)   Wt 287 lb (130.2 kg)   SpO2 95%   BMI 37.87 kg/m²     Gen: No distress. Alert. Eyes: PERRL. No sclera icterus. No conjunctival injection. ENT: No discharge. Pharynx clear. Neck: Trachea midline. Normal thyroid. Resp: No accessory muscle use. No crackles. No wheezes. No rhonchi. No dullness on percussion. CV: Regular rate. Regular rhythm. No murmur or rub. No edema. GI: mild epigastric tenderness. Non-distended. No masses. No organomegaly. Normal bowel sounds. No hernia. Assessment/Plan:  Acute pancreatitis  Abdominal Pain   - likely secondary to alcohol abuse  - lipase: 631. Continue n.p.o. for now. Start IV Dilaudid again.  -Place nasoenteric tube. - Hx of pancreatitis in the past as well   - CT scan shows acute pancreatitis with stranding and free fluid around the pancreas   - GI consulted--start clear liquid diet today. Patient with worsening pain, nausea, vomiting. Changed back to NPO. - triglycerides WNL   - lipase improved to 86. Discussed with GI service. - NJ Tube placed. Started feedings 5/24   - IV PPI.  - D/c home tomorrow on tube feedings. I wanted to discharge him on 5/25/2020. However discharge morning said tube feeds could not be arranged as an outpatient. Hence the delay in discharge. Elevated LFTs--resolved  - suspect 2/2 to ETOH abuse   - Trend-->resolved    Dehydration   - UA with > 80 ketones   - Suspect 2/2 to abdominal pain, N/V from acute pancreatitis   - Continued IVF and anti-emetics     Hypokalemia  - replaced. Monitor labs. SIRS  - of non-infectious origine without acute organ dysfunction  - tachycardia, leukocytosis  - resolved. HTN  - BP is elevated, likely a pain component as well   - patient states he was taken off Lisinopril and changed to Amlodipine 5 mg BID.

## 2020-05-25 NOTE — DISCHARGE INSTR - COC
Continuity of Care Form    Patient Name: Kaila Garcia   :  1980  MRN:  [id number]    Admit date:  2020  Discharge date:  2020    Code Status Order: Full Code   Advance Directives:   Advance Care Flowsheet Documentation     Date/Time Healthcare Directive Type of Healthcare Directive Copy in [address] Agent's Name Healthcare Agent's Phone Number    20 [phone]  No, patient does not have an advance directive for healthcare treatment -- -- -- -- --          Admitting Physician:  Steve Lui MD  PCP: Calvin Pardo DO    Discharging Nurse: [facility] Unit/Room#: 0230/0230-01  Discharging Unit Phone Number: [phone]    Emergency Contact:   Extended Emergency Contact Information  Primary Emergency Contact: Elizabeth Foster  Address: [address] [place] [address] Leonidas Castrejon, [address] [place] [address]  Home Phone: [phone]  Relation: Parent    Past Surgical History:  Past Surgical History:   Procedure Laterality Date    ABDOMEN SURGERY  10/08/2019    sigmoid colectomy with ostomy    OTHER SURGICAL HISTORY  2019    ileostomy reversal, on Q pain buster insertion    SMALL INTESTINE SURGERY N/A 10/8/2019    SIGMOID COLECTOMY, OSTOMY performed by Aneta Wilkes MD at [address] [place] [address] 2019    ILEOSTOMY REVERSAL, ON-Q PAIN BUSTER INSERTION performed by Aneta Wilkes MD at [facility]       Immunization History: There is no immunization history on file for this patient.     Active Problems:  Patient Active Problem List   Diagnosis Code    Recurrent dislocation of patella M22.00    Primary osteoarthritis of both knees M17.0    Primary localized osteoarthritis of knees, bilateral M17.0    Acute pancreatitis without infection or necrosis K85.90    Hypertension I10    Elevated liver enzymes R74.8    Obesity (BMI 35.0-39.9 without comorbidity) E66.9       Isolation/Infection:   Isolation          No Isolation Patient Infection Status     None to display          Nurse Assessment:  Last Vital Signs: BP (!) 135/97   Pulse 70   Temp 97.1 °F (36.2 °C) (Oral)   Resp 16   Ht 6' 1\" (1.854 m)   Wt 287 lb (130.2 kg)   SpO2 95%   BMI 37.87 kg/m²     Last documented pain score (0-10 scale): Pain Level: 6  Last Weight:   Wt Readings from Last 1 Encounters:   05/19/20 287 lb (130.2 kg)     Mental Status:  alert    IV Access:  - no access    Nursing Mobility/ADLs:  Walking   Independent  Transfer  Independent  Bathing  Independent  Dressing  Independent  Toileting  Independent  Feeding  Assisted, tube feed education  Med Admin  Independent  Med Delivery   whole    Wound Care Documentation and Therapy:        Elimination:  Continence:   · Bowel: Yes  · Bladder: Yes  Urinary Catheter: None   Colostomy/Ileostomy/Ileal Conduit: No       Date of Last BM: 5/23/20      Intake/Output Summary (Last 24 hours) at 5/25/2020 1529  Last data filed at 5/25/2020 0803  Gross per 24 hour   Intake 3052 ml   Output --   Net 3052 ml     I/O last 3 completed shifts: In: 1652 [I.V.:1970; NG/GT:1082]  Out: -     Safety Concerns:     None    Impairments/Disabilities:      None    Nutrition Therapy:  Current Nutrition Therapy:   - Tube Feedings:   DIET TUBE FEED CONTINUOUS/CYCLIC NPO; 1.5 Calorie with Fiber (Jevity); Exceptions are: Ice Chips    Routes of Feeding: NJ (right nare)  Liquids: Thin Liquids  Daily Fluid Restriction: no  Last Modified Barium Swallow with Video (Video Swallowing Test): not done    Treatments at the Time of Hospital Discharge:   Respiratory Treatments: n/a  Oxygen Therapy:  is not on home oxygen therapy.   Ventilator:    - No ventilator support    Rehab Therapies: n/a  Weight Bearing Status/Restrictions: No weight bearing restirctions  Other Medical Equipment (for information only, NOT a DME order):  Feeding pump  Other Treatments: tube feed and protein supplement    Patient's personal belongings (please select all that are

## 2020-05-26 VITALS
TEMPERATURE: 97 F | RESPIRATION RATE: 18 BRPM | HEART RATE: 72 BPM | BODY MASS INDEX: 38.04 KG/M2 | WEIGHT: 287 LBS | HEIGHT: 73 IN | OXYGEN SATURATION: 97 % | SYSTOLIC BLOOD PRESSURE: 107 MMHG | DIASTOLIC BLOOD PRESSURE: 74 MMHG

## 2020-05-26 LAB
A/G RATIO: 1.2 (ref 1.1–2.2)
ALBUMIN SERPL-MCNC: 3.8 G/DL (ref 3.4–5)
ALP BLD-CCNC: 118 U/L (ref 40–129)
ALT SERPL-CCNC: 63 U/L (ref 10–40)
ANION GAP SERPL CALCULATED.3IONS-SCNC: 12 MMOL/L (ref 3–16)
AST SERPL-CCNC: 44 U/L (ref 15–37)
BASOPHILS ABSOLUTE: 0 K/UL (ref 0–0.2)
BASOPHILS RELATIVE PERCENT: 0.8 %
BILIRUB SERPL-MCNC: 0.4 MG/DL (ref 0–1)
BUN BLDV-MCNC: 10 MG/DL (ref 7–20)
CALCIUM SERPL-MCNC: 9.2 MG/DL (ref 8.3–10.6)
CHLORIDE BLD-SCNC: 100 MMOL/L (ref 99–110)
CO2: 25 MMOL/L (ref 21–32)
CREAT SERPL-MCNC: 0.6 MG/DL (ref 0.9–1.3)
EOSINOPHILS ABSOLUTE: 0.3 K/UL (ref 0–0.6)
EOSINOPHILS RELATIVE PERCENT: 5.3 %
GFR AFRICAN AMERICAN: >60
GFR NON-AFRICAN AMERICAN: >60
GLOBULIN: 3.2 G/DL
GLUCOSE BLD-MCNC: 132 MG/DL (ref 70–99)
HCT VFR BLD CALC: 37.3 % (ref 40.5–52.5)
HEMOGLOBIN: 12.2 G/DL (ref 13.5–17.5)
LYMPHOCYTES ABSOLUTE: 1.1 K/UL (ref 1–5.1)
LYMPHOCYTES RELATIVE PERCENT: 22.3 %
MCH RBC QN AUTO: 31 PG (ref 26–34)
MCHC RBC AUTO-ENTMCNC: 32.8 G/DL (ref 31–36)
MCV RBC AUTO: 94.7 FL (ref 80–100)
MONOCYTES ABSOLUTE: 1 K/UL (ref 0–1.3)
MONOCYTES RELATIVE PERCENT: 18.7 %
NEUTROPHILS ABSOLUTE: 2.7 K/UL (ref 1.7–7.7)
NEUTROPHILS RELATIVE PERCENT: 52.9 %
PDW BLD-RTO: 14.3 % (ref 12.4–15.4)
PLATELET # BLD: 251 K/UL (ref 135–450)
PMV BLD AUTO: 8 FL (ref 5–10.5)
POTASSIUM REFLEX MAGNESIUM: 3.6 MMOL/L (ref 3.5–5.1)
RBC # BLD: 3.94 M/UL (ref 4.2–5.9)
SODIUM BLD-SCNC: 137 MMOL/L (ref 136–145)
TOTAL PROTEIN: 7 G/DL (ref 6.4–8.2)
WBC # BLD: 5.1 K/UL (ref 4–11)

## 2020-05-26 PROCEDURE — 80053 COMPREHEN METABOLIC PANEL: CPT

## 2020-05-26 PROCEDURE — 85025 COMPLETE CBC W/AUTO DIFF WBC: CPT

## 2020-05-26 PROCEDURE — 36415 COLL VENOUS BLD VENIPUNCTURE: CPT

## 2020-05-26 PROCEDURE — 2580000003 HC RX 258: Performed by: INTERNAL MEDICINE

## 2020-05-26 PROCEDURE — 99238 HOSP IP/OBS DSCHRG MGMT 30/<: CPT | Performed by: INTERNAL MEDICINE

## 2020-05-26 PROCEDURE — 6360000002 HC RX W HCPCS: Performed by: INTERNAL MEDICINE

## 2020-05-26 PROCEDURE — 6370000000 HC RX 637 (ALT 250 FOR IP): Performed by: INTERNAL MEDICINE

## 2020-05-26 PROCEDURE — 6360000002 HC RX W HCPCS: Performed by: PHYSICIAN ASSISTANT

## 2020-05-26 PROCEDURE — C9113 INJ PANTOPRAZOLE SODIUM, VIA: HCPCS | Performed by: PHYSICIAN ASSISTANT

## 2020-05-26 RX ORDER — ONDANSETRON 4 MG/1
4 TABLET, ORALLY DISINTEGRATING ORAL EVERY 8 HOURS PRN
Qty: 30 TABLET | Refills: 0 | Status: ON HOLD | OUTPATIENT
Start: 2020-05-26 | End: 2021-03-14 | Stop reason: ALTCHOICE

## 2020-05-26 RX ORDER — OXYCODONE HYDROCHLORIDE AND ACETAMINOPHEN 5; 325 MG/1; MG/1
1 TABLET ORAL EVERY 4 HOURS PRN
Qty: 30 TABLET | Refills: 0 | Status: SHIPPED | OUTPATIENT
Start: 2020-05-26 | End: 2020-06-02

## 2020-05-26 RX ADMIN — OXYCODONE HYDROCHLORIDE AND ACETAMINOPHEN 1 TABLET: 5; 325 TABLET ORAL at 06:24

## 2020-05-26 RX ADMIN — OXYCODONE HYDROCHLORIDE AND ACETAMINOPHEN 1 TABLET: 5; 325 TABLET ORAL at 10:59

## 2020-05-26 RX ADMIN — ONDANSETRON HYDROCHLORIDE 4 MG: 2 INJECTION, SOLUTION INTRAMUSCULAR; INTRAVENOUS at 10:59

## 2020-05-26 RX ADMIN — PANTOPRAZOLE SODIUM 40 MG: 40 INJECTION, POWDER, FOR SOLUTION INTRAVENOUS at 08:44

## 2020-05-26 RX ADMIN — ESCITALOPRAM OXALATE 20 MG: 10 TABLET ORAL at 08:44

## 2020-05-26 RX ADMIN — Medication 10 ML: at 08:45

## 2020-05-26 RX ADMIN — ENOXAPARIN SODIUM 40 MG: 40 INJECTION SUBCUTANEOUS at 08:44

## 2020-05-26 ASSESSMENT — PAIN DESCRIPTION - LOCATION: LOCATION: ABDOMEN;BACK

## 2020-05-26 ASSESSMENT — PAIN SCALES - GENERAL
PAINLEVEL_OUTOF10: 6
PAINLEVEL_OUTOF10: 6
PAINLEVEL_OUTOF10: 4
PAINLEVEL_OUTOF10: 5

## 2020-05-26 ASSESSMENT — PAIN DESCRIPTION - PAIN TYPE: TYPE: ACUTE PAIN

## 2020-05-26 ASSESSMENT — PAIN DESCRIPTION - ORIENTATION: ORIENTATION: LEFT;UPPER

## 2020-05-26 ASSESSMENT — PAIN DESCRIPTION - DESCRIPTORS: DESCRIPTORS: ACHING

## 2020-05-26 ASSESSMENT — PAIN DESCRIPTION - FREQUENCY: FREQUENCY: CONTINUOUS

## 2020-05-26 NOTE — PROGRESS NOTES
Pt given written and verbal discharge instructions. Pt indicated understanding of home medication and care instructions. Printed prescriptions gave to patient and instructed to take to his pharmacy to be filled. Pt packed own belongings. Patient aware that home health will be contacting him regarding visit and tube feeding. Pt taken down to family car per nurse.

## 2020-05-26 NOTE — PLAN OF CARE
Problem: Falls - Risk of:  Goal: Will remain free from falls  Description: Will remain free from falls  5/26/2020 1325 by Franci Boyce RN  Outcome: Ongoing    Problem: Pain:  Goal: Pain level will decrease  Description: Pain level will decrease  5/26/2020 1325 by Franci Boyce RN  Outcome: Ongoing    Goal: Control of acute pain  Description: Control of acute pain  5/26/2020 1325 by Franci Boyce RN  Outcome: Ongoing         Problem: Coping:  Goal: Ability to verbalize feelings will improve  Description: Ability to verbalize feelings will improve  5/26/2020 1325 by Franci Boyce RN  Outcome: Ongoing    Problem: Fluid Volume:  Goal: Will maintain adequate fluid volume  Description: Will maintain adequate fluid volume  5/26/2020 1325 by Franci Boyce RN  Outcome: Ongoing       Problem: Health Behavior:  Goal: Ability to identify changes in lifestyle to reduce recurrence of condition will improve  Description: Ability to identify changes in lifestyle to reduce recurrence of condition will improve  5/26/2020 1325 by Franci Boyce RN  Outcome: Ongoing      Problem: Nutritional:  Goal: Ability to achieve adequate nutritional intake will improve  Description: Ability to achieve adequate nutritional intake will improve  5/26/2020 1325 by Franci Boyce RN  Outcome: Ongoing      Problem: Sensory:  Goal: General experience of comfort will improve  Description: General experience of comfort will improve  5/26/2020 1325 by Franci Boyce RN  Outcome: Ongoing    Problem: Skin Integrity:  Goal: Skin integrity will be maintained  Description: Skin integrity will be maintained  5/26/2020 1325 by Franci Boyce RN  Outcome: Ongoing      Goal: Understanding of nutritional guidelines  5/26/2020 1325 by Franci Boyce RN  Outcome: Ongoing

## 2020-05-26 NOTE — CARE COORDINATION
[facility]    Referral received from  to follow for home care services.      [facility] unable to staff timely; [facility]    [facility] notified of tubefeed referral    Jenny Light RN, BSN CTN  [facility] [phone]

## 2020-05-26 NOTE — PROGRESS NOTES
PROGRESS NOTE  S:40 yrs Patient  admitted on 5/18/2020 with Acute pancreatitis without infection or necrosis [K85.90] . Today he complains of improving abdominal pain, loose BM this morning, tolerating TFs. Denies nausea, vomiting. Exam:   Vitals:    05/26/20 0830   BP: 109/71   Pulse: 68   Resp: 16   Temp: 96.8 °F (36 °C)   SpO2: 95%      General appearance: alert, appears stated age, cooperative, no distress, morbidly obese and TFs per NJ  HEENT: Oropharynx clear, no lesions  Neck: no adenopathy and supple, symmetrical, trachea midline  Lungs: clear to auscultation bilaterally  Heart: regular rate and rhythm, S1, S2 normal, no murmur, click, rub or gallop  Abdomen: normal findings: bowel sounds normal, no masses palpable and symmetric and abnormal findings:  obese and tenderness mild in the epigastrium and in the LUQ  Extremities: extremities normal, atraumatic, no cyanosis or edema     Medications: Reviewed    Labs:  CBC:   Recent Labs     05/24/20  0531 05/25/20  0431 05/26/20  0535   WBC 7.0 4.9 5.1   HGB 13.1* 12.4* 12.2*   HCT 39.7* 36.6* 37.3*   MCV 94.0 95.1 94.7    231 251     BMP:   Recent Labs     05/24/20  0531 05/25/20  0431 05/26/20  0535   * 131* 137   K 3.4* 3.3* 3.6   CL 95* 96* 100   CO2 20* 23 25   BUN 7 9 10   CREATININE <0.5* <0.5* 0.6*     LIVER PROFILE:   Recent Labs     05/24/20  0531 05/25/20  0431 05/26/20  0535   AST 32 34 44*   ALT 41* 44* 63*   PROT 7.6 6.9 7.0   BILITOT 0.8 0.6 0.4   ALKPHOS 115 116 118     Attending Supervising [de-identified] Attestation Statement  The patient is a 41 y.o. male. I have performed a history and physical examination of the patient. I discussed the case with my physician assistant Leigh Ann Stevens PA-C    I reviewed the patient's Past Medical History, Past Surgical History, Medications, and Allergies.      Physical Exam:  Vitals:    05/25/20 2100 05/26/20 0215 05/26/20 0830 05/26/20 1215   BP: 127/89

## 2020-05-26 NOTE — PLAN OF CARE
Care plan ongoing    Problem: Pain:  Goal: Pain level will decrease  Description: Pain level will decrease  Outcome: Ongoing     Problem: Pain:  Goal: Control of acute pain  Description: Control of acute pain  Outcome: Ongoing     Problem: Sensory:  Goal: General experience of comfort will improve  Description: General experience of comfort will improve  Outcome: Ongoing

## 2020-05-27 NOTE — DISCHARGE SUMMARY
Name:  Jori Cowden  Room:  0423/5930-65  MRN:    [id number]    Discharge Summary      This discharge summary is in conjunction with a complete physical exam done on the day of discharge. Discharging Physician: Marylen Quitter, MD      Admit: 5/18/2020  Discharge:  5/26/2020    HPI taken from admission H&P:      41 y.o. male who presented to the hospital with a chief complaint of abdominal pain that started this morning. The patient describes the pain as a mid epigastric sharp pain that radiates to his back. The patient also endorses nausea but no vomiting. He did endorse drinking a couple bottles of beer this weekend but not that much. He has had pancreatitis in the past but at a time was a heavy drinker and drank hard liquor. He denies any fevers or chills, no chest pain or shortness of breath. In the emergency department a CT scan that was obtained showed pancreatitis and his lipase was elevated confirming this diagnosis. She will be admitted for further medical evaluation and treatment        Diagnoses this Admission and Hospital Course     Acute pancreatitis  Abdominal Pain   - likely 2/2 alcohol abuse; hx of pancreatitis in past as well  - CT scan shows acute pancreatitis with stranding and free fluid around the pancreas   - lipase: 631. Continued n.p.o.  Started IV Dilaudid. - Placed nasoenteric tube. - GI consulted - started clear liquid diet. pt with worsening pain, nausea, vomiting. Changed back to NPO. - triglycerides WNL, lipase improved to 86. Discussed with GI service. - NJ Tube placed. Started feedings 5/24 . Tolerating well  - IV PPI.  - D/c home on tube feedings. Discharge planning has arranged as an outpatient.    - Rx  PRN pain meds and anti-emetics at d/c     Elevated LFTs - resolved  - suspect 2/2 to ETOH abuse   - Trended-->resolved     Dehydration   - UA with > 80 ketones   - Suspected 2/2 to abdominal pain, N/V from acute pancreatitis   - Continued IVF and anti-emetics   - improved     Hypokalemia - Resolved  - replaced. Monitored labs.      SIRS  - of non-infectious origine without acute organ dysfunction  - tachycardia, leukocytosis  - resolved.      HTN  - BP is elevated, likely a pain component as well   - patient states he was taken off Lisinopril and changed to Amlodipine 5 mg BID. This was changed. - Added PRN hydralazine coverage   - Monitored. BP improved.      Depression   - continued Lexapro      History of diverticulitis  - had acute diverticulitis with perforation and abscess formation resulting in sigmoid colectomy and diverting end ileostomy in 10/24/19   - Recent reversal of ileostomy   - SBO in April, resolved without surgical intervention   - No acute issues on admission today      Fatty Liver   - recommended weight loss      Pulmonary Nodules   - 6 mm bibasilar nodules noted on CT  - can follow up OP     Obesity  - Body mass index is 37.87 kg/m². - Complicating assessment and treatment. Placing patient at risk for multiple co-morbidities as well as early death and contributing to the patient's presentation.   - Counseled on weight loss.     Procedures (Please Review Full Report for Details)  N/A    Consults    Gastroenterology    Physical Exam at Discharge:    /74   Pulse 72   Temp 97 °F (36.1 °C) (Oral)   Resp 18   Ht 6' 1\" (1.854 m)   Wt 287 lb (130.2 kg)   SpO2 97%   BMI 37.87 kg/m²   Gen: No distress. Alert. Eyes: PERRL. No sclera icterus. No conjunctival injection. ENT: No discharge. Pharynx clear. Neck: Trachea midline. Normal thyroid. Resp: No accessory muscle use. No crackles. No wheezes. No rhonchi. No dullness on percussion. CV: Regular rate. Regular rhythm. No murmur or rub. No edema. GI: mild epigastric tenderness. Non-distended. No masses. No organomegaly. Normal bowel sounds. No hernia. Skin: Warm and dry. M/S: No cyanosis. No joint deformity. No clubbing. Neuro: Awake. Grossly nonfocal    Psych: Oriented x 3.  No anxiety or agitation.      CBC:   Recent Labs     05/25/20 0431 05/26/20  0535   WBC 4.9 5.1   HGB 12.4* 12.2*   HCT 36.6* 37.3*   MCV 95.1 94.7    251     BMP:   Recent Labs     05/25/20  0431 05/26/20  0535   * 137   K 3.3* 3.6   CL 96* 100   CO2 23 25   BUN 9 10   CREATININE <0.5* 0.6*     LIVER PROFILE:   Recent Labs     05/25/20 0431 05/26/20  0535   AST 34 44*   ALT 44* 63*   BILITOT 0.6 0.4   ALKPHOS 116 118     CULTURES  None    RADIOLOGY    XR Place Ng Tube By Dr Bill Phillips 5/23/2020   Final Result      CT ABDOMEN PELVIS W IV CONTRAST Additional Contrast? None 5/19/2020   Final Result   Moderate inflammatory stranding and free fluid about the pancreas, compatible   with pancreatitis. Correlate with amylase and lipase. Follow-up to   resolution recommended. Fatty liver. Linear induration of subcutaneous fat within the anterior right hemiabdomen,   new as compared to prior. Correlate with any history of surgery or contusion   at the site. Sequela of granulomatous disease. Noncalcified basilar pulmonary nodules are   also seen measuring up to approximately 6 mm which may be followed based upon   clinical risk factors. Discharge Medications     Medication List      START taking these medications    ondansetron 4 MG disintegrating tablet  Commonly known as:  Zofran ODT  Take 1 tablet by mouth every 8 hours as needed for Nausea  Notes to patient:  Ondansetron (Zofran®)  Use: nausea and vomiting. Side effects: headache, weakness or dizziness. oxyCODONE-acetaminophen 5-325 MG per tablet  Commonly known as:  PERCOCET  Take 1 tablet by mouth every 4 hours as needed for Pain for up to 7 days. Notes to patient:  Take only as needed for pain.          CONTINUE taking these medications    amLODIPine 5 MG tablet  Commonly known as:  NORVASC     escitalopram 20 MG tablet  Commonly known as:  LEXAPRO        STOP taking these medications    diclofenac 75 MG EC tablet  Commonly

## 2020-06-08 NOTE — PROGRESS NOTES
Nutrition Assessment    Type and Reason for Visit: Reassess    Nutrition Recommendations:   1. Ensure High Protein and magic cup added to 2 melas per day     Nutrition Assessment: pt has improved nutritionally AEB increased intakes and weight gain. Will add ensure HP and Magic cups to meals     Malnutrition Assessment:  · Malnutrition Status: Meets the criteria for moderate malnutrition  · Context: Acute illness or injury  · Findings of the 6 clinical characteristics of malnutrition (Minimum of 2 out of 6 clinical characteristics is required to make the diagnosis of moderate or severe Protein Calorie Malnutrition based on AND/ASPEN Guidelines):  1. Energy Intake-Less than or equal to 75% of estimated energy requirement, Greater than or equal to 1 month    2. Weight Loss-10% loss or greater, in 3 months  3. Fat Loss-Moderate subcutaneous fat loss, Triceps, Orbital  4. Muscle Loss-Moderate muscle mass loss, Clavicles (pectoralis and deltoids), Temples (temporalis muscle), Scapula (trapezius)  5. Fluid Accumulation- , Extremities  6.  Strength-Not measured    Nutrition Risk Level: Moderate    Nutrient Needs:  · Estimated Daily Total Kcal: 3536-6540  · Estimated Daily Protein (g): 108-123  · Estimated Daily Total Fluid (ml/day): 9402-6391    Nutrition Diagnosis:   · Problem:  Moderate malnutrition  · Etiology: related to Alteration in GI function, Insufficient energy/nutrient consumption     Signs and symptoms:  as evidenced by Diet history of poor intake, Weight loss greater than or equal to 10% in 6 months, Moderate loss of subcutaneous fat, Moderate muscle loss, GI abnormality    Objective Information:  · Nutrition-Focused Physical Findings: Pts appears \"better\", drainage is feculent   · Wound Type:    · Current Nutrition Therapies:  · Oral Diet Orders: Low Fiber   · Oral Diet intake: 51-75%, %  · Oral Nutrition Supplement (ONS) Orders: None  · ONS intake: Unable to assess  · Anthropometric Measures:  · Ht: 6' 1\" (185.4 cm)   · Current Body Wt: 235 lb (106.6 kg)  · Admission Body Wt: 227 lb (103 kg)  · Usual Body Wt: 270 lb (122.5 kg)  · % Weight Change:  ,  8# increase r/t fluids   · Ideal Body Wt: 184 lb (83.5 kg), % Ideal Body (123)  · BMI Classification: BMI 30.0 - 34.9 Obese Class I    Nutrition Interventions:   Continue current diet, Start ONS  Continued Inpatient Monitoring, Coordination of Community Care, Coordination of Care    Nutrition Evaluation:   · Evaluation: Progressing toward goals   · Goals: pt will consume > 50% of meals and supplements and his body weight will be > 220#      · Monitoring: Meal Intake, Supplement Intake, TF Tolerance, Monitor Bowel Function, Weight      Electronically signed by Alejandra Varghese RD, LD on 3/29/19 at 12:01 PM    Contact Number: [phone]

## 2021-03-13 ENCOUNTER — HOSPITAL ENCOUNTER (INPATIENT)
Age: 41
LOS: 23 days | Discharge: HOME | DRG: 282 | End: 2021-04-05
Attending: HOSPITALIST | Admitting: HOSPITALIST
Payer: MEDICAID

## 2021-03-13 DIAGNOSIS — M17.0 PRIMARY OSTEOARTHRITIS OF BOTH KNEES: Primary | ICD-10-CM

## 2021-03-13 PROBLEM — K81.0 ACUTE CHOLECYSTITIS: Status: ACTIVE | Noted: 2021-03-13

## 2021-03-13 PROCEDURE — 1200000000 HC SEMI PRIVATE

## 2021-03-14 ENCOUNTER — APPOINTMENT (OUTPATIENT)
Dept: ULTRASOUND IMAGING | Age: 41
DRG: 282 | End: 2021-03-14
Attending: HOSPITALIST
Payer: MEDICAID

## 2021-03-14 LAB
A/G RATIO: 0.7 (ref 1.1–2.2)
ALBUMIN SERPL-MCNC: 2.6 G/DL (ref 3.4–5)
ALBUMIN SERPL-MCNC: 3 G/DL (ref 3.4–5)
ALP BLD-CCNC: 210 U/L (ref 40–129)
ALP BLD-CCNC: 224 U/L (ref 40–129)
ALT SERPL-CCNC: 29 U/L (ref 10–40)
ALT SERPL-CCNC: 33 U/L (ref 10–40)
ANION GAP SERPL CALCULATED.3IONS-SCNC: 12 MMOL/L (ref 3–16)
ANION GAP SERPL CALCULATED.3IONS-SCNC: 14 MMOL/L (ref 3–16)
AST SERPL-CCNC: 80 U/L (ref 15–37)
AST SERPL-CCNC: 89 U/L (ref 15–37)
BASOPHILS ABSOLUTE: 0 K/UL (ref 0–0.2)
BASOPHILS RELATIVE PERCENT: 0.1 %
BILIRUB SERPL-MCNC: 4.9 MG/DL (ref 0–1)
BILIRUB SERPL-MCNC: 5.2 MG/DL (ref 0–1)
BILIRUBIN DIRECT: 3.7 MG/DL (ref 0–0.3)
BILIRUBIN, INDIRECT: 1.5 MG/DL (ref 0–1)
BUN BLDV-MCNC: 10 MG/DL (ref 7–20)
BUN BLDV-MCNC: 9 MG/DL (ref 7–20)
CALCIUM SERPL-MCNC: 8.3 MG/DL (ref 8.3–10.6)
CALCIUM SERPL-MCNC: 8.4 MG/DL (ref 8.3–10.6)
CHLORIDE BLD-SCNC: 86 MMOL/L (ref 99–110)
CHLORIDE BLD-SCNC: 90 MMOL/L (ref 99–110)
CO2: 29 MMOL/L (ref 21–32)
CO2: 33 MMOL/L (ref 21–32)
CREAT SERPL-MCNC: <0.5 MG/DL (ref 0.9–1.3)
CREAT SERPL-MCNC: <0.5 MG/DL (ref 0.9–1.3)
EKG ATRIAL RATE: 100 BPM
EKG DIAGNOSIS: NORMAL
EKG P AXIS: 40 DEGREES
EKG P-R INTERVAL: 172 MS
EKG Q-T INTERVAL: 388 MS
EKG QRS DURATION: 92 MS
EKG QTC CALCULATION (BAZETT): 500 MS
EKG R AXIS: -10 DEGREES
EKG T AXIS: 13 DEGREES
EKG VENTRICULAR RATE: 100 BPM
EOSINOPHILS ABSOLUTE: 0.1 K/UL (ref 0–0.6)
EOSINOPHILS RELATIVE PERCENT: 0.5 %
FERRITIN: 2217 NG/ML (ref 30–400)
FOLATE: 2.44 NG/ML (ref 4.78–24.2)
GFR AFRICAN AMERICAN: >60
GFR AFRICAN AMERICAN: >60
GFR NON-AFRICAN AMERICAN: >60
GFR NON-AFRICAN AMERICAN: >60
GLOBULIN: 3.7 G/DL
GLUCOSE BLD-MCNC: 106 MG/DL (ref 70–99)
GLUCOSE BLD-MCNC: 110 MG/DL (ref 70–99)
GLUCOSE BLD-MCNC: 122 MG/DL (ref 70–99)
HCT VFR BLD CALC: 26.1 % (ref 40.5–52.5)
HCT VFR BLD CALC: 27.7 % (ref 40.5–52.5)
HEMOGLOBIN: 8.9 G/DL (ref 13.5–17.5)
HEMOGLOBIN: 9.4 G/DL (ref 13.5–17.5)
IRON SATURATION: 96 % (ref 20–50)
IRON: 163 UG/DL (ref 59–158)
LIPASE: 75 U/L (ref 13–60)
LYMPHOCYTES ABSOLUTE: 0.8 K/UL (ref 1–5.1)
LYMPHOCYTES RELATIVE PERCENT: 6.7 %
MAGNESIUM: 1.4 MG/DL (ref 1.8–2.4)
MAGNESIUM: 2.1 MG/DL (ref 1.8–2.4)
MCH RBC QN AUTO: 33.5 PG (ref 26–34)
MCH RBC QN AUTO: 33.8 PG (ref 26–34)
MCHC RBC AUTO-ENTMCNC: 33.9 G/DL (ref 31–36)
MCHC RBC AUTO-ENTMCNC: 34.1 G/DL (ref 31–36)
MCV RBC AUTO: 98.4 FL (ref 80–100)
MCV RBC AUTO: 99.5 FL (ref 80–100)
MONOCYTES ABSOLUTE: 1.3 K/UL (ref 0–1.3)
MONOCYTES RELATIVE PERCENT: 10.3 %
NEUTROPHILS ABSOLUTE: 10.2 K/UL (ref 1.7–7.7)
NEUTROPHILS RELATIVE PERCENT: 82.4 %
PDW BLD-RTO: 15.7 % (ref 12.4–15.4)
PDW BLD-RTO: 16.1 % (ref 12.4–15.4)
PERFORMED ON: ABNORMAL
PHOSPHORUS: 2.6 MG/DL (ref 2.5–4.9)
PLATELET # BLD: 233 K/UL (ref 135–450)
PLATELET # BLD: 253 K/UL (ref 135–450)
PMV BLD AUTO: 7.4 FL (ref 5–10.5)
PMV BLD AUTO: 8 FL (ref 5–10.5)
POTASSIUM REFLEX MAGNESIUM: 3.3 MMOL/L (ref 3.5–5.1)
POTASSIUM SERPL-SCNC: 2.8 MMOL/L (ref 3.5–5.1)
RBC # BLD: 2.63 M/UL (ref 4.2–5.9)
RBC # BLD: 2.81 M/UL (ref 4.2–5.9)
SODIUM BLD-SCNC: 131 MMOL/L (ref 136–145)
SODIUM BLD-SCNC: 133 MMOL/L (ref 136–145)
TOTAL IRON BINDING CAPACITY: 170 UG/DL (ref 260–445)
TOTAL PROTEIN: 6.3 G/DL (ref 6.4–8.2)
TOTAL PROTEIN: 6.6 G/DL (ref 6.4–8.2)
TROPONIN: <0.01 NG/ML
TROPONIN: <0.01 NG/ML
VITAMIN B-12: 1790 PG/ML (ref 211–911)
WBC # BLD: 10.8 K/UL (ref 4–11)
WBC # BLD: 12.3 K/UL (ref 4–11)

## 2021-03-14 PROCEDURE — 85025 COMPLETE CBC W/AUTO DIFF WBC: CPT

## 2021-03-14 PROCEDURE — 2700000000 HC OXYGEN THERAPY PER DAY

## 2021-03-14 PROCEDURE — 36415 COLL VENOUS BLD VENIPUNCTURE: CPT

## 2021-03-14 PROCEDURE — 76705 ECHO EXAM OF ABDOMEN: CPT

## 2021-03-14 PROCEDURE — 83540 ASSAY OF IRON: CPT

## 2021-03-14 PROCEDURE — 6370000000 HC RX 637 (ALT 250 FOR IP): Performed by: HOSPITALIST

## 2021-03-14 PROCEDURE — 80053 COMPREHEN METABOLIC PANEL: CPT

## 2021-03-14 PROCEDURE — 82607 VITAMIN B-12: CPT

## 2021-03-14 PROCEDURE — 93005 ELECTROCARDIOGRAM TRACING: CPT | Performed by: HOSPITALIST

## 2021-03-14 PROCEDURE — 82728 ASSAY OF FERRITIN: CPT

## 2021-03-14 PROCEDURE — 93010 ELECTROCARDIOGRAM REPORT: CPT | Performed by: INTERNAL MEDICINE

## 2021-03-14 PROCEDURE — 82746 ASSAY OF FOLIC ACID SERUM: CPT

## 2021-03-14 PROCEDURE — 85027 COMPLETE CBC AUTOMATED: CPT

## 2021-03-14 PROCEDURE — 83690 ASSAY OF LIPASE: CPT

## 2021-03-14 PROCEDURE — 84484 ASSAY OF TROPONIN QUANT: CPT

## 2021-03-14 PROCEDURE — 2580000003 HC RX 258

## 2021-03-14 PROCEDURE — 6360000002 HC RX W HCPCS: Performed by: INTERNAL MEDICINE

## 2021-03-14 PROCEDURE — 1200000000 HC SEMI PRIVATE

## 2021-03-14 PROCEDURE — 94761 N-INVAS EAR/PLS OXIMETRY MLT: CPT

## 2021-03-14 PROCEDURE — 83735 ASSAY OF MAGNESIUM: CPT

## 2021-03-14 PROCEDURE — 6360000002 HC RX W HCPCS: Performed by: HOSPITALIST

## 2021-03-14 PROCEDURE — 83550 IRON BINDING TEST: CPT

## 2021-03-14 PROCEDURE — 82787 IGG 1 2 3 OR 4 EACH: CPT

## 2021-03-14 PROCEDURE — 2580000003 HC RX 258: Performed by: HOSPITALIST

## 2021-03-14 RX ORDER — LISINOPRIL 10 MG/1
TABLET ORAL 2 TIMES DAILY
Status: ON HOLD | COMMUNITY
End: 2021-04-05 | Stop reason: HOSPADM

## 2021-03-14 RX ORDER — SODIUM CHLORIDE 9 MG/ML
INJECTION, SOLUTION INTRAVENOUS
Status: COMPLETED
Start: 2021-03-14 | End: 2021-03-14

## 2021-03-14 RX ORDER — CLONAZEPAM 0.5 MG/1
0.5 TABLET ORAL 2 TIMES DAILY PRN
Status: DISCONTINUED | OUTPATIENT
Start: 2021-03-14 | End: 2021-04-05 | Stop reason: HOSPADM

## 2021-03-14 RX ORDER — ACETAMINOPHEN 325 MG/1
650 TABLET ORAL EVERY 6 HOURS PRN
Status: DISCONTINUED | OUTPATIENT
Start: 2021-03-14 | End: 2021-04-05 | Stop reason: HOSPADM

## 2021-03-14 RX ORDER — LISINOPRIL 10 MG/1
10 TABLET ORAL 2 TIMES DAILY
Status: DISCONTINUED | OUTPATIENT
Start: 2021-03-14 | End: 2021-03-28

## 2021-03-14 RX ORDER — CITALOPRAM 10 MG/1
TABLET ORAL DAILY
COMMUNITY

## 2021-03-14 RX ORDER — PROMETHAZINE HYDROCHLORIDE 25 MG/1
12.5 TABLET ORAL EVERY 6 HOURS PRN
Status: DISCONTINUED | OUTPATIENT
Start: 2021-03-14 | End: 2021-03-14

## 2021-03-14 RX ORDER — DICYCLOMINE HYDROCHLORIDE 10 MG/1
10 CAPSULE ORAL 2 TIMES DAILY
Status: DISCONTINUED | OUTPATIENT
Start: 2021-03-14 | End: 2021-04-05 | Stop reason: HOSPADM

## 2021-03-14 RX ORDER — SODIUM CHLORIDE 0.9 % (FLUSH) 0.9 %
10 SYRINGE (ML) INJECTION EVERY 12 HOURS SCHEDULED
Status: DISCONTINUED | OUTPATIENT
Start: 2021-03-14 | End: 2021-04-05 | Stop reason: HOSPADM

## 2021-03-14 RX ORDER — ACETAMINOPHEN 650 MG/1
650 SUPPOSITORY RECTAL EVERY 6 HOURS PRN
Status: DISCONTINUED | OUTPATIENT
Start: 2021-03-14 | End: 2021-04-05 | Stop reason: HOSPADM

## 2021-03-14 RX ORDER — CLONIDINE HYDROCHLORIDE 0.1 MG/1
TABLET ORAL 2 TIMES DAILY
Status: ON HOLD | COMMUNITY
End: 2021-04-05 | Stop reason: HOSPADM

## 2021-03-14 RX ORDER — SODIUM CHLORIDE 9 MG/ML
INJECTION, SOLUTION INTRAVENOUS CONTINUOUS
Status: DISCONTINUED | OUTPATIENT
Start: 2021-03-14 | End: 2021-03-16

## 2021-03-14 RX ORDER — SODIUM CHLORIDE 0.9 % (FLUSH) 0.9 %
10 SYRINGE (ML) INJECTION PRN
Status: DISCONTINUED | OUTPATIENT
Start: 2021-03-14 | End: 2021-04-05 | Stop reason: HOSPADM

## 2021-03-14 RX ORDER — POLYETHYLENE GLYCOL 3350 17 G/17G
17 POWDER, FOR SOLUTION ORAL DAILY PRN
Status: DISCONTINUED | OUTPATIENT
Start: 2021-03-14 | End: 2021-04-05 | Stop reason: HOSPADM

## 2021-03-14 RX ORDER — AMLODIPINE BESYLATE 5 MG/1
5 TABLET ORAL DAILY
Status: DISCONTINUED | OUTPATIENT
Start: 2021-03-14 | End: 2021-03-28

## 2021-03-14 RX ORDER — DICYCLOMINE HYDROCHLORIDE 10 MG/1
CAPSULE ORAL 2 TIMES DAILY
COMMUNITY

## 2021-03-14 RX ORDER — PROMETHAZINE HYDROCHLORIDE 25 MG/ML
12.5 INJECTION, SOLUTION INTRAMUSCULAR; INTRAVENOUS EVERY 6 HOURS PRN
Status: DISCONTINUED | OUTPATIENT
Start: 2021-03-14 | End: 2021-04-05 | Stop reason: HOSPADM

## 2021-03-14 RX ORDER — MAGNESIUM SULFATE IN WATER 40 MG/ML
4000 INJECTION, SOLUTION INTRAVENOUS ONCE
Status: COMPLETED | OUTPATIENT
Start: 2021-03-14 | End: 2021-03-14

## 2021-03-14 RX ORDER — CLONIDINE HYDROCHLORIDE 0.1 MG/1
0.1 TABLET ORAL 2 TIMES DAILY
Status: DISCONTINUED | OUTPATIENT
Start: 2021-03-14 | End: 2021-03-28

## 2021-03-14 RX ORDER — AMLODIPINE BESYLATE 5 MG/1
TABLET ORAL 2 TIMES DAILY
Status: ON HOLD | COMMUNITY
End: 2021-04-05 | Stop reason: HOSPADM

## 2021-03-14 RX ORDER — MORPHINE SULFATE 2 MG/ML
2 INJECTION, SOLUTION INTRAMUSCULAR; INTRAVENOUS EVERY 4 HOURS PRN
Status: DISCONTINUED | OUTPATIENT
Start: 2021-03-14 | End: 2021-03-14

## 2021-03-14 RX ORDER — ONDANSETRON 2 MG/ML
4 INJECTION INTRAMUSCULAR; INTRAVENOUS EVERY 6 HOURS PRN
Status: DISCONTINUED | OUTPATIENT
Start: 2021-03-14 | End: 2021-04-05 | Stop reason: HOSPADM

## 2021-03-14 RX ORDER — POTASSIUM CHLORIDE 7.45 MG/ML
10 INJECTION INTRAVENOUS PRN
Status: DISCONTINUED | OUTPATIENT
Start: 2021-03-14 | End: 2021-03-28

## 2021-03-14 RX ORDER — CLONAZEPAM 0.5 MG/1
0.5 TABLET ORAL 2 TIMES DAILY PRN
COMMUNITY

## 2021-03-14 RX ORDER — CITALOPRAM 20 MG/1
10 TABLET ORAL DAILY
Status: DISCONTINUED | OUTPATIENT
Start: 2021-03-14 | End: 2021-04-05 | Stop reason: HOSPADM

## 2021-03-14 RX ADMIN — SODIUM CHLORIDE, PRESERVATIVE FREE 10 ML: 5 INJECTION INTRAVENOUS at 20:16

## 2021-03-14 RX ADMIN — POTASSIUM CHLORIDE 10 MEQ: 7.46 INJECTION, SOLUTION INTRAVENOUS at 03:50

## 2021-03-14 RX ADMIN — MORPHINE SULFATE 2 MG: 2 INJECTION, SOLUTION INTRAMUSCULAR; INTRAVENOUS at 09:40

## 2021-03-14 RX ADMIN — HYDROMORPHONE HYDROCHLORIDE 1 MG: 1 INJECTION, SOLUTION INTRAMUSCULAR; INTRAVENOUS; SUBCUTANEOUS at 21:55

## 2021-03-14 RX ADMIN — MAGNESIUM SULFATE IN WATER 4000 MG: 40 INJECTION, SOLUTION INTRAVENOUS at 03:31

## 2021-03-14 RX ADMIN — SODIUM CHLORIDE: 9 INJECTION, SOLUTION INTRAVENOUS at 22:58

## 2021-03-14 RX ADMIN — MORPHINE SULFATE 2 MG: 2 INJECTION, SOLUTION INTRAMUSCULAR; INTRAVENOUS at 01:06

## 2021-03-14 RX ADMIN — HYDROMORPHONE HYDROCHLORIDE 1 MG: 1 INJECTION, SOLUTION INTRAMUSCULAR; INTRAVENOUS; SUBCUTANEOUS at 18:23

## 2021-03-14 RX ADMIN — CLONIDINE HYDROCHLORIDE 0.1 MG: 0.1 TABLET ORAL at 20:16

## 2021-03-14 RX ADMIN — MORPHINE SULFATE 2 MG: 2 INJECTION, SOLUTION INTRAMUSCULAR; INTRAVENOUS at 13:41

## 2021-03-14 RX ADMIN — ONDANSETRON 4 MG: 2 SOLUTION INTRAMUSCULAR; INTRAVENOUS at 06:51

## 2021-03-14 RX ADMIN — POTASSIUM CHLORIDE 10 MEQ: 7.46 INJECTION, SOLUTION INTRAVENOUS at 11:35

## 2021-03-14 RX ADMIN — SODIUM CHLORIDE: 9 INJECTION, SOLUTION INTRAVENOUS at 01:00

## 2021-03-14 RX ADMIN — DICYCLOMINE HYDROCHLORIDE 10 MG: 10 CAPSULE ORAL at 20:16

## 2021-03-14 RX ADMIN — POTASSIUM CHLORIDE 10 MEQ: 7.46 INJECTION, SOLUTION INTRAVENOUS at 05:20

## 2021-03-14 RX ADMIN — ONDANSETRON 4 MG: 2 SOLUTION INTRAMUSCULAR; INTRAVENOUS at 01:00

## 2021-03-14 RX ADMIN — PROMETHAZINE HYDROCHLORIDE 12.5 MG: 25 INJECTION INTRAMUSCULAR; INTRAVENOUS at 18:23

## 2021-03-14 RX ADMIN — SODIUM CHLORIDE 250 ML: 9 INJECTION, SOLUTION INTRAVENOUS at 03:36

## 2021-03-14 RX ADMIN — MORPHINE SULFATE 2 MG: 2 INJECTION, SOLUTION INTRAMUSCULAR; INTRAVENOUS at 05:23

## 2021-03-14 RX ADMIN — ONDANSETRON 4 MG: 2 SOLUTION INTRAMUSCULAR; INTRAVENOUS at 15:24

## 2021-03-14 RX ADMIN — SODIUM CHLORIDE 250 ML: 9 INJECTION, SOLUTION INTRAVENOUS at 01:42

## 2021-03-14 RX ADMIN — LISINOPRIL 10 MG: 10 TABLET ORAL at 20:16

## 2021-03-14 RX ADMIN — POTASSIUM CHLORIDE 10 MEQ: 7.46 INJECTION, SOLUTION INTRAVENOUS at 06:47

## 2021-03-14 RX ADMIN — SODIUM CHLORIDE: 9 INJECTION, SOLUTION INTRAVENOUS at 11:39

## 2021-03-14 RX ADMIN — ENOXAPARIN SODIUM 40 MG: 40 INJECTION SUBCUTANEOUS at 09:41

## 2021-03-14 RX ADMIN — POTASSIUM CHLORIDE 10 MEQ: 7.46 INJECTION, SOLUTION INTRAVENOUS at 01:42

## 2021-03-14 ASSESSMENT — ENCOUNTER SYMPTOMS
RESPIRATORY NEGATIVE: 1
ALLERGIC/IMMUNOLOGIC NEGATIVE: 1
ABDOMINAL PAIN: 1
EYES NEGATIVE: 1

## 2021-03-14 ASSESSMENT — PAIN DESCRIPTION - ORIENTATION: ORIENTATION: RIGHT;LEFT

## 2021-03-14 ASSESSMENT — PAIN DESCRIPTION - ONSET
ONSET: ON-GOING
ONSET: ON-GOING

## 2021-03-14 ASSESSMENT — PAIN SCALES - GENERAL
PAINLEVEL_OUTOF10: 7
PAINLEVEL_OUTOF10: 8

## 2021-03-14 ASSESSMENT — PAIN DESCRIPTION - FREQUENCY
FREQUENCY: CONTINUOUS
FREQUENCY: CONTINUOUS

## 2021-03-14 ASSESSMENT — PAIN DESCRIPTION - DESCRIPTORS
DESCRIPTORS: ACHING
DESCRIPTORS: ACHING

## 2021-03-14 ASSESSMENT — PAIN DESCRIPTION - PAIN TYPE: TYPE: ACUTE PAIN

## 2021-03-14 NOTE — PROGRESS NOTES
Spoke with Dr. Ni Olivarez regarding the GI consult. MD would like the CT disc from [facility] to be uploaded to be able to view here.  Renetta Verdin

## 2021-03-14 NOTE — CONSULTS
Gastroenterology Consult Note    Patient:   Pawel Gardner   :    1980   Facility:     [address] [facility] [id number]   Referring/PCP: Grace Solorzano, APRN - CNP  Date:     3/14/2021  Consultant:   Gen Munguia DO    Subjective: This 41 y.o. male was admitted 3/13/2021 with a diagnosis of \"Acute cholecystitis [K81.0]\" and is seen in consultation regarding abnormal lfts    42 yo male with abdominal pain over past 2 weeks with epigastric/RUQ pain and fevers. Patient denies alcohol abuse. Has been noted on previous admissions. Has history of heroin use. CT scan demonstrates no biliary dilation. Has obstructive lfts. Past Medical History:   Diagnosis Date    Arthritis     Diverticulitis     Heroin abuse ([facility] [address]     Pt has been clean for OVER 8 hears last used     Hypertension     Ileostomy in place [facility]) 2019    Knee instability     per pt, his knees pop out at times, bilateral      Past Surgical History:   Procedure Laterality Date    ABDOMEN SURGERY  10/08/2019    sigmoid colectomy with ostomy    OTHER SURGICAL HISTORY  2019    ileostomy reversal, on Q pain buster insertion    SMALL INTESTINE SURGERY N/A 10/8/2019    SIGMOID COLECTOMY, OSTOMY performed by Darlene Pederson MD at [address] [place] [address] N/A 2019    ILEOSTOMY REVERSAL, ON-Q PAIN BUSTER INSERTION performed by Darlene Pederson MD at [facility]:   Social History     Tobacco Use    Smoking status: Current Every Day Smoker     Packs/day: 0.50     Years: 28.00     Pack years: 14.00     Types: Cigarettes    Smokeless tobacco: Never Used   Substance Use Topics    Alcohol use: Yes     Alcohol/week: 6.0 standard drinks     Types: 6 Shots of liquor per week     Comment: once or twice a month     Family: History reviewed. No pertinent family history.     Scheduled Medications:    citalopram  10 mg Oral Daily    amLODIPine  5 mg Oral Daily    cloNIDine  0.1 mg Oral BID    lisinopril  10 mg Oral BID    dicyclomine  10 mg Oral BID    sodium chloride flush  10 mL Intravenous 2 times per day    enoxaparin  40 mg Subcutaneous Daily     Infusions:    sodium chloride 100 mL/hr at 03/14/21 1139     PRN Medications: clonazePAM, sodium chloride flush, promethazine **OR** ondansetron, polyethylene glycol, acetaminophen **OR** acetaminophen, morphine, potassium chloride  Allergies: Allergies   Allergen Reactions    Tramadol Other (See Comments)     Hot and sweaty       ROS:   Review of Systems   Constitutional: Negative. HENT: Negative. Eyes: Negative. Respiratory: Negative. Cardiovascular: Negative. Gastrointestinal: Positive for abdominal pain. Endocrine: Negative. Genitourinary: Negative. Musculoskeletal: Negative. Skin: Negative. Allergic/Immunologic: Negative. Neurological: Negative. Hematological: Negative. Psychiatric/Behavioral: Negative. Objective:     Physical Exam:   Vitals:    03/14/21 0900   BP: 120/77   Pulse: 94   Resp: 16   Temp: 97 °F (36.1 °C)   SpO2: 97%     No intake/output data recorded.    General appearance: alert, appears stated age and cooperative  HEENT: PERRLA  Neck: no adenopathy, no carotid bruit, no JVD, supple, symmetrical, trachea midline and thyroid not enlarged, symmetric, no tenderness/mass/nodules  Lungs: clear to auscultation bilaterally  Heart: regular rate and rhythm, S1, S2 normal, no murmur, click, rub or gallop  Abdomen: soft, non-tender; bowel sounds normal; no masses,  no organomegaly  Extremities: extremities normal, atraumatic, no cyanosis or edema     Lab and Imaging Review   Labs:  CBC:   Recent Labs     03/14/21  0100 03/14/21  0716   WBC 12.3* 10.8   HGB 9.4* 8.9*   HCT 27.7* 26.1*   MCV 98.4 99.5    233     BMP:   Recent Labs     03/14/21  0100 03/14/21  0716   * 133*   K 2.8* 3.3*   CL 86* 90*   CO2 33* 29   PHOS 2.6 --    BUN 9 10   CREATININE <0.5* <0.5*     LIVER PROFILE:   Recent Labs     03/14/21  0100 03/14/21  0716   AST 89* 80*   ALT 33 29   LIPASE 75.0*  --    PROT 6.6 6.3*   BILIDIR 3.7*  --    BILITOT 5.2* 4.9*   ALKPHOS 224* 210*     PT/INR: No results for input(s): INR in the last 72 hours. Invalid input(s): PT    IMAGING:  No results found. Hospital Problems           Last Modified POA    Acute cholecystitis 3/13/2021 Yes        Assessment:   40 yo male who presents with abdominal pain and abnormal lfts    Plan:   1. Obtain RUQ US. If no stone or ductal dilation patient's presentation suspicious for EtOH hepatitis secondary to alcohol abuse given >2:1 AST/ALT elevations. 2. Continue supportive care  3. ERCP if obstruction found      Lana Jacinto DO  12:14 PM 3/14/2021            [address] [facility]    [address]      [address] [place] [address]     Phone: [phone]     Fax: [phone]          STAT RUQ US performed in afternoon demonstrated dilated bile duct. Given dilation and abnormal bilirubin ERCP could be justified as high preoperative risk of cbd stone. Will let Dr. Sandy Hall know for tomorrow as unable to coordinate procedure today.

## 2021-03-14 NOTE — PROGRESS NOTES
Brief progress note. Patient seen by derekist.  H&P reviewed. 41year old male with hypertension, h/o heroin abuse presented with c/o epigastric/RUQ abdominal pain, nausea, vomiting. Assessment/Plan    Abdominal pain  - acute pancreatitis, possible cholecystitis, choledocholithiasis  - admitted to med-surg.   - NPO, IVF's. - pina control: morphine prn  - antiemetics prn  - Check RUQ US: pending  - GI Consulted. Hypokalemia  Hypomagnesemia  - replace electrolytes. Monitor labs. Hyponatremia  - likely related to fluid volume depletion  - Give IVF's  - monitor labs. Elevated LFT's  Hyperbilirubinemia   - check RUQ US    Chest pain  - monitor on tele  - serial troponin negative x2  - repeat EKG; NSR prolonged QT  - chest pain resolved    Acute hypoxia  - on 2 L o2. Leukocytosis  - Due to above  - improved on repeat. Normocytic anemia  - Hgb 13.1 5/2020  - 9.4 on admission.  - check iron studies; B12, folate  - occult stool    Hypertension   - BP stable. Continue Clonidine, Amlodipine, Lisinopril. Anxiety  - Continue Celexa, Klonopin prn    Tobacco Dependence  - Recommended cessation    Obesity  - Body mass index is 38.04 kg/m². - Recommended weight loss    H/o alcohol abuse noted during previous admission. Admission last year for acute pancreatitis due to alcohol abuse. He had to to [facility] tube feedings.      DVT Prophylaxis: lovenox  Diet: Diet NPO Effective Now  Code Status: Full Code    Jay Whitman FNP-C  3/14/2021

## 2021-03-14 NOTE — H&P
Hospital Medicine History & Physical      PCP: BENI Vega CNP    Date of Admission: 3/13/2021    Date of Service: Pt seen/examined on 3/14/2021 and Admitted to Inpatient with expected LOS greater than two midnights due to medical therapy. Chief Complaint:  abd pain, n/v      History Of Present Illness:      41 y.o. male presents with worsening abdominal pain, n/v with onset 2 weeks ago. Pain is epigastric and in the RUQ, worsening with PO intake, associated with emesis of food within 30 minutes of ingestion. Emesis has been of clear fluid with no blood. His stools have not been black/bloody/acholic. He's had fevers in the low 100's. Today he also noted R sided chest pain with some radiation to his left hand. Chest pain has resolved, however he continues to have abd pain, nausea. Past Medical History:          Diagnosis Date    Arthritis     Diverticulitis     Heroin abuse ([address] 2013    Pt has been clean for OVER 8 hears last used 2013    Hypertension     Ileostomy in place [facility]) 11/27/2019    Knee instability     per pt, his knees pop out at times, bilateral        Past Surgical History:          Procedure Laterality Date    ABDOMEN SURGERY  10/08/2019    sigmoid colectomy with ostomy    OTHER SURGICAL HISTORY  11/27/2019    ileostomy reversal, on Q pain buster insertion    SMALL INTESTINE SURGERY N/A 10/8/2019    SIGMOID COLECTOMY, OSTOMY performed by Sharmila Delgado MD at [place] [address] N/A 11/27/2019    ILEOSTOMY REVERSAL, ON-Q PAIN BUSTER INSERTION performed by Sharmila Delgado MD at [facility]       Medications Prior to Admission:      Prior to Admission medications    Medication Sig Start Date End Date Taking?  Authorizing Provider   citalopram (CELEXA) 10 MG tablet Take by mouth daily   Yes Historical Provider, MD   lisinopril (PRINIVIL;ZESTRIL) 10 MG tablet Take by mouth 2 times daily   Yes Historical Provider, MD   cloNIDine (CATAPRES) 0.1 MG tablet Take by mouth 2 times daily   Yes Historical Provider, MD   amLODIPine (NORVASC) 5 MG tablet Take by mouth 2 times daily   Yes Historical Provider, MD   dicyclomine (BENTYL) 10 MG capsule Take by mouth 2 times daily   Yes Historical Provider, MD   DICLOFENAC PO Take by mouth 2 times daily   Yes Historical Provider, MD   clonazePAM (KLONOPIN) 0.5 MG tablet Take 0.5 mg by mouth 2 times daily as needed. Yes Historical Provider, MD       Allergies:  Tramadol    Social History:           TOBACCO:   reports that he has been smoking cigarettes. He has a 14.00 pack-year smoking history. He has never used smokeless tobacco.  ETOH:   reports current alcohol use of about 6.0 standard drinks of alcohol per week. Family History:      Denies premature CAD    REVIEW OF SYSTEMS:   Pertinent positives as noted in the HPI. All other systems reviewed and negative. PHYSICAL EXAM PERFORMED:    /80   Pulse 97   Temp 97.4 °F (36.3 °C) (Oral)   Resp 16   Ht 6' 1\" (1.854 m)   Wt 288 lb 4.8 oz (130.8 kg)   SpO2 94%   BMI 38.04 kg/m²     General appearance:  No apparent distress, appears stated age and cooperative. HEENT:  Normal cephalic, atraumatic without obvious deformity. Pupils equal, round, and reactive to light. Extra ocular muscles intact. Conjunctivae/corneas clear. Neck: Supple, with full range of motion. No jugular venous distention. Trachea midline. Respiratory:  Normal respiratory effort. No use of accessory muscles, equal excursion, no intercostal retractions  Cardiovascular:  Regular rate and rhythm   Abdomen: Obese, soft, (+) RUQ/epigastric tenderness, no distention  Musculoskeletal:  No clubbing, cyanosis or edema bilaterally.   No calf tenderess  Skin: Skin color, texture, turgor normal.    Neurologic:  grossly non-focal.  Psychiatric:  Alert and oriented, thought content appropriate, normal insight  Capillary Refill: Brisk,< 3 seconds   Peripheral Pulses: +2 palpable, equal bilaterally Labs:     Recent Labs     03/14/21 0100   WBC 12.3*   HGB 9.4*   HCT 27.7*        Recent Labs     03/14/21 0100   *   K 2.8*   CL 86*   CO2 33*   BUN 9   CREATININE <0.5*   CALCIUM 8.4   PHOS 2.6     Recent Labs     03/14/21 0100   AST 89*   ALT 33   BILIDIR 3.7*   BILITOT 5.2*   ALKPHOS 224*     No results for input(s): INR in the last 72 hours. No results for input(s): Ethelene Soulier in the last 72 hours. Urinalysis:      Lab Results   Component Value Date    NITRU Negative 05/19/2020    WBCUA 0-2 05/19/2020    BACTERIA Rare 05/19/2020    RBCUA 11-20 05/19/2020    BLOODU LARGE 05/19/2020    SPECGRAV 1.010 05/19/2020    GLUCOSEU Negative 05/19/2020       Radiology:          No orders to display       ASSESSMENT:    Active Hospital Problems    Diagnosis Date Noted    Acute cholecystitis [K81.0] 03/13/2021         PLAN:    1)  Abdominal pain  - acute pancreatitis, poss cholecystitis  - NPO, IVFs, IV pain and anti emetics  - possible choledocholithiasis, GI consulted    2) Hypokalemia / Hypomag  - IV replacements ordered    3) Chest pain  - will place on tele  - serial trop, ekg in am, resolved      DVT Prophylaxis: lovenox  Diet: Diet NPO Effective Now  Code Status: Full Sharmila Hardin MD    Thank you BENI Zaidi - MITESH for the opportunity to be involved in this patient's care. If you have any questions or concerns please feel free to contact me at [phone].

## 2021-03-15 ENCOUNTER — APPOINTMENT (OUTPATIENT)
Dept: MRI IMAGING | Age: 41
DRG: 282 | End: 2021-03-15
Attending: HOSPITALIST
Payer: MEDICAID

## 2021-03-15 PROBLEM — E44.1 MILD PROTEIN-CALORIE MALNUTRITION (HCC): Status: ACTIVE | Noted: 2019-03-24

## 2021-03-15 LAB
ALBUMIN SERPL-MCNC: 3 G/DL (ref 3.4–5)
ALP BLD-CCNC: 203 U/L (ref 40–129)
ALT SERPL-CCNC: 30 U/L (ref 10–40)
ANION GAP SERPL CALCULATED.3IONS-SCNC: 11 MMOL/L (ref 3–16)
AST SERPL-CCNC: 78 U/L (ref 15–37)
BASOPHILS ABSOLUTE: 0.1 K/UL (ref 0–0.2)
BASOPHILS RELATIVE PERCENT: 0.7 %
BILIRUB SERPL-MCNC: 3.2 MG/DL (ref 0–1)
BILIRUBIN DIRECT: 2.1 MG/DL (ref 0–0.3)
BILIRUBIN, INDIRECT: 1.1 MG/DL (ref 0–1)
BUN BLDV-MCNC: 11 MG/DL (ref 7–20)
CALCIUM SERPL-MCNC: 8.5 MG/DL (ref 8.3–10.6)
CHLORIDE BLD-SCNC: 94 MMOL/L (ref 99–110)
CO2: 28 MMOL/L (ref 21–32)
CREAT SERPL-MCNC: <0.5 MG/DL (ref 0.9–1.3)
EOSINOPHILS ABSOLUTE: 0.1 K/UL (ref 0–0.6)
EOSINOPHILS RELATIVE PERCENT: 0.9 %
GFR AFRICAN AMERICAN: >60
GFR NON-AFRICAN AMERICAN: >60
GLUCOSE BLD-MCNC: 111 MG/DL (ref 70–99)
GLUCOSE BLD-MCNC: 114 MG/DL (ref 70–99)
GLUCOSE BLD-MCNC: 115 MG/DL (ref 70–99)
GLUCOSE BLD-MCNC: 117 MG/DL (ref 70–99)
HCT VFR BLD CALC: 25.4 % (ref 40.5–52.5)
HEMOGLOBIN: 8.5 G/DL (ref 13.5–17.5)
LYMPHOCYTES ABSOLUTE: 0.8 K/UL (ref 1–5.1)
LYMPHOCYTES RELATIVE PERCENT: 9.5 %
MAGNESIUM: 1.6 MG/DL (ref 1.8–2.4)
MCH RBC QN AUTO: 33.8 PG (ref 26–34)
MCHC RBC AUTO-ENTMCNC: 33.5 G/DL (ref 31–36)
MCV RBC AUTO: 101 FL (ref 80–100)
MONOCYTES ABSOLUTE: 0.6 K/UL (ref 0–1.3)
MONOCYTES RELATIVE PERCENT: 7.4 %
NEUTROPHILS ABSOLUTE: 7.1 K/UL (ref 1.7–7.7)
NEUTROPHILS RELATIVE PERCENT: 81.5 %
PDW BLD-RTO: 15.8 % (ref 12.4–15.4)
PERFORMED ON: ABNORMAL
PLATELET # BLD: 223 K/UL (ref 135–450)
PMV BLD AUTO: 7.3 FL (ref 5–10.5)
POTASSIUM REFLEX MAGNESIUM: 3.2 MMOL/L (ref 3.5–5.1)
RBC # BLD: 2.52 M/UL (ref 4.2–5.9)
SODIUM BLD-SCNC: 133 MMOL/L (ref 136–145)
TOTAL PROTEIN: 6.5 G/DL (ref 6.4–8.2)
WBC # BLD: 8.7 K/UL (ref 4–11)

## 2021-03-15 PROCEDURE — 74181 MRI ABDOMEN W/O CONTRAST: CPT

## 2021-03-15 PROCEDURE — 6370000000 HC RX 637 (ALT 250 FOR IP): Performed by: INTERNAL MEDICINE

## 2021-03-15 PROCEDURE — 80048 BASIC METABOLIC PNL TOTAL CA: CPT

## 2021-03-15 PROCEDURE — 36415 COLL VENOUS BLD VENIPUNCTURE: CPT

## 2021-03-15 PROCEDURE — 6360000002 HC RX W HCPCS: Performed by: INTERNAL MEDICINE

## 2021-03-15 PROCEDURE — 6360000002 HC RX W HCPCS: Performed by: PHYSICIAN ASSISTANT

## 2021-03-15 PROCEDURE — 6360000002 HC RX W HCPCS: Performed by: HOSPITALIST

## 2021-03-15 PROCEDURE — 99232 SBSQ HOSP IP/OBS MODERATE 35: CPT | Performed by: INTERNAL MEDICINE

## 2021-03-15 PROCEDURE — 85025 COMPLETE CBC W/AUTO DIFF WBC: CPT

## 2021-03-15 PROCEDURE — 2580000003 HC RX 258: Performed by: HOSPITALIST

## 2021-03-15 PROCEDURE — C9113 INJ PANTOPRAZOLE SODIUM, VIA: HCPCS | Performed by: PHYSICIAN ASSISTANT

## 2021-03-15 PROCEDURE — 83735 ASSAY OF MAGNESIUM: CPT

## 2021-03-15 PROCEDURE — 6370000000 HC RX 637 (ALT 250 FOR IP): Performed by: HOSPITALIST

## 2021-03-15 PROCEDURE — 80076 HEPATIC FUNCTION PANEL: CPT

## 2021-03-15 PROCEDURE — 1200000000 HC SEMI PRIVATE

## 2021-03-15 RX ORDER — PANTOPRAZOLE SODIUM 40 MG/10ML
40 INJECTION, POWDER, LYOPHILIZED, FOR SOLUTION INTRAVENOUS DAILY
Status: DISCONTINUED | OUTPATIENT
Start: 2021-03-15 | End: 2021-03-17

## 2021-03-15 RX ORDER — FOLIC ACID 1 MG/1
1 TABLET ORAL DAILY
Status: DISCONTINUED | OUTPATIENT
Start: 2021-03-15 | End: 2021-03-31

## 2021-03-15 RX ADMIN — DICYCLOMINE HYDROCHLORIDE 10 MG: 10 CAPSULE ORAL at 21:15

## 2021-03-15 RX ADMIN — SODIUM CHLORIDE, PRESERVATIVE FREE 10 ML: 5 INJECTION INTRAVENOUS at 21:12

## 2021-03-15 RX ADMIN — FOLIC ACID 1 MG: 1 TABLET ORAL at 18:02

## 2021-03-15 RX ADMIN — DICYCLOMINE HYDROCHLORIDE 10 MG: 10 CAPSULE ORAL at 09:35

## 2021-03-15 RX ADMIN — HYDROMORPHONE HYDROCHLORIDE 1 MG: 1 INJECTION, SOLUTION INTRAMUSCULAR; INTRAVENOUS; SUBCUTANEOUS at 14:54

## 2021-03-15 RX ADMIN — PANTOPRAZOLE SODIUM 40 MG: 40 INJECTION, POWDER, FOR SOLUTION INTRAVENOUS at 13:41

## 2021-03-15 RX ADMIN — SODIUM CHLORIDE, PRESERVATIVE FREE 10 ML: 5 INJECTION INTRAVENOUS at 09:31

## 2021-03-15 RX ADMIN — CITALOPRAM HYDROBROMIDE 10 MG: 20 TABLET ORAL at 09:34

## 2021-03-15 RX ADMIN — HYDROMORPHONE HYDROCHLORIDE 1 MG: 1 INJECTION, SOLUTION INTRAMUSCULAR; INTRAVENOUS; SUBCUTANEOUS at 08:13

## 2021-03-15 RX ADMIN — LISINOPRIL 10 MG: 10 TABLET ORAL at 09:35

## 2021-03-15 RX ADMIN — HYDROMORPHONE HYDROCHLORIDE 1 MG: 1 INJECTION, SOLUTION INTRAMUSCULAR; INTRAVENOUS; SUBCUTANEOUS at 01:05

## 2021-03-15 RX ADMIN — LISINOPRIL 10 MG: 10 TABLET ORAL at 21:15

## 2021-03-15 RX ADMIN — SODIUM CHLORIDE: 9 INJECTION, SOLUTION INTRAVENOUS at 21:15

## 2021-03-15 RX ADMIN — PROMETHAZINE HYDROCHLORIDE 12.5 MG: 25 INJECTION INTRAMUSCULAR; INTRAVENOUS at 01:00

## 2021-03-15 RX ADMIN — PROMETHAZINE HYDROCHLORIDE 12.5 MG: 25 INJECTION INTRAMUSCULAR; INTRAVENOUS at 08:13

## 2021-03-15 RX ADMIN — SODIUM CHLORIDE: 9 INJECTION, SOLUTION INTRAVENOUS at 11:47

## 2021-03-15 RX ADMIN — HYDROMORPHONE HYDROCHLORIDE 1 MG: 1 INJECTION, SOLUTION INTRAMUSCULAR; INTRAVENOUS; SUBCUTANEOUS at 05:11

## 2021-03-15 RX ADMIN — PROMETHAZINE HYDROCHLORIDE 12.5 MG: 25 INJECTION INTRAMUSCULAR; INTRAVENOUS at 21:12

## 2021-03-15 RX ADMIN — HYDROMORPHONE HYDROCHLORIDE 1 MG: 1 INJECTION, SOLUTION INTRAMUSCULAR; INTRAVENOUS; SUBCUTANEOUS at 18:03

## 2021-03-15 RX ADMIN — HYDROMORPHONE HYDROCHLORIDE 1 MG: 1 INJECTION, SOLUTION INTRAMUSCULAR; INTRAVENOUS; SUBCUTANEOUS at 11:37

## 2021-03-15 RX ADMIN — ENOXAPARIN SODIUM 40 MG: 40 INJECTION SUBCUTANEOUS at 09:33

## 2021-03-15 RX ADMIN — HYDROMORPHONE HYDROCHLORIDE 1 MG: 1 INJECTION, SOLUTION INTRAMUSCULAR; INTRAVENOUS; SUBCUTANEOUS at 21:15

## 2021-03-15 RX ADMIN — CLONIDINE HYDROCHLORIDE 0.1 MG: 0.1 TABLET ORAL at 21:15

## 2021-03-15 RX ADMIN — PROMETHAZINE HYDROCHLORIDE 12.5 MG: 25 INJECTION INTRAMUSCULAR; INTRAVENOUS at 14:54

## 2021-03-15 RX ADMIN — CLONIDINE HYDROCHLORIDE 0.1 MG: 0.1 TABLET ORAL at 09:34

## 2021-03-15 RX ADMIN — AMLODIPINE BESYLATE 5 MG: 5 TABLET ORAL at 09:35

## 2021-03-15 ASSESSMENT — PAIN DESCRIPTION - PROGRESSION
CLINICAL_PROGRESSION: NOT CHANGED

## 2021-03-15 ASSESSMENT — PAIN DESCRIPTION - ONSET
ONSET: ON-GOING
ONSET: ON-GOING

## 2021-03-15 ASSESSMENT — PAIN DESCRIPTION - DESCRIPTORS
DESCRIPTORS: THROBBING
DESCRIPTORS: DISCOMFORT
DESCRIPTORS: THROBBING

## 2021-03-15 ASSESSMENT — PAIN DESCRIPTION - FREQUENCY
FREQUENCY: CONTINUOUS

## 2021-03-15 ASSESSMENT — PAIN SCALES - GENERAL
PAINLEVEL_OUTOF10: 9
PAINLEVEL_OUTOF10: 0
PAINLEVEL_OUTOF10: 9
PAINLEVEL_OUTOF10: 9
PAINLEVEL_OUTOF10: 7
PAINLEVEL_OUTOF10: 9

## 2021-03-15 ASSESSMENT — PAIN DESCRIPTION - ORIENTATION
ORIENTATION: RIGHT;MID
ORIENTATION: RIGHT
ORIENTATION: RIGHT;MID
ORIENTATION: MID
ORIENTATION: RIGHT;MID
ORIENTATION: RIGHT;MID

## 2021-03-15 ASSESSMENT — PAIN - FUNCTIONAL ASSESSMENT
PAIN_FUNCTIONAL_ASSESSMENT: ACTIVITIES ARE NOT PREVENTED

## 2021-03-15 ASSESSMENT — ENCOUNTER SYMPTOMS
EYES NEGATIVE: 1
ABDOMINAL PAIN: 1
RESPIRATORY NEGATIVE: 1
ALLERGIC/IMMUNOLOGIC NEGATIVE: 1

## 2021-03-15 ASSESSMENT — PAIN DESCRIPTION - PAIN TYPE
TYPE: ACUTE PAIN

## 2021-03-15 ASSESSMENT — PAIN DESCRIPTION - LOCATION
LOCATION: ABDOMEN

## 2021-03-15 NOTE — PROGRESS NOTES
IM Progress Note    Admit Date:  3/13/2021  2    Interval history:  Admitted from [facility] with abd pain   Hx of alcohol abuse, reports he quit about 4 months ago     Subjective:  Mr. Ramon Nick seen up in bed, reports still with abd pain and nausea  No vomiting      Objective:   /77   Pulse 77   Temp 97.1 °F (36.2 °C) (Oral)   Resp 16   Ht 6' 1\" (1.854 m)   Wt 288 lb 4.8 oz (130.8 kg)   SpO2 92%   BMI 38.04 kg/m²       Intake/Output Summary (Last 24 hours) at 3/15/2021 1253  Last data filed at 3/15/2021 1029  Gross per 24 hour   Intake 3911 ml   Output 1975 ml   Net 1936 ml       Physical Exam:        General: young male, obese,  Awake, alert and oriented. Appears to be not in any distress  Mucous Membranes:  Pink , anicteric  Neck: No JVD, no carotid bruit, no thyromegaly  Chest:  Clear to auscultation bilaterally, no added sounds  Cardiovascular:  RRR S1S2 heard, no murmurs or gallops  Abdomen: hard, undistended, non tender, no organomegaly, BS present  Extremities: No edema or cyanosis.  Distal pulses well felt  Neurological : grossly normal      Medications:   Scheduled Medications:    citalopram  10 mg Oral Daily    amLODIPine  5 mg Oral Daily    cloNIDine  0.1 mg Oral BID    lisinopril  10 mg Oral BID    dicyclomine  10 mg Oral BID    sodium chloride flush  10 mL Intravenous 2 times per day    enoxaparin  40 mg Subcutaneous Daily     I   sodium chloride 100 mL/hr at 03/15/21 1147     clonazePAM, sodium chloride flush, [DISCONTINUED] promethazine **OR** ondansetron, polyethylene glycol, acetaminophen **OR** acetaminophen, potassium chloride, HYDROmorphone, promethazine    Lab Data:  Recent Labs     03/14/21  0100 03/14/21  0716 03/15/21  0612   WBC 12.3* 10.8 8.7   HGB 9.4* 8.9* 8.5*   HCT 27.7* 26.1* 25.4*   MCV 98.4 99.5 101.0*    233 223     Recent Labs     03/14/21  0100 03/14/21  0716 03/15/21  0612   * 133* 133*   K 2.8* 3.3* 3.2*   CL 86* 90* 94*   CO2 33* 29 28   PHOS 2.6  -- --    BUN 9 10 11   CREATININE <0.5* <0.5* <0.5*     Recent Labs     03/14/21  0332 03/14/21  0716   TROPONINI <0.01 <0.01       Coagulation:   Lab Results   Component Value Date    INR 1.28 10/04/2019     Cardiac markers:   Lab Results   Component Value Date    TROPONINI <0.01 03/14/2021         Lab Results   Component Value Date    ALT 30 03/15/2021    AST 78 (H) 03/15/2021    ALKPHOS 203 (H) 03/15/2021    BILITOT 3.2 (H) 03/15/2021       Lab Results   Component Value Date    INR 1.28 (H) 10/04/2019    INR 1.65 (H) 03/22/2019    PROTIME 14.6 (H) 10/04/2019    PROTIME 18.8 (H) 03/22/2019       Radiology    MRCP   *Normal caliber bile ducts without evidence of choledocholithiasis. *Hepatomegaly with severe fatty infiltration as described. *Mildly atrophic pancreas with ductal dilatation as measured and described   above, findings likely sequela of chronic pancreatitis. *Large amount of gallbladder sludge. US gall bladder    1. Gallbladder sludge with nonspecific wall thickening. 2. Abnormal common duct dilatation.  MRCP and/or ERCP would be options to   further evaluate. Assessment & Plan:      Abdominal pain likely pancreatitis     - admitted to med-surg.   - NPO, IVF's. - pain control: morphine prn  - antiemetics prn    Abn LFT   - no CBD stone per MRCP , Mildly atrophic pancreas with ductal dilatation noted but with gall bladder sludge  - bili improving  - pt reports no alcohol since 4 months     Hypokalemia  Hypomagnesemia  - replace electrolytes. Monitor labs. Hyponatremia  - likely related to fluid volume depletion  - Give IVF's  - improved       Leukocytosis  - likely acute stress response resolved     Normocytic anemia  - Hgb 13.1 5/2020  - 9.4 on admission.  - folate def noted, start on supplements     Hypertension   - BP stable. Continue Clonidine, Amlodipine, Lisinopril.      Anxiety  - Continue Celexa, Klonopin prn    Tobacco Dependence  - Recommended cessation    Obesity  - Body mass index is 38.04 kg/m². - Recommended weight loss    H/o alcohol abuse noted during previous admission. Admission last year for acute pancreatitis due to alcohol abuse. He had to to [facility] tube feedings.      DVT Prophylaxis: lovenox  Diet: Diet NPO Effective Now  Code Status: Full Code    Zahra Pyle MD 3/15/2021 12:57 PM

## 2021-03-15 NOTE — PROGRESS NOTES
Pt. Returned to room via wheelchair transport from MRI. Tele reapplied and monitoring notified. PRN pain medication given. No signs/symptoms of chest pain or respiratory distress voiced. Pt. Voices pain- PRN pain medication given per MAR. As of this note, pt currently in bed, IV fluids infusing per MAR. Call light and patient belongings within reach.

## 2021-03-15 NOTE — PROGRESS NOTES
Comprehensive Nutrition Assessment    Type and Reason for Visit:  Initial, Positive Nutrition Screen(+ screen for N/V PTA)    Nutrition Recommendations/Plan:   1. Continue NPO status and monitor for diet advancement  2. Monitor for N/V, GI status, and nutrition-related lab values    Nutrition Assessment:  Pt is nutritionally compromised AEB poor appetite/po intake, nausea and vomiting x2 weeks pta; pt is at risk for further compromise d/t altered nutrition-related lab values, hx of alcohol abuse and acute cholecystitis; will continue NPO status and monitor for diet advancement    Malnutrition Assessment:  Malnutrition Status:  Mild malnutrition (mild malnutrition in the context of acute illness or injury < 3 months based on 50% or less of estimated energy requirements for 5 or more days and mild muscle loss), per guidelines from Academy of Nutrition and Dietetics (Academy)/American Society for Parenteral and Enteral Nutrition (A.S.P.E.N.) - clinical characteristics that the clinician can  obtain and document to support a diagnosis of malnutrition. Context:  Acute Illness     Findings of the 6 clinical characteristics of malnutrition:  Energy Intake:  7 - 50% or less of estimated energy requirements for 5 or more days  Weight Loss:  No significant weight loss     Body Fat Loss:  No significant body fat loss     Muscle Mass Loss:  1 - Mild muscle mass loss Hand (interosseous)  Fluid Accumulation:  No significant fluid accumulation     Strength:  Not Performed    Estimated Daily Nutrient Needs:  Energy (kcal):  0695-0930 kcals/day based on 15-17 kcals/kg/CBW; Weight Used for Energy Requirements:  Current     Protein (g):  101-118 g protein/day based on 1.2-1.4 g/kg/IBW;  Weight Used for Protein Requirements:  Ideal        Fluid (ml/day):  1132-0645 ml; Method Used for Fluid Requirements:  1 ml/kcal      Nutrition Related Findings:  Pt is A/O x4; + flat affect; sclera yellow eyes; + poor dentition; abdomen is soft, EDT    Contact: [phone]

## 2021-03-15 NOTE — PROGRESS NOTES
MRI Screening questionnaire completed with patient. Imaging notified of completion. Medications given per MAR. Vitals:    03/15/21 0731   BP: 129/80   Pulse: 95   Resp: 16   Temp: 97.1 °F (36.2 °C)   SpO2: 92%   Pt. Currently resting in bed. Call light and urinal within reach.  Leslie Kent RN

## 2021-03-15 NOTE — PROGRESS NOTES
PROGRESS NOTE  S:40 yrs Patient  admitted on 3/13/2021 with Acute cholecystitis [K81.0] . Today he complains of nausea, emesis x3, dysphagia, heartburn, and upper abdominal pain and cramping. His last BM was one week ago. Of note, he \"ran out\" of Pantoprazole one month ago which caused worsening dysphagia to liquids and solids as well as GERD. He drank one serving of alcohol yesterday. He had been abstinent for two months prior. Exam:   Vitals:    03/15/21 1135   BP: 124/77   Pulse: 77   Resp:    Temp:    SpO2:       General appearance: alert, appears stated age, cooperative, no distress and morbidly obese  HEENT: Oropharynx clear, no lesions  Neck: no adenopathy and supple, symmetrical, trachea midline  Lungs: clear to auscultation bilaterally  Heart: regular rate and rhythm, S1, S2 normal, no murmur, click, rub or gallop  Abdomen: normal findings: no masses palpable and symmetric and abnormal findings:  distended, hypoactive bowel sounds, obese and tenderness moderate in the upper abdomen  Extremities: extremities normal, atraumatic, no cyanosis or edema     Medications: Reviewed    Labs:  CBC:   Recent Labs     03/14/21  0100 03/14/21  0716 03/15/21  0612   WBC 12.3* 10.8 8.7   HGB 9.4* 8.9* 8.5*   HCT 27.7* 26.1* 25.4*   MCV 98.4 99.5 101.0*    233 223     BMP:   Recent Labs     03/14/21 0100 03/14/21 0716 03/15/21  0612   * 133* 133*   K 2.8* 3.3* 3.2*   CL 86* 90* 94*   CO2 33* 29 28   PHOS 2.6  --   --    BUN 9 10 11   CREATININE <0.5* <0.5* <0.5*     LIVER PROFILE:   Recent Labs     03/14/21  0100 03/14/21  0716 03/15/21  0612   AST 89* 80* 78*   ALT 33 29 30   LIPASE 75.0*  --   --    PROT 6.6 6.3* 6.5   BILIDIR 3.7*  --  2.1*   BILITOT 5.2* 4.9* 3.2*   ALKPHOS 224* 210* 203*     PT/INR: No results for input(s): INR in the last 72 hours.     Invalid input(s): PT      IMAGING:  MRI ABDOMEN WO CONTRAST MRCP  Impression   *Normal caliber bile ducts output  NPO with ice chips, IVF, pain control  Continue PPI qAM  Check IgG 4  Up in bed or chair TID  Slowly advance diet as abdominal pain improves   alcohol abstinence  Will follow  Consider outpatient EUS upon d/c      Gianna Little PA-C  1:36 PM 3/15/2021                      41year old male with a history of HTN, arthritis, diverticulitis s/p partial colectomy and reversal of ileostomy, alcohol abuse, admitted with recurrent acute pancreatitis likely secondary to biliary sludge. MRCP negative for choledocholithiasis but there is evidence of gallbladder sludge and chronic pancreatitis. Continue supportive care. NPO, IVF, pain control. Check IgG4 and TG levels. May need surgery consult for elective cholecystectomy. Will follow.     Theresa Booth MD          [phone]  [phone]

## 2021-03-15 NOTE — PROGRESS NOTES
Pt. Transported off unit via wheelchair to MRI by transport. Telemetry unit and leads removed; tele monitor notified of off unit transfer.   Lizeth Scott RN

## 2021-03-15 NOTE — PLAN OF CARE
Problem: Pain:  Goal: Pain level will decrease  Description: Pain level will decrease  Outcome: Ongoing  Goal: Control of acute pain  Description: Control of acute pain  Outcome: Ongoing  Goal: Control of chronic pain  Description: Control of chronic pain  Outcome: Ongoing  Goal: Patient's pain/discomfort is manageable  Description: Patient's pain/discomfort is manageable  Outcome: Ongoing     Problem: Falls - Risk of:  Goal: Will remain free from falls  Description: Will remain free from falls  Outcome: Ongoing     Problem: Infection:  Goal: Will remain free from infection  Description: Will remain free from infection  Outcome: Ongoing     Problem: Safety:  Goal: Free from accidental physical injury  Description: Free from accidental physical injury  Outcome: Ongoing  Goal: Free from intentional harm  Description: Free from intentional harm  Outcome: Ongoing     Problem: Daily Care:  Goal: Daily care needs are met  Description: Daily care needs are met  Outcome: Ongoing     Problem: Skin Integrity:  Goal: Skin integrity will stabilize  Description: Skin integrity will stabilize  Outcome: Ongoing     Problem: Discharge Planning:  Goal: Patients continuum of care needs are met  Description: Patients continuum of care needs are met  Outcome: Ongoing

## 2021-03-15 NOTE — CONSULTS
Gastroenterology Consult Note    Patient:   Dakotah Goyal   :    1980   Facility:     [phone]   Referring/PCP: Savi Florentino APRN - CNP  Date:     3/15/2021  Consultant:   Dulce Maier DO    Subjective: This 41 y.o. male was admitted 3/13/2021 with a diagnosis of \"Acute cholecystitis [K81.0]\" and is seen in consultation regarding abnormal lfts    40 yo male with abdominal pain over past 2 weeks with epigastric/RUQ pain and fevers. Patient denies alcohol abuse. Has been noted on previous admissions. Has history of heroin use. CT scan demonstrates no biliary dilation. Has obstructive lfts. Past Medical History:   Diagnosis Date    Arthritis     Diverticulitis     Heroin abuse ([facility] [address]     Pt has been clean for OVER 8 hears last used     Hypertension     Ileostomy in place [facility]) 2019    Knee instability     per pt, his knees pop out at times, bilateral      Past Surgical History:   Procedure Laterality Date    ABDOMEN SURGERY  10/08/2019    sigmoid colectomy with ostomy    OTHER SURGICAL HISTORY  2019    ileostomy reversal, on Q pain buster insertion    SMALL INTESTINE SURGERY N/A 10/8/2019    SIGMOID COLECTOMY, OSTOMY performed by Emma West MD at [address] [place] [address] N/A 2019    ILEOSTOMY REVERSAL, ON-Q PAIN BUSTER INSERTION performed by Emma West MD at [place] [address]:   Social History     Tobacco Use    Smoking status: Current Every Day Smoker     Packs/day: 0.50     Years: 28.00     Pack years: 14.00     Types: Cigarettes    Smokeless tobacco: Never Used   Substance Use Topics    Alcohol use: Yes     Alcohol/week: 6.0 standard drinks     Types: 6 Shots of liquor per week     Comment: once or twice a month     Family: History reviewed. No pertinent family history.     Scheduled Medications:    citalopram  10 mg Oral Daily    amLODIPine  5 mg Oral Daily    cloNIDine  0.1 mg Oral BID    lisinopril  10 mg Oral BID    dicyclomine  10 mg Oral BID    sodium chloride flush  10 mL Intravenous 2 times per day    enoxaparin  40 mg Subcutaneous Daily     Infusions:    sodium chloride 100 mL/hr at 03/15/21 1147     PRN Medications: clonazePAM, sodium chloride flush, [DISCONTINUED] promethazine **OR** ondansetron, polyethylene glycol, acetaminophen **OR** acetaminophen, potassium chloride, HYDROmorphone, promethazine  Allergies: Allergies   Allergen Reactions    Tramadol Other (See Comments)     Hot and sweaty       ROS:   Review of Systems   Constitutional: Negative. HENT: Negative. Eyes: Negative. Respiratory: Negative. Cardiovascular: Negative. Gastrointestinal: Positive for abdominal pain. Endocrine: Negative. Genitourinary: Negative. Musculoskeletal: Negative. Skin: Negative. Allergic/Immunologic: Negative. Neurological: Negative. Hematological: Negative. Psychiatric/Behavioral: Negative. Objective:     Physical Exam:   Vitals:    03/15/21 1135   BP: 124/77   Pulse: 77   Resp:    Temp:    SpO2:      I/O last 3 completed shifts:   In: 6480 [I.V.:3396]  Out: 2300 [Urine:2300]   General appearance: alert, appears stated age and cooperative  HEENT: PERRLA  Neck: no adenopathy, no carotid bruit, no JVD, supple, symmetrical, trachea midline and thyroid not enlarged, symmetric, no tenderness/mass/nodules  Lungs: clear to auscultation bilaterally  Heart: regular rate and rhythm, S1, S2 normal, no murmur, click, rub or gallop  Abdomen: soft, non-tender; bowel sounds normal; no masses,  no organomegaly  Extremities: extremities normal, atraumatic, no cyanosis or edema     Lab and Imaging Review   Labs:  CBC:   Recent Labs     03/14/21  0100 03/14/21  0716 03/15/21  0612   WBC 12.3* 10.8 8.7   HGB 9.4* 8.9* 8.5*   HCT 27.7* 26.1* 25.4*   MCV 98.4 99.5 101.0*    233 223     BMP:   Recent Labs     03/14/21  0100 03/14/21  0716 03/15/21  0612   * 133* 133*   K 2.8* 3.3* 3.2*   CL 86* 90* 94*   CO2 33* 29 28   PHOS 2.6  --   --    BUN 9 10 11   CREATININE <0.5* <0.5* <0.5*     LIVER PROFILE:   Recent Labs     03/14/21  0100 03/14/21  0716 03/15/21  0612   AST 89* 80* 78*   ALT 33 29 30   LIPASE 75.0*  --   --    PROT 6.6 6.3* 6.5   BILIDIR 3.7*  --  2.1*   BILITOT 5.2* 4.9* 3.2*   ALKPHOS 224* 210* 203*     PT/INR: No results for input(s): INR in the last 72 hours. Invalid input(s): PT    IMAGING:  No results found. Hospital Problems           Last Modified POA    Acute cholecystitis 3/13/2021 Yes        Assessment:   42 yo male who presents with abdominal pain and abnormal lfts    Plan:   1. Obtain RUQ US. If no stone or ductal dilation patient's presentation suspicious for EtOH hepatitis secondary to alcohol abuse given >2:1 AST/ALT elevations. 2. Continue supportive care  3. ERCP if obstruction found      Radha Lowe [place], [place]  12:52 PM 3/15/2021            [address] [facility]    [address]      [address] [place] [address]     Phone: [phone]     Fax: [phone]          STAT RUQ US performed in afternoon demonstrated dilated bile duct. Given dilation and abnormal bilirubin ERCP could be justified as high preoperative risk of cbd stone. Will let Dr. Arnoldo Turner know for tomorrow as unable to coordinate procedure today.

## 2021-03-16 LAB
A/G RATIO: 0.8 (ref 1.1–2.2)
ALBUMIN SERPL-MCNC: 3 G/DL (ref 3.4–5)
ALP BLD-CCNC: 181 U/L (ref 40–129)
ALT SERPL-CCNC: 27 U/L (ref 10–40)
ANION GAP SERPL CALCULATED.3IONS-SCNC: 12 MMOL/L (ref 3–16)
AST SERPL-CCNC: 67 U/L (ref 15–37)
BASOPHILS ABSOLUTE: 0.1 K/UL (ref 0–0.2)
BASOPHILS RELATIVE PERCENT: 0.6 %
BILIRUB SERPL-MCNC: 3.4 MG/DL (ref 0–1)
BUN BLDV-MCNC: 12 MG/DL (ref 7–20)
CALCIUM SERPL-MCNC: 8.6 MG/DL (ref 8.3–10.6)
CHLORIDE BLD-SCNC: 97 MMOL/L (ref 99–110)
CO2: 25 MMOL/L (ref 21–32)
CREAT SERPL-MCNC: <0.5 MG/DL (ref 0.9–1.3)
EOSINOPHILS ABSOLUTE: 0.1 K/UL (ref 0–0.6)
EOSINOPHILS RELATIVE PERCENT: 1 %
GFR AFRICAN AMERICAN: >60
GFR NON-AFRICAN AMERICAN: >60
GLOBULIN: 3.6 G/DL
GLUCOSE BLD-MCNC: 101 MG/DL (ref 70–99)
GLUCOSE BLD-MCNC: 102 MG/DL (ref 70–99)
GLUCOSE BLD-MCNC: 120 MG/DL (ref 70–99)
GLUCOSE BLD-MCNC: 129 MG/DL (ref 70–99)
GLUCOSE BLD-MCNC: 97 MG/DL (ref 70–99)
HCT VFR BLD CALC: 24.3 % (ref 40.5–52.5)
HEMOGLOBIN: 8.3 G/DL (ref 13.5–17.5)
LYMPHOCYTES ABSOLUTE: 0.9 K/UL (ref 1–5.1)
LYMPHOCYTES RELATIVE PERCENT: 10.2 %
MAGNESIUM: 1.5 MG/DL (ref 1.8–2.4)
MCH RBC QN AUTO: 34 PG (ref 26–34)
MCHC RBC AUTO-ENTMCNC: 34 G/DL (ref 31–36)
MCV RBC AUTO: 99.7 FL (ref 80–100)
MONOCYTES ABSOLUTE: 0.9 K/UL (ref 0–1.3)
MONOCYTES RELATIVE PERCENT: 9.8 %
NEUTROPHILS ABSOLUTE: 7.2 K/UL (ref 1.7–7.7)
NEUTROPHILS RELATIVE PERCENT: 78.4 %
PDW BLD-RTO: 16 % (ref 12.4–15.4)
PERFORMED ON: ABNORMAL
PERFORMED ON: NORMAL
PLATELET # BLD: 218 K/UL (ref 135–450)
PMV BLD AUTO: 7 FL (ref 5–10.5)
POTASSIUM REFLEX MAGNESIUM: 3.4 MMOL/L (ref 3.5–5.1)
RBC # BLD: 2.43 M/UL (ref 4.2–5.9)
SODIUM BLD-SCNC: 134 MMOL/L (ref 136–145)
TOTAL PROTEIN: 6.6 G/DL (ref 6.4–8.2)
TRIGL SERPL-MCNC: 155 MG/DL (ref 0–150)
WBC # BLD: 9.2 K/UL (ref 4–11)

## 2021-03-16 PROCEDURE — 6370000000 HC RX 637 (ALT 250 FOR IP): Performed by: INTERNAL MEDICINE

## 2021-03-16 PROCEDURE — 2580000003 HC RX 258: Performed by: HOSPITALIST

## 2021-03-16 PROCEDURE — 99232 SBSQ HOSP IP/OBS MODERATE 35: CPT | Performed by: INTERNAL MEDICINE

## 2021-03-16 PROCEDURE — 84478 ASSAY OF TRIGLYCERIDES: CPT

## 2021-03-16 PROCEDURE — 80053 COMPREHEN METABOLIC PANEL: CPT

## 2021-03-16 PROCEDURE — 1200000000 HC SEMI PRIVATE

## 2021-03-16 PROCEDURE — 6360000002 HC RX W HCPCS: Performed by: PHYSICIAN ASSISTANT

## 2021-03-16 PROCEDURE — 6360000002 HC RX W HCPCS: Performed by: INTERNAL MEDICINE

## 2021-03-16 PROCEDURE — 6360000002 HC RX W HCPCS: Performed by: HOSPITALIST

## 2021-03-16 PROCEDURE — 6370000000 HC RX 637 (ALT 250 FOR IP): Performed by: PHYSICIAN ASSISTANT

## 2021-03-16 PROCEDURE — 6370000000 HC RX 637 (ALT 250 FOR IP): Performed by: HOSPITALIST

## 2021-03-16 PROCEDURE — C9113 INJ PANTOPRAZOLE SODIUM, VIA: HCPCS | Performed by: PHYSICIAN ASSISTANT

## 2021-03-16 PROCEDURE — 36415 COLL VENOUS BLD VENIPUNCTURE: CPT

## 2021-03-16 PROCEDURE — 83735 ASSAY OF MAGNESIUM: CPT

## 2021-03-16 PROCEDURE — 2500000003 HC RX 250 WO HCPCS: Performed by: INTERNAL MEDICINE

## 2021-03-16 PROCEDURE — 85025 COMPLETE CBC W/AUTO DIFF WBC: CPT

## 2021-03-16 RX ORDER — OXYCODONE HYDROCHLORIDE AND ACETAMINOPHEN 5; 325 MG/1; MG/1
2 TABLET ORAL EVERY 4 HOURS PRN
Status: DISCONTINUED | OUTPATIENT
Start: 2021-03-16 | End: 2021-04-05 | Stop reason: HOSPADM

## 2021-03-16 RX ORDER — DEXTROSE, SODIUM CHLORIDE, AND POTASSIUM CHLORIDE 5; .45; .15 G/100ML; G/100ML; G/100ML
INJECTION INTRAVENOUS CONTINUOUS
Status: DISCONTINUED | OUTPATIENT
Start: 2021-03-16 | End: 2021-03-25

## 2021-03-16 RX ORDER — OXYCODONE HYDROCHLORIDE AND ACETAMINOPHEN 5; 325 MG/1; MG/1
1 TABLET ORAL EVERY 4 HOURS PRN
Status: DISCONTINUED | OUTPATIENT
Start: 2021-03-16 | End: 2021-04-05 | Stop reason: HOSPADM

## 2021-03-16 RX ORDER — MAGNESIUM SULFATE IN WATER 40 MG/ML
2000 INJECTION, SOLUTION INTRAVENOUS ONCE
Status: COMPLETED | OUTPATIENT
Start: 2021-03-16 | End: 2021-03-16

## 2021-03-16 RX ADMIN — SODIUM CHLORIDE, PRESERVATIVE FREE 10 ML: 5 INJECTION INTRAVENOUS at 20:00

## 2021-03-16 RX ADMIN — HYDROMORPHONE HYDROCHLORIDE 1 MG: 1 INJECTION, SOLUTION INTRAMUSCULAR; INTRAVENOUS; SUBCUTANEOUS at 03:17

## 2021-03-16 RX ADMIN — ONDANSETRON 4 MG: 2 SOLUTION INTRAMUSCULAR; INTRAVENOUS at 03:21

## 2021-03-16 RX ADMIN — HYDROMORPHONE HYDROCHLORIDE 1 MG: 1 INJECTION, SOLUTION INTRAMUSCULAR; INTRAVENOUS; SUBCUTANEOUS at 06:24

## 2021-03-16 RX ADMIN — OXYCODONE HYDROCHLORIDE AND ACETAMINOPHEN 2 TABLET: 5; 325 TABLET ORAL at 20:05

## 2021-03-16 RX ADMIN — PROMETHAZINE HYDROCHLORIDE 12.5 MG: 25 INJECTION INTRAMUSCULAR; INTRAVENOUS at 20:05

## 2021-03-16 RX ADMIN — HYDROMORPHONE HYDROCHLORIDE 1 MG: 1 INJECTION, SOLUTION INTRAMUSCULAR; INTRAVENOUS; SUBCUTANEOUS at 09:36

## 2021-03-16 RX ADMIN — HYDROMORPHONE HYDROCHLORIDE 1 MG: 1 INJECTION, SOLUTION INTRAMUSCULAR; INTRAVENOUS; SUBCUTANEOUS at 13:51

## 2021-03-16 RX ADMIN — CLONIDINE HYDROCHLORIDE 0.1 MG: 0.1 TABLET ORAL at 09:41

## 2021-03-16 RX ADMIN — CITALOPRAM HYDROBROMIDE 10 MG: 20 TABLET ORAL at 09:41

## 2021-03-16 RX ADMIN — ONDANSETRON 4 MG: 2 SOLUTION INTRAMUSCULAR; INTRAVENOUS at 09:36

## 2021-03-16 RX ADMIN — FOLIC ACID 1 MG: 1 TABLET ORAL at 09:41

## 2021-03-16 RX ADMIN — SODIUM CHLORIDE, PRESERVATIVE FREE 10 ML: 5 INJECTION INTRAVENOUS at 09:36

## 2021-03-16 RX ADMIN — ONDANSETRON 4 MG: 2 SOLUTION INTRAMUSCULAR; INTRAVENOUS at 16:54

## 2021-03-16 RX ADMIN — AMLODIPINE BESYLATE 5 MG: 5 TABLET ORAL at 09:41

## 2021-03-16 RX ADMIN — DICYCLOMINE HYDROCHLORIDE 10 MG: 10 CAPSULE ORAL at 20:01

## 2021-03-16 RX ADMIN — POTASSIUM CHLORIDE, DEXTROSE MONOHYDRATE AND SODIUM CHLORIDE: 150; 5; 450 INJECTION, SOLUTION INTRAVENOUS at 13:51

## 2021-03-16 RX ADMIN — LISINOPRIL 10 MG: 10 TABLET ORAL at 09:41

## 2021-03-16 RX ADMIN — LISINOPRIL 10 MG: 10 TABLET ORAL at 20:01

## 2021-03-16 RX ADMIN — MAGNESIUM SULFATE HEPTAHYDRATE 2000 MG: 40 INJECTION, SOLUTION INTRAVENOUS at 13:51

## 2021-03-16 RX ADMIN — ENOXAPARIN SODIUM 40 MG: 40 INJECTION SUBCUTANEOUS at 09:41

## 2021-03-16 RX ADMIN — DICYCLOMINE HYDROCHLORIDE 10 MG: 10 CAPSULE ORAL at 09:41

## 2021-03-16 RX ADMIN — HYDROMORPHONE HYDROCHLORIDE 1 MG: 1 INJECTION, SOLUTION INTRAMUSCULAR; INTRAVENOUS; SUBCUTANEOUS at 00:16

## 2021-03-16 RX ADMIN — PANTOPRAZOLE SODIUM 40 MG: 40 INJECTION, POWDER, FOR SOLUTION INTRAVENOUS at 09:41

## 2021-03-16 RX ADMIN — HYDROMORPHONE HYDROCHLORIDE 1 MG: 1 INJECTION, SOLUTION INTRAMUSCULAR; INTRAVENOUS; SUBCUTANEOUS at 18:19

## 2021-03-16 RX ADMIN — HYDROMORPHONE HYDROCHLORIDE 1 MG: 1 INJECTION, SOLUTION INTRAMUSCULAR; INTRAVENOUS; SUBCUTANEOUS at 22:17

## 2021-03-16 RX ADMIN — OXYCODONE HYDROCHLORIDE AND ACETAMINOPHEN 2 TABLET: 5; 325 TABLET ORAL at 12:34

## 2021-03-16 RX ADMIN — CLONIDINE HYDROCHLORIDE 0.1 MG: 0.1 TABLET ORAL at 20:01

## 2021-03-16 ASSESSMENT — PAIN DESCRIPTION - PAIN TYPE: TYPE: ACUTE PAIN

## 2021-03-16 ASSESSMENT — PAIN DESCRIPTION - LOCATION: LOCATION: ABDOMEN

## 2021-03-16 ASSESSMENT — PAIN DESCRIPTION - ONSET: ONSET: ON-GOING

## 2021-03-16 ASSESSMENT — PAIN - FUNCTIONAL ASSESSMENT: PAIN_FUNCTIONAL_ASSESSMENT: ACTIVITIES ARE NOT PREVENTED

## 2021-03-16 ASSESSMENT — PAIN DESCRIPTION - ORIENTATION: ORIENTATION: RIGHT;MID

## 2021-03-16 ASSESSMENT — PAIN SCALES - GENERAL
PAINLEVEL_OUTOF10: 6
PAINLEVEL_OUTOF10: 9
PAINLEVEL_OUTOF10: 8

## 2021-03-16 ASSESSMENT — PAIN DESCRIPTION - PROGRESSION: CLINICAL_PROGRESSION: GRADUALLY WORSENING

## 2021-03-16 ASSESSMENT — PAIN DESCRIPTION - FREQUENCY: FREQUENCY: CONTINUOUS

## 2021-03-16 NOTE — PROGRESS NOTES
PROGRESS NOTE  S:40 yrs Patient  admitted on 3/13/2021 with Acute cholecystitis [K81.0] . Today he complains of slightly improved nausea, vomiting, dysphagia, and epigastric/RUQ abdominal pain. Exam:   Vitals:    03/16/21 0823   BP: 112/73   Pulse: 95   Resp: 18   Temp: 99 °F (37.2 °C)   SpO2: 95%      General appearance: alert, appears stated age, cooperative and no distress  HEENT: Oropharynx clear, no lesions  Neck: no adenopathy and supple, symmetrical, trachea midline  Lungs: clear to auscultation bilaterally  Heart: regular rate and rhythm, S1, S2 normal, no murmur, click, rub or gallop  Abdomen: normal findings: bowel sounds normal, no masses palpable and symmetric and abnormal findings:  distended, obese and tenderness moderate in the epigastrium and in the RUQ  Extremities: extremities normal, atraumatic, no cyanosis or edema     Medications: Reviewed    Labs:  CBC:   Recent Labs     03/14/21  0716 03/15/21  0612 03/16/21  0849   WBC 10.8 8.7 9.2   HGB 8.9* 8.5* 8.3*   HCT 26.1* 25.4* 24.3*   MCV 99.5 101.0* 99.7    223 218     BMP:   Recent Labs     03/14/21  0100 03/14/21  0716 03/15/21  0612 03/16/21  0849   * 133* 133* 134*   K 2.8* 3.3* 3.2* 3.4*   CL 86* 90* 94* 97*   CO2 33* 29 28 25   PHOS 2.6  --   --   --    BUN 9 10 11 12   CREATININE <0.5* <0.5* <0.5* <0.5*     LIVER PROFILE:   Recent Labs     03/14/21  0100 03/14/21  0716 03/15/21  0612 03/16/21  0849   AST 89* 80* 78* 67*   ALT 33 29 30 27   LIPASE 75.0*  --   --   --    PROT 6.6 6.3* 6.5 6.6   BILIDIR 3.7*  --  2.1*  --    BILITOT 5.2* 4.9* 3.2* 3.4*   ALKPHOS 224* 210* 203* 181*     Attending Supervising [de-identified] Attestation Statement  The patient is a 41 y.o. male. I have performed a history and physical examination of the patient.  I discussed the case with my physician assistant Richmond Moscoso PA-C    I reviewed the patient's Past Medical History, Past Surgical History, evidence of gallbladder sludge and chronic pancreatitis. Continue supportive care. Continue NPO, IVF, pain control. Transition to oral pain regimen and clear diet tomorrow. May need surgery consult for elective cholecystectomy. Will follow.     Ana Luisa Wilkerson MD          [phone]  [phone]

## 2021-03-16 NOTE — PLAN OF CARE
Problem: Pain:  Goal: Control of acute pain  Description: Control of acute pain  Outcome: Ongoing     Problem: Falls - Risk of:  Goal: Will remain free from falls  Description: Will remain free from falls  Outcome: Ongoing     Problem: Infection:  Goal: Will remain free from infection  Description: Will remain free from infection  Outcome: Ongoing     Problem: Skin Integrity:  Goal: Skin integrity will stabilize  Description: Skin integrity will stabilize  Outcome: Ongoing

## 2021-03-16 NOTE — PROGRESS NOTES
Handoff report and transfer of care given to [facility]. Pt in stable condition. Call light within reach. Bed locked in lowest position. Denies further needs at this time.

## 2021-03-16 NOTE — PROGRESS NOTES
Pm assessment completed. See flowsheet. Medicated with dilaudid for abdominal pain and phenergan for nausea. See betzy Em RN\

## 2021-03-16 NOTE — PROGRESS NOTES
Patients chart accessed to give PRN pain medication rating pain 9/10, PO 10 mg oxycodone given; see MAR. Primary RN Tami Kimball aware.

## 2021-03-16 NOTE — PROGRESS NOTES
24.3*   MCV 99.5 101.0* 99.7    223 218     Recent Labs     03/14/21  0100 03/14/21  0716 03/15/21  0612 03/16/21  0849   * 133* 133* 134*   K 2.8* 3.3* 3.2* 3.4*   CL 86* 90* 94* 97*   CO2 33* 29 28 25   PHOS 2.6  --   --   --    BUN 9 10 11 12   CREATININE <0.5* <0.5* <0.5* <0.5*     Recent Labs     03/14/21  0332 03/14/21  0716   TROPONINI <0.01 <0.01       Coagulation:   Lab Results   Component Value Date    INR 1.28 10/04/2019     Cardiac markers:   Lab Results   Component Value Date    TROPONINI <0.01 03/14/2021         Lab Results   Component Value Date    ALT 27 03/16/2021    AST 67 (H) 03/16/2021    ALKPHOS 181 (H) 03/16/2021    BILITOT 3.4 (H) 03/16/2021       Lab Results   Component Value Date    INR 1.28 (H) 10/04/2019    INR 1.65 (H) 03/22/2019    PROTIME 14.6 (H) 10/04/2019    PROTIME 18.8 (H) 03/22/2019       Radiology    MRCP   *Normal caliber bile ducts without evidence of choledocholithiasis. *Hepatomegaly with severe fatty infiltration as described. *Mildly atrophic pancreas with ductal dilatation as measured and described   above, findings likely sequela of chronic pancreatitis. *Large amount of gallbladder sludge. US gall bladder    1. Gallbladder sludge with nonspecific wall thickening. 2. Abnormal common duct dilatation.  MRCP and/or ERCP would be options to   further evaluate. Assessment & Plan:      Abdominal pain likely pancreatitis     - admitted to med-surg.   - NPO, IVF's. - pain control: morphine prn  - antiemetics prn  - can start clears     Abn LFT   - no CBD stone per MRCP , Mildly atrophic pancreas with ductal dilatation noted but with gall bladder sludge  - bili improving  - pt reports no alcohol since 4 months     Hypokalemia  Hypomagnesemia  - replace electrolytes. Monitor labs.      Hyponatremia  - likely related to fluid volume depletion  - Give IVF's  - improved       Leukocytosis  - likely acute stress response resolved     Normocytic anemia  - Hgb 13.1 5/2020  - 9.4 on admission.  - folate def noted, start on supplements     Hypertension   - BP stable. Continue Clonidine, Amlodipine, Lisinopril. Anxiety  - Continue Celexa, Klonopin prn    Tobacco Dependence  - Recommended cessation    Obesity  - Body mass index is 38.15 kg/m². - Recommended weight loss    H/o alcohol abuse noted during previous admission. Admission last year for acute pancreatitis due to alcohol abuse. He had to to [facility] tube feedings.        DVT Prophylaxis: lovenox  Diet: Diet NPO Effective Now  Code Status: Full Code    Tarik Parks MD 3/16/2021 1:33 PM

## 2021-03-16 NOTE — PROGRESS NOTES
Pt requesting pain medicine and nausea medicine for a pain rating of 9/10. Pt reports pain in the right upper and left upper quadrants of his abdomen. Assessment complete meds passed. HOB up. Call light in reach. Bed locked In low position. Will continue to monitor.

## 2021-03-16 NOTE — PLAN OF CARE
Problem: Pain:  Goal: Pain level will decrease  Description: Pain level will decrease  Outcome: Ongoing  Goal: Control of acute pain  Description: Control of acute pain  Outcome: Ongoing  Goal: Control of chronic pain  Description: Control of chronic pain  Outcome: Ongoing  Goal: Patient's pain/discomfort is manageable  Description: Patient's pain/discomfort is manageable  Outcome: Ongoing     Problem: Falls - Risk of:  Goal: Will remain free from falls  Description: Will remain free from falls  Outcome: Ongoing  Goal: Absence of physical injury  Description: Absence of physical injury  Outcome: Ongoing     Problem: Infection:  Goal: Will remain free from infection  Description: Will remain free from infection  Outcome: Ongoing     Problem: Safety:  Goal: Free from accidental physical injury  Description: Free from accidental physical injury  Outcome: Ongoing  Goal: Free from intentional harm  Description: Free from intentional harm  Outcome: Ongoing     Problem: Daily Care:  Goal: Daily care needs are met  Description: Daily care needs are met  Outcome: Ongoing     Problem: Skin Integrity:  Goal: Skin integrity will stabilize  Description: Skin integrity will stabilize  Outcome: Ongoing     Problem: Discharge Planning:  Goal: Patients continuum of care needs are met  Description: Patients continuum of care needs are met  Outcome: Ongoing     Problem: Nutrition  Goal: Optimal nutrition therapy  Outcome: Ongoing  Goal: Understanding of nutritional guidelines  Outcome: Ongoing

## 2021-03-17 LAB
GLUCOSE BLD-MCNC: 118 MG/DL (ref 70–99)
GLUCOSE BLD-MCNC: 120 MG/DL (ref 70–99)
GLUCOSE BLD-MCNC: 129 MG/DL (ref 70–99)
GLUCOSE BLD-MCNC: 130 MG/DL (ref 70–99)
GLUCOSE BLD-MCNC: 131 MG/DL (ref 70–99)
GLUCOSE BLD-MCNC: 151 MG/DL (ref 70–99)
IGG 4: 137 MG/DL (ref 1–123)
PERFORMED ON: ABNORMAL

## 2021-03-17 PROCEDURE — 2500000003 HC RX 250 WO HCPCS: Performed by: INTERNAL MEDICINE

## 2021-03-17 PROCEDURE — C9113 INJ PANTOPRAZOLE SODIUM, VIA: HCPCS | Performed by: PHYSICIAN ASSISTANT

## 2021-03-17 PROCEDURE — 6370000000 HC RX 637 (ALT 250 FOR IP): Performed by: PHYSICIAN ASSISTANT

## 2021-03-17 PROCEDURE — 6360000002 HC RX W HCPCS: Performed by: INTERNAL MEDICINE

## 2021-03-17 PROCEDURE — 1200000000 HC SEMI PRIVATE

## 2021-03-17 PROCEDURE — 6370000000 HC RX 637 (ALT 250 FOR IP): Performed by: HOSPITALIST

## 2021-03-17 PROCEDURE — 2580000003 HC RX 258: Performed by: HOSPITALIST

## 2021-03-17 PROCEDURE — 6370000000 HC RX 637 (ALT 250 FOR IP): Performed by: INTERNAL MEDICINE

## 2021-03-17 PROCEDURE — 6360000002 HC RX W HCPCS: Performed by: HOSPITALIST

## 2021-03-17 PROCEDURE — 6360000002 HC RX W HCPCS: Performed by: PHYSICIAN ASSISTANT

## 2021-03-17 PROCEDURE — 99232 SBSQ HOSP IP/OBS MODERATE 35: CPT | Performed by: INTERNAL MEDICINE

## 2021-03-17 RX ORDER — PANTOPRAZOLE SODIUM 40 MG/10ML
40 INJECTION, POWDER, LYOPHILIZED, FOR SOLUTION INTRAVENOUS 2 TIMES DAILY
Status: DISCONTINUED | OUTPATIENT
Start: 2021-03-17 | End: 2021-04-05 | Stop reason: HOSPADM

## 2021-03-17 RX ADMIN — SODIUM CHLORIDE, PRESERVATIVE FREE 10 ML: 5 INJECTION INTRAVENOUS at 08:07

## 2021-03-17 RX ADMIN — LISINOPRIL 10 MG: 10 TABLET ORAL at 20:38

## 2021-03-17 RX ADMIN — HYDROMORPHONE HYDROCHLORIDE 1 MG: 1 INJECTION, SOLUTION INTRAMUSCULAR; INTRAVENOUS; SUBCUTANEOUS at 12:01

## 2021-03-17 RX ADMIN — CLONIDINE HYDROCHLORIDE 0.1 MG: 0.1 TABLET ORAL at 07:55

## 2021-03-17 RX ADMIN — HYDROMORPHONE HYDROCHLORIDE 1 MG: 1 INJECTION, SOLUTION INTRAMUSCULAR; INTRAVENOUS; SUBCUTANEOUS at 05:36

## 2021-03-17 RX ADMIN — HYDROMORPHONE HYDROCHLORIDE 1 MG: 1 INJECTION, SOLUTION INTRAMUSCULAR; INTRAVENOUS; SUBCUTANEOUS at 01:59

## 2021-03-17 RX ADMIN — POTASSIUM CHLORIDE, DEXTROSE MONOHYDRATE AND SODIUM CHLORIDE: 150; 5; 450 INJECTION, SOLUTION INTRAVENOUS at 13:50

## 2021-03-17 RX ADMIN — OXYCODONE HYDROCHLORIDE AND ACETAMINOPHEN 2 TABLET: 5; 325 TABLET ORAL at 20:38

## 2021-03-17 RX ADMIN — PANTOPRAZOLE SODIUM 40 MG: 40 INJECTION, POWDER, FOR SOLUTION INTRAVENOUS at 07:55

## 2021-03-17 RX ADMIN — ONDANSETRON 4 MG: 2 SOLUTION INTRAMUSCULAR; INTRAVENOUS at 20:37

## 2021-03-17 RX ADMIN — AMLODIPINE BESYLATE 5 MG: 5 TABLET ORAL at 07:55

## 2021-03-17 RX ADMIN — HYDROMORPHONE HYDROCHLORIDE 1 MG: 1 INJECTION, SOLUTION INTRAMUSCULAR; INTRAVENOUS; SUBCUTANEOUS at 16:08

## 2021-03-17 RX ADMIN — PROMETHAZINE HYDROCHLORIDE 12.5 MG: 25 INJECTION INTRAMUSCULAR; INTRAVENOUS at 05:36

## 2021-03-17 RX ADMIN — HYDROMORPHONE HYDROCHLORIDE 1 MG: 1 INJECTION, SOLUTION INTRAMUSCULAR; INTRAVENOUS; SUBCUTANEOUS at 09:05

## 2021-03-17 RX ADMIN — ONDANSETRON 4 MG: 2 SOLUTION INTRAMUSCULAR; INTRAVENOUS at 07:55

## 2021-03-17 RX ADMIN — SODIUM CHLORIDE, PRESERVATIVE FREE 10 ML: 5 INJECTION INTRAVENOUS at 20:38

## 2021-03-17 RX ADMIN — POTASSIUM CHLORIDE, DEXTROSE MONOHYDRATE AND SODIUM CHLORIDE: 150; 5; 450 INJECTION, SOLUTION INTRAVENOUS at 00:56

## 2021-03-17 RX ADMIN — CITALOPRAM HYDROBROMIDE 10 MG: 20 TABLET ORAL at 07:55

## 2021-03-17 RX ADMIN — ENOXAPARIN SODIUM 40 MG: 40 INJECTION SUBCUTANEOUS at 07:54

## 2021-03-17 RX ADMIN — HYDROMORPHONE HYDROCHLORIDE 1 MG: 1 INJECTION, SOLUTION INTRAMUSCULAR; INTRAVENOUS; SUBCUTANEOUS at 19:02

## 2021-03-17 RX ADMIN — DICYCLOMINE HYDROCHLORIDE 10 MG: 10 CAPSULE ORAL at 07:55

## 2021-03-17 RX ADMIN — PANTOPRAZOLE SODIUM 40 MG: 40 INJECTION, POWDER, FOR SOLUTION INTRAVENOUS at 20:38

## 2021-03-17 RX ADMIN — HYDROMORPHONE HYDROCHLORIDE 1 MG: 1 INJECTION, SOLUTION INTRAMUSCULAR; INTRAVENOUS; SUBCUTANEOUS at 23:58

## 2021-03-17 RX ADMIN — OXYCODONE HYDROCHLORIDE AND ACETAMINOPHEN 2 TABLET: 5; 325 TABLET ORAL at 07:54

## 2021-03-17 RX ADMIN — ONDANSETRON 4 MG: 2 SOLUTION INTRAMUSCULAR; INTRAVENOUS at 13:54

## 2021-03-17 RX ADMIN — Medication 1 SPRAY: at 12:01

## 2021-03-17 RX ADMIN — OXYCODONE HYDROCHLORIDE AND ACETAMINOPHEN 2 TABLET: 5; 325 TABLET ORAL at 00:55

## 2021-03-17 RX ADMIN — DICYCLOMINE HYDROCHLORIDE 10 MG: 10 CAPSULE ORAL at 20:38

## 2021-03-17 RX ADMIN — Medication 1 SPRAY: at 17:55

## 2021-03-17 RX ADMIN — CLONIDINE HYDROCHLORIDE 0.1 MG: 0.1 TABLET ORAL at 20:38

## 2021-03-17 RX ADMIN — LISINOPRIL 10 MG: 10 TABLET ORAL at 07:54

## 2021-03-17 RX ADMIN — ONDANSETRON 4 MG: 2 SOLUTION INTRAMUSCULAR; INTRAVENOUS at 00:55

## 2021-03-17 RX ADMIN — FOLIC ACID 1 MG: 1 TABLET ORAL at 07:54

## 2021-03-17 RX ADMIN — PROMETHAZINE HYDROCHLORIDE 12.5 MG: 25 INJECTION INTRAMUSCULAR; INTRAVENOUS at 18:03

## 2021-03-17 ASSESSMENT — PAIN DESCRIPTION - ORIENTATION
ORIENTATION: RIGHT;MID
ORIENTATION: RIGHT;MID

## 2021-03-17 ASSESSMENT — PAIN DESCRIPTION - DESCRIPTORS
DESCRIPTORS: THROBBING
DESCRIPTORS: THROBBING

## 2021-03-17 ASSESSMENT — PAIN SCALES - GENERAL
PAINLEVEL_OUTOF10: 7
PAINLEVEL_OUTOF10: 4
PAINLEVEL_OUTOF10: 8
PAINLEVEL_OUTOF10: 9
PAINLEVEL_OUTOF10: 8
PAINLEVEL_OUTOF10: 6
PAINLEVEL_OUTOF10: 7
PAINLEVEL_OUTOF10: 7
PAINLEVEL_OUTOF10: 6

## 2021-03-17 ASSESSMENT — PAIN DESCRIPTION - ONSET
ONSET: ON-GOING
ONSET: ON-GOING

## 2021-03-17 ASSESSMENT — PAIN DESCRIPTION - LOCATION
LOCATION: ABDOMEN
LOCATION: ABDOMEN

## 2021-03-17 ASSESSMENT — PAIN DESCRIPTION - PAIN TYPE: TYPE: ACUTE PAIN

## 2021-03-17 ASSESSMENT — PAIN DESCRIPTION - PROGRESSION: CLINICAL_PROGRESSION: GRADUALLY WORSENING

## 2021-03-17 ASSESSMENT — PAIN DESCRIPTION - FREQUENCY: FREQUENCY: CONTINUOUS

## 2021-03-17 NOTE — PROGRESS NOTES
PT assessment complete. PT c/o of pain and nausea, with a pain scale of 8/10. rn gave 10mg of percocet and zofran. Morning medications passed. No active vomiting. Mother Toni Urbano is a worker at the hospital and is at the bedside currently. HOB up, call light in reach. Pt instructed to call out for needs.

## 2021-03-17 NOTE — PROGRESS NOTES
Physician Progress Note      PATIENT:               Alexei Le  CSN #:                  [id number]  :                       1980  ADMIT DATE:       3/13/2021 11:43 PM  [address] [place] DATE:  RESPONDING  PROVIDER #:        Shawn Zheng MD          QUERY TEXT:    Patient admitted with acute pancreatitis and noted to have wbc-12.3 and   tachycardia on admission. If possible, please document in progress notes and   discharge summary if you are evaluating and/or treating any of the following: The medical record reflects the following:  Risk Factors: acute pancreatitis, hx of etoh abuse  Clinical Indicators: wbc-12.3, tachycardia, no infectious source, Leukocytosis   - likely acute stress response resolved  Treatment: IVF, monitoring    Thank you for your assistance,  Sugar Gaviria RN,BSN,CCDS,CRCR  Options provided:  -- SIRS of non-infectious origin due to pancreatitis without acute organ   dysfunction  -- No SIRS  -- Other - I will add my own diagnosis  -- Disagree - Not applicable / Not valid  -- Disagree - Clinically unable to determine / Unknown  -- Refer to Clinical Documentation Reviewer    PROVIDER RESPONSE TEXT:    This patient does not have SIRS. Query created by: Cordella Galeazzi on 3/16/2021 11:09 AM      QUERY TEXT:    Pt admitted with acute pancreatitis. Noted documentation of mild malnutrition   per dietary consultant on 3/15.   If possible, please document in progress   notes and discharge summary:    The medical record reflects the following:  Risk Factors: acute pancreatitis,  altered GI function, altered GI structure,   pain, inadequate protein-energy intake  Clinical Indicators: NPO or clear liquid status due to medical condition, poor   intake prior to admission, GI abnormality, nausea, vomiting, mild muscle loss  Treatment: dietician consult, Continue NPO status and monitor for diet   advancement, Monitor for N/V, GI status, and nutrition-related lab values    Thank you for your assistance,  Sugar Gaviria RN,BSN,CCDS,CRCR  Options provided:  -- Mild malnutrition confirmed present on admission  -- Mild malnutrition ruled out  -- Other - I will add my own diagnosis  -- Disagree - Not applicable / Not valid  -- Disagree - Clinically unable to determine / Unknown  -- Refer to Clinical Documentation Reviewer    PROVIDER RESPONSE TEXT:    The diagnosis of mild malnutrition was ruled out.     Query created by: Loretta Harvey on 3/16/2021 11:14 AM      Electronically signed by:  Shirin Woodson MD 3/16/2021 8:34 PM

## 2021-03-17 NOTE — PLAN OF CARE
Problem: Pain:  Goal: Pain level will decrease  Description: Pain level will decrease  Outcome: Ongoing  Goal: Control of acute pain  Description: Control of acute pain  Outcome: Ongoing     Problem: Nutrition  Goal: Optimal nutrition therapy  Outcome: Ongoing

## 2021-03-17 NOTE — PROGRESS NOTES
TempSrc: Oral Oral Oral Oral   SpO2: 98% 98% 97% 94%   Weight:       Height:           Physical Examination: General appearance - alert, well appearing, and in no distress  Mental status - alert, oriented to person, place, and time  Eyes - pupils equal and reactive, extraocular eye movements intact  Neck - supple, no significant adenopathy  Chest - clear to auscultation, no wheezes, rales or rhonchi, symmetric air entry  Heart - normal rate, regular rhythm, normal S1, S2, no murmurs, rubs, clicks or gallops  Abdomen - tenderness noted upper abd  Extremities - no pedal edema noted          Impression: 41year old male with a history of HTN, arthritis, diverticulitis s/p partial colectomy and reversal of ileostomy, alcohol abuse, admitted with recurrent acute pancreatitis likely secondary to biliary sludge. MRCP negative for choledocholithiasis but there is evidence of gallbladder sludge and chronic pancreatitis. Recommendation:  1. Continue supportive care  2. Monitor LFTs  3. Monitor and replenish electrolytes  4. Monitor and document output  5. Continue PPI BID  6. Continue IVF, PO pain control  7. Continue clear liquid diet as toelrated  8. Up in chair or bed TID  9.  tobacco cessation  10. Consider surgery consult for elective cholecystectomy  11. Will follow      Leonides Queen PA-C  3:53 PM 3/17/2021                      41year old male with a history of HTN, arthritis, diverticulitis s/p partial colectomy and reversal of ileostomy, alcohol abuse, admitted with recurrent acute pancreatitis likely secondary to biliary sludge. MRCP negative for choledocholithiasis but there is evidence of gallbladder sludge and chronic pancreatitis. Continue supportive care. Continue NPO, IVF, pain control. Transition to oral pain regimen and clear diet. Agree with surgery consult for elective cholecystectomy. Will follow.     Radha Ellis MD          [phone]  [phone]

## 2021-03-17 NOTE — PROGRESS NOTES
BP stable. Continue Clonidine, Amlodipine, Lisinopril. Anxiety  - Continue Celexa, Klonopin prn    Tobacco Dependence  - Recommended cessation    Obesity  - Body mass index is 38.15 kg/m². - Recommended weight loss    H/o alcohol abuse noted during previous admission. Admission last year for acute pancreatitis due to alcohol abuse. He had to to [facility] tube feedings.        DVT Prophylaxis: lovenox  Diet: Diet NPO Effective Now  Code Status: Full Code        Alberto Lee MD 3/17/2021 11:54 AM

## 2021-03-18 LAB
A/G RATIO: 0.8 (ref 1.1–2.2)
ALBUMIN SERPL-MCNC: 3.1 G/DL (ref 3.4–5)
ALP BLD-CCNC: 181 U/L (ref 40–129)
ALT SERPL-CCNC: 23 U/L (ref 10–40)
ANION GAP SERPL CALCULATED.3IONS-SCNC: 12 MMOL/L (ref 3–16)
ANISOCYTOSIS: ABNORMAL
AST SERPL-CCNC: 62 U/L (ref 15–37)
BASOPHILS ABSOLUTE: 0 K/UL (ref 0–0.2)
BASOPHILS RELATIVE PERCENT: 0 %
BILIRUB SERPL-MCNC: 3.6 MG/DL (ref 0–1)
BUN BLDV-MCNC: 5 MG/DL (ref 7–20)
CALCIUM SERPL-MCNC: 8.9 MG/DL (ref 8.3–10.6)
CHLORIDE BLD-SCNC: 96 MMOL/L (ref 99–110)
CO2: 21 MMOL/L (ref 21–32)
CREAT SERPL-MCNC: <0.5 MG/DL (ref 0.9–1.3)
EOSINOPHILS ABSOLUTE: 0 K/UL (ref 0–0.6)
EOSINOPHILS RELATIVE PERCENT: 0 %
GFR AFRICAN AMERICAN: >60
GFR NON-AFRICAN AMERICAN: >60
GLOBULIN: 4 G/DL
GLUCOSE BLD-MCNC: 134 MG/DL (ref 70–99)
HCT VFR BLD CALC: 23.3 % (ref 40.5–52.5)
HEMOGLOBIN: 7.9 G/DL (ref 13.5–17.5)
HYPOCHROMIA: ABNORMAL
LYMPHOCYTES ABSOLUTE: 1.9 K/UL (ref 1–5.1)
LYMPHOCYTES RELATIVE PERCENT: 12 %
MCH RBC QN AUTO: 33.6 PG (ref 26–34)
MCHC RBC AUTO-ENTMCNC: 34 G/DL (ref 31–36)
MCV RBC AUTO: 98.7 FL (ref 80–100)
MONOCYTES ABSOLUTE: 0.5 K/UL (ref 0–1.3)
MONOCYTES RELATIVE PERCENT: 3 %
NEUTROPHILS ABSOLUTE: 13.3 K/UL (ref 1.7–7.7)
NEUTROPHILS RELATIVE PERCENT: 85 %
PDW BLD-RTO: 15.5 % (ref 12.4–15.4)
PLATELET # BLD: 308 K/UL (ref 135–450)
PLATELET SLIDE REVIEW: ADEQUATE
PMV BLD AUTO: 7.1 FL (ref 5–10.5)
POTASSIUM REFLEX MAGNESIUM: 3.7 MMOL/L (ref 3.5–5.1)
RBC # BLD: 2.36 M/UL (ref 4.2–5.9)
SLIDE REVIEW: ABNORMAL
SODIUM BLD-SCNC: 129 MMOL/L (ref 136–145)
TOTAL PROTEIN: 7.1 G/DL (ref 6.4–8.2)
WBC # BLD: 15.7 K/UL (ref 4–11)

## 2021-03-18 PROCEDURE — 99232 SBSQ HOSP IP/OBS MODERATE 35: CPT | Performed by: INTERNAL MEDICINE

## 2021-03-18 PROCEDURE — C9113 INJ PANTOPRAZOLE SODIUM, VIA: HCPCS | Performed by: PHYSICIAN ASSISTANT

## 2021-03-18 PROCEDURE — 6360000002 HC RX W HCPCS: Performed by: INTERNAL MEDICINE

## 2021-03-18 PROCEDURE — 6360000002 HC RX W HCPCS: Performed by: HOSPITALIST

## 2021-03-18 PROCEDURE — 2580000003 HC RX 258: Performed by: HOSPITALIST

## 2021-03-18 PROCEDURE — 80053 COMPREHEN METABOLIC PANEL: CPT

## 2021-03-18 PROCEDURE — 2500000003 HC RX 250 WO HCPCS: Performed by: INTERNAL MEDICINE

## 2021-03-18 PROCEDURE — 6370000000 HC RX 637 (ALT 250 FOR IP): Performed by: PHYSICIAN ASSISTANT

## 2021-03-18 PROCEDURE — 1200000000 HC SEMI PRIVATE

## 2021-03-18 PROCEDURE — 6360000002 HC RX W HCPCS: Performed by: PHYSICIAN ASSISTANT

## 2021-03-18 PROCEDURE — 6370000000 HC RX 637 (ALT 250 FOR IP): Performed by: HOSPITALIST

## 2021-03-18 PROCEDURE — 85025 COMPLETE CBC W/AUTO DIFF WBC: CPT

## 2021-03-18 PROCEDURE — 36415 COLL VENOUS BLD VENIPUNCTURE: CPT

## 2021-03-18 PROCEDURE — 6370000000 HC RX 637 (ALT 250 FOR IP): Performed by: INTERNAL MEDICINE

## 2021-03-18 RX ORDER — DIMETHICONE, OXYBENZONE, AND PADIMATE O 2; 2.5; 6.6 G/100G; G/100G; G/100G
STICK TOPICAL
Status: COMPLETED
Start: 2021-03-18 | End: 2021-03-19

## 2021-03-18 RX ADMIN — SODIUM CHLORIDE, PRESERVATIVE FREE 10 ML: 5 INJECTION INTRAVENOUS at 09:32

## 2021-03-18 RX ADMIN — LISINOPRIL 10 MG: 10 TABLET ORAL at 20:54

## 2021-03-18 RX ADMIN — PROMETHAZINE HYDROCHLORIDE 12.5 MG: 25 INJECTION INTRAMUSCULAR; INTRAVENOUS at 03:23

## 2021-03-18 RX ADMIN — PANTOPRAZOLE SODIUM 40 MG: 40 INJECTION, POWDER, FOR SOLUTION INTRAVENOUS at 09:32

## 2021-03-18 RX ADMIN — CITALOPRAM HYDROBROMIDE 10 MG: 20 TABLET ORAL at 09:31

## 2021-03-18 RX ADMIN — HYDROMORPHONE HYDROCHLORIDE 1 MG: 1 INJECTION, SOLUTION INTRAMUSCULAR; INTRAVENOUS; SUBCUTANEOUS at 20:54

## 2021-03-18 RX ADMIN — HYDROMORPHONE HYDROCHLORIDE 1 MG: 1 INJECTION, SOLUTION INTRAMUSCULAR; INTRAVENOUS; SUBCUTANEOUS at 13:56

## 2021-03-18 RX ADMIN — AMLODIPINE BESYLATE 5 MG: 5 TABLET ORAL at 09:31

## 2021-03-18 RX ADMIN — OXYCODONE HYDROCHLORIDE AND ACETAMINOPHEN 2 TABLET: 5; 325 TABLET ORAL at 05:30

## 2021-03-18 RX ADMIN — DICYCLOMINE HYDROCHLORIDE 10 MG: 10 CAPSULE ORAL at 09:31

## 2021-03-18 RX ADMIN — ONDANSETRON 4 MG: 2 SOLUTION INTRAMUSCULAR; INTRAVENOUS at 06:34

## 2021-03-18 RX ADMIN — PANTOPRAZOLE SODIUM 40 MG: 40 INJECTION, POWDER, FOR SOLUTION INTRAVENOUS at 20:53

## 2021-03-18 RX ADMIN — PROMETHAZINE HYDROCHLORIDE 12.5 MG: 25 INJECTION INTRAMUSCULAR; INTRAVENOUS at 09:31

## 2021-03-18 RX ADMIN — Medication 1 SPRAY: at 06:33

## 2021-03-18 RX ADMIN — SODIUM CHLORIDE, PRESERVATIVE FREE 10 ML: 5 INJECTION INTRAVENOUS at 20:53

## 2021-03-18 RX ADMIN — HYDROMORPHONE HYDROCHLORIDE 1 MG: 1 INJECTION, SOLUTION INTRAMUSCULAR; INTRAVENOUS; SUBCUTANEOUS at 03:23

## 2021-03-18 RX ADMIN — LISINOPRIL 10 MG: 10 TABLET ORAL at 09:31

## 2021-03-18 RX ADMIN — CLONIDINE HYDROCHLORIDE 0.1 MG: 0.1 TABLET ORAL at 09:31

## 2021-03-18 RX ADMIN — HYDROMORPHONE HYDROCHLORIDE 1 MG: 1 INJECTION, SOLUTION INTRAMUSCULAR; INTRAVENOUS; SUBCUTANEOUS at 06:34

## 2021-03-18 RX ADMIN — HYDROMORPHONE HYDROCHLORIDE 1 MG: 1 INJECTION, SOLUTION INTRAMUSCULAR; INTRAVENOUS; SUBCUTANEOUS at 17:51

## 2021-03-18 RX ADMIN — PROMETHAZINE HYDROCHLORIDE 12.5 MG: 25 INJECTION INTRAMUSCULAR; INTRAVENOUS at 20:53

## 2021-03-18 RX ADMIN — HYDROMORPHONE HYDROCHLORIDE 1 MG: 1 INJECTION, SOLUTION INTRAMUSCULAR; INTRAVENOUS; SUBCUTANEOUS at 09:34

## 2021-03-18 RX ADMIN — CLONIDINE HYDROCHLORIDE 0.1 MG: 0.1 TABLET ORAL at 20:54

## 2021-03-18 RX ADMIN — ENOXAPARIN SODIUM 40 MG: 40 INJECTION SUBCUTANEOUS at 09:31

## 2021-03-18 RX ADMIN — Medication 1 SPRAY: at 09:52

## 2021-03-18 RX ADMIN — FOLIC ACID 1 MG: 1 TABLET ORAL at 09:31

## 2021-03-18 RX ADMIN — POTASSIUM CHLORIDE, DEXTROSE MONOHYDRATE AND SODIUM CHLORIDE: 150; 5; 450 INJECTION, SOLUTION INTRAVENOUS at 15:42

## 2021-03-18 RX ADMIN — POTASSIUM CHLORIDE, DEXTROSE MONOHYDRATE AND SODIUM CHLORIDE: 150; 5; 450 INJECTION, SOLUTION INTRAVENOUS at 02:39

## 2021-03-18 RX ADMIN — DICYCLOMINE HYDROCHLORIDE 10 MG: 10 CAPSULE ORAL at 20:54

## 2021-03-18 RX ADMIN — ONDANSETRON 4 MG: 2 SOLUTION INTRAMUSCULAR; INTRAVENOUS at 17:51

## 2021-03-18 ASSESSMENT — PAIN DESCRIPTION - FREQUENCY
FREQUENCY: CONTINUOUS

## 2021-03-18 ASSESSMENT — PAIN DESCRIPTION - ORIENTATION
ORIENTATION: RIGHT;MID

## 2021-03-18 ASSESSMENT — PAIN SCALES - GENERAL
PAINLEVEL_OUTOF10: 9
PAINLEVEL_OUTOF10: 6
PAINLEVEL_OUTOF10: 8
PAINLEVEL_OUTOF10: 7
PAINLEVEL_OUTOF10: 5
PAINLEVEL_OUTOF10: 7

## 2021-03-18 ASSESSMENT — PAIN DESCRIPTION - PROGRESSION: CLINICAL_PROGRESSION: GRADUALLY WORSENING

## 2021-03-18 ASSESSMENT — PAIN DESCRIPTION - DESCRIPTORS
DESCRIPTORS: THROBBING
DESCRIPTORS: THROBBING

## 2021-03-18 ASSESSMENT — PAIN DESCRIPTION - PAIN TYPE: TYPE: ACUTE PAIN

## 2021-03-18 ASSESSMENT — PAIN DESCRIPTION - LOCATION
LOCATION: ABDOMEN
LOCATION: ABDOMEN

## 2021-03-18 ASSESSMENT — PAIN - FUNCTIONAL ASSESSMENT: PAIN_FUNCTIONAL_ASSESSMENT: ACTIVITIES ARE NOT PREVENTED

## 2021-03-18 ASSESSMENT — PAIN DESCRIPTION - ONSET: ONSET: ON-GOING

## 2021-03-18 NOTE — PROGRESS NOTES
PROGRESS NOTE  S:40 yrs Patient  admitted on 3/13/2021 with Acute cholecystitis [K81.0] . Today he has improved nausea and epigastric/RUQ abdominal pain and cramping. He did vomit after breakfast this morning. He passed clear diarrhea yesterday, and admits to abdominal distention. Exam:   Vitals:    03/18/21 0915   BP: 135/79   Pulse: 99   Resp: 18   Temp: 98.2 °F (36.8 °C)   SpO2: 95%      General appearance: alert, appears stated age, cooperative, no distress and morbidly obese  HEENT: Oropharynx clear, no lesions  Neck: no adenopathy and supple, symmetrical, trachea midline  Lungs: clear to auscultation bilaterally  Heart: regular rate and rhythm, S1, S2 normal, no murmur, click, rub or gallop  Abdomen: normal findings: bowel sounds normal, no masses palpable and symmetric and abnormal findings:  distended, obese and tenderness moderate in the epigastrium and in the RUQ  Extremities: extremities normal, atraumatic, no cyanosis or edema     Medications: Reviewed    Labs:  CBC:   Recent Labs     03/16/21  0849 03/18/21  0910   WBC 9.2 15.7*   HGB 8.3* 7.9*   HCT 24.3* 23.3*   MCV 99.7 98.7    308     BMP:   Recent Labs     03/16/21  0849 03/18/21  0910   * 129*   K 3.4* 3.7   CL 97* 96*   CO2 25 21   BUN 12 5*   CREATININE <0.5* <0.5*     LIVER PROFILE:   Recent Labs     03/16/21  0849 03/18/21  0910   AST 67* 62*   ALT 27 23   PROT 6.6 7.1   BILITOT 3.4* 3.6*   ALKPHOS 181* 181*     Attending Supervising [de-identified] Attestation Statement  The patient is a 41 y.o. male. I have performed a history and physical examination of the patient. I discussed the case with my physician assistant Estefania Ivey PA-C    I reviewed the patient's Past Medical History, Past Surgical History, Medications, and Allergies.      Physical Exam:  Vitals:    03/17/21 2345 03/18/21 0240 03/18/21 0915 03/18/21 1353   BP: 111/73 116/71 135/79 106/71   Pulse: 92 94 99 97   Resp:  16 18 18   Temp:  98.9 °F (37.2 °C) 98.2 °F (36.8 °C) 98.8 °F (37.1 °C)   TempSrc:  Oral Oral Oral   SpO2:  98% 95% 96%   Weight:       Height:           Physical Examination: General appearance - alert, well appearing, and in no distress  Mental status - alert, oriented to person, place, and time  Eyes - pupils equal and reactive, extraocular eye movements intact  Neck - supple, no significant adenopathy  Chest - clear to auscultation, no wheezes, rales or rhonchi, symmetric air entry  Heart - normal rate, regular rhythm, normal S1, S2, no murmurs, rubs, clicks or gallops  Abdomen - soft, nontender, nondistended, no masses or organomegaly  Extremities - no pedal edema noted          Impression: 41year old male with a history of HTN, arthritis, diverticulitis s/p partial colectomy and reversal of ileostomy, alcohol abuse, admitted with recurrent acute pancreatitis likely secondary to biliary sludge. MRCP negative for choledocholithiasis but there is evidence of gallbladder sludge and chronic pancreatitis. Recommendation:  1. Continue supportive care  2. Monitor LFTs  3. Monitor and document output  4. NPO, IVF  5. Recommend PO pain control  6. Continue PPI BID  7. Ambulate TID  8. Up in chair or bed TID  9. Will follow  10. Consider surgery consult for elective cholecystectomy       Tresa Kruse PA-C  11:15 AM 3/18/2021                      41year old male with a history of HTN, arthritis, diverticulitis s/p partial colectomy and reversal of ileostomy, alcohol abuse, admitted with recurrent acute pancreatitis likely secondary to biliary sludge. Continue supportive care. resort back to NPO given persistent N/V, pain requiring continuous IV narcotics every 3h. Continue IVF, pain control. Transition to oral pain regimen and clear diet tomorrow if improved. Alternatively can consider [place] tube placement for post pyloric feeds. Agree with surgery consult for elective cholecystectomy. Will follow.     Pablo Gomez

## 2021-03-18 NOTE — PLAN OF CARE
Problem: Pain:  Goal: Pain level will decrease  Description: Pain level will decrease  Outcome: Ongoing  Goal: Control of chronic pain  Description: Control of chronic pain  Outcome: Ongoing     Problem: Safety:  Goal: Free from accidental physical injury  Description: Free from accidental physical injury  Outcome: Ongoing  Goal: Free from intentional harm  Description: Free from intentional harm  Outcome: Ongoing

## 2021-03-18 NOTE — PROGRESS NOTES
RN walked into room to check on pt, and pt inquired about pain medicine. At that time, the GI MD came into the room and discussed his plan, and the faults of needing IV pain control versus PO pain control. After the MD left the room, This RN asked the pt if he needed any pain control, and the pt stated he didn't want either. RN reminded pt that if he needs pain control, he can call out and receive.

## 2021-03-18 NOTE — PROGRESS NOTES
admission. Trending down with IVF  monitor  - folate def noted, started on supplements     Hypertension   - BP stable. Continue Clonidine, Amlodipine, Lisinopril. Anxiety  - Continue Celexa, Klonopin prn    Tobacco Dependence  - Recommended cessation    Obesity  - Body mass index is 38.15 kg/m². - Recommended weight loss    H/o alcohol abuse noted during previous admission. Admission last year for acute pancreatitis due to alcohol abuse. He had to to [facility] tube feedings.        DVT Prophylaxis: lovenox  Diet: Diet NPO Effective Now  Code Status: Full Code        Kellen Santana MD 3/18/2021 1:23 PM

## 2021-03-18 NOTE — PROGRESS NOTES
[facility] expresses no needs at this time. Care transferred.

## 2021-03-18 NOTE — PROGRESS NOTES
Pt resting. Resp e/e. Shift assessment completed and charted. No needs. Will monitor.  Jonathan Palomino

## 2021-03-19 PROCEDURE — 6370000000 HC RX 637 (ALT 250 FOR IP): Performed by: PHYSICIAN ASSISTANT

## 2021-03-19 PROCEDURE — 6370000000 HC RX 637 (ALT 250 FOR IP): Performed by: HOSPITALIST

## 2021-03-19 PROCEDURE — 6360000002 HC RX W HCPCS: Performed by: INTERNAL MEDICINE

## 2021-03-19 PROCEDURE — 6370000000 HC RX 637 (ALT 250 FOR IP): Performed by: INTERNAL MEDICINE

## 2021-03-19 PROCEDURE — 2580000003 HC RX 258: Performed by: HOSPITALIST

## 2021-03-19 PROCEDURE — 2500000003 HC RX 250 WO HCPCS: Performed by: INTERNAL MEDICINE

## 2021-03-19 PROCEDURE — 6360000002 HC RX W HCPCS: Performed by: HOSPITALIST

## 2021-03-19 PROCEDURE — 99232 SBSQ HOSP IP/OBS MODERATE 35: CPT | Performed by: INTERNAL MEDICINE

## 2021-03-19 PROCEDURE — C9113 INJ PANTOPRAZOLE SODIUM, VIA: HCPCS | Performed by: PHYSICIAN ASSISTANT

## 2021-03-19 PROCEDURE — 6370000000 HC RX 637 (ALT 250 FOR IP)

## 2021-03-19 PROCEDURE — 1200000000 HC SEMI PRIVATE

## 2021-03-19 PROCEDURE — 6360000002 HC RX W HCPCS: Performed by: PHYSICIAN ASSISTANT

## 2021-03-19 RX ORDER — SUCRALFATE ORAL 1 G/10ML
1 SUSPENSION ORAL
Status: DISCONTINUED | OUTPATIENT
Start: 2021-03-19 | End: 2021-03-19 | Stop reason: CLARIF

## 2021-03-19 RX ORDER — SUCRALFATE 1 G/1
1 TABLET ORAL
Status: DISCONTINUED | OUTPATIENT
Start: 2021-03-19 | End: 2021-04-05 | Stop reason: HOSPADM

## 2021-03-19 RX ORDER — DOCUSATE SODIUM 100 MG/1
100 CAPSULE, LIQUID FILLED ORAL DAILY
Status: DISCONTINUED | OUTPATIENT
Start: 2021-03-19 | End: 2021-03-30

## 2021-03-19 RX ADMIN — LISINOPRIL 10 MG: 10 TABLET ORAL at 10:14

## 2021-03-19 RX ADMIN — ENOXAPARIN SODIUM 40 MG: 40 INJECTION SUBCUTANEOUS at 08:56

## 2021-03-19 RX ADMIN — PROMETHAZINE HYDROCHLORIDE 12.5 MG: 25 INJECTION INTRAMUSCULAR; INTRAVENOUS at 08:56

## 2021-03-19 RX ADMIN — HYDROMORPHONE HYDROCHLORIDE 1 MG: 1 INJECTION, SOLUTION INTRAMUSCULAR; INTRAVENOUS; SUBCUTANEOUS at 08:57

## 2021-03-19 RX ADMIN — CITALOPRAM HYDROBROMIDE 10 MG: 20 TABLET ORAL at 10:15

## 2021-03-19 RX ADMIN — OXYCODONE HYDROCHLORIDE AND ACETAMINOPHEN 2 TABLET: 5; 325 TABLET ORAL at 10:18

## 2021-03-19 RX ADMIN — SODIUM CHLORIDE, PRESERVATIVE FREE 10 ML: 5 INJECTION INTRAVENOUS at 08:57

## 2021-03-19 RX ADMIN — ONDANSETRON 4 MG: 2 SOLUTION INTRAMUSCULAR; INTRAVENOUS at 22:44

## 2021-03-19 RX ADMIN — AMLODIPINE BESYLATE 5 MG: 5 TABLET ORAL at 10:15

## 2021-03-19 RX ADMIN — SODIUM CHLORIDE, PRESERVATIVE FREE 10 ML: 5 INJECTION INTRAVENOUS at 22:40

## 2021-03-19 RX ADMIN — CLONIDINE HYDROCHLORIDE 0.1 MG: 0.1 TABLET ORAL at 10:14

## 2021-03-19 RX ADMIN — ONDANSETRON 4 MG: 2 SOLUTION INTRAMUSCULAR; INTRAVENOUS at 01:29

## 2021-03-19 RX ADMIN — DICYCLOMINE HYDROCHLORIDE 10 MG: 10 CAPSULE ORAL at 22:40

## 2021-03-19 RX ADMIN — OXYCODONE HYDROCHLORIDE AND ACETAMINOPHEN 2 TABLET: 5; 325 TABLET ORAL at 14:06

## 2021-03-19 RX ADMIN — Medication: at 00:09

## 2021-03-19 RX ADMIN — PROMETHAZINE HYDROCHLORIDE 12.5 MG: 25 INJECTION INTRAMUSCULAR; INTRAVENOUS at 16:59

## 2021-03-19 RX ADMIN — HYDROMORPHONE HYDROCHLORIDE 1 MG: 1 INJECTION, SOLUTION INTRAMUSCULAR; INTRAVENOUS; SUBCUTANEOUS at 04:15

## 2021-03-19 RX ADMIN — DICYCLOMINE HYDROCHLORIDE 10 MG: 10 CAPSULE ORAL at 10:14

## 2021-03-19 RX ADMIN — CLONAZEPAM 0.5 MG: 0.5 TABLET ORAL at 01:24

## 2021-03-19 RX ADMIN — POTASSIUM CHLORIDE, DEXTROSE MONOHYDRATE AND SODIUM CHLORIDE: 150; 5; 450 INJECTION, SOLUTION INTRAVENOUS at 16:53

## 2021-03-19 RX ADMIN — HYDROMORPHONE HYDROCHLORIDE 1 MG: 1 INJECTION, SOLUTION INTRAMUSCULAR; INTRAVENOUS; SUBCUTANEOUS at 00:09

## 2021-03-19 RX ADMIN — POTASSIUM CHLORIDE, DEXTROSE MONOHYDRATE AND SODIUM CHLORIDE: 150; 5; 450 INJECTION, SOLUTION INTRAVENOUS at 04:16

## 2021-03-19 RX ADMIN — Medication 10 ML: at 00:09

## 2021-03-19 RX ADMIN — CLONAZEPAM 0.5 MG: 0.5 TABLET ORAL at 22:50

## 2021-03-19 RX ADMIN — PANTOPRAZOLE SODIUM 40 MG: 40 INJECTION, POWDER, FOR SOLUTION INTRAVENOUS at 08:56

## 2021-03-19 RX ADMIN — Medication 10 ML: at 04:15

## 2021-03-19 RX ADMIN — ONDANSETRON 4 MG: 2 SOLUTION INTRAMUSCULAR; INTRAVENOUS at 14:02

## 2021-03-19 RX ADMIN — OXYCODONE HYDROCHLORIDE AND ACETAMINOPHEN 2 TABLET: 5; 325 TABLET ORAL at 01:24

## 2021-03-19 RX ADMIN — OXYCODONE HYDROCHLORIDE AND ACETAMINOPHEN 2 TABLET: 5; 325 TABLET ORAL at 19:22

## 2021-03-19 RX ADMIN — SUCRALFATE 1 G: 1 TABLET ORAL at 22:40

## 2021-03-19 RX ADMIN — PANTOPRAZOLE SODIUM 40 MG: 40 INJECTION, POWDER, FOR SOLUTION INTRAVENOUS at 22:40

## 2021-03-19 RX ADMIN — FOLIC ACID 1 MG: 1 TABLET ORAL at 10:14

## 2021-03-19 ASSESSMENT — PAIN SCALES - GENERAL
PAINLEVEL_OUTOF10: 8
PAINLEVEL_OUTOF10: 9

## 2021-03-19 ASSESSMENT — PAIN DESCRIPTION - PAIN TYPE: TYPE: ACUTE PAIN

## 2021-03-19 ASSESSMENT — PAIN DESCRIPTION - ORIENTATION: ORIENTATION: RIGHT;MID

## 2021-03-19 NOTE — PROGRESS NOTES
Significant smoke odor noted in pt room, especially the bathroom as the patient was exiting the bathroom. Writer asked pt if he was smoking in the room and he denied it. Writer asked again and asked if he brought cigarettes with him - he denied it. \"Are you calling me a liar? \" Pt educated about the safety risk of smoking in the facility, and pt again \"are you calling me a liar. \"     Security called to the room and educated the patient about the safety risks with smoking in the facility. Extinguished cigarette under wet paper towels found in bathroom trash and pt states that is not his cigarette.      Farhat Murphy Clinical

## 2021-03-19 NOTE — PLAN OF CARE
Nutrition Problem #1: Inadequate oral intake  Intervention: Food and/or Nutrient Delivery: Continue Current Diet, Start Oral Nutrition Supplement  Nutritional Goals: Pt will consume >50% of clear liquid diet meals x 3 meals per day + 50% or greater of Ensure Clear with meals without GI distress

## 2021-03-19 NOTE — PROGRESS NOTES
follow  13.  Outpatient referral to general surgery for elective cholecystectomy upon d/c      Francisca Cifuentes PA-C  12:29 PM 3/19/2021                     Agree w above    Ramez Platt MD       ([phone]) [phone]

## 2021-03-19 NOTE — PROGRESS NOTES
Comprehensive Nutrition Assessment    Type and Reason for Visit:  Reassess    Nutrition Recommendations/Plan:   1. Continue clear liquid diet order + low-fat restriction. 2. Added Ensure Clear with meals while patient is on clear liquid diet order. 3. Monitor appetite, meal intake + diet advancement, and acceptance/intake of ONS. 4. Please obtain an updated weight for this patient - last weight was obtained on 3/15/21.   5. Monitor nutrition-related labs, bowel function, and weight trends. Nutrition Assessment:  Pt is unchanged from last RD assessment AEB ongoing poor appetite, nausea and abdominal pain; pt is at risk for further compromise d/t altered nutrtion-related lab values, hx of alcohol abuse, and acute cholecystitis; will continue low fat clear liquid diet and monitor for advancement and will add Ensure Clear with meals    Malnutrition Assessment:  Malnutrition Status:  Mild malnutrition    Context:  Acute Illness     Findings of the 6 clinical characteristics of malnutrition:  Energy Intake:  7 - 50% or less of estimated energy requirements for 5 or more days  Weight Loss:  No significant weight loss     Body Fat Loss:  No significant body fat loss     Muscle Mass Loss:  1 - Mild muscle mass loss Hand (interosseous)  Fluid Accumulation:  1 - Mild Extremities(BUE +1 edema)   Strength:  Not Performed    Estimated Daily Nutrient Needs:  Energy (kcal):  0713-7910 kcals/day based on 15-17 kcals/kg/CBW; Weight Used for Energy Requirements:  Current     Protein (g):  101-118 g protein/day based on 1.2-1.4 g/kg/IBW; Weight Used for Protein Requirements:  Ideal        Fluid (ml/day):  6405-1050 ml; Method Used for Fluid Requirements:  1 ml/kcal      Nutrition Related Findings:  Pt is A/O x4; poor dentition; abdomen is soft, rounded, tender and BS active; BUE +1 edema; ongoing poor appetite; hx of diverticulitis, HTN, heroin abuse, acute pancreatitis, alcoholism;  Na remains low, BUN/Creatinine, H/H are low at this time; AST is elevated at this time      Wounds:  None       Current Nutrition Therapies:    DIET CLEAR LIQUID; Low Fat  Dietary Nutrition Supplements: Clear Liquid Oral Supplement    Anthropometric Measures:  · Height: 6' 1\" (185.4 cm)  · Current Body Weight: 289 lb 2 oz (131.1 kg)(obtained 3/15)   · Admission Body Weight: 288 lb 4.8 oz (130.8 kg)(obtained 3/13 - actual)    · Usual Body Weight: 287 lb (130.2 kg)(obtained 5/18/20 - unspecified method)     · Ideal Body Weight: 184 lbs; % Ideal Body Weight 157.1 %   · BMI: 38.2   · BMI Categories: Obese Class 2 (BMI 35.0 -39.9)       Nutrition Diagnosis:   · Inadequate oral intake related to altered GI function, altered GI structure, inadequate protein-energy intake as evidenced by NPO or clear liquid status due to medical condition, poor intake prior to admission, GI abnormality, nausea, vomiting, mild muscle loss      Nutrition Interventions:   Food and/or Nutrient Delivery:  Continue Current Diet, Start Oral Nutrition Supplement  Nutrition Education/Counseling:  No recommendation at this time   Coordination of Nutrition Care:  Continue to monitor while inpatient    Goals:  Pt will consume >50% of clear liquid diet meals x 3 meals per day + 50% or greater of Ensure Clear with meals without GI distress       Nutrition Monitoring and Evaluation:   Behavioral-Environmental Outcomes:  Beliefs and Attitutes, Knowledge or Skill, Readiness for Change   Food/Nutrient Intake Outcomes:  Diet Advancement/Tolerance, Food and Nutrient Intake, Supplement Intake  Physical Signs/Symptoms Outcomes:  GI Status, Nausea or Vomiting, Weight     Discharge Planning:     Too soon to determine     Electronically signed by Ericka Sears RD, LD on 3/19/21 at 5:19 PM EDT    Contact: [phone]

## 2021-03-19 NOTE — FLOWSHEET NOTE
Shift assessment complete. See flow sheet. Patient resting in bed, complains of 9/10 abdominal pain. Respirations easy and even at rest, diminished breath sounds. Dilaudid and phenergan given with scheduled medications. Pt states no additional needs. Call light and bedside table in reach. Will follow.      03/18/21 2030   Vital Signs   Temp 98 °F (36.7 °C)   Temp Source Oral   Pulse 99   Heart Rate Source Monitor   Resp 18   /81   BP Location Right lower arm   Patient Position Semi fowlers   Level of Consciousness Alert (0)   MEWS Score 1   Patient Currently in Pain Yes   Pain Assessment   Pain Assessment 0-10   Pain Level 9   Pain Type Acute pain   Pain Location Abdomen   Pain Orientation Right;Mid   Pain Descriptors Throbbing   Pain Frequency Continuous   Pain Onset On-going   Clinical Progression Gradually worsening   Functional Pain Assessment Activities are not prevented   Non-Pharmaceutical Pain Intervention(s) Rest   Oxygen Therapy   SpO2 98 %   O2 Device None (Room air)

## 2021-03-19 NOTE — PROGRESS NOTES
IM Progress Note    Admit Date:  3/13/2021  6    Interval history:  Admitted from [facility] with abd pain   Hx of alcohol abuse, reports he quit about 4 months ago       Subjective:  Mr. Ludwig Villa seen up in bed,reports pain issues still. Not happy about stopping dilaudid  Kept clears down     Not ambulating , remains in bed    Objective:   /76   Pulse 95   Temp 99.4 °F (37.4 °C) (Oral)   Resp 18   Ht 6' 1\" (1.854 m)   Wt 289 lb 2 oz (131.1 kg) Comment: obtained by RD this afternoon  SpO2 98%   BMI 38.15 kg/m²       Intake/Output Summary (Last 24 hours) at 3/19/2021 1408  Last data filed at 3/19/2021 0845  Gross per 24 hour   Intake 1891 ml   Output --   Net 1891 ml       Physical Exam:        General: young male, obese,  Awake, alert and oriented. Appears to be not in any distress  Mucous Membranes:  Pink , anicteric  Neck: No JVD, no carotid bruit, no thyromegaly  Chest:  Clear to auscultation bilaterally, no added sounds  Cardiovascular:  RRR S1S2 heard, no murmurs or gallops  Abdomen: hard, undistended, non tender, no organomegaly, BS present  Extremities: No edema or cyanosis.  Distal pulses well felt  Neurological : grossly normal      Medications:   Scheduled Medications:    docusate sodium  100 mg Oral Daily    sucralfate  1 g Oral 4x Daily AC & HS    pantoprazole  40 mg Intravenous BID    folic acid  1 mg Oral Daily    citalopram  10 mg Oral Daily    amLODIPine  5 mg Oral Daily    cloNIDine  0.1 mg Oral BID    lisinopril  10 mg Oral BID    dicyclomine  10 mg Oral BID    sodium chloride flush  10 mL Intravenous 2 times per day    enoxaparin  40 mg Subcutaneous Daily     I   dextrose 5% and 0.45% NaCl with KCl 20 mEq 75 mL/hr at 03/19/21 0416     phenol, oxyCODONE-acetaminophen **OR** oxyCODONE-acetaminophen, clonazePAM, sodium chloride flush, [DISCONTINUED] promethazine **OR** ondansetron, polyethylene glycol, acetaminophen **OR** acetaminophen, potassium chloride, promethazine    Lab Data:  Recent Labs     03/18/21  0910   WBC 15.7*   HGB 7.9*   HCT 23.3*   MCV 98.7        Recent Labs     03/18/21  0910   *   K 3.7   CL 96*   CO2 21   BUN 5*   CREATININE <0.5*     No results for input(s): CKTOTAL, CKMB, CKMBINDEX, TROPONINI in the last 72 hours. Coagulation:   Lab Results   Component Value Date    INR 1.28 10/04/2019     Cardiac markers:   Lab Results   Component Value Date    TROPONINI <0.01 03/14/2021         Lab Results   Component Value Date    ALT 23 03/18/2021    AST 62 (H) 03/18/2021    ALKPHOS 181 (H) 03/18/2021    BILITOT 3.6 (H) 03/18/2021       Lab Results   Component Value Date    INR 1.28 (H) 10/04/2019    INR 1.65 (H) 03/22/2019    PROTIME 14.6 (H) 10/04/2019    PROTIME 18.8 (H) 03/22/2019       Radiology    MRCP   *Normal caliber bile ducts without evidence of choledocholithiasis. *Hepatomegaly with severe fatty infiltration as described. *Mildly atrophic pancreas with ductal dilatation as measured and described   above, findings likely sequela of chronic pancreatitis. *Large amount of gallbladder sludge. US gall bladder    1. Gallbladder sludge with nonspecific wall thickening. 2. Abnormal common duct dilatation.  MRCP and/or ERCP would be options to   further evaluate. Assessment & Plan:      Abdominal pain likely pancreatitis     - admitted to med-surg.   - NPO, IVF's. - pain control: morphine prn  - antiemetics prn  -- pain came back with starting diet. Trial of food again today   - cut down narcotics     Abn LFT   - no CBD stone per MRCP , Mildly atrophic pancreas with ductal dilatation noted but with gall bladder sludge  - bili improving  - pt reports no alcohol since 4 months   Refer to SUrgery at [place] for cholecystectomy     Hypokalemia  Hypomagnesemia  - replace electrolytes. Monitor labs.      Hyponatremia  - likely related to fluid volume depletion  - Give IVF's  - improved       Leukocytosis  - likely acute stress response - f/w Normocytic  anemia  -  - 9.4 on admission. Trending down with IVF  monitor  - folate def noted, started on supplements     Hypertension   - BP stable. Continue Clonidine, Amlodipine, Lisinopril. Anxiety  - Continue Celexa, Klonopin prn    Tobacco Dependence  - Recommended cessation    Obesity  - Body mass index is 38.15 kg/m². - Recommended weight loss    H/o alcohol abuse noted during previous admission. Admission last year for acute pancreatitis due to alcohol abuse. He had to to [place] tube feedings.        DVT Prophylaxis: lovenox  Diet: Diet NPO Effective Now  Code Status: Full Code        Beti Griffiths MD 3/19/2021 2:08 PM

## 2021-03-20 LAB
ANISOCYTOSIS: ABNORMAL
BANDED NEUTROPHILS RELATIVE PERCENT: 2 % (ref 0–7)
BASOPHILS ABSOLUTE: 0 K/UL (ref 0–0.2)
BASOPHILS RELATIVE PERCENT: 0 %
EOSINOPHILS ABSOLUTE: 0.1 K/UL (ref 0–0.6)
EOSINOPHILS RELATIVE PERCENT: 1 %
GLUCOSE BLD-MCNC: 134 MG/DL (ref 70–99)
HCT VFR BLD CALC: 21.2 % (ref 40.5–52.5)
HEMOGLOBIN: 7.1 G/DL (ref 13.5–17.5)
HYPOCHROMIA: ABNORMAL
LYMPHOCYTES ABSOLUTE: 2 K/UL (ref 1–5.1)
LYMPHOCYTES RELATIVE PERCENT: 24 %
MCH RBC QN AUTO: 33.9 PG (ref 26–34)
MCHC RBC AUTO-ENTMCNC: 33.6 G/DL (ref 31–36)
MCV RBC AUTO: 101 FL (ref 80–100)
MONOCYTES ABSOLUTE: 0.2 K/UL (ref 0–1.3)
MONOCYTES RELATIVE PERCENT: 2 %
NEUTROPHILS ABSOLUTE: 6 K/UL (ref 1.7–7.7)
NEUTROPHILS RELATIVE PERCENT: 71 %
OCCULT BLOOD DIAGNOSTIC: NORMAL
PDW BLD-RTO: 16.3 % (ref 12.4–15.4)
PERFORMED ON: ABNORMAL
PLATELET # BLD: 215 K/UL (ref 135–450)
PLATELET SLIDE REVIEW: ADEQUATE
PMV BLD AUTO: 7.1 FL (ref 5–10.5)
RBC # BLD: 2.1 M/UL (ref 4.2–5.9)
SLIDE REVIEW: ABNORMAL
WBC # BLD: 8.2 K/UL (ref 4–11)

## 2021-03-20 PROCEDURE — 6360000002 HC RX W HCPCS: Performed by: INTERNAL MEDICINE

## 2021-03-20 PROCEDURE — 1200000000 HC SEMI PRIVATE

## 2021-03-20 PROCEDURE — G0328 FECAL BLOOD SCRN IMMUNOASSAY: HCPCS

## 2021-03-20 PROCEDURE — 36415 COLL VENOUS BLD VENIPUNCTURE: CPT

## 2021-03-20 PROCEDURE — 6360000002 HC RX W HCPCS: Performed by: PHYSICIAN ASSISTANT

## 2021-03-20 PROCEDURE — 6370000000 HC RX 637 (ALT 250 FOR IP): Performed by: HOSPITALIST

## 2021-03-20 PROCEDURE — C9113 INJ PANTOPRAZOLE SODIUM, VIA: HCPCS | Performed by: PHYSICIAN ASSISTANT

## 2021-03-20 PROCEDURE — 85025 COMPLETE CBC W/AUTO DIFF WBC: CPT

## 2021-03-20 PROCEDURE — 6370000000 HC RX 637 (ALT 250 FOR IP): Performed by: PHYSICIAN ASSISTANT

## 2021-03-20 PROCEDURE — 6360000002 HC RX W HCPCS: Performed by: HOSPITALIST

## 2021-03-20 PROCEDURE — 2500000003 HC RX 250 WO HCPCS: Performed by: INTERNAL MEDICINE

## 2021-03-20 PROCEDURE — 2580000003 HC RX 258: Performed by: HOSPITALIST

## 2021-03-20 PROCEDURE — 99232 SBSQ HOSP IP/OBS MODERATE 35: CPT | Performed by: INTERNAL MEDICINE

## 2021-03-20 RX ADMIN — HYDROMORPHONE HYDROCHLORIDE 0.5 MG: 1 INJECTION, SOLUTION INTRAMUSCULAR; INTRAVENOUS; SUBCUTANEOUS at 12:26

## 2021-03-20 RX ADMIN — OXYCODONE HYDROCHLORIDE AND ACETAMINOPHEN 2 TABLET: 5; 325 TABLET ORAL at 04:58

## 2021-03-20 RX ADMIN — PANTOPRAZOLE SODIUM 40 MG: 40 INJECTION, POWDER, FOR SOLUTION INTRAVENOUS at 10:15

## 2021-03-20 RX ADMIN — SUCRALFATE 1 G: 1 TABLET ORAL at 21:09

## 2021-03-20 RX ADMIN — SODIUM CHLORIDE, PRESERVATIVE FREE 10 ML: 5 INJECTION INTRAVENOUS at 10:20

## 2021-03-20 RX ADMIN — OXYCODONE HYDROCHLORIDE AND ACETAMINOPHEN 2 TABLET: 5; 325 TABLET ORAL at 10:15

## 2021-03-20 RX ADMIN — OXYCODONE HYDROCHLORIDE AND ACETAMINOPHEN 2 TABLET: 5; 325 TABLET ORAL at 15:17

## 2021-03-20 RX ADMIN — PROMETHAZINE HYDROCHLORIDE 12.5 MG: 25 INJECTION INTRAMUSCULAR; INTRAVENOUS at 12:31

## 2021-03-20 RX ADMIN — POTASSIUM CHLORIDE, DEXTROSE MONOHYDRATE AND SODIUM CHLORIDE: 150; 5; 450 INJECTION, SOLUTION INTRAVENOUS at 21:07

## 2021-03-20 RX ADMIN — POTASSIUM CHLORIDE, DEXTROSE MONOHYDRATE AND SODIUM CHLORIDE: 150; 5; 450 INJECTION, SOLUTION INTRAVENOUS at 16:25

## 2021-03-20 RX ADMIN — HYDROMORPHONE HYDROCHLORIDE 0.5 MG: 1 INJECTION, SOLUTION INTRAMUSCULAR; INTRAVENOUS; SUBCUTANEOUS at 21:07

## 2021-03-20 RX ADMIN — OXYCODONE HYDROCHLORIDE AND ACETAMINOPHEN 2 TABLET: 5; 325 TABLET ORAL at 19:34

## 2021-03-20 RX ADMIN — DICYCLOMINE HYDROCHLORIDE 10 MG: 10 CAPSULE ORAL at 21:09

## 2021-03-20 RX ADMIN — ENOXAPARIN SODIUM 40 MG: 40 INJECTION SUBCUTANEOUS at 10:14

## 2021-03-20 RX ADMIN — PROMETHAZINE HYDROCHLORIDE 12.5 MG: 25 INJECTION INTRAMUSCULAR; INTRAVENOUS at 19:34

## 2021-03-20 RX ADMIN — HYDROMORPHONE HYDROCHLORIDE 0.5 MG: 1 INJECTION, SOLUTION INTRAMUSCULAR; INTRAVENOUS; SUBCUTANEOUS at 16:23

## 2021-03-20 RX ADMIN — PROMETHAZINE HYDROCHLORIDE 12.5 MG: 25 INJECTION INTRAMUSCULAR; INTRAVENOUS at 03:26

## 2021-03-20 RX ADMIN — SUCRALFATE 1 G: 1 TABLET ORAL at 04:58

## 2021-03-20 RX ADMIN — ONDANSETRON 4 MG: 2 SOLUTION INTRAMUSCULAR; INTRAVENOUS at 10:15

## 2021-03-20 RX ADMIN — OXYCODONE HYDROCHLORIDE AND ACETAMINOPHEN 2 TABLET: 5; 325 TABLET ORAL at 00:45

## 2021-03-20 RX ADMIN — PANTOPRAZOLE SODIUM 40 MG: 40 INJECTION, POWDER, FOR SOLUTION INTRAVENOUS at 21:07

## 2021-03-20 RX ADMIN — ONDANSETRON 4 MG: 2 SOLUTION INTRAMUSCULAR; INTRAVENOUS at 16:23

## 2021-03-20 RX ADMIN — POTASSIUM CHLORIDE, DEXTROSE MONOHYDRATE AND SODIUM CHLORIDE: 150; 5; 450 INJECTION, SOLUTION INTRAVENOUS at 00:44

## 2021-03-20 ASSESSMENT — PAIN SCALES - GENERAL
PAINLEVEL_OUTOF10: 10
PAINLEVEL_OUTOF10: 9
PAINLEVEL_OUTOF10: 9
PAINLEVEL_OUTOF10: 7
PAINLEVEL_OUTOF10: 5
PAINLEVEL_OUTOF10: 9

## 2021-03-20 ASSESSMENT — PAIN DESCRIPTION - LOCATION: LOCATION: ABDOMEN

## 2021-03-20 ASSESSMENT — PAIN DESCRIPTION - PAIN TYPE
TYPE: ACUTE PAIN
TYPE: ACUTE PAIN

## 2021-03-20 NOTE — PROGRESS NOTES
Moriah Archer is a 41 y.o. male patient.     Current Facility-Administered Medications   Medication Dose Route Frequency Provider Last Rate Last Admin    docusate sodium (COLACE) capsule 100 mg  100 mg Oral Daily Abraham Valentine MD        sucralfate (CARAFATE) tablet 1 g  1 g Oral 4x Daily AC & HS MACEY Phelps   1 g at 03/20/21 0458    phenol 1.4 % mouth spray 1 spray  1 spray Mouth/Throat Q2H PRN Abraham Valentine MD   1 spray at 03/18/21 0952    pantoprazole (PROTONIX) injection 40 mg  40 mg Intravenous BID MACEY Phelps   40 mg at 03/19/21 2240    oxyCODONE-acetaminophen (PERCOCET) 5-325 MG per tablet 1 tablet  1 tablet Oral Q4H PRN Michelevesnorbert [place] PA-C        Or    oxyCODONE-acetaminophen (PERCOCET) 5-325 MG per tablet 2 tablet  2 tablet Oral Q4H PRN MACEY Rich-C   2 tablet at 03/20/21 0458    dextrose 5 % and 0.45 % NaCl with KCl 20 mEq infusion   Intravenous Continuous Abraham Valentine MD 75 mL/hr at 03/20/21 0044 New Bag at 60/45/39 3845    folic acid (FOLVITE) tablet 1 mg  1 mg Oral Daily Abraham Valentine MD   1 mg at 03/19/21 1014    clonazePAM (KLONOPIN) tablet 0.5 mg  0.5 mg Oral BID PRN Delmy Dunham MD   0.5 mg at 03/19/21 2250    citalopram (CELEXA) tablet 10 mg  10 mg Oral Daily Delmy Meth, MD   10 mg at 03/19/21 1015    amLODIPine (NORVASC) tablet 5 mg  5 mg Oral Daily Delmy Meth MD   5 mg at 03/19/21 1015    cloNIDine (CATAPRES) tablet 0.1 mg  0.1 mg Oral BID Delmy Meth MD   0.1 mg at 03/19/21 1014    lisinopril (PRINIVIL;ZESTRIL) tablet 10 mg  10 mg Oral BID Delmy Meth, MD   10 mg at 03/19/21 1014    dicyclomine (BENTYL) capsule 10 mg  10 mg Oral BID Delmy Meth, MD   10 mg at 03/19/21 2240    sodium chloride flush 0.9 % injection 10 mL  10 mL Intravenous 2 times per day Delmy Dunham MD   10 mL at 03/19/21 2240    sodium chloride flush 0.9 % injection 10 mL  10 mL Intravenous PRN Delmy Dunham MD   10 mL at 03/19/21 0415    enoxaparin (LOVENOX) injection 40 mg  40 mg Subcutaneous Daily Jay Merino MD   40 mg at 03/19/21 0856    ondansetron (ZOFRAN) injection 4 mg  4 mg Intravenous Q6H PRN Jay Merino MD   4 mg at 03/19/21 2244    polyethylene glycol (GLYCOLAX) packet 17 g  17 g Oral Daily PRN Jay Merino MD        acetaminophen (TYLENOL) tablet 650 mg  650 mg Oral Q6H PRN Jay Merino MD        Or    acetaminophen (TYLENOL) suppository 650 mg  650 mg Rectal Q6H PRN Jay Merino MD        potassium chloride 10 mEq/100 mL IVPB (Peripheral Line)  10 mEq Intravenous PRN Jay Merino  mL/hr at 03/14/21 1135 10 mEq at 03/14/21 1135    promethazine (PHENERGAN) injection 12.5 mg  12.5 mg Intravenous Q6H PRN Bindu Evans MD   12.5 mg at 03/20/21 1871     Allergies   Allergen Reactions    Tramadol Other (See Comments)     Hot and sweaty     Active Problems:    Mild protein-calorie malnutrition (HCC)    Acute cholecystitis    Alcohol-induced chronic pancreatitis (HCC)    Abdominal pain, epigastric    Abnormal LFTs  Resolved Problems:    * No resolved hospital problems. *    Blood pressure 117/83, pulse 97, temperature 97.5 °F (36.4 °C), temperature source Oral, resp. rate 18, height 6' 1\" (1.854 m), weight 289 lb 2 oz (131.1 kg), SpO2 97 %. Subjective:  Symptoms:  Stable. Pain:  He complains of pain that is moderate. Objective:  General Appearance: In no acute distress. Vital signs: (most recent): Blood pressure 117/83, pulse 97, temperature 97.5 °F (36.4 °C), temperature source Oral, resp. rate 18, height 6' 1\" (1.854 m), weight 289 lb 2 oz (131.1 kg), SpO2 97 %. Abdomen: Abdomen is soft. Bowel sounds are normal.   There is right upper quadrant, left upper quadrant and epigastric tenderness.       Assessment & Plan  41year old male with a history of HTN, arthritis, diverticulitis s/p partial colectomy and reversal of ileostomy, alcohol abuse, admitted with recurrent

## 2021-03-20 NOTE — PROGRESS NOTES
improved       Leukocytosis  - likely acute stress response - f/w     Normocytic  anemia  -  - 9.4 on admission. Trending down with IVF  Monitor  - might need transfusion hb at 7.1  - folate def noted, started on supplements     Hypertension   - BP stable. Continue Clonidine, Amlodipine, Lisinopril. Anxiety  - Continue Celexa, Klonopin prn    Tobacco Dependence  - Recommended cessation    Obesity  - Body mass index is 38.15 kg/m². - Recommended weight loss    H/o alcohol abuse noted during previous admission. Admission last year for acute pancreatitis due to alcohol abuse. He had to to [facility] tube feedings.        DVT Prophylaxis: lovenox  Diet: Diet NPO Effective Now  Code Status: Full Code        Mesha Le MD 3/20/2021 7:56 AM

## 2021-03-20 NOTE — PROGRESS NOTES
Bedside report given to [facility]any [facility]ney RN  pt in stable condition no needs at this time.  Call light within reach

## 2021-03-21 LAB
ANISOCYTOSIS: ABNORMAL
BANDED NEUTROPHILS RELATIVE PERCENT: 3 % (ref 0–7)
BASOPHILS ABSOLUTE: 0.2 K/UL (ref 0–0.2)
BASOPHILS RELATIVE PERCENT: 2 %
EOSINOPHILS ABSOLUTE: 0 K/UL (ref 0–0.6)
EOSINOPHILS RELATIVE PERCENT: 0 %
GLUCOSE BLD-MCNC: 110 MG/DL (ref 70–99)
GLUCOSE BLD-MCNC: 113 MG/DL (ref 70–99)
GLUCOSE BLD-MCNC: 114 MG/DL (ref 70–99)
GLUCOSE BLD-MCNC: 115 MG/DL (ref 70–99)
GLUCOSE BLD-MCNC: 118 MG/DL (ref 70–99)
GLUCOSE BLD-MCNC: 158 MG/DL (ref 70–99)
HCT VFR BLD CALC: 23.1 % (ref 40.5–52.5)
HEMOGLOBIN: 7.8 G/DL (ref 13.5–17.5)
HYPOCHROMIA: ABNORMAL
LYMPHOCYTES ABSOLUTE: 0.9 K/UL (ref 1–5.1)
LYMPHOCYTES RELATIVE PERCENT: 11 %
MCH RBC QN AUTO: 34.2 PG (ref 26–34)
MCHC RBC AUTO-ENTMCNC: 33.6 G/DL (ref 31–36)
MCV RBC AUTO: 101.7 FL (ref 80–100)
METAMYELOCYTES RELATIVE PERCENT: 1 %
MONOCYTES ABSOLUTE: 0.5 K/UL (ref 0–1.3)
MONOCYTES RELATIVE PERCENT: 6 %
MYELOCYTE PERCENT: 1 %
NEUTROPHILS ABSOLUTE: 6.9 K/UL (ref 1.7–7.7)
NEUTROPHILS RELATIVE PERCENT: 76 %
PDW BLD-RTO: 16.5 % (ref 12.4–15.4)
PERFORMED ON: ABNORMAL
PLATELET # BLD: 203 K/UL (ref 135–450)
PLATELET SLIDE REVIEW: ADEQUATE
PMV BLD AUTO: 6.7 FL (ref 5–10.5)
RBC # BLD: 2.27 M/UL (ref 4.2–5.9)
SLIDE REVIEW: ABNORMAL
WBC # BLD: 8.5 K/UL (ref 4–11)

## 2021-03-21 PROCEDURE — 6370000000 HC RX 637 (ALT 250 FOR IP): Performed by: PHYSICIAN ASSISTANT

## 2021-03-21 PROCEDURE — 6360000002 HC RX W HCPCS: Performed by: PHYSICIAN ASSISTANT

## 2021-03-21 PROCEDURE — 6360000002 HC RX W HCPCS: Performed by: HOSPITALIST

## 2021-03-21 PROCEDURE — 6360000002 HC RX W HCPCS: Performed by: INTERNAL MEDICINE

## 2021-03-21 PROCEDURE — 6370000000 HC RX 637 (ALT 250 FOR IP): Performed by: HOSPITALIST

## 2021-03-21 PROCEDURE — C9113 INJ PANTOPRAZOLE SODIUM, VIA: HCPCS | Performed by: PHYSICIAN ASSISTANT

## 2021-03-21 PROCEDURE — 99232 SBSQ HOSP IP/OBS MODERATE 35: CPT | Performed by: INTERNAL MEDICINE

## 2021-03-21 PROCEDURE — 2500000003 HC RX 250 WO HCPCS: Performed by: INTERNAL MEDICINE

## 2021-03-21 PROCEDURE — 6370000000 HC RX 637 (ALT 250 FOR IP): Performed by: INTERNAL MEDICINE

## 2021-03-21 PROCEDURE — 2580000003 HC RX 258: Performed by: HOSPITALIST

## 2021-03-21 PROCEDURE — 85025 COMPLETE CBC W/AUTO DIFF WBC: CPT

## 2021-03-21 PROCEDURE — 1200000000 HC SEMI PRIVATE

## 2021-03-21 PROCEDURE — 36415 COLL VENOUS BLD VENIPUNCTURE: CPT

## 2021-03-21 RX ADMIN — SUCRALFATE 1 G: 1 TABLET ORAL at 06:38

## 2021-03-21 RX ADMIN — AMLODIPINE BESYLATE 5 MG: 5 TABLET ORAL at 07:46

## 2021-03-21 RX ADMIN — DICYCLOMINE HYDROCHLORIDE 10 MG: 10 CAPSULE ORAL at 07:44

## 2021-03-21 RX ADMIN — HYDROMORPHONE HYDROCHLORIDE 0.5 MG: 1 INJECTION, SOLUTION INTRAMUSCULAR; INTRAVENOUS; SUBCUTANEOUS at 02:46

## 2021-03-21 RX ADMIN — OXYCODONE HYDROCHLORIDE AND ACETAMINOPHEN 2 TABLET: 5; 325 TABLET ORAL at 16:47

## 2021-03-21 RX ADMIN — HYDROMORPHONE HYDROCHLORIDE 0.5 MG: 1 INJECTION, SOLUTION INTRAMUSCULAR; INTRAVENOUS; SUBCUTANEOUS at 10:47

## 2021-03-21 RX ADMIN — OXYCODONE HYDROCHLORIDE AND ACETAMINOPHEN 2 TABLET: 5; 325 TABLET ORAL at 00:10

## 2021-03-21 RX ADMIN — PANTOPRAZOLE SODIUM 40 MG: 40 INJECTION, POWDER, FOR SOLUTION INTRAVENOUS at 21:53

## 2021-03-21 RX ADMIN — LISINOPRIL 10 MG: 10 TABLET ORAL at 07:46

## 2021-03-21 RX ADMIN — OXYCODONE HYDROCHLORIDE AND ACETAMINOPHEN 2 TABLET: 5; 325 TABLET ORAL at 21:53

## 2021-03-21 RX ADMIN — ONDANSETRON 4 MG: 2 SOLUTION INTRAMUSCULAR; INTRAVENOUS at 20:04

## 2021-03-21 RX ADMIN — ONDANSETRON 4 MG: 2 SOLUTION INTRAMUSCULAR; INTRAVENOUS at 06:42

## 2021-03-21 RX ADMIN — PROMETHAZINE HYDROCHLORIDE 12.5 MG: 25 INJECTION INTRAMUSCULAR; INTRAVENOUS at 02:46

## 2021-03-21 RX ADMIN — LISINOPRIL 10 MG: 10 TABLET ORAL at 22:02

## 2021-03-21 RX ADMIN — DOCUSATE SODIUM 100 MG: 100 CAPSULE, LIQUID FILLED ORAL at 07:44

## 2021-03-21 RX ADMIN — ONDANSETRON 4 MG: 2 SOLUTION INTRAMUSCULAR; INTRAVENOUS at 00:10

## 2021-03-21 RX ADMIN — SODIUM CHLORIDE, PRESERVATIVE FREE 10 ML: 5 INJECTION INTRAVENOUS at 07:47

## 2021-03-21 RX ADMIN — SUCRALFATE 1 G: 1 TABLET ORAL at 12:03

## 2021-03-21 RX ADMIN — PROMETHAZINE HYDROCHLORIDE 12.5 MG: 25 INJECTION INTRAMUSCULAR; INTRAVENOUS at 16:53

## 2021-03-21 RX ADMIN — HYDROMORPHONE HYDROCHLORIDE 0.5 MG: 1 INJECTION, SOLUTION INTRAMUSCULAR; INTRAVENOUS; SUBCUTANEOUS at 14:53

## 2021-03-21 RX ADMIN — PROMETHAZINE HYDROCHLORIDE 12.5 MG: 25 INJECTION INTRAMUSCULAR; INTRAVENOUS at 10:45

## 2021-03-21 RX ADMIN — CLONAZEPAM 0.5 MG: 0.5 TABLET ORAL at 00:16

## 2021-03-21 RX ADMIN — ENOXAPARIN SODIUM 40 MG: 40 INJECTION SUBCUTANEOUS at 07:46

## 2021-03-21 RX ADMIN — SUCRALFATE 1 G: 1 TABLET ORAL at 22:02

## 2021-03-21 RX ADMIN — CLONIDINE HYDROCHLORIDE 0.1 MG: 0.1 TABLET ORAL at 07:45

## 2021-03-21 RX ADMIN — POTASSIUM CHLORIDE, DEXTROSE MONOHYDRATE AND SODIUM CHLORIDE: 150; 5; 450 INJECTION, SOLUTION INTRAVENOUS at 15:54

## 2021-03-21 RX ADMIN — DICYCLOMINE HYDROCHLORIDE 10 MG: 10 CAPSULE ORAL at 22:02

## 2021-03-21 RX ADMIN — CLONIDINE HYDROCHLORIDE 0.1 MG: 0.1 TABLET ORAL at 22:02

## 2021-03-21 RX ADMIN — PANTOPRAZOLE SODIUM 40 MG: 40 INJECTION, POWDER, FOR SOLUTION INTRAVENOUS at 07:46

## 2021-03-21 RX ADMIN — OXYCODONE HYDROCHLORIDE AND ACETAMINOPHEN 2 TABLET: 5; 325 TABLET ORAL at 04:27

## 2021-03-21 RX ADMIN — ONDANSETRON 4 MG: 2 SOLUTION INTRAMUSCULAR; INTRAVENOUS at 12:09

## 2021-03-21 RX ADMIN — PROMETHAZINE HYDROCHLORIDE 12.5 MG: 25 INJECTION INTRAMUSCULAR; INTRAVENOUS at 21:54

## 2021-03-21 RX ADMIN — HYDROMORPHONE HYDROCHLORIDE 0.5 MG: 1 INJECTION, SOLUTION INTRAMUSCULAR; INTRAVENOUS; SUBCUTANEOUS at 20:04

## 2021-03-21 RX ADMIN — HYDROMORPHONE HYDROCHLORIDE 0.5 MG: 1 INJECTION, SOLUTION INTRAMUSCULAR; INTRAVENOUS; SUBCUTANEOUS at 06:38

## 2021-03-21 RX ADMIN — SUCRALFATE 1 G: 1 TABLET ORAL at 16:47

## 2021-03-21 RX ADMIN — OXYCODONE HYDROCHLORIDE AND ACETAMINOPHEN 2 TABLET: 5; 325 TABLET ORAL at 09:12

## 2021-03-21 RX ADMIN — FOLIC ACID 1 MG: 1 TABLET ORAL at 07:45

## 2021-03-21 RX ADMIN — CITALOPRAM HYDROBROMIDE 10 MG: 20 TABLET ORAL at 07:44

## 2021-03-21 ASSESSMENT — PAIN - FUNCTIONAL ASSESSMENT: PAIN_FUNCTIONAL_ASSESSMENT: ACTIVITIES ARE NOT PREVENTED

## 2021-03-21 ASSESSMENT — PAIN DESCRIPTION - DESCRIPTORS: DESCRIPTORS: ACHING;CRAMPING;THROBBING

## 2021-03-21 ASSESSMENT — PAIN DESCRIPTION - PAIN TYPE
TYPE: ACUTE PAIN

## 2021-03-21 ASSESSMENT — PAIN SCALES - GENERAL
PAINLEVEL_OUTOF10: 9
PAINLEVEL_OUTOF10: 8
PAINLEVEL_OUTOF10: 9
PAINLEVEL_OUTOF10: 5
PAINLEVEL_OUTOF10: 8
PAINLEVEL_OUTOF10: 8

## 2021-03-21 ASSESSMENT — PAIN DESCRIPTION - FREQUENCY: FREQUENCY: CONTINUOUS

## 2021-03-21 ASSESSMENT — PAIN DESCRIPTION - LOCATION
LOCATION: ABDOMEN
LOCATION: ABDOMEN

## 2021-03-21 ASSESSMENT — PAIN DESCRIPTION - ORIENTATION
ORIENTATION: UPPER
ORIENTATION: RIGHT;LEFT;UPPER

## 2021-03-21 NOTE — FLOWSHEET NOTE
03/20/21 2104   Vital Signs   Temp 97.9 °F (36.6 °C)   Temp Source Oral   Pulse 98   Heart Rate Source Monitor   Resp 16   /75   BP Location Right upper arm   Patient Position Semi fowlers   Level of Consciousness Alert (0)   MEWS Score 1   Oxygen Therapy   SpO2 98 %   O2 Device None (Room air)   Pt A/O x 4 assessment completed. Meds given per MAR. Pt denies any needs.  Call light within reach

## 2021-03-21 NOTE — PROGRESS NOTES
Jessica Salguero is a 41 y.o. male patient.     Current Facility-Administered Medications   Medication Dose Route Frequency Provider Last Rate Last Admin    HYDROmorphone (DILAUDID) injection 0.5 mg  0.5 mg Intravenous Q4H PRN Beti Griffiths MD   0.5 mg at 03/21/21 7913    docusate sodium (COLACE) capsule 100 mg  100 mg Oral Daily Beti Griffiths MD   100 mg at 03/21/21 0744    sucralfate (CARAFATE) tablet 1 g  1 g Oral 4x Daily AC & HS MACEY Phelps   1 g at 03/21/21 7143    phenol 1.4 % mouth spray 1 spray  1 spray Mouth/Throat Q2H PRN Beti Griffiths MD   1 spray at 03/18/21 0952    pantoprazole (PROTONIX) injection 40 mg  40 mg Intravenous BID MACEY Phelps   40 mg at 03/21/21 0746    oxyCODONE-acetaminophen (PERCOCET) 5-325 MG per tablet 1 tablet  1 tablet Oral Q4H PRN Glenn Del Rio PA-C        Or    oxyCODONE-acetaminophen (PERCOCET) 5-325 MG per tablet 2 tablet  2 tablet Oral Q4H PRN Glenn Del Rio PA-C   2 tablet at 03/21/21 0427    dextrose 5 % and 0.45 % NaCl with KCl 20 mEq infusion   Intravenous Continuous Beti Griffiths MD 75 mL/hr at 03/20/21 2107 New Bag at 89/97/96 4526    folic acid (FOLVITE) tablet 1 mg  1 mg Oral Daily Beti Griffiths MD   1 mg at 03/21/21 0745    clonazePAM (KLONOPIN) tablet 0.5 mg  0.5 mg Oral BID PRN Juan Ibrahim MD   0.5 mg at 03/21/21 0016    citalopram (CELEXA) tablet 10 mg  10 mg Oral Daily Juan Ibrahim MD   10 mg at 03/21/21 0744    amLODIPine (NORVASC) tablet 5 mg  5 mg Oral Daily Juan Ibrahim MD   5 mg at 03/21/21 0746    cloNIDine (CATAPRES) tablet 0.1 mg  0.1 mg Oral BID Juan Ibrahim MD   0.1 mg at 03/21/21 0745    lisinopril (PRINIVIL;ZESTRIL) tablet 10 mg  10 mg Oral BID Juan Ibrahim MD   10 mg at 03/21/21 0746    dicyclomine (BENTYL) capsule 10 mg  10 mg Oral BID Juan Ibrahim MD   10 mg at 03/21/21 0769    sodium chloride flush 0.9 % injection 10 mL  10 mL Intravenous 2 times per day Brinton Krabbe, MD   10 mL at 03/21/21 0747    sodium chloride flush 0.9 % injection 10 mL  10 mL Intravenous PRN Brinton Krabbe, MD   10 mL at 03/19/21 0415    enoxaparin (LOVENOX) injection 40 mg  40 mg Subcutaneous Daily Brinton Krabbe, MD   40 mg at 03/21/21 0746    ondansetron (ZOFRAN) injection 4 mg  4 mg Intravenous Q6H PRN Brinton Krabbe, MD   4 mg at 03/21/21 9726    polyethylene glycol (GLYCOLAX) packet 17 g  17 g Oral Daily PRN Brinton Krabbe, MD        acetaminophen (TYLENOL) tablet 650 mg  650 mg Oral Q6H PRN Brinton Krabbe, MD        Or    acetaminophen (TYLENOL) suppository 650 mg  650 mg Rectal Q6H PRN Brinton Krabbe, MD        potassium chloride 10 mEq/100 mL IVPB (Peripheral Line)  10 mEq Intravenous PRN Brinton Krabbe,  mL/hr at 03/14/21 1135 10 mEq at 03/14/21 1135    promethazine (PHENERGAN) injection 12.5 mg  12.5 mg Intravenous Q6H PRN Katie Arcos MD   12.5 mg at 03/21/21 0246     Allergies   Allergen Reactions    Tramadol Other (See Comments)     Hot and sweaty     Active Problems:    Mild protein-calorie malnutrition (HCC)    Acute cholecystitis    Alcohol-induced chronic pancreatitis (HCC)    Abdominal pain, epigastric    Abnormal LFTs  Resolved Problems:    * No resolved hospital problems. *    Blood pressure 116/84, pulse 96, temperature 97.5 °F (36.4 °C), temperature source Oral, resp. rate 16, height 6' 1\" (1.854 m), weight 289 lb 2 oz (131.1 kg), SpO2 98 %. Subjective:  Symptoms:  Stable. Pain:  He complains of pain that is moderate. Objective:  General Appearance: In no acute distress. Vital signs: (most recent): Blood pressure 116/84, pulse 96, temperature 97.5 °F (36.4 °C), temperature source Oral, resp. rate 16, height 6' 1\" (1.854 m), weight 289 lb 2 oz (131.1 kg), SpO2 98 %. Heart: Normal rate. Regular rhythm. S1 normal and S2 normal.    Abdomen: Abdomen is soft. Bowel sounds are normal.   There is epigastric tenderness.       Assessment & Plan  41year old male with a history of HTN, arthritis, diverticulitis s/p partial colectomy and reversal of ileostomy, alcohol abuse, admitted with recurrent acute pancreatitis likely secondary to biliary sludge found on USG w neg. MRCP.        Recommendation:  1. Continue supportive care/aggressive IV hydration  2. Monitor LFTs  3.  Will need CCY  4. Agree w plans for repeat CT in am and considering NJT feeding (IR may need to place)       Addison Allen MD       [phone]  Addison Allen MD  3/21/2021

## 2021-03-21 NOTE — PROGRESS NOTES
Bedside report given to Solo Irizarry RN  pt in stable condition no needs at this time.  Call light within reach

## 2021-03-21 NOTE — PROGRESS NOTES
Pt medicated for pain 8/10 in abdomen. Medicated with PRN Percocet. See MAR and pain flow sheet. No further assistance needed at this time. Will continue to monitor.

## 2021-03-21 NOTE — PROGRESS NOTES
IM Progress Note    Admit Date:  3/13/2021  8    Interval history:  Admitted from [facility] with abd pain   Hx of alcohol abuse, reports he quit about 4 months ago     Recurrent emesis after stating diet, again NPO     Subjective:  Mr. Jaxon Mckinnon seen up in bed,reports pain issues still , no emesis    Objective:   /81   Pulse 96   Temp 97.5 °F (36.4 °C) (Oral)   Resp 16   Ht 6' 1\" (1.854 m)   Wt 289 lb 2 oz (131.1 kg) Comment: obtained by RD this afternoon  SpO2 98%   BMI 38.15 kg/m²       Intake/Output Summary (Last 24 hours) at 3/21/2021 1202  Last data filed at 3/21/2021 0640  Gross per 24 hour   Intake 1739.16 ml   Output 1850 ml   Net -110.84 ml       Physical Exam:        General: young male, obese,  Awake, alert and oriented. Appears to be not in any distress  Mucous Membranes:  Pink , anicteric  Neck: No JVD, no carotid bruit, no thyromegaly  Chest:  Clear to auscultation bilaterally, no added sounds  Cardiovascular:  RRR S1S2 heard, no murmurs or gallops  Abdomen: hard, undistended, mid diffuse abd tenderness noted, no organomegaly, BS present  Extremities: No edema or cyanosis.  Distal pulses well felt  Neurological : grossly normal      Medications:   Scheduled Medications:    docusate sodium  100 mg Oral Daily    sucralfate  1 g Oral 4x Daily AC & HS    pantoprazole  40 mg Intravenous BID    folic acid  1 mg Oral Daily    citalopram  10 mg Oral Daily    amLODIPine  5 mg Oral Daily    cloNIDine  0.1 mg Oral BID    lisinopril  10 mg Oral BID    dicyclomine  10 mg Oral BID    sodium chloride flush  10 mL Intravenous 2 times per day    enoxaparin  40 mg Subcutaneous Daily     I   dextrose 5% and 0.45% NaCl with KCl 20 mEq 75 mL/hr at 03/20/21 2107     HYDROmorphone, phenol, oxyCODONE-acetaminophen **OR** oxyCODONE-acetaminophen, clonazePAM, sodium chloride flush, [DISCONTINUED] promethazine **OR** ondansetron, polyethylene glycol, acetaminophen **OR** acetaminophen, potassium chloride, promethazine    Lab Data:  Recent Labs     03/20/21  0635 03/21/21  0558   WBC 8.2 8.5   HGB 7.1* 7.8*   HCT 21.2* 23.1*   .0* 101.7*    203     No results for input(s): NA, K, CL, CO2, PHOS, BUN, CREATININE in the last 72 hours. Invalid input(s): CA  No results for input(s): CKTOTAL, CKMB, CKMBINDEX, TROPONINI in the last 72 hours. Coagulation:   Lab Results   Component Value Date    INR 1.28 10/04/2019     Cardiac markers:   Lab Results   Component Value Date    TROPONINI <0.01 03/14/2021         Lab Results   Component Value Date    ALT 23 03/18/2021    AST 62 (H) 03/18/2021    ALKPHOS 181 (H) 03/18/2021    BILITOT 3.6 (H) 03/18/2021       Lab Results   Component Value Date    INR 1.28 (H) 10/04/2019    INR 1.65 (H) 03/22/2019    PROTIME 14.6 (H) 10/04/2019    PROTIME 18.8 (H) 03/22/2019       Radiology    MRCP   *Normal caliber bile ducts without evidence of choledocholithiasis. *Hepatomegaly with severe fatty infiltration as described. *Mildly atrophic pancreas with ductal dilatation as measured and described   above, findings likely sequela of chronic pancreatitis. *Large amount of gallbladder sludge. US gall bladder    1. Gallbladder sludge with nonspecific wall thickening. 2. Abnormal common duct dilatation.  MRCP and/or ERCP would be options to   further evaluate. Assessment & Plan:      Abdominal pain likely pancreatitis     - admitted to med-surg.   - NPO, IVF's. - pain control: morphine prn  - antiemetics prn  -- pain came back with starting diet. Keep NPO, repeat CT on Monday   - consider postpyloric TF    Abn LFT   - no CBD stone per MRCP , Mildly atrophic pancreas with ductal dilatation noted but with gall bladder sludge  - bili improving  - pt reports no alcohol since 4 months   Refer to SU[facility]gery at [place] for cholecystectomy     Hypokalemia  Hypomagnesemia  - replace electrolytes. Monitor labs.      Hyponatremia  - likely related to fluid volume depletion  - Give IVF's  - improved       Leukocytosis  - likely acute stress response - f/w     Normocytic  anemia  -  - 9.4 on admission. Trending down with IVF  Monitor  - might need transfusion hb at 7.8  - folate def noted, started on supplements     Hypertension   - BP stable. Continue Clonidine, Amlodipine, Lisinopril. Anxiety  - Continue Celexa, Klonopin prn    Tobacco Dependence  - Recommended cessation    Obesity  - Body mass index is 38.15 kg/m². - Recommended weight loss    H/o alcohol abuse noted during previous admission. Admission last year for acute pancreatitis due to alcohol abuse. He had to to [facility] tube feedings.        DVT Prophylaxis: lovenox  Diet: Diet NPO Effective Now  Code Status: Full Code    Plan for TF next week       Dm Lin MD 3/21/2021 12:02 PM

## 2021-03-22 ENCOUNTER — APPOINTMENT (OUTPATIENT)
Dept: CT IMAGING | Age: 41
DRG: 282 | End: 2021-03-22
Attending: HOSPITALIST
Payer: MEDICAID

## 2021-03-22 LAB
ANION GAP SERPL CALCULATED.3IONS-SCNC: 9 MMOL/L (ref 3–16)
APTT: 103.5 SEC (ref 24.2–36.2)
APTT: 41.9 SEC (ref 24.2–36.2)
BASOPHILS ABSOLUTE: 0 K/UL (ref 0–0.2)
BASOPHILS RELATIVE PERCENT: 0.5 %
BUN BLDV-MCNC: <2 MG/DL (ref 7–20)
CALCIUM SERPL-MCNC: 8.7 MG/DL (ref 8.3–10.6)
CHLORIDE BLD-SCNC: 99 MMOL/L (ref 99–110)
CO2: 21 MMOL/L (ref 21–32)
CREAT SERPL-MCNC: <0.5 MG/DL (ref 0.9–1.3)
EOSINOPHILS ABSOLUTE: 0.1 K/UL (ref 0–0.6)
EOSINOPHILS RELATIVE PERCENT: 1.3 %
GFR AFRICAN AMERICAN: >60
GFR NON-AFRICAN AMERICAN: >60
GLUCOSE BLD-MCNC: 101 MG/DL (ref 70–99)
GLUCOSE BLD-MCNC: 105 MG/DL (ref 70–99)
GLUCOSE BLD-MCNC: 109 MG/DL (ref 70–99)
GLUCOSE BLD-MCNC: 110 MG/DL (ref 70–99)
GLUCOSE BLD-MCNC: 114 MG/DL (ref 70–99)
GLUCOSE BLD-MCNC: 115 MG/DL (ref 70–99)
HCT VFR BLD CALC: 23.4 % (ref 40.5–52.5)
HEMOGLOBIN: 7.9 G/DL (ref 13.5–17.5)
LYMPHOCYTES ABSOLUTE: 0.9 K/UL (ref 1–5.1)
LYMPHOCYTES RELATIVE PERCENT: 11.4 %
MCH RBC QN AUTO: 34.1 PG (ref 26–34)
MCHC RBC AUTO-ENTMCNC: 33.8 G/DL (ref 31–36)
MCV RBC AUTO: 100.9 FL (ref 80–100)
MONOCYTES ABSOLUTE: 1.2 K/UL (ref 0–1.3)
MONOCYTES RELATIVE PERCENT: 15.7 %
NEUTROPHILS ABSOLUTE: 5.5 K/UL (ref 1.7–7.7)
NEUTROPHILS RELATIVE PERCENT: 71.1 %
PDW BLD-RTO: 17.7 % (ref 12.4–15.4)
PERFORMED ON: ABNORMAL
PLATELET # BLD: 196 K/UL (ref 135–450)
PMV BLD AUTO: 7.1 FL (ref 5–10.5)
POTASSIUM SERPL-SCNC: 4.2 MMOL/L (ref 3.5–5.1)
RBC # BLD: 2.32 M/UL (ref 4.2–5.9)
SODIUM BLD-SCNC: 129 MMOL/L (ref 136–145)
WBC # BLD: 7.7 K/UL (ref 4–11)

## 2021-03-22 PROCEDURE — 6370000000 HC RX 637 (ALT 250 FOR IP): Performed by: PHYSICIAN ASSISTANT

## 2021-03-22 PROCEDURE — 6360000002 HC RX W HCPCS: Performed by: HOSPITALIST

## 2021-03-22 PROCEDURE — 99232 SBSQ HOSP IP/OBS MODERATE 35: CPT | Performed by: NURSE PRACTITIONER

## 2021-03-22 PROCEDURE — 6370000000 HC RX 637 (ALT 250 FOR IP): Performed by: NURSE PRACTITIONER

## 2021-03-22 PROCEDURE — 6360000002 HC RX W HCPCS: Performed by: INTERNAL MEDICINE

## 2021-03-22 PROCEDURE — 6370000000 HC RX 637 (ALT 250 FOR IP): Performed by: INTERNAL MEDICINE

## 2021-03-22 PROCEDURE — 6360000002 HC RX W HCPCS: Performed by: PHYSICIAN ASSISTANT

## 2021-03-22 PROCEDURE — 6360000004 HC RX CONTRAST MEDICATION: Performed by: NURSE PRACTITIONER

## 2021-03-22 PROCEDURE — 6370000000 HC RX 637 (ALT 250 FOR IP): Performed by: HOSPITALIST

## 2021-03-22 PROCEDURE — 2580000003 HC RX 258: Performed by: HOSPITALIST

## 2021-03-22 PROCEDURE — C9113 INJ PANTOPRAZOLE SODIUM, VIA: HCPCS | Performed by: PHYSICIAN ASSISTANT

## 2021-03-22 PROCEDURE — 2500000003 HC RX 250 WO HCPCS: Performed by: INTERNAL MEDICINE

## 2021-03-22 PROCEDURE — 36415 COLL VENOUS BLD VENIPUNCTURE: CPT

## 2021-03-22 PROCEDURE — 80048 BASIC METABOLIC PNL TOTAL CA: CPT

## 2021-03-22 PROCEDURE — 85730 THROMBOPLASTIN TIME PARTIAL: CPT

## 2021-03-22 PROCEDURE — 85025 COMPLETE CBC W/AUTO DIFF WBC: CPT

## 2021-03-22 PROCEDURE — 1200000000 HC SEMI PRIVATE

## 2021-03-22 PROCEDURE — 74177 CT ABD & PELVIS W/CONTRAST: CPT

## 2021-03-22 RX ORDER — HEPARIN SODIUM 1000 [USP'U]/ML
80 INJECTION, SOLUTION INTRAVENOUS; SUBCUTANEOUS PRN
Status: DISCONTINUED | OUTPATIENT
Start: 2021-03-22 | End: 2021-03-25 | Stop reason: ALTCHOICE

## 2021-03-22 RX ORDER — HEPARIN SODIUM 1000 [USP'U]/ML
80 INJECTION, SOLUTION INTRAVENOUS; SUBCUTANEOUS ONCE
Status: COMPLETED | OUTPATIENT
Start: 2021-03-22 | End: 2021-03-22

## 2021-03-22 RX ORDER — HEPARIN SODIUM 1000 [USP'U]/ML
80 INJECTION, SOLUTION INTRAVENOUS; SUBCUTANEOUS PRN
Status: DISCONTINUED | OUTPATIENT
Start: 2021-03-22 | End: 2021-03-22

## 2021-03-22 RX ORDER — HEPARIN SODIUM 1000 [USP'U]/ML
40 INJECTION, SOLUTION INTRAVENOUS; SUBCUTANEOUS PRN
Status: DISCONTINUED | OUTPATIENT
Start: 2021-03-22 | End: 2021-03-22

## 2021-03-22 RX ORDER — HEPARIN SODIUM 10000 [USP'U]/100ML
18 INJECTION, SOLUTION INTRAVENOUS CONTINUOUS
Status: DISCONTINUED | OUTPATIENT
Start: 2021-03-22 | End: 2021-03-22

## 2021-03-22 RX ORDER — HEPARIN SODIUM 10000 [USP'U]/100ML
1200 INJECTION, SOLUTION INTRAVENOUS CONTINUOUS
Status: DISCONTINUED | OUTPATIENT
Start: 2021-03-23 | End: 2021-03-23

## 2021-03-22 RX ORDER — HEPARIN SODIUM 1000 [USP'U]/ML
80 INJECTION, SOLUTION INTRAVENOUS; SUBCUTANEOUS ONCE
Status: DISCONTINUED | OUTPATIENT
Start: 2021-03-22 | End: 2021-03-22

## 2021-03-22 RX ORDER — HEPARIN SODIUM 1000 [USP'U]/ML
40 INJECTION, SOLUTION INTRAVENOUS; SUBCUTANEOUS PRN
Status: DISCONTINUED | OUTPATIENT
Start: 2021-03-22 | End: 2021-03-25 | Stop reason: ALTCHOICE

## 2021-03-22 RX ORDER — HEPARIN SODIUM 10000 [USP'U]/100ML
1800 INJECTION, SOLUTION INTRAVENOUS CONTINUOUS
Status: DISCONTINUED | OUTPATIENT
Start: 2021-03-22 | End: 2021-03-22

## 2021-03-22 RX ADMIN — CITALOPRAM HYDROBROMIDE 10 MG: 20 TABLET ORAL at 10:26

## 2021-03-22 RX ADMIN — OXYCODONE HYDROCHLORIDE AND ACETAMINOPHEN 2 TABLET: 5; 325 TABLET ORAL at 03:31

## 2021-03-22 RX ADMIN — HEPARIN SODIUM 8000 UNITS: 1000 INJECTION INTRAVENOUS; SUBCUTANEOUS at 17:40

## 2021-03-22 RX ADMIN — ENOXAPARIN SODIUM 40 MG: 40 INJECTION SUBCUTANEOUS at 10:32

## 2021-03-22 RX ADMIN — HYDROMORPHONE HYDROCHLORIDE 0.5 MG: 1 INJECTION, SOLUTION INTRAMUSCULAR; INTRAVENOUS; SUBCUTANEOUS at 05:20

## 2021-03-22 RX ADMIN — PANTOPRAZOLE SODIUM 40 MG: 40 INJECTION, POWDER, FOR SOLUTION INTRAVENOUS at 10:26

## 2021-03-22 RX ADMIN — HYDROMORPHONE HYDROCHLORIDE 0.5 MG: 1 INJECTION, SOLUTION INTRAMUSCULAR; INTRAVENOUS; SUBCUTANEOUS at 10:33

## 2021-03-22 RX ADMIN — SUCRALFATE 1 G: 1 TABLET ORAL at 20:18

## 2021-03-22 RX ADMIN — ONDANSETRON 4 MG: 2 SOLUTION INTRAMUSCULAR; INTRAVENOUS at 12:04

## 2021-03-22 RX ADMIN — DICYCLOMINE HYDROCHLORIDE 10 MG: 10 CAPSULE ORAL at 10:25

## 2021-03-22 RX ADMIN — CLONAZEPAM 0.5 MG: 0.5 TABLET ORAL at 22:42

## 2021-03-22 RX ADMIN — ONDANSETRON 4 MG: 2 SOLUTION INTRAMUSCULAR; INTRAVENOUS at 03:36

## 2021-03-22 RX ADMIN — CLONIDINE HYDROCHLORIDE 0.1 MG: 0.1 TABLET ORAL at 10:25

## 2021-03-22 RX ADMIN — CLONAZEPAM 0.5 MG: 0.5 TABLET ORAL at 03:36

## 2021-03-22 RX ADMIN — LISINOPRIL 10 MG: 10 TABLET ORAL at 10:26

## 2021-03-22 RX ADMIN — PROMETHAZINE HYDROCHLORIDE 12.5 MG: 25 INJECTION INTRAMUSCULAR; INTRAVENOUS at 08:17

## 2021-03-22 RX ADMIN — SUCRALFATE 1 G: 1 TABLET ORAL at 10:25

## 2021-03-22 RX ADMIN — CLONIDINE HYDROCHLORIDE 0.1 MG: 0.1 TABLET ORAL at 20:18

## 2021-03-22 RX ADMIN — ONDANSETRON 4 MG: 2 SOLUTION INTRAMUSCULAR; INTRAVENOUS at 20:16

## 2021-03-22 RX ADMIN — DOCUSATE SODIUM 100 MG: 100 CAPSULE, LIQUID FILLED ORAL at 10:25

## 2021-03-22 RX ADMIN — SODIUM CHLORIDE, PRESERVATIVE FREE 10 ML: 5 INJECTION INTRAVENOUS at 20:25

## 2021-03-22 RX ADMIN — NYSTATIN 500000 UNITS: 500000 SUSPENSION ORAL at 17:56

## 2021-03-22 RX ADMIN — HYDROMORPHONE HYDROCHLORIDE 0.5 MG: 1 INJECTION, SOLUTION INTRAMUSCULAR; INTRAVENOUS; SUBCUTANEOUS at 14:20

## 2021-03-22 RX ADMIN — NYSTATIN 500000 UNITS: 500000 SUSPENSION ORAL at 20:19

## 2021-03-22 RX ADMIN — NYSTATIN 500000 UNITS: 500000 SUSPENSION ORAL at 14:03

## 2021-03-22 RX ADMIN — OXYCODONE HYDROCHLORIDE AND ACETAMINOPHEN 2 TABLET: 5; 325 TABLET ORAL at 20:16

## 2021-03-22 RX ADMIN — FOLIC ACID 1 MG: 1 TABLET ORAL at 10:25

## 2021-03-22 RX ADMIN — AMLODIPINE BESYLATE 5 MG: 5 TABLET ORAL at 10:25

## 2021-03-22 RX ADMIN — HYDROMORPHONE HYDROCHLORIDE 0.5 MG: 1 INJECTION, SOLUTION INTRAMUSCULAR; INTRAVENOUS; SUBCUTANEOUS at 18:44

## 2021-03-22 RX ADMIN — HEPARIN SODIUM 1800 UNITS/HR: 10000 INJECTION, SOLUTION INTRAVENOUS at 17:41

## 2021-03-22 RX ADMIN — IOPAMIDOL 75 ML: 755 INJECTION, SOLUTION INTRAVENOUS at 14:33

## 2021-03-22 RX ADMIN — HYDROMORPHONE HYDROCHLORIDE 0.5 MG: 1 INJECTION, SOLUTION INTRAMUSCULAR; INTRAVENOUS; SUBCUTANEOUS at 01:12

## 2021-03-22 RX ADMIN — LISINOPRIL 10 MG: 10 TABLET ORAL at 20:19

## 2021-03-22 RX ADMIN — OXYCODONE HYDROCHLORIDE AND ACETAMINOPHEN 2 TABLET: 5; 325 TABLET ORAL at 08:17

## 2021-03-22 RX ADMIN — OXYCODONE HYDROCHLORIDE AND ACETAMINOPHEN 2 TABLET: 5; 325 TABLET ORAL at 12:04

## 2021-03-22 RX ADMIN — POTASSIUM CHLORIDE, DEXTROSE MONOHYDRATE AND SODIUM CHLORIDE: 150; 5; 450 INJECTION, SOLUTION INTRAVENOUS at 03:31

## 2021-03-22 RX ADMIN — SODIUM CHLORIDE, PRESERVATIVE FREE 10 ML: 5 INJECTION INTRAVENOUS at 10:26

## 2021-03-22 RX ADMIN — PANTOPRAZOLE SODIUM 40 MG: 40 INJECTION, POWDER, FOR SOLUTION INTRAVENOUS at 20:19

## 2021-03-22 RX ADMIN — PROMETHAZINE HYDROCHLORIDE 12.5 MG: 25 INJECTION INTRAMUSCULAR; INTRAVENOUS at 17:56

## 2021-03-22 RX ADMIN — SUCRALFATE 1 G: 1 TABLET ORAL at 05:22

## 2021-03-22 RX ADMIN — DICYCLOMINE HYDROCHLORIDE 10 MG: 10 CAPSULE ORAL at 20:18

## 2021-03-22 RX ADMIN — HYDROMORPHONE HYDROCHLORIDE 0.5 MG: 1 INJECTION, SOLUTION INTRAMUSCULAR; INTRAVENOUS; SUBCUTANEOUS at 22:42

## 2021-03-22 ASSESSMENT — PAIN DESCRIPTION - PAIN TYPE
TYPE: ACUTE PAIN

## 2021-03-22 ASSESSMENT — PAIN DESCRIPTION - FREQUENCY
FREQUENCY: CONTINUOUS
FREQUENCY: CONTINUOUS

## 2021-03-22 ASSESSMENT — PAIN DESCRIPTION - PROGRESSION
CLINICAL_PROGRESSION: GRADUALLY WORSENING
CLINICAL_PROGRESSION: GRADUALLY WORSENING

## 2021-03-22 ASSESSMENT — PAIN SCALES - GENERAL
PAINLEVEL_OUTOF10: 8
PAINLEVEL_OUTOF10: 8
PAINLEVEL_OUTOF10: 5
PAINLEVEL_OUTOF10: 9
PAINLEVEL_OUTOF10: 0
PAINLEVEL_OUTOF10: 8
PAINLEVEL_OUTOF10: 6
PAINLEVEL_OUTOF10: 8
PAINLEVEL_OUTOF10: 6
PAINLEVEL_OUTOF10: 7

## 2021-03-22 ASSESSMENT — PAIN DESCRIPTION - LOCATION: LOCATION: ABDOMEN

## 2021-03-22 ASSESSMENT — PAIN DESCRIPTION - ORIENTATION
ORIENTATION: UPPER
ORIENTATION: UPPER

## 2021-03-22 ASSESSMENT — PAIN DESCRIPTION - DESCRIPTORS: DESCRIPTORS: THROBBING;TENDER

## 2021-03-22 ASSESSMENT — PAIN DESCRIPTION - ONSET: ONSET: ON-GOING

## 2021-03-22 ASSESSMENT — PAIN - FUNCTIONAL ASSESSMENT: PAIN_FUNCTIONAL_ASSESSMENT: ACTIVITIES ARE NOT PREVENTED

## 2021-03-22 NOTE — PROGRESS NOTES
IM Progress Note    Admit Date:  3/13/2021  9    Interval history:  Admitted from [facility] with abd pain   Hx of alcohol abuse, reports he quit about 4 months ago     Recurrent emesis after stating diet, again NPO     Subjective:  Mr. Trae López seen up in bed, reports pain issues still and nausea. No emesis. Objective:   /83   Pulse 105   Temp 99.3 °F (37.4 °C) (Oral)   Resp 18   Ht 6' 1\" (1.854 m)   Wt 283 lb 9.6 oz (128.6 kg)   SpO2 97%   BMI 37.42 kg/m²       Intake/Output Summary (Last 24 hours) at 3/22/2021 4862  Last data filed at 3/22/2021 0343  Gross per 24 hour   Intake 2245 ml   Output 775 ml   Net 1470 ml       Physical Exam:  General: young male, obese,  Awake, alert and oriented. Appears to be not in any distress  Mucous Membranes:  Pink, anicteric, white patches on tongue  Neck: No JVD, no carotid bruit, no thyromegaly  Chest:  Clear to auscultation bilaterally, no added sounds  Cardiovascular:  RRR S1S2 heard, no murmurs or gallops  Abdomen: hard, undistended, mid diffuse abd tenderness noted, no organomegaly, BS present  Extremities: No edema or cyanosis.  Distal pulses well felt  Neurological : grossly normal      Medications:   Scheduled Medications:    docusate sodium  100 mg Oral Daily    sucralfate  1 g Oral 4x Daily AC & HS    pantoprazole  40 mg Intravenous BID    folic acid  1 mg Oral Daily    citalopram  10 mg Oral Daily    amLODIPine  5 mg Oral Daily    cloNIDine  0.1 mg Oral BID    lisinopril  10 mg Oral BID    dicyclomine  10 mg Oral BID    sodium chloride flush  10 mL Intravenous 2 times per day    enoxaparin  40 mg Subcutaneous Daily     I   dextrose 5% and 0.45% NaCl with KCl 20 mEq 75 mL/hr at 03/22/21 0331     HYDROmorphone, phenol, oxyCODONE-acetaminophen **OR** oxyCODONE-acetaminophen, clonazePAM, sodium chloride flush, [DISCONTINUED] promethazine **OR** ondansetron, polyethylene glycol, acetaminophen **OR** acetaminophen, potassium chloride, promethazine    Lab Data:  Recent Labs     03/20/21  0635 03/21/21  0558 03/22/21  0535   WBC 8.2 8.5 7.7   HGB 7.1* 7.8* 7.9*   HCT 21.2* 23.1* 23.4*   .0* 101.7* 100.9*    203 196     Recent Labs     03/22/21  0535   *   K 4.2   CL 99   CO2 21   BUN <2*   CREATININE <0.5*     No results for input(s): CKTOTAL, CKMB, CKMBINDEX, TROPONINI in the last 72 hours. Coagulation:   Lab Results   Component Value Date    INR 1.28 10/04/2019     Cardiac markers:   Lab Results   Component Value Date    TROPONINI <0.01 03/14/2021         Lab Results   Component Value Date    ALT 23 03/18/2021    AST 62 (H) 03/18/2021    ALKPHOS 181 (H) 03/18/2021    BILITOT 3.6 (H) 03/18/2021       Lab Results   Component Value Date    INR 1.28 (H) 10/04/2019    INR 1.65 (H) 03/22/2019    PROTIME 14.6 (H) 10/04/2019    PROTIME 18.8 (H) 03/22/2019       Radiology    MRI ABDOMEN WO CONTRAST MRCP   Final Result   *Normal caliber bile ducts without evidence of choledocholithiasis. *Hepatomegaly with severe fatty infiltration as described. *Mildly atrophic pancreas with ductal dilatation as measured and described   above, findings likely sequela of chronic pancreatitis. *Large amount of gallbladder sludge. US GALLBLADDER RUQ   Final Result   1. Gallbladder sludge with nonspecific wall thickening. 2. Abnormal common duct dilatation. MRCP and/or ERCP would be options to   further evaluate. Assessment & Plan:      Abdominal pain likely acute pancreatitis     - admitted to med-surg.   - NPO, IVF's. - pain control: morphine prn  - antiemetics prn  -- pain came back with starting diet.  Keep NPO, repeat CT on Monday   - consider postpyloric TF    Abn LFT   - no CBD stone per MRCP , Mildly atrophic pancreas with ductal dilatation noted but with gall bladder sludge  - bili improving  - pt reports no alcohol since 4 months   Refer to Surgery at [place] for cholecystectomy     Hypokalemia  Hypomagnesemia  - replace electrolytes. Monitor labs. Hyponatremia  - likely related to fluid volume depletion  - Give IVF's  - monitor BMP. 129 today. Leukocytosis  - likely acute stress response  - resolved now. Normocytic  anemia  - 9.4 on admission. Trending down with IVF  Monitor  - might need transfusion hgb at 7.9  - folate def noted, started on supplements     Hypertension   - BP stable. Continue Clonidine, Amlodipine, Lisinopril. Anxiety  - Continue Celexa, Klonopin prn    Tobacco Dependence  - Recommended cessation    Obesity  - Body mass index is 37.42 kg/m². - Recommended weight loss    Oral candidiasis  - Nystatin swish and swallow. H/o alcohol abuse noted during previous admission. Admission last year for acute pancreatitis due to alcohol abuse. He had to to [facility] tube feedings.      DVT Prophylaxis: lovenox  Diet: Diet NPO Effective Now  Code Status: Full Code    Repeat CT abdomen, possibly start post-pyloric Tube feedings    Juan Morales FNP-C  3/22/2021

## 2021-03-22 NOTE — PROGRESS NOTES
Jocelyn Trevizo is a 41 y.o. male patient.     Current Facility-Administered Medications   Medication Dose Route Frequency Provider Last Rate Last Admin    nystatin (MYCOSTATIN) 702988 UNIT/ML suspension 500,000 Units  5 mL Oral 4x Daily [place], APRN - CNP   500,000 Units at 03/22/21 1756    heparin (porcine) injection 8,000 Units  80 Units/kg (Order-Specific) Intravenous PRN Linda Goetz PA-C        heparin 25,000 units in dextrose 5% 250 mL (premix) infusion  1,800 Units/hr Intravenous Continuous Linda Goetz PA-C 18 mL/hr at 03/22/21 1741 1,800 Units/hr at 03/22/21 1741    heparin (porcine) injection 4,000 Units  40 Units/kg (Order-Specific) Intravenous PRN Linda Goetz PA-C        HYDROmorphone (DILAUDID) injection 0.5 mg  0.5 mg Intravenous Q4H PRN Reji Turner MD   0.5 mg at 03/22/21 1420    docusate sodium (COLACE) capsule 100 mg  100 mg Oral Daily Reji Turner MD   100 mg at 03/22/21 1025    sucralfate (CARAFATE) tablet 1 g  1 g Oral 4x Daily AC & HS MACEY Phelps   1 g at 03/22/21 1025    phenol 1.4 % mouth spray 1 spray  1 spray Mouth/Throat Q2H PRN Reji Turner MD   1 spray at 03/18/21 0952    pantoprazole (PROTONIX) injection 40 mg  40 mg Intravenous BID MACEY Phelps   40 mg at 03/22/21 1026    oxyCODONE-acetaminophen (PERCOCET) 5-325 MG per tablet 1 tablet  1 tablet Oral Q4H PRN Linda Goetz PA-C        Or    oxyCODONE-acetaminophen (PERCOCET) 5-325 MG per tablet 2 tablet  2 tablet Oral Q4H PRN Linda Goetz PA-C   2 tablet at 03/22/21 1204    dextrose 5 % and 0.45 % NaCl with KCl 20 mEq infusion   Intravenous Continuous Reji Turner MD 75 mL/hr at 03/22/21 0331 New Bag at 91/38/25 1969    folic acid (FOLVITE) tablet 1 mg  1 mg Oral Daily Reji Turner MD   1 mg at 03/22/21 1025    clonazePAM (KLONOPIN) tablet 0.5 mg  0.5 mg Oral BID PRN Jay Merino MD   0.5 mg at 03/22/21 0326    citalopram (CELEXA) tablet 10 mg  10 mg Oral Daily Louisa Willis MD   10 mg at 03/22/21 1026    amLODIPine (NORVASC) tablet 5 mg  5 mg Oral Daily Louisa Willis MD   5 mg at 03/22/21 1025    cloNIDine (CATAPRES) tablet 0.1 mg  0.1 mg Oral BID Louisa Willis MD   0.1 mg at 03/22/21 1025    lisinopril (PRINIVIL;ZESTRIL) tablet 10 mg  10 mg Oral BID Louisa Willis MD   10 mg at 03/22/21 1026    dicyclomine (BENTYL) capsule 10 mg  10 mg Oral BID Louisa Willis MD   10 mg at 03/22/21 1025    sodium chloride flush 0.9 % injection 10 mL  10 mL Intravenous 2 times per day Louisa Willis MD   10 mL at 03/22/21 1026    sodium chloride flush 0.9 % injection 10 mL  10 mL Intravenous PRN Louisa Willis MD   10 mL at 03/19/21 0415    ondansetron (ZOFRAN) injection 4 mg  4 mg Intravenous Q6H PRN Louisa Willis MD   4 mg at 03/22/21 1204    polyethylene glycol (GLYCOLAX) packet 17 g  17 g Oral Daily PRN Louisa Willis MD        acetaminophen (TYLENOL) tablet 650 mg  650 mg Oral Q6H PRN Louisa Willis MD        Or    acetaminophen (TYLENOL) suppository 650 mg  650 mg Rectal Q6H PRN Louisa Willis MD        potassium chloride 10 mEq/100 mL IVPB (Peripheral Line)  10 mEq Intravenous PRN Louisa Willis  mL/hr at 03/14/21 1135 10 mEq at 03/14/21 1135    promethazine (PHENERGAN) injection 12.5 mg  12.5 mg Intravenous Q6H PRN Juan Jose Saenz MD   12.5 mg at 03/22/21 1756     Allergies   Allergen Reactions    Tramadol Other (See Comments)     Hot and sweaty     Active Problems:    Mild protein-calorie malnutrition (HCC)    Acute cholecystitis    Alcohol-induced chronic pancreatitis (HCC)    Abdominal pain, epigastric    Abnormal LFTs    Portal vein thrombosis  Resolved Problems:    * No resolved hospital problems. *    Blood pressure 116/81, pulse 108, temperature 97.4 °F (36.3 °C), temperature source Oral, resp. rate 18, height 6' 1\" (1.854 m), weight 283 lb 9.6 oz (128.6 kg), SpO2 95 %.     Subjective:  Symptoms: Stable. Pain:  He complains of pain that is moderate. Objective:  General Appearance: In no acute distress. Vital signs: (most recent): Blood pressure 116/81, pulse 108, temperature 97.4 °F (36.3 °C), temperature source Oral, resp. rate 18, height 6' 1\" (1.854 m), weight 283 lb 9.6 oz (128.6 kg), SpO2 95 %. Assessment & Plan  41year old male with a history of HTN, arthritis, diverticulitis s/p partial colectomy and reversal of ileostomy, alcohol abuse, admitted with recurrent acute pancreatitis likely secondary to biliary sludge found on USG w neg. MRCP.  Repeat CT w PV thrombosis extending int MV.     Recommendation:  1. Continue supportive care/aggressive IV hydration  2. Monitor LFTs  3.  Will need CCY  4. Getting transferred to Bill Royal MD       [phone]  Olive Paez MD  3/22/2021

## 2021-03-22 NOTE — PROGRESS NOTES
Percocet given for abdominal pain. Nauseated, zofran given. Anxious, klonopin given. Patient has barking cough, dry, tongue coated, states has order for chloraseptic spray but feels like he has thrush and would like something for that. Will leave note for am physician. Will continue to monitor.

## 2021-03-22 NOTE — DISCHARGE SUMMARY
Name:  Toro Ruiz  Room:  5041/8207-05  MRN:    [id number]    Discharge Summary      This discharge summary is in conjunction with a complete physical exam done on the day of discharge. Attending Physician: Dr. Krysten Rodrigues  Discharging Provider: Haseeb LIEBERMAN      Admit: 3/13/2021  Discharge:   3/22/2021    HPI:  41 y.o. male presents with worsening abdominal pain, n/v with onset 2 weeks ago. Pain is epigastric and in the RUQ, worsening with PO intake, associated with emesis of food within 30 minutes of ingestion. Emesis has been of clear fluid with no blood. His stools have not been black/bloody/acholic. He's had fevers in the low 100's. Today he also noted R sided chest pain with some radiation to his left hand. Chest pain has resolved, however he continues to have abd pain, nausea. Diagnoses this Admission and Hospital Course   Abdominal pain likely acute pancreatitis      - admitted to med-surg.   - NPO, IVF's. - pain control: morphine prn  - antiemetics prn  -- pain came back with starting diet. Kept NPO  - repeat CT today. Shows completely occlusive portal vein thrombosis extending into the SMV and splenic vein  - we were going to start post-pyloric tube feedings  - started Heparin drip and transfer to >    Portal vein thrombosis  - extending into SMV and splenic vein  - started on high dose heparin drip  - Transfer to   - needs TIPS with intra-vascular thrombolysis.       Abn LFT   - no CBD stone per MRCP , Mildly atrophic pancreas with ductal dilatation noted but with gall bladder sludge  - bili improving  - pt reports no alcohol since 4 months   Refer to Surgery at [place] for cholecystectomy      Hypokalemia  Hypomagnesemia  - replaced electrolytes. Monitored labs. Hyponatremia  - likely related to fluid volume depletion  - Give IVF's  - monitored BMP. 129 today.      Leukocytosis  - likely acute stress response  - resolved now.      Normocytic  anemia  - 9.4 on admission.  Trending down with IVF  Monitored  - might need transfusion hgb at 7.9  - folate def noted, started on supplements      Hypertension   - BP stable. Continued Clonidine, Amlodipine, Lisinopril.      Anxiety  - Continue Celexa, Klonopin prn     Tobacco Dependence  - Recommended cessation     Obesity  - Body mass index is 37.42 kg/m². - Recommended weight loss     Oral candidiasis  - Nystatin swish and swallow.     H/o alcohol abuse noted during previous admission. Admission last year for acute pancreatitis due to alcohol abuse. He had to to [address] [place] [address] tube feedings.      DVT Prophylaxis: lovenox    Transfer to . Procedures (Please Review Full Report for Details)  N/A    Consults    GI       Physical Exam at Discharge:    /81   Pulse 108   Temp 97.4 °F (36.3 °C) (Oral)   Resp 18   Ht 6' 1\" (1.854 m)   Wt 283 lb 9.6 oz (128.6 kg)   SpO2 95%   BMI 37.42 kg/m²     General: young male, obese,  Awake, alert and oriented. Appears to be not in any distress  Mucous Membranes:  Pink, anicteric, white patches on tongue  Neck: No JVD, no carotid bruit, no thyromegaly  Chest:  Clear to auscultation bilaterally, no added sounds  Cardiovascular:  RRR S1S2 heard, no murmurs or gallops  Abdomen: hard, undistended, mid diffuse abd tenderness noted, no organomegaly, BS present  Extremities: No edema or cyanosis.  Distal pulses well felt  Neurological : grossly normal    CBC:   Recent Labs     03/20/21  0635 03/21/21  0558 03/22/21  0535   WBC 8.2 8.5 7.7   HGB 7.1* 7.8* 7.9*   HCT 21.2* 23.1* 23.4*   .0* 101.7* 100.9*    203 196     BMP:   Recent Labs     03/22/21  0535   *   K 4.2   CL 99   CO2 21   BUN <2*   CREATININE <0.5*       U/A:    Lab Results   Component Value Date    COLORU Yellow 05/19/2020    WBCUA 0-2 05/19/2020    RBCUA 11-20 05/19/2020    MUCUS 1+ 05/19/2020    BACTERIA Rare 05/19/2020    CLARITYU SL CLOUDY 05/19/2020    SPECGRAV 1.010 05/19/2020    LEUKOCYTESUR Negative 05/19/2020    BLOODU LARGE 05/19/2020    GLUCOSEU Negative 05/19/2020    AMORPHOUS 1+ 05/19/2020       CULTURES  N/A    RADIOLOGY  CT ABDOMEN PELVIS W IV CONTRAST Additional Contrast? None   Final Result   There is complete thrombosis of the portal vein. Thrombus is also seen   extending into the cephalad portion of the superior mesenteric vein and   throughout the splenic vein. Peripancreatic stranding consistent with pancreatitis. There is a small area   of low-attenuation in the region the pancreatic head which may correspond to   focal dilated pancreatic duct or a small developing fluid collection. Hepatic steatosis. The findings were sent to the Radiology Results [address] at 3:41   pm on 3/22/2021to be communicated to a licensed caregiver. MRI ABDOMEN WO CONTRAST MRCP   Final Result   *Normal caliber bile ducts without evidence of choledocholithiasis. *Hepatomegaly with severe fatty infiltration as described. *Mildly atrophic pancreas with ductal dilatation as measured and described   above, findings likely sequela of chronic pancreatitis. *Large amount of gallbladder sludge. US GALLBLADDER RUQ   Final Result   1. Gallbladder sludge with nonspecific wall thickening. 2. Abnormal common duct dilatation. MRCP and/or ERCP would be options to   further evaluate. Discharge Medications     Medication List      STOP taking these medications    escitalopram 20 MG tablet  Commonly known as: LEXAPRO     ondansetron 4 MG disintegrating tablet  Commonly known as: Zofran ODT        ASK your doctor about these medications    amLODIPine 5 MG tablet  Commonly known as: Elia Perdomo  Ask about: Which instructions should I use?      CeleXA 10 MG tablet  Generic drug: citalopram     clonazePAM 0.5 MG tablet  Commonly known as: KLONOPIN     cloNIDine 0.1 MG tablet  Commonly known as: CATAPRES     DICLOFENAC PO     dicyclomine 10 MG capsule  Commonly known as: BENTYL     lisinopril 10 MG tablet Commonly known as: PRINIVIL;ZESTRIL              Transferred in stable condition to .

## 2021-03-22 NOTE — PROGRESS NOTES
Assessment complete, patient awakens nauseated, pain 9.5/10 in abdomen a crossed upper quadrants, asks for pain and nausea medication, percocet and phenergan given, up to restroom for void per self, patient BS active, states had tiny smear of stool yesterday. Only taking clear liquids at this time. Denies further needs, call light in reach, patient requests door shut to room. Will continue to monitor.

## 2021-03-22 NOTE — PROGRESS NOTES
Pharmacy to Dose High Dose Heparin Drip for Mesenteric Thrombus per [facility]      Pharmacy to Manage Heparin Infusion per [facility]    Dx:Mesenteric Thrombus  Pt wt = __100kg_ (will use adjusted wt if ABW > 120% IBW). Baseline aPTT = _____ at ____. High Dose Heparin Infusion  Heparin 80 units/kg IVP bolus (8000 units) followed by Heparin infusion at 18 units/kg/hr (1800 units/kg)  Recheck aPTT in 6 hours. Goal aPTT = 49-76 seconds.   Rian Little Pharm D 1/30/28603:56 PM  .

## 2021-03-23 LAB
ANION GAP SERPL CALCULATED.3IONS-SCNC: 9 MMOL/L (ref 3–16)
APTT: 104.1 SEC (ref 24.2–36.2)
APTT: 49.7 SEC (ref 24.2–36.2)
APTT: 50.8 SEC (ref 24.2–36.2)
BASOPHILS ABSOLUTE: 0.1 K/UL (ref 0–0.2)
BASOPHILS RELATIVE PERCENT: 1.7 %
BUN BLDV-MCNC: 3 MG/DL (ref 7–20)
CALCIUM SERPL-MCNC: 8.6 MG/DL (ref 8.3–10.6)
CHLORIDE BLD-SCNC: 99 MMOL/L (ref 99–110)
CO2: 22 MMOL/L (ref 21–32)
CREAT SERPL-MCNC: <0.5 MG/DL (ref 0.9–1.3)
EOSINOPHILS ABSOLUTE: 0.1 K/UL (ref 0–0.6)
EOSINOPHILS RELATIVE PERCENT: 1.3 %
GFR AFRICAN AMERICAN: >60
GFR NON-AFRICAN AMERICAN: >60
GLUCOSE BLD-MCNC: 111 MG/DL (ref 70–99)
GLUCOSE BLD-MCNC: 113 MG/DL (ref 70–99)
GLUCOSE BLD-MCNC: 115 MG/DL (ref 70–99)
GLUCOSE BLD-MCNC: 117 MG/DL (ref 70–99)
HCT VFR BLD CALC: 25.3 % (ref 40.5–52.5)
HEMOGLOBIN: 8.3 G/DL (ref 13.5–17.5)
LYMPHOCYTES ABSOLUTE: 1.2 K/UL (ref 1–5.1)
LYMPHOCYTES RELATIVE PERCENT: 14.3 %
MCH RBC QN AUTO: 34.2 PG (ref 26–34)
MCHC RBC AUTO-ENTMCNC: 33 G/DL (ref 31–36)
MCV RBC AUTO: 103.7 FL (ref 80–100)
MONOCYTES ABSOLUTE: 1.2 K/UL (ref 0–1.3)
MONOCYTES RELATIVE PERCENT: 13.7 %
NEUTROPHILS ABSOLUTE: 5.8 K/UL (ref 1.7–7.7)
NEUTROPHILS RELATIVE PERCENT: 69 %
PDW BLD-RTO: 18.3 % (ref 12.4–15.4)
PERFORMED ON: ABNORMAL
PLATELET # BLD: 193 K/UL (ref 135–450)
PMV BLD AUTO: 7 FL (ref 5–10.5)
POTASSIUM SERPL-SCNC: 4.3 MMOL/L (ref 3.5–5.1)
RBC # BLD: 2.44 M/UL (ref 4.2–5.9)
SODIUM BLD-SCNC: 130 MMOL/L (ref 136–145)
WBC # BLD: 8.4 K/UL (ref 4–11)

## 2021-03-23 PROCEDURE — 6370000000 HC RX 637 (ALT 250 FOR IP): Performed by: PHYSICIAN ASSISTANT

## 2021-03-23 PROCEDURE — 80048 BASIC METABOLIC PNL TOTAL CA: CPT

## 2021-03-23 PROCEDURE — 6360000002 HC RX W HCPCS: Performed by: INTERNAL MEDICINE

## 2021-03-23 PROCEDURE — 6370000000 HC RX 637 (ALT 250 FOR IP): Performed by: INTERNAL MEDICINE

## 2021-03-23 PROCEDURE — 2580000003 HC RX 258: Performed by: HOSPITALIST

## 2021-03-23 PROCEDURE — 6360000002 HC RX W HCPCS: Performed by: PHYSICIAN ASSISTANT

## 2021-03-23 PROCEDURE — 6370000000 HC RX 637 (ALT 250 FOR IP): Performed by: NURSE PRACTITIONER

## 2021-03-23 PROCEDURE — 85730 THROMBOPLASTIN TIME PARTIAL: CPT

## 2021-03-23 PROCEDURE — 2500000003 HC RX 250 WO HCPCS: Performed by: INTERNAL MEDICINE

## 2021-03-23 PROCEDURE — C9113 INJ PANTOPRAZOLE SODIUM, VIA: HCPCS | Performed by: PHYSICIAN ASSISTANT

## 2021-03-23 PROCEDURE — 6360000002 HC RX W HCPCS: Performed by: HOSPITALIST

## 2021-03-23 PROCEDURE — 85025 COMPLETE CBC W/AUTO DIFF WBC: CPT

## 2021-03-23 PROCEDURE — 1200000000 HC SEMI PRIVATE

## 2021-03-23 PROCEDURE — 36415 COLL VENOUS BLD VENIPUNCTURE: CPT

## 2021-03-23 PROCEDURE — 6370000000 HC RX 637 (ALT 250 FOR IP): Performed by: HOSPITALIST

## 2021-03-23 RX ORDER — HEPARIN SODIUM 10000 [USP'U]/100ML
1000 INJECTION, SOLUTION INTRAVENOUS CONTINUOUS
Status: DISCONTINUED | OUTPATIENT
Start: 2021-03-23 | End: 2021-03-25

## 2021-03-23 RX ADMIN — HEPARIN SODIUM 600 UNITS/HR: 10000 INJECTION, SOLUTION INTRAVENOUS at 19:09

## 2021-03-23 RX ADMIN — HYDROMORPHONE HYDROCHLORIDE 0.5 MG: 1 INJECTION, SOLUTION INTRAMUSCULAR; INTRAVENOUS; SUBCUTANEOUS at 02:52

## 2021-03-23 RX ADMIN — CLONIDINE HYDROCHLORIDE 0.1 MG: 0.1 TABLET ORAL at 21:45

## 2021-03-23 RX ADMIN — OXYCODONE HYDROCHLORIDE AND ACETAMINOPHEN 2 TABLET: 5; 325 TABLET ORAL at 08:10

## 2021-03-23 RX ADMIN — HYDROMORPHONE HYDROCHLORIDE 0.5 MG: 1 INJECTION, SOLUTION INTRAMUSCULAR; INTRAVENOUS; SUBCUTANEOUS at 14:59

## 2021-03-23 RX ADMIN — OXYCODONE HYDROCHLORIDE AND ACETAMINOPHEN 2 TABLET: 5; 325 TABLET ORAL at 17:16

## 2021-03-23 RX ADMIN — DOCUSATE SODIUM 100 MG: 100 CAPSULE, LIQUID FILLED ORAL at 08:22

## 2021-03-23 RX ADMIN — HEPARIN SODIUM 1200 UNITS/HR: 10000 INJECTION, SOLUTION INTRAVENOUS at 00:05

## 2021-03-23 RX ADMIN — OXYCODONE HYDROCHLORIDE AND ACETAMINOPHEN 2 TABLET: 5; 325 TABLET ORAL at 12:49

## 2021-03-23 RX ADMIN — HYDROMORPHONE HYDROCHLORIDE 0.5 MG: 1 INJECTION, SOLUTION INTRAMUSCULAR; INTRAVENOUS; SUBCUTANEOUS at 10:41

## 2021-03-23 RX ADMIN — PROMETHAZINE HYDROCHLORIDE 12.5 MG: 25 INJECTION INTRAMUSCULAR; INTRAVENOUS at 00:07

## 2021-03-23 RX ADMIN — OXYCODONE HYDROCHLORIDE AND ACETAMINOPHEN 2 TABLET: 5; 325 TABLET ORAL at 21:45

## 2021-03-23 RX ADMIN — CLONIDINE HYDROCHLORIDE 0.1 MG: 0.1 TABLET ORAL at 08:22

## 2021-03-23 RX ADMIN — NYSTATIN 500000 UNITS: 500000 SUSPENSION ORAL at 17:17

## 2021-03-23 RX ADMIN — SODIUM CHLORIDE, PRESERVATIVE FREE 10 ML: 5 INJECTION INTRAVENOUS at 08:22

## 2021-03-23 RX ADMIN — OXYCODONE HYDROCHLORIDE AND ACETAMINOPHEN 2 TABLET: 5; 325 TABLET ORAL at 04:12

## 2021-03-23 RX ADMIN — NYSTATIN 500000 UNITS: 500000 SUSPENSION ORAL at 08:24

## 2021-03-23 RX ADMIN — NYSTATIN 500000 UNITS: 500000 SUSPENSION ORAL at 12:53

## 2021-03-23 RX ADMIN — SODIUM CHLORIDE, PRESERVATIVE FREE 10 ML: 5 INJECTION INTRAVENOUS at 21:34

## 2021-03-23 RX ADMIN — ONDANSETRON 4 MG: 2 SOLUTION INTRAMUSCULAR; INTRAVENOUS at 14:59

## 2021-03-23 RX ADMIN — LISINOPRIL 10 MG: 10 TABLET ORAL at 21:45

## 2021-03-23 RX ADMIN — POTASSIUM CHLORIDE, DEXTROSE MONOHYDRATE AND SODIUM CHLORIDE 75 ML/HR: 150; 5; 450 INJECTION, SOLUTION INTRAVENOUS at 12:52

## 2021-03-23 RX ADMIN — HEPARIN SODIUM 600 UNITS/HR: 10000 INJECTION, SOLUTION INTRAVENOUS at 08:09

## 2021-03-23 RX ADMIN — ONDANSETRON 4 MG: 2 SOLUTION INTRAMUSCULAR; INTRAVENOUS at 02:52

## 2021-03-23 RX ADMIN — HYDROMORPHONE HYDROCHLORIDE 0.5 MG: 1 INJECTION, SOLUTION INTRAMUSCULAR; INTRAVENOUS; SUBCUTANEOUS at 06:37

## 2021-03-23 RX ADMIN — HYDROMORPHONE HYDROCHLORIDE 0.5 MG: 1 INJECTION, SOLUTION INTRAMUSCULAR; INTRAVENOUS; SUBCUTANEOUS at 23:35

## 2021-03-23 RX ADMIN — NYSTATIN 500000 UNITS: 500000 SUSPENSION ORAL at 21:35

## 2021-03-23 RX ADMIN — PANTOPRAZOLE SODIUM 40 MG: 40 INJECTION, POWDER, FOR SOLUTION INTRAVENOUS at 08:21

## 2021-03-23 RX ADMIN — OXYCODONE HYDROCHLORIDE AND ACETAMINOPHEN 2 TABLET: 5; 325 TABLET ORAL at 00:07

## 2021-03-23 RX ADMIN — LISINOPRIL 10 MG: 10 TABLET ORAL at 08:22

## 2021-03-23 RX ADMIN — AMLODIPINE BESYLATE 5 MG: 5 TABLET ORAL at 08:22

## 2021-03-23 RX ADMIN — POTASSIUM CHLORIDE, DEXTROSE MONOHYDRATE AND SODIUM CHLORIDE: 150; 5; 450 INJECTION, SOLUTION INTRAVENOUS at 21:50

## 2021-03-23 RX ADMIN — DICYCLOMINE HYDROCHLORIDE 10 MG: 10 CAPSULE ORAL at 08:22

## 2021-03-23 RX ADMIN — PANTOPRAZOLE SODIUM 40 MG: 40 INJECTION, POWDER, FOR SOLUTION INTRAVENOUS at 21:38

## 2021-03-23 RX ADMIN — POTASSIUM CHLORIDE, DEXTROSE MONOHYDRATE AND SODIUM CHLORIDE: 150; 5; 450 INJECTION, SOLUTION INTRAVENOUS at 00:04

## 2021-03-23 RX ADMIN — HYDROMORPHONE HYDROCHLORIDE 0.5 MG: 1 INJECTION, SOLUTION INTRAMUSCULAR; INTRAVENOUS; SUBCUTANEOUS at 18:50

## 2021-03-23 RX ADMIN — PROMETHAZINE HYDROCHLORIDE 12.5 MG: 25 INJECTION INTRAMUSCULAR; INTRAVENOUS at 06:37

## 2021-03-23 RX ADMIN — CITALOPRAM HYDROBROMIDE 10 MG: 20 TABLET ORAL at 08:22

## 2021-03-23 RX ADMIN — ONDANSETRON 4 MG: 2 SOLUTION INTRAMUSCULAR; INTRAVENOUS at 21:34

## 2021-03-23 RX ADMIN — DICYCLOMINE HYDROCHLORIDE 10 MG: 10 CAPSULE ORAL at 21:45

## 2021-03-23 RX ADMIN — PROMETHAZINE HYDROCHLORIDE 12.5 MG: 25 INJECTION INTRAMUSCULAR; INTRAVENOUS at 12:49

## 2021-03-23 RX ADMIN — FOLIC ACID 1 MG: 1 TABLET ORAL at 08:22

## 2021-03-23 RX ADMIN — PROMETHAZINE HYDROCHLORIDE 12.5 MG: 25 INJECTION INTRAMUSCULAR; INTRAVENOUS at 18:50

## 2021-03-23 ASSESSMENT — PAIN DESCRIPTION - PAIN TYPE
TYPE: ACUTE PAIN

## 2021-03-23 ASSESSMENT — PAIN SCALES - GENERAL
PAINLEVEL_OUTOF10: 8
PAINLEVEL_OUTOF10: 9
PAINLEVEL_OUTOF10: 8
PAINLEVEL_OUTOF10: 0
PAINLEVEL_OUTOF10: 8
PAINLEVEL_OUTOF10: 8
PAINLEVEL_OUTOF10: 9
PAINLEVEL_OUTOF10: 8

## 2021-03-23 ASSESSMENT — PAIN DESCRIPTION - ONSET
ONSET: ON-GOING

## 2021-03-23 ASSESSMENT — PAIN DESCRIPTION - LOCATION
LOCATION: ABDOMEN

## 2021-03-23 ASSESSMENT — PAIN DESCRIPTION - DESCRIPTORS
DESCRIPTORS: THROBBING
DESCRIPTORS: THROBBING

## 2021-03-23 ASSESSMENT — PAIN - FUNCTIONAL ASSESSMENT
PAIN_FUNCTIONAL_ASSESSMENT: ACTIVITIES ARE NOT PREVENTED

## 2021-03-23 ASSESSMENT — PAIN DESCRIPTION - ORIENTATION
ORIENTATION: UPPER
ORIENTATION: UPPER

## 2021-03-23 ASSESSMENT — PAIN DESCRIPTION - FREQUENCY
FREQUENCY: CONTINUOUS

## 2021-03-23 ASSESSMENT — PAIN DESCRIPTION - PROGRESSION: CLINICAL_PROGRESSION: GRADUALLY WORSENING

## 2021-03-23 NOTE — PROGRESS NOTES
Pharmacy - RE:  High-dose Heparin drip  Current rate = 18 ml/h  (1800 units/h)  aPTT drawn @ 2245 = 103.5 sec. Goal aPTT = 49 - 76 sec. Per protocol, turn drip off x 1 h (2300 - 0000), then restart drip at a reduced rate of 12 ml/h (1200 units/h). Obtain another aPTT 3/23 @ 0630.

## 2021-03-23 NOTE — PROGRESS NOTES
Patient resting quietly in bed. No s/s of distress noted C/O pain in abdomen and nausea, PRN zofran and percocet given at this time. C/O pain to IV site, noted to be red and swollen. Will call CA for new IV access. Shift assessment complete, see flow sheet. Denies needs at this time. Call light in reach. Will monitor.

## 2021-03-23 NOTE — PROGRESS NOTES
Received call from lab for aPTT of 104. 1.  Spoke with Yolande Vega in pharmacy- RN to place heparin gtt on hold x1 hr.

## 2021-03-23 NOTE — PROGRESS NOTES
Comprehensive Nutrition Assessment    Type and Reason for Visit:  Reassess(HR F/U)    Nutrition Recommendations/Plan:   1. Continue Current Full Liquid, Low Fat diet- monitor for s/s of GI distress  2. Monitor need/desire for ONS  3. Monitor diet tolerance, appetite and PO intake  4. Monitor weight and nutrition related lab values       Nutrition Assessment:  pt has declined from a nutritional standpoint AEB unable to tolerate clear liquid, low fat PO diet, changed to NPO status, worsening abd pain +N/V, wt loss.  At risk for further compromise d/t altered nutrition related lab values, poor PO intake d/t N/V x2 wks PTA, hx of alcohol abuse and acute cholecystitis    Malnutrition Assessment:  Malnutrition Status:  Severe malnutrition    Context:  Acute Illness     Findings of the 6 clinical characteristics of malnutrition:  Energy Intake:  7 - 50% or less of estimated energy requirements for 5 or more days  Weight Loss:  1 - 1% to 2% over 1 week     Body Fat Loss:  No significant body fat loss     Muscle Mass Loss:  1 - Mild muscle mass loss Hand (interosseous)  Fluid Accumulation:  No significant fluid accumulation(noted WDL) Extremities(BUE +1 edema)   Strength:  Not Performed    Estimated Daily Nutrient Needs:  Energy (kcal):  8596-2108 kcals/day based on 15-17 kcals/kg CBW; Weight Used for Energy Requirements:  Current     Protein (g):  100-117 g protein/day based on 1.2-1.4 g/kg IBW; Weight Used for Protein Requirements:  Ideal        Fluid (ml/day):  2897-4853 ml/day based on 1 ml/kcal; Method Used for Fluid Requirements:  1 ml/kcal      Nutrition Related Findings:  noted pt has ongoing vomiting 30 min after consuming PO diet, +worsening abd pain; pt was changed back to NPO status, now advanced to full liquid, low fat diet +will monitor diet tolerance, 1.2% wt loss x 10 days admission      Wounds:  None       Current Nutrition Therapies:      Anthropometric Measures:  · Height: 6' 1\" (185.4

## 2021-03-23 NOTE — PROGRESS NOTES
Patient had CT scan completed yesterday. Based on result hospitalist started process for patient to be transferred to [facility] DR CHEYENNE DONG. Vascular surgeon and Liver specialist at [facility] reviewed patients CT and determined that no transfer is needed at this time. Ordered to give heparin bolus and begin heparin drip. Pharmacy to dose heparin.

## 2021-03-23 NOTE — PROGRESS NOTES
Cecille Maya is a 41 y.o. male patient.     Current Facility-Administered Medications   Medication Dose Route Frequency Provider Last Rate Last Admin    heparin 25,000 units in dextrose 5% 250 mL (premix) infusion  600 Units/hr Intravenous Continuous Fer Stiles PA-C 6 mL/hr at 03/23/21 0809 600 Units/hr at 03/23/21 0809    nystatin (MYCOSTATIN) 192812 UNIT/ML suspension 500,000 Units  5 mL Oral 4x Daily Acey Lacy, APRN - CNP   500,000 Units at 03/22/21 2019    heparin (porcine) injection 8,000 Units  80 Units/kg (Order-Specific) Intravenous PRN Fer Stiles PA-C        heparin (porcine) injection 4,000 Units  40 Units/kg (Order-Specific) Intravenous PRN Fer Stiles PA-C        HYDROmorphone (DILAUDID) injection 0.5 mg  0.5 mg Intravenous Q4H PRN Cate Mckeon MD   0.5 mg at 03/23/21 9934    docusate sodium (COLACE) capsule 100 mg  100 mg Oral Daily Cate Mckeon MD   100 mg at 03/22/21 1025    sucralfate (CARAFATE) tablet 1 g  1 g Oral 4x Daily AC & HS MACEY Phelps   1 g at 03/22/21 2018    phenol 1.4 % mouth spray 1 spray  1 spray Mouth/Throat Q2H PRN Cate Mckeon MD   1 spray at 03/18/21 0952    pantoprazole (PROTONIX) injection 40 mg  40 mg Intravenous BID MACEY Phelps   40 mg at 03/22/21 2019    oxyCODONE-acetaminophen (PERCOCET) 5-325 MG per tablet 1 tablet  1 tablet Oral Q4H PRN Fer Stiles PA-C        Or    oxyCODONE-acetaminophen (PERCOCET) 5-325 MG per tablet 2 tablet  2 tablet Oral Q4H PRN Fer Stiles PA-C   2 tablet at 03/23/21 0810    dextrose 5 % and 0.45 % NaCl with KCl 20 mEq infusion   Intravenous Continuous Cate Mckeon MD 75 mL/hr at 03/23/21 0004 New Bag at 08/55/86 8310    folic acid (FOLVITE) tablet 1 mg  1 mg Oral Daily Cate Mckeon MD   1 mg at 03/22/21 1025    clonazePAM (KLONOPIN) tablet 0.5 mg  0.5 mg Oral BID PRN Roxanna Espinoza MD   0.5 mg at 03/22/21 3182    citalopram (CELEXA) tablet 10 mg  10 mg Oral Daily Linnette Mendez MD   10 mg at 03/22/21 1026    amLODIPine (NORVASC) tablet 5 mg  5 mg Oral Daily Linnette Mendez MD   5 mg at 03/22/21 1025    cloNIDine (CATAPRES) tablet 0.1 mg  0.1 mg Oral BID Linnette Mendez MD   0.1 mg at 03/22/21 2018    lisinopril (PRINIVIL;ZESTRIL) tablet 10 mg  10 mg Oral BID Linnette Mendez MD   10 mg at 03/22/21 2019    dicyclomine (BENTYL) capsule 10 mg  10 mg Oral BID Linnette Mendez MD   10 mg at 03/22/21 2018    sodium chloride flush 0.9 % injection 10 mL  10 mL Intravenous 2 times per day Linnette Mendez MD   10 mL at 03/22/21 2025    sodium chloride flush 0.9 % injection 10 mL  10 mL Intravenous PRN Linnette Mendez MD   10 mL at 03/19/21 0415    ondansetron (ZOFRAN) injection 4 mg  4 mg Intravenous Q6H PRN Linnette Mendez MD   4 mg at 03/23/21 0252    polyethylene glycol (GLYCOLAX) packet 17 g  17 g Oral Daily PRN Linnette Mendez MD        acetaminophen (TYLENOL) tablet 650 mg  650 mg Oral Q6H PRN Linnette Mendez MD        Or    acetaminophen (TYLENOL) suppository 650 mg  650 mg Rectal Q6H PRN Linnette Mendez MD        potassium chloride 10 mEq/100 mL IVPB (Peripheral Line)  10 mEq Intravenous PRN Linnette Mendez  mL/hr at 03/14/21 1135 10 mEq at 03/14/21 1135    promethazine (PHENERGAN) injection 12.5 mg  12.5 mg Intravenous Q6H PRN Kojo Matta MD   12.5 mg at 03/23/21 8284     Allergies   Allergen Reactions    Tramadol Other (See Comments)     Hot and sweaty     Active Problems:    Mild protein-calorie malnutrition (HCC)    Acute cholecystitis    Alcohol-induced chronic pancreatitis (HCC)    Abdominal pain, epigastric    Abnormal LFTs    Portal vein thrombosis  Resolved Problems:    * No resolved hospital problems. *    Blood pressure 111/75, pulse 94, temperature 97.2 °F (36.2 °C), temperature source Oral, resp. rate 18, height 6' 1\" (1.854 m), weight 283 lb 9.6 oz (128.6 kg), SpO2 97 %.     Subjective:  Symptoms: Stable. Pain:  He complains of pain that is moderate. Objective:  General Appearance: In no acute distress and not in pain. Vital signs: (most recent): Blood pressure 111/75, pulse 94, temperature 97.2 °F (36.2 °C), temperature source Oral, resp. rate 18, height 6' 1\" (1.854 m), weight 283 lb 9.6 oz (128.6 kg), SpO2 97 %. Abdomen: Abdomen is soft. Bowel sounds are normal.   There is epigastric tenderness. Assessment & Plan  41year old male with a history of HTN, arthritis, diverticulitis s/p partial colectomy and reversal of ileostomy, alcohol abuse, admitted with recurrent acute pancreatitis likely secondary to biliary sludge found on USG w neg. MRCP.  Repeat CT w PV thrombosis extending int MV.     Recommendation:  1. Continue supportive care/aggressive IV hydration  2. Monitor LFTs  3. Will need CCY  4. Consider NJT feeding  5.  Plans for transfer to  noted        Omayra Yun MD       [phone]) [phone]  Omayra Yun MD  3/23/2021

## 2021-03-23 NOTE — PROGRESS NOTES
Received call from lab for aPTT of 103.5. Spoke with Sherell Hammans in pharmacy- new order to hold heparin gtt x1 hr and restart at new rate.

## 2021-03-23 NOTE — PROGRESS NOTES
Patient given tray with full liquid diet around 1230, pt w/ large emesis at 1255, phenergan given to help with nausea.

## 2021-03-23 NOTE — PROGRESS NOTES
Pharmacy - RE:  High-dose Heparin drip  Current rate = 12 ml/h  (1200 units/h)  aPTT drawn @ 0611 = 104.1 sec. Goal aPTT = 49 - 76 sec. Per protocol, turn drip off x 1 h (0700 - 0800), then restart drip at a reduced rate of 6 ml/h (600 units/h). Obtain another aPTT 3/23 @ 1430.

## 2021-03-23 NOTE — PROGRESS NOTES
IM Progress Note    Admit Date:  3/13/2021  10    Interval history:  Admitted from [facility] with abd pain   Hx of alcohol abuse, reports he quit about 4 months ago     Recurrent emesis after stating diet, again NPO     Subjective:    Ongoing diffuse abd pain. No nausea or emesis - asking for diet. Objective:   /75   Pulse 94   Temp 97.2 °F (36.2 °C) (Oral)   Resp 18   Ht 6' 1\" (1.854 m)   Wt 283 lb 9.6 oz (128.6 kg)   SpO2 97%   BMI 37.42 kg/m²       Intake/Output Summary (Last 24 hours) at 3/23/2021 1319  Last data filed at 3/23/2021 1256  Gross per 24 hour   Intake 240 ml   Output 2100 ml   Net -1860 ml       Physical Exam:  General: young male, obese,  Awake, alert and oriented. Appears to be not in any distress  Mucous Membranes:  Pink, anicteric, white patches on tongue  Neck: No JVD, no carotid bruit, no thyromegaly  Chest:  Clear to auscultation bilaterally, no added sounds  Cardiovascular:  RRR S1S2 heard, no murmurs or gallops  Abdomen: hard, undistended, mid diffuse abd tenderness noted, no organomegaly, BS present  Extremities: No edema or cyanosis.  Distal pulses well felt  Neurological : grossly normal      Medications:   Scheduled Medications:    nystatin  5 mL Oral 4x Daily    docusate sodium  100 mg Oral Daily    sucralfate  1 g Oral 4x Daily AC & HS    pantoprazole  40 mg Intravenous BID    folic acid  1 mg Oral Daily    citalopram  10 mg Oral Daily    amLODIPine  5 mg Oral Daily    cloNIDine  0.1 mg Oral BID    lisinopril  10 mg Oral BID    dicyclomine  10 mg Oral BID    sodium chloride flush  10 mL Intravenous 2 times per day     I   heparin (PORCINE) Infusion 600 Units/hr (03/23/21 0809)    dextrose 5% and 0.45% NaCl with KCl 20 mEq 75 mL/hr (03/23/21 1252)     heparin (porcine), heparin (porcine), HYDROmorphone, phenol, oxyCODONE-acetaminophen **OR** oxyCODONE-acetaminophen, clonazePAM, sodium chloride flush, [DISCONTINUED] promethazine **OR** ondansetron, sequela of chronic pancreatitis. *Large amount of gallbladder sludge. US GALLBLADDER RUQ   Final Result   1. Gallbladder sludge with nonspecific wall thickening. 2. Abnormal common duct dilatation. MRCP and/or ERCP would be options to   further evaluate. Assessment & Plan:      Abdominal pain likely acute pancreatitis   - admitted to med-surg.   - NPO, IVF's. - pain control: morphine prn  - antiemetics prn  -- pain came back with starting diet. - kept NPO longer   - hungry today - will try full liquids - if not tolerating - post pyloric feeding is being considered. - GI involved. Portal Vein Thrombosis. - extending into SMV and splenic vein  - started on high dose heparin drip  - Transfer to  requested -  called us back and recommended AC - no need for transfer. Abn LFT   - no CBD stone per MRCP , Mildly atrophic pancreas with ductal dilatation noted but with gall bladder sludge  - bili improving  - pt reports no alcohol since 4 months   Refer to Surgery at [place] for cholecystectomy       Hypokalemia  Hypomagnesemia  - replace electrolytes. Monitor labs. Hyponatremia  - likely related to fluid volume depletion  - Give IVF's  - monitor BMP. 129 today. Leukocytosis  - likely acute stress response  - resolved now. Normocytic  anemia  - 9.4 on admission. Trending down with IVF  Monitor  - might need transfusion hgb at 7.9  - folate def noted, started on supplements       Hypertension   - BP stable. Continue Clonidine, Amlodipine, Lisinopril. Anxiety  - Continue Celexa, Klonopin prn      Tobacco Dependence  - Recommended cessation      Obesity  - Body mass index is 37.42 kg/m². - Recommended weight loss      Oral candidiasis  - Nystatin swish and swallow. H/o alcohol abuse noted during previous admission. Admission last year for acute pancreatitis due to alcohol abuse.      DVT Prophylaxis: lovenox  Diet: Diet NPO Effective Now  Code Status: Full Code    Pt hungry asking for food. Trial of full liquids today. Open discussion about post pyloric feeding.        Nery Kelley  3/23/2021

## 2021-03-24 LAB
ANION GAP SERPL CALCULATED.3IONS-SCNC: 12 MMOL/L (ref 3–16)
APTT: 39.4 SEC (ref 24.2–36.2)
APTT: 48.5 SEC (ref 24.2–36.2)
APTT: 49.2 SEC (ref 24.2–36.2)
BASOPHILS ABSOLUTE: 0.1 K/UL (ref 0–0.2)
BASOPHILS RELATIVE PERCENT: 0.9 %
BUN BLDV-MCNC: 3 MG/DL (ref 7–20)
CALCIUM SERPL-MCNC: 9.2 MG/DL (ref 8.3–10.6)
CHLORIDE BLD-SCNC: 96 MMOL/L (ref 99–110)
CO2: 20 MMOL/L (ref 21–32)
CREAT SERPL-MCNC: <0.5 MG/DL (ref 0.9–1.3)
EOSINOPHILS ABSOLUTE: 0.1 K/UL (ref 0–0.6)
EOSINOPHILS RELATIVE PERCENT: 1.5 %
GFR AFRICAN AMERICAN: >60
GFR NON-AFRICAN AMERICAN: >60
GLUCOSE BLD-MCNC: 104 MG/DL (ref 70–99)
GLUCOSE BLD-MCNC: 112 MG/DL (ref 70–99)
GLUCOSE BLD-MCNC: 115 MG/DL (ref 70–99)
GLUCOSE BLD-MCNC: 115 MG/DL (ref 70–99)
GLUCOSE BLD-MCNC: 125 MG/DL (ref 70–99)
GLUCOSE BLD-MCNC: 138 MG/DL (ref 70–99)
HCT VFR BLD CALC: 28.5 % (ref 40.5–52.5)
HEMOGLOBIN: 9.3 G/DL (ref 13.5–17.5)
LYMPHOCYTES ABSOLUTE: 1.1 K/UL (ref 1–5.1)
LYMPHOCYTES RELATIVE PERCENT: 14.1 %
MCH RBC QN AUTO: 33.9 PG (ref 26–34)
MCHC RBC AUTO-ENTMCNC: 32.6 G/DL (ref 31–36)
MCV RBC AUTO: 104.2 FL (ref 80–100)
MONOCYTES ABSOLUTE: 0.9 K/UL (ref 0–1.3)
MONOCYTES RELATIVE PERCENT: 11.3 %
NEUTROPHILS ABSOLUTE: 5.5 K/UL (ref 1.7–7.7)
NEUTROPHILS RELATIVE PERCENT: 72.2 %
PDW BLD-RTO: 18.7 % (ref 12.4–15.4)
PERFORMED ON: ABNORMAL
PLATELET # BLD: 151 K/UL (ref 135–450)
PMV BLD AUTO: 7.7 FL (ref 5–10.5)
POTASSIUM SERPL-SCNC: 4.4 MMOL/L (ref 3.5–5.1)
RBC # BLD: 2.74 M/UL (ref 4.2–5.9)
SODIUM BLD-SCNC: 128 MMOL/L (ref 136–145)
WBC # BLD: 7.6 K/UL (ref 4–11)

## 2021-03-24 PROCEDURE — 6360000002 HC RX W HCPCS: Performed by: PHYSICIAN ASSISTANT

## 2021-03-24 PROCEDURE — 85730 THROMBOPLASTIN TIME PARTIAL: CPT

## 2021-03-24 PROCEDURE — 6360000002 HC RX W HCPCS: Performed by: INTERNAL MEDICINE

## 2021-03-24 PROCEDURE — 6370000000 HC RX 637 (ALT 250 FOR IP): Performed by: HOSPITALIST

## 2021-03-24 PROCEDURE — 6370000000 HC RX 637 (ALT 250 FOR IP): Performed by: NURSE PRACTITIONER

## 2021-03-24 PROCEDURE — 80048 BASIC METABOLIC PNL TOTAL CA: CPT

## 2021-03-24 PROCEDURE — 36415 COLL VENOUS BLD VENIPUNCTURE: CPT

## 2021-03-24 PROCEDURE — 1200000000 HC SEMI PRIVATE

## 2021-03-24 PROCEDURE — 6370000000 HC RX 637 (ALT 250 FOR IP): Performed by: PHYSICIAN ASSISTANT

## 2021-03-24 PROCEDURE — 6370000000 HC RX 637 (ALT 250 FOR IP): Performed by: INTERNAL MEDICINE

## 2021-03-24 PROCEDURE — C9113 INJ PANTOPRAZOLE SODIUM, VIA: HCPCS | Performed by: PHYSICIAN ASSISTANT

## 2021-03-24 PROCEDURE — 85025 COMPLETE CBC W/AUTO DIFF WBC: CPT

## 2021-03-24 PROCEDURE — 6360000002 HC RX W HCPCS: Performed by: HOSPITALIST

## 2021-03-24 PROCEDURE — 99233 SBSQ HOSP IP/OBS HIGH 50: CPT | Performed by: INTERNAL MEDICINE

## 2021-03-24 PROCEDURE — 2500000003 HC RX 250 WO HCPCS: Performed by: INTERNAL MEDICINE

## 2021-03-24 PROCEDURE — 2580000003 HC RX 258: Performed by: HOSPITALIST

## 2021-03-24 RX ORDER — HEPARIN SODIUM 1000 [USP'U]/ML
40 INJECTION, SOLUTION INTRAVENOUS; SUBCUTANEOUS ONCE
Status: COMPLETED | OUTPATIENT
Start: 2021-03-24 | End: 2021-03-24

## 2021-03-24 RX ADMIN — CLONIDINE HYDROCHLORIDE 0.1 MG: 0.1 TABLET ORAL at 20:07

## 2021-03-24 RX ADMIN — LISINOPRIL 10 MG: 10 TABLET ORAL at 09:31

## 2021-03-24 RX ADMIN — OXYCODONE HYDROCHLORIDE AND ACETAMINOPHEN 2 TABLET: 5; 325 TABLET ORAL at 13:52

## 2021-03-24 RX ADMIN — ONDANSETRON 4 MG: 2 SOLUTION INTRAMUSCULAR; INTRAVENOUS at 16:43

## 2021-03-24 RX ADMIN — AMLODIPINE BESYLATE 5 MG: 5 TABLET ORAL at 09:30

## 2021-03-24 RX ADMIN — PROMETHAZINE HYDROCHLORIDE 12.5 MG: 25 INJECTION INTRAMUSCULAR; INTRAVENOUS at 08:03

## 2021-03-24 RX ADMIN — HYDROMORPHONE HYDROCHLORIDE 0.5 MG: 1 INJECTION, SOLUTION INTRAMUSCULAR; INTRAVENOUS; SUBCUTANEOUS at 16:43

## 2021-03-24 RX ADMIN — HYDROMORPHONE HYDROCHLORIDE 0.5 MG: 1 INJECTION, SOLUTION INTRAMUSCULAR; INTRAVENOUS; SUBCUTANEOUS at 23:22

## 2021-03-24 RX ADMIN — CITALOPRAM HYDROBROMIDE 10 MG: 20 TABLET ORAL at 09:32

## 2021-03-24 RX ADMIN — PANTOPRAZOLE SODIUM 40 MG: 40 INJECTION, POWDER, FOR SOLUTION INTRAVENOUS at 09:32

## 2021-03-24 RX ADMIN — HEPARIN SODIUM 4000 UNITS: 1000 INJECTION, SOLUTION INTRAVENOUS; SUBCUTANEOUS at 08:01

## 2021-03-24 RX ADMIN — PANTOPRAZOLE SODIUM 40 MG: 40 INJECTION, POWDER, FOR SOLUTION INTRAVENOUS at 20:06

## 2021-03-24 RX ADMIN — HYDROMORPHONE HYDROCHLORIDE 0.5 MG: 1 INJECTION, SOLUTION INTRAMUSCULAR; INTRAVENOUS; SUBCUTANEOUS at 20:06

## 2021-03-24 RX ADMIN — SODIUM CHLORIDE, PRESERVATIVE FREE 10 ML: 5 INJECTION INTRAVENOUS at 09:32

## 2021-03-24 RX ADMIN — SUCRALFATE 1 G: 1 TABLET ORAL at 10:24

## 2021-03-24 RX ADMIN — HYDROMORPHONE HYDROCHLORIDE 0.5 MG: 1 INJECTION, SOLUTION INTRAMUSCULAR; INTRAVENOUS; SUBCUTANEOUS at 08:03

## 2021-03-24 RX ADMIN — OXYCODONE HYDROCHLORIDE AND ACETAMINOPHEN 2 TABLET: 5; 325 TABLET ORAL at 09:31

## 2021-03-24 RX ADMIN — HYDROMORPHONE HYDROCHLORIDE 0.5 MG: 1 INJECTION, SOLUTION INTRAMUSCULAR; INTRAVENOUS; SUBCUTANEOUS at 03:38

## 2021-03-24 RX ADMIN — NYSTATIN 500000 UNITS: 500000 SUSPENSION ORAL at 09:31

## 2021-03-24 RX ADMIN — NYSTATIN 500000 UNITS: 500000 SUSPENSION ORAL at 20:07

## 2021-03-24 RX ADMIN — NYSTATIN 500000 UNITS: 500000 SUSPENSION ORAL at 16:43

## 2021-03-24 RX ADMIN — ONDANSETRON 4 MG: 2 SOLUTION INTRAMUSCULAR; INTRAVENOUS at 09:30

## 2021-03-24 RX ADMIN — SODIUM CHLORIDE, PRESERVATIVE FREE 10 ML: 5 INJECTION INTRAVENOUS at 20:08

## 2021-03-24 RX ADMIN — CLONIDINE HYDROCHLORIDE 0.1 MG: 0.1 TABLET ORAL at 09:31

## 2021-03-24 RX ADMIN — HYDROMORPHONE HYDROCHLORIDE 0.5 MG: 1 INJECTION, SOLUTION INTRAMUSCULAR; INTRAVENOUS; SUBCUTANEOUS at 12:12

## 2021-03-24 RX ADMIN — POTASSIUM CHLORIDE, DEXTROSE MONOHYDRATE AND SODIUM CHLORIDE: 150; 5; 450 INJECTION, SOLUTION INTRAVENOUS at 12:18

## 2021-03-24 RX ADMIN — SUCRALFATE 1 G: 1 TABLET ORAL at 06:23

## 2021-03-24 RX ADMIN — NYSTATIN 500000 UNITS: 500000 SUSPENSION ORAL at 12:18

## 2021-03-24 RX ADMIN — OXYCODONE HYDROCHLORIDE AND ACETAMINOPHEN 2 TABLET: 5; 325 TABLET ORAL at 18:29

## 2021-03-24 RX ADMIN — OXYCODONE HYDROCHLORIDE AND ACETAMINOPHEN 2 TABLET: 5; 325 TABLET ORAL at 05:26

## 2021-03-24 RX ADMIN — ONDANSETRON 4 MG: 2 SOLUTION INTRAMUSCULAR; INTRAVENOUS at 03:47

## 2021-03-24 RX ADMIN — PROMETHAZINE HYDROCHLORIDE 12.5 MG: 25 INJECTION INTRAMUSCULAR; INTRAVENOUS at 13:52

## 2021-03-24 RX ADMIN — DICYCLOMINE HYDROCHLORIDE 10 MG: 10 CAPSULE ORAL at 20:07

## 2021-03-24 RX ADMIN — SUCRALFATE 1 G: 1 TABLET ORAL at 16:43

## 2021-03-24 RX ADMIN — SUCRALFATE 1 G: 1 TABLET ORAL at 20:07

## 2021-03-24 RX ADMIN — LISINOPRIL 10 MG: 10 TABLET ORAL at 20:07

## 2021-03-24 RX ADMIN — DICYCLOMINE HYDROCHLORIDE 10 MG: 10 CAPSULE ORAL at 09:31

## 2021-03-24 RX ADMIN — ONDANSETRON 4 MG: 2 SOLUTION INTRAMUSCULAR; INTRAVENOUS at 23:22

## 2021-03-24 RX ADMIN — FOLIC ACID 1 MG: 1 TABLET ORAL at 09:31

## 2021-03-24 RX ADMIN — DOCUSATE SODIUM 100 MG: 100 CAPSULE, LIQUID FILLED ORAL at 09:31

## 2021-03-24 ASSESSMENT — PAIN DESCRIPTION - DESCRIPTORS: DESCRIPTORS: THROBBING

## 2021-03-24 ASSESSMENT — PAIN DESCRIPTION - LOCATION
LOCATION: ABDOMEN

## 2021-03-24 ASSESSMENT — PAIN DESCRIPTION - PAIN TYPE
TYPE: ACUTE PAIN

## 2021-03-24 ASSESSMENT — PAIN SCALES - GENERAL
PAINLEVEL_OUTOF10: 8
PAINLEVEL_OUTOF10: 9
PAINLEVEL_OUTOF10: 8

## 2021-03-24 ASSESSMENT — PAIN DESCRIPTION - ORIENTATION
ORIENTATION: MID;RIGHT

## 2021-03-24 ASSESSMENT — PAIN DESCRIPTION - FREQUENCY: FREQUENCY: INTERMITTENT

## 2021-03-24 ASSESSMENT — PAIN DESCRIPTION - PROGRESSION: CLINICAL_PROGRESSION: GRADUALLY IMPROVING

## 2021-03-24 NOTE — PROGRESS NOTES
Patient had 1 bowl of soup at dinner and 4 cups of ice water throughout the past few hours. No vomiting so far. Still c/o nausea.

## 2021-03-24 NOTE — PROGRESS NOTES
Patient c/o abdominal pain 8/10 PRN Dilaudid given. Call light within reach. Will continue to monitor.

## 2021-03-24 NOTE — PLAN OF CARE
Problem: Pain:  Goal: Pain level will decrease  Description: Pain level will decrease  Outcome: Ongoing  Goal: Control of acute pain  Description: Control of acute pain  Outcome: Ongoing     Problem: Falls - Risk of:  Goal: Will remain free from falls  Description: Will remain free from falls  Outcome: Ongoing     Problem: Infection:  Goal: Will remain free from infection  Description: Will remain free from infection  Outcome: Ongoing     Problem: Daily Care:  Goal: Daily care needs are met  Description: Daily care needs are met  Outcome: Ongoing

## 2021-03-24 NOTE — PLAN OF CARE
Problem: Pain:  Goal: Pain level will decrease  Description: Pain level will decrease  3/24/2021 1954 by Keysha Kearns RN  Outcome: Ongoing  3/24/2021 1100 by Tracy Zee RN  Outcome: Ongoing  Goal: Control of acute pain  Description: Control of acute pain  3/24/2021 1954 by Keysha Kearns RN  Outcome: Ongoing  3/24/2021 1100 by Tracy Zee RN  Outcome: Ongoing  Goal: Control of chronic pain  Description: Control of chronic pain  Outcome: Ongoing  Goal: Patient's pain/discomfort is manageable  Description: Patient's pain/discomfort is manageable  Outcome: Ongoing     Problem: Falls - Risk of:  Goal: Will remain free from falls  Description: Will remain free from falls  3/24/2021 1954 by Keysha Kearns RN  Outcome: Ongoing  3/24/2021 1100 by Tracy Zee RN  Outcome: Ongoing  Goal: Absence of physical injury  Description: Absence of physical injury  Outcome: Ongoing     Problem: Infection:  Goal: Will remain free from infection  Description: Will remain free from infection  3/24/2021 1954 by Keysha Kearns RN  Outcome: Ongoing  3/24/2021 1100 by Tracy Zee RN  Outcome: Ongoing     Problem: Safety:  Goal: Free from accidental physical injury  Description: Free from accidental physical injury  Outcome: Ongoing  Goal: Free from intentional harm  Description: Free from intentional harm  Outcome: Ongoing     Problem: Daily Care:  Goal: Daily care needs are met  Description: Daily care needs are met  3/24/2021 1954 by Keysha Kearns RN  Outcome: Ongoing  3/24/2021 1100 by Tracy Zee RN  Outcome: Ongoing     Problem: Skin Integrity:  Goal: Skin integrity will stabilize  Description: Skin integrity will stabilize  Outcome: Ongoing     Problem: Discharge Planning:  Goal: Patients continuum of care needs are met  Description: Patients continuum of care needs are met  Outcome: Ongoing     Problem: Nutrition  Goal: Optimal nutrition therapy  Outcome: Ongoing  Goal: Understanding of nutritional guidelines  Outcome: Ongoing

## 2021-03-24 NOTE — PROGRESS NOTES
Pharmacy - RE:  High-dose Heparin drip  Current rate = 6 ml/h  (600 units/h)  aPTT drawn @ 1600 = 49.7 sec. Goal aPTT = 49 - 76 sec. Per protocol, continue with rate of 6 ml/h (600 units/h). Per protocol, may switch to daily apTT draws, as this was the second consecutive therapeutic aPTT. Next aPTT due 3/24 with AM lab draws. Valencia Draper

## 2021-03-24 NOTE — PROGRESS NOTES
Shift assessment complete. VSS. Patient A/Ox4, flat affect, appears agitated when asked questions. C/o nausea and abd pain. PRN Phenergan and PRN Dilaudid given earlier. \"A little bit better\" per patient. Pt requesting PRN Zofran and PRN Percocet. Meds given. See MAR. Pt only tolerating water. BS WNL. Pt denies needs at this time. Call light within reach. Patient refusing alarms. Pt calls out appropriately. Will continue to monitor.

## 2021-03-24 NOTE — PROGRESS NOTES
IM Progress Note    Admit Date:  3/13/2021  11    Interval history:  Admitted from [facility] with abd pain   Hx of alcohol abuse, reports he quit about 4 months ago     Recurrent emesis after stating diet, again NPO     Subjective:  He complains of persistent diffuse abdominal pain  not tolerating p.o. diet , having recurrent emesis . complains of change in voice and oral thrush      Objective:   /80   Pulse 110   Temp 97.2 °F (36.2 °C) (Oral)   Resp 16   Ht 6' 1\" (1.854 m)   Wt 283 lb 9.6 oz (128.6 kg)   SpO2 95%   BMI 37.42 kg/m²       Intake/Output Summary (Last 24 hours) at 3/24/2021 1537  Last data filed at 3/24/2021 0809  Gross per 24 hour   Intake 4315 ml   Output 1225 ml   Net 3090 ml       Physical Exam:  General: young male, obese,  Awake, alert and oriented. Appears to be not in any distress   ill appearing   Mucous Membranes:  Pink, anicteric, white patches on tongue  Neck: No JVD, no carotid bruit, no thyromegaly  Chest:  Clear to auscultation bilaterally, no added sounds  Cardiovascular:  RRR S1S2 heard, no murmurs or gallops  Abdomen: hard, undistended, mid diffuse abd tenderness noted, no organomegaly, BS present  Extremities: No edema or cyanosis.  Distal pulses well felt  Neurological : grossly normal      Medications:   Scheduled Medications:    nystatin  5 mL Oral 4x Daily    docusate sodium  100 mg Oral Daily    sucralfate  1 g Oral 4x Daily AC & HS    pantoprazole  40 mg Intravenous BID    folic acid  1 mg Oral Daily    citalopram  10 mg Oral Daily    amLODIPine  5 mg Oral Daily    cloNIDine  0.1 mg Oral BID    lisinopril  10 mg Oral BID    dicyclomine  10 mg Oral BID    sodium chloride flush  10 mL Intravenous 2 times per day     I   heparin (PORCINE) Infusion 800 Units/hr (03/24/21 0746)    dextrose 5% and 0.45% NaCl with KCl 20 mEq 75 mL/hr at 03/24/21 1218     heparin (porcine), heparin (porcine), HYDROmorphone, phenol, oxyCODONE-acetaminophen **OR** oxyCODONE-acetaminophen, clonazePAM, sodium chloride flush, [DISCONTINUED] promethazine **OR** ondansetron, polyethylene glycol, acetaminophen **OR** acetaminophen, potassium chloride, promethazine    Lab Data:  Recent Labs     03/22/21  0535 03/23/21  0611 03/24/21  0652   WBC 7.7 8.4 7.6   HGB 7.9* 8.3* 9.3*   HCT 23.4* 25.3* 28.5*   .9* 103.7* 104.2*    193 151     Recent Labs     03/22/21  0535 03/23/21  0611 03/24/21  0609   * 130* 128*   K 4.2 4.3 4.4   CL 99 99 96*   CO2 21 22 20*   BUN <2* 3* 3*   CREATININE <0.5* <0.5* <0.5*     No results for input(s): CKTOTAL, CKMB, CKMBINDEX, TROPONINI in the last 72 hours. Coagulation:   Lab Results   Component Value Date    INR 1.28 10/04/2019    APTT 49.2 03/24/2021     Cardiac markers:   Lab Results   Component Value Date    TROPONINI <0.01 03/14/2021         Lab Results   Component Value Date    ALT 23 03/18/2021    AST 62 (H) 03/18/2021    ALKPHOS 181 (H) 03/18/2021    BILITOT 3.6 (H) 03/18/2021       Lab Results   Component Value Date    INR 1.28 (H) 10/04/2019    INR 1.65 (H) 03/22/2019    PROTIME 14.6 (H) 10/04/2019    PROTIME 18.8 (H) 03/22/2019       Radiology    CT ABDOMEN PELVIS W IV CONTRAST Additional Contrast? None   Final Result   There is complete thrombosis of the portal vein. Thrombus is also seen   extending into the cephalad portion of the superior mesenteric vein and   throughout the splenic vein. Peripancreatic stranding consistent with pancreatitis. There is a small area   of low-attenuation in the region the pancreatic head which may correspond to   focal dilated pancreatic duct or a small developing fluid collection. Hepatic steatosis. The findings were sent to the Radiology Results [address] at 3:41   pm on 3/22/2021to be communicated to a licensed caregiver. MRI ABDOMEN WO CONTRAST MRCP   Final Result   *Normal caliber bile ducts without evidence of choledocholithiasis. *Hepatomegaly with severe fatty infiltration as described. *Mildly atrophic pancreas with ductal dilatation as measured and described   above, findings likely sequela of chronic pancreatitis. *Large amount of gallbladder sludge. US GALLBLADDER RUQ   Final Result   1. Gallbladder sludge with nonspecific wall thickening. 2. Abnormal common duct dilatation. MRCP and/or ERCP would be options to   further evaluate. Assessment & Plan:      Abdominal pain likely acute pancreatitis   - admitted to med-surg.   - NPO, IVF's. - pain control: morphine prn  - antiemetics prn  -- pain came back with starting diet. - kept NPO longer   - resumed  full liquids on 3/23- >not tolerating   - post pyloric feeding is being considered. - GI involved. persistent abd pain. Increase dilaudid      Portal Vein Thrombosis. - extending into SMV and splenic vein  - started on high dose heparin drip  - Transfer to  was  requested -  called us back and recommended AC - no need for transfer. cont heparin gtt . Recommendation was heparin fro 48 hours and then switch to oral AC    Abn LFT   - no CBD stone per MRCP , Mildly atrophic pancreas with ductal dilatation noted but with gall bladder sludge  - bili improving  - pt reports no alcohol since 4 months   Refer to Surgery at [place] for cholecystectomy     Hypokalemia  Hypomagnesemia  - replace electrolytes. Monitor labs. Hyponatremia  - likely related to fluid volume depletion  - Give IVF's  - monitor BMP. 129 today. Leukocytosis  - likely acute stress response  - resolved now. Normocytic  anemia  - 9.4 on admission. Trending down with IVF  Monitor  - might need transfusion hgb at 7.9  - folate def noted, started on supplements     Hypertension   - BP stable. Continue Clonidine, Amlodipine, Lisinopril. Anxiety  - Continue Celexa, Klonopin prn    Tobacco Dependence  - Recommended cessation      Obesity  - Body mass index is 37.42 kg/m².   - Recommended weight loss      Oral candidiasis  - Nystatin swish and swallow. severe malnutrition  - will need to consider tube feeds    H/o alcohol abuse noted during previous admission. Admission last year for acute pancreatitis due to alcohol abuse.      DVT Prophylaxis: lovenox  Diet: Diet NPO Effective Now  Code Status: Full Code      Plan of care discussed with pt's nurse       Rashel Borja  3/24/2021

## 2021-03-24 NOTE — PROGRESS NOTES
Shift assessment complete. See doc flow. Nightly medications given see MAR. Patient c/o abdominal pain & nausea PRN Dilaudid given, nothing available for nausea at this time. Patient reports no BM for 5 days. Call light and bedside table within easy reach. Will continue to monitor.

## 2021-03-25 LAB
ANION GAP SERPL CALCULATED.3IONS-SCNC: 8 MMOL/L (ref 3–16)
APTT: 53.7 SEC (ref 24.2–36.2)
APTT: 54.3 SEC (ref 24.2–36.2)
BASOPHILS ABSOLUTE: 0.1 K/UL (ref 0–0.2)
BASOPHILS RELATIVE PERCENT: 0.9 %
BUN BLDV-MCNC: 3 MG/DL (ref 7–20)
CALCIUM SERPL-MCNC: 8.7 MG/DL (ref 8.3–10.6)
CHLORIDE BLD-SCNC: 100 MMOL/L (ref 99–110)
CO2: 23 MMOL/L (ref 21–32)
CREAT SERPL-MCNC: <0.5 MG/DL (ref 0.9–1.3)
EOSINOPHILS ABSOLUTE: 0.2 K/UL (ref 0–0.6)
EOSINOPHILS RELATIVE PERCENT: 2.3 %
GFR AFRICAN AMERICAN: >60
GFR NON-AFRICAN AMERICAN: >60
GLUCOSE BLD-MCNC: 112 MG/DL (ref 70–99)
GLUCOSE BLD-MCNC: 120 MG/DL (ref 70–99)
GLUCOSE BLD-MCNC: 122 MG/DL (ref 70–99)
GLUCOSE BLD-MCNC: 126 MG/DL (ref 70–99)
GLUCOSE BLD-MCNC: 133 MG/DL (ref 70–99)
HCT VFR BLD CALC: 25.7 % (ref 40.5–52.5)
HEMOGLOBIN: 8.6 G/DL (ref 13.5–17.5)
LYMPHOCYTES ABSOLUTE: 1.3 K/UL (ref 1–5.1)
LYMPHOCYTES RELATIVE PERCENT: 14.8 %
MCH RBC QN AUTO: 34.4 PG (ref 26–34)
MCHC RBC AUTO-ENTMCNC: 33.4 G/DL (ref 31–36)
MCV RBC AUTO: 103 FL (ref 80–100)
MONOCYTES ABSOLUTE: 1.1 K/UL (ref 0–1.3)
MONOCYTES RELATIVE PERCENT: 12.2 %
NEUTROPHILS ABSOLUTE: 6.1 K/UL (ref 1.7–7.7)
NEUTROPHILS RELATIVE PERCENT: 69.8 %
PDW BLD-RTO: 18.1 % (ref 12.4–15.4)
PERFORMED ON: ABNORMAL
PLATELET # BLD: 234 K/UL (ref 135–450)
PMV BLD AUTO: 7.1 FL (ref 5–10.5)
POTASSIUM SERPL-SCNC: 4.5 MMOL/L (ref 3.5–5.1)
RBC # BLD: 2.5 M/UL (ref 4.2–5.9)
SODIUM BLD-SCNC: 131 MMOL/L (ref 136–145)
WBC # BLD: 8.7 K/UL (ref 4–11)

## 2021-03-25 PROCEDURE — 2580000003 HC RX 258: Performed by: HOSPITALIST

## 2021-03-25 PROCEDURE — 36415 COLL VENOUS BLD VENIPUNCTURE: CPT

## 2021-03-25 PROCEDURE — 6360000002 HC RX W HCPCS: Performed by: PHYSICIAN ASSISTANT

## 2021-03-25 PROCEDURE — 6370000000 HC RX 637 (ALT 250 FOR IP): Performed by: PHYSICIAN ASSISTANT

## 2021-03-25 PROCEDURE — 6360000002 HC RX W HCPCS: Performed by: HOSPITALIST

## 2021-03-25 PROCEDURE — 99232 SBSQ HOSP IP/OBS MODERATE 35: CPT | Performed by: NURSE PRACTITIONER

## 2021-03-25 PROCEDURE — C9113 INJ PANTOPRAZOLE SODIUM, VIA: HCPCS | Performed by: PHYSICIAN ASSISTANT

## 2021-03-25 PROCEDURE — 1200000000 HC SEMI PRIVATE

## 2021-03-25 PROCEDURE — 85025 COMPLETE CBC W/AUTO DIFF WBC: CPT

## 2021-03-25 PROCEDURE — 6370000000 HC RX 637 (ALT 250 FOR IP): Performed by: NURSE PRACTITIONER

## 2021-03-25 PROCEDURE — 80048 BASIC METABOLIC PNL TOTAL CA: CPT

## 2021-03-25 PROCEDURE — 85730 THROMBOPLASTIN TIME PARTIAL: CPT

## 2021-03-25 PROCEDURE — 6370000000 HC RX 637 (ALT 250 FOR IP): Performed by: HOSPITALIST

## 2021-03-25 PROCEDURE — 6360000002 HC RX W HCPCS: Performed by: INTERNAL MEDICINE

## 2021-03-25 PROCEDURE — 6370000000 HC RX 637 (ALT 250 FOR IP): Performed by: INTERNAL MEDICINE

## 2021-03-25 RX ADMIN — PANTOPRAZOLE SODIUM 40 MG: 40 INJECTION, POWDER, FOR SOLUTION INTRAVENOUS at 08:14

## 2021-03-25 RX ADMIN — CITALOPRAM HYDROBROMIDE 10 MG: 20 TABLET ORAL at 08:14

## 2021-03-25 RX ADMIN — PANTOPRAZOLE SODIUM 40 MG: 40 INJECTION, POWDER, FOR SOLUTION INTRAVENOUS at 20:20

## 2021-03-25 RX ADMIN — PROMETHAZINE HYDROCHLORIDE 12.5 MG: 25 INJECTION INTRAMUSCULAR; INTRAVENOUS at 15:37

## 2021-03-25 RX ADMIN — ONDANSETRON 4 MG: 2 SOLUTION INTRAMUSCULAR; INTRAVENOUS at 06:23

## 2021-03-25 RX ADMIN — DICYCLOMINE HYDROCHLORIDE 10 MG: 10 CAPSULE ORAL at 08:14

## 2021-03-25 RX ADMIN — OXYCODONE HYDROCHLORIDE AND ACETAMINOPHEN 2 TABLET: 5; 325 TABLET ORAL at 14:38

## 2021-03-25 RX ADMIN — HYDROMORPHONE HYDROCHLORIDE 0.5 MG: 1 INJECTION, SOLUTION INTRAMUSCULAR; INTRAVENOUS; SUBCUTANEOUS at 20:19

## 2021-03-25 RX ADMIN — Medication 1 SPRAY: at 08:16

## 2021-03-25 RX ADMIN — APIXABAN 10 MG: 5 TABLET, FILM COATED ORAL at 13:59

## 2021-03-25 RX ADMIN — SUCRALFATE 1 G: 1 TABLET ORAL at 17:28

## 2021-03-25 RX ADMIN — NYSTATIN 500000 UNITS: 500000 SUSPENSION ORAL at 08:14

## 2021-03-25 RX ADMIN — HYDROMORPHONE HYDROCHLORIDE 0.5 MG: 1 INJECTION, SOLUTION INTRAMUSCULAR; INTRAVENOUS; SUBCUTANEOUS at 11:25

## 2021-03-25 RX ADMIN — LISINOPRIL 10 MG: 10 TABLET ORAL at 20:20

## 2021-03-25 RX ADMIN — PROMETHAZINE HYDROCHLORIDE 12.5 MG: 25 INJECTION INTRAMUSCULAR; INTRAVENOUS at 23:06

## 2021-03-25 RX ADMIN — SODIUM CHLORIDE, PRESERVATIVE FREE 10 ML: 5 INJECTION INTRAVENOUS at 20:28

## 2021-03-25 RX ADMIN — OXYCODONE HYDROCHLORIDE AND ACETAMINOPHEN 2 TABLET: 5; 325 TABLET ORAL at 10:28

## 2021-03-25 RX ADMIN — SODIUM CHLORIDE, PRESERVATIVE FREE 10 ML: 5 INJECTION INTRAVENOUS at 08:14

## 2021-03-25 RX ADMIN — FOLIC ACID 1 MG: 1 TABLET ORAL at 08:14

## 2021-03-25 RX ADMIN — NYSTATIN 500000 UNITS: 500000 SUSPENSION ORAL at 17:28

## 2021-03-25 RX ADMIN — SUCRALFATE 1 G: 1 TABLET ORAL at 20:20

## 2021-03-25 RX ADMIN — ONDANSETRON 4 MG: 2 SOLUTION INTRAMUSCULAR; INTRAVENOUS at 20:19

## 2021-03-25 RX ADMIN — HEPARIN SODIUM 1000 UNITS/HR: 10000 INJECTION, SOLUTION INTRAVENOUS at 04:16

## 2021-03-25 RX ADMIN — PROMETHAZINE HYDROCHLORIDE 12.5 MG: 25 INJECTION INTRAMUSCULAR; INTRAVENOUS at 07:58

## 2021-03-25 RX ADMIN — OXYCODONE HYDROCHLORIDE AND ACETAMINOPHEN 2 TABLET: 5; 325 TABLET ORAL at 06:23

## 2021-03-25 RX ADMIN — ONDANSETRON 4 MG: 2 SOLUTION INTRAMUSCULAR; INTRAVENOUS at 14:37

## 2021-03-25 RX ADMIN — POLYETHYLENE GLYCOL (3350) 17 G: 17 POWDER, FOR SOLUTION ORAL at 15:43

## 2021-03-25 RX ADMIN — NYSTATIN 500000 UNITS: 500000 SUSPENSION ORAL at 14:00

## 2021-03-25 RX ADMIN — AMLODIPINE BESYLATE 5 MG: 5 TABLET ORAL at 08:14

## 2021-03-25 RX ADMIN — HYDROMORPHONE HYDROCHLORIDE 0.5 MG: 1 INJECTION, SOLUTION INTRAMUSCULAR; INTRAVENOUS; SUBCUTANEOUS at 15:37

## 2021-03-25 RX ADMIN — CLONIDINE HYDROCHLORIDE 0.1 MG: 0.1 TABLET ORAL at 08:14

## 2021-03-25 RX ADMIN — OXYCODONE HYDROCHLORIDE AND ACETAMINOPHEN 2 TABLET: 5; 325 TABLET ORAL at 01:35

## 2021-03-25 RX ADMIN — APIXABAN 10 MG: 5 TABLET, FILM COATED ORAL at 20:20

## 2021-03-25 RX ADMIN — DOCUSATE SODIUM 100 MG: 100 CAPSULE, LIQUID FILLED ORAL at 08:14

## 2021-03-25 RX ADMIN — NYSTATIN 500000 UNITS: 500000 SUSPENSION ORAL at 20:19

## 2021-03-25 RX ADMIN — SUCRALFATE 1 G: 1 TABLET ORAL at 11:21

## 2021-03-25 RX ADMIN — OXYCODONE HYDROCHLORIDE AND ACETAMINOPHEN 2 TABLET: 5; 325 TABLET ORAL at 18:59

## 2021-03-25 RX ADMIN — LISINOPRIL 10 MG: 10 TABLET ORAL at 08:14

## 2021-03-25 RX ADMIN — CLONAZEPAM 0.5 MG: 0.5 TABLET ORAL at 01:46

## 2021-03-25 RX ADMIN — CLONIDINE HYDROCHLORIDE 0.1 MG: 0.1 TABLET ORAL at 20:20

## 2021-03-25 RX ADMIN — HYDROMORPHONE HYDROCHLORIDE 0.5 MG: 1 INJECTION, SOLUTION INTRAMUSCULAR; INTRAVENOUS; SUBCUTANEOUS at 07:59

## 2021-03-25 RX ADMIN — HYDROMORPHONE HYDROCHLORIDE 0.5 MG: 1 INJECTION, SOLUTION INTRAMUSCULAR; INTRAVENOUS; SUBCUTANEOUS at 02:41

## 2021-03-25 RX ADMIN — DICYCLOMINE HYDROCHLORIDE 10 MG: 10 CAPSULE ORAL at 20:20

## 2021-03-25 RX ADMIN — HYDROMORPHONE HYDROCHLORIDE 0.5 MG: 1 INJECTION, SOLUTION INTRAMUSCULAR; INTRAVENOUS; SUBCUTANEOUS at 23:06

## 2021-03-25 ASSESSMENT — PAIN DESCRIPTION - PAIN TYPE
TYPE: ACUTE PAIN

## 2021-03-25 ASSESSMENT — PAIN DESCRIPTION - ORIENTATION
ORIENTATION: RIGHT;LEFT

## 2021-03-25 ASSESSMENT — PAIN DESCRIPTION - DESCRIPTORS: DESCRIPTORS: CONSTANT

## 2021-03-25 ASSESSMENT — PAIN SCALES - GENERAL
PAINLEVEL_OUTOF10: 8
PAINLEVEL_OUTOF10: 8
PAINLEVEL_OUTOF10: 9
PAINLEVEL_OUTOF10: 8
PAINLEVEL_OUTOF10: 8

## 2021-03-25 ASSESSMENT — PAIN DESCRIPTION - FREQUENCY: FREQUENCY: CONTINUOUS

## 2021-03-25 ASSESSMENT — PAIN DESCRIPTION - LOCATION
LOCATION: ABDOMEN

## 2021-03-25 NOTE — PROGRESS NOTES
Physician Progress Note      PATIENT:               Lindsey Alves  CSN #:                  [id number]  :                       1980  ADMIT DATE:       3/13/2021 11:43 PM  [address] [place] DATE:  RESPONDING  PROVIDER #:        Yady Guthrie MD          QUERY TEXT:    Pt admitted with acute pancreatitis. Noted documentation of Severe   malnutrition per dietary consultant on 3/23. If possible, please document in   progress notes and discharge summary:    The medical record reflects the following:  Risk Factors: acute/chronic pancreatitis,  Inadequate oral intake related to   altered GI function, inadequate protein-energy intake  Clinical Indicators: per dietician PN on 3/23: as evidenced by intake 0-25%,   poor intake prior to admission, weight loss 1%-2% in 1 week, lab values, GI   abnormality, nausea, vomiting  Treatment: dietician consult,  Continue Current Full Liquid, Low Fat diet-   monitor for s/s of GI distress, 2. Monitor need/desire for ONS, 3. Monitor   diet tolerance, appetite and PO intake, 4. Monitor weight and nutrition   related lab values    Thank you for your assistance,  Hao Bradley RN,BSN,CCDS,CRCR  Options provided:  -- Severe malnutrition confirmed present on admission  -- Severe malnutrition confirmed not present on admission  -- Severe malnutrition ruled out  -- Other - I will add my own diagnosis  -- Disagree - Not applicable / Not valid  -- Disagree - Clinically unable to determine / Unknown  -- Refer to Clinical Documentation Reviewer    PROVIDER RESPONSE TEXT:    The diagnosis of severe malnutrition was confirmed as present on admission.     Query created by: Kari Wallace on 3/24/2021 12:56 PM      Electronically signed by:  Yady Guthrie MD 3/25/2021 8:51 AM

## 2021-03-25 NOTE — PLAN OF CARE
Problem: Pain:  Goal: Pain level will decrease  Description: Pain level will decrease  Outcome: Ongoing  Goal: Control of acute pain  Description: Control of acute pain  Outcome: Ongoing     Problem: Infection:  Goal: Will remain free from infection  Description: Will remain free from infection  Outcome: Ongoing     Problem: Daily Care:  Goal: Daily care needs are met  Description: Daily care needs are met  Outcome: Ongoing

## 2021-03-25 NOTE — PROGRESS NOTES
Shift assessment complete. VSS. Patient A/OX4. C/o nausea and abd pain that is now on right and left side 9/10. PRN Phenergan and PRN Dilaudid given per orders. See MAR. Scheduled meds given per orders. See MAR. RN keeping patient NPO w/ sips and chips d/t vomiting after having clear liquids overnight and yesterday. Abd non distended, bowel sounds active. Pt c/o constipation, no BM in 5 days per patient. Will notify MD. Patient steady on feet, refusing bed/chair alarms. Call light within reach. Will continue to monitor.

## 2021-03-25 NOTE — PROGRESS NOTES
clonazePAM, sodium chloride flush, [DISCONTINUED] promethazine **OR** ondansetron, polyethylene glycol, acetaminophen **OR** acetaminophen, potassium chloride, promethazine    Lab Data:  Recent Labs     03/23/21  0611 03/24/21  0652 03/25/21  0359   WBC 8.4 7.6 8.7   HGB 8.3* 9.3* 8.6*   HCT 25.3* 28.5* 25.7*   .7* 104.2* 103.0*    151 234     Recent Labs     03/23/21  0611 03/24/21  0609 03/25/21  0359   * 128* 131*   K 4.3 4.4 4.5   CL 99 96* 100   CO2 22 20* 23   BUN 3* 3* 3*   CREATININE <0.5* <0.5* <0.5*     No results for input(s): CKTOTAL, CKMB, CKMBINDEX, TROPONINI in the last 72 hours. Coagulation:   Lab Results   Component Value Date    INR 1.28 10/04/2019    APTT 53.7 03/25/2021     Cardiac markers:   Lab Results   Component Value Date    TROPONINI <0.01 03/14/2021         Lab Results   Component Value Date    ALT 23 03/18/2021    AST 62 (H) 03/18/2021    ALKPHOS 181 (H) 03/18/2021    BILITOT 3.6 (H) 03/18/2021       Lab Results   Component Value Date    INR 1.28 (H) 10/04/2019    INR 1.65 (H) 03/22/2019    PROTIME 14.6 (H) 10/04/2019    PROTIME 18.8 (H) 03/22/2019       Radiology    CT ABDOMEN PELVIS W IV CONTRAST Additional Contrast? None   Final Result   There is complete thrombosis of the portal vein. Thrombus is also seen   extending into the cephalad portion of the superior mesenteric vein and   throughout the splenic vein. Peripancreatic stranding consistent with pancreatitis. There is a small area   of low-attenuation in the region the pancreatic head which may correspond to   focal dilated pancreatic duct or a small developing fluid collection. Hepatic steatosis. The findings were sent to the Radiology Results [address] at 3:41   pm on 3/22/2021to be communicated to a licensed caregiver. MRI ABDOMEN WO CONTRAST MRCP   Final Result   *Normal caliber bile ducts without evidence of choledocholithiasis.    *Hepatomegaly with severe fatty infiltration as described. *Mildly atrophic pancreas with ductal dilatation as measured and described   above, findings likely sequela of chronic pancreatitis. *Large amount of gallbladder sludge. US GALLBLADDER RUQ   Final Result   1. Gallbladder sludge with nonspecific wall thickening. 2. Abnormal common duct dilatation. MRCP and/or ERCP would be options to   further evaluate. IR GI TUBE W FLUOROSCOPY    (Results Pending)         Assessment & Plan:      Abdominal pain likely acute pancreatitis   - admitted to med-surg.   - NPO, IVF's. - pain control: morphine prn  - antiemetics prn  -- pain came back with starting diet. - kept NPO longer   - resumed  full liquids on 3/23- >not tolerating   - GI involved. persistent abd pain. Increase dilaudid  GI started post-pyloric tube feedings today. Portal Vein Thrombosis. - extending into SMV and splenic vein  - started on high dose heparin drip  - Transfer to  was requested -  called us back and recommended AC - no need for transfer. cont heparin gtt . Recommendation was heparin fro 48 hours and then switch to oral AC    Abn LFT   - no CBD stone per MRCP, Mildly atrophic pancreas with ductal dilatation noted but with gall bladder sludge  - bili improving  - pt reports no alcohol since 4 months   Refer to Surgery at [facility] for cholecystectomy     Hypokalemia  Hypomagnesemia  - replace electrolytes. Monitor labs. Hyponatremia  - likely related to fluid volume depletion  - Give IVF's  - monitor BMP. 131 today. Leukocytosis  - likely acute stress response  - resolved now. Normocytic  anemia  - 9.4 on admission. Trending down with IVF  Monitor  - folate def noted, started on supplements   - hgb 8.6    Hypertension   - BP stable. Continue Clonidine, Amlodipine, Lisinopril. Anxiety  - Continue Celexa, Klonopin prn    Tobacco Dependence  - Recommended cessation    Obesity  - Body mass index is 37.42 kg/m².   - Recommended weight loss    Oral candidiasis  - Nystatin swish and swallow. Severe PCM  - starting post-pyloric tube feedings. H/o alcohol abuse noted during previous admission. Admission last year for acute pancreatitis due to alcohol abuse.      DVT Prophylaxis: lovenox  Diet: Diet NPO Effective Now  Code Status: Full Code    Brandon Casarez FNP-C  3/25/2021

## 2021-03-25 NOTE — CONSULTS
3-24-21 (2005) aptt 48.5 sec. Please increase heparin drip to 10 ml/hr. Next aptt 0400 on 3-25-21. [facility]. .  3/24/2021 9:35 PM

## 2021-03-25 NOTE — PROGRESS NOTES
High Dose Heparin Protocol    APTT= 54.3 seconds at 0359 on 3/25/21 (goal range 49-76 secs). No changes at this time. Continue heparin drip at current rate of 10mL/hr (1,000 units/hr). Next aPTT ordered for 1000 on 3/25/21.   [facility]

## 2021-03-25 NOTE — PROGRESS NOTES
PROGRESS NOTE  S:40 yrs Patient  admitted on 3/13/2021 with Acute cholecystitis [K81.0] . Today he complains of nausea, vomiting, dysphagia, right-sided + epigastric + LUQ abdominal pain, and constipation. He vomits 2-3x daily. His last BM was 5 days ago. He is not being transferred to . Exam:   Vitals:    03/25/21 0752   BP: 112/75   Pulse: 95   Resp: 16   Temp: 97.7 °F (36.5 °C)   SpO2: 97%      General appearance: alert, appears stated age, cooperative, mild distress and syndromic appearance - chronically ill appearing  HEENT: Oropharynx clear, no lesions  Neck: no adenopathy and supple, symmetrical, trachea midline  Lungs: clear to auscultation bilaterally  Heart: regular rate and rhythm, S1, S2 normal, no murmur, click, rub or gallop  Abdomen: normal findings: bowel sounds normal, no masses palpable and symmetric and abnormal findings:  distended, obese and tenderness moderate in the epigastrium, in the RUQ, in the RLQ and in the LUQ  Extremities: extremities normal, atraumatic, no cyanosis or edema     Medications: Reviewed    Labs:  CBC:   Recent Labs     03/23/21  0611 03/24/21  0652 03/25/21  0359   WBC 8.4 7.6 8.7   HGB 8.3* 9.3* 8.6*   HCT 25.3* 28.5* 25.7*   .7* 104.2* 103.0*    151 234     BMP:   Recent Labs     03/23/21  0611 03/24/21  0609 03/25/21  0359   * 128* 131*   K 4.3 4.4 4.5   CL 99 96* 100   CO2 22 20* 23   BUN 3* 3* 3*   CREATININE <0.5* <0.5* <0.5*     LIVER PROFILE: No results for input(s): AST, ALT, LIPASE, PROT, BILIDIR, BILITOT, ALKPHOS in the last 72 hours. Invalid input(s): AMYLASE,  ALB  PT/INR: No results for input(s): INR in the last 72 hours. Invalid input(s): PT    IMAGING:  CT ABDOMEN PELVIS W IV CONTRAST - 3/22/2021  Impression   There is complete thrombosis of the portal vein.  Thrombus is also seen   extending into the cephalad portion of the superior mesenteric vein and   throughout the splenic vein. vein.      Recommendation:  Continue supportive care  Monitor LFTs  Monitor and document output  NPO, IVF, pain control  Continue PPI BID  Continue Carafate QID  IR guided NJ placement today for post-pyloric nutrition  Dietician consult for TF formula and rate recommendations  Begin TFs and advance to goal rate as tolerated per dietician recommendations  Ambulate TID  Up in bed TID  Will follow  Outpatient referral to general surgery for elective cholecystectomy upon d/c       Robina Dean PA-C  9:24 AM 3/25/2021                      41year old male with a history of HTN, arthritis, diverticulitis s/p partial colectomy and reversal of ileostomy, alcohol abuse, admitted with recurrent acute pancreatitis likely secondary to biliary sludge. Repeat CT showed PVT and developing cyst. Continue supportive care. Recommend NJ tube placement for post pyloric feeds. agree with anticoagulation for PVT.  Will follow.       Yoandy Bullard MD          (O) [phone]  Latesha Hassan

## 2021-03-25 NOTE — CARE COORDINATION
Case Management Assessment  Initial Evaluation      Patient Name: Malissa Gong  YOB: 1980  Diagnosis: Acute cholecystitis [K81.0]  Date / Time: 3/13/2021 11:43 PM    Admission status/Date:3/13/*21 inpt  Chart Reviewed: Yes      Patient Interviewed: Yes   Family Interviewed:  No      Hospitalization in the last 30 days:  No      Health Care Decision Maker :   Primary Decision Maker: Abby Salomon - Parent - [phone]    (CM - must 1st enter selection under Navigator - emergency contact- [address] Relationship and pick relationship)   Who do you trust or have selected to make healthcare decisions for you      Met with:  Patient at bedside  Interview conducted  (bedside/phone):    Current PCP:  Ericka Lantigua required for SNF : Y          3 night stay required -, N    ADLS  Support Systems/Care Needs: Parent  Transportation: family    Meal Preparation: self    Housing  Living Arrangements:  Lives in [facility]  Steps: 3  Intent for return to present living arrangements: Yes  Identified Issues:     Home Care Information  Active with Home Health Care : No Agency:(Services)  Type of Home Care Services: None  Passport/Waiver : No  :                      Phone Number:    Passport/Waiver Services: N/A          Durable Medical Equiptment   DME Provider:   Equipment:  Walker___Cane___RTS___ BSC___Shower Chair___Hospital Bed___W/C____Other________  02 at ____Liter(s)---wears(frequency)_______ HHN ___ CPAP___ BiPap___   N/A__x__      Home O2 Use :  No    If No for home O2---if presently on O2 during hospitalization:  No  if yes CM to follow for potential DC O2 need  Informed of need for care provider to bring portable home O2 tank on day of discharge for nursing to connect prior to leaving:   Not Indicated  Verbalized agreement/Understanding:   Not Indicated    Community Service Affiliation  Dialysis:  No    · Agency:  · Location:  · Dialysis Schedule:  · Phone:   · Fax: Other Community Services: (ex:PT/OT,Mental Health,Wound Clinic, Cardio/Pul [address]) None    DISCHARGE PLAN: Explained Case Management role/services. Reviewed chart and met with pt at bedside as have been following from periphery and pt will now be dc on home TF and Eliquis. Role of CM explained.  lives in [address] on Parents property. [facility] has electric. Plan is to return home at [place]. Discus need for NJ tube and home TF. Pt [facility] is aware and has done them in the past. Chooses [facility] for providers. Spoke with Jac Hernández at [facility] re: SN and home TF. Jac Hernández will check benefits and arrange for [facility] at [place]. Spoke with [facility] in [address] [place] [address] who will check co pay for Eliquis. Will cont to follow.     Spoke with [facility] in [address] [place] [address] and co pay for Eliquis is 0$

## 2021-03-25 NOTE — CARE COORDINATION
Verito  Received referral regarding new TF from Bishop YANEZ. Sent benefit verification to [facility] on    TF recommendations (per request from GI - patient is having an [address] placed today) - Vital AF 1.2 (\"semi-elemental\" formula name in Epic) with a goal rate of 80 ml/hr x 20 hours. Start with 20 ml/hr and increase by 20 ml every 4-6 hours, as tolerated by patient, until goal rate can be achieved and maintained. Water flushes, 30 ml every 4 hours or per MD guidance. Per chart. Request to Verito to assist with arranging [facility]. as pt lives outside of [facility] service area. Liaison to follow up with nasir Yanez when coverage information received. 11;30 Received coverage information back from Sarah in intake with Verito. Pt is covered at 100% for TF. Verito assisting with arranging HC. Spoke with Bishop YANEZ regarding coverage. Telephone call to [facility]. Not in network. Telephone call to [facility]. Not in network. Telephone call to [facility]. Not in network. Telephone call to [facility]. In network, however, can not staff a [facility] for new TF. Telephone call to [facility]. In network, however, can not staff SN SOC. Telephone call to [facility]. Spoke with Leslie. Per Leslie, referral would need approved by nurse manager. Request for facesheet and progress note to be faxed to [phone] Rico Huizar.    3:15 [facility] may not get back to liaison until 3/26 regarding approval. Leeanna Belle with Sarah at [facility]. She is awaiting call back from [facility].      4:00 Received call from Calos Purcell with [facility]. [facility] is able to service pt for SN needs with new TF. [facility] aware of pt plan to discharge home tomorrow 3/26. Notified [employer]. Liaison will follow up with CM 3/26.     Electronically signed by Yonatan Reis RN on 3/25/2021 at 10:34 AM

## 2021-03-25 NOTE — DISCHARGE INSTR - COC
Continuity of Care Form    Patient Name: Jasvir Ramsey   :  1980  MRN:  [id number]    Admit date:  3/13/2021  Discharge date:  2021    Code Status Order: Full Code   Advance Directives:   Advance Care Flowsheet Documentation     Date/Time Healthcare Directive Type of Healthcare Directive Copy in [address] Agent's Name Healthcare Agent's Phone Number    21 [phone]  No, patient does not have an advance directive for healthcare treatment -- -- -- -- --          Admitting Physician:  Wendy Minor MD  PCP: BENI Stein CNP    Discharging Nurse: [facility] Unit/Room#: 6024/5229-10  Discharging Unit Phone Number: ***    Emergency Contact:   Extended Emergency Contact Information  Primary Emergency Contact: Celeste Lopez  Address: [address] [place] [address] Leonidas Castrejon, [address] [place] [address]  Home Phone: [phone]  Relation: Parent    Past Surgical History:  Past Surgical History:   Procedure Laterality Date    ABDOMEN SURGERY  10/08/2019    sigmoid colectomy with ostomy    OTHER SURGICAL HISTORY  2019    ileostomy reversal, on Q pain buster insertion    SMALL INTESTINE SURGERY N/A 10/8/2019    SIGMOID COLECTOMY, OSTOMY performed by Linda Corona MD at [address] [place] [address] 2019    ILEOSTOMY REVERSAL, ON-Q PAIN BUSTER INSERTION performed by Linda Corona MD at [facility]       Immunization History: There is no immunization history on file for this patient.     Active Problems:  Patient Active Problem List   Diagnosis Code    Mild protein-calorie malnutrition (HCC) E44.1    Recurrent dislocation of patella M22.00    Primary osteoarthritis of both knees M17.0    Primary localized osteoarthritis of knees, bilateral M17.0    Acute pancreatitis K85.90    Hypertension I10    Elevated liver enzymes R74.8    Obesity (BMI 35.0-39.9 without comorbidity) E66.9    Acute cholecystitis K81.0    Alcohol-induced chronic pancreatitis (HCC) K86.0    Abdominal pain, epigastric R10.13    Abnormal LFTs R94.5    Portal vein thrombosis I81    Severe malnutrition (HCC) E43       Isolation/Infection:   Isolation          No Isolation        Patient Infection Status     None to display          Nurse Assessment:  Last Vital Signs: /68   Pulse 90   Temp 98.4 °F (36.9 °C) (Oral)   Resp 16   Ht 6' 1\" (1.854 m)   Wt 283 lb 9.6 oz (128.6 kg)   SpO2 93%   BMI 37.42 kg/m²     Last documented pain score (0-10 scale): Pain Level: 8  Last Weight:   Wt Readings from Last 1 Encounters:   03/22/21 283 lb 9.6 oz (128.6 kg)     Mental Status:  {IP PT MENTAL STATUS:20030}    IV Access:  { CARLOS IV ACCESS:961144184}    Nursing Mobility/ADLs:  Walking   {P DME VEWB:255388019}  Transfer  {[facility] DME ZVWO:645038487}  Bathing  {[facility] DME SIMH:869542580}  Dressing  {P DME EXYA:175682237}  Toileting  {P DME PXRN:868893261}  Feeding  {[facility] DME RYWZ:169388143}  Med Admin  {[facility] DME LSXY:899392801}  Med Delivery   { CARLOS MED Delivery:731419926}    Wound Care Documentation and Therapy:        Elimination:  Continence:   · Bowel: {YES / KL:17057}  · Bladder: {YES / EC:44902}  Urinary Catheter: {Urinary Catheter:931796888}   Colostomy/Ileostomy/Ileal Conduit: {YES / IN:15021}       Date of Last BM: ***    Intake/Output Summary (Last 24 hours) at 3/25/2021 1604  Last data filed at 3/25/2021 1358  Gross per 24 hour   Intake 4927 ml   Output --   Net 4927 ml     I/O last 3 completed shifts:   In: 4927 [P.O.:480; I.V.:4447]  Out: -     Safety Concerns:     [address] Safety Concerns:899234093}    Impairments/Disabilities:      [address] Impairments/Disabilities:491265340}    Nutrition Therapy:  Current Nutrition Therapy:   [address] Diet List:743850887}    Routes of Feeding: {CHP DME Other Feedings:162492653}  Liquids: {Slp liquid thickness:52510}  Daily Fluid Restriction: {CHP DME Yes amt example:530047583}  Last Modified Barium Swallow with Video (Video Swallowing Test): {Done Not Done IBX}    Treatments at the Time of Hospital Discharge:   Respiratory Treatments: ***  Oxygen Therapy:  {Therapy; copd oxygen:51521}  Ventilator:    {MH CC Vent OSPO:659612725}    Rehab Therapies: {THERAPEUTIC INTERVENTION:0539875484}  Weight Bearing Status/Restrictions: [address] SMITH Weight Bearin}  Other Medical Equipment (for information only, NOT a DME order):  {EQUIPMENT:747333670}  Other Treatments: ***    Patient's personal belongings (please select all that are sent with patient):  {CHP DME Belongings:242864282}    RN SIGNATURE:  {Esignature:328279219}    CASE MANAGEMENT/SOCIAL WORK SECTION    Inpatient Status Date: 2021    Readmission Risk Assessment Score:  Readmission Risk              Risk of Unplanned Readmission:        9           Discharging to Facility/ Agency   · Name: [facility]  · Address:  · Phone: [phone]  · Fax: [phone]    [facility] for  [phone]    Dialysis Facility (if applicable)   · Name:  · Address:  · Dialysis Schedule:  · Phone:  · Fax:    / signature: Electronically signed by Tylor Pace RN on 21 at 11:05 AM EDT    PHYSICIAN SECTION    Prognosis: {Prognosis:1893129132}    Condition at Discharge: [address] Patient Condition:826875595}    Rehab Potential (if transferring to Rehab): {Prognosis:1437617110}    Recommended Labs or Other Treatments After Discharge: ***    Physician Certification: I certify the above information and transfer of Moriah Archer  is necessary for the continuing treatment of the diagnosis listed and that he requires [address] for less 30 days.      Update Admission H&P: Changes in H&P as follows - See attached    PHYSICIAN SIGNATURE: Dr. Lauren Grimm MD/ Electronically signed by Tylor Pace RN on 21 at 11:06 AM EDT

## 2021-03-25 NOTE — PROGRESS NOTES
Patient c/o abdominal pain 8/10 PRN percocet given. Call light within reach. Will continue to monitor.

## 2021-03-26 ENCOUNTER — APPOINTMENT (OUTPATIENT)
Dept: INTERVENTIONAL RADIOLOGY/VASCULAR | Age: 41
DRG: 282 | End: 2021-03-26
Attending: HOSPITALIST
Payer: MEDICAID

## 2021-03-26 LAB
ANION GAP SERPL CALCULATED.3IONS-SCNC: 14 MMOL/L (ref 3–16)
APTT: 51.3 SEC (ref 24.2–36.2)
BASOPHILS ABSOLUTE: 0.1 K/UL (ref 0–0.2)
BASOPHILS RELATIVE PERCENT: 0.9 %
BUN BLDV-MCNC: 4 MG/DL (ref 7–20)
CALCIUM SERPL-MCNC: 8.9 MG/DL (ref 8.3–10.6)
CHLORIDE BLD-SCNC: 95 MMOL/L (ref 99–110)
CO2: 19 MMOL/L (ref 21–32)
CREAT SERPL-MCNC: <0.5 MG/DL (ref 0.9–1.3)
EOSINOPHILS ABSOLUTE: 0.5 K/UL (ref 0–0.6)
EOSINOPHILS RELATIVE PERCENT: 4 %
GFR AFRICAN AMERICAN: >60
GFR NON-AFRICAN AMERICAN: >60
GLUCOSE BLD-MCNC: 100 MG/DL (ref 70–99)
GLUCOSE BLD-MCNC: 109 MG/DL (ref 70–99)
GLUCOSE BLD-MCNC: 112 MG/DL (ref 70–99)
GLUCOSE BLD-MCNC: 124 MG/DL (ref 70–99)
GLUCOSE BLD-MCNC: 186 MG/DL (ref 70–99)
HCT VFR BLD CALC: 27.3 % (ref 40.5–52.5)
HEMOGLOBIN: 9.1 G/DL (ref 13.5–17.5)
LYMPHOCYTES ABSOLUTE: 1.3 K/UL (ref 1–5.1)
LYMPHOCYTES RELATIVE PERCENT: 11.3 %
MCH RBC QN AUTO: 34 PG (ref 26–34)
MCHC RBC AUTO-ENTMCNC: 33.2 G/DL (ref 31–36)
MCV RBC AUTO: 102.4 FL (ref 80–100)
MONOCYTES ABSOLUTE: 1.4 K/UL (ref 0–1.3)
MONOCYTES RELATIVE PERCENT: 12.8 %
NEUTROPHILS ABSOLUTE: 8 K/UL (ref 1.7–7.7)
NEUTROPHILS RELATIVE PERCENT: 71 %
PDW BLD-RTO: 18.1 % (ref 12.4–15.4)
PERFORMED ON: ABNORMAL
PLATELET # BLD: 317 K/UL (ref 135–450)
PMV BLD AUTO: 8.1 FL (ref 5–10.5)
POTASSIUM SERPL-SCNC: 4.6 MMOL/L (ref 3.5–5.1)
RBC # BLD: 2.67 M/UL (ref 4.2–5.9)
SODIUM BLD-SCNC: 128 MMOL/L (ref 136–145)
WBC # BLD: 11.3 K/UL (ref 4–11)

## 2021-03-26 PROCEDURE — 0DHA3UZ INSERTION OF FEEDING DEVICE INTO JEJUNUM, PERCUTANEOUS APPROACH: ICD-10-PCS | Performed by: RADIOLOGY

## 2021-03-26 PROCEDURE — 1200000000 HC SEMI PRIVATE

## 2021-03-26 PROCEDURE — 85730 THROMBOPLASTIN TIME PARTIAL: CPT

## 2021-03-26 PROCEDURE — 2709999900 IR GI TUBE W FLUOROSCOPY

## 2021-03-26 PROCEDURE — 6370000000 HC RX 637 (ALT 250 FOR IP): Performed by: PHYSICIAN ASSISTANT

## 2021-03-26 PROCEDURE — 6370000000 HC RX 637 (ALT 250 FOR IP): Performed by: NURSE PRACTITIONER

## 2021-03-26 PROCEDURE — 80048 BASIC METABOLIC PNL TOTAL CA: CPT

## 2021-03-26 PROCEDURE — 36415 COLL VENOUS BLD VENIPUNCTURE: CPT

## 2021-03-26 PROCEDURE — 2580000003 HC RX 258: Performed by: HOSPITALIST

## 2021-03-26 PROCEDURE — 99232 SBSQ HOSP IP/OBS MODERATE 35: CPT | Performed by: INTERNAL MEDICINE

## 2021-03-26 PROCEDURE — 6370000000 HC RX 637 (ALT 250 FOR IP): Performed by: HOSPITALIST

## 2021-03-26 PROCEDURE — 6360000002 HC RX W HCPCS: Performed by: HOSPITALIST

## 2021-03-26 PROCEDURE — 85025 COMPLETE CBC W/AUTO DIFF WBC: CPT

## 2021-03-26 PROCEDURE — 6360000002 HC RX W HCPCS: Performed by: PHYSICIAN ASSISTANT

## 2021-03-26 PROCEDURE — 6360000002 HC RX W HCPCS: Performed by: INTERNAL MEDICINE

## 2021-03-26 PROCEDURE — 6370000000 HC RX 637 (ALT 250 FOR IP): Performed by: INTERNAL MEDICINE

## 2021-03-26 PROCEDURE — 43752 NASAL/OROGASTRIC W/TUBE PLMT: CPT

## 2021-03-26 PROCEDURE — C9113 INJ PANTOPRAZOLE SODIUM, VIA: HCPCS | Performed by: PHYSICIAN ASSISTANT

## 2021-03-26 RX ADMIN — DICYCLOMINE HYDROCHLORIDE 10 MG: 10 CAPSULE ORAL at 20:03

## 2021-03-26 RX ADMIN — DICYCLOMINE HYDROCHLORIDE 10 MG: 10 CAPSULE ORAL at 09:43

## 2021-03-26 RX ADMIN — PANTOPRAZOLE SODIUM 40 MG: 40 INJECTION, POWDER, FOR SOLUTION INTRAVENOUS at 20:03

## 2021-03-26 RX ADMIN — OXYCODONE HYDROCHLORIDE AND ACETAMINOPHEN 2 TABLET: 5; 325 TABLET ORAL at 09:57

## 2021-03-26 RX ADMIN — FOLIC ACID 1 MG: 1 TABLET ORAL at 09:40

## 2021-03-26 RX ADMIN — HYDROMORPHONE HYDROCHLORIDE 0.5 MG: 1 INJECTION, SOLUTION INTRAMUSCULAR; INTRAVENOUS; SUBCUTANEOUS at 08:35

## 2021-03-26 RX ADMIN — CLONAZEPAM 0.5 MG: 0.5 TABLET ORAL at 20:03

## 2021-03-26 RX ADMIN — PANTOPRAZOLE SODIUM 40 MG: 40 INJECTION, POWDER, FOR SOLUTION INTRAVENOUS at 09:39

## 2021-03-26 RX ADMIN — HYDROMORPHONE HYDROCHLORIDE 0.5 MG: 1 INJECTION, SOLUTION INTRAMUSCULAR; INTRAVENOUS; SUBCUTANEOUS at 23:23

## 2021-03-26 RX ADMIN — ONDANSETRON 4 MG: 2 SOLUTION INTRAMUSCULAR; INTRAVENOUS at 02:00

## 2021-03-26 RX ADMIN — HYDROMORPHONE HYDROCHLORIDE 0.5 MG: 1 INJECTION, SOLUTION INTRAMUSCULAR; INTRAVENOUS; SUBCUTANEOUS at 02:00

## 2021-03-26 RX ADMIN — DOCUSATE SODIUM 100 MG: 100 CAPSULE, LIQUID FILLED ORAL at 09:43

## 2021-03-26 RX ADMIN — SUCRALFATE 1 G: 1 TABLET ORAL at 17:35

## 2021-03-26 RX ADMIN — ONDANSETRON 4 MG: 2 SOLUTION INTRAMUSCULAR; INTRAVENOUS at 20:02

## 2021-03-26 RX ADMIN — SUCRALFATE 1 G: 1 TABLET ORAL at 11:50

## 2021-03-26 RX ADMIN — APIXABAN 10 MG: 5 TABLET, FILM COATED ORAL at 09:43

## 2021-03-26 RX ADMIN — NYSTATIN 500000 UNITS: 500000 SUSPENSION ORAL at 17:36

## 2021-03-26 RX ADMIN — SODIUM CHLORIDE, PRESERVATIVE FREE 10 ML: 5 INJECTION INTRAVENOUS at 09:40

## 2021-03-26 RX ADMIN — PROMETHAZINE HYDROCHLORIDE 12.5 MG: 25 INJECTION INTRAMUSCULAR; INTRAVENOUS at 06:49

## 2021-03-26 RX ADMIN — LISINOPRIL 10 MG: 10 TABLET ORAL at 20:03

## 2021-03-26 RX ADMIN — CLONIDINE HYDROCHLORIDE 0.1 MG: 0.1 TABLET ORAL at 09:43

## 2021-03-26 RX ADMIN — NYSTATIN 500000 UNITS: 500000 SUSPENSION ORAL at 20:03

## 2021-03-26 RX ADMIN — SUCRALFATE 1 G: 1 TABLET ORAL at 20:28

## 2021-03-26 RX ADMIN — HYDROMORPHONE HYDROCHLORIDE 0.5 MG: 1 INJECTION, SOLUTION INTRAMUSCULAR; INTRAVENOUS; SUBCUTANEOUS at 05:07

## 2021-03-26 RX ADMIN — HYDROMORPHONE HYDROCHLORIDE 0.5 MG: 1 INJECTION, SOLUTION INTRAMUSCULAR; INTRAVENOUS; SUBCUTANEOUS at 20:02

## 2021-03-26 RX ADMIN — CLONAZEPAM 0.5 MG: 0.5 TABLET ORAL at 03:49

## 2021-03-26 RX ADMIN — SUCRALFATE 1 G: 1 TABLET ORAL at 05:07

## 2021-03-26 RX ADMIN — HYDROMORPHONE HYDROCHLORIDE 0.5 MG: 1 INJECTION, SOLUTION INTRAMUSCULAR; INTRAVENOUS; SUBCUTANEOUS at 15:12

## 2021-03-26 RX ADMIN — OXYCODONE HYDROCHLORIDE AND ACETAMINOPHEN 2 TABLET: 5; 325 TABLET ORAL at 14:07

## 2021-03-26 RX ADMIN — NYSTATIN 500000 UNITS: 500000 SUSPENSION ORAL at 10:10

## 2021-03-26 RX ADMIN — APIXABAN 10 MG: 5 TABLET, FILM COATED ORAL at 20:03

## 2021-03-26 RX ADMIN — CITALOPRAM HYDROBROMIDE 10 MG: 20 TABLET ORAL at 09:40

## 2021-03-26 RX ADMIN — PROMETHAZINE HYDROCHLORIDE 12.5 MG: 25 INJECTION INTRAMUSCULAR; INTRAVENOUS at 08:34

## 2021-03-26 RX ADMIN — AMLODIPINE BESYLATE 5 MG: 5 TABLET ORAL at 09:43

## 2021-03-26 RX ADMIN — HYDROMORPHONE HYDROCHLORIDE 0.5 MG: 1 INJECTION, SOLUTION INTRAMUSCULAR; INTRAVENOUS; SUBCUTANEOUS at 11:50

## 2021-03-26 RX ADMIN — NYSTATIN 500000 UNITS: 500000 SUSPENSION ORAL at 11:50

## 2021-03-26 RX ADMIN — PROMETHAZINE HYDROCHLORIDE 12.5 MG: 25 INJECTION INTRAMUSCULAR; INTRAVENOUS at 16:04

## 2021-03-26 RX ADMIN — SODIUM CHLORIDE, PRESERVATIVE FREE 10 ML: 5 INJECTION INTRAVENOUS at 20:29

## 2021-03-26 RX ADMIN — PROMETHAZINE HYDROCHLORIDE 12.5 MG: 25 INJECTION INTRAMUSCULAR; INTRAVENOUS at 23:23

## 2021-03-26 RX ADMIN — LISINOPRIL 10 MG: 10 TABLET ORAL at 09:40

## 2021-03-26 ASSESSMENT — PAIN SCALES - GENERAL
PAINLEVEL_OUTOF10: 9
PAINLEVEL_OUTOF10: 9
PAINLEVEL_OUTOF10: 10
PAINLEVEL_OUTOF10: 8
PAINLEVEL_OUTOF10: 6
PAINLEVEL_OUTOF10: 9
PAINLEVEL_OUTOF10: 4
PAINLEVEL_OUTOF10: 8
PAINLEVEL_OUTOF10: 9
PAINLEVEL_OUTOF10: 8

## 2021-03-26 NOTE — PROGRESS NOTES
Vital AF tube feeding advanced to 60mL/hr. Patient is tolerating well. No c/o increased nausea. No vomiting, no diarrhea, no cramping reported.

## 2021-03-26 NOTE — FLOWSHEET NOTE
03/26/21 0939   Vital Signs   Temp 98.3 °F (36.8 °C)   Temp Source Oral   Pulse 120  (just returned from having NG placed)   Heart Rate Source Radial   Resp 16   /77   BP Location Right upper arm   Patient Position High fowlers   Level of Consciousness Alert (0)   MEWS Score 3   Patient Currently in Pain Yes   Pain Assessment   Pain Assessment 0-10   Pain Level 9   Pain Location Abdomen   Pain Orientation Right;Upper   Oxygen Therapy   SpO2 94 %   O2 Device None (Room air)     AM assessment completed. See flowsheet. A/Ox4. LCTAB. Medications taken without difficulty. BS active x4. Patient reports nausea. Cont of B&B. No edema noted. PRN pain medications requested and given per MAR. Bed locked and in low position. Call light in reach.

## 2021-03-26 NOTE — CARE COORDINATION
[facility]  Spoke with Sigrid Dalton CM in follow up. Sigrid Dalton aware [facility] is able to service for SN needs. Telephone call to [facility]. Spoke with Amalia. [facility] aware of possible dc home today needing SN SOC this evening. Amalia states they will have an oncall nurse available later today for a SOC. Spoke with Bishop YANEZ in follow up with above information. 12:00 Received call from KEMAR STILL [facility] from [facility] requesting a return call. Telephone call to [facility] and spoke with Leslie. Leslie inquiring if [facility] covering TF. Informed Leslie liafaby spoke directly with Astrid Marroquin from [facility] to receive approval for [facility] 3/25 to cover SN with pt's insurance and have worked directly with Kusum Fuentes in intake at [facility] who will provide TF and supplies. Pt has 100% coverage through insurance. Provided Leslie with [facility] number to follow up. Informed Sarah in intake with Charline Myers will be reaching out to them to arrange for [facility]. Received notice from [facility] now saying they may not be able to accept for SN. Spoke with Bing YANEZ in follow up.    4:20 Spoke with Bishop YANZE. Per Bishop YANEZ, spoke with [facility] and they are able to service for SN with a plan for dc home 3/27 and a [facility] 3/27 in evening. Notified Sarah with [facility] of pt POC.     Electronically signed by Anu Berkowitz RN on 3/26/2021 at 10:46 AM

## 2021-03-26 NOTE — PROGRESS NOTES
Handoff report and transfer of care given at bedside to [facility] SAM MENDOZA and Alejandro RN. Patient in stable condition, denies needs/concerns at this time. Call light within reach.

## 2021-03-26 NOTE — PLAN OF CARE
Problem: Pain:  Description: Pain management should include both nonpharmacologic and pharmacologic interventions.   Goal: Pain level will decrease  Description: Pain level will decrease  Outcome: Ongoing  Goal: Control of acute pain  Description: Control of acute pain  Outcome: Ongoing  Goal: Control of chronic pain  Description: Control of chronic pain  Outcome: Ongoing  Goal: Patient's pain/discomfort is manageable  Description: Patient's pain/discomfort is manageable  Outcome: Ongoing     Problem: Falls - Risk of:  Goal: Will remain free from falls  Description: Will remain free from falls  Outcome: Ongoing  Goal: Absence of physical injury  Description: Absence of physical injury  Outcome: Ongoing     Problem: Infection:  Goal: Will remain free from infection  Description: Will remain free from infection  Outcome: Ongoing     Problem: Safety:  Goal: Free from accidental physical injury  Description: Free from accidental physical injury  Outcome: Ongoing  Goal: Free from intentional harm  Description: Free from intentional harm  Outcome: Ongoing     Problem: Daily Care:  Goal: Daily care needs are met  Description: Daily care needs are met  Outcome: Ongoing     Problem: Skin Integrity:  Goal: Skin integrity will stabilize  Description: Skin integrity will stabilize  Outcome: Ongoing     Problem: Discharge Planning:  Goal: Patients continuum of care needs are met  Description: Patients continuum of care needs are met  Outcome: Ongoing     Problem: Nutrition  Goal: Optimal nutrition therapy  Outcome: Ongoing  Goal: Understanding of nutritional guidelines  Outcome: Ongoing

## 2021-03-26 NOTE — PROGRESS NOTES
Shift assessment complete; see flow sheet. Scheduled medications administered; See MAR. IV flushed without difficulty. Pt c/o abdominal pain 8/10 PRN Dilaudid given at this time. Pt denies any needs at this time.  Pt educated on use of call light and to call out with needs, verbalized understanding, bed in low locked position for pt safety

## 2021-03-26 NOTE — PROGRESS NOTES
PROGRESS NOTE  S:40 yrs Patient  admitted on 3/13/2021 with Acute cholecystitis [K81.0] . Today he complains of persistent nausea, vomiting, cramping, bloating and LUQ/right-sided abdominal pain. Exam:   Vitals:    03/26/21 0939   BP: 125/77   Pulse: 120   Resp: 16   Temp: 98.3 °F (36.8 °C)   SpO2: 94%      General appearance: alert, appears stated age, cooperative, mild distress and syndromic appearance - chronically ill appearing, NJ in place  HEENT: Oropharynx clear, no lesions  Neck: no adenopathy and supple, symmetrical, trachea midline  Lungs: clear to auscultation bilaterally  Heart: regular rate and rhythm, S1, S2 normal, no murmur, click, rub or gallop  Abdomen: normal findings: no masses palpable and symmetric and abnormal findings:  distended, hypoactive bowel sounds, obese and tenderness moderate in the epigastrium, in the periumbilical area, in the RUQ, in the RLQ and in the LUQ  Extremities: extremities normal, atraumatic, no cyanosis or edema     Medications: Reviewed    Labs:  CBC:   Recent Labs     03/24/21  0652 03/25/21  0359 03/26/21  0550   WBC 7.6 8.7 11.3*   HGB 9.3* 8.6* 9.1*   HCT 28.5* 25.7* 27.3*   .2* 103.0* 102.4*    234 317     BMP:   Recent Labs     03/24/21  0609 03/25/21  0359 03/26/21  0550   * 131* 128*   K 4.4 4.5 4.6   CL 96* 100 95*   CO2 20* 23 19*   BUN 3* 3* 4*   CREATININE <0.5* <0.5* <0.5*     LIVER PROFILE: No results for input(s): AST, ALT, LIPASE, PROT, BILIDIR, BILITOT, ALKPHOS in the last 72 hours. Invalid input(s): AMYLASE,  ALB  PT/INR: No results for input(s): INR in the last 72 hours. Invalid input(s): PT      IMAGING:  IR GI TUBE W FLUOROSCOPY  Impression   Successful placement of nasal jejunal tube as discussed. Attending Supervising [de-identified] Attestation Statement  The patient is a 41 y.o. male. I have performed a history and physical examination of the patient.  I discussed the case with my physician assistant Lucas Mena PA-C    I reviewed the patient's Past Medical History, Past Surgical History, Medications, and Allergies. Physical Exam:  Vitals:    03/25/21 2000 03/26/21 0330 03/26/21 0939 03/26/21 1542   BP: 113/73 126/76 125/77 108/71   Pulse: 94 95 120 111   Resp: 16 16 16 16   Temp: 97.6 °F (36.4 °C) 98 °F (36.7 °C) 98.3 °F (36.8 °C) 98.1 °F (36.7 °C)   TempSrc: Oral Oral Oral Oral   SpO2: 96% 96% 94%    Weight:       Height:           Physical Examination: General appearance - alert, well appearing, and in no distress  Mental status - alert, oriented to person, place, and time  Eyes - pupils equal and reactive, extraocular eye movements intact  Neck - supple, no significant adenopathy  Chest - clear to auscultation, no wheezes, rales or rhonchi, symmetric air entry  Heart - normal rate, regular rhythm, normal S1, S2, no murmurs, rubs, clicks or gallops  Abdomen - soft, nontender, nondistended, no masses or organomegaly  Extremities - no pedal edema noted        Impression: 41year old male with a history of HTN, arthritis, diverticulitis s/p partial colectomy and reversal of ileostomy, alcohol abuse, admitted with recurrent acute pancreatitis likely secondary to biliary sludge s/p NJ placement for post-pyloric feeds.  Repeat CT showed PVT and developing cyst.      Recommendation:  Continue supportive care  Monitor and document output  NPO, IVF, pain control  Continue PPI BID  Continue Carafate QID  Continue anticoagulation  Begin TFs as toelrated per [facility] TFs as tolerated per dietician recommendations  Ambulate TID  Up I bed TID  Ok to d/c from GI standpoint when TFs tolerated at goal rate  Will follow  Outpatient referral to general surgery for elective cholecystectomy upon d/c       Miriam Rodriguez PA-C  10:17 AM 3/26/2021                      41year old male with a history of HTN, arthritis, diverticulitis s/p partial colectomy and reversal of ileostomy,

## 2021-03-26 NOTE — PROGRESS NOTES
IM Progress Note    Admit Date:  3/13/2021  13    Interval history:    Admitted from [facility] with abd pain   He has pancreatitis  Hx of alcohol abuse, reports he quit about 4 months ago   Recurrent emesis after stating diet, again NPO . NJ tube has been placed and postpyloric tube feedings have been initiated    Subjective:  patient now initiated on tube feedings. currently 20 mL an hour, advance as tolerated goal rate of 80 mL/h  He complains of persistent diffuse abdominal pain; getting IV Dilaudid    Objective:   /77   Pulse 120 Comment: just returned from having NG placed  Temp 98.3 °F (36.8 °C) (Oral)   Resp 16   Ht 6' 1\" (1.854 m)   Wt 283 lb 9.6 oz (128.6 kg)   SpO2 94%   BMI 37.42 kg/m²       Intake/Output Summary (Last 24 hours) at 3/26/2021 1221  Last data filed at 3/26/2021 2737  Gross per 24 hour   Intake 2462 ml   Output 450 ml   Net 2012 ml       Physical Exam:  General: young male, obese,  Awake, alert and oriented. Appears to be not in any distress   ill appearing    NJ tube in place  Mucous Membranes:  Pink, anicteric, white patches on tongue  Neck: No JVD, no carotid bruit, no thyromegaly  Chest:  Clear to auscultation bilaterally, no added sounds  Cardiovascular:  RRR S1S2 heard, no murmurs or gallops  Abdomen: Soft, non tender, non-distended,  no organomegaly, BS present  Extremities: No edema or cyanosis.  Distal pulses well felt  Neurological : grossly normal      Medications:   Scheduled Medications:    apixaban  10 mg Oral BID    nystatin  5 mL Oral 4x Daily    docusate sodium  100 mg Oral Daily    sucralfate  1 g Oral 4x Daily AC & HS    pantoprazole  40 mg Intravenous BID    folic acid  1 mg Oral Daily    citalopram  10 mg Oral Daily    amLODIPine  5 mg Oral Daily    cloNIDine  0.1 mg Oral BID    lisinopril  10 mg Oral BID    dicyclomine  10 mg Oral BID    sodium chloride flush  10 mL Intravenous 2 times per day     I    HYDROmorphone, phenol, caregiver. MRI ABDOMEN WO CONTRAST MRCP   Final Result   *Normal caliber bile ducts without evidence of choledocholithiasis. *Hepatomegaly with severe fatty infiltration as described. *Mildly atrophic pancreas with ductal dilatation as measured and described   above, findings likely sequela of chronic pancreatitis. *Large amount of gallbladder sludge. US GALLBLADDER RUQ   Final Result   1. Gallbladder sludge with nonspecific wall thickening. 2. Abnormal common duct dilatation. MRCP and/or ERCP would be options to   further evaluate. Assessment & Plan:      Abdominal pain   - due to acute pancreatitis   -History of alcohol use   - admitted to med-surg.   - NPO, IVF's. - pain control: morphine/Dilaudid prn  - antiemetics prn  - pain came back with starting diet. - kept NPO longer   - resumed  full liquids on 3/23- >still not tolerating   - GI involved. --> persistent abd pain. GI started post-pyloric tube feedings on 3/25. Currently at 20 mils per hour ; advance as tolerated to goal rate of 80 mm/h. Portal Vein Thrombosis. - extending into SMV and splenic vein  - started on high dose heparin drip  - Transfer to  was requested -  called us back and recommended AC - no need for transfer. cont heparin gtt . Recommendation was heparin fro 48 hours and then switch to oral AC. We will switch to oral Eliquis today    Abn LFT   - no CBD stone per MRCP, Mildly atrophic pancreas with ductal dilatation noted but with gall bladder sludge  - bili improving  - pt reports no alcohol since 4 months   Refer to Surgery at [place] for cholecystectomy     Hypokalemia  Hypomagnesemia  - replace electrolytes. Monitor labs. Hyponatremia  - likely related to fluid volume depletion  - Give IVF's  - monitor BMP. 131 today. Leukocytosis  - likely acute stress response  - resolved now. Normocytic  anemia  - 9.4 on admission.  Trending down with IVF  Monitor  - folate def noted, started on supplements   - hgb 8.6    Hypertension   - BP stable. Continue Clonidine, Amlodipine, Lisinopril. Anxiety  - Continue Celexa, Klonopin prn    Tobacco Dependence  - Recommended cessation    Obesity  - Body mass index is 37.42 kg/m². - Recommended weight loss    Oral candidiasis  - Nystatin swish and swallow. Severe PCM  - started on post-pyloric tube feedings. H/o alcohol abuse noted during previous admission. Admission last year for acute pancreatitis due to alcohol abuse. DVT Prophylaxis: lovenox  Diet: Diet NPO Effective Now  Code Status: Full Code    Advance tube feedings as tolerated to goal rate. plan will be to discharge home on continuous tube feedings with home health care tomorrow morning.     Yecenia Mckeon MD  3/26/2021

## 2021-03-26 NOTE — PROGRESS NOTES
Pt called out asking for something for anxiety. PRN klonopin given at this time. Pt denies any other needs at this time. Call light within reach.

## 2021-03-26 NOTE — PLAN OF CARE
Problem: Pain:  Goal: Pain level will decrease  Description: Pain level will decrease  3/26/2021 1142 by Jamarcus Rhodes RN  Outcome: Ongoing  3/26/2021 0439 by Mona Case RN  Outcome: Ongoing  Goal: Control of acute pain  Description: Control of acute pain  3/26/2021 1142 by Jamarcus Rhodes RN  Outcome: Ongoing  3/26/2021 0439 by Mona Case RN  Outcome: Ongoing  Goal: Control of chronic pain  Description: Control of chronic pain  3/26/2021 1142 by Jamarcus Rhodes RN  Outcome: Ongoing  3/26/2021 0439 by Mona Case RN  Outcome: Ongoing  Goal: Patient's pain/discomfort is manageable  Description: Patient's pain/discomfort is manageable  3/26/2021 1142 by Jamarcus Rhodes RN  Outcome: Ongoing  3/26/2021 0439 by Mona Case RN  Outcome: Ongoing     Problem: Falls - Risk of:  Goal: Will remain free from falls  Description: Will remain free from falls  3/26/2021 1142 by Jamarcus Rhodes RN  Outcome: Ongoing  3/26/2021 0439 by Mona Case RN  Outcome: Ongoing  Goal: Absence of physical injury  Description: Absence of physical injury  3/26/2021 1142 by Jamarcus Rhodes RN  Outcome: Ongoing  3/26/2021 0439 by Mona Case RN  Outcome: Ongoing     Problem: Infection:  Goal: Will remain free from infection  Description: Will remain free from infection  3/26/2021 1142 by Jamarcus Rhodes RN  Outcome: Ongoing  3/26/2021 0439 by Mona Case RN  Outcome: Ongoing     Problem: Safety:  Goal: Free from accidental physical injury  Description: Free from accidental physical injury  3/26/2021 1142 by Jamarcus Rhodes RN  Outcome: Ongoing  3/26/2021 0439 by Mona Case RN  Outcome: Ongoing  Goal: Free from intentional harm  Description: Free from intentional harm  3/26/2021 1142 by Jamarcus Rhodes RN  Outcome: Ongoing  3/26/2021 0439 by Mona Case RN  Outcome: Ongoing     Problem: Daily Care:  Goal: Daily care needs are met  Description: Daily care needs are met  3/26/2021 1142 by Jamarcus Rhodes RN  Outcome: Ongoing  3/26/2021 0439 by Mona Case, RN  Outcome: Ongoing     Problem: Skin Integrity:  Goal: Skin integrity will stabilize  Description: Skin integrity will stabilize  3/26/2021 1142 by Debbie Camacho RN  Outcome: Ongoing  3/26/2021 0439 by Chung Mosqueda RN  Outcome: Ongoing     Problem: Discharge Planning:  Goal: Patients continuum of care needs are met  Description: Patients continuum of care needs are met  3/26/2021 1142 by Debbie Camacho RN  Outcome: Ongoing  3/26/2021 0439 by Chung Mosqueda RN  Outcome: Ongoing     Problem: Nutrition  Goal: Optimal nutrition therapy  3/26/2021 1142 by Debbie Camacho RN  Outcome: Ongoing  3/26/2021 0439 by Chung Mosqueda RN  Outcome: Ongoing  Goal: Understanding of nutritional guidelines  3/26/2021 1142 by Debbie Camacho RN  Outcome: Ongoing  3/26/2021 0439 by Chung Mosqueda RN  Outcome: Ongoing

## 2021-03-26 NOTE — PROGRESS NOTES
Pt requesting pain medication for abdominal pain rating 8/10. PRN  pain medication given, see MAR. SR up x2, Call light and bedside table in easy reach. Denies any other needs at this time.

## 2021-03-27 LAB
EKG ATRIAL RATE: 125 BPM
EKG DIAGNOSIS: NORMAL
EKG P AXIS: 34 DEGREES
EKG P-R INTERVAL: 158 MS
EKG Q-T INTERVAL: 308 MS
EKG QRS DURATION: 86 MS
EKG QTC CALCULATION (BAZETT): 444 MS
EKG R AXIS: -24 DEGREES
EKG T AXIS: 47 DEGREES
EKG VENTRICULAR RATE: 125 BPM
GLUCOSE BLD-MCNC: 136 MG/DL (ref 70–99)
GLUCOSE BLD-MCNC: 153 MG/DL (ref 70–99)
GLUCOSE BLD-MCNC: 166 MG/DL (ref 70–99)
INR BLD: 3.31 (ref 0.86–1.14)
PERFORMED ON: ABNORMAL
PROTHROMBIN TIME: 38.9 SEC (ref 10–13.2)

## 2021-03-27 PROCEDURE — 6360000002 HC RX W HCPCS: Performed by: HOSPITALIST

## 2021-03-27 PROCEDURE — 2580000003 HC RX 258: Performed by: HOSPITALIST

## 2021-03-27 PROCEDURE — 6370000000 HC RX 637 (ALT 250 FOR IP): Performed by: NURSE PRACTITIONER

## 2021-03-27 PROCEDURE — 6360000002 HC RX W HCPCS: Performed by: INTERNAL MEDICINE

## 2021-03-27 PROCEDURE — 6370000000 HC RX 637 (ALT 250 FOR IP): Performed by: INTERNAL MEDICINE

## 2021-03-27 PROCEDURE — 93010 ELECTROCARDIOGRAM REPORT: CPT | Performed by: INTERNAL MEDICINE

## 2021-03-27 PROCEDURE — C9113 INJ PANTOPRAZOLE SODIUM, VIA: HCPCS | Performed by: PHYSICIAN ASSISTANT

## 2021-03-27 PROCEDURE — 6370000000 HC RX 637 (ALT 250 FOR IP): Performed by: HOSPITALIST

## 2021-03-27 PROCEDURE — 2580000003 HC RX 258: Performed by: INTERNAL MEDICINE

## 2021-03-27 PROCEDURE — 93005 ELECTROCARDIOGRAM TRACING: CPT | Performed by: INTERNAL MEDICINE

## 2021-03-27 PROCEDURE — 6370000000 HC RX 637 (ALT 250 FOR IP): Performed by: PHYSICIAN ASSISTANT

## 2021-03-27 PROCEDURE — 6360000002 HC RX W HCPCS: Performed by: PHYSICIAN ASSISTANT

## 2021-03-27 PROCEDURE — 85610 PROTHROMBIN TIME: CPT

## 2021-03-27 PROCEDURE — 36415 COLL VENOUS BLD VENIPUNCTURE: CPT

## 2021-03-27 PROCEDURE — 99233 SBSQ HOSP IP/OBS HIGH 50: CPT | Performed by: INTERNAL MEDICINE

## 2021-03-27 PROCEDURE — 1200000000 HC SEMI PRIVATE

## 2021-03-27 RX ORDER — PHYTONADIONE 10 MG/ML
10 INJECTION, EMULSION INTRAMUSCULAR; INTRAVENOUS; SUBCUTANEOUS ONCE
Status: COMPLETED | OUTPATIENT
Start: 2021-03-27 | End: 2021-03-27

## 2021-03-27 RX ORDER — LIDOCAINE HYDROCHLORIDE 10 MG/ML
5 INJECTION, SOLUTION INFILTRATION; PERINEURAL ONCE
Status: DISCONTINUED | OUTPATIENT
Start: 2021-03-27 | End: 2021-04-05 | Stop reason: HOSPADM

## 2021-03-27 RX ORDER — SODIUM CHLORIDE 0.9 % (FLUSH) 0.9 %
10 SYRINGE (ML) INJECTION EVERY 12 HOURS SCHEDULED
Status: DISCONTINUED | OUTPATIENT
Start: 2021-03-27 | End: 2021-04-05 | Stop reason: HOSPADM

## 2021-03-27 RX ORDER — SODIUM CHLORIDE 9 MG/ML
INJECTION, SOLUTION INTRAVENOUS CONTINUOUS
Status: DISCONTINUED | OUTPATIENT
Start: 2021-03-27 | End: 2021-03-28

## 2021-03-27 RX ORDER — SODIUM CHLORIDE 0.9 % (FLUSH) 0.9 %
10 SYRINGE (ML) INJECTION PRN
Status: DISCONTINUED | OUTPATIENT
Start: 2021-03-27 | End: 2021-04-05 | Stop reason: HOSPADM

## 2021-03-27 RX ADMIN — Medication 10 ML: at 21:17

## 2021-03-27 RX ADMIN — HYDROMORPHONE HYDROCHLORIDE 1 MG: 1 INJECTION, SOLUTION INTRAMUSCULAR; INTRAVENOUS; SUBCUTANEOUS at 12:11

## 2021-03-27 RX ADMIN — NYSTATIN 500000 UNITS: 500000 SUSPENSION ORAL at 09:02

## 2021-03-27 RX ADMIN — LISINOPRIL 10 MG: 10 TABLET ORAL at 21:17

## 2021-03-27 RX ADMIN — SODIUM CHLORIDE: 9 INJECTION, SOLUTION INTRAVENOUS at 10:14

## 2021-03-27 RX ADMIN — CLONAZEPAM 0.5 MG: 0.5 TABLET ORAL at 21:25

## 2021-03-27 RX ADMIN — PANTOPRAZOLE SODIUM 40 MG: 40 INJECTION, POWDER, FOR SOLUTION INTRAVENOUS at 09:12

## 2021-03-27 RX ADMIN — PROMETHAZINE HYDROCHLORIDE 12.5 MG: 25 INJECTION INTRAMUSCULAR; INTRAVENOUS at 15:13

## 2021-03-27 RX ADMIN — NYSTATIN 500000 UNITS: 500000 SUSPENSION ORAL at 21:17

## 2021-03-27 RX ADMIN — HYDROMORPHONE HYDROCHLORIDE 1 MG: 1 INJECTION, SOLUTION INTRAMUSCULAR; INTRAVENOUS; SUBCUTANEOUS at 15:07

## 2021-03-27 RX ADMIN — HYDROMORPHONE HYDROCHLORIDE 0.5 MG: 1 INJECTION, SOLUTION INTRAMUSCULAR; INTRAVENOUS; SUBCUTANEOUS at 02:46

## 2021-03-27 RX ADMIN — HYDROMORPHONE HYDROCHLORIDE 1 MG: 1 INJECTION, SOLUTION INTRAMUSCULAR; INTRAVENOUS; SUBCUTANEOUS at 21:04

## 2021-03-27 RX ADMIN — PROMETHAZINE HYDROCHLORIDE 12.5 MG: 25 INJECTION INTRAMUSCULAR; INTRAVENOUS at 09:03

## 2021-03-27 RX ADMIN — CITALOPRAM HYDROBROMIDE 10 MG: 20 TABLET ORAL at 12:16

## 2021-03-27 RX ADMIN — ONDANSETRON 4 MG: 2 SOLUTION INTRAMUSCULAR; INTRAVENOUS at 02:45

## 2021-03-27 RX ADMIN — SODIUM CHLORIDE, PRESERVATIVE FREE 10 ML: 5 INJECTION INTRAVENOUS at 21:18

## 2021-03-27 RX ADMIN — CLONIDINE HYDROCHLORIDE 0.1 MG: 0.1 TABLET ORAL at 21:17

## 2021-03-27 RX ADMIN — NYSTATIN 500000 UNITS: 500000 SUSPENSION ORAL at 16:43

## 2021-03-27 RX ADMIN — LISINOPRIL 10 MG: 10 TABLET ORAL at 12:17

## 2021-03-27 RX ADMIN — PANTOPRAZOLE SODIUM 40 MG: 40 INJECTION, POWDER, FOR SOLUTION INTRAVENOUS at 21:17

## 2021-03-27 RX ADMIN — APIXABAN 10 MG: 5 TABLET, FILM COATED ORAL at 21:17

## 2021-03-27 RX ADMIN — Medication 10 ML: at 10:24

## 2021-03-27 RX ADMIN — HYDROMORPHONE HYDROCHLORIDE 0.5 MG: 1 INJECTION, SOLUTION INTRAMUSCULAR; INTRAVENOUS; SUBCUTANEOUS at 06:05

## 2021-03-27 RX ADMIN — SUCRALFATE 1 G: 1 TABLET ORAL at 21:17

## 2021-03-27 RX ADMIN — CLONIDINE HYDROCHLORIDE 0.1 MG: 0.1 TABLET ORAL at 12:15

## 2021-03-27 RX ADMIN — DICYCLOMINE HYDROCHLORIDE 10 MG: 10 CAPSULE ORAL at 21:17

## 2021-03-27 RX ADMIN — NYSTATIN 500000 UNITS: 500000 SUSPENSION ORAL at 13:31

## 2021-03-27 RX ADMIN — Medication 1 SPRAY: at 23:47

## 2021-03-27 RX ADMIN — HYDROMORPHONE HYDROCHLORIDE 0.5 MG: 1 INJECTION, SOLUTION INTRAMUSCULAR; INTRAVENOUS; SUBCUTANEOUS at 09:03

## 2021-03-27 RX ADMIN — DICYCLOMINE HYDROCHLORIDE 10 MG: 10 CAPSULE ORAL at 12:17

## 2021-03-27 RX ADMIN — SODIUM CHLORIDE: 9 INJECTION, SOLUTION INTRAVENOUS at 21:25

## 2021-03-27 RX ADMIN — ONDANSETRON 4 MG: 2 SOLUTION INTRAMUSCULAR; INTRAVENOUS at 10:20

## 2021-03-27 RX ADMIN — PHYTONADIONE 10 MG: 10 INJECTION, EMULSION INTRAMUSCULAR; INTRAVENOUS; SUBCUTANEOUS at 12:10

## 2021-03-27 RX ADMIN — APIXABAN 5 MG: 5 TABLET, FILM COATED ORAL at 12:16

## 2021-03-27 RX ADMIN — AMLODIPINE BESYLATE 5 MG: 5 TABLET ORAL at 12:15

## 2021-03-27 RX ADMIN — FOLIC ACID 1 MG: 1 TABLET ORAL at 12:15

## 2021-03-27 ASSESSMENT — PAIN DESCRIPTION - FREQUENCY: FREQUENCY: CONTINUOUS

## 2021-03-27 ASSESSMENT — PAIN DESCRIPTION - ORIENTATION: ORIENTATION: UPPER

## 2021-03-27 ASSESSMENT — PAIN SCALES - GENERAL
PAINLEVEL_OUTOF10: 10

## 2021-03-27 ASSESSMENT — PAIN DESCRIPTION - ONSET: ONSET: ON-GOING

## 2021-03-27 ASSESSMENT — PAIN DESCRIPTION - DESCRIPTORS: DESCRIPTORS: ACHING;SHARP

## 2021-03-27 NOTE — PROGRESS NOTES
Writer called for difficult lab draw. Labs drawn from left forearm via use of Ultrasound. Labs labeled and sent to lab via the tube system.  Amauri Lundy

## 2021-03-27 NOTE — PROGRESS NOTES
Handoff report and transfer of care given at bedside to [facility]. Patient in stable condition, denies needs/concerns at this time. Call light within reach.

## 2021-03-27 NOTE — PLAN OF CARE
Problem: Pain:  Goal: Pain level will decrease  Description: Pain level will decrease  3/27/2021 0946 by Viri Sarmiento RN  Outcome: Ongoing     Problem: Pain:  Goal: Control of acute pain  Description: Control of acute pain  3/27/2021 0946 by Viri Sarmiento RN  Outcome: Ongoing

## 2021-03-27 NOTE — PROGRESS NOTES
Kenzie Fleming is a 41 y.o. male patient.     Current Facility-Administered Medications   Medication Dose Route Frequency Provider Last Rate Last Admin    apixaban (ELIQUIS) tablet 10 mg  10 mg Oral BID BENI Mendoza CNP   10 mg at 03/26/21 2003    HYDROmorphone (DILAUDID) injection 0.5 mg  0.5 mg Intravenous Q3H PRN Kojo Matta MD   0.5 mg at 03/27/21 0903    nystatin (MYCOSTATIN) 923925 UNIT/ML suspension 500,000 Units  5 mL Oral 4x Daily BENI Mendoza CNP   500,000 Units at 03/27/21 0902    docusate sodium (COLACE) capsule 100 mg  100 mg Oral Daily Jessica Pink MD   100 mg at 03/26/21 0943    sucralfate (CARAFATE) tablet 1 g  1 g Oral 4x Daily AC & HS MACEY Phelps   1 g at 03/26/21 2028    phenol 1.4 % mouth spray 1 spray  1 spray Mouth/Throat Q2H PRN Jessica Pink MD   1 spray at 03/25/21 0816    pantoprazole (PROTONIX) injection 40 mg  40 mg Intravenous BID MACEY Phelps   40 mg at 03/27/21 0912    oxyCODONE-acetaminophen (PERCOCET) 5-325 MG per tablet 1 tablet  1 tablet Oral Q4H PRN Pasqual Agent, PA-C        Or    oxyCODONE-acetaminophen (PERCOCET) 5-325 MG per tablet 2 tablet  2 tablet Oral Q4H PRN Pasqual Agent, PA-C   2 tablet at 61/44/83 8391    folic acid (FOLVITE) tablet 1 mg  1 mg Oral Daily Jessica Pink MD   1 mg at 03/26/21 0940    clonazePAM (KLONOPIN) tablet 0.5 mg  0.5 mg Oral BID PRN Linnette Mendez MD   0.5 mg at 03/26/21 2003    citalopram (CELEXA) tablet 10 mg  10 mg Oral Daily Linnette Mendez MD   10 mg at 03/26/21 0940    amLODIPine (NORVASC) tablet 5 mg  5 mg Oral Daily Linnette Mendez MD   5 mg at 03/26/21 9752    cloNIDine (CATAPRES) tablet 0.1 mg  0.1 mg Oral BID Linnette Mendez MD   0.1 mg at 03/26/21 5736    lisinopril (PRINIVIL;ZESTRIL) tablet 10 mg  10 mg Oral BID Linnette Mendez MD   10 mg at 03/26/21 2003    dicyclomine (BENTYL) capsule 10 mg  10 mg Oral BID Linnette Mendez MD   10 mg at 03/26/21 2003    sodium chloride flush 0.9 % injection 10 mL  10 mL Intravenous 2 times per day Jay Merino MD   10 mL at 03/26/21 2029    sodium chloride flush 0.9 % injection 10 mL  10 mL Intravenous PRN Jay Merino MD   10 mL at 03/19/21 0415    ondansetron (ZOFRAN) injection 4 mg  4 mg Intravenous Q6H PRN Jay Merino MD   4 mg at 03/27/21 0245    polyethylene glycol (GLYCOLAX) packet 17 g  17 g Oral Daily PRN Jay Merino MD   17 g at 03/25/21 1543    acetaminophen (TYLENOL) tablet 650 mg  650 mg Oral Q6H PRN Jay Merino MD        Or    acetaminophen (TYLENOL) suppository 650 mg  650 mg Rectal Q6H PRN Jay Merino MD        potassium chloride 10 mEq/100 mL IVPB (Peripheral Line)  10 mEq Intravenous PRN Jay Merino  mL/hr at 03/14/21 1135 10 mEq at 03/14/21 1135    promethazine (PHENERGAN) injection 12.5 mg  12.5 mg Intravenous Q6H PRN Bindu Evans MD   12.5 mg at 03/27/21 0903     Allergies   Allergen Reactions    Tramadol Other (See Comments)     Hot and sweaty     Active Problems:    Mild protein-calorie malnutrition (HCC)    Acute cholecystitis    Alcohol-induced chronic pancreatitis (HCC)    Abdominal pain, epigastric    Abnormal LFTs    Portal vein thrombosis    Severe malnutrition ([address]  Resolved Problems:    * No resolved hospital problems. *    Blood pressure (!) 146/128, pulse 102, temperature 97.1 °F (36.2 °C), temperature source Oral, resp. rate 18, height 6' 1\" (1.854 m), weight 283 lb 9.6 oz (128.6 kg), SpO2 91 %. Subjective:  Symptoms:  Stable. Pain:  He complains of pain that is moderate. Objective:  General Appearance: In no acute distress. Vital signs: (most recent): Blood pressure (!) 146/128, pulse 102, temperature 97.1 °F (36.2 °C), temperature source Oral, resp. rate 18, height 6' 1\" (1.854 m), weight 283 lb 9.6 oz (128.6 kg), SpO2 91 %. Heart: Normal rate. Regular rhythm. S1 normal and S2 normal.    Abdomen: Abdomen is soft. Bowel sounds are normal.   There is epigastric tenderness. Assessment & Plan  41year old male with a history of HTN, arthritis, diverticulitis s/p partial colectomy and reversal of ileostomy, alcohol abuse, admitted with recurrent acute pancreatitis likely secondary to biliary sludge s/p NJ placement for post-pyloric feeds, but he vomited it out. Repeat CT showed PVT and developing cyst.      Recommendation:  1. Continue supportive care  2. Monitor and document output  3. NPO, IVF, pain control  4. Continue PPI BID  5. Continue Carafate QID  6. Continue anticoagulation  7. He was given the options of surgically placed J-tube vs TPN, and he elected the former (he would vomit up any endoscopically placed J-tube)  8. Will follow  9.  Needs outpatient referral to general surgery for elective cholecystectomy upon d/c     Yao Brown MD       (O) [phone]        Yao Brown MD  3/27/2021

## 2021-03-27 NOTE — PROGRESS NOTES
Pt called out to have nurse see him. Pt not tolerating Tube feed, nauseous and having emesis. NJ came out during emesis episode. Removed at this time.

## 2021-03-27 NOTE — PROGRESS NOTES
Comprehensive Nutrition Assessment    Type and Reason for Visit:  Reassess    Nutrition Recommendations/Plan: 1. Continue  Vital AF 1.2 (\"semi-elemental\" formula name in Epic) with a goal rate of 80 ml/hr x 20 hours. Start with 20 ml/hr and increase by 20 ml every 4-6 hours, as tolerated by patient, until goal rate can be achieved and maintained. Water flushes, 30 ml every 4 hours or per MD guidance. Nutrition Assessment:  pt has improved  from a nutritional standpoint AEB he is receiving enteral nutrition via NJ tube since 3/25/21 . At risk for further compromise d/t altered nutrition related lab values,hx of alcohol abuse and acute cholecystitis in which he will need elective surgery; plans to have J tube placed; continue enteral feeding as ordered     Malnutrition Assessment:  Malnutrition Status:  Severe malnutrition    Context:  Acute Illness     Findings of the 6 clinical characteristics of malnutrition:  Energy Intake:  7 - 50% or less of estimated energy requirements for 5 or more days  Weight Loss:  1 - 1% to 2% over 1 week     Body Fat Loss:  No significant body fat loss     Muscle Mass Loss:  1 - Mild muscle mass loss Hand (interosseous)  Fluid Accumulation:  No significant fluid accumulation(noted WDL) Extremities(BUE +1 edema)   Strength:  Not Performed    Estimated Daily Nutrient Needs:  Energy (kcal):  3639-2873 kcals/day based on 15-17 kcals/kg CBW; Weight Used for Energy Requirements:  Current     Protein (g):  100-117 g protein/day based on 1.2-1.4 g/kg IBW; Weight Used for Protein Requirements:  Ideal        Fluid (ml/day):  1361-9739 ml/day based on 1 ml/kcal; Method Used for Fluid Requirements:  1 ml/kcal      Nutrition Related Findings:  NG TF started 3/25/20; BM 3/27; c/o Abdominal Pain; Wounds:  None       Current Nutrition Therapies:    DIET TUBE FEED CONTINUOUS/CYCLIC NPO; Semi-elemental (Vital AF 1.2);  Nasoenteric; Continuous; 20; 80; 20; Exceptions are: [employer], Sips of Water with Meds    Anthropometric Measures:  · Height: 6' 1\" (185.4 cm)  · Current Body Weight: (NOne taken since 3/22)   · Admission Body Weight: 288 lb 4.8 oz (130.8 kg)    · Usual Body Weight: 287 lb (130.2 kg)     · Ideal Body Weight: 184 lbs; % Ideal Body Weight 157.1 %   · BMI: 37.4  · Adjusted Body Weight:  ; No Adjustment   · BMI Categories: Obese Class 2 (BMI 35.0 -39.9)(37.42)       Nutrition Diagnosis:   · Inadequate oral intake related to altered GI function, altered GI structure(h/o alcohol abuse and pancreatitis) as evidenced by NPO or clear liquid status due to medical condition, nutrition support - enteral nutrition, GI abnormality      Nutrition Interventions:   Food and/or Nutrient Delivery:  Continue Current Tube Feeding  Nutrition Education/Counseling:  No recommendation at this time   Coordination of Nutrition Care:  Continue to monitor while inpatient    Goals:  pt will adhere to NPO and will tolerate the enteral feeding w/o N/V/d and weight will remain stables >280#       Nutrition Monitoring and Evaluation:   Behavioral-Environmental Outcomes:  None Identified   Food/Nutrient Intake Outcomes:  Enteral Nutrition Intake/Tolerance  Physical Signs/Symptoms Outcomes:  Constipation, Diarrhea, GI Status, Weight, Nutrition Focused Physical Findings, Hemodynamic Status     Discharge Planning:     Too soon to determine     Electronically signed by Jeffry Garcia RD, LD on 3/27/21 at 5:23 PM EDT    Contact: [phone]

## 2021-03-27 NOTE — PROGRESS NOTES
IM Progress Note    Admit Date:  3/13/2021  14    Interval history:      Admitted from [facility] with abd pain   He has pancreatitis  Hx of alcohol abuse, reports he quit about 4 months ago. Recurrent emesis after stating diet, again NPO . NJ tube placed on 3/25/2021 and postpyloric tube feedings  Initiated. Plan was to advance tube feedings as tolerated to goal rate and and discharged home with continued tube feedings. Patient however had significant emesis yesterday ( 3/26) . He did not tolerate the tube feedings , started having diarrhea. During one of his emesis, the NJ tube came out       IV access issues also today      Subjective:   pt seen. he said he had a miserable night . he complains of increasing diffuse abdominal pain , complains of diarrhea and and nausea  NJ tube  came out. He is n.p.o. I will resume him on IV fluids . Needs IV access PICC line ordered. GI to reevaluate and to consider TPN versus NJ tube replacement versus G-tube placement per surgery. Objective:   BP (!) 146/128   Pulse 102   Temp 97.1 °F (36.2 °C) (Oral)   Resp 18   Ht 6' 1\" (1.854 m)   Wt 283 lb 9.6 oz (128.6 kg)   SpO2 91%   BMI 37.42 kg/m²       Intake/Output Summary (Last 24 hours) at 3/27/2021 1052  Last data filed at 3/27/2021 0217  Gross per 24 hour   Intake 0 ml   Output 200 ml   Net -200 ml       Physical Exam:  General: young male, obese. Very ill-appearing, he is in mild distress. Mucous Membranes:  Pink, anicteric, white patches on tongue  Neck: No JVD, no carotid bruit, no thyromegaly  Chest:  Clear to auscultation bilaterally, no added sounds  Cardiovascular:  RRR S1S2 heard, no murmurs or gallops  Abdomen: Soft, diffusely tender, non-distended,  no organomegaly, BS present  Extremities: No edema or cyanosis.  Distal pulses well felt  Neurological : grossly normal      Medications:   Scheduled Medications:    lidocaine 1 % injection  5 mL Intradermal Once    sodium chloride flush 10 mL Intravenous 2 times per day    apixaban  10 mg Oral BID    nystatin  5 mL Oral 4x Daily    docusate sodium  100 mg Oral Daily    sucralfate  1 g Oral 4x Daily AC & HS    pantoprazole  40 mg Intravenous BID    folic acid  1 mg Oral Daily    citalopram  10 mg Oral Daily    amLODIPine  5 mg Oral Daily    cloNIDine  0.1 mg Oral BID    lisinopril  10 mg Oral BID    dicyclomine  10 mg Oral BID    sodium chloride flush  10 mL Intravenous 2 times per day     I   sodium chloride 100 mL/hr at 03/27/21 1014     sodium chloride flush, HYDROmorphone, phenol, oxyCODONE-acetaminophen **OR** oxyCODONE-acetaminophen, clonazePAM, sodium chloride flush, [DISCONTINUED] promethazine **OR** ondansetron, polyethylene glycol, acetaminophen **OR** acetaminophen, potassium chloride, promethazine    Lab Data:  Recent Labs     03/25/21  0359 03/26/21  0550   WBC 8.7 11.3*   HGB 8.6* 9.1*   HCT 25.7* 27.3*   .0* 102.4*    317     Recent Labs     03/25/21  0359 03/26/21  0550   * 128*   K 4.5 4.6    95*   CO2 23 19*   BUN 3* 4*   CREATININE <0.5* <0.5*     No results for input(s): CKTOTAL, CKMB, CKMBINDEX, TROPONINI in the last 72 hours. Coagulation:   Lab Results   Component Value Date    INR 1.28 10/04/2019    APTT 51.3 03/26/2021     Cardiac markers:   Lab Results   Component Value Date    TROPONINI <0.01 03/14/2021         Lab Results   Component Value Date    ALT 23 03/18/2021    AST 62 (H) 03/18/2021    ALKPHOS 181 (H) 03/18/2021    BILITOT 3.6 (H) 03/18/2021       Lab Results   Component Value Date    INR 1.28 (H) 10/04/2019    INR 1.65 (H) 03/22/2019    PROTIME 14.6 (H) 10/04/2019    PROTIME 18.8 (H) 03/22/2019       Radiology    IR GI TUBE W FLUOROSCOPY   Final Result   Successful placement of nasal jejunal tube as discussed. CT ABDOMEN PELVIS W IV CONTRAST Additional Contrast? None   Final Result   There is complete thrombosis of the portal vein.   Thrombus is also seen   extending into the cephalad portion of the superior mesenteric vein and   throughout the splenic vein. Peripancreatic stranding consistent with pancreatitis. There is a small area   of low-attenuation in the region the pancreatic head which may correspond to   focal dilated pancreatic duct or a small developing fluid collection. Hepatic steatosis. The findings were sent to the Radiology Results [address] at 3:41   pm on 3/22/2021to be communicated to a licensed caregiver. MRI ABDOMEN WO CONTRAST MRCP   Final Result   *Normal caliber bile ducts without evidence of choledocholithiasis. *Hepatomegaly with severe fatty infiltration as described. *Mildly atrophic pancreas with ductal dilatation as measured and described   above, findings likely sequela of chronic pancreatitis. *Large amount of gallbladder sludge. US GALLBLADDER RUQ   Final Result   1. Gallbladder sludge with nonspecific wall thickening. 2. Abnormal common duct dilatation. MRCP and/or ERCP would be options to   further evaluate. IR PICC WO SQ PORT/PUMP > 5 YEARS    (Results Pending)         Assessment & Plan:      Abdominal pain   - due to acute pancreatitis   -History of alcohol use   - admitted to med-surg.   - NPO, IVF's. - pain control: morphine/Dilaudid prn  - antiemetics prn  - pain came back with starting diet. - kept NPO longer   - resumed  full liquids on 3/23- >still not tolerating   - GI involved. --> persistent abd pain. GI started post-pyloric tube feedings on 3/25. He did not tolerate advancing tube feedings. He had emesis. And NJ tube has come out  Worsening abdominal pain   - I will increase dose of Dilaudid to 1 mg every 3 hours as needed  Keep n.p.o.; hydrate with IVF  - GI to reevaluate regarding alternate feeding options. TPN vs surgery consult for J-tube placement  - PICC line for IV access     Portal Vein Thrombosis.    - extending into SMV and splenic vein  - started on high

## 2021-03-27 NOTE — PROGRESS NOTES
Called into pt room by pca for HR being high at 130-150 and unable to get BP. /77. And HR fluctuating from 101-130 at this time. Notified clinical EKG ordered at this time.

## 2021-03-27 NOTE — PROGRESS NOTES
Shift assessment complete; see flow sheet. Scheduled medications administered; See MAR. IV infusing without difficulty. Pt c/o abdominal pain 10/10 PRN Dilaudid given at this time. Pt denies any needs at this time.  Pt educated on use of call light and to call out with needs, verbalized understanding, bed in low locked position for pt safety

## 2021-03-27 NOTE — PROGRESS NOTES
Pt requesting pain medication for abdominal pain rating 10/10. PRN  pain medication given, see MAR. SR up x2, Call light and bedside table in easy reach. Denies any other needs at this time.

## 2021-03-27 NOTE — FLOWSHEET NOTE
03/27/21 0845   Vital Signs   Temp 97.1 °F (36.2 °C)   Temp Source Oral   Heart Rate Source Monitor   Resp 18   BP (!) 146/128   BP Location Right upper arm   Patient Position Semi fowlers   Level of Consciousness Alert (0)   Patient Currently in Pain Yes   Oxygen Therapy   O2 Device None (Room air)   AM assessment completed, see flow sheet. Pt is alert and oriented. BP elevated. Respirations are even & easy. No complaints voiced. Pt denies needs at this time. SR up x 2, and bed in low position. Call light is within reach. Pt complaining of nausea and pain 10/10. Admin phenergan/duliadid. Held oral med, pt stated he wanted to wait.

## 2021-03-28 ENCOUNTER — APPOINTMENT (OUTPATIENT)
Dept: GENERAL RADIOLOGY | Age: 41
DRG: 282 | End: 2021-03-28
Attending: HOSPITALIST
Payer: MEDICAID

## 2021-03-28 ENCOUNTER — APPOINTMENT (OUTPATIENT)
Dept: CT IMAGING | Age: 41
DRG: 282 | End: 2021-03-28
Attending: HOSPITALIST
Payer: MEDICAID

## 2021-03-28 LAB
A/G RATIO: 0.6 (ref 1.1–2.2)
ALBUMIN SERPL-MCNC: 2.2 G/DL (ref 3.4–5)
ALBUMIN SERPL-MCNC: 2.4 G/DL (ref 3.4–5)
ALP BLD-CCNC: 215 U/L (ref 40–129)
ALT SERPL-CCNC: 259 U/L (ref 10–40)
AMMONIA: 52 UMOL/L (ref 16–60)
AMORPHOUS: ABNORMAL /HPF
ANION GAP SERPL CALCULATED.3IONS-SCNC: 24 MMOL/L (ref 3–16)
ANION GAP SERPL CALCULATED.3IONS-SCNC: 26 MMOL/L (ref 3–16)
ANION GAP SERPL CALCULATED.3IONS-SCNC: 27 MMOL/L (ref 3–16)
ANISOCYTOSIS: ABNORMAL
APTT: 40.9 SEC (ref 24.2–36.2)
APTT: 71.3 SEC (ref 24.2–36.2)
AST SERPL-CCNC: 1119 U/L (ref 15–37)
BACTERIA: ABNORMAL /HPF
BANDED NEUTROPHILS RELATIVE PERCENT: 10 % (ref 0–7)
BASE EXCESS ARTERIAL: -14 MMOL/L (ref -3–3)
BASE EXCESS VENOUS: -7.9 MMOL/L (ref -3–3)
BASOPHILS ABSOLUTE: 0 K/UL (ref 0–0.2)
BASOPHILS RELATIVE PERCENT: 0 %
BILIRUB SERPL-MCNC: 7.3 MG/DL (ref 0–1)
BILIRUBIN DIRECT: 5.5 MG/DL (ref 0–0.3)
BILIRUBIN URINE: ABNORMAL
BILIRUBIN, INDIRECT: 1.8 MG/DL (ref 0–1)
BLOOD, URINE: ABNORMAL
BUN BLDV-MCNC: 23 MG/DL (ref 7–20)
BUN BLDV-MCNC: 23 MG/DL (ref 7–20)
BUN BLDV-MCNC: 24 MG/DL (ref 7–20)
CALCIUM SERPL-MCNC: 10.1 MG/DL (ref 8.3–10.6)
CALCIUM SERPL-MCNC: 8.5 MG/DL (ref 8.3–10.6)
CALCIUM SERPL-MCNC: 9.2 MG/DL (ref 8.3–10.6)
CARBOXYHEMOGLOBIN ARTERIAL: 0.6 % (ref 0–1.5)
CARBOXYHEMOGLOBIN: 2.6 % (ref 0–1.5)
CHLORIDE BLD-SCNC: 91 MMOL/L (ref 99–110)
CHLORIDE BLD-SCNC: 95 MMOL/L (ref 99–110)
CHLORIDE BLD-SCNC: 96 MMOL/L (ref 99–110)
CHLORIDE URINE RANDOM: <20 MMOL/L
CLARITY: ABNORMAL
CO2: 11 MMOL/L (ref 21–32)
CO2: 11 MMOL/L (ref 21–32)
CO2: 8 MMOL/L (ref 21–32)
COARSE CASTS, UA: ABNORMAL /LPF (ref 0–2)
COLOR: ABNORMAL
CREAT SERPL-MCNC: 2.8 MG/DL (ref 0.9–1.3)
CREAT SERPL-MCNC: 2.9 MG/DL (ref 0.9–1.3)
CREAT SERPL-MCNC: 2.9 MG/DL (ref 0.9–1.3)
CREATININE URINE: 201.6 MG/DL (ref 39–259)
EKG ATRIAL RATE: 109 BPM
EKG DIAGNOSIS: NORMAL
EKG P AXIS: 50 DEGREES
EKG P-R INTERVAL: 158 MS
EKG Q-T INTERVAL: 378 MS
EKG QRS DURATION: 88 MS
EKG QTC CALCULATION (BAZETT): 509 MS
EKG R AXIS: -16 DEGREES
EKG T AXIS: 32 DEGREES
EKG VENTRICULAR RATE: 109 BPM
EOSINOPHILS ABSOLUTE: 0 K/UL (ref 0–0.6)
EOSINOPHILS RELATIVE PERCENT: 0 %
EPITHELIAL CELLS, UA: ABNORMAL /HPF (ref 0–5)
GFR AFRICAN AMERICAN: 29
GFR AFRICAN AMERICAN: 29
GFR AFRICAN AMERICAN: 30
GFR NON-AFRICAN AMERICAN: 24
GFR NON-AFRICAN AMERICAN: 24
GFR NON-AFRICAN AMERICAN: 25
GLOBULIN: 3.8 G/DL
GLUCOSE BLD-MCNC: 107 MG/DL (ref 70–99)
GLUCOSE BLD-MCNC: 189 MG/DL (ref 70–99)
GLUCOSE BLD-MCNC: 202 MG/DL (ref 70–99)
GLUCOSE BLD-MCNC: 230 MG/DL (ref 70–99)
GLUCOSE BLD-MCNC: 90 MG/DL (ref 70–99)
GLUCOSE URINE: 100 MG/DL
HCO3 ARTERIAL: 9.1 MMOL/L (ref 21–29)
HCO3 VENOUS: 17.2 MMOL/L (ref 23–29)
HCT VFR BLD CALC: 26.4 % (ref 40.5–52.5)
HCT VFR BLD CALC: 29.8 % (ref 40.5–52.5)
HEMOGLOBIN, ART, EXTENDED: 9 G/DL (ref 13.5–17.5)
HEMOGLOBIN: 8.4 G/DL (ref 13.5–17.5)
HEMOGLOBIN: 9.3 G/DL (ref 13.5–17.5)
HYALINE CASTS: ABNORMAL /LPF (ref 0–2)
HYPOCHROMIA: ABNORMAL
INR BLD: 3.73 (ref 0.86–1.14)
KETONES, URINE: ABNORMAL MG/DL
LACTIC ACID: 13.5 MMOL/L (ref 0.4–2)
LACTIC ACID: 3.1 MMOL/L (ref 0.4–2)
LACTIC ACID: 4 MMOL/L (ref 0.4–2)
LACTIC ACID: 4.8 MMOL/L (ref 0.4–2)
LACTIC ACID: 5.9 MMOL/L (ref 0.4–2)
LEUKOCYTE ESTERASE, URINE: NEGATIVE
LYMPHOCYTES ABSOLUTE: 0.8 K/UL (ref 1–5.1)
LYMPHOCYTES RELATIVE PERCENT: 2 %
MCH RBC QN AUTO: 33.1 PG (ref 26–34)
MCH RBC QN AUTO: 33.9 PG (ref 26–34)
MCHC RBC AUTO-ENTMCNC: 31.3 G/DL (ref 31–36)
MCHC RBC AUTO-ENTMCNC: 31.8 G/DL (ref 31–36)
MCV RBC AUTO: 105.7 FL (ref 80–100)
MCV RBC AUTO: 106.4 FL (ref 80–100)
METHEMOGLOBIN ARTERIAL: 0.1 %
METHEMOGLOBIN VENOUS: 0.3 %
MICROSCOPIC EXAMINATION: YES
MONOCYTES ABSOLUTE: 3.1 K/UL (ref 0–1.3)
MONOCYTES RELATIVE PERCENT: 8 %
MUCUS: ABNORMAL /LPF
NEUTROPHILS ABSOLUTE: 34.7 K/UL (ref 1.7–7.7)
NEUTROPHILS RELATIVE PERCENT: 80 %
NITRITE, URINE: POSITIVE
O2 CONTENT ARTERIAL: 12 ML/DL
O2 CONTENT, VEN: 12 VOL %
O2 SAT, ARTERIAL: 96.8 %
O2 SAT, VEN: 96 %
O2 THERAPY: ABNORMAL
O2 THERAPY: ABNORMAL
PCO2 ARTERIAL: 15.5 MMHG (ref 35–45)
PCO2, VEN: 33.5 MMHG (ref 40–50)
PDW BLD-RTO: 19.1 % (ref 12.4–15.4)
PDW BLD-RTO: 19.3 % (ref 12.4–15.4)
PERFORMED ON: ABNORMAL
PH ARTERIAL: 7.38 (ref 7.35–7.45)
PH UA: 5 (ref 5–8)
PH VENOUS: 7.33 (ref 7.35–7.45)
PHOSPHORUS: 4.7 MG/DL (ref 2.5–4.9)
PLATELET # BLD: 479 K/UL (ref 135–450)
PLATELET # BLD: 560 K/UL (ref 135–450)
PLATELET SLIDE REVIEW: ABNORMAL
PMV BLD AUTO: 8 FL (ref 5–10.5)
PMV BLD AUTO: 8.3 FL (ref 5–10.5)
PO2 ARTERIAL: 86.4 MMHG (ref 75–108)
PO2, VEN: 84.6 MMHG (ref 25–40)
POLYCHROMASIA: ABNORMAL
POTASSIUM REFLEX MAGNESIUM: 6.4 MMOL/L (ref 3.5–5.1)
POTASSIUM SERPL-SCNC: 4.9 MMOL/L (ref 3.5–5.1)
POTASSIUM SERPL-SCNC: 6.9 MMOL/L (ref 3.5–5.1)
POTASSIUM, UR: 67.9 MMOL/L
PROTEIN UA: 30 MG/DL
PROTHROMBIN TIME: 43.8 SEC (ref 10–13.2)
RBC # BLD: 2.48 M/UL (ref 4.2–5.9)
RBC # BLD: 2.82 M/UL (ref 4.2–5.9)
RBC UA: ABNORMAL /HPF (ref 0–4)
RENAL EPITHELIAL, UA: ABNORMAL /HPF (ref 0–1)
SLIDE REVIEW: ABNORMAL
SODIUM BLD-SCNC: 129 MMOL/L (ref 136–145)
SODIUM BLD-SCNC: 129 MMOL/L (ref 136–145)
SODIUM BLD-SCNC: 131 MMOL/L (ref 136–145)
SODIUM URINE: 23 MMOL/L
SPECIFIC GRAVITY UA: >=1.03 (ref 1–1.03)
TCO2 ARTERIAL: 9.5 MMOL/L
TCO2 CALC VENOUS: 18 MMOL/L
TOTAL PROTEIN: 6.2 G/DL (ref 6.4–8.2)
URINE TYPE: ABNORMAL
UROBILINOGEN, URINE: 4 E.U./DL
WBC # BLD: 30.1 K/UL (ref 4–11)
WBC # BLD: 38.6 K/UL (ref 4–11)
WBC UA: ABNORMAL /HPF (ref 0–5)

## 2021-03-28 PROCEDURE — 83605 ASSAY OF LACTIC ACID: CPT

## 2021-03-28 PROCEDURE — 6360000002 HC RX W HCPCS: Performed by: INTERNAL MEDICINE

## 2021-03-28 PROCEDURE — 82803 BLOOD GASES ANY COMBINATION: CPT

## 2021-03-28 PROCEDURE — 31500 INSERT EMERGENCY AIRWAY: CPT | Performed by: INTERNAL MEDICINE

## 2021-03-28 PROCEDURE — 6360000002 HC RX W HCPCS: Performed by: PHYSICIAN ASSISTANT

## 2021-03-28 PROCEDURE — 6360000002 HC RX W HCPCS

## 2021-03-28 PROCEDURE — 2580000003 HC RX 258: Performed by: INTERNAL MEDICINE

## 2021-03-28 PROCEDURE — 2500000003 HC RX 250 WO HCPCS: Performed by: INTERNAL MEDICINE

## 2021-03-28 PROCEDURE — 36415 COLL VENOUS BLD VENIPUNCTURE: CPT

## 2021-03-28 PROCEDURE — 84300 ASSAY OF URINE SODIUM: CPT

## 2021-03-28 PROCEDURE — 94002 VENT MGMT INPAT INIT DAY: CPT

## 2021-03-28 PROCEDURE — 82436 ASSAY OF URINE CHLORIDE: CPT

## 2021-03-28 PROCEDURE — 94761 N-INVAS EAR/PLS OXIMETRY MLT: CPT

## 2021-03-28 PROCEDURE — 36600 WITHDRAWAL OF ARTERIAL BLOOD: CPT

## 2021-03-28 PROCEDURE — 6360000002 HC RX W HCPCS: Performed by: HOSPITALIST

## 2021-03-28 PROCEDURE — 99254 IP/OBS CNSLTJ NEW/EST MOD 60: CPT | Performed by: SURGERY

## 2021-03-28 PROCEDURE — 87205 SMEAR GRAM STAIN: CPT

## 2021-03-28 PROCEDURE — 93005 ELECTROCARDIOGRAM TRACING: CPT | Performed by: INTERNAL MEDICINE

## 2021-03-28 PROCEDURE — 6370000000 HC RX 637 (ALT 250 FOR IP): Performed by: PHYSICIAN ASSISTANT

## 2021-03-28 PROCEDURE — 6370000000 HC RX 637 (ALT 250 FOR IP): Performed by: INTERNAL MEDICINE

## 2021-03-28 PROCEDURE — 87086 URINE CULTURE/COLONY COUNT: CPT

## 2021-03-28 PROCEDURE — 86403 PARTICLE AGGLUT ANTBDY SCRN: CPT

## 2021-03-28 PROCEDURE — 85027 COMPLETE CBC AUTOMATED: CPT

## 2021-03-28 PROCEDURE — 87150 DNA/RNA AMPLIFIED PROBE: CPT

## 2021-03-28 PROCEDURE — 2000000000 HC ICU R&B

## 2021-03-28 PROCEDURE — 99292 CRITICAL CARE ADDL 30 MIN: CPT | Performed by: INTERNAL MEDICINE

## 2021-03-28 PROCEDURE — 93010 ELECTROCARDIOGRAM REPORT: CPT | Performed by: INTERNAL MEDICINE

## 2021-03-28 PROCEDURE — 85730 THROMBOPLASTIN TIME PARTIAL: CPT

## 2021-03-28 PROCEDURE — 81001 URINALYSIS AUTO W/SCOPE: CPT

## 2021-03-28 PROCEDURE — 99291 CRITICAL CARE FIRST HOUR: CPT | Performed by: INTERNAL MEDICINE

## 2021-03-28 PROCEDURE — 36556 INSERT NON-TUNNEL CV CATH: CPT

## 2021-03-28 PROCEDURE — 2700000000 HC OXYGEN THERAPY PER DAY

## 2021-03-28 PROCEDURE — 74176 CT ABD & PELVIS W/O CONTRAST: CPT

## 2021-03-28 PROCEDURE — 2580000003 HC RX 258: Performed by: HOSPITALIST

## 2021-03-28 PROCEDURE — 87040 BLOOD CULTURE FOR BACTERIA: CPT

## 2021-03-28 PROCEDURE — 6360000002 HC RX W HCPCS: Performed by: NURSE PRACTITIONER

## 2021-03-28 PROCEDURE — 99233 SBSQ HOSP IP/OBS HIGH 50: CPT | Performed by: INTERNAL MEDICINE

## 2021-03-28 PROCEDURE — 80053 COMPREHEN METABOLIC PANEL: CPT

## 2021-03-28 PROCEDURE — 84133 ASSAY OF URINE POTASSIUM: CPT

## 2021-03-28 PROCEDURE — 85025 COMPLETE CBC W/AUTO DIFF WBC: CPT

## 2021-03-28 PROCEDURE — 82570 ASSAY OF URINE CREATININE: CPT

## 2021-03-28 PROCEDURE — 2500000003 HC RX 250 WO HCPCS

## 2021-03-28 PROCEDURE — 87077 CULTURE AEROBIC IDENTIFY: CPT

## 2021-03-28 PROCEDURE — C9113 INJ PANTOPRAZOLE SODIUM, VIA: HCPCS | Performed by: PHYSICIAN ASSISTANT

## 2021-03-28 PROCEDURE — 87186 SC STD MICRODIL/AGAR DIL: CPT

## 2021-03-28 PROCEDURE — 2580000003 HC RX 258: Performed by: NURSE PRACTITIONER

## 2021-03-28 PROCEDURE — 89220 SPUTUM SPECIMEN COLLECTION: CPT

## 2021-03-28 PROCEDURE — 36556 INSERT NON-TUNNEL CV CATH: CPT | Performed by: INTERNAL MEDICINE

## 2021-03-28 PROCEDURE — 85610 PROTHROMBIN TIME: CPT

## 2021-03-28 PROCEDURE — 82140 ASSAY OF AMMONIA: CPT

## 2021-03-28 PROCEDURE — 5A1945Z RESPIRATORY VENTILATION, 24-96 CONSECUTIVE HOURS: ICD-10-PCS | Performed by: HOSPITALIST

## 2021-03-28 PROCEDURE — 0BH17EZ INSERTION OF ENDOTRACHEAL AIRWAY INTO TRACHEA, VIA NATURAL OR ARTIFICIAL OPENING: ICD-10-PCS | Performed by: HOSPITALIST

## 2021-03-28 PROCEDURE — 87070 CULTURE OTHR SPECIMN AEROBIC: CPT

## 2021-03-28 PROCEDURE — 71045 X-RAY EXAM CHEST 1 VIEW: CPT

## 2021-03-28 PROCEDURE — 02HV33Z INSERTION OF INFUSION DEVICE INTO SUPERIOR VENA CAVA, PERCUTANEOUS APPROACH: ICD-10-PCS | Performed by: INTERNAL MEDICINE

## 2021-03-28 PROCEDURE — 6370000000 HC RX 637 (ALT 250 FOR IP): Performed by: HOSPITALIST

## 2021-03-28 RX ORDER — NICOTINE POLACRILEX 4 MG
15 LOZENGE BUCCAL PRN
Status: DISCONTINUED | OUTPATIENT
Start: 2021-03-28 | End: 2021-04-05 | Stop reason: HOSPADM

## 2021-03-28 RX ORDER — 0.9 % SODIUM CHLORIDE 0.9 %
1000 INTRAVENOUS SOLUTION INTRAVENOUS ONCE
Status: COMPLETED | OUTPATIENT
Start: 2021-03-28 | End: 2021-03-28

## 2021-03-28 RX ORDER — MIDAZOLAM HYDROCHLORIDE 1 MG/ML
2 INJECTION INTRAMUSCULAR; INTRAVENOUS ONCE
Status: COMPLETED | OUTPATIENT
Start: 2021-03-28 | End: 2021-03-28

## 2021-03-28 RX ORDER — CHLORHEXIDINE GLUCONATE 0.12 MG/ML
15 RINSE ORAL 2 TIMES DAILY
Status: DISCONTINUED | OUTPATIENT
Start: 2021-03-28 | End: 2021-04-05 | Stop reason: HOSPADM

## 2021-03-28 RX ORDER — SODIUM POLYSTYRENE SULFONATE 15 G/60ML
15 SUSPENSION ORAL; RECTAL ONCE
Status: DISCONTINUED | OUTPATIENT
Start: 2021-03-28 | End: 2021-03-28

## 2021-03-28 RX ORDER — HEPARIN SODIUM 1000 [USP'U]/ML
3976 INJECTION, SOLUTION INTRAVENOUS; SUBCUTANEOUS PRN
Status: DISCONTINUED | OUTPATIENT
Start: 2021-03-28 | End: 2021-04-02

## 2021-03-28 RX ORDER — HEPARIN SODIUM 1000 [USP'U]/ML
7952 INJECTION, SOLUTION INTRAVENOUS; SUBCUTANEOUS PRN
Status: DISCONTINUED | OUTPATIENT
Start: 2021-03-28 | End: 2021-04-02

## 2021-03-28 RX ORDER — HEPARIN SODIUM 10000 [USP'U]/100ML
13 INJECTION, SOLUTION INTRAVENOUS CONTINUOUS
Status: DISCONTINUED | OUTPATIENT
Start: 2021-03-28 | End: 2021-04-02

## 2021-03-28 RX ORDER — HEPARIN SODIUM 1000 [USP'U]/ML
80 INJECTION, SOLUTION INTRAVENOUS; SUBCUTANEOUS ONCE
Status: DISCONTINUED | OUTPATIENT
Start: 2021-03-28 | End: 2021-03-28 | Stop reason: DRUGHIGH

## 2021-03-28 RX ORDER — MIDAZOLAM HYDROCHLORIDE 1 MG/ML
2 INJECTION INTRAMUSCULAR; INTRAVENOUS
Status: DISCONTINUED | OUTPATIENT
Start: 2021-03-28 | End: 2021-03-29

## 2021-03-28 RX ORDER — 0.9 % SODIUM CHLORIDE 0.9 %
2000 INTRAVENOUS SOLUTION INTRAVENOUS ONCE
Status: COMPLETED | OUTPATIENT
Start: 2021-03-28 | End: 2021-03-28

## 2021-03-28 RX ORDER — DEXTROSE MONOHYDRATE 50 MG/ML
100 INJECTION, SOLUTION INTRAVENOUS PRN
Status: DISCONTINUED | OUTPATIENT
Start: 2021-03-28 | End: 2021-04-05 | Stop reason: HOSPADM

## 2021-03-28 RX ORDER — PROPOFOL 10 MG/ML
5-50 INJECTION, EMULSION INTRAVENOUS
Status: DISCONTINUED | OUTPATIENT
Start: 2021-03-28 | End: 2021-03-29

## 2021-03-28 RX ORDER — PROPOFOL 10 MG/ML
INJECTION, EMULSION INTRAVENOUS
Status: DISPENSED
Start: 2021-03-28 | End: 2021-03-29

## 2021-03-28 RX ORDER — CALCIUM GLUCONATE 94 MG/ML
1000 INJECTION, SOLUTION INTRAVENOUS ONCE
Status: DISCONTINUED | OUTPATIENT
Start: 2021-03-28 | End: 2021-04-05 | Stop reason: HOSPADM

## 2021-03-28 RX ORDER — CALCIUM GLUCONATE 94 MG/ML
1000 INJECTION, SOLUTION INTRAVENOUS ONCE
Status: DISCONTINUED | OUTPATIENT
Start: 2021-03-28 | End: 2021-03-28 | Stop reason: SDUPTHER

## 2021-03-28 RX ORDER — DEXTROSE MONOHYDRATE 25 G/50ML
25 INJECTION, SOLUTION INTRAVENOUS ONCE
Status: COMPLETED | OUTPATIENT
Start: 2021-03-28 | End: 2021-03-28

## 2021-03-28 RX ORDER — HEPARIN SODIUM 10000 [USP'U]/100ML
INJECTION, SOLUTION INTRAVENOUS
Status: DISPENSED
Start: 2021-03-28 | End: 2021-03-29

## 2021-03-28 RX ORDER — CALCIUM GLUCONATE 94 MG/ML
INJECTION, SOLUTION INTRAVENOUS
Status: COMPLETED
Start: 2021-03-28 | End: 2021-03-28

## 2021-03-28 RX ORDER — DEXTROSE MONOHYDRATE 25 G/50ML
12.5 INJECTION, SOLUTION INTRAVENOUS PRN
Status: DISCONTINUED | OUTPATIENT
Start: 2021-03-28 | End: 2021-04-05 | Stop reason: HOSPADM

## 2021-03-28 RX ORDER — FENTANYL CITRATE 50 UG/ML
50 INJECTION, SOLUTION INTRAMUSCULAR; INTRAVENOUS
Status: DISCONTINUED | OUTPATIENT
Start: 2021-03-28 | End: 2021-03-31

## 2021-03-28 RX ADMIN — MEROPENEM 1000 MG: 1 INJECTION, POWDER, FOR SOLUTION INTRAVENOUS at 11:44

## 2021-03-28 RX ADMIN — SODIUM BICARBONATE: 84 INJECTION, SOLUTION INTRAVENOUS at 19:11

## 2021-03-28 RX ADMIN — Medication 1500 MG: at 11:47

## 2021-03-28 RX ADMIN — HEPARIN SODIUM 17.9 ML/HR: 10000 INJECTION, SOLUTION INTRAVENOUS at 12:18

## 2021-03-28 RX ADMIN — CEFEPIME HYDROCHLORIDE 1000 MG: 1 INJECTION, POWDER, FOR SOLUTION INTRAMUSCULAR; INTRAVENOUS at 11:47

## 2021-03-28 RX ADMIN — DICYCLOMINE HYDROCHLORIDE 10 MG: 10 CAPSULE ORAL at 21:58

## 2021-03-28 RX ADMIN — Medication 15 ML: at 21:02

## 2021-03-28 RX ADMIN — MIDAZOLAM HYDROCHLORIDE 2 MG: 1 INJECTION, SOLUTION INTRAMUSCULAR; INTRAVENOUS at 16:07

## 2021-03-28 RX ADMIN — PROPOFOL 10 MCG/KG/MIN: 10 INJECTION, EMULSION INTRAVENOUS at 17:46

## 2021-03-28 RX ADMIN — MIDAZOLAM HYDROCHLORIDE 2 MG: 1 INJECTION, SOLUTION INTRAMUSCULAR; INTRAVENOUS at 23:16

## 2021-03-28 RX ADMIN — DEXTROSE MONOHYDRATE 25 G: 25 INJECTION, SOLUTION INTRAVENOUS at 09:47

## 2021-03-28 RX ADMIN — CALCIUM GLUCONATE: 94 INJECTION, SOLUTION INTRAVENOUS at 09:37

## 2021-03-28 RX ADMIN — SODIUM CHLORIDE 1000 ML: 9 INJECTION, SOLUTION INTRAVENOUS at 11:37

## 2021-03-28 RX ADMIN — FENTANYL CITRATE 50 MCG: 50 INJECTION, SOLUTION INTRAMUSCULAR; INTRAVENOUS at 16:07

## 2021-03-28 RX ADMIN — CEFEPIME HYDROCHLORIDE 1000 MG: 1 INJECTION, POWDER, FOR SOLUTION INTRAMUSCULAR; INTRAVENOUS at 21:01

## 2021-03-28 RX ADMIN — NOREPINEPHRINE BITARTRATE 5 MCG/MIN: 1 INJECTION INTRAVENOUS at 19:16

## 2021-03-28 RX ADMIN — OXYCODONE HYDROCHLORIDE AND ACETAMINOPHEN 2 TABLET: 5; 325 TABLET ORAL at 02:09

## 2021-03-28 RX ADMIN — FENTANYL CITRATE 50 MCG: 50 INJECTION, SOLUTION INTRAMUSCULAR; INTRAVENOUS at 18:36

## 2021-03-28 RX ADMIN — SUCRALFATE 1 G: 1 TABLET ORAL at 05:25

## 2021-03-28 RX ADMIN — SODIUM BICARBONATE: 84 INJECTION, SOLUTION INTRAVENOUS at 10:39

## 2021-03-28 RX ADMIN — SUCRALFATE 1 G: 1 TABLET ORAL at 21:58

## 2021-03-28 RX ADMIN — PROMETHAZINE HYDROCHLORIDE 12.5 MG: 25 INJECTION INTRAMUSCULAR; INTRAVENOUS at 03:50

## 2021-03-28 RX ADMIN — Medication 10 ML: at 21:02

## 2021-03-28 RX ADMIN — PANTOPRAZOLE SODIUM 40 MG: 40 INJECTION, POWDER, FOR SOLUTION INTRAVENOUS at 21:02

## 2021-03-28 RX ADMIN — FENTANYL CITRATE 50 MCG: 50 INJECTION, SOLUTION INTRAMUSCULAR; INTRAVENOUS at 17:28

## 2021-03-28 RX ADMIN — SODIUM CHLORIDE 1000 ML: 9 INJECTION, SOLUTION INTRAVENOUS at 20:57

## 2021-03-28 RX ADMIN — HYDROMORPHONE HYDROCHLORIDE 1 MG: 1 INJECTION, SOLUTION INTRAMUSCULAR; INTRAVENOUS; SUBCUTANEOUS at 03:50

## 2021-03-28 RX ADMIN — ONDANSETRON 4 MG: 2 SOLUTION INTRAMUSCULAR; INTRAVENOUS at 02:17

## 2021-03-28 RX ADMIN — SODIUM CHLORIDE, PRESERVATIVE FREE 10 ML: 5 INJECTION INTRAVENOUS at 21:03

## 2021-03-28 RX ADMIN — SODIUM BICARBONATE 50 MEQ: 84 INJECTION INTRAVENOUS at 09:40

## 2021-03-28 RX ADMIN — ONDANSETRON 4 MG: 2 SOLUTION INTRAMUSCULAR; INTRAVENOUS at 13:54

## 2021-03-28 RX ADMIN — SODIUM ZIRCONIUM CYCLOSILICATE 10 G: 10 POWDER, FOR SUSPENSION ORAL at 18:12

## 2021-03-28 RX ADMIN — PANTOPRAZOLE SODIUM 40 MG: 40 INJECTION, POWDER, FOR SOLUTION INTRAVENOUS at 11:51

## 2021-03-28 RX ADMIN — VASOPRESSIN 0.03 UNITS/MIN: 20 INJECTION INTRAVENOUS at 20:18

## 2021-03-28 RX ADMIN — HYDROMORPHONE HYDROCHLORIDE 1 MG: 1 INJECTION, SOLUTION INTRAMUSCULAR; INTRAVENOUS; SUBCUTANEOUS at 00:15

## 2021-03-28 RX ADMIN — SODIUM CHLORIDE 2000 ML: 9 INJECTION, SOLUTION INTRAVENOUS at 16:31

## 2021-03-28 RX ADMIN — INSULIN HUMAN 10 UNITS: 100 INJECTION, SOLUTION PARENTERAL at 09:42

## 2021-03-28 RX ADMIN — PROPOFOL 50 MCG/KG/MIN: 10 INJECTION, EMULSION INTRAVENOUS at 21:15

## 2021-03-28 ASSESSMENT — PAIN DESCRIPTION - ORIENTATION: ORIENTATION: MID;UPPER

## 2021-03-28 ASSESSMENT — PULMONARY FUNCTION TESTS
PIF_VALUE: 27
PIF_VALUE: 21
PIF_VALUE: 20

## 2021-03-28 ASSESSMENT — PAIN SCALES - GENERAL
PAINLEVEL_OUTOF10: 0
PAINLEVEL_OUTOF10: 9
PAINLEVEL_OUTOF10: 0

## 2021-03-28 ASSESSMENT — PAIN DESCRIPTION - LOCATION: LOCATION: ABDOMEN

## 2021-03-28 ASSESSMENT — PAIN DESCRIPTION - PAIN TYPE: TYPE: ACUTE PAIN

## 2021-03-28 ASSESSMENT — PAIN DESCRIPTION - DESCRIPTORS: DESCRIPTORS: SHARP;ACHING

## 2021-03-28 NOTE — CONSULTS
Department of General Surgery Consult    PATIENT NAME: Leny Villanueva   YOB: 1980    ADMISSION DATE: 3/13/2021 11:43 PM      TODAY'S DATE: 3/28/2021    Reason for Consult:  Dori Neas placement    Chief Complaint: abd pain    Requesting Physician:  Alex Peter    HISTORY OF PRESENT ILLNESS:              The patient is a 41 y.o. male who presented with abdominal pain and pancreatitis. History of alcohol abuse. LFTs were elevated. MRCP showed some sludge in gallbladder and no choledocholithiasis. Did not tolerate diet advancement and repeat CT on 3/22 showed complete thrombosis of portal vein, slight extension into SMV and splenic vein thrombosis. Was started on heparin gtt and transitioned to eliquis a few days ago. Also started on postpyloric tube feeds but did not tolerate those and pulled out NJ tube yesterday. This morning was confused, hypotensive and labs demonstrate ARF and leukocytosis.     Past Medical History:        Diagnosis Date    Arthritis     Diverticulitis     Heroin abuse ([address] 2013    Pt has been clean for OVER 8 hears last used 2013    Hypertension     Ileostomy in place [facility]) 11/27/2019    Knee instability     per pt, his knees pop out at times, bilateral        Past Surgical History:        Procedure Laterality Date    ABDOMEN SURGERY  10/08/2019    sigmoid colectomy with ostomy    OTHER SURGICAL HISTORY  11/27/2019    ileostomy reversal, on Q pain buster insertion    SMALL INTESTINE SURGERY N/A 10/8/2019    SIGMOID COLECTOMY, OSTOMY performed by Alethea Munguia MD at [address] [place] [address] N/A 11/27/2019    ILEOSTOMY REVERSAL, ON-Q PAIN BUSTER INSERTION performed by Alethea Munguia MD at [facility] OR       Current Medications:   Current Facility-Administered Medications: 0.9 % sodium chloride bolus, 1,000 mL, Intravenous, Once  glucose (GLUTOSE) 40 % oral gel 15 g, 15 g, Oral, PRN  dextrose 50 % IV solution, 12.5 g, Intravenous, PRN  glucagon PRN  [DISCONTINUED] promethazine (PHENERGAN) tablet 12.5 mg, 12.5 mg, Oral, Q6H PRN **OR** ondansetron (ZOFRAN) injection 4 mg, 4 mg, Intravenous, Q6H PRN  polyethylene glycol (GLYCOLAX) packet 17 g, 17 g, Oral, Daily PRN  acetaminophen (TYLENOL) tablet 650 mg, 650 mg, Oral, Q6H PRN **OR** acetaminophen (TYLENOL) suppository 650 mg, 650 mg, Rectal, Q6H PRN  promethazine (PHENERGAN) injection 12.5 mg, 12.5 mg, Intravenous, Q6H PRN  Prior to Admission medications    Medication Sig Start Date End Date Taking? Authorizing Provider   citalopram (CELEXA) 10 MG tablet Take by mouth daily   Yes Historical Provider, MD   lisinopril (PRINIVIL;ZESTRIL) 10 MG tablet Take by mouth 2 times daily   Yes Historical Provider, MD   cloNIDine (CATAPRES) 0.1 MG tablet Take by mouth 2 times daily   Yes Historical Provider, MD   amLODIPine (NORVASC) 5 MG tablet Take by mouth 2 times daily   Yes Historical Provider, MD   dicyclomine (BENTYL) 10 MG capsule Take by mouth 2 times daily   Yes Historical Provider, MD   DICLOFENAC PO Take by mouth 2 times daily   Yes Historical Provider, MD   clonazePAM (KLONOPIN) 0.5 MG tablet Take 0.5 mg by mouth 2 times daily as needed. Yes Historical Provider, MD        Allergies:  Tramadol    Social History:   TOBACCO:  yes  ETOH:  yes    Family History:    History reviewed. No pertinent family history. REVIEW OF SYSTEMS:  CONSTITUTIONAL:  negative  HEENT:  negative  RESPIRATORY:  negative  CARDIOVASCULAR:  negative  GASTROINTESTINAL:  negative except for nausea, vomiting, diarrhea and abdominal pain  GENITOURINARY:  negative  HEMATOLOGIC/LYMPHATIC:  negative  NEUROLOGICAL:  Negative  * All other ROS reviewed and negative. PHYSICAL EXAM:  VITALS:  BP 88/64   Pulse 111   Temp 98.2 °F (36.8 °C)   Resp 20   Ht 6' 1\" (1.854 m)   Wt 283 lb 9.6 oz (128.6 kg)   SpO2 97%   BMI 37.42 kg/m²   24HR INTAKE/OUTPUT:    I/O last 3 completed shifts:   In: 1005 [I.V.:1005]  Out: -   No intake/output data recorded. CONSTITUTIONAL:  alert, no apparent distress and moderately obese  EYES:  PERRL,   ENT:  Normocephalic,atraumatic, without obvious abnormality  NECK:  supple, symmetrical, trachea midline  LUNGS: Resp effort easy and unlabored,  CARDIOVASCULAR:  NO JVD, tachycardic   ABDOMEN:  Multiple scars, hypoactive bowel sounds, soft, non-distended, moderate tender, involuntary guarding absent, no masses palpated and hernia present and reducible  MUSCULOSKELETAL: No clubbing or cyanosis, 0+ pitting edema lower extremities  NEUROLOGIC:  Mental Status Exam:  Level of Alertness:   awake  PSYCHIATRIC:   disoriented  SKIN:  no rashes    DATA:    CBC:   Recent Labs     03/26/21  0550 03/28/21  0521   WBC 11.3* 38.6*   HGB 9.1* 9.3*   HCT 27.3* 29.8*    560*     BMP:    Recent Labs     03/26/21  0550 03/28/21  0521   * 129*   K 4.6 6.9*   CL 95* 95*   CO2 19* 8*   BUN 4* 23*   CREATININE <0.5* 2.9*   GLUCOSE 112* 90     Hepatic: No results for input(s): AST, ALT, ALB, BILITOT, ALKPHOS in the last 72 hours. Mag:    No results for input(s): MG in the last 72 hours. Phos:   No results for input(s): PHOS in the last 72 hours. INR:   Recent Labs     03/27/21  1047 03/28/21  0955   INR 3.31* 3.73*       Radiology Review: Images personally reviewed by me. CT -   Findings consistent with acute on chronic pancreatitis.       Small amount of free fluid in the pelvis.  No pseudocyst is identified.       Extensive fatty infiltration of the liver.       Nonobstructive bilateral nephrolithiasis.  Punctate caliceal stones are noted   bilaterally.       Multiple small ventral hernias.  2 hernias in the periumbilical midline   contain small bowel.  No incarceration is evident. IMPRESSION/RECOMMENDATIONS:    42 yo with pancreatitis, portal/splenic/smv thrombosis. Without peritonitis on exam and today's CT doesn't show thickened bowel or pneumatosis.   Follow up LFTs and INR as worsened liver function is a possibility given known thrombosis. As not tolerating tube feeds he may not have been tolerating eliquis so thrombosis may have worsened. With his current labs and image findings operative intervention for jtube or other reason would be met with high level of morbidity/mortality.     Electronically signed by Yolande Nguyễn, [address] [place] [address] Surgery  [id number]

## 2021-03-28 NOTE — CONSULTS
Patient is being seen at the request of Dr. Abdiaziz Farah for a consultation for acute renal failure/ lactic acidosis    HISTORY OF PRESENT ILLNESS: The patient is a 40-year-old man with a past medical history of distant heroin abuse, alcohol abuse who had been admitted to [facility] on 3/13/2021 with abdominal pain and pancreatitis. He underwent an MRCP during the hospital stay showing sludge in the gallbladder but no choledocholithiasis. On CT scan 3/20 he was found to have complete thrombosis of his portal vein with extension into the SMV and splenic vein thrombosis. He was initially started on heparin drip but was transitioned to Eliquis a few days ago. In addition he been receiving postpyloric tube feeds but had difficulty tolerating them and pulled his NG tube out yesterday. This morning he was noted to be more confused with hypotension and new onset renal failure and leukocytosis. He was transferred to the ICU for further care. Patient is currently a limited historian and cannot provide many details. He does endorse epigastric abdominal pain without radiation.     PAST MEDICAL HISTORY:  Past Medical History:   Diagnosis Date    Arthritis     Diverticulitis     Heroin abuse ([address] 2013    Pt has been clean for OVER 8 hears last used 2013    Hypertension     Ileostomy in place [facility]) 11/27/2019    Knee instability     per pt, his knees pop out at times, bilateral      PAST SURGICAL HISTORY:  Past Surgical History:   Procedure Laterality Date    ABDOMEN SURGERY  10/08/2019    sigmoid colectomy with ostomy    OTHER SURGICAL HISTORY  11/27/2019    ileostomy reversal, on Q pain buster insertion    SMALL INTESTINE SURGERY N/A 10/8/2019    SIGMOID COLECTOMY, OSTOMY performed by Sadie Alexandra MD at [address] [place] [address] N/A 11/27/2019    ILEOSTOMY REVERSAL, ON-Q PAIN BUSTER INSERTION performed by Sadie Alexandra MD at [address] [facility]:  family history is not on file.    SOCIAL HISTORY:   reports that he has been smoking cigarettes. He has a 14.00 pack-year smoking history. He has never used smokeless tobacco.    Scheduled Meds:   sodium chloride  1,000 mL Intravenous Once    insulin regular  10 Units Intravenous Once    And    dextrose  25 g Intravenous Once    sodium polystyrene  15 g Oral Once    cefepime  1,000 mg Intravenous Q12H    vancomycin  1,500 mg Intravenous Once    [START ON 3/29/2021] vancomycin (VANCOCIN) intermittent dosing (placeholder)   Other RX Placeholder    calcium gluconate        calcium gluconate  1,000 mg Intravenous Once    sodium bicarbonate  50 mEq Intravenous Once    sodium bicarbonate        lidocaine 1 % injection  5 mL Intradermal Once    sodium chloride flush  10 mL Intravenous 2 times per day    [Held by provider] apixaban  10 mg Oral BID    nystatin  5 mL Oral 4x Daily    docusate sodium  100 mg Oral Daily    sucralfate  1 g Oral 4x Daily AC & HS    pantoprazole  40 mg Intravenous BID    folic acid  1 mg Oral Daily    citalopram  10 mg Oral Daily    dicyclomine  10 mg Oral BID    sodium chloride flush  10 mL Intravenous 2 times per day     Continuous Infusions:   IV infusion builder      dextrose       PRN Meds:  glucose, dextrose, glucagon (rDNA), dextrose, sodium chloride flush, HYDROmorphone, phenol, oxyCODONE-acetaminophen **OR** oxyCODONE-acetaminophen, clonazePAM, sodium chloride flush, [DISCONTINUED] promethazine **OR** ondansetron, polyethylene glycol, acetaminophen **OR** acetaminophen, promethazine    ALLERGIES:  Patient is allergic to tramadol.     REVIEW OF SYSTEMS:  Constitutional: Negative for fever  HENT: Negative for sore throat  Eyes: Negative for redness   Respiratory: Negative for dyspnea, cough  Cardiovascular: Negative for chest pain  Gastrointestinal: Negative for vomiting, diarrhea   Genitourinary: Negative for hematuria   Musculoskeletal: Negative for arthralgias   Skin: Negative for rash  Neurological: Negative for syncope  Hematological: Negative for adenopathy  Psychiatric/Behavorial: Negative for anxiety    PHYSICAL EXAM:  Blood pressure 88/64, pulse 111, temperature 96 °F (35.6 °C), temperature source Oral, resp. rate 20, height 6' 1\" (1.854 m), weight 283 lb 9.6 oz (128.6 kg), SpO2 97 %.' on RA  Gen: Mild distress. Lethargic; jaundiced  Eyes: PERRL. No sclera icterus. No conjunctival injection. ENT: No discharge. Pharynx clear. Neck: Trachea midline. No obvious mass. Resp: +  accessory muscle use. No crackles. No wheezes. No rhonchi. No dullness on percussion. CV: Tachycardia. Regular rhythm. No murmur or rub. + 1 LE edema. GI: diffusely tender. Non-distended. No hernia. No rebound or guarding  Skin: Warm and dry. No nodule on exposed extremities. Midline abdominal scars  M/S: No cyanosis. No joint deformity. No clubbing. Neuro: Awake. Alert. Moves all four extremities. Oriented x 3     LABS:  CBC:   Recent Labs     03/26/21  0550 03/28/21  0521   WBC 11.3* 38.6*   HGB 9.1* 9.3*   HCT 27.3* 29.8*   .4* 105.7*    560*     BMP:   Recent Labs     03/26/21  0550 03/28/21  0521   * 129*   K 4.6 6.9*   CL 95* 95*   CO2 19* 8*   BUN 4* 23*   CREATININE <0.5* 2.9*     LIVER PROFILE: No results for input(s): AST, ALT, LIPASE, BILIDIR, BILITOT, ALKPHOS in the last 72 hours. Invalid input(s): AMYLASE,  ALB  PT/INR:   Recent Labs     03/27/21  1047   PROTIME 38.9*   INR 3.31*     APTT:   Recent Labs     03/25/21  0946 03/26/21  0550   APTT 53.7* 51.3*     UA:No results for input(s): NITRITE, COLORU, PHUR, LABCAST, WBCUA, RBCUA, MUCUS, TRICHOMONAS, YEAST, BACTERIA, CLARITYU, SPECGRAV, LEUKOCYTESUR, UROBILINOGEN, BILIRUBINUR, BLOODU, GLUCOSEU, AMORPHOUS in the last 72 hours. Invalid input(s): KETONESU  No results for input(s): PHART, PWU7ZBC, PO2ART in the last 72 hours.     Chest imaging - none    abd CT noncontrast 3/28: Lower Chest: Calcified granuloma are noted

## 2021-03-28 NOTE — PROGRESS NOTES
Levophed started as patient is needing sedation. Another normal saline bolus is infusing. Pt HR was 130's earlier and now 115. Propofol at 20 mcg. Report given to [facility], transfer of care.

## 2021-03-28 NOTE — PROGRESS NOTES
Blood pressure 97/63, pulse 116, temperature 96.7 °F (35.9 °C), temperature source Oral, resp. rate 20, height 6' 1\" (1.854 m), weight 283 lb 9.6 oz (128.6 kg), SpO2 95 %. Patient A/O x 4. Patient complained of pain 10 out of 10. PRN pain medication given at this time. Lung sounds diminished. No coughing noted. No s.s of SOB. Abdomen soft tender to touch. BS + x 4 quads. No swelling noted. Will continue to monitor.      Electronically signed by Prateek Hernadez RN on 3/27/2021 at 9:26 PM

## 2021-03-28 NOTE — PROGRESS NOTES
Smitha Peralta is a 41 y.o. male patient.     Current Facility-Administered Medications   Medication Dose Route Frequency Provider Last Rate Last Admin    0.9 % sodium chloride bolus  1,000 mL Intravenous Once Joey Snowden MD        sodium bicarbonate 50 mEq in sodium chloride 0.9 % 1,000 mL infusion   Intravenous Continuous Joey Snowden MD        insulin regular (HUMULIN R;NOVOLIN R) injection 10 Units  10 Units Intravenous Once Joey Snowden MD        And    dextrose 50 % IV solution  25 g Intravenous Once Joey Snowden MD        glucose (GLUTOSE) 40 % oral gel 15 g  15 g Oral PRN Joey Snowden MD        dextrose 50 % IV solution  12.5 g Intravenous PRN Joey Snowden MD        glucagon (rDNA) injection 1 mg  1 mg Intramuscular PRN Joey Snowden MD        dextrose 5 % solution  100 mL/hr Intravenous PRN Joey Snowden MD        sodium polystyrene (KAYEXALATE) 15 GM/60ML suspension 15 g  15 g Oral Once Joey Snowden MD        cefepime (MAXIPIME) 1000 mg IVPB minibag  1,000 mg Intravenous Q12H BENI Mackey CNP        vancomycin 1500 mg in dextrose 5% 300 mL IVPB  1,500 mg Intravenous Once BENI Mackey CNP Adjutant ON 3/29/2021] vancomycin (VANCOCIN) intermittent dosing (placeholder)   Other RX Placeholder BENI Mackey CNP        sodium bicarbonate 8.4 % injection             calcium gluconate 10 % injection 1,000 mg  1,000 mg Intravenous Once Zeferino Bowman MD        lidocaine 1 % injection 5 mL  5 mL Intradermal Once Joey Snowden MD        sodium chloride flush 0.9 % injection 10 mL  10 mL Intravenous 2 times per day Joey Snowden MD   10 mL at 03/27/21 2117    sodium chloride flush 0.9 % injection 10 mL  10 mL Intravenous PRN Joey Snowden MD        HYDROmorphone (DILAUDID) injection 1 mg  1 mg Intravenous Q3H PRN Joey Snowden MD   1 mg at 03/28/21 0350    [Held by provider] apixaban (ELIQUIS) tablet 10 mg  10 mg Oral BID [facility] Z[facility]ma, APRN - CNP   10 mg at 03/27/21 2117    nystatin (MYCOSTATIN) 326766 UNIT/ML suspension 500,000 Units  5 mL Oral 4x Daily Minus EmmanuelBENI CNP   500,000 Units at 03/27/21 2117    docusate sodium (COLACE) capsule 100 mg  100 mg Oral Daily Misty Alonso MD   Stopped at 03/27/21 0944    sucralfate (CARAFATE) tablet 1 g  1 g Oral 4x Daily AC & HS MACEY Phelps   1 g at 03/28/21 0525    phenol 1.4 % mouth spray 1 spray  1 spray Mouth/Throat Q2H PRN Misty Alonso MD   1 spray at 03/27/21 2347    pantoprazole (PROTONIX) injection 40 mg  40 mg Intravenous BID MACEY Phelps   40 mg at 03/27/21 2117    oxyCODONE-acetaminophen (PERCOCET) 5-325 MG per tablet 1 tablet  1 tablet Oral Q4H PRN Jt La PA-C        Or    oxyCODONE-acetaminophen (PERCOCET) 5-325 MG per tablet 2 tablet  2 tablet Oral Q4H PRN Jt La PA-C   2 tablet at 52/25/62 7243    folic acid (FOLVITE) tablet 1 mg  1 mg Oral Daily Misty Alonso MD   1 mg at 03/27/21 1215    clonazePAM (KLONOPIN) tablet 0.5 mg  0.5 mg Oral BID PRN Ganesh Tolliver MD   0.5 mg at 03/27/21 2125    citalopram (CELEXA) tablet 10 mg  10 mg Oral Daily Ganesh Tolliver MD   10 mg at 03/27/21 1216    dicyclomine (BENTYL) capsule 10 mg  10 mg Oral BID Ganesh Tolliver MD   10 mg at 03/27/21 2117    sodium chloride flush 0.9 % injection 10 mL  10 mL Intravenous 2 times per day Ganesh Tolliver MD   10 mL at 03/27/21 2118    sodium chloride flush 0.9 % injection 10 mL  10 mL Intravenous PRN Ganesh Tolliver MD   10 mL at 03/19/21 0415    ondansetron (ZOFRAN) injection 4 mg  4 mg Intravenous Q6H PRN Ganesh Tolliver MD   4 mg at 03/28/21 0217    polyethylene glycol (GLYCOLAX) packet 17 g  17 g Oral Daily PRN Ganesh Tolliver MD   17 g at 03/25/21 1543    acetaminophen (TYLENOL) tablet 650 mg  650 mg Oral Q6H PRN Ganesh Tolliver MD        Or    acetaminophen (TYLENOL) suppository 650 mg  650 mg Rectal Q6H PRN Diana Mason MD        promethazine [facility]) injection 12.5 mg  12.5 mg Intravenous Q6H PRN Joey Snowden MD   12.5 mg at 03/28/21 0350     Allergies   Allergen Reactions    Tramadol Other (See Comments)     Hot and sweaty     Active Problems:    Mild protein-calorie malnutrition (HCC)    Acute cholecystitis    Alcohol-induced chronic pancreatitis (HCC)    Abdominal pain, epigastric    Abnormal LFTs    Portal vein thrombosis    Severe malnutrition (HCC)  Resolved Problems:    * No resolved hospital problems. *    Blood pressure 88/64, pulse 111, temperature 96 °F (35.6 °C), temperature source Oral, resp. rate 20, height 6' 1\" (1.854 m), weight 283 lb 9.6 oz (128.6 kg), SpO2 97 %. Subjective:  Symptoms:  Worsening. Pain:  He complains of pain that is moderate. Objective:  General Appearance: In pain and in distress. Vital signs: (most recent): Blood pressure 88/64, pulse 111, temperature 96 °F (35.6 °C), temperature source Oral, resp. rate 20, height 6' 1\" (1.854 m), weight 283 lb 9.6 oz (128.6 kg), SpO2 97 %. Heart: Tachycardia. Regular rhythm. S1 normal and S2 normal.    Abdomen: Abdomen is distended. Hypoactive bowel sounds. There is epigastric tenderness. Assessment & Plan  41year old male with a history of HTN, arthritis, diverticulitis s/p partial colectomy and reversal of ileostomy, alcohol abuse, admitted with recurrent acute pancreatitis likely secondary to biliary sludge s/p NJ placement for post-pyloric feeds, but he vomited it out. Repeat CT showed PVT and developing cyst.  This morning, he developed resp distress, hypotension and worsening leucocytosis and ARF and was transferred to ICU.      Recommendation:  1. Continue supportive care  2. NPO, IVF, pain control  3. Continue PPI BID  4. Continue anticoagulation  5. Consider starting TPN until a surgically placed J-tube is safer to do  6. Will follow  7.  Needs cholecystectomy when pancreatitis resolves     Michelle Knowles Ofeliavin Corado MD       ([phone]) [phone]  Ramez Platt MD  3/28/2021

## 2021-03-28 NOTE — PROGRESS NOTES
Order for rapid sequence intubation obtained. Patient pre-oxygenated to with NC to a saturation 100%. Levophed gtt started @ 2 mcg/min via PIV per Dr. Lujan Client @ 1401  20 mg of Etomidate ordered and administered at 1404 hours  100 MCG of Fentanyl ordered and administered at 1405 hours  100 mg of rocuronium ordered and administered at 1406 hours  100 mg of fentanyl ordered and administered at 1409 hours  5 mg of versed ordered and administered at 1411    1406 93/66 (76)  100%  30;480;100;5      Recent Labs     03/28/21  0521 03/28/21  0955 03/28/21  1319   * 129* 131*   K 6.9* 6.4* 4.9   BUN 23* 23* 24*   CREATININE 2.9* 2.9* 2.8*         Dr. Lujan Client inserted a number  8 ET tube. The ET tube is secured at 23 cm measured at the patients lip line. Breath sounds are equal bilaterally. There is a positive color change noted in the colorimetric CO2 detector. RT connected the patient to a ventilator. See orders for ventilator orders. OG tube inserted to a depth of 65 cm. Ausculation of air bolus is positive. Aspirated stomach contents are clear. POST Intubation ORDERS    ABG ordered in 1 hours  Portable Chest x-ray ordered to confirm placement of the patients ET tube and gastric tube. Bilateral wrist restraints ordered and initiated. Pulses present distal to restraints. Prn versed and fentanyl ordered for sedation. See EMAR and orders.      Dr. Lujan Client @ bedside setting up for LIJ CVC placement  1416 levophed gtt increased to 10 mcg/min s/t 56/28 (38) per Dr. Lujan Client  1419  SPO2 100%  1420 levophed gtt increased to 20 mcg/min per Dr. Lujan Client, vaso gtt ordered per MD, pharmacy called to deliver immediately   1422  55/19 (30)  SPO2 100%, levophed to 30 mcg/min and NS bolus wide open per Dr. Lujan Client  1424 70/34 (45)  100%  1425 69/46 (55) awaiting vaso gtt MD remains @ bedside placing CVC  1426 93/61 (72)  SPO2 87% FIO2 temporarily increased to 100%  1430 109/75 (87)  100% on 80%  1431 levophed gtt decreased to 25 mcg/min  1433 111/70 (83)   1436 110/71 (82)   See physician note to follow.  Electronically signed by Juan Osborne RN on 3/28/2021 at 1:51 PM

## 2021-03-28 NOTE — PLAN OF CARE
Problem: Pain:  Goal: Pain level will decrease  Description: Pain level will decrease  3/27/2021 2316 by Aric Matthews RN  Outcome: Ongoing     Problem: Falls - Risk of:  Goal: Will remain free from falls  Description: Will remain free from falls  Outcome: Ongoing     Problem: Infection:  Goal: Will remain free from infection  Description: Will remain free from infection  Outcome: Ongoing     Problem: Safety:  Goal: Free from accidental physical injury  Description: Free from accidental physical injury  Outcome: Ongoing

## 2021-03-28 NOTE — PROCEDURES
Procedure: central venous access, LIJ    Indication: invasive hemodynamic monitoring, frequent blood draws, ensure stable IV access    Informed Consent: was obtained from patient (by me) and family     Time Out: taken    Procedure: Sterile prep with chlorhexidine. Full maximum sterile field/barrier technique was followed (with cap and mask and sterile gown and large sterile sheet and hand hygeine and 2% chlorhexidine). Aqueous lidocaine anesthetic. Direct ultrasound visualization of the right internal jugular vein, with placement of central venous catheter using modified seldinger technique. Good dark venous blood from all 3 lumens. CXR pending. No immediate complication.     Recommendation: remove central line at earliest time feasible to mitigate infectious risks

## 2021-03-28 NOTE — PROGRESS NOTES
Pharmacy - Heparin    High dose weight based heparin ordered by Dr Romi Correa. (complete thrombosis of the portal vein). HT 73 in .6 kg IBW 79.9 kg Adjusted wt 99.4 kg  Will use adjusted wt = 99.4 kg per protocol since actual weight is greater than 20% of IBW. Baseline APTT drawn at 0955 today = 40.9 seconds. INR = 3.73 (drawn 3/28/21 0955). Will not give initial bolus due to elevated INR and APTT. Will start heparin drip at 17.9 ml/hr. Draw APTT 6 hours after drip is started. Will adjust to goal APTT = 49-76 seconds. Pharmacy will continue to monitor and adjust as necessary.

## 2021-03-28 NOTE — PROGRESS NOTES
Writer called for difficult lab draw. Labs obtained from left forearm via use of US, x2 attempts. Multiple labs labeled and sent to lab via the tube system. Update given to shift Rn Corin Portillo.

## 2021-03-28 NOTE — PROGRESS NOTES
General Surgery consult called to answering service spoke with Daylin Bell, 3/28/21 @ 0904-Carmelo Escoto

## 2021-03-28 NOTE — PROGRESS NOTES
IM Progress Note    Admit Date:  3/13/2021  15    Interval history:     Admitted from [facility] with abd pain   He has pancreatitis  Hx of alcohol abuse, reports he quit about 4 months ago. Recurrent emesis after stating diet, again NPO . NJ tube placed on 3/25/2021 and postpyloric tube feedings  Initiated. Plan was to advance tube feedings as tolerated to goal rate and and discharged home with continued tube feedings. Patient however had significant emesis yesterday ( 3/26) . He did not tolerate the tube feedings , started having diarrhea. During one of his emesis, the NJ tube came out       IV access issues also today      Subjective:  Patient seen. He feels terrible today. States worsening pain. It is different than prior. Feels short of breath. He is hypotensive, tachycardic. Fluid bolus given. Labs with new REDDY, hyperkalemia, AGMA, severe leukocytosis. EKG: ST    Ordered stat blood cx, ABG, CT abdomen/pelvis, repeat PT/INR as he needs PICC. Added Vanc, Cefepime. Bicarb fluids, insulin, D50  Consulted nephro, pulmonology. There is already gen surg consult and GI involved. Objective:   BP 88/64   Pulse 111   Temp 96 °F (35.6 °C) (Oral)   Resp 20   Ht 6' 1\" (1.854 m)   Wt 283 lb 9.6 oz (128.6 kg)   SpO2 97%   BMI 37.42 kg/m²       Intake/Output Summary (Last 24 hours) at 3/28/2021 6013  Last data filed at 3/28/2021 6779  Gross per 24 hour   Intake 1005 ml   Output --   Net 1005 ml       Physical Exam:  General: young male, obese. Very ill-appearing, he is in mild distress. Mucous Membranes:  Pink, anicteric  Neck: No JVD, no carotid bruit, no thyromegaly  Chest:  + accessory muscle use, tachypnea. Clear to auscultation bilaterally, no added sounds  Cardiovascular:  Tachycardic rate, regular rhythm. S1S2 heard, no murmurs or gallops  Abdomen: Soft, tender, non-distended,  no organomegaly, BS present  Extremities: No edema or cyanosis.  Distal pulses well felt  Neurological : (H) 03/27/2021    PROTIME 14.6 (H) 10/04/2019    PROTIME 18.8 (H) 03/22/2019     Cultures:  Blood: pending    Radiology    IR GI TUBE W FLUOROSCOPY   Final Result   Successful placement of nasal jejunal tube as discussed. CT ABDOMEN PELVIS W IV CONTRAST Additional Contrast? None   Final Result   There is complete thrombosis of the portal vein. Thrombus is also seen   extending into the cephalad portion of the superior mesenteric vein and   throughout the splenic vein. Peripancreatic stranding consistent with pancreatitis. There is a small area   of low-attenuation in the region the pancreatic head which may correspond to   focal dilated pancreatic duct or a small developing fluid collection. Hepatic steatosis. The findings were sent to the Radiology Results [address] at 3:41   pm on 3/22/2021to be communicated to a licensed caregiver. MRI ABDOMEN WO CONTRAST MRCP   Final Result   *Normal caliber bile ducts without evidence of choledocholithiasis. *Hepatomegaly with severe fatty infiltration as described. *Mildly atrophic pancreas with ductal dilatation as measured and described   above, findings likely sequela of chronic pancreatitis. *Large amount of gallbladder sludge. US GALLBLADDER RUQ   Final Result   1. Gallbladder sludge with nonspecific wall thickening. 2. Abnormal common duct dilatation. MRCP and/or ERCP would be options to   further evaluate. IR PICC WO SQ PORT/PUMP > 5 YEARS    (Results Pending)   CT ABDOMEN PELVIS W IV CONTRAST Additional Contrast? None    (Results Pending)         ->     LINDY Gallegos have reviewed the chart on [id number] and personally interviewed and examined patient, reviewed the data (labs and imaging) and after discussion with my PA formulated the plan. Agree with note with the following edits. HPI:   Patient is worse today he is tachypneic, he is hyperventilating. Fadumo Clark  He still awake complains of worsening abdominal pain. Increasing leukocytosis white count up to 38,000,  he is acidotic and renal failure. Transfer to ICU  I reviewed the patient's Past Medical History, Past Surgical History, Medications, and Allergies. Physical exam:    BP 88/64   Pulse 111   Temp 96 °F (35.6 °C) (Oral)   Resp 20   Ht 6' 1\" (1.854 m)   Wt 283 lb 9.6 oz (128.6 kg)   SpO2 97%   BMI 37.42 kg/m²   Gen: Mild distress. Alert. Awake and oriented. He is dyspneic and tachypneic. He appears very ill  Eyes: PERRL. No sclera icterus. No conjunctival injection. ENT: No discharge. Pharynx clear. Neck: Trachea midline. Normal thyroid. Resp: ++ accessory muscle use. Hyperventilating, dyspneic and tachypneic.    no crackles. No wheezes. No rhonchi. No dullness on percussion. CV: Regular rate. Regular rhythm. No murmur or rub. No edema. GI: Diffusely tender. Non-distended. No masses. No organomegaly. Normal bowel sounds. No hernia. Skin: Warm and dry. No nodule on exposed extremities. No rash on exposed extremities. Lymph: No cervical LAD. No supraclavicular LAD. M/S: No cyanosis. No joint deformity. No clubbing. Neuro: Awake. Reflexes 2+ symmetric bilaterally. Moves all 4 extremities, non focal  Psych: Oriented x 3. Electronically signed by Adora Gilford , MD on 3/28/2021 9:53 AM     Assessment & Plan:      Abdominal pain   - due to acute pancreatitis   -History of alcohol use   - admitted to med-surg.   - NPO, IVF's. - pain control: morphine/Dilaudid prn  - antiemetics prn  - pain came back with starting diet. - kept NPO longer   - resumed  full liquids on 3/23- >still not tolerating   - GI involved. --> persistent abd pain. GI started post-pyloric tube feedings on 3/25. He did not tolerate advancing tube feedings. He had emesis.   And NJ tube has come out  Worsening abdominal pain   - I will increase dose of Dilaudid to 1 mg every 3 hours as needed  Keep n.p.o.; hydrate with IVF  - GI to pancreatitis due to alcohol abuse. DVT Prophylaxis: lovenox  Diet: Diet NPO Effective Now  Code Status: Full Code      Total time today 45 minutes. > 50 % time dominated by coordination of care. Skippy Lat  FNP-C  3/28/2021       Patient seen and examined. Agree with plan of care as per my nurse practitioner. I have reviewed all the orders. . Also discussed with intensivist.     He is hyperventilating, ABG shows respiratory acidosis, compensating for his metabolic acidosis. He is in acute renal failure, with metabolic acidosis, lactic acidosis, and hyperkalemia. Concern for thrombosis/infarction of the kidney. Nephrology consulted might need immediate dialysis. I have started the patient on a bicarb drip. SIRS/possible sepsis. Getting IV fluid bolus, might need pressors. We will start empiric antibiotics. Hyperkalemia. Hold all oral potassium supplements and ACE inhibitor's . Insulin and D50 and Kayexalate ordered. . Monitor renal function closely. .  Portal vein thrombosis. Eliquis will switch him to heparin drip. ..     . Total time today 45 minutes. > 50 % time dominated by coordination of care.     Electronically signed by Oly Martinez MD on 3/28/2021 9:53 AM

## 2021-03-28 NOTE — CONSULTS
[facility]    Consult Note           Reason for Consult: REDDY, hyperkalemia, metabolic acidosis  Requesting Physician:  Dr. Tricia Virk    Chief Complaint:  No chief complaint on file. History of Present Illness on 3/28/2021:    41 y.o. yo male with PMH of heroin and alcohol abuse, hypertension, history of ileostomy who is admitted for acute pancreatitis on 3/13/2021  Patient presented then with complaints of nausea vomiting abdominal pain 2 weeks prior to admission. He had been in medical surgical floor since then with pain control and IV fluid for pancreatitis. Patient had portal vein thrombosis noted on a CT scan of abdomen pelvis with IV contrast done on 3/22  On 3/26 nasojejunal tube was placed and he was started on postpyloric feeding. He had significant nausea vomiting and pulled his NG tube out on 3/28. He also had diarrhea on 3/27  Patient was on D5 half-normal saline with 20 of potassium chloride which was stopped on 3/25. No documentation of IV fluid on 3/26. On 3/27 saline was started at 100 mL/h but according to the nurses due to poor IV access it had not been continued as planned  Patient's blood pressure dropped to high 90s couple of times on 3/27. He also has been tachycardic in the 90s to 100 throughout his stay with occasionally going up to 140s 150s in the last 48 hours.     Past Medical History:        Diagnosis Date    Arthritis     Diverticulitis     Heroin abuse ([facility] [address] 2013    Pt has been clean for OVER 8 hears last used 2013    Hypertension     Ileostomy in place [facility]) 11/27/2019    Knee instability     per pt, his knees pop out at times, bilateral        Past Surgical History:        Procedure Laterality Date    ABDOMEN SURGERY  10/08/2019    sigmoid colectomy with ostomy    OTHER SURGICAL HISTORY  11/27/2019    ileostomy reversal, on Q pain buster insertion    SMALL INTESTINE SURGERY N/A 10/8/2019    SIGMOID COLECTOMY, OSTOMY performed by Erin Choi Dany Rodriges MD at [facility] N/A 11/27/2019    ILEOSTOMY REVERSAL, ON-Q PAIN BUSTER INSERTION performed by Vivi Del Valle MD at [address] Medications:    No current facility-administered medications on file prior to encounter. Current Outpatient Medications on File Prior to Encounter   Medication Sig Dispense Refill    citalopram (CELEXA) 10 MG tablet Take by mouth daily      lisinopril (PRINIVIL;ZESTRIL) 10 MG tablet Take by mouth 2 times daily      cloNIDine (CATAPRES) 0.1 MG tablet Take by mouth 2 times daily      amLODIPine (NORVASC) 5 MG tablet Take by mouth 2 times daily      dicyclomine (BENTYL) 10 MG capsule Take by mouth 2 times daily      DICLOFENAC PO Take by mouth 2 times daily      clonazePAM (KLONOPIN) 0.5 MG tablet Take 0.5 mg by mouth 2 times daily as needed. Allergies:  Tramadol    Social History:    Social History     Socioeconomic History    Marital status:      Spouse name: Not on file    Number of children: Not on file    Years of education: HS education    Highest education level: Not on file   Occupational History    Occupation: unemployed   Social Needs    Financial resource strain: Not on file    Food insecurity     Worry: Not on file     Inability: Not on file   [employer] needs     Medical: Not on file     Non-medical: Not on file   Tobacco Use    Smoking status: Current Every Day Smoker     Packs/day: 0.50     Years: 28.00     Pack years: 14.00     Types: Cigarettes    Smokeless tobacco: Never Used   Substance and Sexual Activity    Alcohol use:  Yes     Alcohol/week: 6.0 standard drinks     Types: 6 Shots of liquor per week     Comment: once or twice a month    Drug use: No    Sexual activity: Yes     Partners: Female   Lifestyle    Physical activity     Days per week: Not on file     Minutes per session: Not on file    Stress: Not on file   Relationships    Social connections     Talks on phone: Not on file     Gets together: Not on file     Attends [employer] service: Not on file     Active member of club or organization: Not on file     Attends meetings of clubs or organizations: Not on file     Relationship status: Not on file    Intimate partner violence     Fear of current or ex partner: Not on file     Emotionally abused: Not on file     Physically abused: Not on file     Forced sexual activity: Not on file   Other Topics Concern    Not on file   Social History Narrative    Not on file       Family History:   History reviewed. No pertinent family history. Review of Systems:   Pertinent positives stated above in HPI. All other 10 systems were reviewed and were negative.      Physical exam:   Constitutional:  VITALS:  BP (!) 87/41   Pulse 118   Temp 97.9 °F (36.6 °C) (Axillary)   Resp 19   Ht 6' 1\" (1.854 m)   Wt 283 lb 9.6 oz (128.6 kg)   SpO2 (!) 84%   BMI 37.42 kg/m²   Gen: alert, awake, nad  Skin: no rash, turgor wnl  Heent:  eomi, mmm  Neck: no bruits or jvd noted, thyroid normal  Cardiovascular:  S1, S2 without m/r/g; no lower extremity edema  Respiratory: Diminished  Abdomen:  +bs, soft, nt, nd, no hepatosplenomegaly  Neuro/Psy: AAoriented times 2- 3 ; moves all 4 ext  Musculoskeletal:  Rom, muscular strength intact; digits, nails normal    Data/  Recent Labs     03/26/21  0550 03/28/21  0521 03/28/21  1319   WBC 11.3* 38.6* 30.1*   HGB 9.1* 9.3* 8.4*   HCT 27.3* 29.8* 26.4*   .4* 105.7* 106.4*    560* 479*     Recent Labs     03/26/21  0550 03/28/21  0521 03/28/21  0955 03/28/21  1319   * 129* 129* 131*   K 4.6 6.9* 6.4* 4.9   CL 95* 95* 91* 96*   CO2 19* 8* 11* 11*   GLUCOSE 112* 90  --  189*   PHOS  --   --   --  4.7   BUN 4* 23* 23* 24*   CREATININE <0.5* 2.9* 2.9* 2.8*   LABGLOM >60 24* 24* 25*   GFRAA >60 29* 29* 30*     CT scan of abdomen pelvis with IV contrast from 3/22  Impression   There is complete thrombosis of the portal vein.  Thrombus is also seen extending into the cephalad portion of the superior mesenteric vein and   throughout the splenic vein.       Peripancreatic stranding consistent with pancreatitis. Michelle Callander is a small area   of low-attenuation in the region the pancreatic head which may correspond to   focal dilated pancreatic duct or a small developing fluid collection.       Hepatic steatosis. CT scan of abdomen pelvis without contrast on 3/28  FINDINGS:   Lower Chest: Calcified granuloma are noted in the lung bases.       Organs: There is extensive fatty infiltration of the liver, with areas of   relative sparing.       Peripancreatic fat stranding is present.  A parenchymal calcification is   noted in the pancreatic head.  No peripancreatic fluid collection is   identified.       The spleen and adrenal glands are unremarkable.  There are nonobstructing   calculi in the kidneys, measuring up to 4 mm on the right and 2 mm on the   left.       GI/Bowel: There is a small hiatal hernia.  No dilated loops of bowel are   identified. Michelle Callander is a colo colonic anastomosis in the sigmoid colon, which   is unremarkable.  Appendix is normal.       Pelvis:  The bladder and prostate gland are unremarkable. Michelle Callander is a small   amount of free fluid in the recto vesicular space.       Peritoneum/Retroperitoneum: There is mild aortoiliac calcification, without   aneurysm.  No adenopathy is seen.       Bones/Soft Tissues: No acute osseous injury is appreciated.       There are midline periumbilical hernias with ostia of 1.6 and 1.2 cm   respectively, with small bowel content. Michelle Callander is no incarceration or area of   transition.  Adjacently, there is a right periumbilical hernia, with ostium   of 2.8 cm and fat content.  Several small fat containing hernias are also   noted in the supraumbilical region.           Impression   Findings consistent with acute on chronic pancreatitis.       Small amount of free fluid in the pelvis.  No pseudocyst is identified.     Extensive fatty infiltration of the liver.       Nonobstructive bilateral nephrolithiasis.  Punctate caliceal stones are noted   bilaterally.       Multiple small ventral hernias.  2 hernias in the periumbilical midline   contain small bowel.  No incarceration is evident. Assessment  -REDDY in the setting of nausea vomiting diarrhea leading to volume depletion, hypotension and also third spacing from SIRS related to pancreatitis. -Hyperkalemia from REDDY and metabolic acidosis   Improved with medical management with IV insulin, D50 and bicarb    -Metabolic acidosis, predominantly lactic acidosis from hypoperfusion and liver failure    -Acute on chronic pancreatitis from biliary sludge    -Possible sepsis with elevated leukocytosis    -Portal vein thrombosis, superior mesenteric vein thrombosis   Elevated INR, also on heparin drip    Plan  -Status post 2 L of normal saline bolus this morning   -additional 1 L normal saline bolus; as needed boluses to keep CVP around 10  -Further boluses can be given up to 2 L after above to maintain CVP and hemodynamic parameters as below  -Vasopressors as needed  -Maintain hemodynamics: Keep MAP>65 CVP 10  SBP>100   -MIVF D5W with 150 M EQ per liter of sod bicarb @150 an hour  -Lokelma 10 g after OG tube is placed  -Urine analysis, urine na, cr  -RRT: High risk but not acutely indicated  -Serial renal panel  -Daily wts and strict i/o  -Renal dose meds and avoid nephrotoxins    40 min of critical care time used reviewing the chart, and managing/coordinating the care of this patient. Thank you for the consultation. Please do not hesitate to call with questions.     Yazmin Carreno  [facility]  Office: [phone]  Fax:    [phone]

## 2021-03-28 NOTE — PROGRESS NOTES
Patient arrived to the ICU from [place] [address] with lower than baseline blood pressures and decline in mentation. Dorian Landau is at bedside helping with insertion of an IV by ultrasound along with labs that have been ordered. Lab has been having difficulty with lab draws. Pt is alert/oriented, with slow but correct responses. He continues to moan in pain and jerk at times. Nephrology paged and surgery Dr. Zeeshan Varnre called to bedside for assessment.

## 2021-03-28 NOTE — CONSULTS
Pharmacy Note  Vancomycin Consult    Ashlee Edmonds is a 41 y.o. male started on Vancomycin for sepsis; consult received from BENI Mao CNP to manage therapy. Also receiving the following antibiotics: Cefepime IV. Patient Active Problem List   Diagnosis    Mild protein-calorie malnutrition (HCC)    Recurrent dislocation of patella    Primary osteoarthritis of both knees    Primary localized osteoarthritis of knees, bilateral    Acute pancreatitis    Hypertension    Elevated liver enzymes    Obesity (BMI 35.0-39.9 without comorbidity)    Acute cholecystitis    Alcohol-induced chronic pancreatitis (HCC)    Abdominal pain, epigastric    Abnormal LFTs    Portal vein thrombosis    Severe malnutrition (HCC)     Allergies:  Tramadol     Temp max: 97.6    Recent Labs     03/26/21  0550 03/28/21  0521   BUN 4* 23*   CREATININE <0.5* 2.9*   WBC 11.3* 38.6*       Intake/Output Summary (Last 24 hours) at 3/28/2021 0956  Last data filed at 3/28/2021 0527  Gross per 24 hour   Intake 1005 ml   Output --   Net 1005 ml         Ht Readings from Last 1 Encounters:   03/19/21 6' 1\" (1.854 m)        Wt Readings from Last 1 Encounters:   03/22/21 283 lb 9.6 oz (128.6 kg)       Body mass index is 37.42 kg/m². IBW CrCl = 37.5 ml/min    Assessment/Plan:  Based on patient's age, weight, and renal function will give vancomycin 1500 mg IVPB x one dose today. Will draw trough/random level tomorrow morning at 0600. Will pulse dose vancomycin based on random level result until renal function improves. Pharmacy will continue to monitor and adjust as necessary. Thank you for the consult. Will continue to follow.

## 2021-03-29 ENCOUNTER — APPOINTMENT (OUTPATIENT)
Dept: GENERAL RADIOLOGY | Age: 41
DRG: 282 | End: 2021-03-29
Attending: HOSPITALIST
Payer: MEDICAID

## 2021-03-29 LAB
ABO/RH: NORMAL
ALBUMIN SERPL-MCNC: 1.9 G/DL (ref 3.4–5)
ALP BLD-CCNC: 173 U/L (ref 40–129)
ALT SERPL-CCNC: 276 U/L (ref 10–40)
ANION GAP SERPL CALCULATED.3IONS-SCNC: 8 MMOL/L (ref 3–16)
ANISOCYTOSIS: ABNORMAL
ANTIBODY SCREEN: NORMAL
APTT: 69 SEC (ref 24.2–36.2)
APTT: 71.9 SEC (ref 24.2–36.2)
AST SERPL-CCNC: 739 U/L (ref 15–37)
BANDED NEUTROPHILS RELATIVE PERCENT: 7 % (ref 0–7)
BASE EXCESS ARTERIAL: 1.3 MMOL/L (ref -3–3)
BASOPHILS ABSOLUTE: 0 K/UL (ref 0–0.2)
BASOPHILS RELATIVE PERCENT: 0 %
BILIRUB SERPL-MCNC: 4.1 MG/DL (ref 0–1)
BILIRUBIN DIRECT: 3.5 MG/DL (ref 0–0.3)
BILIRUBIN, INDIRECT: 0.6 MG/DL (ref 0–1)
BLOOD BANK DISPENSE STATUS: NORMAL
BLOOD BANK PRODUCT CODE: NORMAL
BPU ID: NORMAL
BUN BLDV-MCNC: 24 MG/DL (ref 7–20)
C DIFF TOXIN/ANTIGEN: NORMAL
CALCIUM SERPL-MCNC: 7.4 MG/DL (ref 8.3–10.6)
CARBOXYHEMOGLOBIN ARTERIAL: 0.1 % (ref 0–1.5)
CHLORIDE BLD-SCNC: 98 MMOL/L (ref 99–110)
CO2: 26 MMOL/L (ref 21–32)
CREAT SERPL-MCNC: 1.2 MG/DL (ref 0.9–1.3)
DESCRIPTION BLOOD BANK: NORMAL
EOSINOPHILS ABSOLUTE: 0.3 K/UL (ref 0–0.6)
EOSINOPHILS RELATIVE PERCENT: 1 %
GFR AFRICAN AMERICAN: >60
GFR NON-AFRICAN AMERICAN: >60
GLUCOSE BLD-MCNC: 149 MG/DL (ref 70–99)
GLUCOSE BLD-MCNC: 156 MG/DL (ref 70–99)
GLUCOSE BLD-MCNC: 202 MG/DL (ref 70–99)
GLUCOSE BLD-MCNC: 220 MG/DL (ref 70–99)
GLUCOSE BLD-MCNC: 235 MG/DL (ref 70–99)
GLUCOSE BLD-MCNC: 241 MG/DL (ref 70–99)
GLUCOSE BLD-MCNC: 270 MG/DL (ref 70–99)
HCO3 ARTERIAL: 25 MMOL/L (ref 21–29)
HCT VFR BLD CALC: 20.8 % (ref 40.5–52.5)
HCT VFR BLD CALC: 24 % (ref 40.5–52.5)
HEMOGLOBIN, ART, EXTENDED: 7.7 G/DL (ref 13.5–17.5)
HEMOGLOBIN: 6.7 G/DL (ref 13.5–17.5)
HEMOGLOBIN: 7.9 G/DL (ref 13.5–17.5)
HYPOCHROMIA: ABNORMAL
LACTIC ACID: 3.2 MMOL/L (ref 0.4–2)
LACTIC ACID: 3.2 MMOL/L (ref 0.4–2)
LACTIC ACID: 3.3 MMOL/L (ref 0.4–2)
LACTIC ACID: 3.5 MMOL/L (ref 0.4–2)
LACTIC ACID: 3.7 MMOL/L (ref 0.4–2)
LACTIC ACID: 3.7 MMOL/L (ref 0.4–2)
LYMPHOCYTES ABSOLUTE: 1.8 K/UL (ref 1–5.1)
LYMPHOCYTES RELATIVE PERCENT: 6 %
MCH RBC QN AUTO: 32.9 PG (ref 26–34)
MCHC RBC AUTO-ENTMCNC: 32.1 G/DL (ref 31–36)
MCV RBC AUTO: 102.7 FL (ref 80–100)
METHEMOGLOBIN ARTERIAL: 0.3 %
MONOCYTES ABSOLUTE: 0.9 K/UL (ref 0–1.3)
MONOCYTES RELATIVE PERCENT: 3 %
NEUTROPHILS ABSOLUTE: 27.1 K/UL (ref 1.7–7.7)
NEUTROPHILS RELATIVE PERCENT: 83 %
NUCLEATED RED BLOOD CELLS: 3 /100 WBC
O2 CONTENT ARTERIAL: 9 ML/DL
O2 SAT, ARTERIAL: 85.7 %
O2 THERAPY: ABNORMAL
OCCULT BLOOD DIAGNOSTIC: ABNORMAL
PCO2 ARTERIAL: 35 MMHG (ref 35–45)
PDW BLD-RTO: 18.8 % (ref 12.4–15.4)
PERFORMED ON: ABNORMAL
PH ARTERIAL: 7.47 (ref 7.35–7.45)
PLATELET # BLD: 571 K/UL (ref 135–450)
PLATELET SLIDE REVIEW: ABNORMAL
PMV BLD AUTO: 8 FL (ref 5–10.5)
PO2 ARTERIAL: 46.7 MMHG (ref 75–108)
POTASSIUM SERPL-SCNC: 3.5 MMOL/L (ref 3.5–5.1)
RBC # BLD: 2.03 M/UL (ref 4.2–5.9)
REPORT: NORMAL
SLIDE REVIEW: ABNORMAL
SODIUM BLD-SCNC: 132 MMOL/L (ref 136–145)
TCO2 ARTERIAL: 26 MMOL/L
TOTAL PROTEIN: 5.3 G/DL (ref 6.4–8.2)
VANCOMYCIN TROUGH: 6.4 UG/ML (ref 10–20)
WBC # BLD: 30.1 K/UL (ref 4–11)

## 2021-03-29 PROCEDURE — 6370000000 HC RX 637 (ALT 250 FOR IP): Performed by: INTERNAL MEDICINE

## 2021-03-29 PROCEDURE — 83605 ASSAY OF LACTIC ACID: CPT

## 2021-03-29 PROCEDURE — 36415 COLL VENOUS BLD VENIPUNCTURE: CPT

## 2021-03-29 PROCEDURE — 6370000000 HC RX 637 (ALT 250 FOR IP): Performed by: PHYSICIAN ASSISTANT

## 2021-03-29 PROCEDURE — 2580000003 HC RX 258: Performed by: NURSE PRACTITIONER

## 2021-03-29 PROCEDURE — 87324 CLOSTRIDIUM AG IA: CPT

## 2021-03-29 PROCEDURE — 2700000000 HC OXYGEN THERAPY PER DAY

## 2021-03-29 PROCEDURE — 2500000003 HC RX 250 WO HCPCS: Performed by: INTERNAL MEDICINE

## 2021-03-29 PROCEDURE — 94003 VENT MGMT INPAT SUBQ DAY: CPT

## 2021-03-29 PROCEDURE — 2580000003 HC RX 258: Performed by: INTERNAL MEDICINE

## 2021-03-29 PROCEDURE — 86850 RBC ANTIBODY SCREEN: CPT

## 2021-03-29 PROCEDURE — 36556 INSERT NON-TUNNEL CV CATH: CPT

## 2021-03-29 PROCEDURE — 86900 BLOOD TYPING SEROLOGIC ABO: CPT

## 2021-03-29 PROCEDURE — C9113 INJ PANTOPRAZOLE SODIUM, VIA: HCPCS | Performed by: PHYSICIAN ASSISTANT

## 2021-03-29 PROCEDURE — 36430 TRANSFUSION BLD/BLD COMPNT: CPT

## 2021-03-29 PROCEDURE — 99291 CRITICAL CARE FIRST HOUR: CPT | Performed by: INTERNAL MEDICINE

## 2021-03-29 PROCEDURE — 2580000003 HC RX 258

## 2021-03-29 PROCEDURE — 80048 BASIC METABOLIC PNL TOTAL CA: CPT

## 2021-03-29 PROCEDURE — 71045 X-RAY EXAM CHEST 1 VIEW: CPT

## 2021-03-29 PROCEDURE — 6360000002 HC RX W HCPCS: Performed by: INTERNAL MEDICINE

## 2021-03-29 PROCEDURE — 2000000000 HC ICU R&B

## 2021-03-29 PROCEDURE — 80076 HEPATIC FUNCTION PANEL: CPT

## 2021-03-29 PROCEDURE — 6360000002 HC RX W HCPCS: Performed by: NURSE PRACTITIONER

## 2021-03-29 PROCEDURE — 6360000002 HC RX W HCPCS: Performed by: PHYSICIAN ASSISTANT

## 2021-03-29 PROCEDURE — 94761 N-INVAS EAR/PLS OXIMETRY MLT: CPT

## 2021-03-29 PROCEDURE — 6370000000 HC RX 637 (ALT 250 FOR IP): Performed by: HOSPITALIST

## 2021-03-29 PROCEDURE — 86901 BLOOD TYPING SEROLOGIC RH(D): CPT

## 2021-03-29 PROCEDURE — 85025 COMPLETE CBC W/AUTO DIFF WBC: CPT

## 2021-03-29 PROCEDURE — P9016 RBC LEUKOCYTES REDUCED: HCPCS

## 2021-03-29 PROCEDURE — 36600 WITHDRAWAL OF ARTERIAL BLOOD: CPT

## 2021-03-29 PROCEDURE — 99233 SBSQ HOSP IP/OBS HIGH 50: CPT | Performed by: INTERNAL MEDICINE

## 2021-03-29 PROCEDURE — 2580000003 HC RX 258: Performed by: HOSPITALIST

## 2021-03-29 PROCEDURE — 87449 NOS EACH ORGANISM AG IA: CPT

## 2021-03-29 PROCEDURE — 85730 THROMBOPLASTIN TIME PARTIAL: CPT

## 2021-03-29 PROCEDURE — G0328 FECAL BLOOD SCRN IMMUNOASSAY: HCPCS

## 2021-03-29 PROCEDURE — 99233 SBSQ HOSP IP/OBS HIGH 50: CPT | Performed by: SURGERY

## 2021-03-29 PROCEDURE — 82803 BLOOD GASES ANY COMBINATION: CPT

## 2021-03-29 PROCEDURE — 86923 COMPATIBILITY TEST ELECTRIC: CPT

## 2021-03-29 PROCEDURE — 85018 HEMOGLOBIN: CPT

## 2021-03-29 PROCEDURE — 85014 HEMATOCRIT: CPT

## 2021-03-29 PROCEDURE — 80202 ASSAY OF VANCOMYCIN: CPT

## 2021-03-29 PROCEDURE — 6370000000 HC RX 637 (ALT 250 FOR IP): Performed by: NURSE PRACTITIONER

## 2021-03-29 RX ORDER — MIDAZOLAM HYDROCHLORIDE 1 MG/ML
4 INJECTION INTRAMUSCULAR; INTRAVENOUS
Status: DISCONTINUED | OUTPATIENT
Start: 2021-03-29 | End: 2021-03-30

## 2021-03-29 RX ORDER — SODIUM CHLORIDE 9 MG/ML
INJECTION, SOLUTION INTRAVENOUS PRN
Status: DISCONTINUED | OUTPATIENT
Start: 2021-03-29 | End: 2021-04-05 | Stop reason: HOSPADM

## 2021-03-29 RX ORDER — SODIUM CHLORIDE 9 MG/ML
INJECTION, SOLUTION INTRAVENOUS CONTINUOUS
Status: DISCONTINUED | OUTPATIENT
Start: 2021-03-29 | End: 2021-03-30

## 2021-03-29 RX ORDER — POTASSIUM CHLORIDE 29.8 MG/ML
20 INJECTION INTRAVENOUS ONCE
Status: COMPLETED | OUTPATIENT
Start: 2021-03-29 | End: 2021-03-29

## 2021-03-29 RX ORDER — SODIUM CHLORIDE 9 MG/ML
INJECTION, SOLUTION INTRAVENOUS
Status: COMPLETED
Start: 2021-03-29 | End: 2021-03-29

## 2021-03-29 RX ORDER — 0.9 % SODIUM CHLORIDE 0.9 %
1000 INTRAVENOUS SOLUTION INTRAVENOUS ONCE
Status: COMPLETED | OUTPATIENT
Start: 2021-03-29 | End: 2021-03-29

## 2021-03-29 RX ORDER — SODIUM CHLORIDE 9 MG/ML
INJECTION, SOLUTION INTRAVENOUS
Status: DISPENSED
Start: 2021-03-29 | End: 2021-03-29

## 2021-03-29 RX ADMIN — SUCRALFATE 1 G: 1 TABLET ORAL at 05:56

## 2021-03-29 RX ADMIN — Medication 50 MCG/HR: at 13:53

## 2021-03-29 RX ADMIN — MEROPENEM 1000 MG: 1 INJECTION, POWDER, FOR SOLUTION INTRAVENOUS at 00:45

## 2021-03-29 RX ADMIN — PROPOFOL 50 MCG/KG/MIN: 10 INJECTION, EMULSION INTRAVENOUS at 00:11

## 2021-03-29 RX ADMIN — PROPOFOL 50 MCG/KG/MIN: 10 INJECTION, EMULSION INTRAVENOUS at 03:23

## 2021-03-29 RX ADMIN — Medication 10 ML: at 20:12

## 2021-03-29 RX ADMIN — NYSTATIN 500000 UNITS: 500000 SUSPENSION ORAL at 07:14

## 2021-03-29 RX ADMIN — SODIUM CHLORIDE, PRESERVATIVE FREE 10 ML: 5 INJECTION INTRAVENOUS at 20:14

## 2021-03-29 RX ADMIN — MIDAZOLAM HYDROCHLORIDE 2 MG: 1 INJECTION, SOLUTION INTRAMUSCULAR; INTRAVENOUS at 15:45

## 2021-03-29 RX ADMIN — CITALOPRAM HYDROBROMIDE 10 MG: 20 TABLET ORAL at 07:14

## 2021-03-29 RX ADMIN — POTASSIUM CHLORIDE 20 MEQ: 400 INJECTION, SOLUTION INTRAVENOUS at 11:39

## 2021-03-29 RX ADMIN — HEPARIN SODIUM 17.9 ML/HR: 10000 INJECTION, SOLUTION INTRAVENOUS at 03:20

## 2021-03-29 RX ADMIN — NOREPINEPHRINE BITARTRATE 23 MCG/MIN: 1 INJECTION INTRAVENOUS at 05:54

## 2021-03-29 RX ADMIN — HEPARIN SODIUM 17.9 ML/HR: 10000 INJECTION, SOLUTION INTRAVENOUS at 19:04

## 2021-03-29 RX ADMIN — Medication 15 ML: at 07:14

## 2021-03-29 RX ADMIN — CEFEPIME HYDROCHLORIDE 1000 MG: 1 INJECTION, POWDER, FOR SOLUTION INTRAMUSCULAR; INTRAVENOUS at 20:13

## 2021-03-29 RX ADMIN — Medication 15 ML: at 20:13

## 2021-03-29 RX ADMIN — DICYCLOMINE HYDROCHLORIDE 10 MG: 10 CAPSULE ORAL at 07:14

## 2021-03-29 RX ADMIN — VASOPRESSIN 0.03 UNITS/MIN: 20 INJECTION INTRAVENOUS at 05:55

## 2021-03-29 RX ADMIN — Medication 200 MCG/HR: at 18:35

## 2021-03-29 RX ADMIN — SODIUM BICARBONATE: 84 INJECTION, SOLUTION INTRAVENOUS at 02:56

## 2021-03-29 RX ADMIN — CEFEPIME HYDROCHLORIDE 1000 MG: 1 INJECTION, POWDER, FOR SOLUTION INTRAMUSCULAR; INTRAVENOUS at 08:54

## 2021-03-29 RX ADMIN — MIDAZOLAM HYDROCHLORIDE 4 MG: 1 INJECTION, SOLUTION INTRAMUSCULAR; INTRAVENOUS at 20:54

## 2021-03-29 RX ADMIN — VASOPRESSIN 0.03 UNITS/MIN: 20 INJECTION INTRAVENOUS at 17:43

## 2021-03-29 RX ADMIN — SODIUM CHLORIDE 1000 ML: 9 INJECTION, SOLUTION INTRAVENOUS at 01:57

## 2021-03-29 RX ADMIN — FOLIC ACID 1 MG: 1 TABLET ORAL at 07:14

## 2021-03-29 RX ADMIN — PROPOFOL 50 MCG/KG/MIN: 10 INJECTION, EMULSION INTRAVENOUS at 07:15

## 2021-03-29 RX ADMIN — PANTOPRAZOLE SODIUM 40 MG: 40 INJECTION, POWDER, FOR SOLUTION INTRAVENOUS at 07:14

## 2021-03-29 RX ADMIN — MIDAZOLAM HYDROCHLORIDE 4 MG: 1 INJECTION, SOLUTION INTRAMUSCULAR; INTRAVENOUS at 23:48

## 2021-03-29 RX ADMIN — MIDAZOLAM HYDROCHLORIDE 2 MG/HR: 5 INJECTION, SOLUTION INTRAMUSCULAR; INTRAVENOUS at 14:23

## 2021-03-29 RX ADMIN — PANTOPRAZOLE SODIUM 40 MG: 40 INJECTION, POWDER, FOR SOLUTION INTRAVENOUS at 20:14

## 2021-03-29 RX ADMIN — NYSTATIN 500000 UNITS: 500000 SUSPENSION ORAL at 11:41

## 2021-03-29 RX ADMIN — Medication 1000 ML: at 01:57

## 2021-03-29 RX ADMIN — SODIUM CHLORIDE: 9 INJECTION, SOLUTION INTRAVENOUS at 22:00

## 2021-03-29 RX ADMIN — PROPOFOL 50 MCG/KG/MIN: 10 INJECTION, EMULSION INTRAVENOUS at 05:52

## 2021-03-29 RX ADMIN — INSULIN LISPRO 2 UNITS: 100 INJECTION, SOLUTION INTRAVENOUS; SUBCUTANEOUS at 11:49

## 2021-03-29 ASSESSMENT — PAIN SCALES - GENERAL
PAINLEVEL_OUTOF10: 0
PAINLEVEL_OUTOF10: 0

## 2021-03-29 ASSESSMENT — PULMONARY FUNCTION TESTS
PIF_VALUE: 21
PIF_VALUE: 18

## 2021-03-29 NOTE — PROGRESS NOTES
[facility]    Follow-up  Note           Reason for Consult: REDDY, hyperkalemia, metabolic acidosis  Requesting Physician:  Dr. Masoud Luu history  Patient remains intubated, needed Levophed at 23 mics per minute and vasopressin at 0.04  Renal function has improved  CVP around 12  Received further 2 L of normal saline boluses overnight  Receiving 1 unit of packed red blood cells on 3/29    Last 24 h uop 2.2 L    ROS: Unable to obtain  PSFH: + visitor    Scheduled Meds:   cefepime  1,000 mg Intravenous Q12H    vancomycin (VANCOCIN) intermittent dosing (placeholder)   Other RX Placeholder    calcium gluconate  1,000 mg Intravenous Once    meropenem  1,000 mg Intravenous Q12H    chlorhexidine  15 mL Mouth/Throat BID    lidocaine 1 % injection  5 mL Intradermal Once    sodium chloride flush  10 mL Intravenous 2 times per day    nystatin  5 mL Oral 4x Daily    docusate sodium  100 mg Oral Daily    sucralfate  1 g Oral 4x Daily AC & HS    pantoprazole  40 mg Intravenous BID    folic acid  1 mg Oral Daily    citalopram  10 mg Oral Daily    dicyclomine  10 mg Oral BID    sodium chloride flush  10 mL Intravenous 2 times per day     Continuous Infusions:   sodium chloride      sodium chloride      sodium chloride 100 mL/hr at 03/29/21 1026    dextrose      heparin (PORCINE) Infusion 17.9 mL/hr (03/29/21 0320)    norepinephrine 23 mcg/min (03/29/21 0554)    vasopressin (Septic Shock) infusion 0.03 Units/min (03/29/21 0555)    propofol 50 mcg/kg/min (03/29/21 0715)    phenylephrine (HENNA-SYNEPHRINE) 50mg/250mL infusion Stopped (03/28/21 2222)     PRN Meds:.sodium chloride, glucose, dextrose, glucagon (rDNA), dextrose, heparin (porcine), heparin (porcine), fentanNYL, midazolam, sodium chloride flush, HYDROmorphone, phenol, oxyCODONE-acetaminophen **OR** oxyCODONE-acetaminophen, clonazePAM, sodium chloride flush, [DISCONTINUED] promethazine **OR** ondansetron, polyethylene glycol, acetaminophen **OR** acetaminophen, promethazine    History of Present Illness on 3/28/2021:    41 y.o. yo male with PMH of heroin and alcohol abuse, hypertension, history of ileostomy who is admitted for acute pancreatitis on 3/13/2021  Patient presented then with complaints of nausea vomiting abdominal pain 2 weeks prior to admission. He had been in medical surgical floor since then with pain control and IV fluid for pancreatitis. Patient had portal vein thrombosis noted on a CT scan of abdomen pelvis with IV contrast done on 3/22  On 3/26 nasojejunal tube was placed and he was started on postpyloric feeding. He had significant nausea vomiting and pulled his NG tube out on 3/28. He also had diarrhea on 3/27  Patient was on D5 half-normal saline with 20 of potassium chloride which was stopped on 3/25. No documentation of IV fluid on 3/26. On 3/27 saline was started at 100 mL/h but according to the nurses due to poor IV access it had not been continued as planned  Patient's blood pressure dropped to high 90s couple of times on 3/27. He also has been tachycardic in the 90s to 100 throughout his stay with occasionally going up to 140s 150s in the last 48 hours. Physical exam:   Constitutional:  VITALS:  BP (!) 102/58   Pulse 91   Temp 99.1 °F (37.3 °C) (Axillary)   Resp 30   Ht 6' 1\" (1.854 m)   Wt 283 lb 9.6 oz (128.6 kg)   SpO2 99%   BMI 37.42 kg/m²   Gen:  Intubated  Heent: ET tube in situ  Neck: no bruits or jvd noted, thyroid normal  Cardiovascular:  S1, S2 without m/r/g; no lower extremity edema  Respiratory: Diminished  Abdomen:  +bs, soft, nt, nd, no hepatosplenomegaly  Neuro/Psy: Sedated    Data/  Recent Labs     03/28/21  0521 03/28/21  1319 03/29/21  0605   WBC 38.6* 30.1* 30.1*   HGB 9.3* 8.4* 6.7*   HCT 29.8* 26.4* 20.8*   .7* 106.4* 102.7*   * 479* 571*     Recent Labs     03/28/21  0521 03/28/21  0955 03/28/21  1319 03/29/21  0605   * 129* 131* 132*   K 6.9* 6. 4* 4.9 3.5   CL 95* 91* 96* 98*   CO2 8* 11* 11* 26   GLUCOSE 90  --  189* 270*   PHOS  --   --  4.7  --    BUN 23* 23* 24* 24*   CREATININE 2.9* 2.9* 2.8* 1.2   LABGLOM 24* 24* 25* >60   GFRAA 29* 29* 30* >60     CT scan of abdomen pelvis with IV contrast from 3/22  Impression   There is complete thrombosis of the portal vein.  Thrombus is also seen   extending into the cephalad portion of the superior mesenteric vein and   throughout the splenic vein.       Peripancreatic stranding consistent with pancreatitis. Sydelle Piggs is a small area   of low-attenuation in the region the pancreatic head which may correspond to   focal dilated pancreatic duct or a small developing fluid collection.       Hepatic steatosis. CT scan of abdomen pelvis without contrast on 3/28  FINDINGS:   Lower Chest: Calcified granuloma are noted in the lung bases.       Organs: There is extensive fatty infiltration of the liver, with areas of   relative sparing.       Peripancreatic fat stranding is present.  A parenchymal calcification is   noted in the pancreatic head.  No peripancreatic fluid collection is   identified.       The spleen and adrenal glands are unremarkable.  There are nonobstructing   calculi in the kidneys, measuring up to 4 mm on the right and 2 mm on the   left.       GI/Bowel: There is a small hiatal hernia.  No dilated loops of bowel are   identified. Sydelle Piggs is a colo colonic anastomosis in the sigmoid colon, which   is unremarkable.  Appendix is normal.       Pelvis:  The bladder and prostate gland are unremarkable. Sydelle Piggs is a small   amount of free fluid in the recto vesicular space.       Peritoneum/Retroperitoneum: There is mild aortoiliac calcification, without   aneurysm.  No adenopathy is seen.       Bones/Soft Tissues: No acute osseous injury is appreciated.       There are midline periumbilical hernias with ostia of 1.6 and 1.2 cm   respectively, with small bowel content. Sydelle Piggs is no incarceration or area of

## 2021-03-29 NOTE — PROGRESS NOTES
Pharmacy - RE:  High-dose Heparin drip  Current rate = 17.9 ml/h  (1790 units/h)  aPTT drawn @ 0158 on 3/29 = 71.9 sec. Goal aPTT = 49 - 76 sec. Per protocol, Continue current rate of infusion and obtain another aPTT at 1400 on 3/29/21.    Vinny Crowder [facility]

## 2021-03-29 NOTE — PROGRESS NOTES
4 Eyes Skin Assessment     The patient is being assess for   Shift Handoff    I agree that 2 RN's have performed a thorough Head to Toe Skin Assessment on the patient. ALL assessment sites listed below have been assessed. Areas assessed by both nurses:   [x]   Head, Face, and Ears   [x]   Shoulders, Back, and Chest, Abdomen  [x]   Arms, Elbows, and Hands   [x]   Coccyx, Sacrum, and Ischium  [x]   Legs, Feet, and Heels        Old surgical abdominal scars, negative skin assessment, generalized upper/lower edema    **SHARE this note so that the co-signing nurse is able to place an eSignature**    Co-signer eSignature: Electronically signed by Jacinda Ray RN on 3/29/21 at 5:10 PM EDT    Does the Patient have Skin Breakdown?   No          Sebastian Prevention initiated:  Yes   Wound Care Orders initiated:  No      WOC nurse consulted for Pressure Injury (Stage 3,4, Unstageable, DTI, NWPT, Complex wounds)and New or Established Ostomies:  No      Primary Nurse eSignature: Electronically signed by Terrell Childress RN on 3/29/21 at 4:08 PM EDT

## 2021-03-29 NOTE — PROGRESS NOTES
03/29/21 0315   Vent Information   Vent Type 980   Vent Mode AC/VC   Vt Ordered 480 mL   Rate Set 30 bmp   Peak Flow 70 L/min   FiO2  30 %   SpO2 100 %   SpO2/FiO2 ratio 333.33   Sensitivity 3   PEEP/CPAP 5   Vent Patient Data   Peak Inspiratory Pressure 20 cmH2O   Mean Airway Pressure 11 cmH20   Rate Measured 31 br/min   Vt Exhaled 572 mL   Minute Volume 13.8 Liters   I:E Ratio 1:1.7   Plateau Pressure 15 KFH64   Cough/Sputum   Sputum How Obtained Suctioned;Endotracheal;Oral   Cough Productive   Sputum Amount Moderate   Sputum Color Creamy   Tenacity Thick   Spontaneous Breathing Trial (SBT) RT Doc   Pulse 95   Breath Sounds   Right Upper Lobe Diminished   Right Middle Lobe Diminished   Right Lower Lobe Diminished   Left Upper Lobe Diminished   Left Lower Lobe Diminished   Additional Respiratory  Assessments   Resp 30   Position Semi-Barry's   Oral Care Completed? Yes   Oral Care Mouth suctioned   Subglottic Suction Done?  Yes   Alarm Settings   High Pressure Alarm 50 cmH2O   Low Minute Volume Alarm 5.5 L/min   Apnea (secs) 20 secs   High Respiratory Rate 40 br/min   Low Exhaled Vt  300 mL   ETT (adult)   Placement Date/Time: 03/28/21 1400   Timeout: Patient;Procedure;Site/Side  Preoxygenation: Yes  Tube Size: 8 mm  Location: Oral   Secured at 25 cm   Measured From Lips   ET Placement Right   Secured By Commercial tube watters   Site Condition Dry

## 2021-03-29 NOTE — PROGRESS NOTES
Shift assessment is complete. ET/OG are intact. OG is clamped. LIJ central line dry/intact with propofol 50 mcg, levophed 23 mcg, and vasopressin. RASS -2. Patient is having loose/watery stools, rectal tube was inserted during the night. Valenzuela intact and draining. Urine output improved. Bilateral upper/lower generalized edema. VSS, telemetry SR. Bilateral soft wrist restraints continue for safety of airway.

## 2021-03-29 NOTE — CONSULTS
cyanosis. No joint deformity. No clubbing. Neuro: Sedated, not following commands.  Patellar reflexes are symmetric  Psych: Unable to obtain because the patient is non-communicative      Scheduled Meds:   cefepime  1,000 mg Intravenous Q12H    vancomycin (VANCOCIN) intermittent dosing (placeholder)   Other RX Placeholder    calcium gluconate  1,000 mg Intravenous Once    meropenem  1,000 mg Intravenous Q12H    chlorhexidine  15 mL Mouth/Throat BID    lidocaine 1 % injection  5 mL Intradermal Once    sodium chloride flush  10 mL Intravenous 2 times per day    nystatin  5 mL Oral 4x Daily    docusate sodium  100 mg Oral Daily    sucralfate  1 g Oral 4x Daily AC & HS    pantoprazole  40 mg Intravenous BID    folic acid  1 mg Oral Daily    citalopram  10 mg Oral Daily    dicyclomine  10 mg Oral BID    sodium chloride flush  10 mL Intravenous 2 times per day     PRN Meds:  sodium chloride, glucose, dextrose, glucagon (rDNA), dextrose, heparin (porcine), heparin (porcine), fentanNYL, midazolam, sodium chloride flush, HYDROmorphone, phenol, oxyCODONE-acetaminophen **OR** oxyCODONE-acetaminophen, clonazePAM, sodium chloride flush, [DISCONTINUED] promethazine **OR** ondansetron, polyethylene glycol, acetaminophen **OR** acetaminophen, promethazine    Results:  CBC:   Recent Labs     03/28/21  0521 03/28/21  1319 03/29/21  0605   WBC 38.6* 30.1* 30.1*   HGB 9.3* 8.4* 6.7*   HCT 29.8* 26.4* 20.8*   .7* 106.4* 102.7*   * 479* 571*     BMP:   Recent Labs     03/28/21  0955 03/28/21  1319 03/29/21  0605   * 131* 132*   K 6.4* 4.9 3.5   CL 91* 96* 98*   CO2 11* 11* 26   PHOS  --  4.7  --    BUN 23* 24* 24*   CREATININE 2.9* 2.8* 1.2     LIVER PROFILE:   Recent Labs     03/28/21  0955 03/29/21  0605   AST 1,119* 739*   * 276*   BILIDIR 5.5* 3.5*   BILITOT 7.3* 4.1*   ALKPHOS 215* 173*       Cultures:  3/28/2021 blood Klebsiella    Films:  3/28/2021 CXR interstitial edema    ASSESSMENT:  · Gram-negative bacteremia   · Septic shock  · Lactic acidosis  · H/O partial colectomy  · Acute kidney failure   · Alcohol abuse  · Recurrent pancreatitis  · Remote h/o heroin abuse  · Recent admission for pancreatitis, [facility] MRCP  · Portal vein thrombosis with extension to the SMV and splenic veins on 3/20/2021  · Pulmonary nodules    PLAN:  Mechanical ventilation as per my orders. The ventilator was adjusted by me at the bedside for unstable, life threatening respiratory failure. IV Propofol for sedation, target RASS -2, with daily spontaneous awakening trial.  Fentanyl PRN pain, gtt as needed --changed to Versed and fentanyl given h/o pancreatitis  Head of bed 30 degrees or higher at all times  · Daily spontaneous breathing trial    Broad spectrum antibiotics to include cefepime and vancomycin, can DC vancomycin    IV levophed and vasopressin to maintain MAP of 65  Blood sugar control, with goal 140-180    · Eventually will require follow-up imaging  · Spoke with his mom at the bedside today ([place] in dietary)       Total critical care time caring for this patient with life threatening, unstable organ failure, including direct patient contact, management of life support systems, review of data including imaging and labs, discussions with other team members and physicians is 35 minutes so far today, excluding procedures.

## 2021-03-29 NOTE — PROGRESS NOTES
03/29/21 1924   Vent Information   $Ventilation $Subsequent Day   Vent Type 980   Vent Mode AC/VC   Vt Ordered 500 mL   Rate Set 18 bmp   Peak Flow 60 L/min   FiO2  30 %   SpO2 98 %   SpO2/FiO2 ratio 326.67   Sensitivity 3   PEEP/CPAP 5   Humidification Source Heated wire   Humidification Temp 37   Humidification Temp Measured 37   Vent Patient Data   Peak Inspiratory Pressure 21 cmH2O   Mean Airway Pressure 8.7 cmH20   Rate Measured 18 br/min   Vt Exhaled 557 mL   Minute Volume 10.1 Liters   I:E Ratio 1:2.7   Plateau Pressure 14 [id number]   Cough/Sputum   Sputum How Obtained Suctioned;Endotracheal   Cough Productive   Sputum Amount Moderate   Sputum Color Creamy   Tenacity Thick   Spontaneous Breathing Trial (SBT) RT Doc   Pulse 92   Breath Sounds   Right Upper Lobe Diminished   Right Middle Lobe Diminished   Right Lower Lobe Diminished   Left Upper Lobe Diminished   Left Lower Lobe Diminished   Additional Respiratory  Assessments   Resp 18   Position Semi-Barry's   Oral Care Completed? Yes   Oral Care Mouth suctioned   Subglottic Suction Done?  Yes   Alarm Settings   High Pressure Alarm 50 cmH2O   Low Minute Volume Alarm 5.5 L/min   Apnea (secs) 20 secs   High Respiratory Rate 40 br/min   Low Exhaled Vt  300 mL   ETT (adult)   Placement Date/Time: 03/28/21 1400   Timeout: Patient;Procedure;Site/Side  Preoxygenation: Yes  Tube Size: 8 mm  Location: Oral   Secured at 25 cm   Measured From [address] [place] [address],[address] By [employer] tube watters   Site Condition Dry

## 2021-03-29 NOTE — PROGRESS NOTES
Pharmacy - RE:  High-dose Heparin drip  Current rate = 17.9 ml/h  (1790 units/h)  aPTT drawn @ 2010 on 3/28 = 71.9 sec. Goal aPTT = 49 - 76 sec. Per protocol, Continue current rate of infusion and obtain another aPTT at 0230 on 3/29/21.    Tai Bradley [facility]

## 2021-03-29 NOTE — PROGRESS NOTES
Mouth/Throat BID    lidocaine 1 % injection  5 mL Intradermal Once    sodium chloride flush  10 mL Intravenous 2 times per day    nystatin  5 mL Oral 4x Daily    docusate sodium  100 mg Oral Daily    sucralfate  1 g Oral 4x Daily AC & HS    pantoprazole  40 mg Intravenous BID    folic acid  1 mg Oral Daily    citalopram  10 mg Oral Daily    dicyclomine  10 mg Oral BID    sodium chloride flush  10 mL Intravenous 2 times per day     I   sodium chloride      sodium chloride      sodium chloride 100 mL/hr at 03/29/21 1026    midazolam 2 mg/hr (03/29/21 1423)    fentaNYL 200 mcg/hr (03/29/21 1548)    dextrose      heparin (PORCINE) Infusion 17.9 mL/hr (03/29/21 0320)    norepinephrine 16 mcg/min (03/29/21 1359)    vasopressin (Septic Shock) infusion 0.03 Units/min (03/29/21 0555)    phenylephrine (HENNA-SYNEPHRINE) 50mg/250mL infusion Stopped (03/28/21 2222)     sodium chloride, midazolam, glucose, dextrose, glucagon (rDNA), dextrose, heparin (porcine), heparin (porcine), fentanNYL, sodium chloride flush, HYDROmorphone, phenol, oxyCODONE-acetaminophen **OR** oxyCODONE-acetaminophen, clonazePAM, sodium chloride flush, [DISCONTINUED] promethazine **OR** ondansetron, polyethylene glycol, acetaminophen **OR** acetaminophen, promethazine    Lab Data:  Recent Labs     03/28/21  0521 03/28/21  1319 03/29/21  0605 03/29/21  1245   WBC 38.6* 30.1* 30.1*  --    HGB 9.3* 8.4* 6.7* 7.9*   HCT 29.8* 26.4* 20.8* 24.0*   .7* 106.4* 102.7*  --    * 479* 571*  --      Recent Labs     03/28/21  0955 03/28/21  1319 03/29/21  0605   * 131* 132*   K 6.4* 4.9 3.5   CL 91* 96* 98*   CO2 11* 11* 26   PHOS  --  4.7  --    BUN 23* 24* 24*   CREATININE 2.9* 2.8* 1.2     No results for input(s): CKTOTAL, CKMB, CKMBINDEX, TROPONINI in the last 72 hours.     Coagulation:   Lab Results   Component Value Date    INR 3.73 03/28/2021    APTT 69.0 03/29/2021     Cardiac markers:   Lab Results   Component Value Date TROPONINI <0.01 03/14/2021         Lab Results   Component Value Date     (H) 03/29/2021     (H) 03/29/2021    ALKPHOS 173 (H) 03/29/2021    BILITOT 4.1 (H) 03/29/2021       Lab Results   Component Value Date    INR 3.73 (H) 03/28/2021    INR 3.31 (H) 03/27/2021    INR 1.28 (H) 10/04/2019    PROTIME 43.8 (H) 03/28/2021    PROTIME 38.9 (H) 03/27/2021    PROTIME 14.6 (H) 10/04/2019     Cultures:  Blood: pending    Radiology    XR CHEST PORTABLE   Final Result   Increased diffuse interstitial prominence suggests pulmonary edema. Stable support lines. XR CHEST PORTABLE   Final Result   Tubes and lines as above. Low lung volumes with probable basilar atelectasis. CT ABDOMEN PELVIS WO CONTRAST Additional Contrast? None   Final Result   Findings consistent with acute on chronic pancreatitis. Small amount of free fluid in the pelvis. No pseudocyst is identified. Extensive fatty infiltration of the liver. Nonobstructive bilateral nephrolithiasis. Punctate caliceal stones are noted   bilaterally. Multiple small ventral hernias. 2 hernias in the periumbilical midline   contain small bowel. No incarceration is evident. IR GI TUBE W FLUOROSCOPY   Final Result   Successful placement of nasal jejunal tube as discussed. CT ABDOMEN PELVIS W IV CONTRAST Additional Contrast? None   Final Result   There is complete thrombosis of the portal vein. Thrombus is also seen   extending into the cephalad portion of the superior mesenteric vein and   throughout the splenic vein. Peripancreatic stranding consistent with pancreatitis. There is a small area   of low-attenuation in the region the pancreatic head which may correspond to   focal dilated pancreatic duct or a small developing fluid collection. Hepatic steatosis.       The findings were sent to the Radiology Results [address] at 3:41   pm on 3/22/2021to be communicated to a licensed caregiver. MRI ABDOMEN WO CONTRAST MRCP   Final Result   *Normal caliber bile ducts without evidence of choledocholithiasis. *Hepatomegaly with severe fatty infiltration as described. *Mildly atrophic pancreas with ductal dilatation as measured and described   above, findings likely sequela of chronic pancreatitis. *Large amount of gallbladder sludge. US GALLBLADDER RUQ   Final Result   1. Gallbladder sludge with nonspecific wall thickening. 2. Abnormal common duct dilatation. MRCP and/or ERCP would be options to   further evaluate. XR CHEST PORTABLE    (Results Pending)       Assessment & Plan:      Recurrent pancreatitis   - remote hx of alcohol related pancreatitis but reportedly quit 4 months ago  - did not improve with conservative mx . Recurrent and persistent nausea, emesis and worsening per CT , unable to tolerate oral diet  GI started post-pyloric tube feedings on 3/25. He did not tolerate advancing tube feedings. Complicated clinical course with development of poral vein thrombosis, septic shock with hypotension, renal failure, hyperkalemia and resp failure now on vent  See below      Sepsis  - not POA -   - he is hypotensive, tachycardic, WBC worsened at 38.6  - his abdominal pain is worse and ct with worsening pancreatitis,  - blood cx with Klebsiella pneumoniae   - added vanc, Cefepime. 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 Holding Eliquis for coagulopathy,     Abn LFT   - no CBD stone per MRCP, Mildly atrophic pancreas with ductal dilatation noted but with gall bladder sludge  - bili improving  - pt reports no alcohol since 4 months   - [facility]er to [facility] for [facility]olecystectomy     Hyponatremia  - likely related to fluid volume depletion  - Give IVF's  - monitor BMP. 132 today. Normocytic  Anemia  Folic Acid Deficiency  - 9.4 on admission. Trended down with IVF  Monitor  - folate def noted, started on supplements   - hgb 6.7   - given 1 unit PRBC with repeat at 7.9     Anxiety   - Continue Celexa, Klonopin prn       Severe PCM  -Attempted on post-pyloric tube feedings.    - Will need J-tube placed.      H/o alcohol abuse   - noted during previous admission  - Admission last year for acute pancreatitis due to alcohol abuse.      Central line placed on 3/28 d/t poor access     DVT Prophylaxis: holding heparin   Diet: Diet NPO Effective Now  Code Status: Full Code      Kellen Santana MD 3/29/2021 5:24 PM

## 2021-03-29 NOTE — PROGRESS NOTES
PROGRESS NOTE  S:40 yrs Patient  admitted on 3/13/2021 with Acute cholecystitis [K81.0] . Today he remains intubated and sedated. Nurse reports multiple liquid BMs overnight. Exam:   Vitals:    03/29/21 1000   BP: (!) 98/56   Pulse: 89   Resp: 30   Temp:    SpO2: 98%      General appearance: appears older than stated age, cooperative and syndromic appearance - chronically ill appearing, intubated, sedated  HEENT: Oropharynx clear, no lesions  Neck: no adenopathy and supple, symmetrical, trachea midline  Lungs: clear to auscultation bilaterally  Heart: regular rate and rhythm, S1, S2 normal, no murmur, click, rub or gallop  Abdomen: normal findings: no masses palpable, soft, non-tender and symmetric and abnormal findings:  distended, hypoactive bowel sounds and obese  Extremities: extremities normal, atraumatic, no cyanosis or edema     Medications: Reviewed    Labs:  CBC:   Recent Labs     03/28/21  0521 03/28/21  1319 03/29/21  0605   WBC 38.6* 30.1* 30.1*   HGB 9.3* 8.4* 6.7*   HCT 29.8* 26.4* 20.8*   .7* 106.4* 102.7*   * 479* 571*     BMP:   Recent Labs     03/28/21  0955 03/28/21  1319 03/29/21  0605   * 131* 132*   K 6.4* 4.9 3.5   CL 91* 96* 98*   CO2 11* 11* 26   PHOS  --  4.7  --    BUN 23* 24* 24*   CREATININE 2.9* 2.8* 1.2     LIVER PROFILE:   Recent Labs     03/28/21  0955 03/29/21  0605   AST 1,119* 739*   * 276*   PROT 6.2* 5.3*   BILIDIR 5.5* 3.5*   BILITOT 7.3* 4.1*   ALKPHOS 215* 173*     PT/INR:   Recent Labs     03/27/21  1047 03/28/21  0955   INR 3.31* 3.73*       IMAGING:  XR CHEST PORTABLE  Impression   Increased diffuse interstitial prominence suggests pulmonary edema.       Stable support lines.      CT ABDOMEN PELVIS WO CONTRAST - 3/28/2021  Impression   Findings consistent with acute on chronic pancreatitis.       Small amount of free fluid in the pelvis.  No pseudocyst is identified.       Extensive fatty infiltration of the liver.       Nonobstructive bilateral nephrolithiasis.  Punctate caliceal stones are noted   bilaterally.       Multiple small ventral hernias.  2 hernias in the periumbilical midline   contain small bowel.  No incarceration is evident. Attending Supervising [de-identified] Attestation Statement  The patient is a 41 y.o. male. I have performed a history and physical examination of the patient. I discussed the case with my physician assistant Don Pierce PA-C    I reviewed the patient's Past Medical History, Past Surgical History, Medications, and Allergies. Physical Exam:  Vitals:    03/29/21 1300 03/29/21 1400 03/29/21 1500 03/29/21 1600   BP: (!) 108/56 (!) 98/58 (!) 99/58 (!) 102/59   Pulse: 90 92 95 97   Resp: 21 24 21 21   Temp: 99.7 °F (37.6 °C)      TempSrc: Axillary      SpO2: 100% 100% 97% 96%   Weight:       Height:           Physical Examination: General appearance - chronically ill appearing  Mental status - intubated and sedated  Eyes - sclera icteric  Neck - supple, no significant adenopathy  Chest - clear to auscultation, no wheezes, rales or rhonchi, symmetric air entry  Heart - normal rate, regular rhythm, normal S1, S2, no murmurs, rubs, clicks or gallops  Abdomen - soft, nontender, nondistended, no masses or organomegaly  Extremities - no pedal edema noted          Impression: 41year old male with a history of HTN, arthritis, diverticulitis s/p partial colectomy and reversal of ileostomy, alcohol abuse, admitted with recurrent acute pancreatitis likely secondary to biliary sludge s/p NJ placement for post-pyloric feeds, but he vomited it out. Repeat CT showed PVT and developing cyst. He was transferred to the ICU and intubated due to   respiratory distress, hypotension, worsening leukocytosis, and ARF.       Recommendation:  Continue supportive care  NPO, IVF, pain control  Continue PPI BID  Continue Carafate QID  Continue anticoagulation  Consider starting TPN  Will follow  Needs cholecystectomy when pancreatitis resolves       Shy Leonardo PA-C  10:39 AM 3/29/2021                      41year old male with a history of HTN, arthritis, diverticulitis s/p partial colectomy and reversal of ileostomy, alcohol abuse, admitted with recurrent acute pancreatitis likely secondary to biliary sludge vs alcohol c/b portal vein thrombosis and evolving pseudocyst. Hospital course complicated by sepsis and respiratory failure. Continue supportive care. NPO, IVF, pain control. Monitor LFTs. High risk for TPN given bacteremia. Agree that he's not a surgical candidate. Continue antibiotics and eliquis. May need to reinsert NJ tube for post pyloric feeds after he is weaned off levophed. Will follow.     Argentina Muñoz MD          ([phone]  [facility]) [phone]

## 2021-03-29 NOTE — PROGRESS NOTES
Pharmacy - RE:  High-dose Heparin drip  Current rate = 17.9 ml/h  (1790 units/h)  aPTT drawn @1440 on 3/29 = 69 sec. Goal aPTT = 49 - 76 sec. Per protocol, Continue current rate of infusion and obtain another aPTT at 0600 in am and daily given 2 consecutive aPtt.   Marian Vidal D 3/19/19249:65 PM  .  '

## 2021-03-29 NOTE — PROGRESS NOTES
General Surgery - L[facility] Alarcon, [address] [facility]  Daily Progress Note    Pt Name: Cecille Maya  Medical Record Number: [id number]  Date of Birth 1980   Today's Date: 3/29/2021    ASSESSMENT  1. CT 3/28: acute on chronic pancreatitis, fatty liver  2. CT 3/22\" portal vein thrombosis, SMV and splenic vein, pancreatiits  3. ABD: obese, soft, no distention, rectal tube in place\" large amount of liquid stool, no signs of abd pain to palpation  4. Leuks 38.6->30.1  5. ARF improved: Cr 2.9->1.2  6. LA improved 13.5->3.2  7. Last Lipase was 3/14 and 75  8. Elevated LFTs/INR 3.37  9. Hgb 6.7/20.8->7.9/24 s/p 1 unit of PRBCs  10. TF via OG  11. F/C 2.2 L  12. BC + Klebsiella   13. Pt is sedated on vent  14. Remote hx of heroin abuse, per RN stopped drinking ETOH 4 months ago. 15. 3/26 NJ tube was placed and he was started on post-pyloric feeding. He had N/V and pulled out the tube, he had diarrhea. PLAN  1. Send C diff  2. NPO, connect NGT to CLWS  3. Labs in the am  4. Renal seeing  5. Hep gtt  6. Septic shock on vaso and Levo  7. Getting cefepime, Vanc (recently d/c'd) and merrem  8. Acute pancreatitis   9. Pt appears to be improving but, remains critically ill. No surgical plans at this time. SUBJECTIVE  Miguel has slightly improved from yesterday. Pain appears well controlled. He has no vomiting. He has passed flatus and has had a bowel movement. He is NPO. Current activity is strict bedrest with HOB flat-to-30 degrees X 24 hours, then check with Sx    OBJECTIVE  VITALS:  height is 6' 1\" (1.854 m) and weight is 283 lb 9.6 oz (128.6 kg). His axillary temperature is 99.7 °F (37.6 °C). His blood pressure is 99/58 (abnormal) and his pulse is 95. His respiration is 21 and oxygen saturation is 97%.  VITALS:  BP (!) 102/59   Pulse 97   Temp 99.7 °F (37.6 °C) (Axillary)   Resp 21   Ht 6' 1\" (1.854 m)   Wt 283 lb 9.6 oz (128.6 kg)   SpO2 96%   BMI 37.42 kg/m²   INTAKE/OUTPUT:    Intake/Output Summary (Last 24 hours) at 3/29/2021 1701  Last data filed at 3/29/2021 1500  Gross per 24 hour   Intake 35370 ml   Output 2550 ml   Net 41441 ml     URINARY CATHETER OUTPUT (Valenzuela):     GENERAL: sedated on vent    I/O last 3 completed shifts: In: 40692 [I.V.:67055]  Out: 2700 [Urine:2700]  No intake/output data recorded.     LABS  Recent Labs     03/28/21  0955 03/28/21  1319 03/28/21  1600 03/29/21  0605 03/29/21  1245   WBC  --  30.1*  --  30.1*  --    HGB  --  8.4*  --  6.7* 7.9*   HCT  --  26.4*  --  20.8* 24.0*   PLT  --  479*  --  571*  --    * 131*  --  132*  --    K 6.4* 4.9  --  3.5  --    CL 91* 96*  --  98*  --    CO2 11* 11*  --  26  --    BUN 23* 24*  --  24*  --    CREATININE 2.9* 2.8*  --  1.2  --    PHOS  --  4.7  --   --   --    CALCIUM 10.1 8.5  --  7.4*  --    INR 3.73*  --   --   --   --    AST 1,119*  --   --  739*  --    *  --   --  276*  --    BILITOT 7.3*  --   --  4.1*  --    BILIDIR 5.5*  --   --  3.5*  --    NITRU  --   --  POSITIVE*  --   --    COLORU  --   --  BROWN*  --   --    BACTERIA  --   --  3+*  --   --      CBC with Differential:    Lab Results   Component Value Date    WBC 30.1 03/29/2021    RBC 2.03 03/29/2021    HGB 7.9 03/29/2021    HCT 24.0 03/29/2021     03/29/2021    .7 03/29/2021    MCH 32.9 03/29/2021    MCHC 32.1 03/29/2021    RDW 18.8 03/29/2021    NRBC 3 03/29/2021    BANDSPCT 7 03/29/2021    METASPCT 1 03/21/2021    LYMPHOPCT 6.0 03/29/2021    MONOPCT 3.0 03/29/2021    MYELOPCT 1 03/21/2021    BASOPCT 0.0 03/29/2021    MONOSABS 0.9 03/29/2021    LYMPHSABS 1.8 03/29/2021    EOSABS 0.3 03/29/2021    BASOSABS 0.0 03/29/2021     CMP:    Lab Results   Component Value Date     03/29/2021    K 3.5 03/29/2021    K 6.4 03/28/2021    CL 98 03/29/2021    CO2 26 03/29/2021    BUN 24 03/29/2021    CREATININE 1.2 03/29/2021    GFRAA >60 03/29/2021    AGRATIO 0.6 03/28/2021    LABGLOM >60 03/29/2021    GLUCOSE 270 03/29/2021    PROT 5.3 03/29/2021    LABALBU 1.9 03/29/2021    CALCIUM 7.4 03/29/2021    BILITOT 4.1 03/29/2021    ALKPHOS 173 03/29/2021     03/29/2021     03/29/2021         Ledy Blanco [employer]  Electronically signed 3/29/2021 at 3:30 PM     Patient seen and examined. I agree with the assessment and plan from MITESH Roldan. Patient sedated. Intubated. Abdomen soft. Non distended. Appears jaundiced. .  Discussed with nursing. Pressors being weaned. UOP better. Labs and imaging reviewed. Continue current care. No surgical plans at present.       [address] [facility]

## 2021-03-29 NOTE — PROGRESS NOTES
03/28/21 2250   Vent Information   Vent Type 980   Vent Mode AC/VC   Vt Ordered 480 mL   Rate Set 30 bmp   Peak Flow 70 L/min   FiO2  30 %   SpO2 100 %   SpO2/FiO2 ratio 333.33   Sensitivity 3   PEEP/CPAP 5   Vent Patient Data   Peak Inspiratory Pressure 21 cmH2O   Mean Airway Pressure 10 cmH20   Rate Measured 30 br/min   Vt Exhaled 501 mL   Minute Volume 15 Liters   I:E Ratio 1:1.7   Plateau Pressure 13 USP98   Cough/Sputum   Sputum How Obtained Suctioned;Endotracheal   Cough Productive   Sputum Amount Moderate   Sputum Color Tan;Brown   Tenacity Thick   Spontaneous Breathing Trial (SBT) RT Doc   Pulse 102   Breath Sounds   Right Upper Lobe Diminished   Right Middle Lobe Diminished   Right Lower Lobe Diminished   Left Upper Lobe Diminished   Left Lower Lobe Diminished   Additional Respiratory  Assessments   Resp 30   Position Semi-Barry's   Oral Care Completed?  Yes   Oral Care Mouth suctioned   ETT (adult)   Placement Date/Time: 03/28/21 1400   Timeout: Patient;Procedure;Site/Side  Preoxygenation: Yes  Tube Size: 8 mm  Location: Oral   Secured at 25 cm   Measured From [address] [place] [address],[address] By [employer] tube watters   Site Condition Dry

## 2021-03-29 NOTE — PROGRESS NOTES
CVP 11-13. Poor waveform. 5L total bolus given today, order received for 1 more. Order also in place for anh if needed.

## 2021-03-30 ENCOUNTER — APPOINTMENT (OUTPATIENT)
Dept: GENERAL RADIOLOGY | Age: 41
DRG: 282 | End: 2021-03-30
Attending: HOSPITALIST
Payer: MEDICAID

## 2021-03-30 LAB
ALBUMIN SERPL-MCNC: 1.9 G/DL (ref 3.4–5)
ALBUMIN SERPL-MCNC: 2.1 G/DL (ref 3.4–5)
ALP BLD-CCNC: 170 U/L (ref 40–129)
ALT SERPL-CCNC: 182 U/L (ref 10–40)
ANION GAP SERPL CALCULATED.3IONS-SCNC: 5 MMOL/L (ref 3–16)
ANION GAP SERPL CALCULATED.3IONS-SCNC: 7 MMOL/L (ref 3–16)
ANISOCYTOSIS: ABNORMAL
APTT: 77 SEC (ref 24.2–36.2)
APTT: 79.5 SEC (ref 24.2–36.2)
AST SERPL-CCNC: 314 U/L (ref 15–37)
BANDED NEUTROPHILS RELATIVE PERCENT: 3 % (ref 0–7)
BASE EXCESS ARTERIAL: 3.1 MMOL/L (ref -3–3)
BASOPHILIC STIPPLING: ABNORMAL
BASOPHILS ABSOLUTE: 0 K/UL (ref 0–0.2)
BASOPHILS RELATIVE PERCENT: 0 %
BILIRUB SERPL-MCNC: 3.7 MG/DL (ref 0–1)
BILIRUBIN DIRECT: 3.1 MG/DL (ref 0–0.3)
BILIRUBIN, INDIRECT: 0.6 MG/DL (ref 0–1)
BUN BLDV-MCNC: 22 MG/DL (ref 7–20)
BUN BLDV-MCNC: 24 MG/DL (ref 7–20)
CALCIUM SERPL-MCNC: 7.4 MG/DL (ref 8.3–10.6)
CALCIUM SERPL-MCNC: 7.6 MG/DL (ref 8.3–10.6)
CARBOXYHEMOGLOBIN ARTERIAL: 0.4 % (ref 0–1.5)
CHLORIDE BLD-SCNC: 96 MMOL/L (ref 99–110)
CHLORIDE BLD-SCNC: 97 MMOL/L (ref 99–110)
CO2: 26 MMOL/L (ref 21–32)
CO2: 27 MMOL/L (ref 21–32)
CREAT SERPL-MCNC: <0.5 MG/DL (ref 0.9–1.3)
CREAT SERPL-MCNC: <0.5 MG/DL (ref 0.9–1.3)
EOSINOPHILS ABSOLUTE: 0.6 K/UL (ref 0–0.6)
EOSINOPHILS RELATIVE PERCENT: 3 %
GFR AFRICAN AMERICAN: >60
GFR AFRICAN AMERICAN: >60
GFR NON-AFRICAN AMERICAN: >60
GFR NON-AFRICAN AMERICAN: >60
GLUCOSE BLD-MCNC: 121 MG/DL (ref 70–99)
GLUCOSE BLD-MCNC: 138 MG/DL (ref 70–99)
GLUCOSE BLD-MCNC: 140 MG/DL (ref 70–99)
GLUCOSE BLD-MCNC: 143 MG/DL (ref 70–99)
GLUCOSE BLD-MCNC: 149 MG/DL (ref 70–99)
GLUCOSE BLD-MCNC: 152 MG/DL (ref 70–99)
GLUCOSE BLD-MCNC: 156 MG/DL (ref 70–99)
HCO3 ARTERIAL: 26.7 MMOL/L (ref 21–29)
HCT VFR BLD CALC: 21.2 % (ref 40.5–52.5)
HEMATOLOGY PATH CONSULT: NO
HEMOGLOBIN, ART, EXTENDED: 7.7 G/DL (ref 13.5–17.5)
HEMOGLOBIN: 7.1 G/DL (ref 13.5–17.5)
LIPASE: 37 U/L (ref 13–60)
LYMPHOCYTES ABSOLUTE: 1.5 K/UL (ref 1–5.1)
LYMPHOCYTES RELATIVE PERCENT: 8 %
MAGNESIUM: 1.6 MG/DL (ref 1.8–2.4)
MAGNESIUM: 1.9 MG/DL (ref 1.8–2.4)
MCH RBC QN AUTO: 32.9 PG (ref 26–34)
MCHC RBC AUTO-ENTMCNC: 33.3 G/DL (ref 31–36)
MCV RBC AUTO: 98.6 FL (ref 80–100)
METAMYELOCYTES RELATIVE PERCENT: 1 %
METHEMOGLOBIN ARTERIAL: 0.3 %
MONOCYTES ABSOLUTE: 1.5 K/UL (ref 0–1.3)
MONOCYTES RELATIVE PERCENT: 8 %
MYELOCYTE PERCENT: 2 %
NEUTROPHILS ABSOLUTE: 15.1 K/UL (ref 1.7–7.7)
NEUTROPHILS RELATIVE PERCENT: 72 %
O2 CONTENT ARTERIAL: 11 ML/DL
O2 SAT, ARTERIAL: 97.1 %
O2 THERAPY: ABNORMAL
PCO2 ARTERIAL: 36.4 MMHG (ref 35–45)
PDW BLD-RTO: 21.6 % (ref 12.4–15.4)
PERFORMED ON: ABNORMAL
PH ARTERIAL: 7.48 (ref 7.35–7.45)
PHOSPHORUS: 1.5 MG/DL (ref 2.5–4.9)
PHOSPHORUS: 2.2 MG/DL (ref 2.5–4.9)
PLATELET # BLD: 339 K/UL (ref 135–450)
PLATELET SLIDE REVIEW: ABNORMAL
PMV BLD AUTO: 7.6 FL (ref 5–10.5)
PO2 ARTERIAL: 85 MMHG (ref 75–108)
POLYCHROMASIA: ABNORMAL
POTASSIUM SERPL-SCNC: 3 MMOL/L (ref 3.5–5.1)
POTASSIUM SERPL-SCNC: 3.6 MMOL/L (ref 3.5–5.1)
PROMYELOCYTES PERCENT: 3 %
RBC # BLD: 2.15 M/UL (ref 4.2–5.9)
SLIDE REVIEW: ABNORMAL
SODIUM BLD-SCNC: 128 MMOL/L (ref 136–145)
SODIUM BLD-SCNC: 130 MMOL/L (ref 136–145)
STOMATOCYTES: ABNORMAL
TARGET CELLS: ABNORMAL
TCO2 ARTERIAL: 27.8 MMOL/L
TOTAL PROTEIN: 4.8 G/DL (ref 6.4–8.2)
URINE CULTURE, ROUTINE: NORMAL
WBC # BLD: 18.6 K/UL (ref 4–11)

## 2021-03-30 PROCEDURE — 36556 INSERT NON-TUNNEL CV CATH: CPT

## 2021-03-30 PROCEDURE — 94003 VENT MGMT INPAT SUBQ DAY: CPT

## 2021-03-30 PROCEDURE — 99291 CRITICAL CARE FIRST HOUR: CPT | Performed by: INTERNAL MEDICINE

## 2021-03-30 PROCEDURE — 2580000003 HC RX 258: Performed by: INTERNAL MEDICINE

## 2021-03-30 PROCEDURE — 82803 BLOOD GASES ANY COMBINATION: CPT

## 2021-03-30 PROCEDURE — 99232 SBSQ HOSP IP/OBS MODERATE 35: CPT | Performed by: INTERNAL MEDICINE

## 2021-03-30 PROCEDURE — 2580000003 HC RX 258: Performed by: NURSE PRACTITIONER

## 2021-03-30 PROCEDURE — 2000000000 HC ICU R&B

## 2021-03-30 PROCEDURE — 71045 X-RAY EXAM CHEST 1 VIEW: CPT

## 2021-03-30 PROCEDURE — 83735 ASSAY OF MAGNESIUM: CPT

## 2021-03-30 PROCEDURE — 2500000003 HC RX 250 WO HCPCS: Performed by: INTERNAL MEDICINE

## 2021-03-30 PROCEDURE — 80076 HEPATIC FUNCTION PANEL: CPT

## 2021-03-30 PROCEDURE — 84100 ASSAY OF PHOSPHORUS: CPT

## 2021-03-30 PROCEDURE — 2500000003 HC RX 250 WO HCPCS: Performed by: HOSPITALIST

## 2021-03-30 PROCEDURE — 85730 THROMBOPLASTIN TIME PARTIAL: CPT

## 2021-03-30 PROCEDURE — 85025 COMPLETE CBC W/AUTO DIFF WBC: CPT

## 2021-03-30 PROCEDURE — 6360000002 HC RX W HCPCS: Performed by: INTERNAL MEDICINE

## 2021-03-30 PROCEDURE — 99232 SBSQ HOSP IP/OBS MODERATE 35: CPT | Performed by: SURGERY

## 2021-03-30 PROCEDURE — 6360000002 HC RX W HCPCS: Performed by: PHYSICIAN ASSISTANT

## 2021-03-30 PROCEDURE — 94761 N-INVAS EAR/PLS OXIMETRY MLT: CPT

## 2021-03-30 PROCEDURE — 6360000002 HC RX W HCPCS: Performed by: NURSE PRACTITIONER

## 2021-03-30 PROCEDURE — 6370000000 HC RX 637 (ALT 250 FOR IP): Performed by: INTERNAL MEDICINE

## 2021-03-30 PROCEDURE — 83690 ASSAY OF LIPASE: CPT

## 2021-03-30 PROCEDURE — C9113 INJ PANTOPRAZOLE SODIUM, VIA: HCPCS | Performed by: PHYSICIAN ASSISTANT

## 2021-03-30 PROCEDURE — 6370000000 HC RX 637 (ALT 250 FOR IP): Performed by: NURSE PRACTITIONER

## 2021-03-30 PROCEDURE — 2700000000 HC OXYGEN THERAPY PER DAY

## 2021-03-30 PROCEDURE — 6370000000 HC RX 637 (ALT 250 FOR IP): Performed by: HOSPITALIST

## 2021-03-30 PROCEDURE — 80069 RENAL FUNCTION PANEL: CPT

## 2021-03-30 RX ORDER — MIDAZOLAM HYDROCHLORIDE 1 MG/ML
5 INJECTION INTRAMUSCULAR; INTRAVENOUS
Status: DISCONTINUED | OUTPATIENT
Start: 2021-03-30 | End: 2021-03-31

## 2021-03-30 RX ORDER — SODIUM CHLORIDE, SODIUM LACTATE, POTASSIUM CHLORIDE, CALCIUM CHLORIDE 600; 310; 30; 20 MG/100ML; MG/100ML; MG/100ML; MG/100ML
INJECTION, SOLUTION INTRAVENOUS CONTINUOUS
Status: DISCONTINUED | OUTPATIENT
Start: 2021-03-30 | End: 2021-03-31

## 2021-03-30 RX ORDER — KETAMINE HYDROCHLORIDE 50 MG/ML
150 INJECTION, SOLUTION, CONCENTRATE INTRAMUSCULAR; INTRAVENOUS ONCE
Status: COMPLETED | OUTPATIENT
Start: 2021-03-30 | End: 2021-03-30

## 2021-03-30 RX ORDER — DEXMEDETOMIDINE HYDROCHLORIDE 4 UG/ML
.2-1.4 INJECTION, SOLUTION INTRAVENOUS CONTINUOUS
Status: DISCONTINUED | OUTPATIENT
Start: 2021-03-30 | End: 2021-04-05

## 2021-03-30 RX ORDER — DOCUSATE SODIUM 100 MG/1
100 CAPSULE, LIQUID FILLED ORAL 2 TIMES DAILY PRN
Status: DISCONTINUED | OUTPATIENT
Start: 2021-03-30 | End: 2021-04-05 | Stop reason: HOSPADM

## 2021-03-30 RX ORDER — POTASSIUM CHLORIDE 29.8 MG/ML
20 INJECTION INTRAVENOUS
Status: COMPLETED | OUTPATIENT
Start: 2021-03-30 | End: 2021-03-30

## 2021-03-30 RX ORDER — MAGNESIUM SULFATE IN WATER 40 MG/ML
2000 INJECTION, SOLUTION INTRAVENOUS ONCE
Status: COMPLETED | OUTPATIENT
Start: 2021-03-30 | End: 2021-03-30

## 2021-03-30 RX ADMIN — NYSTATIN 500000 UNITS: 500000 SUSPENSION ORAL at 08:57

## 2021-03-30 RX ADMIN — POTASSIUM PHOSPHATE, MONOBASIC AND POTASSIUM PHOSPHATE, DIBASIC 30 MMOL: 224; 236 INJECTION, SOLUTION, CONCENTRATE INTRAVENOUS at 07:01

## 2021-03-30 RX ADMIN — Medication 200 MCG/HR: at 00:05

## 2021-03-30 RX ADMIN — Medication 0.4 MCG/KG/HR: at 22:15

## 2021-03-30 RX ADMIN — MIDAZOLAM HYDROCHLORIDE 4 MG: 1 INJECTION, SOLUTION INTRAMUSCULAR; INTRAVENOUS at 07:51

## 2021-03-30 RX ADMIN — Medication 15 ML: at 20:47

## 2021-03-30 RX ADMIN — Medication 10 ML: at 08:56

## 2021-03-30 RX ADMIN — FOLIC ACID 1 MG: 1 TABLET ORAL at 08:57

## 2021-03-30 RX ADMIN — Medication 15 ML: at 08:57

## 2021-03-30 RX ADMIN — POTASSIUM PHOSPHATE, MONOBASIC AND POTASSIUM PHOSPHATE, DIBASIC 20 MMOL: 224; 236 INJECTION, SOLUTION, CONCENTRATE INTRAVENOUS at 16:32

## 2021-03-30 RX ADMIN — PANTOPRAZOLE SODIUM 40 MG: 40 INJECTION, POWDER, FOR SOLUTION INTRAVENOUS at 20:46

## 2021-03-30 RX ADMIN — NOREPINEPHRINE BITARTRATE 10 MCG/MIN: 1 INJECTION INTRAVENOUS at 11:09

## 2021-03-30 RX ADMIN — PANTOPRAZOLE SODIUM 40 MG: 40 INJECTION, POWDER, FOR SOLUTION INTRAVENOUS at 08:57

## 2021-03-30 RX ADMIN — VASOPRESSIN 0.03 UNITS/MIN: 20 INJECTION INTRAVENOUS at 05:07

## 2021-03-30 RX ADMIN — INSULIN LISPRO 1 UNITS: 100 INJECTION, SOLUTION INTRAVENOUS; SUBCUTANEOUS at 16:31

## 2021-03-30 RX ADMIN — Medication 200 MCG/HR: at 05:07

## 2021-03-30 RX ADMIN — KETAMINE HYDROCHLORIDE 150 MG: 50 INJECTION INTRAMUSCULAR; INTRAVENOUS at 04:48

## 2021-03-30 RX ADMIN — Medication 20 MEQ: at 07:03

## 2021-03-30 RX ADMIN — Medication 0.4 MCG/KG/HR: at 13:28

## 2021-03-30 RX ADMIN — NOREPINEPHRINE BITARTRATE 8 MCG/MIN: 1 INJECTION INTRAVENOUS at 14:43

## 2021-03-30 RX ADMIN — Medication 0.4 MCG/KG/HR: at 13:54

## 2021-03-30 RX ADMIN — CEFEPIME HYDROCHLORIDE 2000 MG: 2 INJECTION, POWDER, FOR SOLUTION INTRAVENOUS at 20:46

## 2021-03-30 RX ADMIN — MAGNESIUM SULFATE HEPTAHYDRATE 2000 MG: 40 INJECTION, SOLUTION INTRAVENOUS at 07:07

## 2021-03-30 RX ADMIN — Medication 0.5 MCG/KG/HR: at 09:48

## 2021-03-30 RX ADMIN — SODIUM CHLORIDE: 9 INJECTION, SOLUTION INTRAVENOUS at 14:43

## 2021-03-30 RX ADMIN — DICYCLOMINE HYDROCHLORIDE 10 MG: 10 CAPSULE ORAL at 08:57

## 2021-03-30 RX ADMIN — VASOPRESSIN 0.03 UNITS/MIN: 20 INJECTION INTRAVENOUS at 18:55

## 2021-03-30 RX ADMIN — MIDAZOLAM HYDROCHLORIDE 4 MG: 1 INJECTION, SOLUTION INTRAMUSCULAR; INTRAVENOUS at 04:27

## 2021-03-30 RX ADMIN — HEPARIN SODIUM 17.9 ML/HR: 10000 INJECTION, SOLUTION INTRAVENOUS at 07:53

## 2021-03-30 RX ADMIN — SODIUM CHLORIDE: 9 INJECTION, SOLUTION INTRAVENOUS at 06:08

## 2021-03-30 RX ADMIN — Medication 20 MEQ: at 07:44

## 2021-03-30 RX ADMIN — CEFEPIME HYDROCHLORIDE 1000 MG: 1 INJECTION, POWDER, FOR SOLUTION INTRAMUSCULAR; INTRAVENOUS at 08:56

## 2021-03-30 RX ADMIN — SODIUM CHLORIDE, POTASSIUM CHLORIDE, SODIUM LACTATE AND CALCIUM CHLORIDE: 600; 310; 30; 20 INJECTION, SOLUTION INTRAVENOUS at 16:48

## 2021-03-30 RX ADMIN — CITALOPRAM HYDROBROMIDE 10 MG: 20 TABLET ORAL at 08:57

## 2021-03-30 RX ADMIN — MIDAZOLAM HYDROCHLORIDE 4 MG: 1 INJECTION, SOLUTION INTRAMUSCULAR; INTRAVENOUS at 05:42

## 2021-03-30 RX ADMIN — HYDROMORPHONE HYDROCHLORIDE 1 MG: 1 INJECTION, SOLUTION INTRAMUSCULAR; INTRAVENOUS; SUBCUTANEOUS at 09:55

## 2021-03-30 ASSESSMENT — PULMONARY FUNCTION TESTS
PIF_VALUE: 23
PIF_VALUE: 18

## 2021-03-30 ASSESSMENT — PAIN SCALES - GENERAL
PAINLEVEL_OUTOF10: 0
PAINLEVEL_OUTOF10: 0

## 2021-03-30 NOTE — PROGRESS NOTES
Patient thrashing about violently after xray. Rectal tube ripped out, PIV ripped out. PRN versed/fentanyl ineffective. MD at bedside.

## 2021-03-30 NOTE — PROGRESS NOTES
03/29/21 2342   Vent Information   Vent Type 980   Vent Mode AC/VC   Vt Ordered 500 mL   Rate Set 18 bmp   Peak Flow 60 L/min   FiO2  30 %   SpO2 99 %   SpO2/FiO2 ratio 330   Sensitivity 3   PEEP/CPAP 5   Humidification Source Heated wire   Humidification Temp 37   Humidification Temp Measured 37   Vent Patient Data   Peak Inspiratory Pressure 18 cmH2O   Mean Airway Pressure 8.6 cmH20   Rate Measured 18 br/min   Vt Exhaled 564 mL   Minute Volume 10.1 Liters   I:E Ratio 1:2.7   Plateau Pressure 14 [id number]   Cough/Sputum   Sputum How Obtained Suctioned;Cough on request   Cough Productive   Sputum Amount Small   Sputum Color Brown   Tenacity Thick   Spontaneous Breathing Trial (SBT) RT Doc   Pulse 89   Breath Sounds   Right Upper Lobe Diminished   Right Middle Lobe Diminished   Right Lower Lobe Diminished   Left Upper Lobe Diminished   Left Lower Lobe Diminished   Additional Respiratory  Assessments   Resp 18   Position Semi-Barry's   Oral Care Completed? Yes   Oral Care Mouth suctioned   Subglottic Suction Done?  Yes   Alarm Settings   High Pressure Alarm 50 cmH2O   Low Minute Volume Alarm 5.5 L/min   Apnea (secs) 20 secs   High Respiratory Rate 40 br/min   Low Exhaled Vt  300 mL   ETT (adult)   Placement Date/Time: 03/28/21 1400   Timeout: Patient;Procedure;Site/Side  Preoxygenation: Yes  Tube Size: 8 mm  Location: Oral   Secured at 25 cm   Measured From Lips   ET Placement Left   Secured By Commercial tube watters   Site Condition Dry

## 2021-03-30 NOTE — PROGRESS NOTES
[facility]    Follow-up  Note           Reason for Consult: REDDY, hyperkalemia, metabolic acidosis  Requesting Physician:  Dr. Saray Santos history  Sedated  Levophed at 6 mics per minute and vasopressin at 0.04  Renal function has improved    Last 24 h uop 1.7 L    ROS: Unable to obtain  PSFH: + visitor    Scheduled Meds:   cefepime  2,000 mg Intravenous Q12H    insulin lispro  0-6 Units Subcutaneous Q4H    calcium gluconate  1,000 mg Intravenous Once    chlorhexidine  15 mL Mouth/Throat BID    lidocaine 1 % injection  5 mL Intradermal Once    sodium chloride flush  10 mL Intravenous 2 times per day    nystatin  5 mL Oral 4x Daily    sucralfate  1 g Oral 4x Daily AC & HS    pantoprazole  40 mg Intravenous BID    folic acid  1 mg Oral Daily    citalopram  10 mg Oral Daily    dicyclomine  10 mg Oral BID    sodium chloride flush  10 mL Intravenous 2 times per day     Continuous Infusions:   dexmedetomidine HCl in NaCl 0.4 mcg/kg/hr (03/30/21 1354)    sodium chloride      sodium chloride 100 mL/hr at 03/30/21 1443    midazolam Stopped (03/30/21 0936)    fentaNYL Stopped (03/30/21 0936)    dextrose      heparin (PORCINE) Infusion 16.9 mL/hr (03/30/21 1321)    norepinephrine 6 mcg/min (03/30/21 1601)    vasopressin (Septic Shock) infusion 0.03 Units/min (03/30/21 1106)    phenylephrine (HENNA-SYNEPHRINE) 50mg/250mL infusion Stopped (03/28/21 2222)     PRN Meds:.midazolam, docusate sodium, sodium chloride, glucose, dextrose, glucagon (rDNA), dextrose, heparin (porcine), heparin (porcine), fentanNYL, sodium chloride flush, HYDROmorphone, phenol, oxyCODONE-acetaminophen **OR** oxyCODONE-acetaminophen, clonazePAM, sodium chloride flush, [DISCONTINUED] promethazine **OR** ondansetron, polyethylene glycol, acetaminophen **OR** acetaminophen, promethazine    History of Present Illness on 3/28/2021:    41 y.o. yo male with PMH of heroin and alcohol abuse, hypertension, CREATININE 2.8* 1.2 <0.5* <0.5*   LABGLOM 25* >60 >60 >60   GFRAA 30* >60 >60 >60     CT scan of abdomen pelvis with IV contrast from 3/22  Impression   There is complete thrombosis of the portal vein.  Thrombus is also seen   extending into the cephalad portion of the superior mesenteric vein and   throughout the splenic vein.       Peripancreatic stranding consistent with pancreatitis. Jerilynn Rebekah is a small area   of low-attenuation in the region the pancreatic head which may correspond to   focal dilated pancreatic duct or a small developing fluid collection.       Hepatic steatosis. CT scan of abdomen pelvis without contrast on 3/28  FINDINGS:   Lower Chest: Calcified granuloma are noted in the lung bases.       Organs: There is extensive fatty infiltration of the liver, with areas of   relative sparing.       Peripancreatic fat stranding is present.  A parenchymal calcification is   noted in the pancreatic head.  No peripancreatic fluid collection is   identified.       The spleen and adrenal glands are unremarkable.  There are nonobstructing   calculi in the kidneys, measuring up to 4 mm on the right and 2 mm on the   left.       GI/Bowel: There is a small hiatal hernia.  No dilated loops of bowel are   identified. Jerilynn Rebekah is a colo colonic anastomosis in the sigmoid colon, which   is unremarkable.  Appendix is normal.       Pelvis:  The bladder and prostate gland are unremarkable. Jerilynn Rebekah is a small   amount of free fluid in the recto vesicular space.       Peritoneum/Retroperitoneum: There is mild aortoiliac calcification, without   aneurysm.  No adenopathy is seen.       Bones/Soft Tissues: No acute osseous injury is appreciated.       There are midline periumbilical hernias with ostia of 1.6 and 1.2 cm   respectively, with small bowel content. Jerilynn Rebekah is no incarceration or area of   transition.  Adjacently, there is a right periumbilical hernia, with ostium   of 2.8 cm and fat content.  Several small fat containing hernias are also   noted in the supraumbilical region.           Impression   Findings consistent with acute on chronic pancreatitis.       Small amount of free fluid in the pelvis.  No pseudocyst is identified.       Extensive fatty infiltration of the liver.       Nonobstructive bilateral nephrolithiasis.  Punctate caliceal stones are noted   bilaterally.       Multiple small ventral hernias.  2 hernias in the periumbilical midline   contain small bowel.  No incarceration is evident. UA with micro shows 30 protein positive nitrite 6-9 WBCs and 11-20 RBCs  Urine culture no growth till date     Assessment  -REDDY in the setting of nausea vomiting diarrhea leading to volume depletion, hypotension and also third spacing from SIRS related to pancreatitis. -Hyperkalemia from REDDY and metabolic acidosis   Improved with medical management with IV insulin, D50 and bicarb    -Metabolic acidosis, predominantly lactic acidosis from hypoperfusion and liver failure    -Acute on chronic pancreatitis from biliary sludge    -Septic shock with Klebsiella pneumoniae bacteremia     -Portal vein thrombosis, superior mesenteric vein thrombosis   Elevated INR, also on heparin drip    -Anemia-1 PRBC on 3/29    -Hypocalcemia-corrected calcium is about 9 on 3/29    Plan  -Maintain hemodynamics: Keep MAP>65 CVP 10  SBP>100   -MIVF : Changed to LR at 100 an hour  -Repeat K-Phos 20 mmol IV x1  -Broad-spectrum antibiotics  -Serial renal panel   -Daily wts and strict i/o  -Renal dose meds and avoid nephrotoxins      Thank you for the consultation. Please do not hesitate to call with questions.     Amada Rubio  [facility]  Office:   Fax:    [phone]

## 2021-03-30 NOTE — PROGRESS NOTES
Pt placed on SBT 5/5, FiO2 30%         03/30/21 0905   Vent Information   Vent Type 980   Vent Mode PS   Pressure Support 5 cmH20   FiO2  30 %   SpO2 100 %   SpO2/FiO2 ratio 333.33   Sensitivity 3   PEEP/CPAP 5   Vent Patient Data   Peak Inspiratory Pressure 11 cmH2O   Mean Airway Pressure 7 cmH20   Rate Measured 16 br/min   Spontaneous  mL   Minute Volume 13 Liters   Spontaneous Breathing Trial (SBT) RT Doc   Pulse 99   RSBI Calculated 18.87   Additional Respiratory  Assessments   Resp 8

## 2021-03-30 NOTE — PROGRESS NOTES
03/30/21 0356   Vent Information   Vent Type 980   Vent Mode AC/VC   Vt Ordered 500 mL   Rate Set 18 bmp   Peak Flow 60 L/min   FiO2  30 %   SpO2 99 %   SpO2/FiO2 ratio 330   Sensitivity 3   PEEP/CPAP 5   Humidification Source Heated wire   Humidification Temp 37   Humidification Temp Measured 36   Vent Patient Data   Peak Inspiratory Pressure 18 cmH2O   Mean Airway Pressure 8.7 cmH20   Rate Measured 18 br/min   Vt Exhaled 568 mL   Minute Volume 10.2 Liters   I:E Ratio 1:2.7   Plateau Pressure 14 JRW32   Cough/Sputum   Sputum How Obtained Suctioned;Endotracheal   Cough Non-productive   Spontaneous Breathing Trial (SBT) RT Doc   Pulse 90   Breath Sounds   Right Upper Lobe Diminished   Right Middle Lobe Diminished   Right Lower Lobe Diminished   Left Upper Lobe Diminished   Left Lower Lobe Diminished   Additional Respiratory  Assessments   Resp 18   Position Semi-Barry's   Oral Care Completed? Yes   Oral Care Mouth suctioned   Subglottic Suction Done?  Yes   Alarm Settings   High Pressure Alarm 50 cmH2O   Low Minute Volume Alarm 5.5 L/min   Apnea (secs) 20 secs   High Respiratory Rate 40 br/min   Low Exhaled Vt  300 mL

## 2021-03-30 NOTE — PROGRESS NOTES
General Surgery - [facility], [place]  Daily Progress Note    Pt Name: Drew Johnson  Medical Record Number: [id number]  Date of Birth 1980   Today's Date: 3/30/2021    ASSESSMENT  1. CT 3/28: acute on chronic pancreatitis, fatty liver  2. CT 3/22: portal vein thrombosis, SMV and splenic vein, pancreatiits  3. ABD: obese, soft, no distention, rectal tube in place: large amount of liquid stool, no signs of abd pain to palpation  4. Leuks 38.6->30.1->18.6  5. ARF improved: Cr 2.9->1.2-><0.5  6. LA improved 13.5->3.2  7. Lipase: 37  8. Elevated LFT which are improving  9. Hgb 6.7/20.8->7.9/24 s/p 1 unit of PRBCs-:7.1/21.2  10. F/C 2.2 L  11. BC + Klebsiella  12. C diff negative   13. Pt is on SBT this am: possible extubation today  14. Remote hx of heroin abuse, per RN stopped drinking ETOH 4 months ago. 15. 3/26 [place] tube was placed and he was started on post-pyloric feeding. He had N/V and pulled out the tube, he had diarrhea. PLAN  1. NPO, connect NGT to CLWS  2. Labs in the am  3. Renal seeing  4. Hep gtt  5. Levo and vasso per CC  6. Getting cefepime  7. Acute pancreatitis   8. Pt appears to be improving but, remains critically ill. No surgical plans at this time. Will follow along. SUBJECTIVE  Miguel has slightly improved from yesterday. Pain appears well controlled. He has no vomiting. He has passed flatus and has had a bowel movement. He is NPO. Current activity is strict bedrest with HOB flat-to-30 degrees X 24 hours, then check with Sx    OBJECTIVE  VITALS:  height is 6' 1\" (1.854 m) and weight is 283 lb 9.6 oz (128.6 kg). His axillary temperature is 97 °F (36.1 °C). His blood pressure is 95/52 (abnormal) and his pulse is 69. His respiration is 13 and oxygen saturation is 92%.  VITALS:  BP (!) 95/52   Pulse 69   Temp 97 °F (36.1 °C) (Axillary)   Resp 13   Ht 6' 1\" (1.854 m)   Wt 283 lb 9.6 oz (128.6 kg)   SpO2 92%   BMI 37.42 kg/m²   INTAKE/OUTPUT:      Intake/Output Summary (Last 24 hours) at 3/30/2021 1505  Last data filed at 3/30/2021 1400  Gross per 24 hour   Intake 2293 ml   Output 1270 ml   Net 1023 ml     URINARY CATHETER OUTPUT (Valenzuela):     GENERAL: sedated on vent    I/O last 3 completed shifts: In: 2293 [I.V.:2293]  Out: 26 [Urine:970; Stool:300]  No intake/output data recorded. LABS  Recent Labs     03/28/21  0955 03/28/21  0955 03/28/21  1600 03/28/21  1600 03/30/21  0530 03/30/21  1400   WBC  --    < >  --    < > 18.6*  --    HGB  --    < >  --    < > 7.1*  --    HCT  --    < >  --    < > 21.2*  --    PLT  --    < >  --    < > 339  --    *   < >  --    < > 130* 128*   K 6.4*   < >  --    < > 3.0* 3.6   CL 91*   < >  --    < > 97* 96*   CO2 11*   < >  --    < > 26 27   BUN 23*   < >  --    < > 24* 22*   CREATININE 2.9*   < >  --    < > <0.5* <0.5*   MG  --   --   --    < > 1.60* 1.90   PHOS  --    < >  --   --  1.5* 2.2*   CALCIUM 10.1   < >  --    < > 7.4* 7.6*   INR 3.73*  --   --   --   --   --    AST 1,119*  --   --    < > 314*  --    *  --   --    < > 182*  --    BILITOT 7.3*  --   --    < > 3.7*  --    BILIDIR 5.5*  --   --    < > 3.1*  --    NITRU  --   --  POSITIVE*  --   --   --    COLORU  --   --  BROWN*  --   --   --    BACTERIA  --   --  3+*  --   --   --     < > = values in this interval not displayed.      CBC with Differential:    Lab Results   Component Value Date    WBC 18.6 03/30/2021    RBC 2.15 03/30/2021    HGB 7.1 03/30/2021    HCT 21.2 03/30/2021     03/30/2021    MCV 98.6 03/30/2021    MCH 32.9 03/30/2021    MCHC 33.3 03/30/2021    RDW 21.6 03/30/2021    NRBC 3 03/29/2021    BANDSPCT 3 03/30/2021    METASPCT 1 03/30/2021    LYMPHOPCT 8.0 03/30/2021    PROMYELOPCT 3 03/30/2021    MONOPCT 8.0 03/30/2021    MYELOPCT 2 03/30/2021    BASOPCT 0.0 03/30/2021    MONOSABS 1.5 03/30/2021    LYMPHSABS 1.5 03/30/2021    EOSABS 0.6 03/30/2021    BASOSABS 0.0 03/30/2021     CMP:    Lab Results   Component Value Date     03/30/2021 K 3.6 03/30/2021    K 6.4 03/28/2021    CL 96 03/30/2021    CO2 27 03/30/2021    BUN 22 03/30/2021    CREATININE <0.5 03/30/2021    GFRAA >60 03/30/2021    AGRATIO 0.6 03/28/2021    LABGLOM >60 03/30/2021    GLUCOSE 156 03/30/2021    PROT 4.8 03/30/2021    LABALBU 2.1 03/30/2021    CALCIUM 7.6 03/30/2021    BILITOT 3.7 03/30/2021    ALKPHOS 170 03/30/2021     03/30/2021     03/30/2021         Ledy Blanco [employer]  Electronically signed 3/30/2021 at 3:05 PM     Patient seen and examined. I agree with the assessment and plan from MITESH Roldan. Patient somnolent after extubation. Abdomen without gross tenderness. Continue current treatment plan.     Celestine Morin

## 2021-03-30 NOTE — PROGRESS NOTES
Pulmonary & Critical Care Medicine ICU Progress Note    CC: Septic shock, bacteremia, portal vein thrombosis    Events of Last 24 hours:   Agitated overnight  Levophed is off, still on Vasopressin   Received ketamine    Invasive Lines:    3/28/2021 left IJ CVC    MV:   3/28/2021    Vent Mode: AC/VC Rate Set: 18 bmp/Vt Ordered: 500 mL/ /FiO2 : 30 %  Recent Labs     21  0346 21  0542   PHART 7.471* 7.483*   KCM1KUM 35.0 36.4   PO2ART 46.7* 85.0       IV:   sodium chloride      sodium chloride 100 mL/hr at 21 5507    midazolam 2 mg/hr (21 1423)    fentaNYL 200 mcg/hr (21 050)    dextrose      heparin (PORCINE) Infusion 17.9 mL/hr (21 1904)    norepinephrine Stopped (21 030)    vasopressin (Septic Shock) infusion 0.03 Units/min (21 0507)    phenylephrine (HENNA-SYNEPHRINE) 50mg/250mL infusion Stopped (21)       Vitals:  Blood pressure (!) 96/53, pulse 90, temperature 98.5 °F (36.9 °C), temperature source Axillary, resp. rate 18, height 6' 1\" (1.854 m), weight 283 lb 9.6 oz (128.6 kg), SpO2 99 %. on vent  Temp  Av °F (37.2 °C)  Min: 98.5 °F (36.9 °C)  Max: 99.7 °F (37.6 °C)    Intake/Output Summary (Last 24 hours) at 3/30/2021 0616  Last data filed at 3/29/2021 2200  Gross per 24 hour   Intake 1839 ml   Output 1225 ml   Net 614 ml     EXAM:  General: intubated, ill appearing    ENT: Pharynx with ETT. Resp: No crackles. No wheezing. CV: S1, S2. +edema  GI: NT, ND, +BS  Skin: Warm and dry. Neuro: PERRL. Sedated, not following commands.  Patellar reflexes are symmetric    Scheduled Meds:   insulin lispro  0-6 Units Subcutaneous Q4H    cefepime  1,000 mg Intravenous Q12H    calcium gluconate  1,000 mg Intravenous Once    chlorhexidine  15 mL Mouth/Throat BID    lidocaine 1 % injection  5 mL Intradermal Once    sodium chloride flush  10 mL Intravenous 2 times per day    nystatin  5 mL Oral 4x Daily    docusate sodium  100 mg Oral Daily    sucralfate  1 g Oral 4x Daily AC & HS    pantoprazole  40 mg Intravenous BID    folic acid  1 mg Oral Daily    citalopram  10 mg Oral Daily    dicyclomine  10 mg Oral BID    sodium chloride flush  10 mL Intravenous 2 times per day     PRN Meds:  sodium chloride, midazolam, glucose, dextrose, glucagon (rDNA), dextrose, heparin (porcine), heparin (porcine), fentanNYL, sodium chloride flush, HYDROmorphone, phenol, oxyCODONE-acetaminophen **OR** oxyCODONE-acetaminophen, clonazePAM, sodium chloride flush, [DISCONTINUED] promethazine **OR** ondansetron, polyethylene glycol, acetaminophen **OR** acetaminophen, promethazine    Results:  CBC:   Recent Labs     03/28/21  1319 03/29/21  0605 03/29/21  1245 03/30/21  0530   WBC 30.1* 30.1*  --  18.6*   HGB 8.4* 6.7* 7.9* 7.1*   HCT 26.4* 20.8* 24.0* 21.2*   .4* 102.7*  --  98.6   * 571*  --  339     BMP:   Recent Labs     03/28/21  1319 03/29/21  0605 03/30/21  0530   * 132* 130*   K 4.9 3.5 3.0*   CL 96* 98* 97*   CO2 11* 26 26   PHOS 4.7  --  1.5*   BUN 24* 24* 24*   CREATININE 2.8* 1.2 <0.5*     LIVER PROFILE:   Recent Labs     03/28/21  0955 03/29/21  0605 03/30/21  0530   AST 1,119* 739* 314*   * 276* 182*   LIPASE  --   --  37.0   BILIDIR 5.5* 3.5* 3.1*   BILITOT 7.3* 4.1* 3.7*   ALKPHOS 215* 173* 170*       Cultures:  3/28/2021 blood Klebsiella  3/28/2021 tracheal aspirate pending  3/29/2021 C. difficile toxin negative    Films:  3/29/2021 CXR interstitial edema    ASSESSMENT:  · Acute Respiratory Failure   · Gram-negative bacteremia   · Septic shock  · Lactic acidosis  · H/O partial colectomy  · Acute kidney failure   · Alcohol abuse  · Recurrent pancreatitis  · Remote h/o heroin abuse  · Recent admission for pancreatitis, SP MRCP  · Portal vein thrombosis with extension to the SMV and splenic veins on 3/20/2021  · Pulmonary nodules    PLAN:  Mechanical ventilation as per my orders.   The ventilator was adjusted by me at the bedside for unstable, life threatening respiratory failure. Considering trial of extubation   IV Versed and fentanyl  Head of bed 30 degrees or higher at all times  · Daily spontaneous breathing trial    Cefepime D#3  IV levophed and vasopressin to maintain MAP of 65  Replace electrolytes    TPN    Blood sugar control, with goal 140-180    · Eventually will require follow-up imaging  · Spoke with mother yesterday (Trixie Lal in dietary)     Total critical care time caring for this patient with life threatening, unstable organ failure, including direct patient contact, management of life support systems, review of data including imaging and labs, discussions with other team members and physicians is 32 minutes so far today, excluding procedures.

## 2021-03-30 NOTE — PROGRESS NOTES
Spoke with Dr Vero Hanna and precedex ordered and started. Pt c/o pain and medicated for pain. Mother stopped in to see pt.

## 2021-03-30 NOTE — PROGRESS NOTES
Comprehensive Nutrition Assessment    Type and Reason for Visit:  Reassess, Consult(consult for IR dobhoff placement on 3/31/21)    Nutrition Recommendations/Plan:   1. Will continue NPO status until patient is medically cleared to receive nutrition therapy  2. TF recommendations via dobhoff - Vital High-Protein (\"low calorie, high-protein\" formula name in Epic) with a goal rate of 85 ml/hr x 20 hours. Start with 20 ml/hr and increase by 15 ml every 4-6 hours, as tolerated by patient, until goal rate can be achieved and maintained. Water flushes, 30 ml every 4 hours or per MD guidance. Please administer one proteinex P2Go once daily via feeding tube to meet patient's protein needs that formula alone cannot. 3. Monitor bowels, N/V, wt trends  4. Please obtain updated weight (last weight 3/22/21)      Nutrition Assessment:  Pt has declined from a nutritional standpoint since last RD assessment AEB TF stopped on 3/29 + current NPO status; pt is at risk for further compromise d/t ongoing N/V, abdominal pain, ongoing poor po intake, and need for intubation/sedation; will continue NPO status and provide TPN recommendations, per pharmacy request    Malnutrition Assessment:  Malnutrition Status: Moderate malnutrition (moderate malnutrition in the context of acute illness or injury < 3 months based on 50% or less of estimated energy requirements for 5 or more days, 1-2% weight loss over 1 week, and mild muscle loss in hands (interosseous)), per guidelines from Academy of Nutrition and Dietetics (Academy)/American Society for Parenteral and Enteral Nutrition (A.S.P.E.N.) - clinical characteristics that the clinician can  obtain and document to support a diagnosis of malnutrition.    Context:  Acute Illness     Findings of the 6 clinical characteristics of malnutrition:  Energy Intake:  7 - 50% or less of estimated energy requirements for 5 or more days  Weight Loss:  1 - 1% to 2% over 1 week     Body Fat Loss:  Unable to assess(pt in radiology)     Muscle Mass Loss:  1 - Mild muscle mass loss Hand (interosseous)  Fluid Accumulation:  No significant fluid accumulation Extremities(BUE +1 edema)   Strength:  Not Performed    Estimated Daily Nutrient Needs:  Energy (kcal):  0157-3429 kcals/day based on 11-14 kcals/kg/CBW; Weight Used for Energy Requirements:  Current(obtained 3/22)     Protein (g):  168-185 g protein/day based on 2-2.2 g/kg/IBW; Weight Used for Protein Requirements:  Ideal        Fluid (ml/day):  4314-3860 ml; Method Used for Fluid Requirements:  1 ml/kcal      Nutrition Related Findings:  pt extubated this am after breathing trial, placed on 4L NC, however, pt currently in radiology; pt is non-compliant with staff; abdomen is soft, rounded, BS active; + last BM 3/30 (diarrhea); ongoing N/V and diarrhea during admission; generalized non-pitting edema + BUE non-pitting edema;  Na, Ca, Phosphorus and Cr levels are low, BUN elevated at this time; pt has insulin ordered at this time      Wounds:  None       Current Nutrition Therapies:    Diet NPO Effective Now    Anthropometric Measures:  · Height: 6' 1\" (185.4 cm)  · Current Body Weight: 283 lb 9.6 oz (128.6 kg)(obtained 3/22/21)   · Admission Body Weight: 288 lb 4.8 oz (130.8 kg)(obtained 3/13/21 - bed scale)    · Usual Body Weight: 287 lb (130.2 kg)(obtained 5/18/20 - unspecified method)     · Ideal Body Weight: 184 lbs; % Ideal Body Weight 154.1 %   · BMI: 37.4  · BMI Categories: Obese Class 2 (BMI 35.0 -39.9)       Nutrition Diagnosis:   · Moderate malnutrition related to altered GI function, altered GI structure, inadequate protein-energy intake, impaired respiratory function as evidenced by NPO or clear liquid status due to medical condition, GI abnormality, nausea, vomiting, diarrhea      Nutrition Interventions:   Food and/or Nutrient Delivery:  Continue NPO, Start Tube Feeding  Nutrition Education/Counseling:  No recommendation at this time   Coordination of Nutrition Care:  Continue to monitor while inpatient, Interdisciplinary Rounds    Goals:  pt will adhere to NPO diet status until medically cleared to receive nutrition therapy       Nutrition Monitoring and Evaluation:   Behavioral-Environmental Outcomes:  None Identified   Food/Nutrient Intake Outcomes:  Diet Advancement/Tolerance, Enteral Nutrition Intake/Tolerance  Physical Signs/Symptoms Outcomes:  Diarrhea, GI Status, Nausea or Vomiting, Hemodynamic Status, Weight, Nutrition Focused Physical Findings     Discharge Planning:     Too soon to determine     Electronically signed by John Patricia on 3/30/21 at [address] [facility] EDT    Contact: [phone]

## 2021-03-30 NOTE — PLAN OF CARE
Nutrition Problem #1: Inadequate oral intake  Intervention: Food and/or Nutrient Delivery: Continue NPO  Nutritional Goals: pt will adhere to NPO diet status until medically cleared to receive nutrition therapy

## 2021-03-30 NOTE — PROGRESS NOTES
1,000 mg Intravenous Q12H    calcium gluconate  1,000 mg Intravenous Once    chlorhexidine  15 mL Mouth/Throat BID    lidocaine 1 % injection  5 mL Intradermal Once    sodium chloride flush  10 mL Intravenous 2 times per day    nystatin  5 mL Oral 4x Daily    docusate sodium  100 mg Oral Daily    sucralfate  1 g Oral 4x Daily AC & HS    pantoprazole  40 mg Intravenous BID    folic acid  1 mg Oral Daily    citalopram  10 mg Oral Daily    dicyclomine  10 mg Oral BID    sodium chloride flush  10 mL Intravenous 2 times per day     I   sodium chloride      sodium chloride 100 mL/hr at 03/30/21 0608    midazolam 2 mg/hr (03/29/21 1423)    fentaNYL 200 mcg/hr (03/30/21 0507)    dextrose      heparin (PORCINE) Infusion 17.9 mL/hr (03/29/21 1904)    norepinephrine Stopped (03/30/21 0301)    vasopressin (Septic Shock) infusion 0.03 Units/min (03/30/21 0507)    phenylephrine (HENNA-SYNEPHRINE) 50mg/250mL infusion Stopped (03/28/21 2222)     sodium chloride, midazolam, glucose, dextrose, glucagon (rDNA), dextrose, heparin (porcine), heparin (porcine), fentanNYL, sodium chloride flush, HYDROmorphone, phenol, oxyCODONE-acetaminophen **OR** oxyCODONE-acetaminophen, clonazePAM, sodium chloride flush, [DISCONTINUED] promethazine **OR** ondansetron, polyethylene glycol, acetaminophen **OR** acetaminophen, promethazine    Lab Data:  Recent Labs     03/28/21  1319 03/29/21  0605 03/29/21  1245 03/30/21  0530   WBC 30.1* 30.1*  --  18.6*   HGB 8.4* 6.7* 7.9* 7.1*   HCT 26.4* 20.8* 24.0* 21.2*   .4* 102.7*  --  98.6   * 571*  --  339     Recent Labs     03/28/21  1319 03/29/21  0605 03/30/21  0530   * 132* 130*   K 4.9 3.5 3.0*   CL 96* 98* 97*   CO2 11* 26 26   PHOS 4.7  --  1.5*   BUN 24* 24* 24*   CREATININE 2.8* 1.2 <0.5*     No results for input(s): CKTOTAL, CKMB, CKMBINDEX, TROPONINI in the last 72 hours.     Coagulation:   Lab Results   Component Value Date    INR 3.73 03/28/2021    APTT 77.0 03/30/2021     Cardiac markers:   Lab Results   Component Value Date    TROPONINI <0.01 03/14/2021         Lab Results   Component Value Date     (H) 03/30/2021     (H) 03/30/2021    ALKPHOS 170 (H) 03/30/2021    BILITOT 3.7 (H) 03/30/2021       Lab Results   Component Value Date    INR 3.73 (H) 03/28/2021    INR 3.31 (H) 03/27/2021    INR 1.28 (H) 10/04/2019    PROTIME 43.8 (H) 03/28/2021    PROTIME 38.9 (H) 03/27/2021    PROTIME 14.6 (H) 10/04/2019     Cultures:  Blood: pseudomonas    Radiology    XR CHEST PORTABLE   Preliminary Result   1. Stable appropriate positions of support apparatus. 2. Interval improvement in appearance of bibasilar pneumonia. XR CHEST PORTABLE   Final Result   Increased diffuse interstitial prominence suggests pulmonary edema. Stable support lines. XR CHEST PORTABLE   Final Result   Tubes and lines as above. Low lung volumes with probable basilar atelectasis. CT ABDOMEN PELVIS WO CONTRAST Additional Contrast? None   Final Result   Findings consistent with acute on chronic pancreatitis. Small amount of free fluid in the pelvis. No pseudocyst is identified. Extensive fatty infiltration of the liver. Nonobstructive bilateral nephrolithiasis. Punctate caliceal stones are noted   bilaterally. Multiple small ventral hernias. 2 hernias in the periumbilical midline   contain small bowel. No incarceration is evident. IR GI TUBE W FLUOROSCOPY   Final Result   Successful placement of nasal jejunal tube as discussed. CT ABDOMEN PELVIS W IV CONTRAST Additional Contrast? None   Final Result   There is complete thrombosis of the portal vein. Thrombus is also seen   extending into the cephalad portion of the superior mesenteric vein and   throughout the splenic vein. Peripancreatic stranding consistent with pancreatitis.   There is a small area   of low-attenuation in the region the pancreatic head which may correspond to   focal dilated pancreatic duct or a small developing fluid collection. Hepatic steatosis. The findings were sent to the Radiology Results [address] at 3:41   pm on 3/22/2021to be communicated to a licensed caregiver. MRI ABDOMEN WO CONTRAST MRCP   Final Result   *Normal caliber bile ducts without evidence of choledocholithiasis. *Hepatomegaly with severe fatty infiltration as described. *Mildly atrophic pancreas with ductal dilatation as measured and described   above, findings likely sequela of chronic pancreatitis. *Large amount of gallbladder sludge. US GALLBLADDER RUQ   Final Result   1. Gallbladder sludge with nonspecific wall thickening. 2. Abnormal common duct dilatation. MRCP and/or ERCP would be options to   further evaluate. XR CHEST PORTABLE    (Results Pending)       Assessment & Plan:      Recurrent pancreatitis   - remote hx of alcohol related pancreatitis but reportedly quit 4 months ago  - did not improve with conservative mx . Recurrent and persistent nausea, emesis and worsening per CT , unable to tolerate oral diet  GI started post-pyloric tube feedings on 3/25. He did not tolerate advancing tube feedings. Complicated clinical course with development of poral vein thrombosis, septic shock with hypotension, renal failure, hyperkalemia and resp failure requiring vent support    - restart NJ Tube feeds   See below      Sepsis  - not POA -   - he is hypotensive, tachycardic, WBC worsened at 38.6  - his abdominal pain is worse and ct with worsening pancreatitis,  - blood cx with Klebsiella pneumoniae   - added vanc, Cefepime. - severe hypotensive shock on pressors , critical care managing    REDDY  Hyperkalemia  AGMA  - nephrology consulted  - started on BiCarb fluids  - Treated hyperkalemia with insulin, D50  - maintaining good UOP     Portal Vein Thrombosis.    - extending into SMV and splenic vein- likely with severe pancreatitis - started on high dose heparin drip   cont heparin gtt . Holding Eliquis for coagulopathy,     Abn LFT   - no CBD stone per MRCP, Mildly atrophic pancreas with ductal dilatation noted but with gall bladder sludge  - bili improving  - pt reports no alcohol since 4 months   - Refer to [facility] at [facility] for cholecystectomy     Hyponatremia  - likely related to fluid volume depletion  - Give IVF's  - monitor BMP. 132 today. Normocytic  Anemia  Folic Acid Deficiency  - 9.4 on admission. Trended down with IVF  Monitor  - folate def noted, started on supplements   - hgb 6.7   - given 1 unit PRBC with repeat at 7.1     Anxiety   - Continue Celexa, Klonopin prn       Severe PCM  -Attempted on post-pyloric tube feedings.    - Will need J-tube placed.      H/o alcohol abuse   - noted during previous admission  - Admission last year for acute pancreatitis due to alcohol abuse.      Central line placed on 3/28 d/t poor access     DVT Prophylaxis: holding heparin , INR high   Diet: Diet NPO Effective Now- can start [facility]  Code Status: Full Code      Alberto Lee MD 3/30/2021 7:32 AM

## 2021-03-30 NOTE — PROGRESS NOTES
03/30/21 0706   Vent Information   Skin Assessment Clean, dry, & intact   Vent Type 980   Vent Mode AC/VC   Vt Ordered 500 mL   Rate Set 18 bmp   Peak Flow 60 L/min   FiO2  30 %   SpO2 100 %   SpO2/FiO2 ratio 333.33   Sensitivity 3   PEEP/CPAP 5   Humidification Source Heated wire   Humidification Temp 37   Humidification Temp Measured 36.9   Vent Patient Data   Peak Inspiratory Pressure 23 cmH2O   Mean Airway Pressure 9 cmH20   Vt Exhaled 560 mL   Minute Volume 10.1 Liters   I:E Ratio 1:2.7   Plateau Pressure 13 PDF78   Static Compliance 64 mL/cmH2O   Dynamic Compliance 14 mL/cmH2O   Total PEEP 5 cmH20   Auto PEEP 0 cmH20   Cough/Sputum   Sputum How Obtained None   Spontaneous Breathing Trial (SBT) RT Doc   Pulse 96   Breath Sounds   Right Upper Lobe Clear   Right Middle Lobe Clear   Right Lower Lobe Diminished   Left Upper Lobe Clear   Left Lower Lobe Diminished   Additional Respiratory  Assessments   Resp 18   Position Semi-Barry's   Oral Care Completed? Yes   Oral Care Mouth suctioned   Subglottic Suction Done?  Yes   Alarm Settings   High Pressure Alarm 50 cmH2O   Low Minute Volume Alarm 5 L/min   Apnea (secs) 20 secs   High Respiratory Rate 40 br/min   Low Exhaled Vt  300 mL   ETT (adult)   Placement Date/Time: 03/28/21 1400   Timeout: Patient;Procedure;Site/Side  Preoxygenation: Yes  Tube Size: 8 mm  Location: Oral   Secured at 25 cm   Measured From Lips   ET Placement Left   Secured By Commercial tube watters   Site Condition Dry   Cuff Pressure 27 cm H2O

## 2021-03-30 NOTE — PROGRESS NOTES
PROGRESS NOTE  S:40 yrs Patient  admitted on 3/13/2021 with Acute cholecystitis [K81.0] . Today he remains intubated. Plans for extubation this morning. He appears jaundiced. Exam:   Vitals:    03/30/21 1120   BP: 84/60   Pulse:    Resp:    Temp:    SpO2:       General appearance: appears older than stated age, cooperative, icteric, moderately obese and syndromic appearance - intubated, chronically ill appearing  HEENT: Oropharynx clear, no lesions  Neck: no adenopathy and supple, symmetrical, trachea midline  Lungs: clear to auscultation bilaterally  Heart: regular rate and rhythm, S1, S2 normal, no murmur, click, rub or gallop  Abdomen: normal findings: no masses palpable, soft, non-tender and symmetric and abnormal findings:  hypoactive bowel sounds  Extremities: extremities normal, atraumatic, no cyanosis or edema     Medications: Reviewed    Labs:  CBC:   Recent Labs     03/28/21  1319 03/29/21  0605 03/29/21  1245 03/30/21  0530   WBC 30.1* 30.1*  --  18.6*   HGB 8.4* 6.7* 7.9* 7.1*   HCT 26.4* 20.8* 24.0* 21.2*   .4* 102.7*  --  98.6   * 571*  --  339     BMP:   Recent Labs     03/28/21  1319 03/29/21  0605 03/30/21  0530   * 132* 130*   K 4.9 3.5 3.0*   CL 96* 98* 97*   CO2 11* 26 26   PHOS 4.7  --  1.5*   BUN 24* 24* 24*   CREATININE 2.8* 1.2 <0.5*     LIVER PROFILE:   Recent Labs     03/28/21  0955 03/29/21  0605 03/30/21  0530   AST 1,119* 739* 314*   * 276* 182*   LIPASE  --   --  37.0   PROT 6.2* 5.3* 4.8*   BILIDIR 5.5* 3.5* 3.1*   BILITOT 7.3* 4.1* 3.7*   ALKPHOS 215* 173* 170*     PT/INR:   Recent Labs     03/28/21  0955   INR 3.73*       IMAGING:  XR CHEST PORTABLE  Impression   1. Stable appropriate positions of support apparatus. 2. Interval improvement in appearance of bibasilar pneumonia. Attending Supervising [de-identified] Attestation Statement  The patient is a 41 y.o. male.  I have performed a history and physical examination of the patient. I discussed the case with my physician assistant Aisha Page PA-C    I reviewed the patient's Past Medical History, Past Surgical History, Medications, and Allergies. Physical Exam:  Vitals:    03/30/21 1600 03/30/21 1603 03/30/21 1700 03/30/21 1800   BP: (!) 105/55  115/66 111/67   Pulse: 72  68 69   Resp: 10  15 15   Temp: 96.6 °F (35.9 °C)      TempSrc: Axillary      SpO2: 95%  90% 91%   Weight:       Height:  6' 1\" (1.854 m)         Physical Examination: General appearance - ill-appearing  Mental status - drowsy  Eyes - pupils equal and reactive, extraocular eye movements intact  Neck - supple, no significant adenopathy  Chest - clear to auscultation, no wheezes, rales or rhonchi, symmetric air entry  Heart - normal rate, regular rhythm, normal S1, S2, no murmurs, rubs, clicks or gallops  Abdomen - soft, nontender, nondistended, no masses or organomegaly  Extremities - no pedal edema noted          Impression: 41year old male with a history of HTN, arthritis, diverticulitis s/p partial colectomy and reversal of ileostomy, alcohol abuse, admitted with recurrent acute pancreatitis likely secondary to biliary sludge vs alcohol c/b portal vein thrombosis and evolving pseudocyst. Hospital course complicated by sepsis and respiratory failure. Recommendation:  Continue supportive care  Monitor LFTs  NPO, IVF, pain control  Continue Abx and heparin  Continue PPI and Carafate  He is high risk for TPN given bacteremia. Recommend NJ tube for post pyloric feeds once extubated  Will follow      Dandre Easley PA-C  11:26 AM 3/30/2021                      41year old male with a history of HTN, arthritis, diverticulitis s/p partial colectomy and reversal of ileostomy, alcohol abuse, admitted with recurrent acute pancreatitis c/b portal vein thrombosis and evolving pseudocyst. Hospital course complicated by sepsis and respiratory failure. Continue supportive care.  NPO, IVF,

## 2021-03-31 ENCOUNTER — APPOINTMENT (OUTPATIENT)
Dept: GENERAL RADIOLOGY | Age: 41
DRG: 282 | End: 2021-03-31
Attending: HOSPITALIST
Payer: MEDICAID

## 2021-03-31 ENCOUNTER — APPOINTMENT (OUTPATIENT)
Dept: INTERVENTIONAL RADIOLOGY/VASCULAR | Age: 41
DRG: 282 | End: 2021-03-31
Attending: HOSPITALIST
Payer: MEDICAID

## 2021-03-31 LAB
ANION GAP SERPL CALCULATED.3IONS-SCNC: 11 MMOL/L (ref 3–16)
ANISOCYTOSIS: ABNORMAL
APTT: 69.2 SEC (ref 24.2–36.2)
APTT: 69.6 SEC (ref 24.2–36.2)
BANDED NEUTROPHILS RELATIVE PERCENT: 3 % (ref 0–7)
BASOPHILS ABSOLUTE: 0.2 K/UL (ref 0–0.2)
BASOPHILS RELATIVE PERCENT: 1 %
BLOOD CULTURE, ROUTINE: ABNORMAL
BLOOD CULTURE, ROUTINE: ABNORMAL
BUN BLDV-MCNC: 19 MG/DL (ref 7–20)
CALCIUM SERPL-MCNC: 7.9 MG/DL (ref 8.3–10.6)
CHLORIDE BLD-SCNC: 96 MMOL/L (ref 99–110)
CO2: 25 MMOL/L (ref 21–32)
CREAT SERPL-MCNC: <0.5 MG/DL (ref 0.9–1.3)
CULTURE, BLOOD 2: ABNORMAL
CULTURE, RESPIRATORY: ABNORMAL
CULTURE, RESPIRATORY: ABNORMAL
EOSINOPHILS ABSOLUTE: 0.5 K/UL (ref 0–0.6)
EOSINOPHILS RELATIVE PERCENT: 3 %
GFR AFRICAN AMERICAN: >60
GFR NON-AFRICAN AMERICAN: >60
GLUCOSE BLD-MCNC: 120 MG/DL (ref 70–99)
GLUCOSE BLD-MCNC: 136 MG/DL (ref 70–99)
GLUCOSE BLD-MCNC: 136 MG/DL (ref 70–99)
GLUCOSE BLD-MCNC: 146 MG/DL (ref 70–99)
GLUCOSE BLD-MCNC: 160 MG/DL (ref 70–99)
GLUCOSE BLD-MCNC: 161 MG/DL (ref 70–99)
GRAM STAIN RESULT: ABNORMAL
HCT VFR BLD CALC: 22.5 % (ref 40.5–52.5)
HEMOGLOBIN: 7.5 G/DL (ref 13.5–17.5)
LYMPHOCYTES ABSOLUTE: 1.2 K/UL (ref 1–5.1)
LYMPHOCYTES RELATIVE PERCENT: 7 %
MAGNESIUM: 1.7 MG/DL (ref 1.8–2.4)
MCH RBC QN AUTO: 33.1 PG (ref 26–34)
MCHC RBC AUTO-ENTMCNC: 33.2 G/DL (ref 31–36)
MCV RBC AUTO: 99.6 FL (ref 80–100)
MONOCYTES ABSOLUTE: 1.4 K/UL (ref 0–1.3)
MONOCYTES RELATIVE PERCENT: 8 %
MYELOCYTE PERCENT: 3 %
NEUTROPHILS ABSOLUTE: 14.4 K/UL (ref 1.7–7.7)
NEUTROPHILS RELATIVE PERCENT: 74 %
NUCLEATED RED BLOOD CELLS: 1 /100 WBC
ORGANISM: ABNORMAL
PDW BLD-RTO: 21.5 % (ref 12.4–15.4)
PERFORMED ON: ABNORMAL
PHOSPHORUS: 2 MG/DL (ref 2.5–4.9)
PLATELET # BLD: 327 K/UL (ref 135–450)
PLATELET SLIDE REVIEW: ADEQUATE
PMV BLD AUTO: 7.6 FL (ref 5–10.5)
POLYCHROMASIA: ABNORMAL
POTASSIUM SERPL-SCNC: 3.6 MMOL/L (ref 3.5–5.1)
PROMYELOCYTES PERCENT: 1 %
RBC # BLD: 2.26 M/UL (ref 4.2–5.9)
SLIDE REVIEW: ABNORMAL
SODIUM BLD-SCNC: 132 MMOL/L (ref 136–145)
STOMATOCYTES: ABNORMAL
TARGET CELLS: ABNORMAL
WBC # BLD: 17.8 K/UL (ref 4–11)

## 2021-03-31 PROCEDURE — 94761 N-INVAS EAR/PLS OXIMETRY MLT: CPT

## 2021-03-31 PROCEDURE — 6370000000 HC RX 637 (ALT 250 FOR IP): Performed by: NURSE PRACTITIONER

## 2021-03-31 PROCEDURE — 2580000003 HC RX 258: Performed by: HOSPITALIST

## 2021-03-31 PROCEDURE — 2500000003 HC RX 250 WO HCPCS: Performed by: HOSPITALIST

## 2021-03-31 PROCEDURE — 99233 SBSQ HOSP IP/OBS HIGH 50: CPT | Performed by: INTERNAL MEDICINE

## 2021-03-31 PROCEDURE — 80048 BASIC METABOLIC PNL TOTAL CA: CPT

## 2021-03-31 PROCEDURE — 6360000002 HC RX W HCPCS: Performed by: NURSE PRACTITIONER

## 2021-03-31 PROCEDURE — 2580000003 HC RX 258: Performed by: NURSE PRACTITIONER

## 2021-03-31 PROCEDURE — 84100 ASSAY OF PHOSPHORUS: CPT

## 2021-03-31 PROCEDURE — 85025 COMPLETE CBC W/AUTO DIFF WBC: CPT

## 2021-03-31 PROCEDURE — 6360000002 HC RX W HCPCS: Performed by: PHYSICIAN ASSISTANT

## 2021-03-31 PROCEDURE — 2000000000 HC ICU R&B

## 2021-03-31 PROCEDURE — 6360000002 HC RX W HCPCS: Performed by: INTERNAL MEDICINE

## 2021-03-31 PROCEDURE — 2500000003 HC RX 250 WO HCPCS: Performed by: INTERNAL MEDICINE

## 2021-03-31 PROCEDURE — 2700000000 HC OXYGEN THERAPY PER DAY

## 2021-03-31 PROCEDURE — 6370000000 HC RX 637 (ALT 250 FOR IP): Performed by: INTERNAL MEDICINE

## 2021-03-31 PROCEDURE — 83735 ASSAY OF MAGNESIUM: CPT

## 2021-03-31 PROCEDURE — 2580000003 HC RX 258: Performed by: INTERNAL MEDICINE

## 2021-03-31 PROCEDURE — 85730 THROMBOPLASTIN TIME PARTIAL: CPT

## 2021-03-31 PROCEDURE — C9113 INJ PANTOPRAZOLE SODIUM, VIA: HCPCS | Performed by: PHYSICIAN ASSISTANT

## 2021-03-31 PROCEDURE — 99232 SBSQ HOSP IP/OBS MODERATE 35: CPT | Performed by: SURGERY

## 2021-03-31 RX ORDER — MAGNESIUM SULFATE IN WATER 40 MG/ML
2000 INJECTION, SOLUTION INTRAVENOUS ONCE
Status: COMPLETED | OUTPATIENT
Start: 2021-03-31 | End: 2021-03-31

## 2021-03-31 RX ADMIN — INSULIN LISPRO 1 UNITS: 100 INJECTION, SOLUTION INTRAVENOUS; SUBCUTANEOUS at 15:44

## 2021-03-31 RX ADMIN — PANTOPRAZOLE SODIUM 40 MG: 40 INJECTION, POWDER, FOR SOLUTION INTRAVENOUS at 21:04

## 2021-03-31 RX ADMIN — CEFTRIAXONE 2000 MG: 2 INJECTION, POWDER, FOR SOLUTION INTRAMUSCULAR; INTRAVENOUS at 12:42

## 2021-03-31 RX ADMIN — Medication 10 ML: at 08:33

## 2021-03-31 RX ADMIN — FOLIC ACID: 5 INJECTION, SOLUTION INTRAMUSCULAR; INTRAVENOUS; SUBCUTANEOUS at 12:37

## 2021-03-31 RX ADMIN — SODIUM CHLORIDE, PRESERVATIVE FREE 10 ML: 5 INJECTION INTRAVENOUS at 08:33

## 2021-03-31 RX ADMIN — INSULIN LISPRO 1 UNITS: 100 INJECTION, SOLUTION INTRAVENOUS; SUBCUTANEOUS at 00:19

## 2021-03-31 RX ADMIN — POTASSIUM PHOSPHATE, MONOBASIC AND POTASSIUM PHOSPHATE, DIBASIC 30 MMOL: 224; 236 INJECTION, SOLUTION, CONCENTRATE INTRAVENOUS at 12:37

## 2021-03-31 RX ADMIN — Medication 15 ML: at 08:31

## 2021-03-31 RX ADMIN — NYSTATIN 500000 UNITS: 500000 SUSPENSION ORAL at 08:31

## 2021-03-31 RX ADMIN — SODIUM CHLORIDE, POTASSIUM CHLORIDE, SODIUM LACTATE AND CALCIUM CHLORIDE: 600; 310; 30; 20 INJECTION, SOLUTION INTRAVENOUS at 06:21

## 2021-03-31 RX ADMIN — MAGNESIUM SULFATE HEPTAHYDRATE 2000 MG: 40 INJECTION, SOLUTION INTRAVENOUS at 12:37

## 2021-03-31 RX ADMIN — HEPARIN SODIUM 15 ML/HR: 10000 INJECTION, SOLUTION INTRAVENOUS at 00:27

## 2021-03-31 RX ADMIN — Medication 15 ML: at 21:04

## 2021-03-31 RX ADMIN — Medication 10 ML: at 21:04

## 2021-03-31 RX ADMIN — INSULIN LISPRO 1 UNITS: 100 INJECTION, SOLUTION INTRAVENOUS; SUBCUTANEOUS at 08:27

## 2021-03-31 RX ADMIN — NYSTATIN 500000 UNITS: 500000 SUSPENSION ORAL at 12:37

## 2021-03-31 RX ADMIN — Medication 0.7 MCG/KG/HR: at 22:41

## 2021-03-31 RX ADMIN — PANTOPRAZOLE SODIUM 40 MG: 40 INJECTION, POWDER, FOR SOLUTION INTRAVENOUS at 08:31

## 2021-03-31 RX ADMIN — CEFEPIME HYDROCHLORIDE 2000 MG: 2 INJECTION, POWDER, FOR SOLUTION INTRAVENOUS at 08:32

## 2021-03-31 ASSESSMENT — PAIN SCALES - WONG BAKER
WONGBAKER_NUMERICALRESPONSE: 0

## 2021-03-31 ASSESSMENT — PAIN DESCRIPTION - LOCATION: LOCATION: ABDOMEN

## 2021-03-31 NOTE — PROGRESS NOTES
[facility]    Follow-up  Note           Reason for Consult: REDDY, hyperkalemia, metabolic acidosis  Requesting Physician:  Dr. Birdie Macedo history  Levophed at 6-->3 mics per minute and vasopressin at 0.03  Renal function has improved  postpyloric feeding tube placed on 3/31 by IR    Last 24 h uop 1.5 L    ROS: Unable to obtain  PSFH: NO visitor    Scheduled Meds:   cefepime  2,000 mg Intravenous Q12H    insulin lispro  0-6 Units Subcutaneous Q4H    calcium gluconate  1,000 mg Intravenous Once    chlorhexidine  15 mL Mouth/Throat BID    lidocaine 1 % injection  5 mL Intradermal Once    sodium chloride flush  10 mL Intravenous 2 times per day    nystatin  5 mL Oral 4x Daily    [Held by provider] sucralfate  1 g Oral 4x Daily AC & HS    pantoprazole  40 mg Intravenous BID    [Held by provider] citalopram  10 mg Oral Daily    [Held by provider] dicyclomine  10 mg Oral BID    sodium chloride flush  10 mL Intravenous 2 times per day     Continuous Infusions:   dexmedetomidine HCl in NaCl 1.002 mcg/kg/hr (03/31/21 1100)    lactated ringers 75 mL/hr at 03/31/21 1100    sodium chloride      dextrose      heparin (PORCINE) Infusion 15 mL/hr (03/31/21 1100)    norepinephrine 3 mcg/min (03/31/21 1100)    vasopressin (Septic Shock) infusion 0.03 Units/min (03/31/21 1100)     PRN Meds:.docusate sodium, sodium chloride, glucose, dextrose, glucagon (rDNA), dextrose, heparin (porcine), heparin (porcine), sodium chloride flush, HYDROmorphone, phenol, oxyCODONE-acetaminophen **OR** oxyCODONE-acetaminophen, clonazePAM, sodium chloride flush, [DISCONTINUED] promethazine **OR** ondansetron, polyethylene glycol, acetaminophen **OR** acetaminophen, promethazine    History of Present Illness on 3/28/2021:    41 y.o. yo male with PMH of heroin and alcohol abuse, hypertension, history of ileostomy who is admitted for acute pancreatitis on 3/13/2021  Patient presented then with complaints of nausea vomiting abdominal pain 2 weeks prior to admission. He had been in medical surgical floor since then with pain control and IV fluid for pancreatitis. Patient had portal vein thrombosis noted on a CT scan of abdomen pelvis with IV contrast done on 3/22  On 3/26 nasojejunal tube was placed and he was started on postpyloric feeding. He had significant nausea vomiting and pulled his NG tube out on 3/28. He also had diarrhea on 3/27  Patient was on D5 half-normal saline with 20 of potassium chloride which was stopped on 3/25. No documentation of IV fluid on 3/26. On 3/27 saline was started at 100 mL/h but according to the nurses due to poor IV access it had not been continued as planned  Patient's blood pressure dropped to high 90s couple of times on 3/27. He also has been tachycardic in the 90s to 100 throughout his stay with occasionally going up to 140s 150s in the last 48 hours. Physical exam:   Constitutional:  VITALS:  BP 93/66 Comment: Simultaneous filing. User may not have seen previous data. Pulse 61 Comment: Simultaneous filing. User may not have seen previous data. Temp 96.8 °F (36 °C) (Axillary)   Resp 19 Comment: Simultaneous filing. User may not have seen previous data. Ht 6' 1\" (1.854 m)   Wt 283 lb 9.6 oz (128.6 kg)   SpO2 97% Comment: Simultaneous filing. User may not have seen previous data.   BMI 37.42 kg/m²   Gen: Drowsy   Neck: no bruits or jvd noted, thyroid normal  Cardiovascular:  S1, S2 without m/r/g; no lower extremity edema  Respiratory: Diminished  Abdomen:  +bs, soft, nt, nd, no hepatosplenomegaly  Neuro/Psy: Sleeping    Data/  Recent Labs     03/29/21  0605 03/29/21  1245 03/30/21  0530 03/31/21  0524   WBC 30.1*  --  18.6* 17.8*   HGB 6.7* 7.9* 7.1* 7.5*   HCT 20.8* 24.0* 21.2* 22.5*   .7*  --  98.6 99.6   *  --  339 327     Recent Labs     03/30/21  0530 03/30/21  1400 03/31/21  0524   * 128* 132*   K 3.0* 3.6 3.6   CL 97* 96* 96*   CO2 26 27 25   GLUCOSE 152* 156* 136*   PHOS 1.5* 2.2* 2.0*   MG 1.60* 1.90 1.70*   BUN 24* 22* 19   CREATININE <0.5* <0.5* <0.5*   LABGLOM >60 >60 >60   GFRAA >60 >60 >60     CT scan of abdomen pelvis with IV contrast from 3/22  Impression   There is complete thrombosis of the portal vein.  Thrombus is also seen   extending into the cephalad portion of the superior mesenteric vein and   throughout the splenic vein.       Peripancreatic stranding consistent with pancreatitis. Rickford Math is a small area   of low-attenuation in the region the pancreatic head which may correspond to   focal dilated pancreatic duct or a small developing fluid collection.       Hepatic steatosis. CT scan of abdomen pelvis without contrast on 3/28  FINDINGS:   Lower Chest: Calcified granuloma are noted in the lung bases.       Organs: There is extensive fatty infiltration of the liver, with areas of   relative sparing.       Peripancreatic fat stranding is present.  A parenchymal calcification is   noted in the pancreatic head.  No peripancreatic fluid collection is   identified.       The spleen and adrenal glands are unremarkable.  There are nonobstructing   calculi in the kidneys, measuring up to 4 mm on the right and 2 mm on the   left.       GI/Bowel: There is a small hiatal hernia.  No dilated loops of bowel are   identified. Rickford Math is a colo colonic anastomosis in the sigmoid colon, which   is unremarkable.  Appendix is normal.       Pelvis:  The bladder and prostate gland are unremarkable. Rickford Math is a small   amount of free fluid in the recto vesicular space.       Peritoneum/Retroperitoneum: There is mild aortoiliac calcification, without   aneurysm.  No adenopathy is seen.       Bones/Soft Tissues: No acute osseous injury is appreciated.       There are midline periumbilical hernias with ostia of 1.6 and 1.2 cm   respectively, with small bowel content. Rickford Math is no incarceration or area of   transition.  Adjacently, there is a right periumbilical hernia, with ostium   of 2.8 cm and fat content.  Several small fat containing hernias are also   noted in the supraumbilical region.           Impression   Findings consistent with acute on chronic pancreatitis.       Small amount of free fluid in the pelvis.  No pseudocyst is identified.       Extensive fatty infiltration of the liver.       Nonobstructive bilateral nephrolithiasis.  Punctate caliceal stones are noted   bilaterally.       Multiple small ventral hernias.  2 hernias in the periumbilical midline   contain small bowel.  No incarceration is evident. UA with micro shows 30 protein positive nitrite 6-9 WBCs and 11-20 RBCs  Urine culture no growth till date     Assessment  -REDDY in the setting of nausea vomiting diarrhea leading to volume depletion, hypotension and also third spacing from SIRS related to pancreatitis. -Hyperkalemia from REDDY and metabolic acidosis   Improved with medical management with IV insulin, D50 and bicarb    -Metabolic acidosis, predominantly lactic acidosis from hypoperfusion and liver failure    -Acute on chronic pancreatitis from biliary sludge    -Septic shock with Klebsiella pneumoniae bacteremia     -Portal vein thrombosis, superior mesenteric vein thrombosis   Elevated INR, also on heparin drip    -Anemia-1 PRBC on 3/29    -Hypocalcemia-corrected calcium is about 9 on 3/29    Plan  -Maintain hemodynamics: Keep MAP>65 CVP 10  SBP>100   -DC IV fluid, pain meds as needed boluses to keep above parameters  - K-Phos 30 mmol IV x1  -Mag sulfate 2 g IV x1  -Broad-spectrum antibiotics  -Serial renal panel   -Daily wts and strict i/o  -Renal dose meds and avoid nephrotoxins      Thank you for the consultation. Please do not hesitate to call with questions.     Alaina Moore  [facility]  Office: [phone]  Fax:    [phone]

## 2021-03-31 NOTE — PROGRESS NOTES
Pharmacy - RE:  High-dose Heparin drip  Current rate = 15 ml/h  (1,500 units/h)  aPTT drawn @  1410 on 3/31 = 69.2 sec. Goal aPTT = 49 - 76 sec. Continue current rate of infusion @ 15 ml/hr (1500 units/hr) and obtain another aPTT in am and daily, two consecutive goal aPtt's .   Laurel Shin Pharm D 5/63/90946:40 PM  .

## 2021-03-31 NOTE — PROGRESS NOTES
General Surgery - [place], [place]  Daily Progress Note    Pt Name: Smitha Peralta  Medical Record Number: [id number]  Date of Birth 1980   Today's Date: 3/31/2021    ASSESSMENT  1. NJ tube placed today by IR  2. CT 3/28: acute on chronic pancreatitis, fatty liver  3. CT 3/22: portal vein thrombosis, SMV and splenic vein, pancreatiits  4. ABD: obese, soft, no distention, rectal tube in place: large amount of liquid stool, no signs of abd pain to palpation  5. Leuks 38.6->30.1->18.6->17.8  6. ARF improved: Cr 2.9->1.2-><0.5  7. LA improved 13.5->3.2  8. Lipase: 37  9. Elevated LFT which are improving  10. Hgb 6.7/20.8->7.9/24 s/p 1 unit of PRBCs->7.1/21.2->7.5/22.5  11. F/C 1.4 L  12. BC + Klebsiella  13. C diff negative   14. Pt extubated: becomes agitated with speaking to pt  15. Remote hx of heroin abuse, per RN stopped drinking ETOH 4 months ago. PLAN  1. Agree with trophic TF via NJ tube  2. Labs in the am  3. Renal seeing  4. Hep gtt  5. Levo and vasso per CC  6. Getting cefepime  7. Acute pancreatitis   8. Pt appears to be improving but, remains critically ill. No signs of ischemic bowel, no surgical plans at this time. Will follow along. SUBJECTIVE  Miguel has slightly improved from yesterday. Pain appears well controlled. He has no vomiting. He has passed flatus and has had a bowel movement. He is NPO. Current activity is strict bedrest with HOB flat-to-30 degrees X 24 hours, then check with Sx    OBJECTIVE  VITALS:  height is 6' 1\" (1.854 m) and weight is 283 lb 9.6 oz (128.6 kg). His axillary temperature is 96.3 °F (35.7 °C). His blood pressure is 105/79 and his pulse is 62. His respiration is 25 and oxygen saturation is 99%.  VITALS:  /79   Pulse 62   Temp 96.3 °F (35.7 °C) (Axillary)   Resp 25   Ht 6' 1\" (1.854 m)   Wt 283 lb 9.6 oz (128.6 kg)   SpO2 99%   BMI 37.42 kg/m²   INTAKE/OUTPUT:      Intake/Output Summary (Last 24 hours) at 3/31/2021 2183  Last data filed at 3/31/2021 1400  Gross per 24 hour   Intake 4476.98 ml   Output 2435 ml   Net 2041.98 ml     URINARY CATHETER OUTPUT (Valenzuela):     GENERAL: sedated but, extubated    I/O last 3 completed shifts: In: 7720 [I.V.:3306.8; IV Piggyback:80.2]  Out: 1465 [CSAKF:1439]  I/O this shift:  In: 1090 [I.V.:803.6; IV Piggyback:286.5]  Out: 1150 [Urine:1150]    LABS  Recent Labs     03/28/21  1600 03/28/21  1600 03/30/21  0530 03/30/21  0530 03/31/21  0524   WBC  --    < > 18.6*  --  17.8*   HGB  --    < > 7.1*  --  7.5*   HCT  --    < > 21.2*  --  22.5*   PLT  --    < > 339  --  327   NA  --    < > 130*   < > 132*   K  --    < > 3.0*   < > 3.6   CL  --    < > 97*   < > 96*   CO2  --    < > 26   < > 25   BUN  --    < > 24*   < > 19   CREATININE  --    < > <0.5*   < > <0.5*   MG  --   --  1.60*   < > 1.70*   PHOS  --   --  1.5*   < > 2.0*   CALCIUM  --    < > 7.4*   < > 7.9*   AST  --    < > 314*  --   --    ALT  --    < > 182*  --   --    BILITOT  --    < > 3.7*  --   --    BILIDIR  --    < > 3.1*  --   --    NITRU POSITIVE*  --   --   --   --    COLORU BROWN*  --   --   --   --    BACTERIA 3+*  --   --   --   --     < > = values in this interval not displayed.      CBC with Differential:    Lab Results   Component Value Date    WBC 17.8 03/31/2021    RBC 2.26 03/31/2021    HGB 7.5 03/31/2021    HCT 22.5 03/31/2021     03/31/2021    MCV 99.6 03/31/2021    MCH 33.1 03/31/2021    MCHC 33.2 03/31/2021    RDW 21.5 03/31/2021    NRBC 1 03/31/2021    BANDSPCT 3 03/31/2021    METASPCT 1 03/30/2021    LYMPHOPCT 7.0 03/31/2021    PROMYELOPCT 1 03/31/2021    MONOPCT 8.0 03/31/2021    MYELOPCT 3 03/31/2021    BASOPCT 1.0 03/31/2021    MONOSABS 1.4 03/31/2021    LYMPHSABS 1.2 03/31/2021    EOSABS 0.5 03/31/2021    BASOSABS 0.2 03/31/2021     CMP:    Lab Results   Component Value Date     03/31/2021    K 3.6 03/31/2021    K 6.4 03/28/2021    CL 96 03/31/2021    CO2 25 03/31/2021    BUN 19 03/31/2021    CREATININE <0.5 03/31/2021 GFRAA >60 03/31/2021    AGRATIO 0.6 03/28/2021    LABGLOM >60 03/31/2021    GLUCOSE 136 03/31/2021    PROT 4.8 03/30/2021    LABALBU 2.1 03/30/2021    CALCIUM 7.9 03/31/2021    BILITOT 3.7 03/30/2021    ALKPHOS 170 03/30/2021     03/30/2021     03/30/2021         Ledy Blanco [facility]  Electronically signed 3/31/2021 at 2:56 PM     Patient seen and examined. I agree with the assessment and plan from MITESH Roldan. Patient remains extubated. Abdomen soft. Non distended. No appreciable tenderness. Agree with MARTÍN PAYTON and continue current medical management.      [address] [facility]

## 2021-03-31 NOTE — PROGRESS NOTES
LAD. No supraclavicular LAD. M/S: No cyanosis. No joint deformity. No clubbing. Neuro: A&OX to person. Patellar reflexes are symmetric. Psych: mild agitation, no anxiety, affect is full.      Scheduled Meds:   cefepime  2,000 mg Intravenous Q12H    insulin lispro  0-6 Units Subcutaneous Q4H    calcium gluconate  1,000 mg Intravenous Once    chlorhexidine  15 mL Mouth/Throat BID    lidocaine 1 % injection  5 mL Intradermal Once    sodium chloride flush  10 mL Intravenous 2 times per day    nystatin  5 mL Oral 4x Daily    sucralfate  1 g Oral 4x Daily AC & HS    pantoprazole  40 mg Intravenous BID    folic acid  1 mg Oral Daily    citalopram  10 mg Oral Daily    dicyclomine  10 mg Oral BID    sodium chloride flush  10 mL Intravenous 2 times per day     PRN Meds:  midazolam, docusate sodium, sodium chloride, glucose, dextrose, glucagon (rDNA), dextrose, heparin (porcine), heparin (porcine), fentanNYL, sodium chloride flush, HYDROmorphone, phenol, oxyCODONE-acetaminophen **OR** oxyCODONE-acetaminophen, clonazePAM, sodium chloride flush, [DISCONTINUED] promethazine **OR** ondansetron, polyethylene glycol, acetaminophen **OR** acetaminophen, promethazine    Results:  CBC:   Recent Labs     03/29/21  0605 03/29/21  1245 03/30/21  0530 03/31/21  0524   WBC 30.1*  --  18.6* 17.8*   HGB 6.7* 7.9* 7.1* 7.5*   HCT 20.8* 24.0* 21.2* 22.5*   .7*  --  98.6 99.6   *  --  339 327     BMP:   Recent Labs     03/30/21  0530 03/30/21  1400 03/31/21  0524   * 128* 132*   K 3.0* 3.6 3.6   CL 97* 96* 96*   CO2 26 27 25   PHOS 1.5* 2.2* 2.0*   BUN 24* 22* 19   CREATININE <0.5* <0.5* <0.5*     LIVER PROFILE:   Recent Labs     03/28/21  0955 03/29/21  0605 03/30/21  0530   AST 1,119* 739* 314*   * 276* 182*   LIPASE  --   --  37.0   BILIDIR 5.5* 3.5* 3.1*   BILITOT 7.3* 4.1* 3.7*   ALKPHOS 215* 173* 170*       Cultures:  3/28/2021 blood Klebsiella stephenson sensitive  3/28/2021 tracheal aspirate NRF with MSSA  3/29/2021 C. difficile toxin negative    Films:  3/30/2021 CXR interstitial edema    ASSESSMENT:  · Acute Respiratory Failure   · Gram-negative bacteremia   · Septic shock  · Lactic acidosis  · H/O partial colectomy  · Acute kidney failure   · Alcohol abuse  · Recurrent pancreatitis  · Remote h/o heroin abuse  · Recent admission for pancreatitis, SP MRCP  · Portal vein thrombosis with extension to the SMV and splenic veins on 3/20/2021  · Pulmonary nodules    PLAN:  · Supplemental oxygen to maintain SaO2 >92%; wean as tolerated    Precedex for agitation  Cefepime D#4, change to CTX 2 grams daily   IV levophed and vasopressin to maintain MAP of 65   Replace electrolytes    TF  Blood sugar control, with goal 140-180    On heparin gtt with PTT monitoring   · Eventually will require follow-up imaging  · Nutrition - post pyloric TF   · NOK is mother Nilda Rebollar in dietary)

## 2021-03-31 NOTE — PROGRESS NOTES
PROGRESS NOTE  S:40 yrs Patient  admitted on 3/13/2021 with Acute cholecystitis [K81.0] . Today he remains drowsy. Exam:   Vitals:    03/31/21 1500   BP: 94/65   Pulse: 59   Resp: 20   Temp:    SpO2: 100%      General appearance: appears older than stated age, cooperative, delirious, icteric, slowed mentation and syndromic appearance - chronically ill appearing  HEENT: Oropharynx clear, no lesions  Neck: no adenopathy and supple, symmetrical, trachea midline  Lungs: clear to auscultation bilaterally  Heart: regular rate and rhythm, S1, S2 normal, no murmur, click, rub or gallop  Abdomen: normal findings: bowel sounds normal, no masses palpable, soft, non-tender and symmetric and abnormal findings:  distended and obese  Extremities: extremities normal, atraumatic, no cyanosis or edema     Medications: Reviewed    Labs:  CBC:   Recent Labs     03/29/21  0605 03/29/21  1245 03/30/21  0530 03/31/21  0524   WBC 30.1*  --  18.6* 17.8*   HGB 6.7* 7.9* 7.1* 7.5*   HCT 20.8* 24.0* 21.2* 22.5*   .7*  --  98.6 99.6   *  --  339 327     BMP:   Recent Labs     03/30/21  0530 03/30/21  1400 03/31/21  0524   * 128* 132*   K 3.0* 3.6 3.6   CL 97* 96* 96*   CO2 26 27 25   PHOS 1.5* 2.2* 2.0*   BUN 24* 22* 19   CREATININE <0.5* <0.5* <0.5*     LIVER PROFILE:   Recent Labs     03/29/21  0605 03/30/21  0530   * 314*   * 182*   LIPASE  --  37.0   PROT 5.3* 4.8*   BILIDIR 3.5* 3.1*   BILITOT 4.1* 3.7*   ALKPHOS 173* 170*     PT/INR: No results for input(s): INR in the last 72 hours. Invalid input(s): PT      IMAGING:  IR GI TUBE W FLUOROSCOPY  Impression   Successful placement of nasal jejunal feeding tube     Attending Supervising [de-identified] Attestation Statement  The patient is a 41 y.o. male. I have performed a history and physical examination of the patient.  I discussed the case with my physician assistant Sulma Weiss PA-C    I reviewed the patient's Past Medical History, Past Surgical History, Medications, and Allergies. Physical Exam:  Vitals:    03/31/21 1500 03/31/21 1600 03/31/21 1700 03/31/21 1800   BP: 94/65 89/68 90/64 89/60   Pulse: 59 58 59 61   Resp: 20 20 15 10   Temp:  96.6 °F (35.9 °C)     TempSrc:  Axillary     SpO2: 100% 100% 98% 100%   Weight:       Height:           Physical Examination: General appearance - chronically ill appearing  Mental status - drowsy  Eyes - pupils equal and reactive, extraocular eye movements intact  Neck - supple, no significant adenopathy  Chest - clear to auscultation, no wheezes, rales or rhonchi, symmetric air entry  Heart - normal rate, regular rhythm, normal S1, S2, no murmurs, rubs, clicks or gallops  Abdomen - soft, nontender, nondistended, no masses or organomegaly  Extremities - no pedal edema noted        Impression: 41year old male with a history of HTN, arthritis, diverticulitis s/p partial colectomy and reversal of ileostomy, alcohol abuse, admitted with recurrent acute pancreatitis c/b portal vein thrombosis and evolving pseudocyst. Hospital course complicated by sepsis and respiratory failure. Recommendation:  Continue supportive care  Monitor LFTs  NPO, IVF, pain control  Begin TFs as toelrated per NJ  Advance TFs as toelrated per dietician recommendations   Continue Abx and heparin  Continue PPI and Carafate  Will follow       Homero Hickey PA-C  3:20 PM 3/31/2021                      41year old male with a history of HTN, arthritis, diverticulitis s/p partial colectomy and reversal of ileostomy, alcohol abuse, admitted with recurrent acute pancreatitis c/b portal vein thrombosis and evolving pseudocyst. Hospital course complicated by sepsis and respiratory failure. Continue supportive care. NPO, IVF, pain control. Monitor LFTs. Continue antibiotics and eliquis. Start post pyloric feeds per NJ. Will follow.     Tori Rodgers MD          (O) [phone]  (O) [phone]

## 2021-03-31 NOTE — PROGRESS NOTES
Pharmacy - RE:  High-dose Heparin drip  Current rate = 15 ml/h  (1,500 units/h)  aPTT drawn @ 0524 on 3/31 = 69.2 sec. Goal aPTT = 49 - 76 sec. Continue current rate of infusion @ 15 ml/hr (1500 units/hr) and obtain another aPTT in 6 hours. Ordered for 1200 on 3/31.   Cecille Blevins [facility]

## 2021-03-31 NOTE — CARE COORDINATION
INTERDISCIPLINARY PLAN OF CARE CONFERENCE    Date/Time: 3/31/2021 9:49 AM  Completed by: ZAHIRA Burnett. Case Management      Patient Name:  Jessica Guthrie  YOB: 1980  Admitting Diagnosis: Acute cholecystitis [K81.0]     Admit Date/Time:  3/13/2021 11:43 PM    Chart reviewed. Interdisciplinary team contacted or reviewed plan related to patient progress and discharge plans. Disciplines included Case Management, Nursing, and Dietitian. Current Status:Ongoing. ICU  PT/OT recommendation for discharge plan of care: TBD; will be beneficial when appropriate. Expected D/C Disposition:  TBD    Discharge Plan Comments: Chart review completed. Pt remains in the ICU. Attempted meeting with pt to check in but pt was off the floor. Per previous CM notes, pt has been set up with [employer] for Tube Feeds and [facility] O2 in place on admit: No    CM will follow. Please notify CM if needs or concerns arise. Addendum at 2:26pm: Attempted meeting with pt at bedside but he was sleeping.  CM will attempt again when able to follow up with pt

## 2021-03-31 NOTE — PROGRESS NOTES
Pharmacy - RE:  High-dose Heparin drip  Current rate = 16.9 ml/h  (1690 units/h)  aPTT drawn @2140 on 3/30 = 79.5 sec. Goal aPTT = 49 - 76 sec. Decrease current rate of infusion to 15 ml/hr (1500 units/hr) and obtain another aPTT in 6 hours. Ordered for 0500 on 3/31.   Ubaldo Hyman [facility]

## 2021-03-31 NOTE — PROGRESS NOTES
Intravenous Once    chlorhexidine  15 mL Mouth/Throat BID    lidocaine 1 % injection  5 mL Intradermal Once    sodium chloride flush  10 mL Intravenous 2 times per day    nystatin  5 mL Oral 4x Daily    sucralfate  1 g Oral 4x Daily AC & HS    pantoprazole  40 mg Intravenous BID    folic acid  1 mg Oral Daily    citalopram  10 mg Oral Daily    dicyclomine  10 mg Oral BID    sodium chloride flush  10 mL Intravenous 2 times per day     I   dexmedetomidine HCl in NaCl 0.8 mcg/kg/hr (03/31/21 0411)    lactated ringers 75 mL/hr at 03/31/21 0621    sodium chloride      midazolam Stopped (03/30/21 0936)    fentaNYL Stopped (03/30/21 0936)    dextrose      heparin (PORCINE) Infusion 15 mL/hr (03/31/21 0027)    norepinephrine 8 mcg/min (03/31/21 0408)    vasopressin (Septic Shock) infusion 0.03 Units/min (03/30/21 1855)    phenylephrine (HENNA-SYNEPHRINE) 50mg/250mL infusion Stopped (03/28/21 2222)     midazolam, docusate sodium, sodium chloride, glucose, dextrose, glucagon (rDNA), dextrose, heparin (porcine), heparin (porcine), fentanNYL, sodium chloride flush, HYDROmorphone, phenol, oxyCODONE-acetaminophen **OR** oxyCODONE-acetaminophen, clonazePAM, sodium chloride flush, [DISCONTINUED] promethazine **OR** ondansetron, polyethylene glycol, acetaminophen **OR** acetaminophen, promethazine    Lab Data:  Recent Labs     03/29/21  0605 03/29/21  1245 03/30/21  0530 03/31/21  0524   WBC 30.1*  --  18.6* 17.8*   HGB 6.7* 7.9* 7.1* 7.5*   HCT 20.8* 24.0* 21.2* 22.5*   .7*  --  98.6 99.6   *  --  339 327     Recent Labs     03/30/21  0530 03/30/21  1400 03/31/21  0524   * 128* 132*   K 3.0* 3.6 3.6   CL 97* 96* 96*   CO2 26 27 25   PHOS 1.5* 2.2* 2.0*   BUN 24* 22* 19   CREATININE <0.5* <0.5* <0.5*     No results for input(s): CKTOTAL, CKMB, CKMBINDEX, TROPONINI in the last 72 hours.     Coagulation:   Lab Results   Component Value Date    INR 3.73 03/28/2021    APTT 69.2 03/31/2021     Cardiac markers:   Lab Results   Component Value Date    TROPONINI <0.01 03/14/2021         Lab Results   Component Value Date     (H) 03/30/2021     (H) 03/30/2021    ALKPHOS 170 (H) 03/30/2021    BILITOT 3.7 (H) 03/30/2021       Lab Results   Component Value Date    INR 3.73 (H) 03/28/2021    INR 3.31 (H) 03/27/2021    INR 1.28 (H) 10/04/2019    PROTIME 43.8 (H) 03/28/2021    PROTIME 38.9 (H) 03/27/2021    PROTIME 14.6 (H) 10/04/2019     Cultures:  Blood: pseudomonas    Radiology    XR CHEST PORTABLE   Final Result   1. Stable appropriate positions of support apparatus. 2. Interval improvement in appearance of bibasilar pneumonia. XR CHEST PORTABLE   Final Result   Increased diffuse interstitial prominence suggests pulmonary edema. Stable support lines. XR CHEST PORTABLE   Final Result   Tubes and lines as above. Low lung volumes with probable basilar atelectasis. CT ABDOMEN PELVIS WO CONTRAST Additional Contrast? None   Final Result   Findings consistent with acute on chronic pancreatitis. Small amount of free fluid in the pelvis. No pseudocyst is identified. Extensive fatty infiltration of the liver. Nonobstructive bilateral nephrolithiasis. Punctate caliceal stones are noted   bilaterally. Multiple small ventral hernias. 2 hernias in the periumbilical midline   contain small bowel. No incarceration is evident. IR GI TUBE W FLUOROSCOPY   Final Result   Successful placement of nasal jejunal tube as discussed. CT ABDOMEN PELVIS W IV CONTRAST Additional Contrast? None   Final Result   There is complete thrombosis of the portal vein. Thrombus is also seen   extending into the cephalad portion of the superior mesenteric vein and   throughout the splenic vein. Peripancreatic stranding consistent with pancreatitis.   There is a small area   of low-attenuation in the region the pancreatic head which may correspond to   focal dilated pancreatic duct or a small developing fluid collection. Hepatic steatosis. The findings were sent to the Radiology Results [address] at 3:41   pm on 3/22/2021to be communicated to a licensed caregiver. MRI ABDOMEN WO CONTRAST MRCP   Final Result   *Normal caliber bile ducts without evidence of choledocholithiasis. *Hepatomegaly with severe fatty infiltration as described. *Mildly atrophic pancreas with ductal dilatation as measured and described   above, findings likely sequela of chronic pancreatitis. *Large amount of gallbladder sludge. US GALLBLADDER RUQ   Final Result   1. Gallbladder sludge with nonspecific wall thickening. 2. Abnormal common duct dilatation. MRCP and/or ERCP would be options to   further evaluate. IR GI TUBE W FLUOROSCOPY    (Results Pending)   XR CHEST PORTABLE    (Results Pending)       Assessment & Plan:      Recurrent pancreatitis   - remote hx of alcohol related pancreatitis but reportedly quit 4 months ago  - did not improve with conservative mx . Recurrent and persistent nausea, emesis and worsening per CT , unable to tolerate oral diet  GI started post-pyloric tube feedings on 3/25. He did not tolerate advancing tube feedings. Complicated clinical course with development of poral vein thrombosis, septic shock with hypotension, renal failure, hyperkalemia and resp failure requiring vent support    - restart NJ Tube feeds   See below      Sepsis  - not POA -   - he is hypotensive, tachycardic, WBC worsened at 38.6  - his abdominal pain is worse and ct with worsening pancreatitis,  - blood cx with Klebsiella pneumoniae   - on IV CefepimeD 4  - severe hypotensive shock on pressors , critical care managing  - weaning pressors    REDDY  Hyperkalemia  AGMA  - nephrology consulted  - started on BiCarb fluids  - Treated hyperkalemia with insulin, D50  - maintaining good UOP     Portal Vein Thrombosis.    - extending into SMV and splenic vein- likely with severe pancreatitis   - started on high dose heparin drip   cont heparin gtt . Holding Eliquis for coagulopathy,     Abn LFT   - no CBD stone per MRCP, Mildly atrophic pancreas with ductal dilatation noted but with gall bladder sludge  - bili improving  - pt reports no alcohol since 4 months   - Refer to Surgery at [facility] for cholecystectomy       Normocytic  Anemia  Folic Acid Deficiency  - 9.4 on admission. Trended down with IVF  Monitor  - folate def noted, started on supplements   - hgb 6.7   - given 1 unit PRBC with repeat at 7.5     Anxiety   - Continue Celexa, Klonopin prn       Severe PCM  -Attempted on post-pyloric tube feedings.    - start tube feeds     H/o alcohol abuse   - noted during previous admission  - Admission last year for acute pancreatitis due to alcohol abuse.      Central line placed on 3/28 d/t poor access     DVT Prophylaxis: holding heparin , INR high   Diet: Diet NPO Effective Now- can start [facility]  Code Status: Full Code      Aryan Velasquez MD 3/31/2021 7:39 AM

## 2021-04-01 LAB
ALBUMIN SERPL-MCNC: 2.1 G/DL (ref 3.4–5)
ALBUMIN SERPL-MCNC: 2.2 G/DL (ref 3.4–5)
ALP BLD-CCNC: 158 U/L (ref 40–129)
ALT SERPL-CCNC: 127 U/L (ref 10–40)
ANION GAP SERPL CALCULATED.3IONS-SCNC: 5 MMOL/L (ref 3–16)
ANION GAP SERPL CALCULATED.3IONS-SCNC: 6 MMOL/L (ref 3–16)
ANISOCYTOSIS: ABNORMAL
APTT: 61.3 SEC (ref 24.2–36.2)
AST SERPL-CCNC: 157 U/L (ref 15–37)
ATYPICAL LYMPHOCYTE RELATIVE PERCENT: 1 % (ref 0–6)
BANDED NEUTROPHILS RELATIVE PERCENT: 7 % (ref 0–7)
BASOPHILS ABSOLUTE: 0.1 K/UL (ref 0–0.2)
BASOPHILS RELATIVE PERCENT: 1 %
BILIRUB SERPL-MCNC: 4.5 MG/DL (ref 0–1)
BILIRUBIN DIRECT: 3.4 MG/DL (ref 0–0.3)
BILIRUBIN, INDIRECT: 1.1 MG/DL (ref 0–1)
BUN BLDV-MCNC: 13 MG/DL (ref 7–20)
BUN BLDV-MCNC: 9 MG/DL (ref 7–20)
CALCIUM SERPL-MCNC: 8 MG/DL (ref 8.3–10.6)
CALCIUM SERPL-MCNC: 8.1 MG/DL (ref 8.3–10.6)
CHLORIDE BLD-SCNC: 103 MMOL/L (ref 99–110)
CHLORIDE BLD-SCNC: 95 MMOL/L (ref 99–110)
CO2: 27 MMOL/L (ref 21–32)
CO2: 27 MMOL/L (ref 21–32)
CREAT SERPL-MCNC: <0.5 MG/DL (ref 0.9–1.3)
CREAT SERPL-MCNC: <0.5 MG/DL (ref 0.9–1.3)
EOSINOPHILS ABSOLUTE: 0.5 K/UL (ref 0–0.6)
EOSINOPHILS RELATIVE PERCENT: 4 %
GFR AFRICAN AMERICAN: >60
GFR AFRICAN AMERICAN: >60
GFR NON-AFRICAN AMERICAN: >60
GFR NON-AFRICAN AMERICAN: >60
GLUCOSE BLD-MCNC: 133 MG/DL (ref 70–99)
GLUCOSE BLD-MCNC: 143 MG/DL (ref 70–99)
GLUCOSE BLD-MCNC: 144 MG/DL (ref 70–99)
GLUCOSE BLD-MCNC: 148 MG/DL (ref 70–99)
GLUCOSE BLD-MCNC: 148 MG/DL (ref 70–99)
GLUCOSE BLD-MCNC: 152 MG/DL (ref 70–99)
GLUCOSE BLD-MCNC: 169 MG/DL (ref 70–99)
HCT VFR BLD CALC: 22.2 % (ref 40.5–52.5)
HEMOGLOBIN: 7.3 G/DL (ref 13.5–17.5)
HYPOCHROMIA: ABNORMAL
LYMPHOCYTES ABSOLUTE: 2 K/UL (ref 1–5.1)
LYMPHOCYTES RELATIVE PERCENT: 16 %
MAGNESIUM: 1.6 MG/DL (ref 1.8–2.4)
MAGNESIUM: 1.6 MG/DL (ref 1.8–2.4)
MAGNESIUM: 2.4 MG/DL (ref 1.8–2.4)
MCH RBC QN AUTO: 33.1 PG (ref 26–34)
MCHC RBC AUTO-ENTMCNC: 32.9 G/DL (ref 31–36)
MCV RBC AUTO: 100.6 FL (ref 80–100)
METAMYELOCYTES RELATIVE PERCENT: 1 %
MONOCYTES ABSOLUTE: 1.2 K/UL (ref 0–1.3)
MONOCYTES RELATIVE PERCENT: 10 %
MYELOCYTE PERCENT: 2 %
NEUTROPHILS ABSOLUTE: 8.2 K/UL (ref 1.7–7.7)
NEUTROPHILS RELATIVE PERCENT: 58 %
NUCLEATED RED BLOOD CELLS: 1 /100 WBC
PDW BLD-RTO: 22.1 % (ref 12.4–15.4)
PERFORMED ON: ABNORMAL
PHOSPHORUS: 2.2 MG/DL (ref 2.5–4.9)
PHOSPHORUS: 3.1 MG/DL (ref 2.5–4.9)
PLATELET # BLD: 266 K/UL (ref 135–450)
PLATELET SLIDE REVIEW: ADEQUATE
PMV BLD AUTO: 7.5 FL (ref 5–10.5)
POIKILOCYTES: ABNORMAL
POLYCHROMASIA: ABNORMAL
POTASSIUM SERPL-SCNC: 3.6 MMOL/L (ref 3.5–5.1)
POTASSIUM SERPL-SCNC: 4.2 MMOL/L (ref 3.5–5.1)
RBC # BLD: 2.21 M/UL (ref 4.2–5.9)
SLIDE REVIEW: ABNORMAL
SODIUM BLD-SCNC: 127 MMOL/L (ref 136–145)
SODIUM BLD-SCNC: 136 MMOL/L (ref 136–145)
TOTAL PROTEIN: 5.4 G/DL (ref 6.4–8.2)
WBC # BLD: 12 K/UL (ref 4–11)

## 2021-04-01 PROCEDURE — 6360000002 HC RX W HCPCS: Performed by: PHYSICIAN ASSISTANT

## 2021-04-01 PROCEDURE — 36415 COLL VENOUS BLD VENIPUNCTURE: CPT

## 2021-04-01 PROCEDURE — 2580000003 HC RX 258: Performed by: INTERNAL MEDICINE

## 2021-04-01 PROCEDURE — 80069 RENAL FUNCTION PANEL: CPT

## 2021-04-01 PROCEDURE — 94761 N-INVAS EAR/PLS OXIMETRY MLT: CPT

## 2021-04-01 PROCEDURE — 6370000000 HC RX 637 (ALT 250 FOR IP): Performed by: HOSPITALIST

## 2021-04-01 PROCEDURE — 2500000003 HC RX 250 WO HCPCS: Performed by: INTERNAL MEDICINE

## 2021-04-01 PROCEDURE — 2580000003 HC RX 258: Performed by: HOSPITALIST

## 2021-04-01 PROCEDURE — 83993 ASSAY FOR CALPROTECTIN FECAL: CPT

## 2021-04-01 PROCEDURE — 84100 ASSAY OF PHOSPHORUS: CPT

## 2021-04-01 PROCEDURE — 85025 COMPLETE CBC W/AUTO DIFF WBC: CPT

## 2021-04-01 PROCEDURE — C9113 INJ PANTOPRAZOLE SODIUM, VIA: HCPCS | Performed by: PHYSICIAN ASSISTANT

## 2021-04-01 PROCEDURE — 6360000002 HC RX W HCPCS: Performed by: INTERNAL MEDICINE

## 2021-04-01 PROCEDURE — 87505 NFCT AGENT DETECTION GI: CPT

## 2021-04-01 PROCEDURE — 99291 CRITICAL CARE FIRST HOUR: CPT | Performed by: INTERNAL MEDICINE

## 2021-04-01 PROCEDURE — 99232 SBSQ HOSP IP/OBS MODERATE 35: CPT | Performed by: SURGERY

## 2021-04-01 PROCEDURE — 6370000000 HC RX 637 (ALT 250 FOR IP): Performed by: INTERNAL MEDICINE

## 2021-04-01 PROCEDURE — 2000000000 HC ICU R&B

## 2021-04-01 PROCEDURE — 2500000003 HC RX 250 WO HCPCS: Performed by: HOSPITALIST

## 2021-04-01 PROCEDURE — 85730 THROMBOPLASTIN TIME PARTIAL: CPT

## 2021-04-01 PROCEDURE — 6370000000 HC RX 637 (ALT 250 FOR IP): Performed by: PHYSICIAN ASSISTANT

## 2021-04-01 PROCEDURE — 99233 SBSQ HOSP IP/OBS HIGH 50: CPT | Performed by: INTERNAL MEDICINE

## 2021-04-01 PROCEDURE — 2700000000 HC OXYGEN THERAPY PER DAY

## 2021-04-01 PROCEDURE — 80076 HEPATIC FUNCTION PANEL: CPT

## 2021-04-01 PROCEDURE — 83735 ASSAY OF MAGNESIUM: CPT

## 2021-04-01 PROCEDURE — 6370000000 HC RX 637 (ALT 250 FOR IP): Performed by: NURSE PRACTITIONER

## 2021-04-01 PROCEDURE — 87336 ENTAMOEB HIST DISPR AG IA: CPT

## 2021-04-01 PROCEDURE — 87328 CRYPTOSPORIDIUM AG IA: CPT

## 2021-04-01 PROCEDURE — 82705 FATS/LIPIDS FECES QUAL: CPT

## 2021-04-01 RX ORDER — MAGNESIUM SULFATE IN WATER 40 MG/ML
2000 INJECTION, SOLUTION INTRAVENOUS ONCE
Status: COMPLETED | OUTPATIENT
Start: 2021-04-01 | End: 2021-04-01

## 2021-04-01 RX ORDER — POTASSIUM CHLORIDE 7.45 MG/ML
10 INJECTION INTRAVENOUS PRN
Status: DISCONTINUED | OUTPATIENT
Start: 2021-04-01 | End: 2021-04-05 | Stop reason: HOSPADM

## 2021-04-01 RX ORDER — POTASSIUM CHLORIDE 750 MG/1
40 TABLET, EXTENDED RELEASE ORAL PRN
Status: DISCONTINUED | OUTPATIENT
Start: 2021-04-01 | End: 2021-04-05 | Stop reason: HOSPADM

## 2021-04-01 RX ORDER — SODIUM CHLORIDE 9 MG/ML
INJECTION, SOLUTION INTRAVENOUS
Status: DISPENSED
Start: 2021-04-01 | End: 2021-04-01

## 2021-04-01 RX ORDER — MAGNESIUM SULFATE 1 G/100ML
1000 INJECTION INTRAVENOUS PRN
Status: DISCONTINUED | OUTPATIENT
Start: 2021-04-01 | End: 2021-04-05 | Stop reason: HOSPADM

## 2021-04-01 RX ORDER — POTASSIUM CHLORIDE 29.8 MG/ML
20 INJECTION INTRAVENOUS ONCE
Status: COMPLETED | OUTPATIENT
Start: 2021-04-01 | End: 2021-04-01

## 2021-04-01 RX ADMIN — INSULIN LISPRO 1 UNITS: 100 INJECTION, SOLUTION INTRAVENOUS; SUBCUTANEOUS at 00:37

## 2021-04-01 RX ADMIN — NYSTATIN 500000 UNITS: 500000 SUSPENSION ORAL at 08:29

## 2021-04-01 RX ADMIN — ACETAMINOPHEN 650 MG: 325 TABLET ORAL at 20:25

## 2021-04-01 RX ADMIN — SODIUM CHLORIDE, PRESERVATIVE FREE 10 ML: 5 INJECTION INTRAVENOUS at 20:15

## 2021-04-01 RX ADMIN — POTASSIUM CHLORIDE 20 MEQ: 400 INJECTION, SOLUTION INTRAVENOUS at 10:21

## 2021-04-01 RX ADMIN — NYSTATIN 500000 UNITS: 500000 SUSPENSION ORAL at 13:59

## 2021-04-01 RX ADMIN — SUCRALFATE 1 G: 1 TABLET ORAL at 16:44

## 2021-04-01 RX ADMIN — HEPARIN SODIUM 15 ML/HR: 10000 INJECTION, SOLUTION INTRAVENOUS at 07:59

## 2021-04-01 RX ADMIN — INSULIN LISPRO 1 UNITS: 100 INJECTION, SOLUTION INTRAVENOUS; SUBCUTANEOUS at 19:58

## 2021-04-01 RX ADMIN — POTASSIUM PHOSPHATE, MONOBASIC AND POTASSIUM PHOSPHATE, DIBASIC 20 MMOL: 224; 236 INJECTION, SOLUTION, CONCENTRATE INTRAVENOUS at 09:36

## 2021-04-01 RX ADMIN — Medication 15 ML: at 08:29

## 2021-04-01 RX ADMIN — INSULIN LISPRO 1 UNITS: 100 INJECTION, SOLUTION INTRAVENOUS; SUBCUTANEOUS at 04:10

## 2021-04-01 RX ADMIN — Medication 10 ML: at 08:35

## 2021-04-01 RX ADMIN — ACETAMINOPHEN 650 MG: 325 TABLET ORAL at 14:05

## 2021-04-01 RX ADMIN — SODIUM PHOSPHATE, MONOBASIC, MONOHYDRATE 20 MMOL: 276; 142 INJECTION, SOLUTION INTRAVENOUS at 14:52

## 2021-04-01 RX ADMIN — INSULIN LISPRO 1 UNITS: 100 INJECTION, SOLUTION INTRAVENOUS; SUBCUTANEOUS at 12:01

## 2021-04-01 RX ADMIN — SODIUM CHLORIDE, PRESERVATIVE FREE 10 ML: 5 INJECTION INTRAVENOUS at 05:35

## 2021-04-01 RX ADMIN — PANTOPRAZOLE SODIUM 40 MG: 40 INJECTION, POWDER, FOR SOLUTION INTRAVENOUS at 08:29

## 2021-04-01 RX ADMIN — NYSTATIN 500000 UNITS: 500000 SUSPENSION ORAL at 16:44

## 2021-04-01 RX ADMIN — SUCRALFATE 1 G: 1 TABLET ORAL at 19:59

## 2021-04-01 RX ADMIN — MAGNESIUM SULFATE HEPTAHYDRATE 2000 MG: 40 INJECTION, SOLUTION INTRAVENOUS at 12:02

## 2021-04-01 RX ADMIN — PANTOPRAZOLE SODIUM 40 MG: 40 INJECTION, POWDER, FOR SOLUTION INTRAVENOUS at 19:59

## 2021-04-01 RX ADMIN — Medication 15 ML: at 19:58

## 2021-04-01 RX ADMIN — SODIUM CHLORIDE, PRESERVATIVE FREE 10 ML: 5 INJECTION INTRAVENOUS at 08:35

## 2021-04-01 RX ADMIN — SUCRALFATE 1 G: 1 TABLET ORAL at 10:21

## 2021-04-01 RX ADMIN — MAGNESIUM SULFATE HEPTAHYDRATE 2000 MG: 40 INJECTION, SOLUTION INTRAVENOUS at 16:19

## 2021-04-01 RX ADMIN — Medication 0.7 MCG/KG/HR: at 03:43

## 2021-04-01 RX ADMIN — VASOPRESSIN 0.03 UNITS/MIN: 20 INJECTION INTRAVENOUS at 03:43

## 2021-04-01 RX ADMIN — FOLIC ACID: 5 INJECTION, SOLUTION INTRAMUSCULAR; INTRAVENOUS; SUBCUTANEOUS at 08:30

## 2021-04-01 RX ADMIN — CEFTRIAXONE 2000 MG: 2 INJECTION, POWDER, FOR SOLUTION INTRAMUSCULAR; INTRAVENOUS at 12:02

## 2021-04-01 RX ADMIN — CITALOPRAM HYDROBROMIDE 10 MG: 20 TABLET ORAL at 10:40

## 2021-04-01 RX ADMIN — INSULIN LISPRO 1 UNITS: 100 INJECTION, SOLUTION INTRAVENOUS; SUBCUTANEOUS at 08:36

## 2021-04-01 RX ADMIN — DICYCLOMINE HYDROCHLORIDE 10 MG: 10 CAPSULE ORAL at 19:59

## 2021-04-01 ASSESSMENT — PAIN SCALES - GENERAL
PAINLEVEL_OUTOF10: 5
PAINLEVEL_OUTOF10: 10

## 2021-04-01 ASSESSMENT — PAIN SCALES - WONG BAKER
WONGBAKER_NUMERICALRESPONSE: 0

## 2021-04-01 NOTE — PROGRESS NOTES
Pharmacy - RE:  High-dose Heparin drip  Current rate = 15 ml/h  (1,500 units/h)  aPTT drawn @  0424  = 61.3 sec. Goal aPTT = 49 - 76 sec. Continue current rate of infusion @ 15 ml/hr (1500 units/hr) and obtain another aPTT 4/2 with AM lab draws.

## 2021-04-01 NOTE — PROGRESS NOTES
[facility]    Follow-up  Note           Reason for Consult: REDDY, hyperkalemia, metabolic acidosis  Requesting Physician:  Dr. Tong Lewis history  Levophed at 6-->3-->5 mics per minute  Off vasopressin   Renal function has improved  postpyloric feeding tube placed on 3/31 by IR    Last 24 h uop 2.7 L, 2.5 L this morning    ROS: Unable to obtain  PSFH: NO visitor    Scheduled Meds:   magnesium sulfate  2,000 mg Intravenous Once    sodium phosphate IVPB  20 mmol Intravenous Once    folic acid, thiamine, multi-vitamin with vitamin K infusion   Intravenous Daily    cefTRIAXone (ROCEPHIN) IV  2,000 mg Intravenous Q24H    insulin lispro  0-6 Units Subcutaneous Q4H    calcium gluconate  1,000 mg Intravenous Once    chlorhexidine  15 mL Mouth/Throat BID    lidocaine 1 % injection  5 mL Intradermal Once    sodium chloride flush  10 mL Intravenous 2 times per day    nystatin  5 mL Oral 4x Daily    sucralfate  1 g Oral 4x Daily AC & HS    pantoprazole  40 mg Intravenous BID    citalopram  10 mg Oral Daily    dicyclomine  10 mg Oral BID    sodium chloride flush  10 mL Intravenous 2 times per day     Continuous Infusions:   sodium chloride      dexmedetomidine HCl in NaCl 0.5 mcg/kg/hr (04/01/21 1300)    sodium chloride      dextrose      heparin (PORCINE) Infusion 15 mL/hr (04/01/21 1300)    norepinephrine 5.013 mcg/min (04/01/21 1300)     PRN Meds:.docusate sodium, sodium chloride, glucose, dextrose, glucagon (rDNA), dextrose, heparin (porcine), heparin (porcine), sodium chloride flush, HYDROmorphone, phenol, oxyCODONE-acetaminophen **OR** oxyCODONE-acetaminophen, clonazePAM, sodium chloride flush, [DISCONTINUED] promethazine **OR** ondansetron, polyethylene glycol, acetaminophen **OR** acetaminophen, promethazine    History of Present Illness on 3/28/2021:    41 y.o. yo male with PMH of heroin and alcohol abuse, hypertension, history of ileostomy who is admitted for acute pancreatitis on 3/13/2021  Patient presented then with complaints of nausea vomiting abdominal pain 2 weeks prior to admission. He had been in medical surgical floor since then with pain control and IV fluid for pancreatitis. Patient had portal vein thrombosis noted on a CT scan of abdomen pelvis with IV contrast done on 3/22  On 3/26 nasojejunal tube was placed and he was started on postpyloric feeding. He had significant nausea vomiting and pulled his NG tube out on 3/28. He also had diarrhea on 3/27  Patient was on D5 half-normal saline with 20 of potassium chloride which was stopped on 3/25. No documentation of IV fluid on 3/26. On 3/27 saline was started at 100 mL/h but according to the nurses due to poor IV access it had not been continued as planned  Patient's blood pressure dropped to high 90s couple of times on 3/27. He also has been tachycardic in the 90s to 100 throughout his stay with occasionally going up to 140s 150s in the last 48 hours.     Physical exam:   Constitutional:  VITALS:  BP 98/65   Pulse 73   Temp 97.6 °F (36.4 °C) (Axillary)   Resp 15   Ht 6' 1\" (1.854 m)   Wt 283 lb 9.6 oz (128.6 kg)   SpO2 98%   BMI 37.42 kg/m²   Gen: Drowsy   Neck: no bruits or jvd noted, thyroid normal  Cardiovascular:  S1, S2 without m/r/g; no lower extremity edema  Respiratory: Diminished  Abdomen:  +bs, soft, nt, nd, no hepatosplenomegaly  Neuro/Psy: Sleeping    Data/  Recent Labs     03/30/21  0530 03/31/21 0524 04/01/21  0424   WBC 18.6* 17.8* 12.0*   HGB 7.1* 7.5* 7.3*   HCT 21.2* 22.5* 22.2*   MCV 98.6 99.6 100.6*    327 266     Recent Labs     03/30/21  1400 03/31/21  0524 04/01/21  0424   * 132* 127*   K 3.6 3.6 3.6   CL 96* 96* 95*   CO2 27 25 27   GLUCOSE 156* 136* 143*   PHOS 2.2* 2.0* 2.2*   MG 1.90 1.70* 1.60*   BUN 22* 19 13   CREATININE <0.5* <0.5* <0.5*   LABGLOM >60 >60 >60   GFRAA >60 >60 >60     CT scan of abdomen pelvis with IV contrast from 3/22  Impression There is complete thrombosis of the portal vein.  Thrombus is also seen   extending into the cephalad portion of the superior mesenteric vein and   throughout the splenic vein.       Peripancreatic stranding consistent with pancreatitis. [place] is a small area   of low-attenuation in the region the pancreatic head which may correspond to   focal dilated pancreatic duct or a small developing fluid collection.       Hepatic steatosis. CT scan of abdomen pelvis without contrast on 3/28  FINDINGS:   Lower Chest: Calcified granuloma are noted in the lung bases.       Organs: There is extensive fatty infiltration of the liver, with areas of   relative sparing.       Peripancreatic fat stranding is present.  A parenchymal calcification is   noted in the pancreatic head.  No peripancreatic fluid collection is   identified.       The spleen and adrenal glands are unremarkable.  There are nonobstructing   calculi in the kidneys, measuring up to 4 mm on the right and 2 mm on the   left.       GI/Bowel: There is a small hiatal hernia.  No dilated loops of bowel are   identified. [place] is a colo colonic anastomosis in the sigmoid colon, which   is unremarkable.  Appendix is normal.       Pelvis:  The bladder and prostate gland are unremarkable. [place] is a small   amount of free fluid in the recto vesicular space.       Peritoneum/Retroperitoneum: There is mild aortoiliac calcification, without   aneurysm.  No adenopathy is seen.       Bones/Soft Tissues: No acute osseous injury is appreciated.       There are midline periumbilical hernias with ostia of 1.6 and 1.2 cm   respectively, with small bowel content. [place] Feast is no incarceration or area of   transition.  Adjacently, there is a right periumbilical hernia, with ostium   of 2.8 cm and fat content.  Several small fat containing hernias are also   noted in the supraumbilical region.           Impression   Findings consistent with acute on chronic pancreatitis.     Small amount of free fluid in the pelvis.  No pseudocyst is identified.       Extensive fatty infiltration of the liver.       Nonobstructive bilateral nephrolithiasis.  Punctate caliceal stones are noted   bilaterally.       Multiple small ventral hernias.  2 hernias in the periumbilical midline   contain small bowel.  No incarceration is evident. UA with micro shows 30 protein positive nitrite 6-9 WBCs and 11-20 RBCs  Urine culture no growth till date     Assessment  -REDDY in the setting of nausea vomiting diarrhea leading to volume depletion, hypotension and also third spacing from SIRS related to pancreatitis. -Hyperkalemia from REDDY and metabolic acidosis   Improved with medical management with IV insulin, D50 and bicarb    -Metabolic acidosis, predominantly lactic acidosis from hypoperfusion and liver failure    -Acute on chronic pancreatitis from biliary sludge    -Septic shock with Klebsiella pneumoniae bacteremia     -Portal vein thrombosis, superior mesenteric vein thrombosis   Elevated INR, also on heparin drip    -Anemia-1 PRBC on 3/29    -Hypocalcemia-corrected calcium is about 9 on 3/29    Plan  -Maintain hemodynamics: Keep MAP>65 CVP 10  SBP>100   -Status post 20 M EQ KCl, 20 mmol of K-Phos this a.m., magnesium 2 g IV  -Extra sodium Phos 20 mmol IV x1  -Extra mag sulfate 2 g IV x1  -Recheck and replace later in the evening  -Broad-spectrum antibiotics  -Serial renal panel   -Daily wts and strict i/o  -Renal dose meds and avoid nephrotoxins      Thank you for the consultation. Please do not hesitate to call with questions.     Aleatha Dancer  [facility]  Office: [phone]  Fax:    [phone]

## 2021-04-01 NOTE — PROGRESS NOTES
General Surgery - [facility] [place], [place]  Daily Progress Note    Pt Name: Monserrat Rodriguez  Medical Record Number: [id number]  Date of Birth 1980   Today's Date: 4/1/2021    ASSESSMENT  1. CT 3/28: acute on chronic pancreatitis, fatty liver  2. CT 3/22: portal vein thrombosis, SMV and splenic vein, pancreatiits  3. ABD: obese, soft, no distention, no tenderness, no V with NJ TF, + BMs   4. Leuks 38.6->30.1->18.6->17.8->12  5. ARF resolved  6. Lipase: 37  7. Elevated LFTs which are improved  8. Hgb 6.7/20.8->7.9/24 s/p 1 unit of PRBCs->7.1/21.2->7.5/22.5->7.3/22.2  9. F/C 2.6 L  10. BC + Klebsiella  11. C diff negative   12. Pt extubated and off Vaso, remains on Levo  13. Remote hx of heroin abuse, per RN stopped drinking ETOH 4 months ago. 14. Pt appears to be tolerating NJ feeds     PLAN  1. TF via [facility] tube may advance to goal as tolerated  2. Labs in the am  3. Renal seeing  4. Hep gtt  5. Levo and vasso per CC  6. Getting Rocephin  7. Pt appears to be improving: no surgical plans    SUBJECTIVE  Aurther Candi has slightly improved from yesterday. Pain appears well controlled. He has no vomiting. He has passed flatus and has had a bowel movement. He is tolerating NJ tube feeds. Current activity is strict bedrest with HOB flat-to-30 degrees X 24 hours, then check with Sx    OBJECTIVE  VITALS:  height is 6' 1\" (1.854 m) and weight is 283 lb 9.6 oz (128.6 kg). His axillary temperature is 97.6 °F (36.4 °C). His blood pressure is 108/63 and his pulse is 75. His respiration is 16 and oxygen saturation is 100%.  VITALS:  /63   Pulse 75   Temp 97.6 °F (36.4 °C) (Axillary)   Resp 16   Ht 6' 1\" (1.854 m)   Wt 283 lb 9.6 oz (128.6 kg)   SpO2 100%   BMI 37.42 kg/m²   INTAKE/OUTPUT:      Intake/Output Summary (Last 24 hours) at 4/1/2021 1509  Last data filed at 4/1/2021 1300  Gross per 24 hour   Intake 2464.9 ml   Output 3880 ml   Net -1415.1 ml     URINARY CATHETER OUTPUT (Valenzuela):     GENERAL: sedated on vent    I/O last 3 completed shifts: In: 2464.9 [I.V.:1434.7; NG/GT:846; IV Piggyback:184.2]  Out: 3434 [Urine:3880]  No intake/output data recorded.     LABS  Recent Labs     04/01/21  0424   WBC 12.0*   HGB 7.3*   HCT 22.2*      *   K 3.6   CL 95*   CO2 27   BUN 13   CREATININE <0.5*   MG 1.60*   PHOS 2.2*   CALCIUM 8.0*   *   *   BILITOT 4.5*   BILIDIR 3.4*     CBC with Differential:    Lab Results   Component Value Date    WBC 12.0 04/01/2021    RBC 2.21 04/01/2021    HGB 7.3 04/01/2021    HCT 22.2 04/01/2021     04/01/2021    .6 04/01/2021    MCH 33.1 04/01/2021    MCHC 32.9 04/01/2021    RDW 22.1 04/01/2021    NRBC 1 04/01/2021    BANDSPCT 7 04/01/2021    METASPCT 1 04/01/2021    LYMPHOPCT 16.0 04/01/2021    PROMYELOPCT 1 03/31/2021    MONOPCT 10.0 04/01/2021    MYELOPCT 2 04/01/2021    BASOPCT 1.0 04/01/2021    MONOSABS 1.2 04/01/2021    LYMPHSABS 2.0 04/01/2021    EOSABS 0.5 04/01/2021    BASOSABS 0.1 04/01/2021     CMP:    Lab Results   Component Value Date     04/01/2021    K 3.6 04/01/2021    K 6.4 03/28/2021    CL 95 04/01/2021    CO2 27 04/01/2021    BUN 13 04/01/2021    CREATININE <0.5 04/01/2021    GFRAA >60 04/01/2021    AGRATIO 0.6 03/28/2021    LABGLOM >60 04/01/2021    GLUCOSE 143 04/01/2021    PROT 5.4 04/01/2021    LABALBU 2.1 04/01/2021    CALCIUM 8.0 04/01/2021    BILITOT 4.5 04/01/2021    ALKPHOS 158 04/01/2021     04/01/2021     04/01/2021         Ledy Blanco [place]  Electronically signed 4/1/2021 at 3:09 PM

## 2021-04-01 NOTE — PROGRESS NOTES
Pulmonary & Critical Care Medicine ICU Progress Note    CC: Septic shock, bacteremia, portal vein thrombosis    Events of Last 24 hours:   Postpyloric tube placed  Started on TF, now @ 65  Intermittent confusion  On precedex 0.7  Ongoing levophed requirement    Invasive Lines:    3/28/2021 left IJ CVC    MV:   3/28/2021- 3/30/21    Vent Mode: PS Rate Set: 18 bmp/Vt Ordered: 500 mL/ /FiO2 : 30 %  Recent Labs     21  0542   PHART 7.483*   LHX2ELI 36.4   PO2ART 85.0       IV:   sodium chloride      dexmedetomidine HCl in NaCl 0.7 mcg/kg/hr (21 0343)    sodium chloride      dextrose      heparin (PORCINE) Infusion 15 mL/hr (21 1800)    norepinephrine 5 mcg/min (21)    vasopressin (Septic Shock) infusion 0.03 Units/min (21)       Vitals:  Blood pressure 102/70, pulse 70, temperature 98.2 °F (36.8 °C), temperature source Axillary, resp. rate 16, height 6' 1\" (1.854 m), weight 283 lb 9.6 oz (128.6 kg), SpO2 97 %. on room air  Temp  Av.9 °F (36.1 °C)  Min: 96.3 °F (35.7 °C)  Max: 98.2 °F (36.8 °C)    Intake/Output Summary (Last 24 hours) at 2021 0646  Last data filed at 2021 0624  Gross per 24 hour   Intake 4303.21 ml   Output 2710 ml   Net 1593.21 ml     EXAM:  General: ill appearing. Eyes: PERRL. No sclera icterus. No conjunctival injection. ENT: No discharge. Pharynx clear. Neck: Trachea midline. Normal thyroid. Resp: No accessory muscle use. No crackles. No wheezing. No rhonchi. No dullness on percussion. CV: Regular rate. Regular rhythm. No mumur or rub. No edema. Peripheral pulses are 2+. Capillary refill is less than 3 seconds. GI: Non-tender. Non-distended. No masses. No organomegaly. Normal bowel sounds. No hernia. Skin: Warm and dry. No nodule on exposed extremities. No rash on exposed extremities. Lymph: No cervical LAD. No supraclavicular LAD. M/S: No cyanosis. No joint deformity. No clubbing. Neuro: A&OX to person & year.  Patellar reflexes are symmetric. Psych: mild agitation, no anxiety, affect is full.      Scheduled Meds:   folic acid, thiamine, multi-vitamin with vitamin K infusion   Intravenous Daily    cefTRIAXone (ROCEPHIN) IV  2,000 mg Intravenous Q24H    insulin lispro  0-6 Units Subcutaneous Q4H    calcium gluconate  1,000 mg Intravenous Once    chlorhexidine  15 mL Mouth/Throat BID    lidocaine 1 % injection  5 mL Intradermal Once    sodium chloride flush  10 mL Intravenous 2 times per day    nystatin  5 mL Oral 4x Daily    [Held by provider] sucralfate  1 g Oral 4x Daily AC & HS    pantoprazole  40 mg Intravenous BID    [Held by provider] citalopram  10 mg Oral Daily    [Held by provider] dicyclomine  10 mg Oral BID    sodium chloride flush  10 mL Intravenous 2 times per day     PRN Meds:  docusate sodium, sodium chloride, glucose, dextrose, glucagon (rDNA), dextrose, heparin (porcine), heparin (porcine), sodium chloride flush, HYDROmorphone, phenol, oxyCODONE-acetaminophen **OR** oxyCODONE-acetaminophen, clonazePAM, sodium chloride flush, [DISCONTINUED] promethazine **OR** ondansetron, polyethylene glycol, acetaminophen **OR** acetaminophen, promethazine    Results:  CBC:   Recent Labs     03/30/21  0530 03/31/21  0524 04/01/21  0424   WBC 18.6* 17.8* 12.0*   HGB 7.1* 7.5* 7.3*   HCT 21.2* 22.5* 22.2*   MCV 98.6 99.6 100.6*    327 266     BMP:   Recent Labs     03/30/21  1400 03/31/21  0524 04/01/21  0424   * 132* 127*   K 3.6 3.6 3.6   CL 96* 96* 95*   CO2 27 25 27   PHOS 2.2* 2.0* 2.2*   BUN 22* 19 13   CREATININE <0.5* <0.5* <0.5*     LIVER PROFILE:   Recent Labs     03/30/21  0530   *   *   LIPASE 37.0   BILIDIR 3.1*   BILITOT 3.7*   ALKPHOS 170*       Cultures:  3/28/2021 blood Klebsiella stephenson sensitive  3/28/2021 tracheal aspirate NRF with MSSA  3/29/2021 C. difficile toxin negative    Films:  3/30/2021 CXR interstitial edema  3/31/2021 postpyloric tube placement    ASSESSMENT:  · Acute Respiratory Failure   · Gram-negative bacteremia   · Septic shock  · Agitated delirium  · H/O partial colectomy  · Acute kidney failure   · Alcohol abuse  · Recurrent pancreatitis  · Remote h/o heroin abuse  · Recent admission for pancreatitis, SP MRCP  · Portal vein thrombosis with extension to the SMV and splenic veins on 3/20/2021  · Pulmonary nodules    PLAN:  · Supplemental oxygen to maintain SaO2 >92%; wean as tolerated    Precedex for agitation  Antibiotic day #5, now ceftriaxone  IV levophed to maintain MAP of 65; d/c vasopressin   Replace electrolytes    TF   Blood sugar control, with goal 140-180    Eliquis   · Eventually will require follow-up imaging  · NOK is mother Mell Richardson in dietary)     Total critical care time caring for this patient with life threatening, unstable organ failure, including direct patient contact, management of life support systems, review of data including imaging and labs, discussions with other team members and physicians is 31 minutes so far today, excluding procedures.

## 2021-04-01 NOTE — PROGRESS NOTES
IM Progress Note    Admit Date:  3/13/2021  19    Interval history:     Admitted from [facility] with abd pain   He has pancreatitis  Hx of alcohol abuse, reports he quit about 4 months ago. Recurrent emesis after stating diet, again NPO . NJ tube placed on 3/25/2021 and postpyloric tube feedings  Initiated. Plan was to advance tube feedings as tolerated to goal rate and and discharged home with continued tube feedings. Patient however had significant emesis yesterday ( 3/26) . He did not tolerate the tube feedings , started having diarrhea. During one of his emesis, the NJ tube came out      worsening pain, hypotensive and tachycardic - given fluid bolus; labs with REDDY, AGAMA, hyperkalemia, severe leukocytosis  -transferred to ICU on 3/28 - intubated and placed on vent support  - now on levophed and vasopressin    Weaned off vaso today . Subjective:    Tamika Galindo seen awake on precedex gtt, answering some questions, comfortable  Tolerating NJ tube feeds  Pain controlled    Objective:   /70   Pulse 66   Temp (P) 97.6 °F (36.4 °C) (Oral)   Resp 18   Ht 6' 1\" (1.854 m)   Wt 283 lb 9.6 oz (128.6 kg)   SpO2 96%   BMI 37.42 kg/m²       Intake/Output Summary (Last 24 hours) at 4/1/2021 0739  Last data filed at 4/1/2021 5309  Gross per 24 hour   Intake 2627.01 ml   Output 2510 ml   Net 117.01 ml       Physical Exam:      General: young male , drowsy on sedation   No distress  Mucous Membranes:  Pink , anicteric  Neck: No JVD, no carotid bruit, no thyromegaly  NJ tube in place  Chest:  Clear to auscultation bilaterally, no added sounds  Cardiovascular:  RRR S1S2 heard, no murmurs or gallops  Abdomen:  Slightly +distended, old midline surgical scars , no organomegaly, BS present  Extremities: No edema or cyanosis.  Distal pulses well felt  Neurological : improving mentation , drowsy on sedation       Medications:   Scheduled Medications:    folic acid, thiamine, multi-vitamin with vitamin K infusion Intravenous Daily    cefTRIAXone (ROCEPHIN) IV  2,000 mg Intravenous Q24H    insulin lispro  0-6 Units Subcutaneous Q4H    calcium gluconate  1,000 mg Intravenous Once    chlorhexidine  15 mL Mouth/Throat BID    lidocaine 1 % injection  5 mL Intradermal Once    sodium chloride flush  10 mL Intravenous 2 times per day    nystatin  5 mL Oral 4x Daily    [Held by provider] sucralfate  1 g Oral 4x Daily AC & HS    pantoprazole  40 mg Intravenous BID    [Held by provider] citalopram  10 mg Oral Daily    [Held by provider] dicyclomine  10 mg Oral BID    sodium chloride flush  10 mL Intravenous 2 times per day     I   sodium chloride      dexmedetomidine HCl in NaCl 0.7 mcg/kg/hr (04/01/21 0343)    sodium chloride      dextrose      heparin (PORCINE) Infusion 15 mL/hr (03/31/21 1800)    norepinephrine 5 mcg/min (04/01/21 0626)    vasopressin (Septic Shock) infusion 0.03 Units/min (04/01/21 0343)     docusate sodium, sodium chloride, glucose, dextrose, glucagon (rDNA), dextrose, heparin (porcine), heparin (porcine), sodium chloride flush, HYDROmorphone, phenol, oxyCODONE-acetaminophen **OR** oxyCODONE-acetaminophen, clonazePAM, sodium chloride flush, [DISCONTINUED] promethazine **OR** ondansetron, polyethylene glycol, acetaminophen **OR** acetaminophen, promethazine    Lab Data:  Recent Labs     03/30/21  0530 03/31/21  0524 04/01/21  0424   WBC 18.6* 17.8* 12.0*   HGB 7.1* 7.5* 7.3*   HCT 21.2* 22.5* 22.2*   MCV 98.6 99.6 100.6*    327 266     Recent Labs     03/30/21  1400 03/31/21  0524 04/01/21  0424   * 132* 127*   K 3.6 3.6 3.6   CL 96* 96* 95*   CO2 27 25 27   PHOS 2.2* 2.0* 2.2*   BUN 22* 19 13   CREATININE <0.5* <0.5* <0.5*     No results for input(s): CKTOTAL, CKMB, CKMBINDEX, TROPONINI in the last 72 hours.     Coagulation:   Lab Results   Component Value Date    INR 3.73 03/28/2021    APTT 61.3 04/01/2021     Cardiac markers:   Lab Results   Component Value Date    TROPONINI <0.01 03/14/2021         Lab Results   Component Value Date     (H) 03/30/2021     (H) 03/30/2021    ALKPHOS 170 (H) 03/30/2021    BILITOT 3.7 (H) 03/30/2021       Lab Results   Component Value Date    INR 3.73 (H) 03/28/2021    INR 3.31 (H) 03/27/2021    INR 1.28 (H) 10/04/2019    PROTIME 43.8 (H) 03/28/2021    PROTIME 38.9 (H) 03/27/2021    PROTIME 14.6 (H) 10/04/2019     Cultures:  Blood: pseudomonas    Radiology    IR GI TUBE W FLUOROSCOPY   Final Result   Successful placement of nasal jejunal feeding tube         XR CHEST PORTABLE   Final Result   1. Stable appropriate positions of support apparatus. 2. Interval improvement in appearance of bibasilar pneumonia. XR CHEST PORTABLE   Final Result   Increased diffuse interstitial prominence suggests pulmonary edema. Stable support lines. XR CHEST PORTABLE   Final Result   Tubes and lines as above. Low lung volumes with probable basilar atelectasis. CT ABDOMEN PELVIS WO CONTRAST Additional Contrast? None   Final Result   Findings consistent with acute on chronic pancreatitis. Small amount of free fluid in the pelvis. No pseudocyst is identified. Extensive fatty infiltration of the liver. Nonobstructive bilateral nephrolithiasis. Punctate caliceal stones are noted   bilaterally. Multiple small ventral hernias. 2 hernias in the periumbilical midline   contain small bowel. No incarceration is evident. IR GI TUBE W FLUOROSCOPY   Final Result   Successful placement of nasal jejunal tube as discussed. CT ABDOMEN PELVIS W IV CONTRAST Additional Contrast? None   Final Result   There is complete thrombosis of the portal vein. Thrombus is also seen   extending into the cephalad portion of the superior mesenteric vein and   throughout the splenic vein. Peripancreatic stranding consistent with pancreatitis.   There is a small area   of low-attenuation in the region the pancreatic head which may correspond to   focal dilated pancreatic duct or a small developing fluid collection. Hepatic steatosis. The findings were sent to the Radiology Results [address] at 3:41   pm on 3/22/2021to be communicated to a licensed caregiver. MRI ABDOMEN WO CONTRAST MRCP   Final Result   *Normal caliber bile ducts without evidence of choledocholithiasis. *Hepatomegaly with severe fatty infiltration as described. *Mildly atrophic pancreas with ductal dilatation as measured and described   above, findings likely sequela of chronic pancreatitis. *Large amount of gallbladder sludge. US GALLBLADDER RUQ   Final Result   1. Gallbladder sludge with nonspecific wall thickening. 2. Abnormal common duct dilatation. MRCP and/or ERCP would be options to   further evaluate. Assessment & Plan:      Recurrent pancreatitis   - remote hx of alcohol related pancreatitis but reportedly quit 4 months ago  - did not improve with conservative mx . Recurrent and persistent nausea, emesis and worsening per CT , unable to tolerate oral diet  GI started post-pyloric tube feedings on 3/25. He did not tolerate advancing tube feedings. Complicated clinical course with development of poral vein thrombosis, septic shock with hypotension, renal failure, hyperkalemia and resp failure requiring vent support    - restarted NJ Tube feeds - tolerating well   See below      Sepsis  - not POA -   - he is hypotensive, tachycardic, WBC worsened at 38.6  - his abdominal pain is worse and ct with worsening pancreatitis,  - blood cx with Klebsiella pneumoniae   - on IV CefepimeD 4  - severe hypotensive shock on pressors , critical care managing  - weaning pressors     REDDY  Hyperkalemia  AGMA  - nephrology consulted  - started on BiCarb fluids  - Treated hyperkalemia with insulin, D50  - maintaining good UOP     Portal Vein Thrombosis.    - extending into SMV and splenic vein- likely with severe pancreatitis   - started on high dose heparin drip >eliquis     Abn LFT   - no CBD stone per MRCP, Mildly atrophic pancreas with ductal dilatation noted but with gall bladder sludge  - bili improving  - pt reports no alcohol since 4 months   - Refer to [facility] for cholecystectomy       Normocytic  Anemia  Folic Acid Deficiency  - 9.4 on admission. Trended down with IVF  Monitor  - folate def noted, started on supplements   - hgb 6.7   - given 1 unit PRBC with repeat at 7.5     Anxiety   - Continue Celexa, Klonopin prn       Severe PCM  -Attempted on post-pyloric tube feedings.    - start tube feeds     H/o alcohol abuse   - noted during previous admission  - Admission last year for acute pancreatitis due to alcohol abuse.         DVT Prophylaxis: eliquis  Diet: Diet NPO - on  NJ  Code Status: Full Code      Marybel Park MD 4/1/2021 7:39 AM

## 2021-04-01 NOTE — PROGRESS NOTES
PROGRESS NOTE  S:40 yrs Patient  admitted on 3/13/2021 with Acute cholecystitis [K81.0] . Today he remains fatigued. He admits to LLQ pain, cramping, and bloating. He is tolerating TFs per NJ, and is passing loose stools. Exam:   Vitals:    04/01/21 1000   BP: 90/61   Pulse: 70   Resp: 17   Temp:    SpO2: 96%      General appearance: alert, appears older than stated age, cooperative, icteric, slowed mentation and syndromic appearance - chronically ill appearing  HEENT: Oropharynx clear, no lesions  Neck: no adenopathy and supple, symmetrical, trachea midline  Lungs: clear to auscultation bilaterally  Heart: regular rate and rhythm, S1, S2 normal, no murmur, click, rub or gallop  Abdomen: normal findings: bowel sounds normal, no masses palpable and symmetric and abnormal findings:  distended, obese and tenderness mild in the LLQ  Extremities: extremities normal, atraumatic, no cyanosis or edema     Medications: Reviewed    Labs:  CBC:   Recent Labs     03/30/21  0530 03/31/21  0524 04/01/21  0424   WBC 18.6* 17.8* 12.0*   HGB 7.1* 7.5* 7.3*   HCT 21.2* 22.5* 22.2*   MCV 98.6 99.6 100.6*    327 266     BMP:   Recent Labs     03/30/21  1400 03/31/21  0524 04/01/21  0424   * 132* 127*   K 3.6 3.6 3.6   CL 96* 96* 95*   CO2 27 25 27   PHOS 2.2* 2.0* 2.2*   BUN 22* 19 13   CREATININE <0.5* <0.5* <0.5*     LIVER PROFILE:   Recent Labs     03/30/21  0530   *   *   LIPASE 37.0   PROT 4.8*   BILIDIR 3.1*   BILITOT 3.7*   ALKPHOS 170*     Attending Supervising [de-identified] Attestation Statement  The patient is a 41 y.o. male. I have performed a history and physical examination of the patient. I discussed the case with my physician assistant Miguelito Ventura PA-C    I reviewed the patient's Past Medical History, Past Surgical History, Medications, and Allergies.      Physical Exam:  Vitals:    04/01/21 1700 04/01/21 1800 04/01/21 1900 04/01/21 1921   BP: (!)

## 2021-04-02 LAB
ALBUMIN SERPL-MCNC: 2.1 G/DL (ref 3.4–5)
ALBUMIN SERPL-MCNC: 2.2 G/DL (ref 3.4–5)
ALBUMIN SERPL-MCNC: 2.2 G/DL (ref 3.4–5)
ALP BLD-CCNC: 203 U/L (ref 40–129)
ALT SERPL-CCNC: 124 U/L (ref 10–40)
ANION GAP SERPL CALCULATED.3IONS-SCNC: 5 MMOL/L (ref 3–16)
ANION GAP SERPL CALCULATED.3IONS-SCNC: 5 MMOL/L (ref 3–16)
ANION GAP SERPL CALCULATED.3IONS-SCNC: 6 MMOL/L (ref 3–16)
ANISOCYTOSIS: ABNORMAL
APTT: 80.8 SEC (ref 24.2–36.2)
AST SERPL-CCNC: 185 U/L (ref 15–37)
BANDED NEUTROPHILS RELATIVE PERCENT: 1 % (ref 0–7)
BASOPHILS ABSOLUTE: 0 K/UL (ref 0–0.2)
BASOPHILS RELATIVE PERCENT: 0 %
BILIRUB SERPL-MCNC: 4.8 MG/DL (ref 0–1)
BILIRUBIN DIRECT: 4.1 MG/DL (ref 0–0.3)
BILIRUBIN, INDIRECT: 0.7 MG/DL (ref 0–1)
BUN BLDV-MCNC: 6 MG/DL (ref 7–20)
BUN BLDV-MCNC: 6 MG/DL (ref 7–20)
BUN BLDV-MCNC: 7 MG/DL (ref 7–20)
CALCIUM SERPL-MCNC: 8.1 MG/DL (ref 8.3–10.6)
CALCIUM SERPL-MCNC: 8.2 MG/DL (ref 8.3–10.6)
CALCIUM SERPL-MCNC: 8.5 MG/DL (ref 8.3–10.6)
CHLORIDE BLD-SCNC: 105 MMOL/L (ref 99–110)
CHLORIDE BLD-SCNC: 105 MMOL/L (ref 99–110)
CHLORIDE BLD-SCNC: 109 MMOL/L (ref 99–110)
CO2: 25 MMOL/L (ref 21–32)
CO2: 27 MMOL/L (ref 21–32)
CO2: 28 MMOL/L (ref 21–32)
CREAT SERPL-MCNC: <0.5 MG/DL (ref 0.9–1.3)
CRYPTOSPORIDIUM ANTIGEN STOOL: NORMAL
E HISTOLYTICA ANTIGEN STOOL: NORMAL
EOSINOPHILS ABSOLUTE: 0.4 K/UL (ref 0–0.6)
EOSINOPHILS RELATIVE PERCENT: 3 %
GFR AFRICAN AMERICAN: >60
GFR NON-AFRICAN AMERICAN: >60
GIARDIA ANTIGEN STOOL: NORMAL
GLUCOSE BLD-MCNC: 108 MG/DL (ref 70–99)
GLUCOSE BLD-MCNC: 135 MG/DL (ref 70–99)
GLUCOSE BLD-MCNC: 137 MG/DL (ref 70–99)
GLUCOSE BLD-MCNC: 140 MG/DL (ref 70–99)
GLUCOSE BLD-MCNC: 141 MG/DL (ref 70–99)
GLUCOSE BLD-MCNC: 142 MG/DL (ref 70–99)
GLUCOSE BLD-MCNC: 156 MG/DL (ref 70–99)
GLUCOSE BLD-MCNC: 164 MG/DL (ref 70–99)
GLUCOSE BLD-MCNC: 166 MG/DL (ref 70–99)
HCT VFR BLD CALC: 23 % (ref 40.5–52.5)
HEMATOLOGY PATH CONSULT: NO
HEMOGLOBIN: 7.5 G/DL (ref 13.5–17.5)
HYPOCHROMIA: ABNORMAL
LYMPHOCYTES ABSOLUTE: 3.2 K/UL (ref 1–5.1)
LYMPHOCYTES RELATIVE PERCENT: 25 %
MAGNESIUM: 1.9 MG/DL (ref 1.8–2.4)
MAGNESIUM: 1.9 MG/DL (ref 1.8–2.4)
MCH RBC QN AUTO: 33.5 PG (ref 26–34)
MCHC RBC AUTO-ENTMCNC: 32.5 G/DL (ref 31–36)
MCV RBC AUTO: 102.9 FL (ref 80–100)
MONOCYTES ABSOLUTE: 0.6 K/UL (ref 0–1.3)
MONOCYTES RELATIVE PERCENT: 5 %
NEUTROPHILS ABSOLUTE: 8.6 K/UL (ref 1.7–7.7)
NEUTROPHILS RELATIVE PERCENT: 66 %
PDW BLD-RTO: 22.3 % (ref 12.4–15.4)
PERFORMED ON: ABNORMAL
PHOSPHORUS: 2.3 MG/DL (ref 2.5–4.9)
PHOSPHORUS: 2.5 MG/DL (ref 2.5–4.9)
PHOSPHORUS: 2.6 MG/DL (ref 2.5–4.9)
PLATELET # BLD: 227 K/UL (ref 135–450)
PLATELET SLIDE REVIEW: ADEQUATE
PMV BLD AUTO: 7.6 FL (ref 5–10.5)
POLYCHROMASIA: ABNORMAL
POTASSIUM SERPL-SCNC: 4.1 MMOL/L (ref 3.5–5.1)
POTASSIUM SERPL-SCNC: 4.2 MMOL/L (ref 3.5–5.1)
POTASSIUM SERPL-SCNC: 4.2 MMOL/L (ref 3.5–5.1)
RBC # BLD: 2.23 M/UL (ref 4.2–5.9)
SLIDE REVIEW: ABNORMAL
SODIUM BLD-SCNC: 136 MMOL/L (ref 136–145)
SODIUM BLD-SCNC: 138 MMOL/L (ref 136–145)
SODIUM BLD-SCNC: 141 MMOL/L (ref 136–145)
TOTAL PROTEIN: 5.4 G/DL (ref 6.4–8.2)
WBC # BLD: 12.8 K/UL (ref 4–11)

## 2021-04-02 PROCEDURE — 2580000003 HC RX 258: Performed by: INTERNAL MEDICINE

## 2021-04-02 PROCEDURE — 80069 RENAL FUNCTION PANEL: CPT

## 2021-04-02 PROCEDURE — 36592 COLLECT BLOOD FROM PICC: CPT

## 2021-04-02 PROCEDURE — 83735 ASSAY OF MAGNESIUM: CPT

## 2021-04-02 PROCEDURE — 97530 THERAPEUTIC ACTIVITIES: CPT

## 2021-04-02 PROCEDURE — 2580000003 HC RX 258

## 2021-04-02 PROCEDURE — 2500000003 HC RX 250 WO HCPCS: Performed by: HOSPITALIST

## 2021-04-02 PROCEDURE — 99291 CRITICAL CARE FIRST HOUR: CPT | Performed by: INTERNAL MEDICINE

## 2021-04-02 PROCEDURE — 6370000000 HC RX 637 (ALT 250 FOR IP): Performed by: PHYSICIAN ASSISTANT

## 2021-04-02 PROCEDURE — 6370000000 HC RX 637 (ALT 250 FOR IP): Performed by: HOSPITALIST

## 2021-04-02 PROCEDURE — 6370000000 HC RX 637 (ALT 250 FOR IP): Performed by: INTERNAL MEDICINE

## 2021-04-02 PROCEDURE — 97163 PT EVAL HIGH COMPLEX 45 MIN: CPT

## 2021-04-02 PROCEDURE — 6360000002 HC RX W HCPCS: Performed by: INTERNAL MEDICINE

## 2021-04-02 PROCEDURE — 2580000003 HC RX 258: Performed by: HOSPITALIST

## 2021-04-02 PROCEDURE — 84100 ASSAY OF PHOSPHORUS: CPT

## 2021-04-02 PROCEDURE — 97166 OT EVAL MOD COMPLEX 45 MIN: CPT

## 2021-04-02 PROCEDURE — 6360000002 HC RX W HCPCS: Performed by: PHYSICIAN ASSISTANT

## 2021-04-02 PROCEDURE — 2000000000 HC ICU R&B

## 2021-04-02 PROCEDURE — 80076 HEPATIC FUNCTION PANEL: CPT

## 2021-04-02 PROCEDURE — 36415 COLL VENOUS BLD VENIPUNCTURE: CPT

## 2021-04-02 PROCEDURE — C9113 INJ PANTOPRAZOLE SODIUM, VIA: HCPCS | Performed by: PHYSICIAN ASSISTANT

## 2021-04-02 PROCEDURE — 94761 N-INVAS EAR/PLS OXIMETRY MLT: CPT

## 2021-04-02 PROCEDURE — 85025 COMPLETE CBC W/AUTO DIFF WBC: CPT

## 2021-04-02 PROCEDURE — 99233 SBSQ HOSP IP/OBS HIGH 50: CPT | Performed by: INTERNAL MEDICINE

## 2021-04-02 PROCEDURE — 2700000000 HC OXYGEN THERAPY PER DAY

## 2021-04-02 PROCEDURE — 85730 THROMBOPLASTIN TIME PARTIAL: CPT

## 2021-04-02 PROCEDURE — 2500000003 HC RX 250 WO HCPCS: Performed by: INTERNAL MEDICINE

## 2021-04-02 PROCEDURE — 6370000000 HC RX 637 (ALT 250 FOR IP): Performed by: NURSE PRACTITIONER

## 2021-04-02 PROCEDURE — 97110 THERAPEUTIC EXERCISES: CPT

## 2021-04-02 RX ORDER — SODIUM CHLORIDE, SODIUM LACTATE, POTASSIUM CHLORIDE, CALCIUM CHLORIDE 600; 310; 30; 20 MG/100ML; MG/100ML; MG/100ML; MG/100ML
INJECTION, SOLUTION INTRAVENOUS
Status: COMPLETED
Start: 2021-04-02 | End: 2021-04-02

## 2021-04-02 RX ADMIN — Medication 10 ML: at 19:53

## 2021-04-02 RX ADMIN — NYSTATIN 500000 UNITS: 500000 SUSPENSION ORAL at 17:10

## 2021-04-02 RX ADMIN — DICYCLOMINE HYDROCHLORIDE 10 MG: 10 CAPSULE ORAL at 19:52

## 2021-04-02 RX ADMIN — CLONAZEPAM 0.5 MG: 0.5 TABLET ORAL at 23:15

## 2021-04-02 RX ADMIN — SUCRALFATE 1 G: 1 TABLET ORAL at 11:23

## 2021-04-02 RX ADMIN — FOLIC ACID: 5 INJECTION, SOLUTION INTRAMUSCULAR; INTRAVENOUS; SUBCUTANEOUS at 07:25

## 2021-04-02 RX ADMIN — DICYCLOMINE HYDROCHLORIDE 10 MG: 10 CAPSULE ORAL at 08:24

## 2021-04-02 RX ADMIN — INSULIN LISPRO 1 UNITS: 100 INJECTION, SOLUTION INTRAVENOUS; SUBCUTANEOUS at 00:10

## 2021-04-02 RX ADMIN — NYSTATIN 500000 UNITS: 500000 SUSPENSION ORAL at 11:23

## 2021-04-02 RX ADMIN — APIXABAN 10 MG: 5 TABLET, FILM COATED ORAL at 11:27

## 2021-04-02 RX ADMIN — SUCRALFATE 1 G: 1 TABLET ORAL at 19:51

## 2021-04-02 RX ADMIN — CITALOPRAM HYDROBROMIDE 10 MG: 20 TABLET ORAL at 08:24

## 2021-04-02 RX ADMIN — Medication 0.6 MCG/KG/HR: at 06:21

## 2021-04-02 RX ADMIN — SODIUM CHLORIDE, PRESERVATIVE FREE 10 ML: 5 INJECTION INTRAVENOUS at 19:54

## 2021-04-02 RX ADMIN — HYDROMORPHONE HYDROCHLORIDE 1 MG: 1 INJECTION, SOLUTION INTRAMUSCULAR; INTRAVENOUS; SUBCUTANEOUS at 20:01

## 2021-04-02 RX ADMIN — PANTOPRAZOLE SODIUM 40 MG: 40 INJECTION, POWDER, FOR SOLUTION INTRAVENOUS at 08:23

## 2021-04-02 RX ADMIN — SUCRALFATE 1 G: 1 TABLET ORAL at 16:37

## 2021-04-02 RX ADMIN — ACETAMINOPHEN 650 MG: 325 TABLET ORAL at 05:05

## 2021-04-02 RX ADMIN — PANTOPRAZOLE SODIUM 40 MG: 40 INJECTION, POWDER, FOR SOLUTION INTRAVENOUS at 19:54

## 2021-04-02 RX ADMIN — APIXABAN 10 MG: 5 TABLET, FILM COATED ORAL at 19:51

## 2021-04-02 RX ADMIN — NYSTATIN 500000 UNITS: 500000 SUSPENSION ORAL at 19:52

## 2021-04-02 RX ADMIN — CEFTRIAXONE 2000 MG: 2 INJECTION, POWDER, FOR SOLUTION INTRAMUSCULAR; INTRAVENOUS at 11:23

## 2021-04-02 RX ADMIN — CLONAZEPAM 0.5 MG: 0.5 TABLET ORAL at 08:54

## 2021-04-02 RX ADMIN — Medication 15 ML: at 08:24

## 2021-04-02 RX ADMIN — INSULIN LISPRO 1 UNITS: 100 INJECTION, SOLUTION INTRAVENOUS; SUBCUTANEOUS at 08:25

## 2021-04-02 RX ADMIN — HEPARIN SODIUM 15 ML/HR: 10000 INJECTION, SOLUTION INTRAVENOUS at 00:09

## 2021-04-02 RX ADMIN — Medication 15 ML: at 19:52

## 2021-04-02 RX ADMIN — OXYCODONE HYDROCHLORIDE AND ACETAMINOPHEN 2 TABLET: 5; 325 TABLET ORAL at 16:37

## 2021-04-02 RX ADMIN — Medication 0.6 MCG/KG/HR: at 00:10

## 2021-04-02 RX ADMIN — NYSTATIN 500000 UNITS: 500000 SUSPENSION ORAL at 08:24

## 2021-04-02 RX ADMIN — HYDROMORPHONE HYDROCHLORIDE 1 MG: 1 INJECTION, SOLUTION INTRAMUSCULAR; INTRAVENOUS; SUBCUTANEOUS at 23:15

## 2021-04-02 RX ADMIN — SODIUM CHLORIDE, POTASSIUM CHLORIDE, SODIUM LACTATE AND CALCIUM CHLORIDE 500 ML: 600; 310; 30; 20 INJECTION, SOLUTION INTRAVENOUS at 11:22

## 2021-04-02 RX ADMIN — SUCRALFATE 1 G: 1 TABLET ORAL at 06:22

## 2021-04-02 RX ADMIN — Medication 10 ML: at 08:24

## 2021-04-02 ASSESSMENT — PAIN DESCRIPTION - DESCRIPTORS
DESCRIPTORS: ACHING;CRAMPING
DESCRIPTORS: ACHING;CRAMPING

## 2021-04-02 ASSESSMENT — PAIN DESCRIPTION - ONSET: ONSET: ON-GOING

## 2021-04-02 ASSESSMENT — PAIN DESCRIPTION - ORIENTATION: ORIENTATION: MID;UPPER

## 2021-04-02 ASSESSMENT — PAIN SCALES - WONG BAKER
WONGBAKER_NUMERICALRESPONSE: 0

## 2021-04-02 ASSESSMENT — PAIN DESCRIPTION - PROGRESSION
CLINICAL_PROGRESSION: NOT CHANGED

## 2021-04-02 ASSESSMENT — PAIN SCALES - GENERAL
PAINLEVEL_OUTOF10: 6
PAINLEVEL_OUTOF10: 0
PAINLEVEL_OUTOF10: 8
PAINLEVEL_OUTOF10: 6

## 2021-04-02 ASSESSMENT — PAIN DESCRIPTION - FREQUENCY: FREQUENCY: CONTINUOUS

## 2021-04-02 ASSESSMENT — PAIN DESCRIPTION - PAIN TYPE: TYPE: ACUTE PAIN

## 2021-04-02 NOTE — PROGRESS NOTES
IM Progress Note    Admit Date:  3/13/2021  20    Interval history:     Admitted from [facility] with abd pain   He has pancreatitis  Hx of alcohol abuse, reports he quit about 4 months ago. Recurrent emesis after stating diet, again NPO . NJ tube placed on 3/25/2021 and postpyloric tube feedings  Initiated. Plan was to advance tube feedings as tolerated to goal rate and and discharged home with continued tube feedings. Patient however had significant emesis yesterday ( 3/26) . He did not tolerate the tube feedings , started having diarrhea. During one of his emesis, the NJ tube came out      worsening pain, hypotensive and tachycardic - given fluid bolus; labs with REDDY, AGAMA, hyperkalemia, severe leukocytosis  -transferred to ICU on 3/28 - intubated and placed on vent support    - eventually extubated to NC  Off precedex   - now on levophed and off vasopressin     Subjective:    Jaxon Galindo seen awake off  precedex gtt, answering  questions, comfortable  Tolerating NJ tube feeds  Pain controlled  Diarrhea noted    10 L UOP over last 24 hrs ,     Objective:   BP 99/67   Pulse 65   Temp 97.8 °F (36.6 °C) (Axillary)   Resp 15   Ht 6' 1\" (1.854 m)   Wt 283 lb 9.6 oz (128.6 kg)   SpO2 94%   BMI 37.42 kg/m²       Intake/Output Summary (Last 24 hours) at 4/2/2021 0737  Last data filed at 4/2/2021 3893  Gross per 24 hour   Intake 3326.52 ml   Output 68534 ml   Net -7573.48 ml       Physical Exam:      General: young male , awake alert and oriented, fatigued    No distress  Mucous Membranes:  Pink , anicteric  Neck: No JVD, no carotid bruit, no thyromegaly  NJ tube in place  Chest:  Clear to auscultation bilaterally, no added sounds  Cardiovascular:  RRR S1S2 heard, no murmurs or gallops  Abdomen:  Slightly +distended, old midline surgical scars , no organomegaly, BS present  Extremities: resolving upper ext edema .  Distal pulses well felt  Neurological : improving gen weakness, no nfocal       Medications: Scheduled Medications:    cefTRIAXone (ROCEPHIN) IV  2,000 mg Intravenous Q24H    insulin lispro  0-6 Units Subcutaneous Q4H    calcium gluconate  1,000 mg Intravenous Once    chlorhexidine  15 mL Mouth/Throat BID    lidocaine 1 % injection  5 mL Intradermal Once    sodium chloride flush  10 mL Intravenous 2 times per day    nystatin  5 mL Oral 4x Daily    sucralfate  1 g Oral 4x Daily AC & HS    pantoprazole  40 mg Intravenous BID    citalopram  10 mg Oral Daily    dicyclomine  10 mg Oral BID    sodium chloride flush  10 mL Intravenous 2 times per day     I   dexmedetomidine HCl in NaCl 0.6 mcg/kg/hr (04/02/21 7068)    sodium chloride      dextrose      heparin (PORCINE) Infusion 13 mL/hr (04/02/21 0648)    norepinephrine 5.013 mcg/min (04/01/21 1900)     potassium chloride **OR** potassium alternative oral replacement **OR** potassium chloride, magnesium sulfate, docusate sodium, sodium chloride, glucose, dextrose, glucagon (rDNA), dextrose, heparin (porcine), heparin (porcine), sodium chloride flush, HYDROmorphone, phenol, oxyCODONE-acetaminophen **OR** oxyCODONE-acetaminophen, clonazePAM, sodium chloride flush, [DISCONTINUED] promethazine **OR** ondansetron, polyethylene glycol, acetaminophen **OR** acetaminophen, promethazine    Lab Data:  Recent Labs     03/31/21  0524 04/01/21  0424 04/02/21  0627   WBC 17.8* 12.0* 12.8*   HGB 7.5* 7.3* 7.5*   HCT 22.5* 22.2* 23.0*   MCV 99.6 100.6* 102.9*    266 227     Recent Labs     04/01/21  0424 04/01/21  1445 04/02/21  0627   * 136 141   K 3.6 4.2 4.2   CL 95* 103 109   CO2 27 27 27   PHOS 2.2* 3.1 2.5   BUN 13 9 6*   CREATININE <0.5* <0.5* <0.5*     No results for input(s): CKTOTAL, CKMB, CKMBINDEX, TROPONINI in the last 72 hours.     Coagulation:   Lab Results   Component Value Date    INR 3.73 03/28/2021    APTT 80.8 04/02/2021     Cardiac markers:   Lab Results   Component Value Date    TROPONINI <0.01 03/14/2021         Lab Results Component Value Date     (H) 04/01/2021     (H) 04/01/2021    ALKPHOS 158 (H) 04/01/2021    BILITOT 4.5 (H) 04/01/2021       Lab Results   Component Value Date    INR 3.73 (H) 03/28/2021    INR 3.31 (H) 03/27/2021    INR 1.28 (H) 10/04/2019    PROTIME 43.8 (H) 03/28/2021    PROTIME 38.9 (H) 03/27/2021    PROTIME 14.6 (H) 10/04/2019     Cultures:  Blood: pseudomonas    Radiology    IR GI TUBE W FLUOROSCOPY   Final Result   Successful placement of nasal jejunal feeding tube         XR CHEST PORTABLE   Final Result   1. Stable appropriate positions of support apparatus. 2. Interval improvement in appearance of bibasilar pneumonia. XR CHEST PORTABLE   Final Result   Increased diffuse interstitial prominence suggests pulmonary edema. Stable support lines. XR CHEST PORTABLE   Final Result   Tubes and lines as above. Low lung volumes with probable basilar atelectasis. CT ABDOMEN PELVIS WO CONTRAST Additional Contrast? None   Final Result   Findings consistent with acute on chronic pancreatitis. Small amount of free fluid in the pelvis. No pseudocyst is identified. Extensive fatty infiltration of the liver. Nonobstructive bilateral nephrolithiasis. Punctate caliceal stones are noted   bilaterally. Multiple small ventral hernias. 2 hernias in the periumbilical midline   contain small bowel. No incarceration is evident. IR GI TUBE W FLUOROSCOPY   Final Result   Successful placement of nasal jejunal tube as discussed. CT ABDOMEN PELVIS W IV CONTRAST Additional Contrast? None   Final Result   There is complete thrombosis of the portal vein. Thrombus is also seen   extending into the cephalad portion of the superior mesenteric vein and   throughout the splenic vein. Peripancreatic stranding consistent with pancreatitis.   There is a small area   of low-attenuation in the region the pancreatic head which may correspond to   focal dilated severe pancreatitis   - started on high dose heparin drip >eliquis     Abn LFT   - no CBD stone per MRCP, Mildly atrophic pancreas with ductal dilatation noted but with gall bladder sludge  - bili improving  - pt reports no alcohol since 4 months   - Refer to Surgery at [place] for cholecystectomy       Normocytic  Anemia  Folic Acid Deficiency  - 9.4 on admission. Trended down with IVF  Monitor  - folate def noted, started on supplements   - hgb 6.7   - given 1 unit PRBC with repeat at 7.5     Anxiety   - Continue Celexa, resume Klonopin prn       Severe PCM  -Attempted on post-pyloric tube feedings.    - start tube feeds     H/o alcohol abuse   - noted during previous admission  - Admission last year for acute pancreatitis due to alcohol abuse.         DVT Prophylaxis: eliquis  Diet: Diet NPO - on  NJ  Code Status: Full Code    Start PT    Beti Griffiths MD 4/2/2021 7:37 AM

## 2021-04-02 NOTE — PROGRESS NOTES
Comprehensive Nutrition Assessment    Type and Reason for Visit:  Reassess    Nutrition Recommendations/Plan:   1. Continue Glucerna 1.5 at goal rate of 55 ml/hr x 20 hours + NS water flushes of 30 ml every 4 hours + one proteinex P2Go TWICE daily to meet patient's protein needs that formula alone cannot. 2. Monitor TF rate, intake, and tolerance + water flushes + administration of one proteinex P2Go TWICE daily via feeding tube. 3. Monitor GI status, progress notes, and plan of care. 4. Please obtain an updated weight for this patient - last weight was obtained on 3/22/21.   5. Monitor nutrition-related labs, bowel function/fecal management system output, and weight trends. Nutrition Assessment:  patient is improving from a nutritional standpoint AEB TF formula was modified on 4/1/21 and TF is at goal rate + patient is tolerating well (abdominal pain is decreased and stool output decreased), however, he remains at risk for further compromise d/t GI dysfunction, need for EN via post-pyloric tube as sole source of nutrition at this time, and loose stools; will continue Glucerna 1.5 at goal rate of 55 ml/hr x 20 hours + normal saline flushes of 30 ml every 4 hours + one proteinex P2Go TWICE daily via feeding tube    Malnutrition Assessment:  Malnutrition Status: Moderate malnutrition (moderate malnutrition in the context of acute illness or injury < 3 months based on 50% or less of estimated energy requirements for 5 or more days and 1% - 2% weight loss over 1 week), per guidelines from Academy of Nutrition and Dietetics (Academy)/American Society for Parenteral and Enteral Nutrition (A.S.P.E.N.) - clinical characteristics that the clinician can  obtain and document to support a diagnosis of malnutrition.   Context:  Acute Illness     Findings of the 6 clinical characteristics of malnutrition:  Energy Intake:  7 - 50% or less of estimated energy requirements for 5 or more days  Weight Loss:  1 - 1% to 2% over 1 week     Body Fat Loss:  No significant body fat loss     Muscle Mass Loss:  1 - Mild muscle mass loss Hand (interosseous)  Fluid Accumulation:  No significant fluid accumulation Extremities(BUE +1 edema)   Strength:  Not Performed    Estimated Daily Nutrient Needs:  Energy (kcal):  3350-6264 kcals/day based on 11-14 kcals/kg/CBW; Weight Used for Energy Requirements:  Current(obtained 3/22)     Protein (g):  168-185 g protein/day based on 2-2.2 g/kg/IBW;  Weight Used for Protein Requirements:  Ideal        Fluid (ml/day):  0053-8879 ml; Method Used for Fluid Requirements:  1 ml/kcal      Nutrition Related Findings:  patient is alert with intermittent confusion at times; abdomen is rotund, soft, and bowel sounds are present; + diarrhea - patient has fecal management system in place; stool output seems to be decreasing since TF formula change; decreased abdominal discomfort; ALT/AST elevated and Ca is low; + flat affect and patient is anxious; patient has bentyl, low-dose SSI, nystatin, protonix, carafate, precedex, and levo ordered at this time; patient is pale/jaundiced      Wounds:  None       Current Nutrition Therapies:    Current Tube Feeding (TF) Orders:  · Feeding Route: Nasojejunal  · Formula: 1.5 Diabetic  · Schedule: Continuous  · Additives/Modulars: Protein(one proteinex P2Go TWICE daily via feeding tube)  · Water Flushes: NS 30 ml water flushes every 4 hours or per MD guidance  · Current TF & Flush Orders Provides: TF is infusing at goal rate (Glucerna 1.5) and patient is tolerating well  · Goal TF & Flush Orders Provides: Glucerna 1.5 with a goal rate of 55 ml/hr x 20 hours = 1100 ml TV, 1650 kcals, 91 g protein, and 835 ml free water + 30 ml water flushes every 4 hours or per MD guidance + 52 g protein and 208 kcals from one proteinex P2Go TWICE daily via feeding tube (143 g protein and 1858 kcals total)      Anthropometric Measures:  · Height: 6' 1\" (185.4 cm)  · Current Body Weight: 283 lb 9.6 oz (128.6 kg)(obtained 3/22/21)   · Admission Body Weight: 288 lb 4.8 oz (130.8 kg)(obtained 3/13/21 - bed scale)    · Usual Body Weight: 287 lb (130.2 kg)(obtained 5/18/20 - unspecified method)     · Ideal Body Weight: 184 lbs; % Ideal Body Weight 154.1 %   · BMI: 37.4  · BMI Categories: Obese Class 2 (BMI 35.0 -39.9)       Nutrition Diagnosis:   · Moderate malnutrition related to altered GI function, altered GI structure, inadequate protein-energy intake, impaired respiratory function as evidenced by NPO or clear liquid status due to medical condition, GI abnormality, nausea, vomiting, diarrhea      Nutrition Interventions:   Food and/or Nutrient Delivery:  Continue NPO, Continue Current Tube Feeding  Nutrition Education/Counseling:  No recommendation at this time   Coordination of Nutrition Care:  Continue to monitor while inpatient, Interdisciplinary Rounds    Goals:  patient will tolerate Glucerna 1.5 at goal rate of 55 ml/hr x 20 hours without s/s of worsening GI distress, without s/s of aspiration, and without additional lab/fluid disturbances       Nutrition Monitoring and Evaluation:   Behavioral-Environmental Outcomes:  Knowledge or Skill, Readiness for Change   Food/Nutrient Intake Outcomes:  Enteral Nutrition Intake/Tolerance  Physical Signs/Symptoms Outcomes:  Biochemical Data, GI Status, Diarrhea, Fluid Status or Edema, Nutrition Focused Physical Findings, Skin, Weight     Discharge Planning:    Enteral Nutrition     Electronically signed by Kobi Light RD, LD on 4/2/21 at 11:23 AM EDT    Contact: [phone]

## 2021-04-02 NOTE — PROGRESS NOTES
Inpatient Physical Therapy Evaluation and Treatment    Unit: ICU  Date:  4/2/2021  Patient Name:    Drew Johnson  Admitting diagnosis:  Acute cholecystitis [K81.0]  Admit Date:  3/13/2021  Precautions/Restrictions/WB Status/ Lines/ Wounds/ Oxygen: Fall risk, Bed/chair alarm, Lines -IV, Supplemental O2 (2 L) and Valenzuela catheter, Telemetry and Continuous pulse oximetry, NG  LIJ CVC  Wears hinged knee braces bilaterally for chronic  Knee dislocation  Treatment Time:  16:15-17:05  Treatment Number:  1   Timed Code Treatment Minutes: 40 minutes  Total Treatment Minutes:  50  minutes    Patient Goals for Therapy: \" get stronger and  Go home \"          Discharge Recommendations: SNF  DME needs for discharge: defer to facility       Therapy recommendation for EMS Transport: requires transport by cot due to inability to transfer without lift equiptment    Therapy recommendations for staff:   Assist of 2 for bed mobility, encourage bed  Mobility and AROM    HISTORY OF PRESENT ILLNESS: per Dr. Ethan Langford: The patient is a 68-year-old man with a past medical history of distant heroin abuse, alcohol abuse who had been admitted to [facility] on 3/13/2021 with abdominal pain and pancreatitis. He underwent an MRCP during the hospital stay showing sludge in the gallbladder but no choledocholithiasis. On CT scan 3/20 he was found to have complete thrombosis of his portal vein with extension into the SMV and splenic vein thrombosis. He was initially started on heparin drip but was transitioned to Eliquis a few days ago. In addition he been receiving postpyloric tube feeds but had difficulty tolerating them and pulled his NG tube out yesterday. This morning he was noted to be more confused with hypotension and new onset renal failure and leukocytosis. He was transferred to the ICU for further care. Patient is currently a limited historian and cannot provide many details.   He does endorse epigastric abdominal pain without radiation. Transferred to ICU 3/29, intubated 3/29-3/30  Copied from Dr. Fern Benedict note dated 1/28/2020 pertaining to B knees:  Treatment Plan: I reviewed patient's x-ray and physical exam findings, he does have very complex situation with chronically dislocated patellas. This will require total knee arthroplasty with at least extensive lateral release, may require more extensive surgery depending on patella tracking intraoperatively. This was discussed with the patient. Today I recommend follow-up with 1 of our more experienced joint surgeons including Dr. Joy Umaña or Dr. Mari Osborne is a deal with these complex cases on a more regular basis. Patient agreed and will schedule follow-up. I also counseled patient about tobacco since cessation. Prior to proceeding with a total knee arthroplasty I encouraged him to stop smoking as this will help decrease her risk of complication. I did provide lateral patella stabilizing braces today to improve stability and function prior to surgery. Home Health S4 Level Recommendation:  NA  AM-PAC Mobility Score    AM-PAC Inpatient Mobility Raw Score : 8       Preadmission Environment    Pt. Lives Alone, but will stay with Mom at [place], does not have 24/7  Had been staying in a camper on his moms property  Home environment:  one story home  Steps to enter first floor: 2 with handrail in front of house steps to enter, 2 shorted steps in back  Steps to second floor: N/A  Bathroom: walk in shower  Equipment owned: family has some equiptment from family: raised toilet seat, BSC (he thinks), RW,     Preadmission Status:  Pt. Able to drive: Yes  Pt Fully independent with ADLs: Yes  Pt. Required assistance from family for: Laundry , shares cooking   Pt. independent for transfers and gait and walked with No Device  History of falls Yes, all the time, knees gives out-states he wears knee braces and needs B TKA.  States B knees  Chronically dislocate    Pain   Yes  Location: \"all over is moderate, abdomen is severe\"  Rating: severe /10  Pain Medicine Status: RN notified    Cognition    A&O Person, Place, Time and Situation   Able to follow 1 step commands    Subjective  Patient lying supine in bed with no family present. Pt agreeable to this PT eval & tx. Upper Extremity ROM/Strength  Please see OT evaluation. Lower Extremity ROM / Strength   AROM WFL: Yes  ROM limitations:   Strength in supine:  Hip flexors 2/5  Hip Abd 2+/5  DF/PF 3/5    Lower Extremity Sensation    [facility]    Lower Extremity Proprioception:   NT    Coordination and Tone  NT    Balance  Sitting:  Not tested; Not Tested  Comments:     Standing: Not tested; N/A  Comments:     Bed mobility:    Supine to sit:                           Not Tested  Sit to supine:                           Not Tested  Rolling: Mod A towards left side, CGA towards right side, increased abdominal pain with this activity  Scooting in sitting:                   Not Tested  Scooting to head of bed:         Max A of 2                     Bridging:                                  Not Tested     Transfers:    Sit to stand:                 Not Tested  Stand to sit:                 Not Tested  Bed to chair:                Not Tested  Standard toilet:            Not Tested  Bed to [facility]:                Not Tested    Gait gait deferred due to difficulty with transfers; pt ambulated 0 ft. , and pain    Stair Training deferred, pt unsafe/ not appropriate to complete stairs at this time    Activity Tolerance   Pt completed therapy session with Pain noted with rolling and bed mobility  Spo2 94% on RA  HR 93  /87  Positioning Needs   Pt in bed, no alarm needed, positioned in proper neutral alignment and pressure relief provided.  All needs  In reach    Exercises Initiated  all completed bilaterally unless indicated  Glut sets,  Quad sets, hip abd, AP, heel slides    Other      Patient/Family Education   Pt educated on role of inpatient PT, POC, importance of continued activity, DC recommendations, safety awareness and calling for assist with mobility. Requested patient have mother bring in B LE braces   Assessment  Pt seen for Physical Therapy evaluation in acute care setting. Pt demonstrated decreased Activity tolerance, Safety and Strength as well as decreased independence with Ambulation, Bed Mobility  and Transfers. Patient will benefit from skilled PT while in acute care to maximize functional mobility including bed mobility, transfers and  ambulation. Recommending SNF upon discharge as patient functioning well below baseline, demonstrates good rehab potential and unable to return home due to limited or no family support, inability to negotiate stairs to enter home/bedroom/bathroom, burden of care beyond caregiver ability and home environment not conducive to patient recovery. Goals : To be met in 3 visits:  1). Independent with LE Ex x 10 reps  2.) Supine to sit with Max A  3.) Bed to chair with Max A and  RW  To be met in 6 visits:  1). Supine to/from sit: Mod A   2). Sit to/from stand: Mod A   3). Bed to chair: Mod A   4). Gait: Ambulate 25 ft.   with  Mod A  and use of rolling walker (RW)  5). Tolerate B LE exercises 3 sets of 10-15 reps    Rehabilitation Potential: Good  Strengths for achieving goals include:   Pt motivated, PLOF, Family Support and Pt cooperative   Barriers to achieving goals include:    Complexity of condition, Pain and Weakness    Plan    To be seen 3-5 x / week  while in acute care setting for therapeutic exercises, bed mobility, transfers, progressive gait training, balance training, and family/patient education. Signature: Shelly Parra, PT #[id number]    If patient discharges from this facility prior to next visit, this note will serve as the Discharge Summary.

## 2021-04-02 NOTE — PROGRESS NOTES
Units Subcutaneous Q4H    calcium gluconate  1,000 mg Intravenous Once    chlorhexidine  15 mL Mouth/Throat BID    lidocaine 1 % injection  5 mL Intradermal Once    sodium chloride flush  10 mL Intravenous 2 times per day    nystatin  5 mL Oral 4x Daily    sucralfate  1 g Oral 4x Daily AC & HS    pantoprazole  40 mg Intravenous BID    citalopram  10 mg Oral Daily    dicyclomine  10 mg Oral BID    sodium chloride flush  10 mL Intravenous 2 times per day     PRN Meds:  potassium chloride **OR** potassium alternative oral replacement **OR** potassium chloride, magnesium sulfate, docusate sodium, sodium chloride, glucose, dextrose, glucagon (rDNA), dextrose, heparin (porcine), heparin (porcine), sodium chloride flush, HYDROmorphone, phenol, oxyCODONE-acetaminophen **OR** oxyCODONE-acetaminophen, clonazePAM, sodium chloride flush, [DISCONTINUED] promethazine **OR** ondansetron, polyethylene glycol, acetaminophen **OR** acetaminophen, promethazine    Results:  CBC:   Recent Labs     03/31/21  0524 04/01/21  0424 04/02/21  0627   WBC 17.8* 12.0* 12.8*   HGB 7.5* 7.3* 7.5*   HCT 22.5* 22.2* 23.0*   MCV 99.6 100.6* 102.9*    266 227     BMP:   Recent Labs     04/01/21  0424 04/01/21  1445 04/02/21  0627   * 136 141   K 3.6 4.2 4.2   CL 95* 103 109   CO2 27 27 27   PHOS 2.2* 3.1 2.5   BUN 13 9 6*   CREATININE <0.5* <0.5* <0.5*     LIVER PROFILE:   Recent Labs     04/01/21 0424   *   *   BILIDIR 3.4*   BILITOT 4.5*   ALKPHOS 158*       Cultures:  3/28/2021 blood Klebsiella stephenson sensitive  3/28/2021 tracheal aspirate NRF with MSSA  3/29/2021 C. difficile toxin negative    Films:  3/30/2021 CXR interstitial edema  3/31/2021 postpyloric tube placement    ASSESSMENT:  · Acute Respiratory Failure   · Gram-negative bacteremia   · Septic shock  · Agitated delirium  · H/O partial colectomy  · Acute kidney failure   · Alcohol abuse  · Recurrent pancreatitis  · Remote h/o heroin abuse  · Recent admission for pancreatitis, SP MRCP  · Portal vein thrombosis with extension to the SMV and splenic veins on 3/20/2021  · Pulmonary nodules    PLAN:  · Supplemental oxygen to maintain SaO2 >92%; wean as tolerated    Precedex for agitation  Antibiotic day #6, now ceftriaxone  IV levophed to maintain MAP of 65    TF, post pyloric   Blood sugar control, with goal 140-180    Heparin gtt --> Eliquis   · Eventually will require follow-up imaging  · NOK is mother Everardo Bain in dietary)     Total critical care time caring for this patient with life threatening, unstable organ failure, including direct patient contact, management of life support systems, review of data including imaging and labs, discussions with other team members and physicians is 33 minutes so far today, excluding procedures.

## 2021-04-02 NOTE — PROGRESS NOTES
Pharmacy - RE:  High-dose Heparin drip  Current rate = 15 ml/h  (1,500 units/h)  aPTT drawn @  0627 = 80.8 sec. Goal aPTT = 49 - 76 sec. Per protocol, decrease rate to 13 ml/h (1300 units/h). Obtain a new aPTT 4/2 @ 1300.

## 2021-04-02 NOTE — PROGRESS NOTES
0800-  Doing well this am- oriented x 4, calmer. Able to  Vaguely discuss current medical events and prior history. Earlier c/o of low abdominal pain- \" I have a lot of gas\". Bentyl, clonopin scheduled am meds. 0800-  Precedex and Levophed IV drips titrated according to orders. See eMAR. All ICU monitoring lines checked and in place. Dobhoff tube in place. Tf infusing per orders. Denies nasal discomfort. Valenzuela in place, draining clear yellow urine. Rectal tube with water dk brown stool in tubing. LIJ TLC in place, drsg CDI. Bed alarm active. Telesitter active. Safety precautions and fall safety reviewed with patient with review of call light use. 1000- Dr Holland Metzger rounding, IV drip rates and current meds reviewed. Will restart Eliquis and discontine Heparin IV drip. -see orders. PT/OT ordered. POC discussed at bedside.

## 2021-04-02 NOTE — PROGRESS NOTES
[facility]    Follow-up  Note           Reason for Consult: REDDY, hyperkalemia, metabolic acidosis  Requesting Physician:  Dr. Masoud Luu history  Levophed at 6-->3-->5-->4 mics per minute  Off vasopressin   Renal function has improved  postpyloric feeding tube placed on 3/31 by IR    Last 24 h uop 10.6 l    ROS: Unable to obtain  PSFH: NO visitor    Scheduled Meds:   apixaban  10 mg Oral BID    [START ON 4/9/2021] apixaban  5 mg Oral BID    cefTRIAXone (ROCEPHIN) IV  2,000 mg Intravenous Q24H    insulin lispro  0-6 Units Subcutaneous Q4H    calcium gluconate  1,000 mg Intravenous Once    chlorhexidine  15 mL Mouth/Throat BID    lidocaine 1 % injection  5 mL Intradermal Once    sodium chloride flush  10 mL Intravenous 2 times per day    nystatin  5 mL Oral 4x Daily    sucralfate  1 g Oral 4x Daily AC & HS    pantoprazole  40 mg Intravenous BID    citalopram  10 mg Oral Daily    dicyclomine  10 mg Oral BID    sodium chloride flush  10 mL Intravenous 2 times per day     Continuous Infusions:   dexmedetomidine HCl in NaCl 0.2 mcg/kg/hr (04/02/21 1012)    sodium chloride      dextrose      norepinephrine 4 mcg/min (04/02/21 0950)     PRN Meds:.potassium chloride **OR** potassium alternative oral replacement **OR** potassium chloride, magnesium sulfate, docusate sodium, sodium chloride, glucose, dextrose, glucagon (rDNA), dextrose, sodium chloride flush, HYDROmorphone, phenol, oxyCODONE-acetaminophen **OR** oxyCODONE-acetaminophen, clonazePAM, sodium chloride flush, [DISCONTINUED] promethazine **OR** ondansetron, polyethylene glycol, acetaminophen **OR** acetaminophen, promethazine    History of Present Illness on 3/28/2021:    41 y.o. yo male with PMH of heroin and alcohol abuse, hypertension, history of ileostomy who is admitted for acute pancreatitis on 3/13/2021  Patient presented then with complaints of nausea vomiting abdominal pain 2 weeks prior to admission. He had been in medical surgical floor since then with pain control and IV fluid for pancreatitis. Patient had portal vein thrombosis noted on a CT scan of abdomen pelvis with IV contrast done on 3/22  On 3/26 nasojejunal tube was placed and he was started on postpyloric feeding. He had significant nausea vomiting and pulled his NG tube out on 3/28. He also had diarrhea on 3/27  Patient was on D5 half-normal saline with 20 of potassium chloride which was stopped on 3/25. No documentation of IV fluid on 3/26. On 3/27 saline was started at 100 mL/h but according to the nurses due to poor IV access it had not been continued as planned  Patient's blood pressure dropped to high 90s couple of times on 3/27. He also has been tachycardic in the 90s to 100 throughout his stay with occasionally going up to 140s 150s in the last 48 hours.     Physical exam:   Constitutional:  VITALS:  /68   Pulse 76   Temp 97.8 °F (36.6 °C) (Oral)   Resp 22   Ht 6' 1\" (1.854 m)   Wt 283 lb 9.6 oz (128.6 kg)   SpO2 94%   BMI 37.42 kg/m²   Gen: Drowsy   Neck: no bruits or jvd noted, thyroid normal  Cardiovascular:  S1, S2 without m/r/g; no lower extremity edema  Respiratory: Diminished  Abdomen:  +bs, soft, nt, nd, no hepatosplenomegaly  Neuro/Psy: Sleeping    Data/  Recent Labs     03/31/21  0524 04/01/21  0424 04/02/21  0627   WBC 17.8* 12.0* 12.8*   HGB 7.5* 7.3* 7.5*   HCT 22.5* 22.2* 23.0*   MCV 99.6 100.6* 102.9*    266 227     Recent Labs     04/01/21  1445 04/01/21  2117 04/02/21  0627 04/02/21  1150     --  141 138   K 4.2  --  4.2 4.1     --  109 105   CO2 27  --  27 28   GLUCOSE 133*  --  166* 141*   PHOS 3.1  --  2.5 2.6   MG 1.60* 2.40 1.90 1.90   BUN 9  --  6* 7   CREATININE <0.5*  --  <0.5* <0.5*   LABGLOM >60  --  >60 >60   GFRAA >60  --  >60 >60     CT scan of abdomen pelvis with IV contrast from 3/22  Impression   There is complete thrombosis of the portal vein.  Thrombus is also seen extending into the cephalad portion of the superior mesenteric vein and   throughout the splenic vein.       Peripancreatic stranding consistent with pancreatitis. Merian Rouge is a small area   of low-attenuation in the region the pancreatic head which may correspond to   focal dilated pancreatic duct or a small developing fluid collection.       Hepatic steatosis. CT scan of abdomen pelvis without contrast on 3/28  FINDINGS:   Lower Chest: Calcified granuloma are noted in the lung bases.       Organs: There is extensive fatty infiltration of the liver, with areas of   relative sparing.       Peripancreatic fat stranding is present.  A parenchymal calcification is   noted in the pancreatic head.  No peripancreatic fluid collection is   identified.       The spleen and adrenal glands are unremarkable.  There are nonobstructing   calculi in the kidneys, measuring up to 4 mm on the right and 2 mm on the   left.       GI/Bowel: There is a small hiatal hernia.  No dilated loops of bowel are   identified. Merian Rouge is a colo colonic anastomosis in the sigmoid colon, which   is unremarkable.  Appendix is normal.       Pelvis:  The bladder and prostate gland are unremarkable. Merian Rouge is a small   amount of free fluid in the recto vesicular space.       Peritoneum/Retroperitoneum: There is mild aortoiliac calcification, without   aneurysm.  No adenopathy is seen.       Bones/Soft Tissues: No acute osseous injury is appreciated.       There are midline periumbilical hernias with ostia of 1.6 and 1.2 cm   respectively, with small bowel content. Merian Rouge is no incarceration or area of   transition.  Adjacently, there is a right periumbilical hernia, with ostium   of 2.8 cm and fat content.  Several small fat containing hernias are also   noted in the supraumbilical region.           Impression   Findings consistent with acute on chronic pancreatitis.       Small amount of free fluid in the pelvis.  No pseudocyst is identified.     Extensive fatty infiltration of the liver.       Nonobstructive bilateral nephrolithiasis.  Punctate caliceal stones are noted   bilaterally.       Multiple small ventral hernias.  2 hernias in the periumbilical midline   contain small bowel.  No incarceration is evident. UA with micro shows 30 protein positive nitrite 6-9 WBCs and 11-20 RBCs  Urine culture no growth till date     Assessment  -REDDY in the setting of nausea vomiting diarrhea leading to volume depletion, hypotension and also third spacing from SIRS related to pancreatitis. -Hyperkalemia from REDDY and metabolic acidosis   Improved with medical management with IV insulin, D50 and bicarb    -Metabolic acidosis, predominantly lactic acidosis from hypoperfusion and liver failure    -Acute on chronic pancreatitis from biliary sludge    -Septic shock with Klebsiella pneumoniae bacteremia     -Portal vein thrombosis, superior mesenteric vein thrombosis   Elevated INR, also on heparin drip    -Anemia-1 PRBC on 3/29    -Hypocalcemia-corrected calcium is about 9 on 3/29    Plan  -Maintain hemodynamics: Keep MAP>65 CVP 10  SBP>100   - mL bolus up to 2 L in 24 hours to keep CVP over 8, patient is polyuric and has been losing volume significantly. Patient receiving bolus x1 now  -Change free water to 60 ml q4 hours  -Broad-spectrum antibiotics  -Serial renal panel   -Daily wts and strict i/o  -Renal dose meds and avoid nephrotoxins    Discussed with RN    Thank you for the consultation. Please do not hesitate to call with questions.     Valencia Tenorio  [facility]  Office: [phone]  Fax:    [phone]

## 2021-04-02 NOTE — PROGRESS NOTES
PROGRESS NOTE  S:40 yrs Patient  admitted on 3/13/2021 with Acute cholecystitis [K81.0] . Today he complains of nausea, abdominal pain, and loose BMs. He remains fatigued and jaundiced. He is tolerating TFs per [place]. Exam:   Vitals:    04/02/21 1100   BP: 99/61   Pulse: 68   Resp: 15   Temp:    SpO2: 96%      General appearance: appears older than stated age, cooperative, fatigued, icteric, no distress and syndromic appearance - chronically ill appearing  HEENT: Oropharynx clear, no lesions  Neck: no adenopathy and supple, symmetrical, trachea midline  Lungs: clear to auscultation bilaterally  Heart: regular rate and rhythm, S1, S2 normal, no murmur, click, rub or gallop  Abdomen: normal findings: bowel sounds normal, no masses palpable and symmetric and abnormal findings:  distended, obese and tenderness mild in the entire abdomen  Extremities: extremities normal, atraumatic, no cyanosis or edema     Medications: Reviewed    Labs:  CBC:   Recent Labs     03/31/21  0524 04/01/21  0424 04/02/21  0627   WBC 17.8* 12.0* 12.8*   HGB 7.5* 7.3* 7.5*   HCT 22.5* 22.2* 23.0*   MCV 99.6 100.6* 102.9*    266 227     BMP:   Recent Labs     04/01/21  0424 04/01/21  1445 04/02/21  0627   * 136 141   K 3.6 4.2 4.2   CL 95* 103 109   CO2 27 27 27   PHOS 2.2* 3.1 2.5   BUN 13 9 6*   CREATININE <0.5* <0.5* <0.5*     LIVER PROFILE:   Recent Labs     04/01/21  0424 04/02/21  0627   * 185*   * 124*   PROT 5.4* 5.4*   BILIDIR 3.4* 4.1*   BILITOT 4.5* 4.8*   ALKPHOS 158* 203*     Attending Supervising [de-identified] Attestation Statement  The patient is a 41 y.o. male. I have performed a history and physical examination of the patient. I discussed the case with my physician assistant Lucas Mena PA-C    I reviewed the patient's Past Medical History, Past Surgical History, Medications, and Allergies.      Physical Exam:  Vitals:    04/02/21 1200 04/02/21 1300 04/02/21 1400 04/02/21 1500   BP: 125/68 125/71 131/74 116/70   Pulse: 76 75 83 88   Resp: 22 15 15 17   Temp: 97.8 °F (36.6 °C)      TempSrc: Oral      SpO2: 94% 94% 94% 93%   Weight:       Height:           Physical Examination: General appearance - alert, well appearing, and in no distress  Mental status - drowsy  Eyes - pupils equal and reactive, extraocular eye movements intact  Neck - supple, no significant adenopathy  Chest - clear to auscultation, no wheezes, rales or rhonchi, symmetric air entry  Heart - normal rate, regular rhythm, normal S1, S2, no murmurs, rubs, clicks or gallops  Abdomen - soft, nontender, nondistended, no masses or organomegaly  Extremities - no pedal edema noted          Impression: 41year old male with a history of HTN, arthritis, diverticulitis s/p partial colectomy and reversal of ileostomy, alcohol abuse, admitted with recurrent acute pancreatitis c/b portal vein thrombosis and evolving pseudocyst. Hospital course complicated by sepsis and respiratory failure. Recommendation:  1. Continue supportive care  2. Monitor LFTs  3. NPO, IVF, pain control  4. Continue post pyloric TFs as tolerated per NJ  5. Dietician following for formula and rate recommendations  6. Continue Abx and Eliquis  7. Continue PPI and Carafate  8. Continue Dicyclomine  9. Recommend wean off levophed   10. Await stool test results  11. PT/OT  12. Will follow      Miriam Rodriguez PA-C  11:28 AM 4/2/2021                 41year old male with a history of HTN, arthritis, diverticulitis s/p partial colectomy and reversal of ileostomy, alcohol abuse, admitted with recurrent acute pancreatitis c/b portal vein thrombosis and evolving pseudocyst. Hospital course complicated by sepsis and respiratory failure. Continue supportive care. NPO, IVF, pain control. wean off levophed. Elevated LFTs likely from sepsis. Continue antibiotics. Can repeat MRCP if LFTs continue to rise.  Continue post pyloric feeds with adjusted TF formula per [place]. Will follow.          Sebastian David MD          [phone]  [phone]

## 2021-04-03 LAB
ANION GAP SERPL CALCULATED.3IONS-SCNC: 5 MMOL/L (ref 3–16)
ANISOCYTOSIS: ABNORMAL
BANDED NEUTROPHILS RELATIVE PERCENT: 3 % (ref 0–7)
BASOPHILS ABSOLUTE: 0 K/UL (ref 0–0.2)
BASOPHILS RELATIVE PERCENT: 0 %
BUN BLDV-MCNC: 6 MG/DL (ref 7–20)
CALCIUM SERPL-MCNC: 8.3 MG/DL (ref 8.3–10.6)
CHLORIDE BLD-SCNC: 105 MMOL/L (ref 99–110)
CO2: 25 MMOL/L (ref 21–32)
CREAT SERPL-MCNC: <0.5 MG/DL (ref 0.9–1.3)
EOSINOPHILS ABSOLUTE: 0.4 K/UL (ref 0–0.6)
EOSINOPHILS RELATIVE PERCENT: 3 %
FECAL NEUTRAL FAT: NORMAL
FECAL SPLIT FATS: NORMAL
GFR AFRICAN AMERICAN: >60
GFR NON-AFRICAN AMERICAN: >60
GI BACTERIAL PATHOGENS BY PCR: NORMAL
GLUCOSE BLD-MCNC: 104 MG/DL (ref 70–99)
GLUCOSE BLD-MCNC: 105 MG/DL (ref 70–99)
GLUCOSE BLD-MCNC: 110 MG/DL (ref 70–99)
GLUCOSE BLD-MCNC: 114 MG/DL (ref 70–99)
GLUCOSE BLD-MCNC: 122 MG/DL (ref 70–99)
GLUCOSE BLD-MCNC: 92 MG/DL (ref 70–99)
HCT VFR BLD CALC: 22.6 % (ref 40.5–52.5)
HEMATOLOGY PATH CONSULT: NO
HEMOGLOBIN: 7.3 G/DL (ref 13.5–17.5)
HYPOCHROMIA: ABNORMAL
LYMPHOCYTES ABSOLUTE: 2 K/UL (ref 1–5.1)
LYMPHOCYTES RELATIVE PERCENT: 16 %
MAGNESIUM: 1.6 MG/DL (ref 1.8–2.4)
MCH RBC QN AUTO: 33.3 PG (ref 26–34)
MCHC RBC AUTO-ENTMCNC: 32.2 G/DL (ref 31–36)
MCV RBC AUTO: 103.4 FL (ref 80–100)
MONOCYTES ABSOLUTE: 0.6 K/UL (ref 0–1.3)
MONOCYTES RELATIVE PERCENT: 5 %
MYELOCYTE PERCENT: 3 %
NEUTROPHILS ABSOLUTE: 9.6 K/UL (ref 1.7–7.7)
NEUTROPHILS RELATIVE PERCENT: 70 %
PDW BLD-RTO: 22.5 % (ref 12.4–15.4)
PERFORMED ON: ABNORMAL
PERFORMED ON: NORMAL
PHOSPHORUS: 2.7 MG/DL (ref 2.5–4.9)
PLATELET # BLD: 204 K/UL (ref 135–450)
PLATELET SLIDE REVIEW: ADEQUATE
PMV BLD AUTO: 7.4 FL (ref 5–10.5)
POLYCHROMASIA: ABNORMAL
POTASSIUM SERPL-SCNC: 4.3 MMOL/L (ref 3.5–5.1)
RBC # BLD: 2.18 M/UL (ref 4.2–5.9)
SLIDE REVIEW: ABNORMAL
SODIUM BLD-SCNC: 135 MMOL/L (ref 136–145)
WBC # BLD: 12.6 K/UL (ref 4–11)

## 2021-04-03 PROCEDURE — 6360000002 HC RX W HCPCS: Performed by: HOSPITALIST

## 2021-04-03 PROCEDURE — 84100 ASSAY OF PHOSPHORUS: CPT

## 2021-04-03 PROCEDURE — 2580000003 HC RX 258: Performed by: INTERNAL MEDICINE

## 2021-04-03 PROCEDURE — 6360000002 HC RX W HCPCS: Performed by: INTERNAL MEDICINE

## 2021-04-03 PROCEDURE — 85025 COMPLETE CBC W/AUTO DIFF WBC: CPT

## 2021-04-03 PROCEDURE — 2580000003 HC RX 258

## 2021-04-03 PROCEDURE — 99232 SBSQ HOSP IP/OBS MODERATE 35: CPT | Performed by: INTERNAL MEDICINE

## 2021-04-03 PROCEDURE — 6370000000 HC RX 637 (ALT 250 FOR IP): Performed by: PHYSICIAN ASSISTANT

## 2021-04-03 PROCEDURE — 80048 BASIC METABOLIC PNL TOTAL CA: CPT

## 2021-04-03 PROCEDURE — 99233 SBSQ HOSP IP/OBS HIGH 50: CPT | Performed by: INTERNAL MEDICINE

## 2021-04-03 PROCEDURE — 83735 ASSAY OF MAGNESIUM: CPT

## 2021-04-03 PROCEDURE — 6370000000 HC RX 637 (ALT 250 FOR IP): Performed by: INTERNAL MEDICINE

## 2021-04-03 PROCEDURE — C9113 INJ PANTOPRAZOLE SODIUM, VIA: HCPCS | Performed by: PHYSICIAN ASSISTANT

## 2021-04-03 PROCEDURE — 6360000002 HC RX W HCPCS: Performed by: PHYSICIAN ASSISTANT

## 2021-04-03 PROCEDURE — 6370000000 HC RX 637 (ALT 250 FOR IP): Performed by: HOSPITALIST

## 2021-04-03 PROCEDURE — 6370000000 HC RX 637 (ALT 250 FOR IP): Performed by: NURSE PRACTITIONER

## 2021-04-03 PROCEDURE — C9113 INJ PANTOPRAZOLE SODIUM, VIA: HCPCS | Performed by: INTERNAL MEDICINE

## 2021-04-03 PROCEDURE — 1200000000 HC SEMI PRIVATE

## 2021-04-03 RX ORDER — SODIUM CHLORIDE 9 MG/ML
INJECTION, SOLUTION INTRAVENOUS
Status: COMPLETED
Start: 2021-04-03 | End: 2021-04-03

## 2021-04-03 RX ORDER — SODIUM CHLORIDE 9 MG/ML
INJECTION, SOLUTION INTRAVENOUS CONTINUOUS
Status: DISCONTINUED | OUTPATIENT
Start: 2021-04-03 | End: 2021-04-05

## 2021-04-03 RX ORDER — MAGNESIUM SULFATE IN WATER 40 MG/ML
2000 INJECTION, SOLUTION INTRAVENOUS ONCE
Status: DISCONTINUED | OUTPATIENT
Start: 2021-04-03 | End: 2021-04-05 | Stop reason: HOSPADM

## 2021-04-03 RX ADMIN — OXYCODONE HYDROCHLORIDE AND ACETAMINOPHEN 2 TABLET: 5; 325 TABLET ORAL at 15:51

## 2021-04-03 RX ADMIN — NYSTATIN 500000 UNITS: 500000 SUSPENSION ORAL at 15:45

## 2021-04-03 RX ADMIN — MAGNESIUM SULFATE HEPTAHYDRATE 1000 MG: 1 INJECTION, SOLUTION INTRAVENOUS at 06:50

## 2021-04-03 RX ADMIN — NYSTATIN 500000 UNITS: 500000 SUSPENSION ORAL at 08:26

## 2021-04-03 RX ADMIN — SODIUM CHLORIDE 250 ML: 9 INJECTION, SOLUTION INTRAVENOUS at 03:23

## 2021-04-03 RX ADMIN — SUCRALFATE 1 G: 1 TABLET ORAL at 11:55

## 2021-04-03 RX ADMIN — HYDROMORPHONE HYDROCHLORIDE 1 MG: 1 INJECTION, SOLUTION INTRAMUSCULAR; INTRAVENOUS; SUBCUTANEOUS at 03:23

## 2021-04-03 RX ADMIN — ONDANSETRON 4 MG: 2 SOLUTION INTRAMUSCULAR; INTRAVENOUS at 08:27

## 2021-04-03 RX ADMIN — SODIUM CHLORIDE, PRESERVATIVE FREE 10 ML: 5 INJECTION INTRAVENOUS at 22:34

## 2021-04-03 RX ADMIN — PANTOPRAZOLE SODIUM 40 MG: 40 INJECTION, POWDER, FOR SOLUTION INTRAVENOUS at 08:26

## 2021-04-03 RX ADMIN — SODIUM CHLORIDE: 9 INJECTION, SOLUTION INTRAVENOUS at 15:35

## 2021-04-03 RX ADMIN — NYSTATIN 500000 UNITS: 500000 SUSPENSION ORAL at 22:33

## 2021-04-03 RX ADMIN — OXYCODONE HYDROCHLORIDE AND ACETAMINOPHEN 2 TABLET: 5; 325 TABLET ORAL at 22:31

## 2021-04-03 RX ADMIN — MAGNESIUM SULFATE HEPTAHYDRATE 1000 MG: 1 INJECTION, SOLUTION INTRAVENOUS at 05:32

## 2021-04-03 RX ADMIN — OXYCODONE HYDROCHLORIDE AND ACETAMINOPHEN 2 TABLET: 5; 325 TABLET ORAL at 10:54

## 2021-04-03 RX ADMIN — HYDROMORPHONE HYDROCHLORIDE 1 MG: 1 INJECTION, SOLUTION INTRAMUSCULAR; INTRAVENOUS; SUBCUTANEOUS at 17:56

## 2021-04-03 RX ADMIN — CITALOPRAM HYDROBROMIDE 10 MG: 20 TABLET ORAL at 08:27

## 2021-04-03 RX ADMIN — HYDROMORPHONE HYDROCHLORIDE 1 MG: 1 INJECTION, SOLUTION INTRAMUSCULAR; INTRAVENOUS; SUBCUTANEOUS at 08:27

## 2021-04-03 RX ADMIN — DICYCLOMINE HYDROCHLORIDE 10 MG: 10 CAPSULE ORAL at 22:32

## 2021-04-03 RX ADMIN — SODIUM CHLORIDE: 9 INJECTION, SOLUTION INTRAVENOUS at 22:36

## 2021-04-03 RX ADMIN — SUCRALFATE 1 G: 1 TABLET ORAL at 15:45

## 2021-04-03 RX ADMIN — DICYCLOMINE HYDROCHLORIDE 10 MG: 10 CAPSULE ORAL at 08:26

## 2021-04-03 RX ADMIN — CEFTRIAXONE 2000 MG: 2 INJECTION, POWDER, FOR SOLUTION INTRAMUSCULAR; INTRAVENOUS at 12:01

## 2021-04-03 RX ADMIN — Medication 15 ML: at 08:26

## 2021-04-03 RX ADMIN — Medication 10 ML: at 08:36

## 2021-04-03 RX ADMIN — SUCRALFATE 1 G: 1 TABLET ORAL at 22:32

## 2021-04-03 RX ADMIN — APIXABAN 10 MG: 5 TABLET, FILM COATED ORAL at 08:26

## 2021-04-03 RX ADMIN — PANTOPRAZOLE SODIUM 40 MG: 40 INJECTION, POWDER, FOR SOLUTION INTRAVENOUS at 22:29

## 2021-04-03 RX ADMIN — Medication 10 ML: at 22:29

## 2021-04-03 RX ADMIN — APIXABAN 10 MG: 5 TABLET, FILM COATED ORAL at 22:34

## 2021-04-03 ASSESSMENT — PAIN DESCRIPTION - PAIN TYPE
TYPE: ACUTE PAIN

## 2021-04-03 ASSESSMENT — PAIN DESCRIPTION - PROGRESSION
CLINICAL_PROGRESSION: NOT CHANGED

## 2021-04-03 ASSESSMENT — PAIN DESCRIPTION - LOCATION
LOCATION: GENERALIZED
LOCATION: ABDOMEN;LEG

## 2021-04-03 ASSESSMENT — PAIN SCALES - GENERAL
PAINLEVEL_OUTOF10: 3
PAINLEVEL_OUTOF10: 8
PAINLEVEL_OUTOF10: 7
PAINLEVEL_OUTOF10: 8
PAINLEVEL_OUTOF10: 10
PAINLEVEL_OUTOF10: 8
PAINLEVEL_OUTOF10: 9

## 2021-04-03 ASSESSMENT — PAIN DESCRIPTION - ONSET: ONSET: ON-GOING

## 2021-04-03 ASSESSMENT — PAIN DESCRIPTION - DESCRIPTORS
DESCRIPTORS: ACHING;CONSTANT
DESCRIPTORS: ACHING;CONSTANT

## 2021-04-03 ASSESSMENT — PAIN SCALES - WONG BAKER
WONGBAKER_NUMERICALRESPONSE: 0
WONGBAKER_NUMERICALRESPONSE: 0

## 2021-04-03 ASSESSMENT — PAIN DESCRIPTION - FREQUENCY: FREQUENCY: CONTINUOUS

## 2021-04-03 NOTE — PROGRESS NOTES
AM assessment done. Pt placed on RA, SaO2 remains > 92%. Pt is alert & oriented. He is resting w/out evidence of distress @ this time.

## 2021-04-03 NOTE — PROGRESS NOTES
Pt transported to 2 [address] rm 232. Pt is alert & w/out evidence of discomfort or distress upon transport.

## 2021-04-03 NOTE — PROGRESS NOTES
Alert and resting in bed. Skin w/d. resp e/e unlabored. IVF running as ordered. Bed alarm on. Call light in easy reach. No s/s distress noted. Tele sitter in place.

## 2021-04-03 NOTE — PROGRESS NOTES
Shift handoff given to Andes, RN. POC transferred at this time.     Vitals:    04/03/21 0730   BP: 107/66   Pulse: 106   Resp: 12   Temp:    SpO2: 100%

## 2021-04-03 NOTE — PROGRESS NOTES
Pt feeling nauseated. Tube feed held at this time. Will re-evaluate at later time.     Electronically signed by Bernabe Trevizo RN on 4/2/2021 at 09:22PM.

## 2021-04-03 NOTE — PROGRESS NOTES
Pulmonary & Critical Care Medicine ICU Progress Note    CC: Septic shock, bacteremia, portal vein thrombosis    Events of Last 24 hours:   Emesis   Levophed is off     Invasive Lines:    3/28/2021 left IJ CVC    MV:   3/28/2021- 3/30/21    Vent Mode: PS Rate Set: 18 bmp/Vt Ordered: 500 mL/ /FiO2 : 30 %  No results for input(s): PHART, PHP7RQZ, PO2ART in the last 72 hours. IV:   dexmedetomidine HCl in NaCl Stopped (21 1030)    sodium chloride      dextrose      norepinephrine Stopped (21 1826)       Vitals:  Blood pressure 104/82, pulse 117, temperature 97.9 °F (36.6 °C), temperature source Axillary, resp. rate 20, height 6' 1\" (1.854 m), weight 283 lb 9.6 oz (128.6 kg), SpO2 93 %. on RA  Temp  Av °F (36.7 °C)  Min: 97.8 °F (36.6 °C)  Max: 98.3 °F (36.8 °C)    Intake/Output Summary (Last 24 hours) at 4/3/2021 0550  Last data filed at 4/3/2021 0000  Gross per 24 hour   Intake 2231 ml   Output 4475 ml   Net -2244 ml     EXAM:  Constitutional:  No acute distress. HENT:  Oropharynx is clear and moist.   Neck: No tracheal deviation present. Cardiovascular: Normal heart sounds. No lower extremity edema. Pulmonary/Chest: No wheezes. No rhonchi. No rales. No decreased breath sounds. No accessory muscle usage or stridor. Abdomen: tender   Musculoskeletal: No cyanosis. No clubbing. Skin: Skin is warm and dry. Psychiatric: Normal mood and affect.   Neurologic: speech fluent, alert and oriented, strength symmetric       Scheduled Meds:   apixaban  10 mg Oral BID    [START ON 2021] apixaban  5 mg Oral BID    cefTRIAXone (ROCEPHIN) IV  2,000 mg Intravenous Q24H    insulin lispro  0-6 Units Subcutaneous Q4H    calcium gluconate  1,000 mg Intravenous Once    chlorhexidine  15 mL Mouth/Throat BID    lidocaine 1 % injection  5 mL Intradermal Once    sodium chloride flush  10 mL Intravenous 2 times per day    nystatin  5 mL Oral 4x Daily    sucralfate  1 g Oral 4x Daily AC & HS    pantoprazole  40 mg Intravenous BID    citalopram  10 mg Oral Daily    dicyclomine  10 mg Oral BID    sodium chloride flush  10 mL Intravenous 2 times per day     PRN Meds:  potassium chloride **OR** potassium alternative oral replacement **OR** potassium chloride, magnesium sulfate, docusate sodium, sodium chloride, glucose, dextrose, glucagon (rDNA), dextrose, sodium chloride flush, HYDROmorphone, phenol, oxyCODONE-acetaminophen **OR** oxyCODONE-acetaminophen, clonazePAM, sodium chloride flush, [DISCONTINUED] promethazine **OR** ondansetron, polyethylene glycol, acetaminophen **OR** acetaminophen, promethazine    Results:  CBC:   Recent Labs     04/01/21  0424 04/02/21  0627 04/03/21  0330   WBC 12.0* 12.8* 12.6*   HGB 7.3* 7.5* 7.3*   HCT 22.2* 23.0* 22.6*   .6* 102.9* 103.4*    227 204     BMP:   Recent Labs     04/02/21  1150 04/02/21  1830 04/03/21  0330    136 135*   K 4.1 4.2 4.3    105 105   CO2 28 25 25   PHOS 2.6 2.3* 2.7   BUN 7 6* 6*   CREATININE <0.5* <0.5* <0.5*     LIVER PROFILE:   Recent Labs     04/01/21  0424 04/02/21  0627   * 185*   * 124*   BILIDIR 3.4* 4.1*   BILITOT 4.5* 4.8*   ALKPHOS 158* 203*       Cultures:  3/28/2021 blood Klebsiella stephenson sensitive  3/28/2021 tracheal aspirate NRF with MSSA  3/29/2021 C. difficile toxin negative    Films:  3/30/2021 CXR interstitial edema  3/31/2021 postpyloric tube placement    ASSESSMENT:  · Acute Respiratory Failure - resolved  · Gram-negative bacteremia   · Septic shock - improving   · Agitated delirium  · H/O partial colectomy  · Acute kidney failure   · Alcohol abuse  · Recurrent pancreatitis  · Remote h/o heroin abuse  · Recent admission for pancreatitis, S/P MRCP  · Portal vein thrombosis with extension to the SMV and splenic veins on 3/20/2021  · Pulmonary nodules    PLAN:  · Supplemental oxygen to maintain SaO2 >92%; wean as tolerated    Precedex for agitation, has been titrated to off   Antibiotic day #7, now ceftriaxone  TF held, dobhoff displaced, consider TPN, await input from GI   Blood sugar control, with goal 140-180    Eliquis   · Eventually will require follow-up imaging  · NOK is mother (Joe Starr in dietary)  · Okay for floor

## 2021-04-03 NOTE — PROGRESS NOTES
Patient is not able to demonstrated the ability to move from a reclining position to an upright position within the recliner. Patient is confused, demented and /or unable to follow instruction. Patient confused at times.  Need alarm on recliner and help to move from reclining position to upright

## 2021-04-03 NOTE — PROGRESS NOTES
CM-SR, VSS pt remains afebrile, UO WNL. Pt is alert & taking PO well. Pt is resting w/out evidence of distress @ this time.

## 2021-04-03 NOTE — PROGRESS NOTES
Pt vomited 500 mL brown emesis. Dobhoff came out with emesis. Bridle removed, dobhoff removed. No signs of injury. Will continue to monitor.     Electronically signed by Nicky Huerta RN on 4/2/2021 at 10:42PM.

## 2021-04-03 NOTE — PROGRESS NOTES
PROGRESS NOTE  S:40 yrs Patient  admitted on 3/13/2021 with Acute cholecystitis [K81.0] . Today he vomited the NJ out overnight. He is weaned off levophed. He is more alert and calm. His abd pain is controlled    Exam:   Vitals:    04/03/21 1300   BP: 115/72   Pulse: 95   Resp: 15   Temp:    SpO2:       General appearance: alert, appears stated age and cooperative  HEENT: Neck supple with midline trachea  Neck: no adenopathy and supple, symmetrical, trachea midline  Lungs: clear to auscultation bilaterally  Heart: regular rate and rhythm, S1, S2 normal, no murmur, click, rub or gallop  Abdomen: soft, non-tender; bowel sounds normal; no masses,  no organomegaly  Extremities: extremities normal, atraumatic, no cyanosis or edema     Medications: Reviewed    Labs:  CBC:   Recent Labs     04/01/21  0424 04/02/21  0627 04/03/21  0330   WBC 12.0* 12.8* 12.6*   HGB 7.3* 7.5* 7.3*   HCT 22.2* 23.0* 22.6*   .6* 102.9* 103.4*    227 204     BMP:   Recent Labs     04/02/21  1150 04/02/21  1830 04/03/21  0330    136 135*   K 4.1 4.2 4.3    105 105   CO2 28 25 25   PHOS 2.6 2.3* 2.7   BUN 7 6* 6*   CREATININE <0.5* <0.5* <0.5*     LIVER PROFILE:   Recent Labs     04/01/21  0424 04/02/21  0627   * 185*   * 124*   PROT 5.4* 5.4*   BILIDIR 3.4* 4.1*   BILITOT 4.5* 4.8*   ALKPHOS 80* 0*       Impression:41year old male with a history of HTN, arthritis, diverticulitis s/p partial colectomy and reversal of ileostomy, alcohol abuse, admitted with recurrent acute pancreatitis c/b portal vein thrombosis and evolving pseudocyst. Hospital course complicated by sepsis and respiratory failure.      Recommendation:  1. Continue supportive care. 2. Start clear diet  3. continue IVF, pain control. 4. Monitor LFTs, Elevated likely from sepsis.   5. Continue antibiotics. 6. Can repeat MRCP if LFTs continue to rise.        Joey Almendarez MD  1:54 PM 4/3/2021

## 2021-04-03 NOTE — PROGRESS NOTES
4 Eyes Skin Assessment     The patient is being assess for   Transfer to New Unit    I agree that 2 RN's have performed a thorough Head to Toe Skin Assessment on the patient. ALL assessment sites listed below have been assessed. Areas assessed by both nurses:   [x]   Head, Face, and Ears   [x]   Shoulders, Back, and Chest, Abdomen  [x]   Arms, Elbows, and Hands   [x]   Coccyx, Sacrum, and Ischium  [x]   Legs, Feet, and Heels      Scab to LUE  D/I    **SHARE this note so that the co-signing nurse is able to place an eSignature**    Co-signer eSignature: Electronically signed by Tala Haskins RN on 4/3/21 at 3:19 PM EDT    Does the Patient have Skin Breakdown?   No          Sebastian Prevention initiated:  Yes   Wound Care Orders initiated:  No      [facility] nurse consulted for Pressure Injury (Stage 3,4, Unstageable, DTI, NWPT, Complex wounds)and New or Established Ostomies:  No      Primary Nurse eSignature: Electronically signed by Soraida Vasquez RN on 4/3/21 at 3:17 PM EDT

## 2021-04-03 NOTE — PROGRESS NOTES
IM Progress Note    Admit Date:  3/13/2021  21    Interval history:     Admitted from [facility] with abd pain   He has pancreatitis  Hx of alcohol abuse, reports he quit about 4 months ago. Recurrent emesis after stating diet, again NPO . NJ tube placed on 3/25/2021 and postpyloric tube feedings  Initiated. Plan was to advance tube feedings as tolerated to goal rate and and discharged home with continued tube feedings. Patient however had significant emesis yesterday ( 3/26) . He did not tolerate the tube feedings , started having diarrhea. During one of his emesis, the NJ tube came out      worsening pain, hypotensive and tachycardic - given fluid bolus; labs with REDDY, AGAMA, hyperkalemia, severe leukocytosis  -transferred to ICU on 3/28 - intubated and placed on vent support    - eventually extubated to NC  Off precedex   - now on levophed and off vasopressin     4/3- coughed out the Dobbhoff tube. GI to start a diet    Subjective:    Saranya Galindo seen awake off  precedex gtt, answering  questions, comfortable  Tolerating NJ tube feeds  Pain controlled  Diarrhea noted        Objective:   /75   Pulse 101   Temp 98.3 °F (36.8 °C) (Axillary)   Resp 19   Ht 6' 1\" (1.854 m)   Wt 283 lb 9.6 oz (128.6 kg)   SpO2 91%   BMI 37.42 kg/m²       Intake/Output Summary (Last 24 hours) at 4/3/2021 1309  Last data filed at 4/3/2021 0800  Gross per 24 hour   Intake 1537 ml   Output 5150 ml   Net -3613 ml       Physical Exam:      General: young male , awake alert and oriented, fatigued    No distress  Mucous Membranes:  Pink , anicteric  Neck: No JVD, no carotid bruit, no thyromegaly  NJ tube in place  Chest:  Clear to auscultation bilaterally, no added sounds  Cardiovascular:  RRR S1S2 heard, no murmurs or gallops  Abdomen:  Slightly +distended, old midline surgical scars , no organomegaly, BS present  Extremities: resolving upper ext edema .  Distal pulses well felt  Neurological : improving gen weakness, no nfocal       Medications:   Scheduled Medications:    magnesium sulfate  2,000 mg Intravenous Once    apixaban  10 mg Oral BID    [START ON 4/9/2021] apixaban  5 mg Oral BID    cefTRIAXone (ROCEPHIN) IV  2,000 mg Intravenous Q24H    insulin lispro  0-6 Units Subcutaneous Q4H    calcium gluconate  1,000 mg Intravenous Once    chlorhexidine  15 mL Mouth/Throat BID    lidocaine 1 % injection  5 mL Intradermal Once    sodium chloride flush  10 mL Intravenous 2 times per day    nystatin  5 mL Oral 4x Daily    sucralfate  1 g Oral 4x Daily AC & HS    pantoprazole  40 mg Intravenous BID    citalopram  10 mg Oral Daily    dicyclomine  10 mg Oral BID    sodium chloride flush  10 mL Intravenous 2 times per day     I   dexmedetomidine HCl in NaCl Stopped (04/02/21 1030)    sodium chloride      dextrose      norepinephrine Stopped (04/02/21 1826)     potassium chloride **OR** potassium alternative oral replacement **OR** potassium chloride, magnesium sulfate, docusate sodium, sodium chloride, glucose, dextrose, glucagon (rDNA), dextrose, sodium chloride flush, HYDROmorphone, phenol, oxyCODONE-acetaminophen **OR** oxyCODONE-acetaminophen, clonazePAM, sodium chloride flush, [DISCONTINUED] promethazine **OR** ondansetron, polyethylene glycol, acetaminophen **OR** acetaminophen, promethazine    Lab Data:  Recent Labs     04/01/21  0424 04/02/21  0627 04/03/21  0330   WBC 12.0* 12.8* 12.6*   HGB 7.3* 7.5* 7.3*   HCT 22.2* 23.0* 22.6*   .6* 102.9* 103.4*    227 204     Recent Labs     04/02/21  1150 04/02/21  1830 04/03/21  0330    136 135*   K 4.1 4.2 4.3    105 105   CO2 28 25 25   PHOS 2.6 2.3* 2.7   BUN 7 6* 6*   CREATININE <0.5* <0.5* <0.5*     No results for input(s): CKTOTAL, CKMB, CKMBINDEX, TROPONINI in the last 72 hours.     Coagulation:   Lab Results   Component Value Date    INR 3.73 03/28/2021    APTT 80.8 04/02/2021     Cardiac markers:   Lab Results   Component Value Date TROPONINI <0.01 03/14/2021         Lab Results   Component Value Date     (H) 04/02/2021     (H) 04/02/2021    ALKPHOS 203 (H) 04/02/2021    BILITOT 4.8 (H) 04/02/2021       Lab Results   Component Value Date    INR 3.73 (H) 03/28/2021    INR 3.31 (H) 03/27/2021    INR 1.28 (H) 10/04/2019    PROTIME 43.8 (H) 03/28/2021    PROTIME 38.9 (H) 03/27/2021    PROTIME 14.6 (H) 10/04/2019     Cultures:  Blood: pseudomonas    Radiology    IR GI TUBE W FLUOROSCOPY   Final Result   Successful placement of nasal jejunal feeding tube         XR CHEST PORTABLE   Final Result   1. Stable appropriate positions of support apparatus. 2. Interval improvement in appearance of bibasilar pneumonia. XR CHEST PORTABLE   Final Result   Increased diffuse interstitial prominence suggests pulmonary edema. Stable support lines. XR CHEST PORTABLE   Final Result   Tubes and lines as above. Low lung volumes with probable basilar atelectasis. CT ABDOMEN PELVIS WO CONTRAST Additional Contrast? None   Final Result   Findings consistent with acute on chronic pancreatitis. Small amount of free fluid in the pelvis. No pseudocyst is identified. Extensive fatty infiltration of the liver. Nonobstructive bilateral nephrolithiasis. Punctate caliceal stones are noted   bilaterally. Multiple small ventral hernias. 2 hernias in the periumbilical midline   contain small bowel. No incarceration is evident. IR GI TUBE W FLUOROSCOPY   Final Result   Successful placement of nasal jejunal tube as discussed. CT ABDOMEN PELVIS W IV CONTRAST Additional Contrast? None   Final Result   There is complete thrombosis of the portal vein. Thrombus is also seen   extending into the cephalad portion of the superior mesenteric vein and   throughout the splenic vein. Peripancreatic stranding consistent with pancreatitis.   There is a small area   of low-attenuation in the region the pancreatic head which may correspond to   focal dilated pancreatic duct or a small developing fluid collection. Hepatic steatosis. The findings were sent to the Radiology Results [address] at 3:41   pm on 3/22/2021to be communicated to a licensed caregiver. MRI ABDOMEN WO CONTRAST MRCP   Final Result   *Normal caliber bile ducts without evidence of choledocholithiasis. *Hepatomegaly with severe fatty infiltration as described. *Mildly atrophic pancreas with ductal dilatation as measured and described   above, findings likely sequela of chronic pancreatitis. *Large amount of gallbladder sludge. US GALLBLADDER RUQ   Final Result   1. Gallbladder sludge with nonspecific wall thickening. 2. Abnormal common duct dilatation. MRCP and/or ERCP would be options to   further evaluate. Assessment & Plan:      Recurrent pancreatitis   - remote hx of alcohol related pancreatitis but reportedly quit 4 months ago  - did not improve with conservative mx . Recurrent and persistent nausea, emesis and worsening per CT , unable to tolerate oral diet  GI started post-pyloric tube feedings on 3/25. He did not tolerate advancing tube feedings. Complicated clinical course with development of poral vein thrombosis, septic shock with hypotension, renal failure, hyperkalemia and resp failure requiring vent support    - restarted NJ Tube feeds -but now tube is out.  GI ok with full liquid diet        Sepsis  - not POA -   - he is hypotensive, tachycardic, WBC worsened at 38.6  - his abdominal pain is worse and ct with worsening pancreatitis,  - blood cx with Klebsiella pneumoniae   - on IV CefepimeD 6  - severe hypotensive shock on pressors, critical care managing  - off Levophed     REDDY  Hyperkalemia  AGMA  - nephrology consulted  - started on BiCarb fluids  - Treated hyperkalemia with insulin, D50  - maintaining good UOP- polyuria with 10 L over last 24 hrs, monitor I/o     Portal Vein Thrombosis. - extending into SMV and splenic vein- likely with severe pancreatitis   - started on high dose heparin drip >eliquis     Abn LFT   - no CBD stone per MRCP, Mildly atrophic pancreas with ductal dilatation noted but with gall bladder sludge  - bili improving  - pt reports no alcohol since 4 months   - Refer to Surgery at [facility] for cholecystectomy       Normocytic  Anemia  Folic Acid Deficiency  - 9.4 on admission. Trended down with IVF  Monitor  - folate def noted, started on supplements   - hgb 6.7   - given 1 unit PRBC with repeat at 7.5     Anxiety   - Continue Celexa, resume Klonopin prn       Severe PCM  -Attempted on post-pyloric tube feedings.    - start tube feeds     H/o alcohol abuse   - noted during previous admission  - Admission last year for acute pancreatitis due to alcohol abuse.         DVT Prophylaxis: eliquis  Diet: full liquid  Code Status: Full Code    Start PT    Chacho Paris MD 4/3/2021 1:09 PM

## 2021-04-03 NOTE — PROGRESS NOTES
Patient transferred from ICU. Skin w/d. resp e/e unlabored. VS WNLs. Valenzuela patent with trena clear urine. IJ to left neck with dressing c/d/i. Scab to left upper arm. No s/s distress noted. Mother at bedside. Tele sitter at bedside. Call light in easy reach. And whiteboard updated.

## 2021-04-03 NOTE — PROGRESS NOTES
Physical Therapy  Spoke with patient and mom, who was in the room. Mom will bring in patients LE braces tomorrow , potentially morning, so that patient can begin WB activities.     Shelly Parra, PT #[id number]  No charge

## 2021-04-04 LAB
ALBUMIN SERPL-MCNC: 2.4 G/DL (ref 3.4–5)
ALP BLD-CCNC: 286 U/L (ref 40–129)
ALT SERPL-CCNC: 92 U/L (ref 10–40)
ANION GAP SERPL CALCULATED.3IONS-SCNC: 7 MMOL/L (ref 3–16)
ANISOCYTOSIS: ABNORMAL
AST SERPL-CCNC: 122 U/L (ref 15–37)
BANDED NEUTROPHILS RELATIVE PERCENT: 1 % (ref 0–7)
BASOPHILS ABSOLUTE: 0 K/UL (ref 0–0.2)
BASOPHILS RELATIVE PERCENT: 0 %
BILIRUB SERPL-MCNC: 3.2 MG/DL (ref 0–1)
BILIRUBIN DIRECT: 2.2 MG/DL (ref 0–0.3)
BILIRUBIN, INDIRECT: 1 MG/DL (ref 0–1)
BUN BLDV-MCNC: 7 MG/DL (ref 7–20)
CALCIUM SERPL-MCNC: 8.8 MG/DL (ref 8.3–10.6)
CALPROTECTIN, FECAL: >3000 UG/G
CHLORIDE BLD-SCNC: 102 MMOL/L (ref 99–110)
CO2: 23 MMOL/L (ref 21–32)
CREAT SERPL-MCNC: <0.5 MG/DL (ref 0.9–1.3)
EOSINOPHILS ABSOLUTE: 0.5 K/UL (ref 0–0.6)
EOSINOPHILS RELATIVE PERCENT: 5 %
GFR AFRICAN AMERICAN: >60
GFR NON-AFRICAN AMERICAN: >60
GLUCOSE BLD-MCNC: 110 MG/DL (ref 70–99)
GLUCOSE BLD-MCNC: 114 MG/DL (ref 70–99)
GLUCOSE BLD-MCNC: 114 MG/DL (ref 70–99)
GLUCOSE BLD-MCNC: 142 MG/DL (ref 70–99)
GLUCOSE BLD-MCNC: 158 MG/DL (ref 70–99)
GLUCOSE BLD-MCNC: 98 MG/DL (ref 70–99)
HCT VFR BLD CALC: 24.5 % (ref 40.5–52.5)
HEMATOLOGY PATH CONSULT: NO
HEMOGLOBIN: 8.1 G/DL (ref 13.5–17.5)
HYPOCHROMIA: ABNORMAL
LYMPHOCYTES ABSOLUTE: 1.5 K/UL (ref 1–5.1)
LYMPHOCYTES RELATIVE PERCENT: 15 %
MAGNESIUM: 1.6 MG/DL (ref 1.8–2.4)
MCH RBC QN AUTO: 34.2 PG (ref 26–34)
MCHC RBC AUTO-ENTMCNC: 33.1 G/DL (ref 31–36)
MCV RBC AUTO: 103.4 FL (ref 80–100)
MONOCYTES ABSOLUTE: 0.7 K/UL (ref 0–1.3)
MONOCYTES RELATIVE PERCENT: 7 %
NEUTROPHILS ABSOLUTE: 7.4 K/UL (ref 1.7–7.7)
NEUTROPHILS RELATIVE PERCENT: 72 %
PDW BLD-RTO: 23.2 % (ref 12.4–15.4)
PERFORMED ON: ABNORMAL
PERFORMED ON: NORMAL
PHOSPHORUS: 3.4 MG/DL (ref 2.5–4.9)
PLATELET # BLD: 201 K/UL (ref 135–450)
PLATELET SLIDE REVIEW: ADEQUATE
PMV BLD AUTO: 7.8 FL (ref 5–10.5)
POLYCHROMASIA: ABNORMAL
POTASSIUM SERPL-SCNC: 4.5 MMOL/L (ref 3.5–5.1)
RBC # BLD: 2.37 M/UL (ref 4.2–5.9)
SLIDE REVIEW: ABNORMAL
SODIUM BLD-SCNC: 132 MMOL/L (ref 136–145)
TOTAL PROTEIN: 6 G/DL (ref 6.4–8.2)
WBC # BLD: 10.2 K/UL (ref 4–11)

## 2021-04-04 PROCEDURE — 99232 SBSQ HOSP IP/OBS MODERATE 35: CPT | Performed by: INTERNAL MEDICINE

## 2021-04-04 PROCEDURE — 2580000003 HC RX 258: Performed by: INTERNAL MEDICINE

## 2021-04-04 PROCEDURE — C9113 INJ PANTOPRAZOLE SODIUM, VIA: HCPCS | Performed by: INTERNAL MEDICINE

## 2021-04-04 PROCEDURE — 6360000002 HC RX W HCPCS: Performed by: INTERNAL MEDICINE

## 2021-04-04 PROCEDURE — 36415 COLL VENOUS BLD VENIPUNCTURE: CPT

## 2021-04-04 PROCEDURE — 97530 THERAPEUTIC ACTIVITIES: CPT

## 2021-04-04 PROCEDURE — 97116 GAIT TRAINING THERAPY: CPT

## 2021-04-04 PROCEDURE — 1200000000 HC SEMI PRIVATE

## 2021-04-04 PROCEDURE — 6370000000 HC RX 637 (ALT 250 FOR IP): Performed by: INTERNAL MEDICINE

## 2021-04-04 PROCEDURE — 84100 ASSAY OF PHOSPHORUS: CPT

## 2021-04-04 PROCEDURE — 97535 SELF CARE MNGMENT TRAINING: CPT

## 2021-04-04 PROCEDURE — 36592 COLLECT BLOOD FROM PICC: CPT

## 2021-04-04 PROCEDURE — 80048 BASIC METABOLIC PNL TOTAL CA: CPT

## 2021-04-04 PROCEDURE — 97110 THERAPEUTIC EXERCISES: CPT

## 2021-04-04 PROCEDURE — 83735 ASSAY OF MAGNESIUM: CPT

## 2021-04-04 PROCEDURE — 85025 COMPLETE CBC W/AUTO DIFF WBC: CPT

## 2021-04-04 PROCEDURE — 80076 HEPATIC FUNCTION PANEL: CPT

## 2021-04-04 RX ADMIN — APIXABAN 10 MG: 5 TABLET, FILM COATED ORAL at 09:06

## 2021-04-04 RX ADMIN — INSULIN LISPRO 1 UNITS: 100 INJECTION, SOLUTION INTRAVENOUS; SUBCUTANEOUS at 20:34

## 2021-04-04 RX ADMIN — DICYCLOMINE HYDROCHLORIDE 10 MG: 10 CAPSULE ORAL at 09:06

## 2021-04-04 RX ADMIN — NYSTATIN 500000 UNITS: 500000 SUSPENSION ORAL at 09:06

## 2021-04-04 RX ADMIN — Medication 10 ML: at 20:20

## 2021-04-04 RX ADMIN — HYDROMORPHONE HYDROCHLORIDE 1 MG: 1 INJECTION, SOLUTION INTRAMUSCULAR; INTRAVENOUS; SUBCUTANEOUS at 16:39

## 2021-04-04 RX ADMIN — SUCRALFATE 1 G: 1 TABLET ORAL at 11:36

## 2021-04-04 RX ADMIN — CITALOPRAM HYDROBROMIDE 10 MG: 20 TABLET ORAL at 09:07

## 2021-04-04 RX ADMIN — APIXABAN 10 MG: 5 TABLET, FILM COATED ORAL at 20:19

## 2021-04-04 RX ADMIN — SUCRALFATE 1 G: 1 TABLET ORAL at 20:19

## 2021-04-04 RX ADMIN — HYDROMORPHONE HYDROCHLORIDE 1 MG: 1 INJECTION, SOLUTION INTRAMUSCULAR; INTRAVENOUS; SUBCUTANEOUS at 12:12

## 2021-04-04 RX ADMIN — PANTOPRAZOLE SODIUM 40 MG: 40 INJECTION, POWDER, FOR SOLUTION INTRAVENOUS at 09:04

## 2021-04-04 RX ADMIN — HYDROMORPHONE HYDROCHLORIDE 1 MG: 1 INJECTION, SOLUTION INTRAMUSCULAR; INTRAVENOUS; SUBCUTANEOUS at 20:19

## 2021-04-04 RX ADMIN — NYSTATIN 500000 UNITS: 500000 SUSPENSION ORAL at 12:12

## 2021-04-04 RX ADMIN — INSULIN LISPRO 1 UNITS: 100 INJECTION, SOLUTION INTRAVENOUS; SUBCUTANEOUS at 11:38

## 2021-04-04 RX ADMIN — HYDROMORPHONE HYDROCHLORIDE 1 MG: 1 INJECTION, SOLUTION INTRAMUSCULAR; INTRAVENOUS; SUBCUTANEOUS at 22:44

## 2021-04-04 RX ADMIN — NYSTATIN 500000 UNITS: 500000 SUSPENSION ORAL at 20:19

## 2021-04-04 RX ADMIN — OXYCODONE HYDROCHLORIDE AND ACETAMINOPHEN 2 TABLET: 5; 325 TABLET ORAL at 03:56

## 2021-04-04 RX ADMIN — SODIUM CHLORIDE: 9 INJECTION, SOLUTION INTRAVENOUS at 18:42

## 2021-04-04 RX ADMIN — Medication 10 ML: at 09:10

## 2021-04-04 RX ADMIN — SUCRALFATE 1 G: 1 TABLET ORAL at 16:39

## 2021-04-04 RX ADMIN — HYDROMORPHONE HYDROCHLORIDE 1 MG: 1 INJECTION, SOLUTION INTRAMUSCULAR; INTRAVENOUS; SUBCUTANEOUS at 05:33

## 2021-04-04 RX ADMIN — PANTOPRAZOLE SODIUM 40 MG: 40 INJECTION, POWDER, FOR SOLUTION INTRAVENOUS at 20:19

## 2021-04-04 RX ADMIN — DICYCLOMINE HYDROCHLORIDE 10 MG: 10 CAPSULE ORAL at 20:19

## 2021-04-04 RX ADMIN — NYSTATIN 500000 UNITS: 500000 SUSPENSION ORAL at 16:39

## 2021-04-04 RX ADMIN — CEFTRIAXONE 2000 MG: 2 INJECTION, POWDER, FOR SOLUTION INTRAMUSCULAR; INTRAVENOUS at 11:38

## 2021-04-04 RX ADMIN — HYDROMORPHONE HYDROCHLORIDE 1 MG: 1 INJECTION, SOLUTION INTRAMUSCULAR; INTRAVENOUS; SUBCUTANEOUS at 09:03

## 2021-04-04 ASSESSMENT — PAIN DESCRIPTION - PROGRESSION
CLINICAL_PROGRESSION: GRADUALLY WORSENING
CLINICAL_PROGRESSION: GRADUALLY WORSENING

## 2021-04-04 ASSESSMENT — PAIN SCALES - GENERAL
PAINLEVEL_OUTOF10: 9
PAINLEVEL_OUTOF10: 4
PAINLEVEL_OUTOF10: 8
PAINLEVEL_OUTOF10: 3
PAINLEVEL_OUTOF10: 8
PAINLEVEL_OUTOF10: 0

## 2021-04-04 ASSESSMENT — PAIN DESCRIPTION - DESCRIPTORS
DESCRIPTORS: SORE
DESCRIPTORS: SORE

## 2021-04-04 ASSESSMENT — PAIN DESCRIPTION - ONSET: ONSET: GRADUAL

## 2021-04-04 ASSESSMENT — PAIN DESCRIPTION - FREQUENCY
FREQUENCY: INTERMITTENT
FREQUENCY: INTERMITTENT

## 2021-04-04 ASSESSMENT — PAIN DESCRIPTION - PAIN TYPE: TYPE: ACUTE PAIN

## 2021-04-04 ASSESSMENT — PAIN DESCRIPTION - LOCATION
LOCATION: ABDOMEN
LOCATION: ABDOMEN;LEG

## 2021-04-04 ASSESSMENT — PAIN - FUNCTIONAL ASSESSMENT: PAIN_FUNCTIONAL_ASSESSMENT: ACTIVITIES ARE NOT PREVENTED

## 2021-04-04 NOTE — PROGRESS NOTES
Inpatient Physical Therapy Daily Treatment Note    Unit: [address]  Date:  4/4/2021  Patient Name:    Jessica Guthrie  Admitting diagnosis:  Acute cholecystitis [K81.0]  Admit Date:  3/13/2021  Precautions/Restrictions:  Fall risk, Bed/chair alarm, Lines -IV, Blanco catheter and  Left CVC (Neck); blanco was about to be taken out after the PT session today, Telemetry and Wears hinged knee braces bilaterally for chronic  Knee dislocation, telesitter      Discharge Recommendations: SNF  DME needs for discharge: defer to facility       Therapy recommendation for EMS Transport: requires transport by cot due to need of STEDY and 2 person assist for bed to chair transfers    Therapy recommendations for staff:   Assist of 2 with use of rolling walker (RW), REGINO STEDY, gait belt and with bilateral knee braces. for all transfers and ambulation to/from [facility]  to/from chair  to/from bathroom    History of Present Illness: per Dr. Shannan Cardenas patient is a 40-year-old man with a past medical history of distant heroin abuse, alcohol abuse who had been admitted to [facility] on 3/13/2021 with abdominal pain and pancreatitis. Zulma Mayfield underwent an MRCP during the hospital stay showing sludge in the gallbladder but no choledocholithiasis.  On CT scan 3/20 he was found to have complete thrombosis of his portal vein with extension into the SMV and splenic vein thrombosis.  He was initially started on heparin drip but was transitioned to Eliquis a few days ago. [place] addition he been receiving postpyloric tube feeds but had difficulty tolerating them and pulled his NG tube out yesterday.  This morning he was noted to be more confused with hypotension and new onset renal failure and leukocytosis.  He was transferred to the ICU for further care. Jason Villafana is currently a limited historian and cannot provide many details. Zulma Mayfield does endorse epigastric abdominal pain without radiation.   Transferred to ICU 3/29, intubated 3/29-3/30  Copied from Dr. Laura Life note dated 1/28/2020 pertaining to B knees:  Treatment Plan: I reviewed patient's x-ray and physical exam findings, he does have very complex situation with chronically dislocated patellas.  This will require total knee arthroplasty with at least extensive lateral release, may require more extensive surgery depending on patella tracking intraoperatively.  This was discussed with the patient.  Today I recommend follow-up with 1 of our more experienced joint surgeons including Dr. Gela Edmond or Dr. Renee Lin is a deal with these complex cases on a more regular basis. Edelmira Chappell agreed and will schedule follow-up.  I also counseled patient about tobacco since cessation. Remi Hui to proceeding with a total knee arthroplasty I encouraged him to stop smoking as this will help decrease her risk of complication.  I did provide lateral patella stabilizing braces today to improve stability and function prior to surgery. Home Health S4 Level Recommendation: Level 3 Safety  AM-PAC Mobility Score   AM-PAC Inpatient Mobility Raw Score : 13     Treatment Time: 8720 - 6261  Treatment number: 2  Timed Code Treatment Minutes: 42 minutes  Total Treatment Minutes:  42  minutes    Cognition    A&O x4   Able to follow 2 step commands    Subjective  Patient lying supine in bed with no family present   Pt agreeable to this PT tx. Pain   Yes  Location: allover  Rating:    mild/10  Pain Medicine Status: Received pain med prior to tx     Bed Mobility   Supine to Sit:    SBA using bed rails  Sit to Supine:   Not Tested  Rolling:   Not Tested  Scooting:   SBA    Transfer Training     Sit to stand:    Max A  and 2 persons with RW; patient progressed to Max A with STEDY, followed by Max A with RW  Stand to sit:   CGA  Bed to Chair:   Max A  with use of gait belt and rolling walker (RW)    Gait Training gait completed as indicated below  Distance:      40 ft  Deviations (firm surface/linoleum):  decreased marissa, increased JOSE LUIS, decreased step length bilaterally and decreased stance time bilaterally  Assistive Device Used:    gait belt, rolling walker (RW) and bilateral knee braces  Level of Assist:    Min A   Comment:     Stair Training deferred, pt unsafe/not appropriate to complete stairs at this time  # of Steps:   N/A  Level of Assist:  Not Tested  UE Support:  NA  Assistive Device:  N/A  Pattern:   N/A  Comments:     Therapeutic Exercise all completed bilaterally unless indicated  Ankle Pumps x 10 reps  SLR x 10 reps  Hip adduction/pillow squeeze: x 10 reps    Balance  Sitting:  Good - ; CGA  Comments:     Standing: Fair -; CGA  Comments: ~5 min    Patient Education      Role of PT, POC, Discharge recommendations, DC recommendations, safety awareness, transfer techniques, pursed lip breathing, energy conservation, pacing activity and calling for assist with mobility. Positioning Needs       Pt up in chair, alarm set, positioned in proper neutral alignment and pressure relief provided. Call light provided and all needs within reach    ROM Measurements N/A  Knee Flexion:  Knee Extension:     Activity Tolerance   Pt completed therapy session with No adverse symptoms noted w/activity. BP: 114/84  SpO2: 87% with activity recovered to 94% within 2 min on room air  HR: resting 92 bpm, with activity 127 bpm (recovery within 3 min)    Other  N/A    Assessment :  Patient improved with sit to stand transfers well from [facility] to [address] [place] [address] with one person assist.  Recommending SNF upon discharge as patient functioning well below baseline, demonstrates good rehab potential and unable to return home due to limited or no family support, inability to negotiate stairs to enter home/bedroom/bathroom, burden of care beyond caregiver ability, home environment not conducive to patient recovery and limited safety awareness. Goals (all goals ongoing unless otherwise indicated)  To be met in 3 visits:  1).  Independent with LE Ex x 10 reps  2.) Supine to sit with Max

## 2021-04-04 NOTE — PROGRESS NOTES
Pt A/Ox4. VSS. Prn percocet given for pain 10/10. Pt unlabored, respirations even & easy. No distress noted. Shift assessment complete. See flowsheet. PM meds given. See MAR. Denies needs at this time. Bed alarm on. Telesitter in place. Bed in low position. Call light within reach.

## 2021-04-04 NOTE — PLAN OF CARE
Problem: Pain:  Goal: Pain level will decrease  Description: Pain level will decrease  Outcome: Ongoing  Goal: Control of acute pain  Description: Control of acute pain  Outcome: Ongoing  Goal: Patient's pain/discomfort is manageable  Description: Patient's pain/discomfort is manageable  Outcome: Ongoing     Problem: Falls - Risk of:  Goal: Will remain free from falls  Description: Will remain free from falls  Outcome: Ongoing  Goal: Absence of physical injury  Description: Absence of physical injury  Outcome: Ongoing     Problem: Infection:  Goal: Will remain free from infection  Description: Will remain free from infection  Outcome: Ongoing     Problem: Safety:  Goal: Free from accidental physical injury  Description: Free from accidental physical injury  Outcome: Ongoing  Goal: Free from intentional harm  Description: Free from intentional harm  Outcome: Ongoing     Problem: Daily Care:  Goal: Daily care needs are met  Description: Daily care needs are met  Outcome: Ongoing     Problem: Skin Integrity:  Goal: Skin integrity will stabilize  Description: Skin integrity will stabilize  Outcome: Ongoing     Problem: Discharge Planning:  Goal: Patients continuum of care needs are met  Description: Patients continuum of care needs are met  Outcome: Ongoing     Problem: Nutrition  Goal: Optimal nutrition therapy  Outcome: Ongoing  Goal: Understanding of nutritional guidelines  Outcome: Ongoing

## 2021-04-04 NOTE — PROGRESS NOTES
AM assessment done at this time. Alert and oriented. Needs are met. Vs; refer to flow sheet. Medications given; refer to mar. Patient has complaint of abdominal pain and BLE pain. In bed, call light within reach.

## 2021-04-04 NOTE — PROGRESS NOTES
Pulmonary & Critical Care Medicine ICU Progress Note    CC: Septic shock, bacteremia, portal vein thrombosis    Events of Last 24 hours: Tolerating minimal liquids     Invasive Lines:    3/28/2021 left IJ CVC    MV:   3/28/2021- 3/30/21    Vent Mode: PS Rate Set: 18 bmp/Vt Ordered: 500 mL/ /FiO2 : 30 %  No results for input(s): PHART, KIL1SFS, PO2ART in the last 72 hours. IV:   sodium chloride 75 mL/hr at 216    dexmedetomidine HCl in NaCl Stopped (21 1030)    sodium chloride      dextrose      norepinephrine Stopped (21 1826)       Vitals:  Blood pressure 117/72, pulse 82, temperature 97 °F (36.1 °C), temperature source Oral, resp. rate 16, height 6' 1\" (1.854 m), weight 283 lb 9.6 oz (128.6 kg), SpO2 94 %. on RA  Temp  Av °F (36.7 °C)  Min: 97 °F (36.1 °C)  Max: 98.7 °F (37.1 °C)    Intake/Output Summary (Last 24 hours) at 2021 0622  Last data filed at 2021 0355  Gross per 24 hour   Intake 908.41 ml   Output 5550 ml   Net -4641.59 ml     EXAM:  Constitutional:  No acute distress. HENT:  Oropharynx is clear and moist.   Neck: No tracheal deviation present. Cardiovascular: Normal heart sounds. + lower extremity edema. Pulmonary/Chest: No wheezes. No rhonchi. No rales. No decreased breath sounds. No accessory muscle usage or stridor. Abdomen: tender   Musculoskeletal: No cyanosis. No clubbing. Skin: Skin is warm and dry. Psychiatric: Normal mood and affect.   Neurologic: speech fluent, alert and oriented, strength symmetric       Scheduled Meds:   magnesium sulfate  2,000 mg Intravenous Once    apixaban  10 mg Oral BID    [START ON 2021] apixaban  5 mg Oral BID    cefTRIAXone (ROCEPHIN) IV  2,000 mg Intravenous Q24H    insulin lispro  0-6 Units Subcutaneous Q4H    calcium gluconate  1,000 mg Intravenous Once    chlorhexidine  15 mL Mouth/Throat BID    lidocaine 1 % injection  5 mL Intradermal Once    sodium chloride flush  10 mL Intravenous 2 times per day    nystatin  5 mL Oral 4x Daily    sucralfate  1 g Oral 4x Daily AC & HS    pantoprazole  40 mg Intravenous BID    citalopram  10 mg Oral Daily    dicyclomine  10 mg Oral BID    sodium chloride flush  10 mL Intravenous 2 times per day     PRN Meds:  potassium chloride **OR** potassium alternative oral replacement **OR** potassium chloride, magnesium sulfate, docusate sodium, sodium chloride, glucose, dextrose, glucagon (rDNA), dextrose, sodium chloride flush, HYDROmorphone, phenol, oxyCODONE-acetaminophen **OR** oxyCODONE-acetaminophen, clonazePAM, sodium chloride flush, [DISCONTINUED] promethazine **OR** ondansetron, polyethylene glycol, acetaminophen **OR** acetaminophen, promethazine    Results:  CBC:   Recent Labs     04/02/21  0627 04/03/21  0330 04/04/21  0542   WBC 12.8* 12.6* 10.2   HGB 7.5* 7.3* 8.1*   HCT 23.0* 22.6* 24.5*   .9* 103.4* 103.4*    204 201     BMP:   Recent Labs     04/02/21  1830 04/03/21  0330 04/04/21  0542    135* 132*   K 4.2 4.3 4.5    105 102   CO2 25 25 23   PHOS 2.3* 2.7 3.4   BUN 6* 6* 7   CREATININE <0.5* <0.5* <0.5*     LIVER PROFILE:   Recent Labs     04/02/21  0627   *   *   BILIDIR 4.1*   BILITOT 4.8*   ALKPHOS 203*       Cultures:  3/28/2021 blood Klebsiella stephenson sensitive  3/28/2021 tracheal aspirate NRF with MSSA  3/29/2021 C. difficile toxin negative    Films:  3/30/2021 CXR interstitial edema  3/31/2021 postpyloric tube placement    ASSESSMENT:  · Acute Respiratory Failure - resolved  · Gram-negative bacteremia   · Septic shock - resolved   · Agitated delirium - improved  · H/O partial colectomy  · Acute kidney failure - improved  · Alcohol abuse  · Recurrent pancreatitis  · Remote h/o heroin abuse  · Recent admission for pancreatitis, S/P MRCP  · Portal vein thrombosis with extension to the SMV and splenic veins on 3/20/2021  · Pulmonary nodules    PLAN:  Antibiotic day #8, now ceftriaxone  Eliquis   · Eventually will require follow-up imaging  · NOK is mother Radha Dancer in dietary)  · Call with questions

## 2021-04-04 NOTE — PROGRESS NOTES
Occupational Therapy Daily Treatment Note    Unit: [address] [place]  Date:  4/4/2021  Patient Name:    Miryam Portillo  Admitting diagnosis:  Acute cholecystitis [K81.0]  Admit Date:  3/13/2021  Precautions/Restrictions:  Fall risk, Bed/chair alarm and Lines -IV, Valenzuela catheter and L IJ CVC        Discharge Recommendations: SNF  DME needs for discharge: defer to facility       Therapy recommendations for staff:   Assist of 2 with use of REGINO STEDY and gait belt for all transfers to/from [facility]  to/from chair, Patient may also transfer with RW and gait belt with Assist of 2 if patient feeling up to it    AM-PAC Score: AM-PAC Inpatient Daily Activity Raw Score: 16  Home Health S4 Level: Level 3- Safety if going home (DC home not recommended at this time, patient does not have 24 hour assist)      Treatment Time:  940-1040  Treatment number:  2    Timed code treatment minutes: 60 minutes  Total treatment minutes:   60 minutes    History of Present Illness: per Dr. Isela West patient is a 47-year-old man with a past medical history of distant heroin abuse, alcohol abuse who had been admitted to [facility] on 3/13/2021 with abdominal pain and pancreatitis. [facility] underwent an MRCP during the hospital stay showing sludge in the gallbladder but no choledocholithiasis.  On CT scan 3/20 he was found to have complete thrombosis of his portal vein with extension into the SMV and splenic vein thrombosis.  He was initially started on heparin drip but was transitioned to Eliquis a few days ago. Cristela Edwards addition he been receiving postpyloric tube feeds but had difficulty tolerating them and pulled his NG tube out yesterday.  This morning he was noted to be more confused with hypotension and new onset renal failure and leukocytosis.  He was transferred to the ICU for further care.  Patient is currently a limited historian and cannot provide many details. [facility] does endorse epigastric abdominal pain without radiation.   Transferred to ICU 3/29, intubated 3/29-3/30    Subjective:  Patient in bed and complaining of not feeling well this am.  Agreeable to try getting up to recliner chair with encouragement. Pain   Yes  Rating: moderate  Location:\"all over\"  Pain Medicine Status: Received pain med prior to tx and No request made      Bed Mobility:   Supine to Sit:  SBA with use of bedrail, flat bed  Sit to Supine:  Not Tested  Rolling:           Not Tested  Scooting:        SBA    Transfer Training:   Sit to stand: Max A  and 2 persons initally (unable to stand to RW from EOB, stood to [facility]), able to stand to RW with MOD A of 2 from chair on 2nd attempt  Stand to sit:  Mod A  and 2 persons to control descent  Bed to Chair:  Total A and with use of STEDY, later in session able to stand from chair with MOD A of 2 (cues to walk feet back toward chair mid way to standing -- patient with B patellar instability and unable to bend knees to 90 degrees); able to complete functional mobility short distance in room with RW with CGA and then again required MOD A of 2 to safely control descent back into chair  Bed to [facility]:   Not Tested  Standard toilet:   Not Tested    Activity Tolerance   Pt completed therapy session with Pain noted with all activity and at rest   SpO2 87-94% on RA, MD notified  HR   /84 at end of session    ADL Training:   Upper body dressing: Not Tested  Upper body bathing:  Not Tested  Lower body dressing:  Max A patient attempted to assist with threading LEs into pullup style incontinence product; unable to successfully get either foot into pullup; required assist to pull pullup all the way up in stance as patient needed BUEs for balance/safety  Lower body bathing:  Max A wipe periarea after stool noted on sheet after sit to stand, BM noted on wipes  Toileting:   Not Tested-patient requesting to urinate and reminded several times of blanco being in place.   Some BM noted upon perihygiene, patient unaware that he may have moved bowels while in bed. Grooming/Hygiene:  Not Tested    Therapeutic Exercise: NA  N/A    Patient Education:   Role of OT  Recommendations for DC  Energy conservation techniques-PLB technique  Safe RW use/hand placement    Positioning Needs:   Pt up in chair, alarm set, positioned in proper neutral alignment and pressure relief provided. Call light provided and all needs within reach    Family Present:  No    Assessment: Making progress toward goals, continue as tolerated. GOALS  To be met in 3 Visits:  1). Bed to toilet/BSC: Min A     To be met in 5 Visits:  1). Supine to/from Sit:             Max A (MET 4/4/21, advance to IND)  2). Upper Body Bathing:         Min A  3). Lower Body Bathing:         Max A (MET 4/4/21, advance to MIN A)  4). Upper Body Dressing:       Min A  5). Lower Body Dressing:       Max A (MET 4/4/21, advance to MIN A)  6).  Pt to demonstrate UE exs x 15 reps with minimal cues      Plan: cont with [address], OTR/L 9438        If patient discharges from this facility prior to next visit, this note will serve as the Discharge Summary

## 2021-04-04 NOTE — PROGRESS NOTES
PROGRESS NOTE  S:40 yrs Patient  admitted on 3/13/2021 with Acute cholecystitis [K81.0] . Today he feels better. Tolerating full liquids. Denies Nausea and Vomitting    Exam:   Vitals:    04/04/21 0741   BP: (!) 155/92   Pulse: 104   Resp: 16   Temp: 97.8 °F (36.6 °C)   SpO2: 93%      General appearance: alert, appears stated age and cooperative  HEENT: Neck supple with midline trachea  Neck: no adenopathy and supple, symmetrical, trachea midline  Lungs: clear to auscultation bilaterally  Heart: regular rate and rhythm, S1, S2 normal, no murmur, click, rub or gallop  Abdomen: soft, non-tender; bowel sounds normal; no masses,  no organomegaly  Extremities: extremities normal, atraumatic, no cyanosis or edema        Medications: Reviewed    Labs:  CBC:   Recent Labs     04/02/21  0627 04/03/21  0330 04/04/21  0542   WBC 12.8* 12.6* 10.2   HGB 7.5* 7.3* 8.1*   HCT 23.0* 22.6* 24.5*   .9* 103.4* 103.4*    204 201     BMP:   Recent Labs     04/02/21  1830 04/03/21  0330 04/04/21  0542    135* 132*   K 4.2 4.3 4.5    105 102   CO2 25 25 23   PHOS 2.3* 2.7 3.4   BUN 6* 6* 7   CREATININE <0.5* <0.5* <0.5*     LIVER PROFILE:   Recent Labs     04/02/21  0627 04/04/21  0542   * 122*   * 92*   PROT 5.4* 6.0*   BILIDIR 4.1* 2.2*   BILITOT 4.8* 3.2*   ALKPHOS 0* 5*       Impression:41year old male with a history of HTN, arthritis, diverticulitis s/p partial colectomy and reversal of ileostomy, alcohol abuse, admitted with recurrent acute pancreatitis c/b portal vein thrombosis and evolving pseudocyst. Hospital course complicated by sepsis and respiratory failure.     Recommendation:  1. Continue supportive care  2. Monitor LFTs (improving)  3. Full liquid diet  4. Continue antibiotics  5. Continue eliquis  6.  PT/OT      Kalman Dandy, MD  10:16 AM 4/4/2021

## 2021-04-04 NOTE — PLAN OF CARE
Problem: Pain:  Goal: Pain level will decrease  Description: Pain level will decrease  4/4/2021 0434 by Mumtaz Barton RN  Outcome: Ongoing  Goal: Control of acute pain  Description: Control of acute pain  4/4/2021 0434 by Mumtaz Barton RN  Outcome: Ongoing  Goal: Patient's pain/discomfort is manageable  Description: Patient's pain/discomfort is manageable  4/4/2021 0434 by Mumtaz Barton RN  Outcome: Ongoing     Problem: Falls - Risk of:  Goal: Will remain free from falls  Description: Will remain free from falls  4/4/2021 0434 by Mumtaz Barton RN  Outcome: Ongoing  Goal: Absence of physical injury  Description: Absence of physical injury  4/4/2021 0434 by Mumtaz Barton RN  Outcome: Ongoing     Problem: Infection:  Goal: Will remain free from infection  Description: Will remain free from infection  4/4/2021 0434 by Mumtaz Barton RN  Outcome: Ongoing     Problem: Safety:  Goal: Free from accidental physical injury  Description: Free from accidental physical injury  4/4/2021 0434 by Mumtaz Barton RN  Outcome: Ongoing  Goal: Free from intentional harm  Description: Free from intentional harm  4/4/2021 0434 by Mumtaz Barton RN  Outcome: Ongoing     Problem: Daily Care:  Goal: Daily care needs are met  Description: Daily care needs are met  4/4/2021 0434 by Mumtaz Barton RN  Outcome: Ongoing     Problem: Skin Integrity:  Goal: Skin integrity will stabilize  Description: Skin integrity will stabilize  4/4/2021 0434 by Mumtaz Barton RN  Outcome: Ongoing     Problem: Discharge Planning:  Goal: Patients continuum of care needs are met  Description: Patients continuum of care needs are met  4/4/2021 0434 by Mumtaz Barton RN  Outcome: Ongoing     Problem: Nutrition  Goal: Optimal nutrition therapy  4/4/2021 0434 by Mumtaz Barton RN  Outcome: Ongoing  Goal: Understanding of nutritional guidelines  4/4/2021 0434 by Mumtaz Barton RN  Outcome: Ongoing

## 2021-04-04 NOTE — PROGRESS NOTES
[facility]    Follow-up  Note           Reason for Consult: REDDY, hyperkalemia, metabolic acidosis  Requesting Physician:  Dr. Estefanía Wallace    History of Present Illness on 3/28/2021:    41 y.o. yo male with PMH of heroin and alcohol abuse, hypertension, history of ileostomy who is admitted for acute pancreatitis on 3/13/2021  Patient had portal vein thrombosis noted on a CT scan of abdomen pelvis with IV contrast done on 3/22  On 3/26 nasojejunal tube was placed and he was started on postpyloric feeding. He had significant nausea vomiting and pulled his NG tube out on 3/28. He also had diarrhea on 3/27  Patient was on D5 half-normal saline with 20 of potassium chloride which was stopped on 3/25. No documentation of IV fluid on 3/26. On 3/27 saline was started at 100 mL/h but according to the nurses due to poor IV access it had not been continued as planned  Patient's blood pressure dropped to high 90s couple of times on 3/27. He also has been tachycardic in the 90s to 100 throughout his stay with occasionally going up to 140s 150s in the last 48 hours. Sub/interval history  Out of ICU  GI has start PO's  Complains of generalized weakness and shakiness  U/O excellent      ROS: No dyspnea, CP. No cough or wheezing. No  abd pain, or diarrhea. No F/C. PSFH: NO visitor        Physical exam:   VITALS:  BP (!) 155/92   Pulse 104   Temp 97.8 °F (36.6 °C) (Oral)   Resp 16   Ht 6' 1\" (1.854 m)   Wt 283 lb 9.6 oz (128.6 kg)   SpO2 93%   BMI 37.42 kg/m²   TEMPERATURE:  Current - Temp: 97.8 °F (36.6 °C);  Max - Temp  Av.8 °F (36.6 °C)  Min: 97 °F (36.1 °C)  Max: 98.6 °F (37 °C)  RESPIRATIONS RANGE: Resp  Av  Min: 16  Max: 16  PULSE RANGE: Pulse  Av.7  Min: 82  Max: 104  BLOOD PRESSURE RANGE:  Systolic (83KKI), EEF:391 , Min:117 , XKZ:251   ; Diastolic (95XTS), ZVC:02, Min:72, Max:92    PULSE OXIMETRY RANGE: SpO2  Av.3 %  Min: 93 %  Max: 96 %  24HR INTAKE/OUTPUT: Intake/Output Summary (Last 24 hours) at 4/4/2021 1557  Last data filed at 4/4/2021 0741  Gross per 24 hour   Intake 545.41 ml   Output 2750 ml   Net -2204.59 ml       Gen: NAD  Neck: no  jvd noted  Cardiovascular:  S1, S2 without m/r/g  Respiratory: Diminished  Abdomen:  +bs, soft, nt, nd, no hepatosplenomegaly  Ext: no edema, no pain on leg palpation  Neuro/Psy: non-focal    Data/  CBC:   Recent Labs     04/02/21  0627 04/03/21  0330 04/04/21  0542   WBC 12.8* 12.6* 10.2   RBC 2.23* 2.18* 2.37*   HGB 7.5* 7.3* 8.1*   HCT 23.0* 22.6* 24.5*    204 201     BMP:    Recent Labs     04/02/21  1830 04/03/21  0330 04/04/21  0542    135* 132*   K 4.2 4.3 4.5    105 102   CO2 25 25 23   BUN 6* 6* 7   CREATININE <0.5* <0.5* <0.5*   CALCIUM 8.5 8.3 8.8   GLUCOSE 142* 114* 114*     Phosphorus:    Recent Labs     04/02/21  1830 04/03/21  0330 04/04/21  0542   PHOS 2.3* 2.7 3.4     Magnesium:    Recent Labs     04/02/21  1150 04/03/21  0330 04/04/21  0542   MG 1.90 1.60* 1.60*     Hepatic Function Panel:    Lab Results   Component Value Date    ALKPHOS 286 04/04/2021    ALT 92 04/04/2021     04/04/2021    PROT 6.0 04/04/2021    BILITOT 3.2 04/04/2021    BILIDIR 2.2 04/04/2021    IBILI 1.0 04/04/2021    LABALBU 2.4 04/04/2021       CT scan of abdomen pelvis with IV contrast from 3/22  Impression   There is complete thrombosis of the portal vein.  Thrombus is also seen   extending into the cephalad portion of the superior mesenteric vein and   throughout the splenic vein.       Peripancreatic stranding consistent with pancreatitis. Debra Pilon is a small area   of low-attenuation in the region the pancreatic head which may correspond to   focal dilated pancreatic duct or a small developing fluid collection.       Hepatic steatosis. CT scan of abdomen pelvis without contrast on 3/28  FINDINGS:   Lower Chest: Calcified granuloma are noted in the lung bases.       Organs:  There is extensive fatty

## 2021-04-04 NOTE — PROGRESS NOTES
Patient asleep; manifested by eyes closed respirations easy and unlabored. In bed, call light within reach.

## 2021-04-04 NOTE — PROGRESS NOTES
IM Progress Note    Admit Date:  3/13/2021  22    Interval history:     Admitted from [facility] with abd pain   He has pancreatitis  Hx of alcohol abuse, reports he quit about 4 months ago. Recurrent emesis after stating diet, again NPO . NJ tube placed on 3/25/2021 and postpyloric tube feedings  Initiated. Plan was to advance tube feedings as tolerated to goal rate and and discharged home with continued tube feedings. Patient however had significant emesis yesterday ( 3/26) . He did not tolerate the tube feedings , started having diarrhea. During one of his emesis, the NJ tube came out      worsening pain, hypotensive and tachycardic - given fluid bolus; labs with REDDY, AGAMA, hyperkalemia, severe leukocytosis  -transferred to ICU on 3/28 - intubated and placed on vent support    - eventually extubated to NC  Off precedex   - now on levophed and off vasopressin     4/3- coughed out the Dobbhoff tube. GI to start a diet    4/4- tolerating diet. Working with PT today. Subjective:    Sitting up on chair. On room air. Objective:   BP (!) 155/92   Pulse 104   Temp 97.8 °F (36.6 °C) (Oral)   Resp 16   Ht 6' 1\" (1.854 m)   Wt 283 lb 9.6 oz (128.6 kg)   SpO2 93%   BMI 37.42 kg/m²       Intake/Output Summary (Last 24 hours) at 4/4/2021 1035  Last data filed at 4/4/2021 0741  Gross per 24 hour   Intake 908.41 ml   Output 3350 ml   Net -2441.59 ml       Physical Exam:      General: young male , awake alert and oriented, fatigued    No distress  Mucous Membranes:  Pink , anicteric  Neck: No JVD, no carotid bruit, no thyromegaly  NJ tube in place  Chest:  Clear to auscultation bilaterally, no added sounds  Cardiovascular:  RRR S1S2 heard, no murmurs or gallops  Abdomen:  Slightly +distended, old midline surgical scars , no organomegaly, BS present  Extremities: resolving upper ext edema .  Distal pulses well felt  Neurological : improving gen weakness, no focal       Medications:   Scheduled Medications:    magnesium sulfate  2,000 mg Intravenous Once    apixaban  10 mg Oral BID    [START ON 4/9/2021] apixaban  5 mg Oral BID    cefTRIAXone (ROCEPHIN) IV  2,000 mg Intravenous Q24H    insulin lispro  0-6 Units Subcutaneous Q4H    calcium gluconate  1,000 mg Intravenous Once    chlorhexidine  15 mL Mouth/Throat BID    lidocaine 1 % injection  5 mL Intradermal Once    sodium chloride flush  10 mL Intravenous 2 times per day    nystatin  5 mL Oral 4x Daily    sucralfate  1 g Oral 4x Daily AC & HS    pantoprazole  40 mg Intravenous BID    citalopram  10 mg Oral Daily    dicyclomine  10 mg Oral BID    sodium chloride flush  10 mL Intravenous 2 times per day     I   sodium chloride 75 mL/hr at 04/03/21 2236    dexmedetomidine HCl in NaCl Stopped (04/02/21 1030)    sodium chloride      dextrose      norepinephrine Stopped (04/02/21 1826)     potassium chloride **OR** potassium alternative oral replacement **OR** potassium chloride, magnesium sulfate, docusate sodium, sodium chloride, glucose, dextrose, glucagon (rDNA), dextrose, sodium chloride flush, HYDROmorphone, phenol, oxyCODONE-acetaminophen **OR** oxyCODONE-acetaminophen, clonazePAM, sodium chloride flush, [DISCONTINUED] promethazine **OR** ondansetron, polyethylene glycol, acetaminophen **OR** acetaminophen, promethazine    Lab Data:  Recent Labs     04/02/21  0627 04/03/21  0330 04/04/21  0542   WBC 12.8* 12.6* 10.2   HGB 7.5* 7.3* 8.1*   HCT 23.0* 22.6* 24.5*   .9* 103.4* 103.4*    204 201     Recent Labs     04/02/21  1830 04/03/21  0330 04/04/21  0542    135* 132*   K 4.2 4.3 4.5    105 102   CO2 25 25 23   PHOS 2.3* 2.7 3.4   BUN 6* 6* 7   CREATININE <0.5* <0.5* <0.5*     No results for input(s): CKTOTAL, CKMB, CKMBINDEX, TROPONINI in the last 72 hours.     Coagulation:   Lab Results   Component Value Date    INR 3.73 03/28/2021    APTT 80.8 04/02/2021     Cardiac markers:   Lab Results   Component Value Date    TROPONINI <0.01 03/14/2021         Lab Results   Component Value Date    ALT 92 (H) 04/04/2021     (H) 04/04/2021    ALKPHOS 286 (H) 04/04/2021    BILITOT 3.2 (H) 04/04/2021       Lab Results   Component Value Date    INR 3.73 (H) 03/28/2021    INR 3.31 (H) 03/27/2021    INR 1.28 (H) 10/04/2019    PROTIME 43.8 (H) 03/28/2021    PROTIME 38.9 (H) 03/27/2021    PROTIME 14.6 (H) 10/04/2019     Cultures:  Blood: pseudomonas    Radiology    IR GI TUBE W FLUOROSCOPY   Final Result   Successful placement of nasal jejunal feeding tube         XR CHEST PORTABLE   Final Result   1. Stable appropriate positions of support apparatus. 2. Interval improvement in appearance of bibasilar pneumonia. XR CHEST PORTABLE   Final Result   Increased diffuse interstitial prominence suggests pulmonary edema. Stable support lines. XR CHEST PORTABLE   Final Result   Tubes and lines as above. Low lung volumes with probable basilar atelectasis. CT ABDOMEN PELVIS WO CONTRAST Additional Contrast? None   Final Result   Findings consistent with acute on chronic pancreatitis. Small amount of free fluid in the pelvis. No pseudocyst is identified. Extensive fatty infiltration of the liver. Nonobstructive bilateral nephrolithiasis. Punctate caliceal stones are noted   bilaterally. Multiple small ventral hernias. 2 hernias in the periumbilical midline   contain small bowel. No incarceration is evident. IR GI TUBE W FLUOROSCOPY   Final Result   Successful placement of nasal jejunal tube as discussed. CT ABDOMEN PELVIS W IV CONTRAST Additional Contrast? None   Final Result   There is complete thrombosis of the portal vein. Thrombus is also seen   extending into the cephalad portion of the superior mesenteric vein and   throughout the splenic vein. Peripancreatic stranding consistent with pancreatitis.   There is a small area   of low-attenuation in the region the Vein Thrombosis. - extending into SMV and splenic vein- likely with severe pancreatitis   - started on high dose heparin drip >eliquis     Abn LFT   - no CBD stone per MRCP, Mildly atrophic pancreas with ductal dilatation noted but with gall bladder sludge  - bili improving  - pt reports no alcohol since 4 months   - [facility]       Normocytic  Anemia  Folic Acid Deficiency  - 9.4 on admission. Trended down with IVF  Monitor  - folate def noted, started on supplements   - hgb 6.7   - given 1 unit PRBC with repeat at 7.5     Anxiety   - Continue Celexa, resume Klonopin prn       Severe PCM  -Attempted on post-pyloric tube feedings.    - started tube feeds but pulled out tube and started on oral feeds.     H/o alcohol abuse   - noted during previous admission  - Admission last year for acute pancreatitis due to alcohol abuse.         DVT Prophylaxis: eliquis  Diet: full liquid  Code Status: Full Code    Discharge planning    Abdullahi Clayton MD 4/4/2021 10:35 AM

## 2021-04-05 VITALS
DIASTOLIC BLOOD PRESSURE: 74 MMHG | OXYGEN SATURATION: 92 % | WEIGHT: 283.6 LBS | HEART RATE: 102 BPM | SYSTOLIC BLOOD PRESSURE: 114 MMHG | RESPIRATION RATE: 16 BRPM | HEIGHT: 73 IN | TEMPERATURE: 97.4 F | BODY MASS INDEX: 37.59 KG/M2

## 2021-04-05 LAB
ALBUMIN SERPL-MCNC: 2.3 G/DL (ref 3.4–5)
ALP BLD-CCNC: 264 U/L (ref 40–129)
ALT SERPL-CCNC: 69 U/L (ref 10–40)
ANION GAP SERPL CALCULATED.3IONS-SCNC: 7 MMOL/L (ref 3–16)
AST SERPL-CCNC: 95 U/L (ref 15–37)
BASOPHILS ABSOLUTE: 0.1 K/UL (ref 0–0.2)
BASOPHILS RELATIVE PERCENT: 1 %
BILIRUB SERPL-MCNC: 2.4 MG/DL (ref 0–1)
BILIRUBIN DIRECT: 1.6 MG/DL (ref 0–0.3)
BILIRUBIN, INDIRECT: 0.8 MG/DL (ref 0–1)
BUN BLDV-MCNC: 9 MG/DL (ref 7–20)
CALCIUM SERPL-MCNC: 8.3 MG/DL (ref 8.3–10.6)
CHLORIDE BLD-SCNC: 100 MMOL/L (ref 99–110)
CO2: 24 MMOL/L (ref 21–32)
CREAT SERPL-MCNC: <0.5 MG/DL (ref 0.9–1.3)
EOSINOPHILS ABSOLUTE: 0.4 K/UL (ref 0–0.6)
EOSINOPHILS RELATIVE PERCENT: 4 %
GFR AFRICAN AMERICAN: >60
GFR NON-AFRICAN AMERICAN: >60
GLUCOSE BLD-MCNC: 120 MG/DL (ref 70–99)
GLUCOSE BLD-MCNC: 120 MG/DL (ref 70–99)
GLUCOSE BLD-MCNC: 168 MG/DL (ref 70–99)
HCT VFR BLD CALC: 25.4 % (ref 40.5–52.5)
HEMOGLOBIN: 8.4 G/DL (ref 13.5–17.5)
LYMPHOCYTES ABSOLUTE: 1.4 K/UL (ref 1–5.1)
LYMPHOCYTES RELATIVE PERCENT: 14.8 %
MAGNESIUM: 1.5 MG/DL (ref 1.8–2.4)
MCH RBC QN AUTO: 34.4 PG (ref 26–34)
MCHC RBC AUTO-ENTMCNC: 32.9 G/DL (ref 31–36)
MCV RBC AUTO: 104.4 FL (ref 80–100)
MONOCYTES ABSOLUTE: 1.5 K/UL (ref 0–1.3)
MONOCYTES RELATIVE PERCENT: 16.2 %
NEUTROPHILS ABSOLUTE: 6 K/UL (ref 1.7–7.7)
NEUTROPHILS RELATIVE PERCENT: 64 %
PDW BLD-RTO: 22.8 % (ref 12.4–15.4)
PERFORMED ON: ABNORMAL
PERFORMED ON: ABNORMAL
PHOSPHORUS: 3.7 MG/DL (ref 2.5–4.9)
PLATELET # BLD: 185 K/UL (ref 135–450)
PMV BLD AUTO: 7.9 FL (ref 5–10.5)
POTASSIUM SERPL-SCNC: 4.2 MMOL/L (ref 3.5–5.1)
RBC # BLD: 2.43 M/UL (ref 4.2–5.9)
SODIUM BLD-SCNC: 131 MMOL/L (ref 136–145)
TOTAL PROTEIN: 5.8 G/DL (ref 6.4–8.2)
WBC # BLD: 9.4 K/UL (ref 4–11)

## 2021-04-05 PROCEDURE — 6370000000 HC RX 637 (ALT 250 FOR IP): Performed by: INTERNAL MEDICINE

## 2021-04-05 PROCEDURE — 97530 THERAPEUTIC ACTIVITIES: CPT

## 2021-04-05 PROCEDURE — 83735 ASSAY OF MAGNESIUM: CPT

## 2021-04-05 PROCEDURE — 80076 HEPATIC FUNCTION PANEL: CPT

## 2021-04-05 PROCEDURE — 97116 GAIT TRAINING THERAPY: CPT

## 2021-04-05 PROCEDURE — 84100 ASSAY OF PHOSPHORUS: CPT

## 2021-04-05 PROCEDURE — 2580000003 HC RX 258: Performed by: INTERNAL MEDICINE

## 2021-04-05 PROCEDURE — 83036 HEMOGLOBIN GLYCOSYLATED A1C: CPT

## 2021-04-05 PROCEDURE — 36415 COLL VENOUS BLD VENIPUNCTURE: CPT

## 2021-04-05 PROCEDURE — 97535 SELF CARE MNGMENT TRAINING: CPT

## 2021-04-05 PROCEDURE — 6360000002 HC RX W HCPCS: Performed by: INTERNAL MEDICINE

## 2021-04-05 PROCEDURE — 85025 COMPLETE CBC W/AUTO DIFF WBC: CPT

## 2021-04-05 PROCEDURE — 97110 THERAPEUTIC EXERCISES: CPT

## 2021-04-05 PROCEDURE — 99239 HOSP IP/OBS DSCHRG MGMT >30: CPT | Performed by: INTERNAL MEDICINE

## 2021-04-05 PROCEDURE — 80048 BASIC METABOLIC PNL TOTAL CA: CPT

## 2021-04-05 PROCEDURE — C9113 INJ PANTOPRAZOLE SODIUM, VIA: HCPCS | Performed by: INTERNAL MEDICINE

## 2021-04-05 RX ORDER — OXYCODONE HYDROCHLORIDE AND ACETAMINOPHEN 5; 325 MG/1; MG/1
1 TABLET ORAL EVERY 6 HOURS PRN
Qty: 12 TABLET | Refills: 0 | Status: SHIPPED | OUTPATIENT
Start: 2021-04-05 | End: 2021-04-08

## 2021-04-05 RX ORDER — PANTOPRAZOLE SODIUM 40 MG/1
40 TABLET, DELAYED RELEASE ORAL
Qty: 60 TABLET | Refills: 1 | Status: SHIPPED | OUTPATIENT
Start: 2021-04-05

## 2021-04-05 RX ORDER — CEFDINIR 300 MG/1
300 CAPSULE ORAL 2 TIMES DAILY
Qty: 4 CAPSULE | Refills: 0 | Status: SHIPPED | OUTPATIENT
Start: 2021-04-05 | End: 2021-04-07

## 2021-04-05 RX ORDER — GABAPENTIN 100 MG/1
100 CAPSULE ORAL 3 TIMES DAILY
Status: DISCONTINUED | OUTPATIENT
Start: 2021-04-05 | End: 2021-04-05 | Stop reason: HOSPADM

## 2021-04-05 RX ORDER — GABAPENTIN 100 MG/1
100 CAPSULE ORAL 3 TIMES DAILY
Qty: 90 CAPSULE | Refills: 0 | Status: SHIPPED | OUTPATIENT
Start: 2021-04-05 | End: 2021-05-05

## 2021-04-05 RX ORDER — SUCRALFATE 1 G/1
1 TABLET ORAL 4 TIMES DAILY
Qty: 120 TABLET | Refills: 1 | Status: SHIPPED | OUTPATIENT
Start: 2021-04-05

## 2021-04-05 RX ADMIN — CITALOPRAM HYDROBROMIDE 10 MG: 20 TABLET ORAL at 08:18

## 2021-04-05 RX ADMIN — OXYCODONE HYDROCHLORIDE AND ACETAMINOPHEN 2 TABLET: 5; 325 TABLET ORAL at 01:14

## 2021-04-05 RX ADMIN — SODIUM CHLORIDE, PRESERVATIVE FREE 10 ML: 5 INJECTION INTRAVENOUS at 08:23

## 2021-04-05 RX ADMIN — MAGNESIUM SULFATE HEPTAHYDRATE 1000 MG: 1 INJECTION, SOLUTION INTRAVENOUS at 12:10

## 2021-04-05 RX ADMIN — SUCRALFATE 1 G: 1 TABLET ORAL at 05:34

## 2021-04-05 RX ADMIN — NYSTATIN 500000 UNITS: 500000 SUSPENSION ORAL at 12:10

## 2021-04-05 RX ADMIN — HYDROMORPHONE HYDROCHLORIDE 1 MG: 1 INJECTION, SOLUTION INTRAMUSCULAR; INTRAVENOUS; SUBCUTANEOUS at 03:08

## 2021-04-05 RX ADMIN — APIXABAN 10 MG: 5 TABLET, FILM COATED ORAL at 08:19

## 2021-04-05 RX ADMIN — DICYCLOMINE HYDROCHLORIDE 10 MG: 10 CAPSULE ORAL at 08:19

## 2021-04-05 RX ADMIN — SUCRALFATE 1 G: 1 TABLET ORAL at 11:07

## 2021-04-05 RX ADMIN — PANTOPRAZOLE SODIUM 40 MG: 40 INJECTION, POWDER, FOR SOLUTION INTRAVENOUS at 08:19

## 2021-04-05 RX ADMIN — MAGNESIUM SULFATE HEPTAHYDRATE 1000 MG: 1 INJECTION, SOLUTION INTRAVENOUS at 08:41

## 2021-04-05 RX ADMIN — Medication 10 ML: at 08:20

## 2021-04-05 RX ADMIN — GABAPENTIN 100 MG: 100 CAPSULE ORAL at 12:10

## 2021-04-05 RX ADMIN — HYDROMORPHONE HYDROCHLORIDE 1 MG: 1 INJECTION, SOLUTION INTRAMUSCULAR; INTRAVENOUS; SUBCUTANEOUS at 08:20

## 2021-04-05 RX ADMIN — SODIUM CHLORIDE: 9 INJECTION, SOLUTION INTRAVENOUS at 03:08

## 2021-04-05 RX ADMIN — CEFTRIAXONE 2000 MG: 2 INJECTION, POWDER, FOR SOLUTION INTRAMUSCULAR; INTRAVENOUS at 11:07

## 2021-04-05 RX ADMIN — NYSTATIN 500000 UNITS: 500000 SUSPENSION ORAL at 08:19

## 2021-04-05 RX ADMIN — OXYCODONE HYDROCHLORIDE AND ACETAMINOPHEN 2 TABLET: 5; 325 TABLET ORAL at 05:34

## 2021-04-05 RX ADMIN — OXYCODONE HYDROCHLORIDE AND ACETAMINOPHEN 2 TABLET: 5; 325 TABLET ORAL at 11:07

## 2021-04-05 ASSESSMENT — PAIN - FUNCTIONAL ASSESSMENT
PAIN_FUNCTIONAL_ASSESSMENT: ACTIVITIES ARE NOT PREVENTED

## 2021-04-05 ASSESSMENT — PAIN SCALES - GENERAL
PAINLEVEL_OUTOF10: 9
PAINLEVEL_OUTOF10: 8
PAINLEVEL_OUTOF10: 6
PAINLEVEL_OUTOF10: 8
PAINLEVEL_OUTOF10: 7
PAINLEVEL_OUTOF10: 0
PAINLEVEL_OUTOF10: 6

## 2021-04-05 ASSESSMENT — PAIN DESCRIPTION - PROGRESSION
CLINICAL_PROGRESSION: GRADUALLY WORSENING
CLINICAL_PROGRESSION: GRADUALLY WORSENING

## 2021-04-05 ASSESSMENT — PAIN DESCRIPTION - DESCRIPTORS: DESCRIPTORS: SORE

## 2021-04-05 ASSESSMENT — PAIN DESCRIPTION - LOCATION
LOCATION: ABDOMEN
LOCATION: ABDOMEN

## 2021-04-05 ASSESSMENT — PAIN DESCRIPTION - FREQUENCY: FREQUENCY: INTERMITTENT

## 2021-04-05 ASSESSMENT — PAIN DESCRIPTION - PAIN TYPE: TYPE: ACUTE PAIN

## 2021-04-05 ASSESSMENT — PAIN DESCRIPTION - ONSET: ONSET: GRADUAL

## 2021-04-05 NOTE — PROGRESS NOTES
PROGRESS NOTE  S:40 yrs Patient  admitted on 3/13/2021 with Acute cholecystitis [K81.0] . Today he complains of weakness, lower abdominal pain, dysphagia, and heartburn. He is tolerating full liquid diet. Exam:   Vitals:    04/05/21 0730   BP: 114/74   Pulse: 102   Resp: 16   Temp: 97.4 °F (36.3 °C)   SpO2: 92%      General appearance: alert, appears stated age, cooperative and no distress  HEENT: Oropharynx clear, no lesions  Neck: no adenopathy and supple, symmetrical, trachea midline  Lungs: clear to auscultation bilaterally  Heart: regular rate and rhythm, S1, S2 normal, no murmur, click, rub or gallop  Abdomen: normal findings: bowel sounds normal, no masses palpable and symmetric and abnormal findings:  distended, obese and tenderness mild in the epigastrium and in the lower abdomen  Extremities: edema 1+ BLE     Medications: Reviewed    Labs:  CBC:   Recent Labs     04/03/21 0330 04/04/21 0542 04/05/21  0535   WBC 12.6* 10.2 9.4   HGB 7.3* 8.1* 8.4*   HCT 22.6* 24.5* 25.4*   .4* 103.4* 104.4*    201 185     BMP:   Recent Labs     04/03/21  0330 04/04/21  0542 04/05/21  0535   * 132* 131*   K 4.3 4.5 4.2    102 100   CO2 25 23 24   PHOS 2.7 3.4 3.7   BUN 6* 7 9   CREATININE <0.5* <0.5* <0.5*     LIVER PROFILE:   Recent Labs     04/04/21  0542 04/05/21  0535   * 95*   ALT 92* 69*   PROT 6.0* 5.8*   BILIDIR 2.2*  --    BILITOT 3.2* 2.4*   ALKPHOS 286* 264*     Attending Supervising [de-identified] Attestation Statement  The patient is a 41 y.o. male. I have performed a history and physical examination of the patient. I discussed the case with my physician assistant Estefania Ivey PA-C    I reviewed the patient's Past Medical History, Past Surgical History, Medications, and Allergies.      Physical Exam:  Vitals:    04/04/21 1559 04/04/21 2015 04/05/21 0308 04/05/21 0730   BP: 106/76 120/76 120/80 114/74   Pulse: 105 117 110 102   Resp: 16 16 16 16   Temp: 97.1 °F (36.2 °C) 97.6 °F (36.4 °C) 97.1 °F (36.2 °C) 97.4 °F (36.3 °C)   TempSrc: Oral Oral Oral Oral   SpO2: 92% 94%  92%   Weight:       Height:           Physical Examination: General appearance - alert, well appearing, and in no distress  Mental status - alert, oriented to person, place, and time  Eyes - pupils equal and reactive, extraocular eye movements intact  Neck - supple, no significant adenopathy  Chest - clear to auscultation, no wheezes, rales or rhonchi, symmetric air entry  Heart - normal rate, regular rhythm, normal S1, S2, no murmurs, rubs, clicks or gallops  Abdomen - tenderness noted epigastric  Extremities - no pedal edema noted            Impression: 41year old male with a history of HTN, arthritis, diverticulitis s/p partial colectomy and reversal of ileostomy, alcohol abuse, admitted with recurrent acute pancreatitis c/b portal vein thrombosis and evolving pseudocyst. Hospital course complicated by sepsis and respiratory failure.        Recommendation:  1. Continue supportive care  2. Monitor LFTs (improving)  3. PO pain control  4. Continue Abx  5. Continue Eliquis  6. Continue Dicyclomine  7. Continue Pantoprazole and Carafate  8. Advance to low fat diet as tolerated  9. PT/OT  10. Ok to d/c from GI standpoint if diet tolerated   11. Outpatient cholecystectomy upon d/c       Louana Duane, PA-C  10:12 AM 4/5/2021                       41year old male with a history of HTN, arthritis, diverticulitis s/p partial colectomy and reversal of ileostomy, alcohol abuse, admitted with recurrent acute pancreatitis c/b portal vein thrombosis and evolving pseudocyst. Hospital course complicated by sepsis and respiratory failure.    Continue supportive care. Finish 14d antibiotics. Low fat diet. Cholecystectomy with IOC. OK to d/c from GI standpoint. Anticoagulation for PVT x 6m. Follow up in clinic upon discharge.     Bobby Harada, MD          (O) [phone]  (O) [phone]

## 2021-04-05 NOTE — PLAN OF CARE
nutritional guidelines  Outcome: Ongoing     Problem: Non-Violent Restraints  Goal: Removal from restraints as soon as assessed to be safe  Outcome: Ongoing  Goal: No harm/injury to patient while restraints in use  Outcome: Ongoing  Goal: Patient's dignity will be maintained  Outcome: Ongoing     Problem: Skin Integrity:  Goal: Will show no infection signs and symptoms  Description: Will show no infection signs and symptoms  Outcome: Ongoing  Goal: Absence of new skin breakdown  Description: Absence of new skin breakdown  Outcome: Ongoing

## 2021-04-05 NOTE — PROGRESS NOTES
Pt A/O in bed and quiet. Denies any needs at this time. SR up x2, bed in lowest position, wheels locked,bed alarm on, Call light and bedside table in easy reach.    Tavia Da Silva RN

## 2021-04-05 NOTE — CARE COORDINATION
DISCHARGE ORDER  Date/Time 2021 11:49 AM  Completed by: Kd Cano, Case Management    Patient Name: Carina Dumont      : 1980  Admitting Diagnosis: Acute cholecystitis [K81.0]      Admit order Date and Status: IP 2021  (verify MD's last order for status of admission)      Noted discharge order. If applicable PT/OT recommendation at Discharge: SNF  DME recommendation by PT/OT: walker, RTS  Confirmed discharge plan with patuient  Discharge Plan: Pt now refusing SNF placement. Plans to return home and mother will provide supervision. Pt does not have  supervision but states he will not go to a rehab facility at this time. [facility] as of Friday they did not have staffing to provide the TF. As pt is off TF they will re-evaluate and let CM know if they can provide services. Await return call. Rolling walker and RTS with safety bars provided to pt from [employer]. Reviewed chart. Role of discharge planner explained and patient verbalized understanding. Discharge order is noted. Has Home O2 in place on admit:  No  Informed of need to bring portable home O2 tank on day of discharge for nursing to connect prior to leaving:   Not Indicated  Verbalized agreement/Understanding:   Not Indicated  Pt is being d/c'd to home today. Pt's O2 sats are 92% on RA. Discharge timeout done with Alejandro RN. All discharge needs and concerns addressed.

## 2021-04-05 NOTE — DISCHARGE SUMMARY
Name:  Corina Hinojosa  Room:  1680/4936-17  MRN:    [id number]    Discharge Summary      This discharge summary is in conjunction with a complete physical exam done on the day of discharge. Discharging Physician: Dr. Segal Amen: 3/13/2021  Discharge: 4/5/2021     HPI taken from admission H&P:    41 y.o. male presents with worsening abdominal pain, n/v with onset 2 weeks ago. Pain is epigastric and in the RUQ, worsening with PO intake, associated with emesis of food within 30 minutes of ingestion. Emesis has been of clear fluid with no blood. His stools have not been black/bloody/acholic. He's had fevers in the low 100's. Today he also noted R sided chest pain with some radiation to his left hand. Chest pain has resolved, however he continues to have abd pain, nausea. Diagnoses this Admission and Hospital Course   Recurrent pancreatitis   - remote hx of alcohol related pancreatitis but reportedly quit 4 months ago  - did not improve with conservative mx . Recurrent and persistent nausea, emesis and worsening per CT , unable to tolerate oral diet  - GI started post-pyloric tube feedings on 3/25. He did not tolerate advancing tube feedings. - Complicated clinical course with development of poral vein thrombosis, septic shock with hypotension, renal failure, hyperkalemia and resp failure requiring vent support  - restarted NJ Tube feeds -but now tube is out. GI ok with full liquid diet tolerating ok. - advance to low fat diet-->tolerating well      Sepsis resolved  - not POA   - he is hypotensive, tachycardic, WBC worsened at 38.6  - his abdominal pain is worse and ct with worsening pancreatitis,  - blood cx with Klebsiella pneumoniae   - received Cefepime initially. now on IV rocephin day#10/10.  - severe hypotensive shock on pressors, critical care managing  - off Levophed     REDDY  Hyperkalemia  AGMA  - nephrology consulted  - started on BiCarb fluids  - Treated hyperkalemia with insulin, D50  - maintaining good UOP  - ok to stop fluids      Portal Vein Thrombosis. - extending into SMV and splenic vein- likely with severe pancreatitis   - started on high dose heparin drip >eliquis   - D/c on Eliquis 10 mg BID x 4, ,then 5 mg BID for 3 months and follow up      Abn LFT   - no CBD stone per MRCP, Mildly atrophic pancreas with ductal dilatation noted but with gall bladder sludge  - bili improving  - pt reports no alcohol since 4 months   - [facility]      Normocytic  Anemia  Folic Acid Deficiency  - 9.4 on admission. Trended down with IVF  Monitor  - folate def noted, started on supplements      Anxiety   - Continue Celexa, resume Klonopin prn     Severe PCM  -Attempted on post-pyloric tube feedings.   - started tube feeds but pulled out tube and started on oral feeds.     H/o alcohol abuse   - noted during previous admission  - Admission last year for acute pancreatitis due to alcohol abuse.      Procedures (Please Review Full Report for Details)  Intubation/Extubation   NJ tube placement and removal     Consults    Pulmonology  Nephrology   General Surgery     Physical Exam at Discharge:    /74   Pulse 102   Temp 97.4 °F (36.3 °C) (Oral)   Resp 16   Ht 6' 1\" (1.854 m)   Wt 283 lb 9.6 oz (128.6 kg)   SpO2 92%   BMI 37.42 kg/m²   General: young male , awake alert and oriented, fatigued    No distress  Mucous Membranes:  Pink , anicteric  Neck: No JVD, no carotid bruit, no thyromegaly  NJ tube in place  Chest:  Clear to auscultation bilaterally, no added sounds  Cardiovascular:  RRR S1S2 heard, no murmurs or gallops  Abdomen:  Slightly +distended, old midline surgical scars , no organomegaly, BS present  Extremities: resolving upper ext edema .  Distal pulses well felt  Neurological : improving gen weakness, no focal     CBC:   Recent Labs     04/03/21  0330 04/04/21  0542 04/05/21  0535   WBC 12.6* 10.2 9.4   HGB 7.3* 8.1* 8.4*   HCT 22.6* 24.5* 25.4*   .4* 103.4* 104.4*    201 185     BMP:   Recent Labs     04/03/21  0330 04/04/21  0542 04/05/21  0535   * 132* 131*   K 4.3 4.5 4.2    102 100   CO2 25 23 24   PHOS 2.7 3.4 3.7   BUN 6* 7 9   CREATININE <0.5* <0.5* <0.5*     LIVER PROFILE:   Recent Labs     04/04/21  0542 04/05/21  0535   * 95*   ALT 92* 69*   BILIDIR 2.2* 1.6*   BILITOT 3.2* 2.4*   ALKPHOS 286* 264*     CULTURES  Blood Cx: + Klebsiella pneumonia   Resp Cx: + Staph Aureus   Urine Cx: NGTD   C. Diff: negative   GI Panel: negative     Resp Cx: Staphylococcus aureus (1)    Antibiotic Interpretation IRA Status    clindamycin Sensitive <=0.25 mcg/mL     erythromycin Sensitive <=0.25 mcg/mL     oxacillin Sensitive 0.5 mcg/mL     tetracycline Sensitive <=1 mcg/mL     trimethoprim-sulfamethoxazole Sensitive <=10 mcg/mL           Blood Cx:   Klebsiella pneumoniae (2)    Antibiotic Interpretation IRA Status    ampicillin Resistant       ceFAZolin Sensitive <=4 mcg/mL     cefepime Sensitive <=0.12 mcg/mL     cefTRIAXone Sensitive <=0.25 mcg/mL     ciprofloxacin Sensitive <=0.25 mcg/mL     ertapenem Sensitive <=0.12 mcg/mL     gentamicin Sensitive <=1 mcg/mL     levofloxacin Sensitive <=0.12 mcg/mL     piperacillin-tazobactam Sensitive <=4 mcg/mL     trimethoprim-sulfamethoxazole Sensitive <=20 mcg/mL         RADIOLOGY  IR GI TUBE W FLUOROSCOPY   Final Result   Successful placement of nasal jejunal feeding tube         XR CHEST PORTABLE   Final Result   1. Stable appropriate positions of support apparatus. 2. Interval improvement in appearance of bibasilar pneumonia. XR CHEST PORTABLE   Final Result   Increased diffuse interstitial prominence suggests pulmonary edema. Stable support lines. XR CHEST PORTABLE   Final Result   Tubes and lines as above. Low lung volumes with probable basilar atelectasis.          CT ABDOMEN PELVIS WO CONTRAST Additional Contrast? None   Final Result   Findings consistent with acute on chronic pancreatitis. Small amount of free fluid in the pelvis. No pseudocyst is identified. Extensive fatty infiltration of the liver. Nonobstructive bilateral nephrolithiasis. Punctate caliceal stones are noted   bilaterally. Multiple small ventral hernias. 2 hernias in the periumbilical midline   contain small bowel. No incarceration is evident. IR GI TUBE W FLUOROSCOPY   Final Result   Successful placement of nasal jejunal tube as discussed. CT ABDOMEN PELVIS W IV CONTRAST Additional Contrast? None   Final Result   There is complete thrombosis of the portal vein. Thrombus is also seen   extending into the cephalad portion of the superior mesenteric vein and   throughout the splenic vein. Peripancreatic stranding consistent with pancreatitis. There is a small area   of low-attenuation in the region the pancreatic head which may correspond to   focal dilated pancreatic duct or a small developing fluid collection. Hepatic steatosis. The findings were sent to the Radiology Results [address] at 3:41   pm on 3/22/2021to be communicated to a licensed caregiver. MRI ABDOMEN WO CONTRAST MRCP   Final Result   *Normal caliber bile ducts without evidence of choledocholithiasis. *Hepatomegaly with severe fatty infiltration as described. *Mildly atrophic pancreas with ductal dilatation as measured and described   above, findings likely sequela of chronic pancreatitis. *Large amount of gallbladder sludge. US GALLBLADDER RUQ   Final Result   1. Gallbladder sludge with nonspecific wall thickening. 2. Abnormal common duct dilatation. MRCP and/or ERCP would be options to   further evaluate.              Discharge Medications     Medication List      STOP taking these medications    escitalopram 20 MG tablet  Commonly known as: LEXAPRO     ondansetron 4 MG disintegrating tablet  Commonly known as: Zofran ODT ASK your doctor about these medications    amLODIPine 5 MG tablet  Commonly known as: Ck Patel  Ask about: Which instructions should I use? CeleXA 10 MG tablet  Generic drug: citalopram     clonazePAM 0.5 MG tablet  Commonly known as: KLONOPIN     cloNIDine 0.1 MG tablet  Commonly known as: CATAPRES     DICLOFENAC PO     dicyclomine 10 MG capsule  Commonly known as: BENTYL     lisinopril 10 MG tablet  Commonly known as: PRINIVIL;ZESTRIL          Discharged in stable condition to home (refusing SNF)    Follow Up:   Follow up with PCP in 1 week, general surgery OP, GI OP     More than 30 mts spent      Oral Nurys 4/6/2021 4:51 PM

## 2021-04-05 NOTE — PROGRESS NOTES
to treatment. Patient can be impulsive and easily irritated. Pain   Yes  Rating: moderate  Location:\"all over\"  Pain Medicine Status: Received pain med prior to tx and No request made      Bed Mobility:   Supine to Sit:  SBA with use of bedrail, flat bed  Sit to Supine:  Not Tested  Rolling:           Not Tested  Scooting:        SBA    Transfer Training:   Sit to stand:   CGA   Stand to sit:  CGA   Bed to Chair:  CGA with RW  Bed to [facility]:   Not Tested  Standard toilet:   Not Tested     Patient completed functional mobility across room with CGA and use of RW. Activity Tolerance   Pt completed therapy session with Pain noted with all activity and at rest   SpO2  HR   BP     ADL Training:   Upper body dressing: Not Tested  Upper body bathing:  Not Tested  Lower body dressing:  setup to don B knee braces   Lower body bathing:  Not Tested   Toileting:   Not Tested  Grooming/Hygiene:  Not Tested    Therapeutic Exercise: NA  N/A    Patient Education:   Role of OT  Recommendations for DC  Energy conservation techniques-PLB technique  Safe RW use/hand placement    Positioning Needs:   Pt up in chair, alarm set, positioned in proper neutral alignment and pressure relief provided. Call light provided and all needs within reach    Family Present:  No    Assessment: Making progress toward goals, continue as tolerated. GOALS  To be met in 3 Visits:  1). Bed to toilet/BSC: Min A     To be met in 5 Visits:  1). Supine to/from Sit:             Max A (MET 4/4/21, advance to IND)  2). Upper Body Bathing:         Min A  3). Lower Body Bathing:         Max A (MET 4/4/21, advance to MIN A)  4). Upper Body Dressing:       Min A  5). Lower Body Dressing:       Max A (MET 4/4/21, advance to MIN A)  6).  Pt to demonstrate UE exs x 15 reps with minimal cues      [id number]        If patient discharges from this facility prior to next visit, this note will serve as the Discharge Summary

## 2021-04-05 NOTE — PROGRESS NOTES
Medications:   Scheduled Medications:    gabapentin  100 mg Oral TID    magnesium sulfate  2,000 mg Intravenous Once    apixaban  10 mg Oral BID    [START ON 4/9/2021] apixaban  5 mg Oral BID    cefTRIAXone (ROCEPHIN) IV  2,000 mg Intravenous Q24H    insulin lispro  0-6 Units Subcutaneous Q4H    calcium gluconate  1,000 mg Intravenous Once    chlorhexidine  15 mL Mouth/Throat BID    lidocaine 1 % injection  5 mL Intradermal Once    sodium chloride flush  10 mL Intravenous 2 times per day    nystatin  5 mL Oral 4x Daily    sucralfate  1 g Oral 4x Daily AC & HS    pantoprazole  40 mg Intravenous BID    citalopram  10 mg Oral Daily    dicyclomine  10 mg Oral BID    sodium chloride flush  10 mL Intravenous 2 times per day     I   sodium chloride      dextrose       potassium chloride **OR** potassium alternative oral replacement **OR** potassium chloride, magnesium sulfate, docusate sodium, sodium chloride, glucose, dextrose, glucagon (rDNA), dextrose, sodium chloride flush, phenol, oxyCODONE-acetaminophen **OR** oxyCODONE-acetaminophen, clonazePAM, sodium chloride flush, [DISCONTINUED] promethazine **OR** ondansetron, polyethylene glycol, acetaminophen **OR** acetaminophen, promethazine    Lab Data:  Recent Labs     04/03/21 0330 04/04/21 0542 04/05/21  0535   WBC 12.6* 10.2 9.4   HGB 7.3* 8.1* 8.4*   HCT 22.6* 24.5* 25.4*   .4* 103.4* 104.4*    201 185     Recent Labs     04/03/21 0330 04/04/21 0542 04/05/21  0535   * 132* 131*   K 4.3 4.5 4.2    102 100   CO2 25 23 24   PHOS 2.7 3.4 3.7   BUN 6* 7 9   CREATININE <0.5* <0.5* <0.5*     No results for input(s): CKTOTAL, CKMB, CKMBINDEX, TROPONINI in the last 72 hours.     Coagulation:   Lab Results   Component Value Date    INR 3.73 03/28/2021    APTT 80.8 04/02/2021     Cardiac markers:   Lab Results   Component Value Date    TROPONINI <0.01 03/14/2021         Lab Results   Component Value Date    ALT 69 (H) 04/05/2021    AST 95 (H) 04/05/2021    ALKPHOS 264 (H) 04/05/2021    BILITOT 2.4 (H) 04/05/2021       Lab Results   Component Value Date    INR 3.73 (H) 03/28/2021    INR 3.31 (H) 03/27/2021    INR 1.28 (H) 10/04/2019    PROTIME 43.8 (H) 03/28/2021    PROTIME 38.9 (H) 03/27/2021    PROTIME 14.6 (H) 10/04/2019     Cultures:  Blood: pseudomonas    Radiology    IR GI TUBE W FLUOROSCOPY   Final Result   Successful placement of nasal jejunal feeding tube         XR CHEST PORTABLE   Final Result   1. Stable appropriate positions of support apparatus. 2. Interval improvement in appearance of bibasilar pneumonia. XR CHEST PORTABLE   Final Result   Increased diffuse interstitial prominence suggests pulmonary edema. Stable support lines. XR CHEST PORTABLE   Final Result   Tubes and lines as above. Low lung volumes with probable basilar atelectasis. CT ABDOMEN PELVIS WO CONTRAST Additional Contrast? None   Final Result   Findings consistent with acute on chronic pancreatitis. Small amount of free fluid in the pelvis. No pseudocyst is identified. Extensive fatty infiltration of the liver. Nonobstructive bilateral nephrolithiasis. Punctate caliceal stones are noted   bilaterally. Multiple small ventral hernias. 2 hernias in the periumbilical midline   contain small bowel. No incarceration is evident. IR GI TUBE W FLUOROSCOPY   Final Result   Successful placement of nasal jejunal tube as discussed. CT ABDOMEN PELVIS W IV CONTRAST Additional Contrast? None   Final Result   There is complete thrombosis of the portal vein. Thrombus is also seen   extending into the cephalad portion of the superior mesenteric vein and   throughout the splenic vein. Peripancreatic stranding consistent with pancreatitis.   There is a small area   of low-attenuation in the region the pancreatic head which may correspond to   focal dilated pancreatic duct or a small developing fluid collection. Hepatic steatosis. The findings were sent to the Radiology Results [address] at 3:41   pm on 3/22/2021to be communicated to a licensed caregiver. MRI ABDOMEN WO CONTRAST MRCP   Final Result   *Normal caliber bile ducts without evidence of choledocholithiasis. *Hepatomegaly with severe fatty infiltration as described. *Mildly atrophic pancreas with ductal dilatation as measured and described   above, findings likely sequela of chronic pancreatitis. *Large amount of gallbladder sludge. US GALLBLADDER RUQ   Final Result   1. Gallbladder sludge with nonspecific wall thickening. 2. Abnormal common duct dilatation. MRCP and/or ERCP would be options to   further evaluate. Assessment & Plan:      Recurrent pancreatitis   - remote hx of alcohol related pancreatitis but reportedly quit 4 months ago  - did not improve with conservative mx . Recurrent and persistent nausea, emesis and worsening per CT , unable to tolerate oral diet  GI started post-pyloric tube feedings on 3/25. He did not tolerate advancing tube feedings. Complicated clinical course with development of poral vein thrombosis, septic shock with hypotension, renal failure, hyperkalemia and resp failure requiring vent support    - restarted [place] Tube feeds -but now tube is out. GI ok with full liquid diet tolerating ok. - advance to low fat diet. Sepsis resolved  - not POA   - he is hypotensive, tachycardic, WBC worsened at 38.6  - his abdominal pain is worse and ct with worsening pancreatitis,  - blood cx with Klebsiella pneumoniae   - received Cefepime initially. now on IV rocephin day#9/10.  - severe hypotensive shock on pressors, critical care managing  - off Levophed     REDDY  Hyperkalemia  AGMA  - nephrology consulted  - started on BiCarb fluids  - Treated hyperkalemia with insulin, D50  - maintaining good UOP  - ok to stop fluids     Portal Vein Thrombosis.    - extending into SMV and splenic vein- likely with severe pancreatitis   - started on high dose heparin drip >eliquis     Abn LFT   - no CBD stone per MRCP, Mildly atrophic pancreas with ductal dilatation noted but with gall bladder sludge  - bili improving  - pt reports no alcohol since 4 months   - Refer to Surgery at [facility] for cholecystectomy       Normocytic  Anemia  Folic Acid Deficiency  - 9.4 on admission. Trended down with IVF  Monitor  - folate def noted, started on supplements        Anxiety   - Continue Celexa, resume Klonopin prn       Severe PCM  -Attempted on post-pyloric tube feedings.    - started tube feeds but pulled out tube and started on oral feeds.     H/o alcohol abuse   - noted during previous admission  - Admission last year for acute pancreatitis due to alcohol abuse.         DVT Prophylaxis: eliquis  Diet: full liquid  Code Status: Full Code    Discharge planning    Leonides Black MD 4/5/2021 10:46 AM

## 2021-04-05 NOTE — PROGRESS NOTES
Pt A/O in bed and quiet. Denies any needs at this time. SR up x2, bed in lowest position, wheels locked,bed alarm on, Call light and bedside table in easy reach.    Matthieu Hampton RN

## 2021-04-05 NOTE — PROGRESS NOTES
Inpatient Physical Therapy Daily Treatment Note    Unit: [facility]  Date:  4/5/2021  Patient Name:    Cecille Maya  Admitting diagnosis:  Acute cholecystitis [K81.0]  Admit Date:  3/13/2021  Precautions/Restrictions:  Fall risk, Bed/chair alarm, Lines -IV and  Left CVC (Neck), Telemetry and Wears hinged knee braces bilaterally for chronic  Knee dislocation, telesitter      Discharge Recommendations: SNF (If refusing will need 24hr assist with home PT)  DME needs for discharge: RW       Therapy recommendation for EMS Transport: can transport by wheelchair    Therapy recommendations for staff:   Assist of 1 with use of rolling walker (RW), gait belt and with bilateral knee braces. for all transfers and ambulation to/from [facility]  to/from chair  to/from bathroom    History of Present Illness: per Dr. Keyon Monson patient is a 69-year-old man with a past medical history of distant heroin abuse, alcohol abuse who had been admitted to [facility] on 3/13/2021 with abdominal pain and pancreatitis. Ajay Rhodes underwent an MRCP during the hospital stay showing sludge in the gallbladder but no choledocholithiasis.  On CT scan 3/20 he was found to have complete thrombosis of his portal vein with extension into the SMV and splenic vein thrombosis.  He was initially started on heparin drip but was transitioned to Eliquis a few days ago. Gale Eyaling addition he been receiving postpyloric tube feeds but had difficulty tolerating them and pulled his NG tube out yesterday.  This morning he was noted to be more confused with hypotension and new onset renal failure and leukocytosis.  He was transferred to the ICU for further care. Edelmira Chappell is currently a limited historian and cannot provide many details. Ajay Rhodes does endorse epigastric abdominal pain without radiation.   Transferred to ICU 3/29, intubated 3/29-3/30  Copied from Dr. Laura Life note dated 1/28/2020 pertaining to B knees:  Treatment Plan: I reviewed patient's x-ray and physical exam findings, he does have very complex situation with chronically dislocated patellas.  This will require total knee arthroplasty with at least extensive lateral release, may require more extensive surgery depending on patella tracking intraoperatively.  This was discussed with the patient.  Today I recommend follow-up with 1 of our more experienced joint surgeons including Dr. Emma Cheema or Dr. Ross Loud is a deal with these complex cases on a more regular basis. Marcela Abena agreed and will schedule follow-up.  I also counseled patient about tobacco since cessation. Cecy Mckeon to proceeding with a total knee arthroplasty I encouraged him to stop smoking as this will help decrease her risk of complication.  I did provide lateral patella stabilizing braces today to improve stability and function prior to surgery. Home Health S4 Level Recommendation: Level 3 Safety  AM-PAC Mobility Score   AM-PAC Inpatient Mobility Raw Score : 17     Treatment Time: 1030 - 0143 & 3809 - 1942  Treatment number: 3  Timed Code Treatment Minutes: 30 minutes  Total Treatment Minutes: 30 minutes    Cognition    A&O x4   Able to follow 2 step commands    Subjective  Patient sitting EOB with OT with no family present   Pt agreeable to this PT tx.      Pain   No  Location: N/A  Rating:    NA/10  Pain Medicine Status: Denies need     Bed Mobility   Supine to Sit:    SBA using bed rails  Sit to Supine:   Not Tested  Rolling:   Not Tested  Scooting:   SBA    Transfer Training     Sit to stand:   CGA with RW  Stand to sit:   CGA  Bed to Chair:   CGA with use of gait belt and rolling walker (RW)    Gait Training gait completed as indicated below  Distance:      80 ft  Deviations (firm surface/linoleum):  decreased marissa, increased JOSE LUIS, decreased step length bilaterally and decreased stance time bilaterally  Assistive Device Used:    gait belt, rolling walker (RW) and bilateral knee braces  Level of Assist:    CGA  Comment:     Stair Training stairs completed as indicated below  # of Steps:   2  Level of Assist:  CGA  UE Support:  bilateral  Assistive Device:  N/A  Pattern:   non-reciprocal pattern  Comments: Simulated stair ambulation was done with 6\" stepper and RW as bilateral handrails. Patient was attempted to perform stair ambulation without using handrails similar to home set up but was unsuccessful and needed CGA with bilateral hand support for stairs negotiation. Therapeutic Exercise all completed bilaterally unless indicated  Exercises  Supine Bridge 10 reps   Clamshell with Resistance  10 reps  Supine Active Straight Leg Raise  10 reps   Seated Long Arc Quad 10 reps   Seated March with Resistance 10 reps   Seated Hip Adduction Isometrics with Ball 10 reps   Seated Hip Abduction with Resistance 10 reps     Balance  Sitting:  Good - ; CGA  Comments:     Standing: Fair ; CGA  Comments: ~5 min    Patient Education      Role of PT, POC, Discharge recommendations, DC recommendations, safety awareness, transfer techniques, pursed lip breathing, energy conservation, pacing activity and calling for assist with mobility. Patient was provided with HEP with red theraband. Patient verbalized understanding about HEP. Positioning Needs       Pt up in chair, alarm set, positioned in proper neutral alignment and pressure relief provided. Call light provided and all needs within reach    ROM Measurements N/A  Knee Flexion:  Knee Extension:     Activity Tolerance   Pt completed therapy session with No adverse symptoms noted w/activity. Other  N/A    Assessment :  Patient was able to perform sit to stand today with CGA with increased hip abduction prior to standing with use of momentum. Patient was educated several times in the session today with correct technique of standing with knees together facing forward, pushing with bilateral UEs while leaning forward but patient kept standing. without using RW and poor safety.  As per the information given by patient, he did not have home 24hr assist and currently patient still needed Min A to [facility] for all functional mobility including stair ambulation. Also patient demonstrated high fall risk with as demonstrated by Tinetti score 17/28 with known history of knee instability with dislocated patella and fair safety awareness and will benefit from continued rehab to decrease fall risk. Recommending SNF upon discharge as patient functioning well below baseline, demonstrates good rehab potential and unable to return home due to limited or no family support, inability to negotiate stairs to enter home/bedroom/bathroom, burden of care beyond caregiver ability, home environment not conducive to patient recovery and limited safety awareness. Goals (all goals ongoing unless otherwise indicated)  To be met in 3 visits:  1). Independent with LE Ex x 10 reps  2.) Supine to sit with Max A (Goal met 4/4/2021), updated to SBA without using bed rails and bed controls. 3.) Bed to chair with Max A and  RW (Goal met 4/4/2021), updated to [address] [place] [address] with LRAD. To be met in 6 visits:  1). Supine to/from sit: Mod A (Goal met 4/4/2021), updated to CGA without using bed rails and bed controls. 2). Sit to/from stand: Mod A    3). Bed to chair: Mod A   4). Gait: Ambulate 25 ft.   with  Mod A  and use of rolling walker (RW) (Goal met 4/4/2021), updated to [facility] for 100 ft with LRAD. 5). Tolerate B LE exercises 3 sets of 10-15 reps    Plan   Continue with plan of care. Signature: Kate Gayle MS PT, # [id number]    If patient discharges from this facility prior to next visit, this note will serve as the Discharge Summary.

## 2021-04-05 NOTE — PROGRESS NOTES
[facility]       Progress Note           Subjective/   41y.o. year old male who we are seeing in consultation for REDDY. HPI:  PMH of heroin and alcohol abuse, hypertension, history of ileostomy who is admitted for acute pancreatitis on 3/13/2021  Patient had portal vein thrombosis noted on a CT scan of abdomen pelvis with IV contrast done on 3/22  On 3/26 nasojejunal tube was placed and he was started on postpyloric feeding. He had significant nausea vomiting and pulled his NG tube out on 3/28. He also had diarrhea on 3/27  Patient was on D5 half-normal saline with 20 of potassium chloride which was stopped on 3/25. No documentation of IV fluid on 3/26. On 3/27 saline was started at 100 mL/h but according to the nurses due to poor IV access it had not been continued as planned  Patient's blood pressure dropped to high 90s couple of times on 3/27. He also has been tachycardic in the 90s to 100 throughout his stay with occasionally going up to 140s 150s. Renal function better with IVF's, urine output of 1.2 liters over last 24 hours. Intake better. ROS:  No sob or weakness.     Objective/   GEN:  Chronically ill, /74   Pulse 102   Temp 97.4 °F (36.3 °C) (Oral)   Resp 16   Ht 6' 1\" (1.854 m)   Wt 283 lb 9.6 oz (128.6 kg)   SpO2 92%   BMI 37.42 kg/m²   HEENT: non-icteric, no JVD  CV: S1, S2 without m/r/g; + LE edema  RESP: CTA B without w/r/r; breathing wnl  ABD: +bs, soft, nt, no hsm  SKIN: warm, no rashes    Data/  Recent Labs     04/03/21  0330 04/04/21  0542 04/05/21  0535   WBC 12.6* 10.2 9.4   HGB 7.3* 8.1* 8.4*   HCT 22.6* 24.5* 25.4*   .4* 103.4* 104.4*    201 185     Recent Labs     04/03/21  0330 04/04/21  0542 04/05/21  0535   * 132* 131*   K 4.3 4.5 4.2    102 100   CO2 25 23 24   GLUCOSE 114* 114* 120*   PHOS 2.7 3.4 3.7   MG 1.60* 1.60* 1.50*   BUN 6* 7 9   CREATININE <0.5* <0.5* <0.5*   LABGLOM >60 >60 >60   GFRAA >60 >60 >60    UA with micro shows 30 protein positive nitrite 6-9 WBCs and 11-20 RBCs  Urine culture no growth till date       Assessment/Plan     -REDDY in the setting of nausea vomiting diarrhea leading to volume depletion, hypotension and also third spacing from SIRS related to pancreatitis.    Creatinine normalized    -Hyperkalemia from REDDY and metabolic acidosis              Resolved      -Metabolic acidosis, predominantly lactic acidosis from hypoperfusion and liver failure  Resolved      -Acute on chronic pancreatitis from biliary sludge     -Septic shock with Klebsiella pneumoniae bacteremia                -Portal vein thrombosis, superior mesenteric vein thrombosis              Elevated INR, also requiring heparin drip, now on eliquis     -Anemia-1 PRBC on 3/29     - Hyponatremia   Sodium steady in low 130 range over last few days    -Hypocalcemia-corrected calcium is about 9 on 3/29       Plan  - Monitor off of IVF's today  - Trend labs

## 2021-04-05 NOTE — PROGRESS NOTES
Patient given discharge instructions including medications, restrictions, and follow-up information. Patient verbalizes understanding and signed discharge paperwork. IJ removed without difficulty. Patient tolerated well. Patient ambulated with walker and mother assisting him off of the unit.

## 2021-04-06 LAB
ESTIMATED AVERAGE GLUCOSE: 76.7 MG/DL
HBA1C MFR BLD: 4.3 %

## 2021-04-14 ENCOUNTER — APPOINTMENT (OUTPATIENT)
Dept: CT IMAGING | Age: 41
End: 2021-04-14
Payer: MEDICAID

## 2021-04-14 ENCOUNTER — APPOINTMENT (OUTPATIENT)
Dept: GENERAL RADIOLOGY | Age: 41
End: 2021-04-14
Payer: MEDICAID

## 2021-04-14 ENCOUNTER — HOSPITAL ENCOUNTER (EMERGENCY)
Age: 41
Discharge: HOME | End: 2021-04-14
Attending: EMERGENCY MEDICINE
Payer: MEDICAID

## 2021-04-14 VITALS
OXYGEN SATURATION: 96 % | DIASTOLIC BLOOD PRESSURE: 61 MMHG | WEIGHT: 275 LBS | RESPIRATION RATE: 16 BRPM | TEMPERATURE: 98.6 F | SYSTOLIC BLOOD PRESSURE: 103 MMHG | BODY MASS INDEX: 36.45 KG/M2 | HEIGHT: 73 IN | HEART RATE: 89 BPM

## 2021-04-14 DIAGNOSIS — E87.1 HYPONATREMIA: ICD-10-CM

## 2021-04-14 DIAGNOSIS — R10.84 GENERALIZED ABDOMINAL PAIN: Primary | ICD-10-CM

## 2021-04-14 DIAGNOSIS — K55.069 MESENTERIC THROMBOSIS (HCC): ICD-10-CM

## 2021-04-14 DIAGNOSIS — R79.89 ELEVATED LFTS: ICD-10-CM

## 2021-04-14 DIAGNOSIS — K76.9 LIVER DISEASE: ICD-10-CM

## 2021-04-14 DIAGNOSIS — R19.7 DIARRHEA, UNSPECIFIED TYPE: ICD-10-CM

## 2021-04-14 DIAGNOSIS — R19.5 HEME POSITIVE STOOL: ICD-10-CM

## 2021-04-14 DIAGNOSIS — K86.2 PANCREAS CYST: ICD-10-CM

## 2021-04-14 DIAGNOSIS — K86.1 OTHER CHRONIC PANCREATITIS (HCC): ICD-10-CM

## 2021-04-14 LAB
A/G RATIO: 0.6 (ref 1.1–2.2)
ALBUMIN SERPL-MCNC: 2.2 G/DL (ref 3.4–5)
ALP BLD-CCNC: 305 U/L (ref 40–129)
ALT SERPL-CCNC: 43 U/L (ref 10–40)
AMORPHOUS: ABNORMAL /HPF
ANION GAP SERPL CALCULATED.3IONS-SCNC: 8 MMOL/L (ref 3–16)
ANISOCYTOSIS: ABNORMAL
AST SERPL-CCNC: 120 U/L (ref 15–37)
BACTERIA: ABNORMAL /HPF
BASOPHILS ABSOLUTE: 0.1 K/UL (ref 0–0.2)
BASOPHILS RELATIVE PERCENT: 1 %
BILIRUB SERPL-MCNC: 2.7 MG/DL (ref 0–1)
BILIRUBIN URINE: ABNORMAL
BLOOD, URINE: NEGATIVE
BUN BLDV-MCNC: 12 MG/DL (ref 7–20)
C DIFF TOXIN/ANTIGEN: NORMAL
CALCIUM SERPL-MCNC: 8.4 MG/DL (ref 8.3–10.6)
CHLORIDE BLD-SCNC: 99 MMOL/L (ref 99–110)
CLARITY: CLEAR
CO2: 21 MMOL/L (ref 21–32)
COLOR: ABNORMAL
CREAT SERPL-MCNC: <0.5 MG/DL (ref 0.9–1.3)
EKG ATRIAL RATE: 96 BPM
EKG DIAGNOSIS: NORMAL
EKG P AXIS: 51 DEGREES
EKG P-R INTERVAL: 162 MS
EKG Q-T INTERVAL: 392 MS
EKG QRS DURATION: 90 MS
EKG QTC CALCULATION (BAZETT): 495 MS
EKG R AXIS: -21 DEGREES
EKG T AXIS: 23 DEGREES
EKG VENTRICULAR RATE: 96 BPM
EOSINOPHILS ABSOLUTE: 0.1 K/UL (ref 0–0.6)
EOSINOPHILS RELATIVE PERCENT: 1 %
EPITHELIAL CELLS, UA: ABNORMAL /HPF (ref 0–5)
GFR AFRICAN AMERICAN: >60
GFR NON-AFRICAN AMERICAN: >60
GLOBULIN: 4 G/DL
GLUCOSE BLD-MCNC: 112 MG/DL (ref 70–99)
GLUCOSE URINE: NEGATIVE MG/DL
HCT VFR BLD CALC: 29.6 % (ref 40.5–52.5)
HEMATOLOGY PATH CONSULT: NO
HEMOGLOBIN: 9.6 G/DL (ref 13.5–17.5)
HYALINE CASTS: ABNORMAL /LPF (ref 0–2)
INR BLD: 1.71 (ref 0.86–1.14)
KETONES, URINE: NEGATIVE MG/DL
LACTIC ACID: 1.4 MMOL/L (ref 0.4–2)
LEUKOCYTE ESTERASE, URINE: ABNORMAL
LIPASE: 72 U/L (ref 13–60)
LYMPHOCYTES ABSOLUTE: 1.4 K/UL (ref 1–5.1)
LYMPHOCYTES RELATIVE PERCENT: 12 %
MCH RBC QN AUTO: 33.5 PG (ref 26–34)
MCHC RBC AUTO-ENTMCNC: 32.5 G/DL (ref 31–36)
MCV RBC AUTO: 103 FL (ref 80–100)
MICROSCOPIC EXAMINATION: YES
MONOCYTES ABSOLUTE: 1.4 K/UL (ref 0–1.3)
MONOCYTES RELATIVE PERCENT: 12 %
MUCUS: ABNORMAL /LPF
MYELOCYTE PERCENT: 1 %
NEUTROPHILS ABSOLUTE: 8.6 K/UL (ref 1.7–7.7)
NEUTROPHILS RELATIVE PERCENT: 73 %
NITRITE, URINE: NEGATIVE
OCCULT BLOOD DIAGNOSTIC: ABNORMAL
PDW BLD-RTO: 17.8 % (ref 12.4–15.4)
PH UA: 6 (ref 5–8)
PLATELET # BLD: 376 K/UL (ref 135–450)
PLATELET SLIDE REVIEW: ADEQUATE
PMV BLD AUTO: 7.7 FL (ref 5–10.5)
POIKILOCYTES: ABNORMAL
POLYCHROMASIA: ABNORMAL
POTASSIUM SERPL-SCNC: 4.7 MMOL/L (ref 3.5–5.1)
PRO-BNP: 169 PG/ML (ref 0–124)
PROTEIN UA: ABNORMAL MG/DL
PROTHROMBIN TIME: 19.9 SEC (ref 10–13.2)
RBC # BLD: 2.88 M/UL (ref 4.2–5.9)
RBC UA: ABNORMAL /HPF (ref 0–4)
REASON FOR REJECTION: NORMAL
REJECTED TEST: NORMAL
RENAL EPITHELIAL, UA: ABNORMAL /HPF (ref 0–1)
SLIDE REVIEW: ABNORMAL
SMUDGE CELLS: PRESENT
SODIUM BLD-SCNC: 128 MMOL/L (ref 136–145)
SPECIFIC GRAVITY UA: 1.02 (ref 1–1.03)
TARGET CELLS: ABNORMAL
TOTAL PROTEIN: 6.2 G/DL (ref 6.4–8.2)
TROPONIN: <0.01 NG/ML
URINE TYPE: ABNORMAL
UROBILINOGEN, URINE: 1 E.U./DL
WBC # BLD: 11.6 K/UL (ref 4–11)
WBC UA: ABNORMAL /HPF (ref 0–5)

## 2021-04-14 PROCEDURE — 85610 PROTHROMBIN TIME: CPT

## 2021-04-14 PROCEDURE — 83880 ASSAY OF NATRIURETIC PEPTIDE: CPT

## 2021-04-14 PROCEDURE — 87449 NOS EACH ORGANISM AG IA: CPT

## 2021-04-14 PROCEDURE — 87505 NFCT AGENT DETECTION GI: CPT

## 2021-04-14 PROCEDURE — 6360000002 HC RX W HCPCS: Performed by: PHYSICIAN ASSISTANT

## 2021-04-14 PROCEDURE — 96374 THER/PROPH/DIAG INJ IV PUSH: CPT | Performed by: PHYSICIAN ASSISTANT

## 2021-04-14 PROCEDURE — 93010 ELECTROCARDIOGRAM REPORT: CPT | Performed by: INTERNAL MEDICINE

## 2021-04-14 PROCEDURE — 2580000003 HC RX 258: Performed by: PHYSICIAN ASSISTANT

## 2021-04-14 PROCEDURE — 74177 CT ABD & PELVIS W/CONTRAST: CPT

## 2021-04-14 PROCEDURE — 83605 ASSAY OF LACTIC ACID: CPT

## 2021-04-14 PROCEDURE — 71260 CT THORAX DX C+: CPT

## 2021-04-14 PROCEDURE — 83690 ASSAY OF LIPASE: CPT

## 2021-04-14 PROCEDURE — 84484 ASSAY OF TROPONIN QUANT: CPT

## 2021-04-14 PROCEDURE — 6370000000 HC RX 637 (ALT 250 FOR IP): Performed by: EMERGENCY MEDICINE

## 2021-04-14 PROCEDURE — 99283 EMERGENCY DEPT VISIT LOW MDM: CPT | Performed by: PHYSICIAN ASSISTANT

## 2021-04-14 PROCEDURE — 96375 TX/PRO/DX INJ NEW DRUG ADDON: CPT | Performed by: PHYSICIAN ASSISTANT

## 2021-04-14 PROCEDURE — 87086 URINE CULTURE/COLONY COUNT: CPT

## 2021-04-14 PROCEDURE — 6360000004 HC RX CONTRAST MEDICATION: Performed by: PHYSICIAN ASSISTANT

## 2021-04-14 PROCEDURE — 81001 URINALYSIS AUTO W/SCOPE: CPT

## 2021-04-14 PROCEDURE — G0328 FECAL BLOOD SCRN IMMUNOASSAY: HCPCS

## 2021-04-14 PROCEDURE — 85025 COMPLETE CBC W/AUTO DIFF WBC: CPT

## 2021-04-14 PROCEDURE — 93005 ELECTROCARDIOGRAM TRACING: CPT | Performed by: EMERGENCY MEDICINE

## 2021-04-14 PROCEDURE — 80053 COMPREHEN METABOLIC PANEL: CPT

## 2021-04-14 PROCEDURE — 87324 CLOSTRIDIUM AG IA: CPT

## 2021-04-14 RX ORDER — 0.9 % SODIUM CHLORIDE 0.9 %
1000 INTRAVENOUS SOLUTION INTRAVENOUS ONCE
Status: COMPLETED | OUTPATIENT
Start: 2021-04-14 | End: 2021-04-14

## 2021-04-14 RX ORDER — OXYCODONE HYDROCHLORIDE 5 MG/1
5 TABLET ORAL ONCE
Status: COMPLETED | OUTPATIENT
Start: 2021-04-14 | End: 2021-04-14

## 2021-04-14 RX ORDER — ONDANSETRON 2 MG/ML
4 INJECTION INTRAMUSCULAR; INTRAVENOUS ONCE
Status: COMPLETED | OUTPATIENT
Start: 2021-04-14 | End: 2021-04-14

## 2021-04-14 RX ORDER — MORPHINE SULFATE 4 MG/ML
4 INJECTION, SOLUTION INTRAMUSCULAR; INTRAVENOUS ONCE
Status: COMPLETED | OUTPATIENT
Start: 2021-04-14 | End: 2021-04-14

## 2021-04-14 RX ADMIN — ONDANSETRON 4 MG: 2 INJECTION INTRAMUSCULAR; INTRAVENOUS at 17:28

## 2021-04-14 RX ADMIN — OXYCODONE 5 MG: 5 TABLET ORAL at 19:25

## 2021-04-14 RX ADMIN — IOPAMIDOL 85 ML: 755 INJECTION, SOLUTION INTRAVENOUS at 17:34

## 2021-04-14 RX ADMIN — SODIUM CHLORIDE 1000 ML: 9 INJECTION, SOLUTION INTRAVENOUS at 19:40

## 2021-04-14 RX ADMIN — MORPHINE SULFATE 4 MG: 4 INJECTION, SOLUTION INTRAMUSCULAR; INTRAVENOUS at 17:28

## 2021-04-14 ASSESSMENT — PAIN SCALES - GENERAL
PAINLEVEL_OUTOF10: 8

## 2021-04-14 NOTE — ED PROVIDER NOTES
ED Attending Attestation Note    This patient was seen by the advance practice provider. I have seen and examined the patient. I agree with the workup, evaluation, management, and diagnosis. The care plan has been discussed. My assessment reveals 41 y.o. male with past medical history including recurrent pancreatitis, portal vein thrombosis extending into the SMV and splenic vein, and as below presenting with recurrent abdominal pain. Focused exam reveals 49-year-old male, nontoxic, no acute distress. Abdominal exam with diffuse mild tenderness to palpation, no rebound or guarding, no masses. For further details of the patient's emergency department visit, please see the advanced practice provider's documentation. Danna Lynn MD     This report has been produced using speech recognition software and may contain errors related to that system including errors in grammar, punctuation, and spelling, as well as words and phrases that may be inappropriate. If there are any questions or concerns please feel free to contact the dictating provider for clarification.       Danna Lynn MD  04/14/21 1532

## 2021-04-14 NOTE — ED PROVIDER NOTES
[address] ED  EMERGENCY DEPARTMENT ENCOUNTER        Pt Name: Kelsi Frazier  MRN: [id number]  [place] 1980  Date of evaluation: 4/14/2021  Provider: Gaby Carroll PA-C  PCP: BENI Peck CNP    Shared Visit or Autonomous Visit:  I have seen and evaluated this patient with my supervising physician Jim Burnham MD.    [address] [place] [address]       Chief Complaint   Patient presents with    Abdominal Pain     Pt reports generalized abdominal pain since being discharged on 4-5. Pt also reports liquid BMs. Pt reports emesis started approx 3 days ago. Denies fevers. HISTORY OF PRESENT ILLNESS   (Location/Symptom, Timing/Onset, Context/Setting, Quality, Duration, Modifying Factors, Severity)  Note limiting factors. Kelsi Frazier is a 41 y.o. male presenting to the emergency department for evaluation of generalized abdominal pain. States he has been having abdominal pain for months. States he has problems with his pancreas and liver from alcohol use. Was admitted to the hospital this past month for pancreatitis, sepsis, renal failure, and development of portal vein thrombosis. Was started on eliquis but states he lost the medication and didn't take it for several days restarted his eliquis the past 2 days. C/o vomiting 2 times per day for the past 4 days. Having multiple episodes of diarrhea per day and states has seen blood in it. No fever. No cough. No chest pain. States he has had shortness of breath. Bilateral leg swelling. The history is provided by the patient. Abdominal Pain  Pain location:  Generalized  Pain quality: aching    Pain radiates to:  Does not radiate  Onset quality:  Gradual  Duration: states abdominal pain for months, increased the past 4 days.   Progression:  Worsening  Chronicity:  Recurrent  Worsened by:  Eating, movement and palpation  Associated symptoms: diarrhea, nausea, shortness of breath and vomiting    Associated symptoms: no chest pain, no cough, no dysuria and no fever    Associated symptoms comment:  BRBPR    Nursing Notes were reviewed    REVIEW OF SYSTEMS    (2-9 systems for level 4, 10 or more for level 5)     Review of Systems   Constitutional: Negative for fever. Respiratory: Positive for shortness of breath. Negative for cough. Cardiovascular: Positive for leg swelling. Negative for chest pain. Gastrointestinal: Positive for abdominal pain, blood in stool, diarrhea, nausea and vomiting. Genitourinary: Negative for difficulty urinating and dysuria. Neurological: Negative for dizziness, syncope and light-headedness. All other systems reviewed and are negative. Positives and Pertinent negatives as per HPI. PAST MEDICAL HISTORY     Past Medical History:   Diagnosis Date    Arthritis     Diverticulitis     Heroin abuse ([facility] [address] 2013    Pt has been clean for OVER 8 hears last used 2013    Hypertension     Ileostomy in place [facility]) 11/27/2019    Knee instability     per pt, his knees pop out at times, bilateral          SURGICAL HISTORY     Past Surgical History:   Procedure Laterality Date    ABDOMEN SURGERY  10/08/2019    sigmoid colectomy with ostomy    OTHER SURGICAL HISTORY  11/27/2019    ileostomy reversal, on Q pain buster insertion    SMALL INTESTINE SURGERY N/A 10/8/2019    SIGMOID COLECTOMY, OSTOMY performed by Paddy Patterson MD at [facility] N/A 11/27/2019    ILEOSTOMY REVERSAL, ON-Q PAIN BUSTER INSERTION performed by Paddy Patterson MD at [facility]       Discharge Medication List as of 4/14/2021  8:52 PM      CONTINUE these medications which have NOT CHANGED    Details   !! apixaban (ELIQUIS) 5 MG TABS tablet Take 2 tablets by mouth 2 times daily for 4 days, THEN 1 tablet 2 times daily for 23 days. , Disp-62 tablet, R-0Print      gabapentin (NEURONTIN) 100 MG capsule Take 1 capsule by mouth 3 times daily for 30 days. , Disp-90 capsule, R-0Normal      pantoprazole (PROTONIX) 40 MG tablet Take 1 tablet by mouth 2 times daily (before meals), Disp-60 tablet, R-1Normal      sucralfate (CARAFATE) 1 GM tablet Take 1 tablet by mouth 4 times daily, Disp-120 tablet, R-1Normal      nystatin (MYCOSTATIN) 699855 UNIT/ML suspension Take 5 mLs by mouth 4 times daily for 10 days, Oral, 4 TIMES DAILY Starting Mon 4/5/2021, Until Thu 4/15/2021, For 10 days, Disp-200 mL, R-0, Normal      !! apixaban (ELIQUIS) 5 MG TABS tablet Take 1 tablet by mouth 2 times daily, Disp-60 tablet, R-2Print      citalopram (CELEXA) 10 MG tablet Take by mouth dailyHistorical Med      dicyclomine (BENTYL) 10 MG capsule Take by mouth 2 times dailyHistorical Med      clonazePAM (KLONOPIN) 0.5 MG tablet Take 0.5 mg by mouth 2 times daily as needed. Historical Med       !! - Potential duplicate medications found. Please discuss with provider. ALLERGIES     Tramadol    FAMILYHISTORY     History reviewed. No pertinent family history. SOCIAL HISTORY       Social History     Socioeconomic History    Marital status:      Spouse name: None    Number of children: None    Years of education: HS education    Highest education level: None   Occupational History    Occupation: unemployed   Social Needs    Financial resource strain: None    Food insecurity     Worry: None     Inability: None    Transportation needs     Medical: None     Non-medical: None   Tobacco Use    Smoking status: Current Every Day Smoker     Packs/day: 0.50     Years: 28.00     Pack years: 14.00     Types: Cigarettes    Smokeless tobacco: Never Used   Substance and Sexual Activity    Alcohol use:  Yes     Alcohol/week: 6.0 standard drinks     Types: 6 Shots of liquor per week     Comment: once or twice a month    Drug use: No    Sexual activity: Yes     Partners: Female   Lifestyle    Physical activity     Days per week: None     Minutes per session: None    Stress: None   Relationships    oriented to person, place, and time. GCS: GCS eye subscore is 4. GCS verbal subscore is 5. GCS motor subscore is 6. Cranial Nerves: No cranial nerve deficit. Sensory: No sensory deficit. Motor: No abnormal muscle tone. Coordination: Coordination normal.   Psychiatric:         Speech: Speech normal.         Behavior: Behavior normal.         Thought Content:  Thought content normal.         DIAGNOSTIC RESULTS   LABS:    Labs Reviewed   CBC WITH AUTO DIFFERENTIAL - Abnormal; Notable for the following components:       Result Value    WBC 11.6 (*)     RBC 2.88 (*)     Hemoglobin 9.6 (*)     Hematocrit 29.6 (*)     .0 (*)     RDW 17.8 (*)     Neutrophils Absolute 8.6 (*)     Monocytes Absolute 1.4 (*)     Myelocyte Percent 1 (*)     Smudge Cells Present (*)     Anisocytosis 1+ (*)     Polychromasia Occasional (*)     Poikilocytes Occasional (*)     Target Cells Occasional (*)     All other components within normal limits    Narrative:     Performed at:  [facility],  [place], [facility]   Phone [phone]   COMPREHENSIVE METABOLIC PANEL - Abnormal; Notable for the following components:    Sodium 128 (*)     Glucose 112 (*)     CREATININE <0.5 (*)     Total Protein 6.2 (*)     Albumin 2.2 (*)     Albumin/Globulin Ratio 0.6 (*)     Total Bilirubin 2.7 (*)     Alkaline Phosphatase 305 (*)     ALT 43 (*)      (*)     All other components within normal limits    Narrative:     Performed at:  [facility]) [facility] [address],  [place], [facility]   Phone [phone]   LIPASE - Abnormal; Notable for the following components:    Lipase 72.0 (*)     All other components within normal limits    Narrative:     Performed at:  [facility]) [facility] [address],  [place], [facility]   Phone [phone]   URINALYSIS - Abnormal; Notable for the following components:    Bilirubin Urine MODERATE (*)     Protein, UA TRACE (*)     Leukocyte Esterase, Urine TRACE (*)     All other components within normal limits    Narrative:     Performed at:  [facility] [address],  [place], [place]   Phone [phone]   MICROSCOPIC URINALYSIS - Abnormal; Notable for the following components:    Hyaline Casts, UA 3-5 (*)     Mucus, UA 3+ (*)     WBC, UA 10-20 (*)     Renal Epithelial, UA 2-5 (*)     Bacteria, UA 1+ (*)     All other components within normal limits    Narrative:     Performed at:  [facility] [address],  [place], [place]   Phone [phone]   BRAIN NATRIURETIC PEPTIDE - Abnormal; Notable for the following components:    Pro- (*)     All other components within normal limits    Narrative:     Performed at:  [place] [address],  [email or web address], [place]   Phone [phone]   BLOOD OCCULT STOOL DIAGNOSTIC - Abnormal; Notable for the following components:    Occult Blood Diagnostic   (*)     Value: Result: POSITIVE  Normal range: Negative      All other components within normal limits    Narrative:     ORDER#: [id number]                          ORDERED BY: Marylen Calamity  SOURCE: Stool                              COLLECTED:  04/14/21 17:35  ANTIBIOTICS AT GRECIA.:                      RECEIVED :  04/14/21 17:53  Performed at:  [facility]) [facility] [address],  [place], [place]   Phone [phone]   PROTIME-INR - Abnormal; Notable for the following components:    Protime 19.9 (*)     INR 1.71 (*)     All other components within normal limits    Narrative:     Performed at:  [facility] [address],  [place], [place]   Phone [phone]   C DIFF TOXIN/ANTIGEN    Narrative:     ORDER#: [id number]                          ORDERED BY: Marylen Calamity  SOURCE: Stool COLLECTED:  04/14/21 17:35  ANTIBIOTICS AT GRECIA.:                      RECEIVED :  04/14/21 17:54  Collect White vial (sterile container)  Performed at:  [facility]) [place] [address],  [place], [facility]   Phone [phone]   GASTROINTESTINAL PANEL, MOLECULAR   CULTURE, URINE   TROPONIN    Narrative:     Performed at:  [place] [address],  [place], [facility]   Phone [phone]   LACTIC ACID, PLASMA    Narrative:     Performed at:  [place] [address],  [place], [facility]   Phone [phone]   SPECIMEN REJECTION    Narrative:     ORDER WAS CANCELLED 04/14/2021 18:19, Rejected: Quantity not sufficient. Notified Al Lebron RN  04/14/2021  18:19.   Performed at:  [place] [address],  [place], [facility]   Phone [phone]     Results for orders placed or performed during the hospital encounter of 04/14/21   Clostridium Difficile Toxin/Antigen    Specimen: Stool   Result Value Ref Range    C.diff Toxin/Antigen       Negative for Clostridium difficile antigen and toxin  Normal Range: Negative     CBC Auto Differential   Result Value Ref Range    WBC 11.6 (H) 4.0 - 11.0 K/uL    RBC 2.88 (L) 4.20 - 5.90 M/uL    Hemoglobin 9.6 (L) 13.5 - 17.5 g/dL    Hematocrit 29.6 (L) 40.5 - 52.5 %    .0 (H) 80.0 - 100.0 fL    MCH 33.5 26.0 - 34.0 pg    MCHC 32.5 31.0 - 36.0 g/dL    RDW 17.8 (H) 12.4 - 15.4 %    Platelets 389 366 - 749 K/uL    MPV 7.7 5.0 - 10.5 fL    PLATELET SLIDE REVIEW Adequate     SLIDE REVIEW see below     Path Consult No     Neutrophils % 73.0 %    Lymphocytes % 12.0 %    Monocytes % 12.0 %    Eosinophils % 1.0 %    Basophils % 1.0 %    Neutrophils Absolute 8.6 (H) 1.7 - 7.7 K/uL    Lymphocytes Absolute 1.4 1.0 - 5.1 K/uL    Monocytes Absolute 1.4 (H) 0.0 - 1.3 K/uL    Eosinophils Absolute 0.1 0.0 - 0.6 K/uL    Basophils Range    Troponin <0.01 <0.01 ng/mL   Blood occult stool #1   Result Value Ref Range    Occult Blood Diagnostic Result: POSITIVE  Normal range: Negative   (A)    Protime-INR   Result Value Ref Range    Protime 19.9 (H) 10.0 - 13.2 sec    INR 1.71 (H) 0.86 - 1.14   Lactic Acid, Plasma   Result Value Ref Range    Lactic Acid 1.4 0.4 - 2.0 mmol/L   SPECIMEN REJECTION   Result Value Ref Range    Rejected Test CGE     Reason for Rejection see below    EKG 12 Lead   Result Value Ref Range    Ventricular Rate 96 BPM    Atrial Rate 96 BPM    P-R Interval 162 ms    QRS Duration 90 ms    Q-T Interval 392 ms    QTc Calculation (Bazett) 495 ms    P Axis 51 degrees    R Axis -21 degrees    T Axis 23 degrees    Diagnosis       Normal sinus rhythmProlonged QTNonspecific ST abnormalityNo previous ECGs availableConfirmed by NICOLE ORTIZ MD ([id number]) on 4/14/2021 5:58:18 PM         All other labs were within normal range or not returned as of this dictation. EKG: All EKG's are interpreted by the Emergency Department Physician in the absence of a cardiologist.  Please see their note for interpretation of EKG. RADIOLOGY:   Non-plain film images such as CT, Ultrasound and MRI are read by the radiologist. Plain radiographic images are visualized andpreliminarily interpreted by the  ED Provider with the below findings:        Interpretation perthe Radiologist below, if available at the time of this note:    CT CHEST PULMONARY EMBOLISM W CONTRAST   Final Result   1. No evidence of pulmonary embolic disease. 2. No acute pulmonary findings. 3. Thrombosis of the distal SMV and splenic vein are again noted. 4. Stable mild peripancreatic inflammatory changes. Several cystic lesions   within the pancreas are grossly stable. No evidence of pancreatic necrosis. 5. Third-spacing with ascites and edematous changes throughout the abdomen. Findings may be related to progressive liver disease or pancreatitis.          CT ABDOMEN PROVIDED HISTORY: Reason for exam:->shortness of breath, tachycardia r/o PE Decision Support Exception->Emergency Medical Condition (MA) Reason for Exam: Abdominal Pain (Pt reports generalized abdominal pain since being discharged on 4-5. Pt also reports liquid BMs. Pt reports emesis started approx 3 days ago. Denies fevers. ) FINDINGS: CTA CHEST: Pulmonary Arteries: Pulmonary arteries are adequately opacified for evaluation. No evidence of intraluminal filling defect to suggest pulmonary embolism. Main pulmonary artery is normal in caliber. Mediastinum: The thoracic aorta is normal in course and caliber. The heart is not enlarged with no pericardial effusion. Note is made of an arch origin of the left vertebral artery. No pathologic hilar or mediastinal adenopathy. Lungs/pleura: The lungs are without acute process. No focal consolidation or pulmonary edema. No evidence of pleural effusion or pneumothorax. Soft Tissues/Bones: No acute bone or soft tissue abnormality. CT ABDOMEN AND PELVIS Organs: Hepatic steatosis with no focal abnormality. The spleen is borderline in size. The pancreas is atrophic. Several cystic areas throughout the pelvis are again noted, grossly stable. The largest in the pancreatic head measures 1.5 cm in diameter. Mild peripancreatic inflammatory changes are similar to the previous studies. No renal mass or significant hydronephrosis. Nonobstructive right nephrolithiasis. GI/Bowel: Previous sigmoid colectomy with no acute features. No significant pericolonic inflammatory changes. The appendix is unremarkable. Previous small bowel resection. Mildly edematous small bowel loops are likely reactive. There is no evidence of obstruction. The stomach and duodenal sweep are intact. Pelvis: Small amount of free pelvic fluid. The prostate is not enlarged. The bladder is largely contracted. Peritoneum/Retroperitoneum: The abdominal aorta is normal in caliber.   No pathologic retroperitoneal adenopathy. Perihepatic ascites with mild edematous changes throughout the abdominal fat. No free intraperitoneal air. Thrombosis of the distal SMV and portal vein are again noted. There is decreased collateral reconstitution of the intrahepatic portal vein branches. Bones/Soft Tissues: No acute osseous or soft tissue abnormality. Small fat containing ventral abdominal wall hernias. Small quantity of fluid associated with a midline incision below the level of the umbilicus. 1. No evidence of pulmonary embolic disease. 2. No acute pulmonary findings. 3. Thrombosis of the distal SMV and splenic vein are again noted. 4. Stable mild peripancreatic inflammatory changes. Several cystic lesions within the pancreas are grossly stable. No evidence of pancreatic necrosis. 5. Third-spacing with ascites and edematous changes throughout the abdomen. Findings may be related to progressive liver disease or pancreatitis. Ct Chest Pulmonary Embolism W Contrast    Result Date: 4/14/2021  EXAMINATION: CT OF THE ABDOMEN AND PELVIS WITH CONTRAST; CTA OF THE CHEST 4/14/2021 TECHNIQUE: CT of the abdomen and pelvis was performed with the administration of intravenous contrast. Multiplanar reformatted images are provided for review. Dose modulation, iterative reconstruction, and/or weight based adjustment of the mA/kV was utilized to reduce the radiation dose to as low as reasonably achievable.; CTA of the chest was performed after the administration of intravenous contrast.  Multiplanar reformatted images are provided for review. MIP images are provided for review. Dose modulation, iterative reconstruction, and/or weight based adjustment of the mA/kV was utilized to reduce the radiation dose to as low as reasonably achievable. COMPARISON: CT of the abdomen and pelvis 03/28/2021 and 03/22/2020.  HISTORY: ORDERING SYSTEM PROVIDED HISTORY: abd pain, vomiting, diarrhea TECHNOLOGIST PROVIDED HISTORY: Reason for exam:->abd pain, vomiting, diarrhea Additional Contrast?->None Decision Support Exception->Emergency Medical Condition (MA) Reason for Exam: Abdominal Pain (Pt reports generalized abdominal pain since being discharged on 4-5. Pt also reports liquid BMs. Pt reports emesis started approx 3 days ago. Denies fevers. ); ORDERING SYSTEM PROVIDED HISTORY: shortness of breath, tachycardia r/o PE TECHNOLOGIST PROVIDED HISTORY: Reason for exam:->shortness of breath, tachycardia r/o PE Decision Support Exception->Emergency Medical Condition (MA) Reason for Exam: Abdominal Pain (Pt reports generalized abdominal pain since being discharged on 4-5. Pt also reports liquid BMs. Pt reports emesis started approx 3 days ago. Denies fevers. ) FINDINGS: CTA CHEST: Pulmonary Arteries: Pulmonary arteries are adequately opacified for evaluation. No evidence of intraluminal filling defect to suggest pulmonary embolism. Main pulmonary artery is normal in caliber. Mediastinum: The thoracic aorta is normal in course and caliber. The heart is not enlarged with no pericardial effusion. Note is made of an arch origin of the left vertebral artery. No pathologic hilar or mediastinal adenopathy. Lungs/pleura: The lungs are without acute process. No focal consolidation or pulmonary edema. No evidence of pleural effusion or pneumothorax. Soft Tissues/Bones: No acute bone or soft tissue abnormality. CT ABDOMEN AND PELVIS Organs: Hepatic steatosis with no focal abnormality. The spleen is borderline in size. The pancreas is atrophic. Several cystic areas throughout the pelvis are again noted, grossly stable. The largest in the pancreatic head measures 1.5 cm in diameter. Mild peripancreatic inflammatory changes are similar to the previous studies. No renal mass or significant hydronephrosis. Nonobstructive right nephrolithiasis. GI/Bowel: Previous sigmoid colectomy with no acute features. No significant pericolonic inflammatory changes. The appendix is unremarkable. Previous small bowel resection. Mildly edematous small bowel loops are likely reactive. There is no evidence of obstruction. The stomach and duodenal sweep are intact. Pelvis: Small amount of free pelvic fluid. The prostate is not enlarged. The bladder is largely contracted. Peritoneum/Retroperitoneum: The abdominal aorta is normal in caliber. No pathologic retroperitoneal adenopathy. Perihepatic ascites with mild edematous changes throughout the abdominal fat. No free intraperitoneal air. Thrombosis of the distal SMV and portal vein are again noted. There is decreased collateral reconstitution of the intrahepatic portal vein branches. Bones/Soft Tissues: No acute osseous or soft tissue abnormality. Small fat containing ventral abdominal wall hernias. Small quantity of fluid associated with a midline incision below the level of the umbilicus. 1. No evidence of pulmonary embolic disease. 2. No acute pulmonary findings. 3. Thrombosis of the distal SMV and splenic vein are again noted. 4. Stable mild peripancreatic inflammatory changes. Several cystic lesions within the pancreas are grossly stable. No evidence of pancreatic necrosis. 5. Third-spacing with ascites and edematous changes throughout the abdomen. Findings may be related to progressive liver disease or pancreatitis.            PROCEDURES   Unless otherwise noted below, none     Procedures    CRITICAL CARE TIME   N/A    CONSULTS:  None      EMERGENCY DEPARTMENT COURSE and DIFFERENTIAL DIAGNOSIS/MDM:   Vitals:    Vitals:    04/14/21 1937 04/14/21 2007 04/14/21 2038 04/14/21 2049   BP: 105/78 102/60 103/61 103/61   Pulse:  89     Resp:  16     Temp:       TempSrc:       SpO2: 100% 100% 96%    Weight:       Height:           Patient was given thefollowing medications:  Medications   morphine sulfate (PF) injection 4 mg (4 mg Intravenous Given 4/14/21 1728)   ondansetron (ZOFRAN) injection 4 mg (4 mg Intravenous Given 4/14/21 1728)   iopamidol (ISOVUE-370) 76 % injection 85 mL (85 mLs Intravenous Given 4/14/21 1734)   oxyCODONE (ROXICODONE) immediate release tablet 5 mg (5 mg Oral Given 4/14/21 1925)   0.9 % sodium chloride bolus (0 mLs Intravenous Stopped 4/14/21 2040)         7:32 PM EDT  Patient presented to the ER today for abdominal pain. Work-up was obtained. On labs WBC 11.6. Hemoglobin 9.6, improved from 4/5/2021 hemoglobin was 8.4. Platelets 247. Sodium 128, previous sodium on 4/5/2021 was 131. He was given 1 L normal saline here. Potassium 4.7. Chloride 99. Glucose 112. CO2 21. Gap 8.  elevated LFTs, bilirubin 2.7 alk phos 305, ALT 43 , stable. Lipase is 72. Lactic acid 1.4. BNP 69. Troponin less than 0.01. EKG showing normal sinus rhythm, prolonged QT, nonspecific ST changes. Positive Hemoccult stool. Negative C. difficile. CT scan abdomen pelvis and CTPA obtained. No evidence of pulmonary embolic disease. No acute pulmonary findings. Thrombosis of the distal SMV and splenic vein are again noted. Stable mild peripancreatic inflammatory changes. No evidence of pancreatic necrosis. Third spacing with ascites and edematous changes throughout the abdomen. He has chronic liver disease and pancreatic disease. He has an appointment with GI for follow-up. Patient was given pain medication and nausea medication in the ER and he is feeling better now. States he feels fine. States his family is waiting outside for him and he is ready to go. Advised to keep his appointment with GI. Continue current medications at home. Advised to return to the ER for any worsening symptoms specifically worsening pain, intractable vomiting or if he develops fevers or for chest pain or worsening shortness of breath. He understands and agrees.         I estimate there is LOW risk for ACUTE APPENDICITIS, BOWEL OBSTRUCTION, CHOLECYSTITIS, DIVERTICULITIS, INCARCERATED HERNIA, PERFORATED BOWEL, BOWEL ISCHEMIA, GONADAL TORSION, OR CARDIAC ISCHEMIA, thus I consider the discharge disposition reasonable. Also, there is no evidence or peritonitis, sepsis, or toxicity. FINAL IMPRESSION      1. Generalized abdominal pain    2. Liver disease    3. Mesenteric thrombosis ([address]    4. Pancreas cyst    5. Elevated LFTs    6. Heme positive stool    7. Hyponatremia    8. Diarrhea, unspecified type    9.  Other chronic pancreatitis [facility])          DISPOSITION/PLAN   DISPOSITION Decision to Discharge    PATIENT REFERREDTO:  Carlean Schwab, APRN - CNP  [address]   [phone]    Call in 1 day  Call to arrange follow-up appointment from ER visit    Ela Morrow, [facility], [address] [place] [address] [phone]    Call in 1 day  Call to arrange follow-up appointment from ER visit    [place] (CREEK) [facility] ED  [address] [facility]  [phone]    If symptoms worsen      DISCHARGE MEDICATIONS:  Discharge Medication List as of 4/14/2021  8:52 PM          DISCONTINUED MEDICATIONS:  Discharge Medication List as of 4/14/2021  8:52 PM                 (Please note that portions ofthis note were completed with a voice recognition program.  Efforts were made to edit the dictations but occasionally words are mis-transcribed.)    Katharine Soto PA-C (electronically signed)            Alex Flanagan PA-C  04/15/21 [id number]

## 2021-04-15 LAB — URINE CULTURE, ROUTINE: NORMAL

## 2021-04-15 ASSESSMENT — ENCOUNTER SYMPTOMS
DIARRHEA: 1
ABDOMINAL PAIN: 1
COUGH: 0
SHORTNESS OF BREATH: 1
VOMITING: 1
BLOOD IN STOOL: 1
NAUSEA: 1

## 2021-04-16 LAB — GI BACTERIAL PATHOGENS BY PCR: NORMAL

## 2021-04-22 NOTE — PROGRESS NOTES
Handoff report given to oncoming night nurse dusted and the fragments appeared to be under 1 millimeter and could pass easily, we flushed all fragments out of the ureter. We withdrew the ureteroscope and visualized the entire ureter. No stone fragments were identified. No damage to the ureter was identified. At this time, over the remaining glidewire, we placed a 6x26 double J ureteral stent in the usual fashion, and we noted appropriate placement in the upper collecting system using fluoroscopy. There was a good curl noted in the bladder. We decided to leave a string at the end of the stent, which was attached with benzoin and steri-strips.  The patient's bladder was drained and removed the scope and the procedure was subsequently terminated.           Plan:  Discharge home in good condition  The patient can pull the stent in 7 days

## 2021-05-19 ENCOUNTER — PREP FOR PROCEDURE (OUTPATIENT)
Dept: GASTROENTEROLOGY | Age: 41
End: 2021-05-19

## 2021-06-02 ENCOUNTER — CLINICAL DOCUMENTATION (OUTPATIENT)
Dept: OTHER | Age: 41
End: 2021-06-02

## 2025-03-24 NOTE — PROGRESS NOTES
Max: 100 %  24HR INTAKE/OUTPUT:    Intake/Output Summary (Last 24 hours) at 4/3/2021 1355  Last data filed at 4/3/2021 0800  Gross per 24 hour   Intake 1537 ml   Output 5150 ml   Net -3613 ml       Gen: NAD  Neck: no  jvd noted  Cardiovascular:  S1, S2 without m/r/g  Respiratory: Diminished  Abdomen:  +bs, soft, nt, nd, no hepatosplenomegaly  Ext: no edema, no pain on leg palpation  Neuro/Psy: non-focal    Data/  CBC:   Recent Labs     04/01/21  0424 04/02/21  0627 04/03/21  0330   WBC 12.0* 12.8* 12.6*   RBC 2.21* 2.23* 2.18*   HGB 7.3* 7.5* 7.3*   HCT 22.2* 23.0* 22.6*    227 204     BMP:    Recent Labs     04/02/21  1150 04/02/21  1830 04/03/21  0330    136 135*   K 4.1 4.2 4.3    105 105   CO2 28 25 25   BUN 7 6* 6*   CREATININE <0.5* <0.5* <0.5*   CALCIUM 8.2* 8.5 8.3   GLUCOSE 141* 142* 114*     Phosphorus:    Recent Labs     04/02/21  1150 04/02/21  1830 04/03/21  0330   PHOS 2.6 2.3* 2.7     Magnesium:    Recent Labs     04/02/21  0627 04/02/21  1150 04/03/21  0330   MG 1.90 1.90 1.60*     Hepatic Function Panel:    Lab Results   Component Value Date    ALKPHOS 203 04/02/2021     04/02/2021     04/02/2021    PROT 5.4 04/02/2021    BILITOT 4.8 04/02/2021    BILIDIR 4.1 04/02/2021    IBILI 0.7 04/02/2021    LABALBU 2.2 04/02/2021       CT scan of abdomen pelvis with IV contrast from 3/22  Impression   There is complete thrombosis of the portal vein.  Thrombus is also seen   extending into the cephalad portion of the superior mesenteric vein and   throughout the splenic vein.       Peripancreatic stranding consistent with pancreatitis. Susanne Mettle is a small area   of low-attenuation in the region the pancreatic head which may correspond to   focal dilated pancreatic duct or a small developing fluid collection.       Hepatic steatosis. CT scan of abdomen pelvis without contrast on 3/28  FINDINGS:   Lower Chest: Calcified granuloma are noted in the lung bases.       Organs:  There is extensive fatty infiltration of the liver, with areas of   relative sparing.       Peripancreatic fat stranding is present.  A parenchymal calcification is   noted in the pancreatic head.  No peripancreatic fluid collection is   identified.       The spleen and adrenal glands are unremarkable.  There are nonobstructing   calculi in the kidneys, measuring up to 4 mm on the right and 2 mm on the   left.       GI/Bowel: There is a small hiatal hernia.  No dilated loops of bowel are   identified. Bella Jose is a colo colonic anastomosis in the sigmoid colon, which   is unremarkable.  Appendix is normal.       Pelvis: The bladder and prostate gland are unremarkable. Bella Jose is a small   amount of free fluid in the recto vesicular space.       Peritoneum/Retroperitoneum: There is mild aortoiliac calcification, without   aneurysm.  No adenopathy is seen.       Bones/Soft Tissues: No acute osseous injury is appreciated.       There are midline periumbilical hernias with ostia of 1.6 and 1.2 cm   respectively, with small bowel content. Bella Jose is no incarceration or area of   transition.  Adjacently, there is a right periumbilical hernia, with ostium   of 2.8 cm and fat content.  Several small fat containing hernias are also   noted in the supraumbilical region.           Impression   Findings consistent with acute on chronic pancreatitis.       Small amount of free fluid in the pelvis.  No pseudocyst is identified.       Extensive fatty infiltration of the liver.       Nonobstructive bilateral nephrolithiasis.  Punctate caliceal stones are noted   bilaterally.       Multiple small ventral hernias.  2 hernias in the periumbilical midline   contain small bowel.  No incarceration is evident.      UA with micro shows 30 protein positive nitrite 6-9 WBCs and 11-20 RBCs  Urine culture no growth till date     Assessment  -REDDY in the setting of nausea vomiting diarrhea leading to volume depletion, hypotension and also third spacing from SIRS related to pancreatitis.   Creatinine improved  Has intermittent vomiting and FT out - continue IVF  Would use NS now with lower Na    -Hyperkalemia from REDDY and metabolic acidosis   Improved with medical management with IV insulin, D50 and bicarb    -Metabolic acidosis, predominantly lactic acidosis from hypoperfusion and liver failure  Resolved     -Acute on chronic pancreatitis from biliary sludge    -Septic shock with Klebsiella pneumoniae bacteremia     -Portal vein thrombosis, superior mesenteric vein thrombosis   Elevated INR, also on heparin drip    -Anemia-1 PRBC on 3/29    -Hypocalcemia-corrected calcium is about 9 on 3/29    Plan  - stop LR  - start NS at 75 ml/hr  - if TF restarted, can stop IVF        Elida Ko  [facility] Patient informed/Family informed/Explanation of wait/Warm blanket/TV

## (undated) DEVICE — KIT INFUS PMP 270ML 4ML/HR 2ML/SITE SOAK CATH L5IN N NARC

## (undated) DEVICE — GOWN,AURORA,NONREINF,RAGLAN,XXL,STERILE: Brand: MEDLINE

## (undated) DEVICE — GLOVE ORANGE PI 8 1/2   MSG9085

## (undated) DEVICE — INTENDED FOR TISSUE SEPARATION, AND OTHER PROCEDURES THAT REQUIRE A SHARP SURGICAL BLADE TO PUNCTURE OR CUT.: Brand: BARD-PARKER ® CARBON RIB-BACK BLADES

## (undated) DEVICE — GAUZE,SPONGE,4"X4",8PLY,STRL,LF,10/TRAY: Brand: MEDLINE

## (undated) DEVICE — Z DISCONTINUED USE 2716237 RELOAD STPL L40MM H1.5-3.5MM WIRE DIA0.2MM REG TISS BLU CRV

## (undated) DEVICE — MAJOR SET UP PK

## (undated) DEVICE — GOWN SIRUS NONREIN XL W/TWL: Brand: MEDLINE INDUSTRIES, INC.

## (undated) DEVICE — SHEATH INTRO 17GA L8IN TUNN DISP ON-Q

## (undated) DEVICE — SUTURE PERMAHAND SZ 3-0 L18IN NONABSORBABLE BLK L26MM SH C013D

## (undated) DEVICE — SUTURE PERMAHAND SZ 2-0 L30IN NONABSORBABLE BLK SILK W/O A305H

## (undated) DEVICE — STAPLER INT L60MM REG TISS BLU B FRM 8 FIRING 2 ROW AUTO

## (undated) DEVICE — PACK,UNIVERSAL,SPLIT,II,AURORA: Brand: MEDLINE

## (undated) DEVICE — SEALER TISS L20CM DIA13MM ADV BPLR L CRV JAW OPN APPRCH

## (undated) DEVICE — RELOAD STPL L75MM OPN H3.8MM CLS 1.5MM WIRE DIA0.2MM REG

## (undated) DEVICE — PACK PROC HALF SHT W3XL5IN SOD HYALURONATE

## (undated) DEVICE — TUBING, SUCTION, 3/16" X 10', STRAIGHT: Brand: MEDLINE

## (undated) DEVICE — CIRCUIT ANES L72IN 3L BACT AND VIR FLTR EL CONN SGL LIMB

## (undated) DEVICE — YANKAUER,BULB TIP,W/O VENT,RIGID,STERILE: Brand: MEDLINE

## (undated) DEVICE — 3M™ STERI-STRIP™ REINFORCED ADHESIVE SKIN CLOSURES, R1540, 1/8 IN X 3 IN (3 MM X 75 MM), 5 STRIPS/ENVELOPE: Brand: 3M™ STERI-STRIP™

## (undated) DEVICE — TRAY PREP DRY W/ PREM GLV 2 APPL 6 SPNG 2 UNDPD 1 OVERWRAP

## (undated) DEVICE — STAPLER INT L75MM CUT LN L73MM STPL LN L77MM BLU B FRM 8

## (undated) DEVICE — TRAY CATH 16FR F INCLUDE LUB DRNGE BG STATLOK STBL DEV

## (undated) DEVICE — STAPLER INT DIA29MM CLS STPL H1.5-2.2MM OPN LEG L5.2MM 26

## (undated) DEVICE — APPLICATOR PREP 26ML 0.7% IOD POVACRYLEX 74% ISO ALC ST

## (undated) DEVICE — CONMED ACCESSORY ELECTRODE, 6 INCH (15.24 CM) FLAT BLADE: Brand: CONMED

## (undated) DEVICE — SUTURE PROL SZ 1 L30IN NONABSORBABLE BLU CTX L48MM 1/2 CIR 8455H

## (undated) DEVICE — SPONGE LAP W18XL18IN WHT COT 4 PLY FLD STRUNG RADPQ DISP ST

## (undated) DEVICE — ELECTRODE PT RET AD L9FT HI MOIST COND ADH HYDRGEL CORDED

## (undated) DEVICE — TOWELS OR X-RAY DETECTABLE ST 17X27

## (undated) DEVICE — SOLUTION IV IRRIG 500ML 0.9% SODIUM CHL 2F7123

## (undated) DEVICE — Z DISCONTINUED USE 2716239 STAPLER INT STPL 51MM CUT LN L40MM STD TISS CRV CUT CR40B

## (undated) DEVICE — SUTURE PERMA-HAND SZ 2-0 L30IN NONABSORBABLE BLK L26MM SH K833H

## (undated) DEVICE — INVIEW CLEAR LEGGINGS: Brand: CONVERTORS

## (undated) DEVICE — POOLE SUCTION INSTRUMENT,RIGID: Brand: ARGYLE

## (undated) DEVICE — UNDER BUTTOCKS DRAPE WITH FLUID CONTROL POUCH: Brand: CONVERTORS